# Patient Record
Sex: FEMALE | Race: WHITE | NOT HISPANIC OR LATINO | Employment: OTHER | RURAL
[De-identification: names, ages, dates, MRNs, and addresses within clinical notes are randomized per-mention and may not be internally consistent; named-entity substitution may affect disease eponyms.]

---

## 2018-06-06 ENCOUNTER — HISTORICAL (OUTPATIENT)
Dept: ADMINISTRATIVE | Facility: HOSPITAL | Age: 83
End: 2018-06-06

## 2018-06-14 LAB
LAB AP CLINICAL INFORMATION: NORMAL
LAB AP DIAGNOSIS - HISTORICAL: NORMAL
LAB AP GROSS PATHOLOGY - HISTORICAL: NORMAL
LAB AP SPECIMEN SUBMITTED - HISTORICAL: NORMAL

## 2020-02-13 ENCOUNTER — HISTORICAL (OUTPATIENT)
Dept: ADMINISTRATIVE | Facility: HOSPITAL | Age: 85
End: 2020-02-13

## 2020-07-29 ENCOUNTER — HISTORICAL (OUTPATIENT)
Dept: ADMINISTRATIVE | Facility: HOSPITAL | Age: 85
End: 2020-07-29

## 2021-04-13 ENCOUNTER — HOSPITAL ENCOUNTER (OUTPATIENT)
Dept: RADIOLOGY | Facility: HOSPITAL | Age: 86
Discharge: HOME | End: 2021-04-13
Attending: ORTHOPAEDIC SURGERY
Payer: MEDICARE

## 2021-04-13 DIAGNOSIS — M25.561 ACUTE PAIN OF RIGHT KNEE: ICD-10-CM

## 2021-04-13 PROBLEM — M19.90 ARTHRITIS: Status: ACTIVE | Noted: 2021-04-13

## 2021-04-13 PROCEDURE — 73562 X-RAY EXAM OF KNEE 3: CPT | Mod: TC,RT

## 2021-04-13 PROCEDURE — 73562 X-RAY EXAM OF KNEE 3: CPT | Mod: 26,RT,, | Performed by: RADIOLOGY

## 2021-04-13 PROCEDURE — 73562 XR KNEE AP LAT WITH SUNRISE RIGHT: ICD-10-PCS | Mod: 26,RT,, | Performed by: RADIOLOGY

## 2021-05-18 ENCOUNTER — HOSPITAL ENCOUNTER (OUTPATIENT)
Dept: RADIOLOGY | Facility: HOSPITAL | Age: 86
Discharge: HOME | End: 2021-05-18
Attending: NURSE PRACTITIONER
Payer: MEDICARE

## 2021-05-18 DIAGNOSIS — M25.562 ACUTE PAIN OF LEFT KNEE: ICD-10-CM

## 2021-05-18 PROBLEM — M17.12 PRIMARY OSTEOARTHRITIS OF LEFT KNEE: Status: ACTIVE | Noted: 2021-05-18

## 2021-05-18 PROCEDURE — 73562 X-RAY EXAM OF KNEE 3: CPT | Mod: TC,LT

## 2021-05-18 PROCEDURE — 73562 XR KNEE AP LAT WITH SUNRISE LEFT: ICD-10-PCS | Mod: 26,LT,,

## 2021-05-18 PROCEDURE — 73562 X-RAY EXAM OF KNEE 3: CPT | Mod: 26,LT,,

## 2021-06-08 ENCOUNTER — OFFICE VISIT (OUTPATIENT)
Dept: FAMILY MEDICINE | Facility: CLINIC | Age: 86
End: 2021-06-08
Payer: MEDICARE

## 2021-06-08 ENCOUNTER — APPOINTMENT (OUTPATIENT)
Dept: RADIOLOGY | Facility: CLINIC | Age: 86
End: 2021-06-08
Attending: FAMILY MEDICINE
Payer: MEDICARE

## 2021-06-08 VITALS
WEIGHT: 130.19 LBS | DIASTOLIC BLOOD PRESSURE: 66 MMHG | SYSTOLIC BLOOD PRESSURE: 140 MMHG | HEART RATE: 66 BPM | RESPIRATION RATE: 18 BRPM

## 2021-06-08 DIAGNOSIS — M54.9 DORSALGIA, UNSPECIFIED: ICD-10-CM

## 2021-06-08 DIAGNOSIS — I10 ESSENTIAL HYPERTENSION: ICD-10-CM

## 2021-06-08 DIAGNOSIS — M17.12 PRIMARY OSTEOARTHRITIS OF LEFT KNEE: Primary | ICD-10-CM

## 2021-06-08 PROCEDURE — 72100 X-RAY EXAM L-S SPINE 2/3 VWS: CPT | Mod: 26,,, | Performed by: RADIOLOGY

## 2021-06-08 PROCEDURE — 99213 PR OFFICE/OUTPT VISIT, EST, LEVL III, 20-29 MIN: ICD-10-PCS | Mod: ,,, | Performed by: FAMILY MEDICINE

## 2021-06-08 PROCEDURE — 72100 X-RAY EXAM L-S SPINE 2/3 VWS: CPT | Mod: TC,RHCUB | Performed by: FAMILY MEDICINE

## 2021-06-08 PROCEDURE — 72100 XR LUMBAR SPINE 2 OR 3 VIEWS: ICD-10-PCS | Mod: 26,,, | Performed by: RADIOLOGY

## 2021-06-08 PROCEDURE — 99213 OFFICE O/P EST LOW 20 MIN: CPT | Mod: ,,, | Performed by: FAMILY MEDICINE

## 2021-06-08 RX ORDER — DICLOFENAC SODIUM 10 MG/G
GEL TOPICAL
Status: ON HOLD | COMMUNITY
Start: 2021-04-17 | End: 2021-10-20 | Stop reason: HOSPADM

## 2021-06-08 RX ORDER — HYDROCODONE BITARTRATE AND ACETAMINOPHEN 7.5; 325 MG/1; MG/1
1 TABLET ORAL EVERY 6 HOURS PRN
Qty: 90 TABLET | Refills: 0 | Status: SHIPPED | OUTPATIENT
Start: 2021-06-08 | End: 2021-06-08 | Stop reason: SDUPTHER

## 2021-06-08 RX ORDER — HYDROCODONE BITARTRATE AND ACETAMINOPHEN 7.5; 325 MG/1; MG/1
1 TABLET ORAL EVERY 6 HOURS PRN
Qty: 90 TABLET | Refills: 0 | Status: SHIPPED | OUTPATIENT
Start: 2021-06-08 | End: 2021-09-17

## 2021-07-11 ENCOUNTER — HOSPITAL ENCOUNTER (EMERGENCY)
Facility: HOSPITAL | Age: 86
Discharge: HOME | End: 2021-07-11
Payer: MEDICARE

## 2021-07-11 VITALS
WEIGHT: 125 LBS | SYSTOLIC BLOOD PRESSURE: 128 MMHG | HEART RATE: 94 BPM | BODY MASS INDEX: 19.62 KG/M2 | HEIGHT: 67 IN | DIASTOLIC BLOOD PRESSURE: 86 MMHG | OXYGEN SATURATION: 91 % | TEMPERATURE: 98 F | RESPIRATION RATE: 18 BRPM

## 2021-07-11 DIAGNOSIS — M19.90 ARTHRITIS: ICD-10-CM

## 2021-07-11 DIAGNOSIS — T14.90XA INJURY: ICD-10-CM

## 2021-07-11 DIAGNOSIS — S40.011A CONTUSION OF RIGHT SHOULDER, INITIAL ENCOUNTER: ICD-10-CM
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
## 2021-07-11 DIAGNOSIS — M17.12 PRIMARY OSTEOARTHRITIS OF LEFT KNEE: ICD-10-CM

## 2021-07-11 DIAGNOSIS — I10 HYPERTENSION: ICD-10-CM

## 2021-07-11 PROCEDURE — 99283 EMERGENCY DEPT VISIT LOW MDM: CPT

## 2021-07-11 PROCEDURE — 99283 EMERGENCY DEPT VISIT LOW MDM: CPT | Mod: ,,, | Performed by: NURSE PRACTITIONER

## 2021-07-11 PROCEDURE — 99283 PR EMERGENCY DEPT VISIT,LEVEL III: ICD-10-PCS | Mod: ,,, | Performed by: NURSE PRACTITIONER

## 2021-07-11 RX ORDER — IBUPROFEN 800 MG/1
800 TABLET ORAL EVERY 6 HOURS PRN
Qty: 20 TABLET | Refills: 0 | Status: SHIPPED | OUTPATIENT
Start: 2021-07-11 | End: 2021-09-17

## 2021-07-11 RX ORDER — TIZANIDINE 4 MG/1
4 TABLET ORAL EVERY 8 HOURS PRN
Qty: 10 TABLET | Refills: 0 | Status: SHIPPED | OUTPATIENT
Start: 2021-07-11 | End: 2021-07-21

## 2021-07-19 ENCOUNTER — OFFICE VISIT (OUTPATIENT)
Dept: FAMILY MEDICINE | Facility: CLINIC | Age: 86
End: 2021-07-19
Payer: MEDICARE

## 2021-07-19 VITALS
BODY MASS INDEX: 19.58 KG/M2 | DIASTOLIC BLOOD PRESSURE: 70 MMHG | HEIGHT: 67 IN | HEART RATE: 75 BPM | SYSTOLIC BLOOD PRESSURE: 110 MMHG | RESPIRATION RATE: 14 BRPM | OXYGEN SATURATION: 94 %

## 2021-07-19 DIAGNOSIS — R41.82 ALTERED MENTAL STATUS, UNSPECIFIED ALTERED MENTAL STATUS TYPE: ICD-10-CM

## 2021-07-19 DIAGNOSIS — S09.90XA ATAXIA AFTER HEAD TRAUMA: ICD-10-CM

## 2021-07-19 DIAGNOSIS — M19.90 ARTHRITIS: ICD-10-CM

## 2021-07-19 DIAGNOSIS — R27.0 ATAXIA AFTER HEAD TRAUMA: Primary | ICD-10-CM

## 2021-07-19 DIAGNOSIS — M17.12 PRIMARY OSTEOARTHRITIS OF LEFT KNEE: ICD-10-CM

## 2021-07-19 DIAGNOSIS — S09.90XA ATAXIA AFTER HEAD TRAUMA: Primary | ICD-10-CM

## 2021-07-19 DIAGNOSIS — I10 ESSENTIAL HYPERTENSION: Primary | ICD-10-CM

## 2021-07-19 DIAGNOSIS — R53.83 FATIGUE, UNSPECIFIED TYPE: ICD-10-CM

## 2021-07-19 DIAGNOSIS — R27.0 ATAXIA AFTER HEAD TRAUMA: ICD-10-CM

## 2021-07-19 PROCEDURE — 99214 OFFICE O/P EST MOD 30 MIN: CPT | Mod: ,,, | Performed by: FAMILY MEDICINE

## 2021-07-19 PROCEDURE — 99214 PR OFFICE/OUTPT VISIT, EST, LEVL IV, 30-39 MIN: ICD-10-PCS | Mod: ,,, | Performed by: FAMILY MEDICINE

## 2021-08-02 ENCOUNTER — OFFICE VISIT (OUTPATIENT)
Dept: FAMILY MEDICINE | Facility: CLINIC | Age: 86
End: 2021-08-02
Payer: MEDICARE

## 2021-08-02 VITALS — HEIGHT: 67 IN | HEART RATE: 78 BPM | RESPIRATION RATE: 18 BRPM | WEIGHT: 122.38 LBS | BODY MASS INDEX: 19.21 KG/M2

## 2021-08-02 DIAGNOSIS — M54.9 DORSALGIA, UNSPECIFIED: ICD-10-CM

## 2021-08-02 DIAGNOSIS — F41.9 ANXIETY: Primary | ICD-10-CM

## 2021-08-02 DIAGNOSIS — M19.90 ARTHRITIS: ICD-10-CM

## 2021-08-02 PROCEDURE — 99213 OFFICE O/P EST LOW 20 MIN: CPT | Mod: ,,, | Performed by: FAMILY MEDICINE

## 2021-08-02 PROCEDURE — 99213 PR OFFICE/OUTPT VISIT, EST, LEVL III, 20-29 MIN: ICD-10-PCS | Mod: ,,, | Performed by: FAMILY MEDICINE

## 2021-08-02 RX ORDER — CYCLOBENZAPRINE HCL 10 MG
10 TABLET ORAL 3 TIMES DAILY PRN
Qty: 45 TABLET | Refills: 2 | Status: SHIPPED | OUTPATIENT
Start: 2021-08-02 | End: 2021-08-12

## 2021-08-02 RX ORDER — ALPRAZOLAM 1 MG/1
TABLET ORAL
Qty: 90 TABLET | Refills: 2 | Status: ON HOLD | OUTPATIENT
Start: 2021-08-02 | End: 2021-10-20 | Stop reason: HOSPADM

## 2021-08-16 ENCOUNTER — TELEPHONE (OUTPATIENT)
Dept: FAMILY MEDICINE | Facility: CLINIC | Age: 86
End: 2021-08-16

## 2021-08-16 DIAGNOSIS — I62.00 NONTRAUMATIC SUBDURAL HEMORRHAGE, UNSPECIFIED: ICD-10-CM

## 2021-08-16 DIAGNOSIS — S06.5XAA SUBDURAL HEMATOMA: Primary | ICD-10-CM

## 2021-08-17 ENCOUNTER — TELEPHONE (OUTPATIENT)
Dept: FAMILY MEDICINE | Facility: CLINIC | Age: 86
End: 2021-08-17

## 2021-08-17 DIAGNOSIS — M54.9 DORSALGIA, UNSPECIFIED: Primary | ICD-10-CM

## 2021-08-17 RX ORDER — HYDROCODONE BITARTRATE AND ACETAMINOPHEN 10; 325 MG/1; MG/1
1 TABLET ORAL EVERY 6 HOURS PRN
Qty: 60 TABLET | Refills: 0 | Status: SHIPPED | OUTPATIENT
Start: 2021-08-17 | End: 2021-08-31 | Stop reason: SDUPTHER

## 2021-08-18 ENCOUNTER — APPOINTMENT (OUTPATIENT)
Dept: RADIOLOGY | Facility: CLINIC | Age: 86
End: 2021-08-18
Attending: FAMILY MEDICINE
Payer: MEDICARE

## 2021-08-18 ENCOUNTER — OFFICE VISIT (OUTPATIENT)
Dept: FAMILY MEDICINE | Facility: CLINIC | Age: 86
End: 2021-08-18
Payer: MEDICARE

## 2021-08-18 VITALS
WEIGHT: 122 LBS | HEIGHT: 67 IN | SYSTOLIC BLOOD PRESSURE: 140 MMHG | BODY MASS INDEX: 19.15 KG/M2 | HEART RATE: 72 BPM | RESPIRATION RATE: 16 BRPM | DIASTOLIC BLOOD PRESSURE: 94 MMHG

## 2021-08-18 DIAGNOSIS — M25.552 LEFT HIP PAIN: Primary | ICD-10-CM

## 2021-08-18 DIAGNOSIS — M54.9 DORSALGIA, UNSPECIFIED: ICD-10-CM

## 2021-08-18 PROCEDURE — 72100 X-RAY EXAM L-S SPINE 2/3 VWS: CPT | Mod: TC,RHCUB | Performed by: FAMILY MEDICINE

## 2021-08-18 PROCEDURE — 99214 OFFICE O/P EST MOD 30 MIN: CPT | Mod: ,,, | Performed by: FAMILY MEDICINE

## 2021-08-18 PROCEDURE — 99214 PR OFFICE/OUTPT VISIT, EST, LEVL IV, 30-39 MIN: ICD-10-PCS | Mod: ,,, | Performed by: FAMILY MEDICINE

## 2021-08-18 PROCEDURE — 72100 X-RAY EXAM L-S SPINE 2/3 VWS: CPT | Mod: 26,,, | Performed by: STUDENT IN AN ORGANIZED HEALTH CARE EDUCATION/TRAINING PROGRAM

## 2021-08-18 PROCEDURE — 72100 XR LUMBAR SPINE AP AND LATERAL: ICD-10-PCS | Mod: 26,,, | Performed by: STUDENT IN AN ORGANIZED HEALTH CARE EDUCATION/TRAINING PROGRAM

## 2021-08-24 ENCOUNTER — TELEPHONE (OUTPATIENT)
Dept: FAMILY MEDICINE | Facility: CLINIC | Age: 86
End: 2021-08-24

## 2021-08-24 ENCOUNTER — OFFICE VISIT (OUTPATIENT)
Dept: PAIN MEDICINE | Facility: CLINIC | Age: 86
End: 2021-08-24
Payer: MEDICARE

## 2021-08-24 VITALS
SYSTOLIC BLOOD PRESSURE: 159 MMHG | WEIGHT: 123 LBS | HEIGHT: 67 IN | DIASTOLIC BLOOD PRESSURE: 69 MMHG | HEART RATE: 107 BPM | BODY MASS INDEX: 19.3 KG/M2

## 2021-08-24 DIAGNOSIS — S72.002A CLOSED FRACTURE OF LEFT HIP, INITIAL ENCOUNTER: Chronic | ICD-10-CM

## 2021-08-24 DIAGNOSIS — M54.9 DORSALGIA, UNSPECIFIED: Chronic | ICD-10-CM

## 2021-08-24 DIAGNOSIS — M25.559 HIP PAIN: Chronic | ICD-10-CM

## 2021-08-24 DIAGNOSIS — Z79.899 ENCOUNTER FOR LONG-TERM (CURRENT) USE OF OTHER MEDICATIONS: Primary | Chronic | ICD-10-CM

## 2021-08-24 LAB
CTP QC/QA: YES
POC (AMP) AMPHETAMINE: NEGATIVE
POC (BAR) BARBITURATES: NEGATIVE
POC (BUP) BUPRENORPHINE: NEGATIVE
POC (BZO) BENZODIAZEPINES: ABNORMAL
POC (COC) COCAINE: NEGATIVE
POC (MDMA) METHYLENEDIOXYMETHAMPHETAMINE 3,4: NEGATIVE
POC (MET) METHAMPHETAMINE: NEGATIVE
POC (MOP) OPIATES: ABNORMAL
POC (MTD) METHADONE: NEGATIVE
POC (OXY) OXYCODONE: NEGATIVE
POC (PCP) PHENCYCLIDINE: NEGATIVE
POC (TCA) TRICYCLIC ANTIDEPRESSANTS: NEGATIVE
POC TEMPERATURE (URINE): 94
POC THC: NEGATIVE

## 2021-08-24 PROCEDURE — 99214 OFFICE O/P EST MOD 30 MIN: CPT | Mod: PBBFAC | Performed by: PAIN MEDICINE

## 2021-08-24 PROCEDURE — 80305 DRUG TEST PRSMV DIR OPT OBS: CPT | Mod: PBBFAC | Performed by: PAIN MEDICINE

## 2021-08-24 PROCEDURE — 99204 OFFICE O/P NEW MOD 45 MIN: CPT | Mod: S$PBB,,, | Performed by: PAIN MEDICINE

## 2021-08-24 PROCEDURE — G0481 DRUG SCREEN DEFINITIVE 14, URINE: ICD-10-PCS | Mod: ,,, | Performed by: CLINICAL MEDICAL LABORATORY

## 2021-08-24 PROCEDURE — 99204 PR OFFICE/OUTPT VISIT, NEW, LEVL IV, 45-59 MIN: ICD-10-PCS | Mod: S$PBB,,, | Performed by: PAIN MEDICINE

## 2021-08-24 PROCEDURE — G0481 DRUG TEST DEF 8-14 CLASSES: HCPCS | Mod: ,,, | Performed by: CLINICAL MEDICAL LABORATORY

## 2021-08-24 RX ORDER — OXYCODONE AND ACETAMINOPHEN 5; 325 MG/1; MG/1
1 TABLET ORAL
Qty: 30 TABLET | Refills: 0 | Status: SHIPPED | OUTPATIENT
Start: 2021-08-24 | End: 2021-08-24

## 2021-08-24 RX ORDER — CELECOXIB 200 MG/1
200 CAPSULE ORAL DAILY
Qty: 30 CAPSULE | Refills: 0 | Status: SHIPPED | OUTPATIENT
Start: 2021-08-24 | End: 2021-08-24

## 2021-08-31 ENCOUNTER — TELEPHONE (OUTPATIENT)
Dept: FAMILY MEDICINE | Facility: CLINIC | Age: 86
End: 2021-08-31

## 2021-08-31 DIAGNOSIS — M54.9 DORSALGIA, UNSPECIFIED: ICD-10-CM

## 2021-08-31 RX ORDER — HYDROCODONE BITARTRATE AND ACETAMINOPHEN 10; 325 MG/1; MG/1
1 TABLET ORAL EVERY 6 HOURS PRN
Qty: 60 TABLET | Refills: 0 | Status: ON HOLD | OUTPATIENT
Start: 2021-08-31 | End: 2021-10-20 | Stop reason: HOSPADM

## 2021-09-01 ENCOUNTER — HOSPITAL ENCOUNTER (EMERGENCY)
Facility: HOSPITAL | Age: 86
Discharge: HOME | End: 2021-09-01
Attending: FAMILY MEDICINE
Payer: MEDICARE

## 2021-09-01 VITALS
HEART RATE: 87 BPM | BODY MASS INDEX: 20.24 KG/M2 | SYSTOLIC BLOOD PRESSURE: 139 MMHG | TEMPERATURE: 99 F | DIASTOLIC BLOOD PRESSURE: 61 MMHG | HEIGHT: 62 IN | OXYGEN SATURATION: 95 % | RESPIRATION RATE: 17 BRPM | WEIGHT: 110 LBS

## 2021-09-01 DIAGNOSIS — M19.90 ARTHRITIS: ICD-10-CM
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
## 2021-09-01 DIAGNOSIS — M17.12 PRIMARY OSTEOARTHRITIS OF LEFT KNEE: ICD-10-CM

## 2021-09-01 DIAGNOSIS — S51.011A SKIN TEAR OF RIGHT ELBOW WITHOUT COMPLICATION, INITIAL ENCOUNTER: Primary | ICD-10-CM

## 2021-09-01 DIAGNOSIS — S81.811A SKIN TEAR OF RIGHT LOWER LEG WITHOUT COMPLICATION, INITIAL ENCOUNTER: ICD-10-CM

## 2021-09-01 DIAGNOSIS — I10 HYPERTENSION: ICD-10-CM

## 2021-09-01 PROCEDURE — 12004 RPR S/N/AX/GEN/TRK7.6-12.5CM: CPT | Mod: ,,, | Performed by: FAMILY MEDICINE

## 2021-09-01 PROCEDURE — 96372 THER/PROPH/DIAG INJ SC/IM: CPT

## 2021-09-01 PROCEDURE — 90715 TDAP VACCINE 7 YRS/> IM: CPT | Performed by: FAMILY MEDICINE

## 2021-09-01 PROCEDURE — 99284 EMERGENCY DEPT VISIT MOD MDM: CPT

## 2021-09-01 PROCEDURE — 12002 RPR S/N/AX/GEN/TRNK2.6-7.5CM: CPT

## 2021-09-01 PROCEDURE — 63600175 PHARM REV CODE 636 W HCPCS: Performed by: FAMILY MEDICINE

## 2021-09-01 PROCEDURE — 12004 PR RESUPERF WND BODY 7.6-12.5 CM: ICD-10-PCS | Mod: ,,, | Performed by: FAMILY MEDICINE

## 2021-09-01 PROCEDURE — 25000003 PHARM REV CODE 250: Performed by: FAMILY MEDICINE

## 2021-09-01 PROCEDURE — 99283 PR EMERGENCY DEPT VISIT,LEVEL III: ICD-10-PCS | Mod: 25,,, | Performed by: FAMILY MEDICINE

## 2021-09-01 PROCEDURE — 90471 IMMUNIZATION ADMIN: CPT | Performed by: FAMILY MEDICINE

## 2021-09-01 PROCEDURE — 99283 EMERGENCY DEPT VISIT LOW MDM: CPT | Mod: 25,,, | Performed by: FAMILY MEDICINE

## 2021-09-01 RX ORDER — HYDROMORPHONE HYDROCHLORIDE 2 MG/ML
0.5 INJECTION, SOLUTION INTRAMUSCULAR; INTRAVENOUS; SUBCUTANEOUS
Status: COMPLETED | OUTPATIENT
Start: 2021-09-01 | End: 2021-09-01

## 2021-09-01 RX ORDER — LIDOCAINE HYDROCHLORIDE 10 MG/ML
1 INJECTION INFILTRATION; PERINEURAL
Status: COMPLETED | OUTPATIENT
Start: 2021-09-01 | End: 2021-09-01

## 2021-09-01 RX ADMIN — LIDOCAINE HYDROCHLORIDE 1 ML: 10 INJECTION, SOLUTION INFILTRATION; PERINEURAL at 03:09

## 2021-09-01 RX ADMIN — TETANUS TOXOID, REDUCED DIPHTHERIA TOXOID AND ACELLULAR PERTUSSIS VACCINE, ADSORBED 0.5 ML: 5; 2.5; 8; 8; 2.5 SUSPENSION INTRAMUSCULAR at 03:09

## 2021-09-01 RX ADMIN — HYDROMORPHONE HYDROCHLORIDE 0.5 MG: 2 INJECTION, SOLUTION INTRAMUSCULAR; INTRAVENOUS; SUBCUTANEOUS at 04:09

## 2021-09-08 LAB
CREAT UR-MCNC: 60 MG/DL (ref 28–219)
PH UR STRIP: 6 PH UNITS
SP GR UR STRIP: 1.01

## 2021-09-20 ENCOUNTER — OFFICE VISIT (OUTPATIENT)
Dept: FAMILY MEDICINE | Facility: CLINIC | Age: 86
End: 2021-09-20
Payer: MEDICARE

## 2021-09-20 ENCOUNTER — APPOINTMENT (OUTPATIENT)
Dept: RADIOLOGY | Facility: CLINIC | Age: 86
End: 2021-09-20
Attending: FAMILY MEDICINE
Payer: MEDICARE

## 2021-09-20 ENCOUNTER — HOSPITAL ENCOUNTER (INPATIENT)
Facility: HOSPITAL | Age: 86
LOS: 3 days | Discharge: NURSING, REHAB OR LONG-TERM CARE | DRG: 522 | End: 2021-09-23
Attending: INTERNAL MEDICINE | Admitting: INTERNAL MEDICINE
Payer: MEDICARE

## 2021-09-20 VITALS — DIASTOLIC BLOOD PRESSURE: 84 MMHG | SYSTOLIC BLOOD PRESSURE: 122 MMHG | RESPIRATION RATE: 16 BRPM | HEART RATE: 80 BPM

## 2021-09-20 DIAGNOSIS — M19.90 ARTHRITIS: ICD-10-CM

## 2021-09-20 DIAGNOSIS — S72.002A HIP FX, LEFT, CLOSED, INITIAL ENCOUNTER: ICD-10-CM

## 2021-09-20 DIAGNOSIS — S72.002A CLOSED FRACTURE OF LEFT HIP, INITIAL ENCOUNTER: Primary | ICD-10-CM

## 2021-09-20 DIAGNOSIS — S72.002A CLOSED FRACTURE OF LEFT HIP: ICD-10-CM

## 2021-09-20 DIAGNOSIS — N30.00 ACUTE CYSTITIS WITHOUT HEMATURIA: ICD-10-CM

## 2021-09-20 DIAGNOSIS — N39.0 URINARY TRACT INFECTION WITHOUT HEMATURIA: ICD-10-CM

## 2021-09-20 DIAGNOSIS — M79.605 LEFT LEG PAIN: ICD-10-CM

## 2021-09-20 DIAGNOSIS — R07.9 CHEST PAIN: ICD-10-CM

## 2021-09-20 DIAGNOSIS — N39.0 URINARY TRACT INFECTION WITHOUT HEMATURIA, SITE UNSPECIFIED: ICD-10-CM

## 2021-09-20 DIAGNOSIS — I10 ESSENTIAL HYPERTENSION: ICD-10-CM

## 2021-09-20 DIAGNOSIS — Z01.818 PRE-OP EVALUATION: ICD-10-CM

## 2021-09-20 DIAGNOSIS — I10 HYPERTENSION: ICD-10-CM

## 2021-09-20 DIAGNOSIS — M79.605 LEFT LEG PAIN: Primary | ICD-10-CM

## 2021-09-20 DIAGNOSIS — M25.559 HIP PAIN: ICD-10-CM

## 2021-09-20 DIAGNOSIS — S81.011D LACERATION OF RIGHT KNEE, SUBSEQUENT ENCOUNTER: ICD-10-CM

## 2021-09-20 DIAGNOSIS — M17.12 PRIMARY OSTEOARTHRITIS OF LEFT KNEE: ICD-10-CM

## 2021-09-20 LAB
ALBUMIN SERPL BCP-MCNC: 3.7 G/DL (ref 3.5–5)
ALBUMIN/GLOB SERPL: 1.1 {RATIO}
ALP SERPL-CCNC: 130 U/L (ref 55–142)
ALT SERPL W P-5'-P-CCNC: 16 U/L (ref 13–56)
ANION GAP SERPL CALCULATED.3IONS-SCNC: 15 MMOL/L (ref 7–16)
AST SERPL W P-5'-P-CCNC: 17 U/L (ref 15–37)
BACTERIA #/AREA URNS HPF: ABNORMAL /HPF
BASOPHILS # BLD AUTO: 0.08 K/UL (ref 0–0.2)
BASOPHILS NFR BLD AUTO: 0.7 % (ref 0–1)
BILIRUB SERPL-MCNC: 0.5 MG/DL (ref 0–1.2)
BILIRUB UR QL STRIP: NEGATIVE
BUN SERPL-MCNC: 12 MG/DL (ref 7–18)
BUN/CREAT SERPL: 15 (ref 6–20)
CALCIUM SERPL-MCNC: 9.3 MG/DL (ref 8.5–10.1)
CHLORIDE SERPL-SCNC: 101 MMOL/L (ref 98–107)
CLARITY UR: ABNORMAL
CO2 SERPL-SCNC: 28 MMOL/L (ref 21–32)
COLOR UR: ABNORMAL
CREAT SERPL-MCNC: 0.81 MG/DL (ref 0.55–1.02)
DIFFERENTIAL METHOD BLD: ABNORMAL
EOSINOPHIL # BLD AUTO: 0.05 K/UL (ref 0–0.5)
EOSINOPHIL NFR BLD AUTO: 0.4 % (ref 1–4)
ERYTHROCYTE [DISTWIDTH] IN BLOOD BY AUTOMATED COUNT: 12.6 % (ref 11.5–14.5)
FLUAV AG UPPER RESP QL IA.RAPID: NEGATIVE
FLUBV AG UPPER RESP QL IA.RAPID: NEGATIVE
GLOBULIN SER-MCNC: 3.3 G/DL (ref 2–4)
GLUCOSE SERPL-MCNC: 116 MG/DL (ref 74–106)
GLUCOSE UR STRIP-MCNC: NEGATIVE MG/DL
HCT VFR BLD AUTO: 38.8 % (ref 38–47)
HGB BLD-MCNC: 12.6 G/DL (ref 12–16)
HYALINE CASTS #/AREA URNS LPF: ABNORMAL /LPF
IMM GRANULOCYTES # BLD AUTO: 0.1 K/UL (ref 0–0.04)
IMM GRANULOCYTES NFR BLD: 0.9 % (ref 0–0.4)
KETONES UR STRIP-SCNC: ABNORMAL MG/DL
LEUKOCYTE ESTERASE UR QL STRIP: ABNORMAL
LYMPHOCYTES # BLD AUTO: 1.76 K/UL (ref 1–4.8)
LYMPHOCYTES NFR BLD AUTO: 15.5 % (ref 27–41)
MCH RBC QN AUTO: 28.7 PG (ref 27–31)
MCHC RBC AUTO-ENTMCNC: 32.5 G/DL (ref 32–36)
MCV RBC AUTO: 88.4 FL (ref 80–96)
MONOCYTES # BLD AUTO: 0.78 K/UL (ref 0–0.8)
MONOCYTES NFR BLD AUTO: 6.9 % (ref 2–6)
MPC BLD CALC-MCNC: 9.9 FL (ref 9.4–12.4)
MUCOUS THREADS #/AREA URNS HPF: ABNORMAL /HPF
NEUTROPHILS # BLD AUTO: 8.59 K/UL (ref 1.8–7.7)
NEUTROPHILS NFR BLD AUTO: 75.6 % (ref 53–65)
NITRITE UR QL STRIP: POSITIVE
NRBC # BLD AUTO: 0 X10E3/UL
NRBC, AUTO (.00): 0 %
PH UR STRIP: 5.5 PH UNITS
PLATELET # BLD AUTO: 268 K/UL (ref 150–400)
POTASSIUM SERPL-SCNC: 4.7 MMOL/L (ref 3.5–5.1)
PROT SERPL-MCNC: 7 G/DL (ref 6.4–8.2)
PROT UR QL STRIP: NEGATIVE
RBC # BLD AUTO: 4.39 M/UL (ref 4.2–5.4)
RBC # UR STRIP: ABNORMAL /UL
RBC #/AREA URNS HPF: ABNORMAL /HPF
SARS-COV+SARS-COV-2 AG RESP QL IA.RAPID: NEGATIVE
SODIUM SERPL-SCNC: 139 MMOL/L (ref 136–145)
SP GR UR STRIP: 1.01
SQUAMOUS #/AREA URNS LPF: ABNORMAL /LPF
TRANS CELLS #/AREA URNS LPF: ABNORMAL /LPF
UROBILINOGEN UR STRIP-ACNC: 0.2 MG/DL
WBC # BLD AUTO: 11.36 K/UL (ref 4.5–11)
WBC #/AREA URNS HPF: ABNORMAL /HPF

## 2021-09-20 PROCEDURE — 36415 COLL VENOUS BLD VENIPUNCTURE: CPT | Performed by: NURSE PRACTITIONER

## 2021-09-20 PROCEDURE — 73552 XR FEMUR 2 VIEW LEFT: ICD-10-PCS | Mod: 26,LT,, | Performed by: RADIOLOGY

## 2021-09-20 PROCEDURE — 99214 OFFICE O/P EST MOD 30 MIN: CPT | Mod: ,,, | Performed by: FAMILY MEDICINE

## 2021-09-20 PROCEDURE — 87077 CULTURE AEROBIC IDENTIFY: CPT | Performed by: NURSE PRACTITIONER

## 2021-09-20 PROCEDURE — 81003 URINALYSIS AUTO W/O SCOPE: CPT | Performed by: NURSE PRACTITIONER

## 2021-09-20 PROCEDURE — 63600175 PHARM REV CODE 636 W HCPCS: Performed by: NURSE PRACTITIONER

## 2021-09-20 PROCEDURE — 11000001 HC ACUTE MED/SURG PRIVATE ROOM

## 2021-09-20 PROCEDURE — 96374 THER/PROPH/DIAG INJ IV PUSH: CPT

## 2021-09-20 PROCEDURE — 87428 SARSCOV & INF VIR A&B AG IA: CPT | Performed by: NURSE PRACTITIONER

## 2021-09-20 PROCEDURE — 81001 URINALYSIS AUTO W/SCOPE: CPT | Performed by: NURSE PRACTITIONER

## 2021-09-20 PROCEDURE — 80053 COMPREHEN METABOLIC PANEL: CPT | Performed by: NURSE PRACTITIONER

## 2021-09-20 PROCEDURE — 87186 SC STD MICRODIL/AGAR DIL: CPT | Performed by: NURSE PRACTITIONER

## 2021-09-20 PROCEDURE — 25000003 PHARM REV CODE 250: Performed by: NURSE PRACTITIONER

## 2021-09-20 PROCEDURE — 73552 X-RAY EXAM OF FEMUR 2/>: CPT | Mod: 26,LT,, | Performed by: RADIOLOGY

## 2021-09-20 PROCEDURE — 99284 PR EMERGENCY DEPT VISIT,LEVEL IV: ICD-10-PCS | Mod: ,,, | Performed by: NURSE PRACTITIONER

## 2021-09-20 PROCEDURE — 85025 COMPLETE CBC W/AUTO DIFF WBC: CPT | Performed by: NURSE PRACTITIONER

## 2021-09-20 PROCEDURE — 99223 PR INITIAL HOSPITAL CARE,LEVL III: ICD-10-PCS | Mod: AI,,, | Performed by: INTERNAL MEDICINE

## 2021-09-20 PROCEDURE — 99285 EMERGENCY DEPT VISIT HI MDM: CPT | Mod: 25

## 2021-09-20 PROCEDURE — 99223 1ST HOSP IP/OBS HIGH 75: CPT | Mod: AI,,, | Performed by: INTERNAL MEDICINE

## 2021-09-20 PROCEDURE — 99214 PR OFFICE/OUTPT VISIT, EST, LEVL IV, 30-39 MIN: ICD-10-PCS | Mod: ,,, | Performed by: FAMILY MEDICINE

## 2021-09-20 PROCEDURE — 73552 X-RAY EXAM OF FEMUR 2/>: CPT | Mod: TC,RHCUB,LT | Performed by: FAMILY MEDICINE

## 2021-09-20 PROCEDURE — 99284 EMERGENCY DEPT VISIT MOD MDM: CPT | Mod: ,,, | Performed by: NURSE PRACTITIONER

## 2021-09-20 RX ADMIN — CEFTRIAXONE SODIUM 1 G: 1 INJECTION, POWDER, FOR SOLUTION INTRAMUSCULAR; INTRAVENOUS at 10:09

## 2021-09-21 ENCOUNTER — ANESTHESIA EVENT (OUTPATIENT)
Dept: SURGERY | Facility: HOSPITAL | Age: 86
DRG: 522 | End: 2021-09-21
Payer: MEDICARE

## 2021-09-21 ENCOUNTER — ANESTHESIA (OUTPATIENT)
Dept: SURGERY | Facility: HOSPITAL | Age: 86
DRG: 522 | End: 2021-09-21
Payer: MEDICARE

## 2021-09-21 PROBLEM — N39.0 URINARY TRACT INFECTION WITHOUT HEMATURIA: Status: ACTIVE | Noted: 2021-09-21

## 2021-09-21 LAB
ANION GAP SERPL CALCULATED.3IONS-SCNC: 14 MMOL/L (ref 7–16)
ANION GAP SERPL CALCULATED.3IONS-SCNC: 16 MMOL/L (ref 7–16)
APTT PPP: 34 SECONDS (ref 25.2–37.3)
BASOPHILS # BLD AUTO: 0.08 K/UL (ref 0–0.2)
BASOPHILS # BLD AUTO: 0.11 K/UL (ref 0–0.2)
BASOPHILS NFR BLD AUTO: 0.6 % (ref 0–1)
BASOPHILS NFR BLD AUTO: 0.8 % (ref 0–1)
BUN SERPL-MCNC: 12 MG/DL (ref 7–18)
BUN SERPL-MCNC: 9 MG/DL (ref 7–18)
BUN/CREAT SERPL: 14 (ref 6–20)
BUN/CREAT SERPL: 16 (ref 6–20)
CALCIUM SERPL-MCNC: 8.2 MG/DL (ref 8.5–10.1)
CALCIUM SERPL-MCNC: 8.7 MG/DL (ref 8.5–10.1)
CHLORIDE SERPL-SCNC: 105 MMOL/L (ref 98–107)
CHLORIDE SERPL-SCNC: 105 MMOL/L (ref 98–107)
CO2 SERPL-SCNC: 25 MMOL/L (ref 21–32)
CO2 SERPL-SCNC: 28 MMOL/L (ref 21–32)
CREAT SERPL-MCNC: 0.64 MG/DL (ref 0.55–1.02)
CREAT SERPL-MCNC: 0.77 MG/DL (ref 0.55–1.02)
DIFFERENTIAL METHOD BLD: ABNORMAL
DIFFERENTIAL METHOD BLD: ABNORMAL
EOSINOPHIL # BLD AUTO: 0.27 K/UL (ref 0–0.5)
EOSINOPHIL # BLD AUTO: 0.38 K/UL (ref 0–0.5)
EOSINOPHIL NFR BLD AUTO: 2.2 % (ref 1–4)
EOSINOPHIL NFR BLD AUTO: 2.8 % (ref 1–4)
ERYTHROCYTE [DISTWIDTH] IN BLOOD BY AUTOMATED COUNT: 12.7 % (ref 11.5–14.5)
ERYTHROCYTE [DISTWIDTH] IN BLOOD BY AUTOMATED COUNT: 12.8 % (ref 11.5–14.5)
GLUCOSE SERPL-MCNC: 119 MG/DL (ref 74–106)
GLUCOSE SERPL-MCNC: 91 MG/DL (ref 74–106)
HCT VFR BLD AUTO: 35.5 % (ref 38–47)
HCT VFR BLD AUTO: 37.1 % (ref 38–47)
HGB BLD-MCNC: 11.3 G/DL (ref 12–16)
HGB BLD-MCNC: 12 G/DL (ref 12–16)
IMM GRANULOCYTES # BLD AUTO: 0.07 K/UL (ref 0–0.04)
IMM GRANULOCYTES # BLD AUTO: 0.22 K/UL (ref 0–0.04)
IMM GRANULOCYTES NFR BLD: 0.7 % (ref 0–0.4)
IMM GRANULOCYTES NFR BLD: 1.3 % (ref 0–0.4)
INDIRECT COOMBS: NORMAL
INR BLD: 0.94 (ref 0.9–1.1)
LYMPHOCYTES # BLD AUTO: 2.47 K/UL (ref 1–4.8)
LYMPHOCYTES # BLD AUTO: 3.2 K/UL (ref 1–4.8)
LYMPHOCYTES NFR BLD AUTO: 18.6 % (ref 27–41)
LYMPHOCYTES NFR BLD AUTO: 25.2 % (ref 27–41)
MCH RBC QN AUTO: 28.7 PG (ref 27–31)
MCH RBC QN AUTO: 28.7 PG (ref 27–31)
MCHC RBC AUTO-ENTMCNC: 31.8 G/DL (ref 32–36)
MCHC RBC AUTO-ENTMCNC: 32.3 G/DL (ref 32–36)
MCV RBC AUTO: 88.8 FL (ref 80–96)
MCV RBC AUTO: 90.1 FL (ref 80–96)
MONOCYTES # BLD AUTO: 0.83 K/UL (ref 0–0.8)
MONOCYTES # BLD AUTO: 0.94 K/UL (ref 0–0.8)
MONOCYTES NFR BLD AUTO: 5.5 % (ref 2–6)
MONOCYTES NFR BLD AUTO: 8.5 % (ref 2–6)
MPC BLD CALC-MCNC: 10.2 FL (ref 9.4–12.4)
MPC BLD CALC-MCNC: 10.2 FL (ref 9.4–12.4)
NEUTROPHILS # BLD AUTO: 12.33 K/UL (ref 1.8–7.7)
NEUTROPHILS # BLD AUTO: 6.07 K/UL (ref 1.8–7.7)
NEUTROPHILS NFR BLD AUTO: 62 % (ref 53–65)
NEUTROPHILS NFR BLD AUTO: 71.8 % (ref 53–65)
NRBC # BLD AUTO: 0 X10E3/UL
NRBC # BLD AUTO: 0 X10E3/UL
NRBC, AUTO (.00): 0 %
NRBC, AUTO (.00): 0 %
PLATELET # BLD AUTO: 244 K/UL (ref 150–400)
PLATELET # BLD AUTO: 249 K/UL (ref 150–400)
POTASSIUM SERPL-SCNC: 3.7 MMOL/L (ref 3.5–5.1)
POTASSIUM SERPL-SCNC: 4.6 MMOL/L (ref 3.5–5.1)
PROTHROMBIN TIME: 12.6 SECONDS (ref 11.7–14.7)
RBC # BLD AUTO: 3.94 M/UL (ref 4.2–5.4)
RBC # BLD AUTO: 4.18 M/UL (ref 4.2–5.4)
RH BLD: NORMAL
SODIUM SERPL-SCNC: 142 MMOL/L (ref 136–145)
SODIUM SERPL-SCNC: 142 MMOL/L (ref 136–145)
TROPONIN I SERPL-MCNC: <0.017 NG/ML
WBC # BLD AUTO: 17.18 K/UL (ref 4.5–11)
WBC # BLD AUTO: 9.79 K/UL (ref 4.5–11)

## 2021-09-21 PROCEDURE — 84484 ASSAY OF TROPONIN QUANT: CPT | Performed by: INTERNAL MEDICINE

## 2021-09-21 PROCEDURE — 80048 BASIC METABOLIC PNL TOTAL CA: CPT | Performed by: INTERNAL MEDICINE

## 2021-09-21 PROCEDURE — D9220A PRA ANESTHESIA: Mod: CRNA,,, | Performed by: NURSE ANESTHETIST, CERTIFIED REGISTERED

## 2021-09-21 PROCEDURE — 80048 BASIC METABOLIC PNL TOTAL CA: CPT | Performed by: ORTHOPAEDIC SURGERY

## 2021-09-21 PROCEDURE — 99233 PR SUBSEQUENT HOSPITAL CARE,LEVL III: ICD-10-PCS | Mod: ,,, | Performed by: HOSPITALIST

## 2021-09-21 PROCEDURE — 85025 COMPLETE CBC W/AUTO DIFF WBC: CPT | Performed by: INTERNAL MEDICINE

## 2021-09-21 PROCEDURE — 36415 COLL VENOUS BLD VENIPUNCTURE: CPT | Performed by: INTERNAL MEDICINE

## 2021-09-21 PROCEDURE — 88305 SURGICAL PATHOLOGY: ICD-10-PCS | Mod: 26,,, | Performed by: PATHOLOGY

## 2021-09-21 PROCEDURE — D9220A PRA ANESTHESIA: ICD-10-PCS | Mod: CRNA,,, | Performed by: NURSE ANESTHETIST, CERTIFIED REGISTERED

## 2021-09-21 PROCEDURE — 88305 TISSUE EXAM BY PATHOLOGIST: CPT | Mod: 26,,, | Performed by: PATHOLOGY

## 2021-09-21 PROCEDURE — 25000003 PHARM REV CODE 250: Performed by: NURSE ANESTHETIST, CERTIFIED REGISTERED

## 2021-09-21 PROCEDURE — 86900 BLOOD TYPING SEROLOGIC ABO: CPT | Performed by: ORTHOPAEDIC SURGERY

## 2021-09-21 PROCEDURE — 63600175 PHARM REV CODE 636 W HCPCS: Performed by: NURSE ANESTHETIST, CERTIFIED REGISTERED

## 2021-09-21 PROCEDURE — D9220A PRA ANESTHESIA: ICD-10-PCS | Mod: ANES,ICN,, | Performed by: ANESTHESIOLOGY

## 2021-09-21 PROCEDURE — 37000008 HC ANESTHESIA 1ST 15 MINUTES: Performed by: ORTHOPAEDIC SURGERY

## 2021-09-21 PROCEDURE — 36000711: Performed by: ORTHOPAEDIC SURGERY

## 2021-09-21 PROCEDURE — 93005 ELECTROCARDIOGRAM TRACING: CPT

## 2021-09-21 PROCEDURE — 37000009 HC ANESTHESIA EA ADD 15 MINS: Performed by: ORTHOPAEDIC SURGERY

## 2021-09-21 PROCEDURE — 36000710: Performed by: ORTHOPAEDIC SURGERY

## 2021-09-21 PROCEDURE — 85730 THROMBOPLASTIN TIME PARTIAL: CPT | Performed by: INTERNAL MEDICINE

## 2021-09-21 PROCEDURE — 63600175 PHARM REV CODE 636 W HCPCS: Performed by: ANESTHESIOLOGY

## 2021-09-21 PROCEDURE — 88305 TISSUE EXAM BY PATHOLOGIST: CPT | Mod: SUR | Performed by: ORTHOPAEDIC SURGERY

## 2021-09-21 PROCEDURE — 25000003 PHARM REV CODE 250: Performed by: INTERNAL MEDICINE

## 2021-09-21 PROCEDURE — D9220A PRA ANESTHESIA: Mod: ANES,ICN,, | Performed by: ANESTHESIOLOGY

## 2021-09-21 PROCEDURE — 27201423 OPTIME MED/SURG SUP & DEVICES STERILE SUPPLY: Performed by: ORTHOPAEDIC SURGERY

## 2021-09-21 PROCEDURE — 71000033 HC RECOVERY, INTIAL HOUR: Performed by: ORTHOPAEDIC SURGERY

## 2021-09-21 PROCEDURE — 88311 DECALCIFY TISSUE: CPT | Mod: 26,,, | Performed by: PATHOLOGY

## 2021-09-21 PROCEDURE — C1776 JOINT DEVICE (IMPLANTABLE): HCPCS | Performed by: ORTHOPAEDIC SURGERY

## 2021-09-21 PROCEDURE — C1769 GUIDE WIRE: HCPCS | Performed by: ORTHOPAEDIC SURGERY

## 2021-09-21 PROCEDURE — 99233 SBSQ HOSP IP/OBS HIGH 50: CPT | Mod: ,,, | Performed by: HOSPITALIST

## 2021-09-21 PROCEDURE — 11000001 HC ACUTE MED/SURG PRIVATE ROOM

## 2021-09-21 PROCEDURE — 63600175 PHARM REV CODE 636 W HCPCS: Performed by: INTERNAL MEDICINE

## 2021-09-21 PROCEDURE — 85610 PROTHROMBIN TIME: CPT | Performed by: INTERNAL MEDICINE

## 2021-09-21 PROCEDURE — 88311 SURGICAL PATHOLOGY: ICD-10-PCS | Mod: 26,,, | Performed by: PATHOLOGY

## 2021-09-21 PROCEDURE — 85025 COMPLETE CBC W/AUTO DIFF WBC: CPT | Performed by: ORTHOPAEDIC SURGERY

## 2021-09-21 PROCEDURE — 87040 BLOOD CULTURE FOR BACTERIA: CPT | Performed by: INTERNAL MEDICINE

## 2021-09-21 DEVICE — IMP HEAD FEM 28MM +0MM: Type: IMPLANTABLE DEVICE | Site: HIP | Status: FUNCTIONAL

## 2021-09-21 DEVICE — IMPLANTABLE DEVICE: Type: IMPLANTABLE DEVICE | Site: HIP | Status: FUNCTIONAL

## 2021-09-21 RX ORDER — HYDROMORPHONE HYDROCHLORIDE 2 MG/ML
0.5 INJECTION, SOLUTION INTRAMUSCULAR; INTRAVENOUS; SUBCUTANEOUS EVERY 5 MIN PRN
Status: DISCONTINUED | OUTPATIENT
Start: 2021-09-21 | End: 2021-09-21 | Stop reason: HOSPADM

## 2021-09-21 RX ORDER — PROCHLORPERAZINE EDISYLATE 5 MG/ML
5 INJECTION INTRAMUSCULAR; INTRAVENOUS EVERY 6 HOURS PRN
Status: DISCONTINUED | OUTPATIENT
Start: 2021-09-21 | End: 2021-09-23 | Stop reason: HOSPADM

## 2021-09-21 RX ORDER — HYDROMORPHONE HYDROCHLORIDE 2 MG/ML
1 INJECTION, SOLUTION INTRAMUSCULAR; INTRAVENOUS; SUBCUTANEOUS EVERY 4 HOURS PRN
Status: DISCONTINUED | OUTPATIENT
Start: 2021-09-21 | End: 2021-09-22

## 2021-09-21 RX ORDER — PANTOPRAZOLE SODIUM 40 MG/1
40 TABLET, DELAYED RELEASE ORAL DAILY
Status: DISCONTINUED | OUTPATIENT
Start: 2021-09-22 | End: 2021-09-23 | Stop reason: HOSPADM

## 2021-09-21 RX ORDER — ONDANSETRON 2 MG/ML
INJECTION INTRAMUSCULAR; INTRAVENOUS
Status: DISCONTINUED | OUTPATIENT
Start: 2021-09-21 | End: 2021-09-21

## 2021-09-21 RX ORDER — IPRATROPIUM BROMIDE AND ALBUTEROL SULFATE 2.5; .5 MG/3ML; MG/3ML
3 SOLUTION RESPIRATORY (INHALATION) EVERY 4 HOURS PRN
Status: DISCONTINUED | OUTPATIENT
Start: 2021-09-21 | End: 2021-09-23 | Stop reason: HOSPADM

## 2021-09-21 RX ORDER — ONDANSETRON 2 MG/ML
4 INJECTION INTRAMUSCULAR; INTRAVENOUS EVERY 8 HOURS PRN
Status: DISCONTINUED | OUTPATIENT
Start: 2021-09-21 | End: 2021-09-22 | Stop reason: SDUPTHER

## 2021-09-21 RX ORDER — PROPOFOL 10 MG/ML
INJECTION, EMULSION INTRAVENOUS
Status: DISCONTINUED | OUTPATIENT
Start: 2021-09-21 | End: 2021-09-21

## 2021-09-21 RX ORDER — DIPHENHYDRAMINE HYDROCHLORIDE 50 MG/ML
25 INJECTION INTRAMUSCULAR; INTRAVENOUS EVERY 6 HOURS PRN
Status: DISCONTINUED | OUTPATIENT
Start: 2021-09-21 | End: 2021-09-21 | Stop reason: HOSPADM

## 2021-09-21 RX ORDER — MORPHINE SULFATE 10 MG/ML
4 INJECTION INTRAMUSCULAR; INTRAVENOUS; SUBCUTANEOUS EVERY 5 MIN PRN
Status: DISCONTINUED | OUTPATIENT
Start: 2021-09-21 | End: 2021-09-21 | Stop reason: HOSPADM

## 2021-09-21 RX ORDER — ACETAMINOPHEN 325 MG/1
650 TABLET ORAL EVERY 8 HOURS PRN
Status: DISCONTINUED | OUTPATIENT
Start: 2021-09-21 | End: 2021-09-23 | Stop reason: HOSPADM

## 2021-09-21 RX ORDER — SODIUM CHLORIDE 9 MG/ML
INJECTION, SOLUTION INTRAVENOUS CONTINUOUS
Status: DISCONTINUED | OUTPATIENT
Start: 2021-09-21 | End: 2021-09-23 | Stop reason: HOSPADM

## 2021-09-21 RX ORDER — IPRATROPIUM BROMIDE AND ALBUTEROL SULFATE 2.5; .5 MG/3ML; MG/3ML
3 SOLUTION RESPIRATORY (INHALATION)
Status: DISCONTINUED | OUTPATIENT
Start: 2021-09-21 | End: 2021-09-23 | Stop reason: HOSPADM

## 2021-09-21 RX ORDER — ACETAMINOPHEN 325 MG/1
650 TABLET ORAL EVERY 4 HOURS PRN
Status: DISCONTINUED | OUTPATIENT
Start: 2021-09-21 | End: 2021-09-23 | Stop reason: HOSPADM

## 2021-09-21 RX ORDER — MAG HYDROX/ALUMINUM HYD/SIMETH 200-200-20
30 SUSPENSION, ORAL (FINAL DOSE FORM) ORAL 4 TIMES DAILY PRN
Status: DISCONTINUED | OUTPATIENT
Start: 2021-09-21 | End: 2021-09-23 | Stop reason: HOSPADM

## 2021-09-21 RX ORDER — DILTIAZEM HYDROCHLORIDE 240 MG/1
240 CAPSULE, COATED, EXTENDED RELEASE ORAL DAILY
Status: DISCONTINUED | OUTPATIENT
Start: 2021-09-21 | End: 2021-09-23 | Stop reason: HOSPADM

## 2021-09-21 RX ORDER — FENTANYL CITRATE 50 UG/ML
INJECTION, SOLUTION INTRAMUSCULAR; INTRAVENOUS
Status: DISCONTINUED | OUTPATIENT
Start: 2021-09-21 | End: 2021-09-21

## 2021-09-21 RX ORDER — PHENYLEPHRINE HYDROCHLORIDE 10 MG/ML
INJECTION INTRAVENOUS
Status: DISCONTINUED | OUTPATIENT
Start: 2021-09-21 | End: 2021-09-21

## 2021-09-21 RX ORDER — ONDANSETRON 2 MG/ML
4 INJECTION INTRAMUSCULAR; INTRAVENOUS DAILY PRN
Status: DISCONTINUED | OUTPATIENT
Start: 2021-09-21 | End: 2021-09-21 | Stop reason: HOSPADM

## 2021-09-21 RX ORDER — LISINOPRIL 20 MG/1
20 TABLET ORAL DAILY
Status: DISCONTINUED | OUTPATIENT
Start: 2021-09-21 | End: 2021-09-23 | Stop reason: HOSPADM

## 2021-09-21 RX ORDER — LIDOCAINE HYDROCHLORIDE 20 MG/ML
INJECTION, SOLUTION EPIDURAL; INFILTRATION; INTRACAUDAL; PERINEURAL
Status: DISCONTINUED | OUTPATIENT
Start: 2021-09-21 | End: 2021-09-21

## 2021-09-21 RX ORDER — SODIUM CHLORIDE, SODIUM LACTATE, POTASSIUM CHLORIDE, CALCIUM CHLORIDE 600; 310; 30; 20 MG/100ML; MG/100ML; MG/100ML; MG/100ML
INJECTION, SOLUTION INTRAVENOUS CONTINUOUS PRN
Status: DISCONTINUED | OUTPATIENT
Start: 2021-09-21 | End: 2021-09-21

## 2021-09-21 RX ORDER — CEFAZOLIN SODIUM 1 G/3ML
INJECTION, POWDER, FOR SOLUTION INTRAMUSCULAR; INTRAVENOUS
Status: DISCONTINUED | OUTPATIENT
Start: 2021-09-21 | End: 2021-09-21

## 2021-09-21 RX ORDER — ENOXAPARIN SODIUM 100 MG/ML
40 INJECTION SUBCUTANEOUS EVERY 24 HOURS
Status: DISCONTINUED | OUTPATIENT
Start: 2021-09-21 | End: 2021-09-22 | Stop reason: SDUPTHER

## 2021-09-21 RX ORDER — SODIUM CHLORIDE, SODIUM LACTATE, POTASSIUM CHLORIDE, CALCIUM CHLORIDE 600; 310; 30; 20 MG/100ML; MG/100ML; MG/100ML; MG/100ML
125 INJECTION, SOLUTION INTRAVENOUS CONTINUOUS
Status: DISCONTINUED | OUTPATIENT
Start: 2021-09-21 | End: 2021-09-23 | Stop reason: HOSPADM

## 2021-09-21 RX ORDER — LEVOTHYROXINE SODIUM 125 UG/1
125 TABLET ORAL
Status: DISCONTINUED | OUTPATIENT
Start: 2021-09-21 | End: 2021-09-23 | Stop reason: HOSPADM

## 2021-09-21 RX ORDER — NALOXONE HCL 0.4 MG/ML
0.02 VIAL (ML) INJECTION
Status: DISCONTINUED | OUTPATIENT
Start: 2021-09-21 | End: 2021-09-23 | Stop reason: HOSPADM

## 2021-09-21 RX ORDER — MEPERIDINE HYDROCHLORIDE 25 MG/ML
25 INJECTION INTRAMUSCULAR; INTRAVENOUS; SUBCUTANEOUS EVERY 10 MIN PRN
Status: DISCONTINUED | OUTPATIENT
Start: 2021-09-21 | End: 2021-09-21 | Stop reason: HOSPADM

## 2021-09-21 RX ORDER — TALC
6 POWDER (GRAM) TOPICAL NIGHTLY PRN
Status: DISCONTINUED | OUTPATIENT
Start: 2021-09-21 | End: 2021-09-23 | Stop reason: HOSPADM

## 2021-09-21 RX ADMIN — HYDROMORPHONE HYDROCHLORIDE 1 MG: 2 INJECTION INTRAMUSCULAR; INTRAVENOUS; SUBCUTANEOUS at 10:09

## 2021-09-21 RX ADMIN — SODIUM CHLORIDE, POTASSIUM CHLORIDE, SODIUM LACTATE AND CALCIUM CHLORIDE: 600; 310; 30; 20 INJECTION, SOLUTION INTRAVENOUS at 03:09

## 2021-09-21 RX ADMIN — MORPHINE SULFATE 2 MG: 10 INJECTION INTRAVENOUS at 04:09

## 2021-09-21 RX ADMIN — LEVOTHYROXINE SODIUM 125 MCG: 125 TABLET ORAL at 05:09

## 2021-09-21 RX ADMIN — CEFTRIAXONE SODIUM 2 G: 2 INJECTION, POWDER, FOR SOLUTION INTRAMUSCULAR; INTRAVENOUS at 09:09

## 2021-09-21 RX ADMIN — HYDROMORPHONE HYDROCHLORIDE 1 MG: 2 INJECTION INTRAMUSCULAR; INTRAVENOUS; SUBCUTANEOUS at 05:09

## 2021-09-21 RX ADMIN — HYDROMORPHONE HYDROCHLORIDE 1 MG: 2 INJECTION INTRAMUSCULAR; INTRAVENOUS; SUBCUTANEOUS at 09:09

## 2021-09-21 RX ADMIN — SODIUM CHLORIDE: 9 INJECTION, SOLUTION INTRAVENOUS at 02:09

## 2021-09-21 RX ADMIN — ONDANSETRON 4 MG: 2 INJECTION INTRAMUSCULAR; INTRAVENOUS at 05:09

## 2021-09-21 RX ADMIN — DILTIAZEM HYDROCHLORIDE 240 MG: 240 CAPSULE, COATED, EXTENDED RELEASE ORAL at 09:09

## 2021-09-21 RX ADMIN — SODIUM CHLORIDE, POTASSIUM CHLORIDE, SODIUM LACTATE AND CALCIUM CHLORIDE 125 ML/HR: 600; 310; 30; 20 INJECTION, SOLUTION INTRAVENOUS at 05:09

## 2021-09-21 RX ADMIN — HYDROMORPHONE HYDROCHLORIDE 1 MG: 2 INJECTION INTRAMUSCULAR; INTRAVENOUS; SUBCUTANEOUS at 02:09

## 2021-09-21 RX ADMIN — PROPOFOL 150 MG: 10 INJECTION, EMULSION INTRAVENOUS at 03:09

## 2021-09-21 RX ADMIN — CEFAZOLIN 2 G: 1 INJECTION, POWDER, FOR SOLUTION INTRAMUSCULAR; INTRAVENOUS; PARENTERAL at 03:09

## 2021-09-21 RX ADMIN — PHENYLEPHRINE HYDROCHLORIDE 100 MCG: 10 INJECTION INTRAVENOUS at 03:09

## 2021-09-21 RX ADMIN — LIDOCAINE HYDROCHLORIDE 100 MG: 20 INJECTION, SOLUTION INTRAVENOUS at 03:09

## 2021-09-21 RX ADMIN — ONDANSETRON 4 MG: 2 INJECTION INTRAMUSCULAR; INTRAVENOUS at 04:09

## 2021-09-21 RX ADMIN — FENTANYL CITRATE 100 MCG: 50 INJECTION INTRAMUSCULAR; INTRAVENOUS at 03:09

## 2021-09-22 LAB
ANION GAP SERPL CALCULATED.3IONS-SCNC: 15 MMOL/L (ref 7–16)
BASOPHILS # BLD AUTO: 0.06 K/UL (ref 0–0.2)
BASOPHILS NFR BLD AUTO: 0.5 % (ref 0–1)
BUN SERPL-MCNC: 8 MG/DL (ref 7–18)
BUN/CREAT SERPL: 16 (ref 6–20)
CALCIUM SERPL-MCNC: 8.1 MG/DL (ref 8.5–10.1)
CHLORIDE SERPL-SCNC: 104 MMOL/L (ref 98–107)
CO2 SERPL-SCNC: 24 MMOL/L (ref 21–32)
CREAT SERPL-MCNC: 0.51 MG/DL (ref 0.55–1.02)
DIFFERENTIAL METHOD BLD: ABNORMAL
EOSINOPHIL # BLD AUTO: 0.03 K/UL (ref 0–0.5)
EOSINOPHIL NFR BLD AUTO: 0.2 % (ref 1–4)
ERYTHROCYTE [DISTWIDTH] IN BLOOD BY AUTOMATED COUNT: 12.6 % (ref 11.5–14.5)
GLUCOSE SERPL-MCNC: 97 MG/DL (ref 74–106)
HCT VFR BLD AUTO: 29.1 % (ref 38–47)
HGB BLD-MCNC: 9 G/DL (ref 12–16)
IMM GRANULOCYTES # BLD AUTO: 0.1 K/UL (ref 0–0.04)
IMM GRANULOCYTES NFR BLD: 0.8 % (ref 0–0.4)
LYMPHOCYTES # BLD AUTO: 1.35 K/UL (ref 1–4.8)
LYMPHOCYTES NFR BLD AUTO: 10.9 % (ref 27–41)
MAGNESIUM SERPL-MCNC: 1.6 MG/DL (ref 1.7–2.3)
MCH RBC QN AUTO: 28.3 PG (ref 27–31)
MCHC RBC AUTO-ENTMCNC: 30.9 G/DL (ref 32–36)
MCV RBC AUTO: 91.5 FL (ref 80–96)
MONOCYTES # BLD AUTO: 0.84 K/UL (ref 0–0.8)
MONOCYTES NFR BLD AUTO: 6.8 % (ref 2–6)
MPC BLD CALC-MCNC: 10.9 FL (ref 9.4–12.4)
NEUTROPHILS # BLD AUTO: 9.99 K/UL (ref 1.8–7.7)
NEUTROPHILS NFR BLD AUTO: 80.8 % (ref 53–65)
NRBC # BLD AUTO: 0 X10E3/UL
NRBC, AUTO (.00): 0 %
PLATELET # BLD AUTO: 202 K/UL (ref 150–400)
POTASSIUM SERPL-SCNC: 4.2 MMOL/L (ref 3.5–5.1)
RBC # BLD AUTO: 3.18 M/UL (ref 4.2–5.4)
SODIUM SERPL-SCNC: 139 MMOL/L (ref 136–145)
T4 SERPL-MCNC: 11.1 ΜG/DL (ref 4.8–13.9)
TSH SERPL DL<=0.005 MIU/L-ACNC: 0.58 UIU/ML (ref 0.36–3.74)
WBC # BLD AUTO: 12.37 K/UL (ref 4.5–11)

## 2021-09-22 PROCEDURE — 80048 BASIC METABOLIC PNL TOTAL CA: CPT | Performed by: HOSPITALIST

## 2021-09-22 PROCEDURE — 25000003 PHARM REV CODE 250: Performed by: NURSE PRACTITIONER

## 2021-09-22 PROCEDURE — 25000003 PHARM REV CODE 250: Performed by: HOSPITALIST

## 2021-09-22 PROCEDURE — 36415 COLL VENOUS BLD VENIPUNCTURE: CPT | Performed by: INTERNAL MEDICINE

## 2021-09-22 PROCEDURE — 63600175 PHARM REV CODE 636 W HCPCS: Performed by: ORTHOPAEDIC SURGERY

## 2021-09-22 PROCEDURE — 25000003 PHARM REV CODE 250: Performed by: ORTHOPAEDIC SURGERY

## 2021-09-22 PROCEDURE — 84436 ASSAY OF TOTAL THYROXINE: CPT | Performed by: HOSPITALIST

## 2021-09-22 PROCEDURE — 63600175 PHARM REV CODE 636 W HCPCS: Performed by: INTERNAL MEDICINE

## 2021-09-22 PROCEDURE — 27000221 HC OXYGEN, UP TO 24 HOURS

## 2021-09-22 PROCEDURE — 11000001 HC ACUTE MED/SURG PRIVATE ROOM

## 2021-09-22 PROCEDURE — 97110 THERAPEUTIC EXERCISES: CPT

## 2021-09-22 PROCEDURE — 85025 COMPLETE CBC W/AUTO DIFF WBC: CPT | Performed by: INTERNAL MEDICINE

## 2021-09-22 PROCEDURE — 84443 ASSAY THYROID STIM HORMONE: CPT | Performed by: HOSPITALIST

## 2021-09-22 PROCEDURE — 97162 PT EVAL MOD COMPLEX 30 MIN: CPT

## 2021-09-22 PROCEDURE — 99232 PR SUBSEQUENT HOSPITAL CARE,LEVL II: ICD-10-PCS | Mod: ,,, | Performed by: HOSPITALIST

## 2021-09-22 PROCEDURE — 63600175 PHARM REV CODE 636 W HCPCS: Performed by: NURSE PRACTITIONER

## 2021-09-22 PROCEDURE — 63600175 PHARM REV CODE 636 W HCPCS: Performed by: ANESTHESIOLOGY

## 2021-09-22 PROCEDURE — 97166 OT EVAL MOD COMPLEX 45 MIN: CPT

## 2021-09-22 PROCEDURE — 83735 ASSAY OF MAGNESIUM: CPT | Performed by: HOSPITALIST

## 2021-09-22 PROCEDURE — 25000003 PHARM REV CODE 250: Performed by: INTERNAL MEDICINE

## 2021-09-22 PROCEDURE — 99232 SBSQ HOSP IP/OBS MODERATE 35: CPT | Mod: ,,, | Performed by: HOSPITALIST

## 2021-09-22 RX ORDER — ONDANSETRON 2 MG/ML
4 INJECTION INTRAMUSCULAR; INTRAVENOUS EVERY 8 HOURS PRN
Status: DISCONTINUED | OUTPATIENT
Start: 2021-09-22 | End: 2021-09-23 | Stop reason: HOSPADM

## 2021-09-22 RX ORDER — BISACODYL 10 MG
10 SUPPOSITORY, RECTAL RECTAL DAILY PRN
Status: DISCONTINUED | OUTPATIENT
Start: 2021-09-22 | End: 2021-09-23 | Stop reason: HOSPADM

## 2021-09-22 RX ORDER — CEFAZOLIN SODIUM 2 G/50ML
2 SOLUTION INTRAVENOUS
Status: COMPLETED | OUTPATIENT
Start: 2021-09-22 | End: 2021-09-22

## 2021-09-22 RX ORDER — MAGNESIUM SULFATE HEPTAHYDRATE 40 MG/ML
2 INJECTION, SOLUTION INTRAVENOUS ONCE
Status: COMPLETED | OUTPATIENT
Start: 2021-09-22 | End: 2021-09-22

## 2021-09-22 RX ORDER — SODIUM CHLORIDE, SODIUM LACTATE, POTASSIUM CHLORIDE, CALCIUM CHLORIDE 600; 310; 30; 20 MG/100ML; MG/100ML; MG/100ML; MG/100ML
INJECTION, SOLUTION INTRAVENOUS CONTINUOUS
Status: DISCONTINUED | OUTPATIENT
Start: 2021-09-22 | End: 2021-09-23 | Stop reason: HOSPADM

## 2021-09-22 RX ORDER — HYDROCODONE BITARTRATE AND ACETAMINOPHEN 10; 325 MG/1; MG/1
1 TABLET ORAL EVERY 4 HOURS PRN
Status: DISCONTINUED | OUTPATIENT
Start: 2021-09-22 | End: 2021-09-23 | Stop reason: HOSPADM

## 2021-09-22 RX ADMIN — HYDROMORPHONE HYDROCHLORIDE 1 MG: 2 INJECTION INTRAMUSCULAR; INTRAVENOUS; SUBCUTANEOUS at 08:09

## 2021-09-22 RX ADMIN — HYDROCODONE BITARTRATE AND ACETAMINOPHEN 1 TABLET: 10; 325 TABLET ORAL at 12:09

## 2021-09-22 RX ADMIN — DEXTROSE MONOHYDRATE 2 G: 50 INJECTION, SOLUTION INTRAVENOUS at 08:09

## 2021-09-22 RX ADMIN — HYDROCODONE BITARTRATE AND ACETAMINOPHEN 1 TABLET: 10; 325 TABLET ORAL at 09:09

## 2021-09-22 RX ADMIN — ONDANSETRON 4 MG: 2 INJECTION INTRAMUSCULAR; INTRAVENOUS at 08:09

## 2021-09-22 RX ADMIN — LEVOTHYROXINE SODIUM 125 MCG: 125 TABLET ORAL at 05:09

## 2021-09-22 RX ADMIN — SODIUM CHLORIDE, POTASSIUM CHLORIDE, SODIUM LACTATE AND CALCIUM CHLORIDE: 600; 310; 30; 20 INJECTION, SOLUTION INTRAVENOUS at 09:09

## 2021-09-22 RX ADMIN — PANTOPRAZOLE SODIUM 40 MG: 40 TABLET, DELAYED RELEASE ORAL at 08:09

## 2021-09-22 RX ADMIN — BISACODYL 10 MG: 10 SUPPOSITORY RECTAL at 05:09

## 2021-09-22 RX ADMIN — DEXTROSE MONOHYDRATE 2 G: 50 INJECTION, SOLUTION INTRAVENOUS at 05:09

## 2021-09-22 RX ADMIN — APIXABAN 2.5 MG: 2.5 TABLET, FILM COATED ORAL at 08:09

## 2021-09-22 RX ADMIN — DILTIAZEM HYDROCHLORIDE 240 MG: 240 CAPSULE, COATED, EXTENDED RELEASE ORAL at 08:09

## 2021-09-22 RX ADMIN — APIXABAN 2.5 MG: 2.5 TABLET, FILM COATED ORAL at 09:09

## 2021-09-22 RX ADMIN — VANCOMYCIN HYDROCHLORIDE 1000 MG: 1 INJECTION, POWDER, LYOPHILIZED, FOR SOLUTION INTRAVENOUS at 12:09

## 2021-09-22 RX ADMIN — CEFTRIAXONE SODIUM 2 G: 2 INJECTION, POWDER, FOR SOLUTION INTRAMUSCULAR; INTRAVENOUS at 09:09

## 2021-09-22 RX ADMIN — MAGNESIUM SULFATE IN WATER 2 G: 40 INJECTION, SOLUTION INTRAVENOUS at 02:09

## 2021-09-22 RX ADMIN — HYDROCODONE BITARTRATE AND ACETAMINOPHEN 1 TABLET: 10; 325 TABLET ORAL at 05:09

## 2021-09-22 RX ADMIN — THERA TABS 1 TABLET: TAB at 08:09

## 2021-09-22 RX ADMIN — SODIUM CHLORIDE, POTASSIUM CHLORIDE, SODIUM LACTATE AND CALCIUM CHLORIDE 125 ML/HR: 600; 310; 30; 20 INJECTION, SOLUTION INTRAVENOUS at 06:09

## 2021-09-22 RX ADMIN — ACETAMINOPHEN 650 MG: 325 TABLET ORAL at 03:09

## 2021-09-23 ENCOUNTER — HOSPITAL ENCOUNTER (INPATIENT)
Facility: HOSPITAL | Age: 86
LOS: 27 days | Discharge: HOME HEALTH | DRG: 561 | End: 2021-10-20
Attending: FAMILY MEDICINE | Admitting: FAMILY MEDICINE
Payer: MEDICARE

## 2021-09-23 VITALS
OXYGEN SATURATION: 97 % | HEART RATE: 100 BPM | HEIGHT: 66 IN | RESPIRATION RATE: 18 BRPM | WEIGHT: 120 LBS | DIASTOLIC BLOOD PRESSURE: 63 MMHG | TEMPERATURE: 98 F | SYSTOLIC BLOOD PRESSURE: 125 MMHG | BODY MASS INDEX: 19.29 KG/M2

## 2021-09-23 DIAGNOSIS — R07.9 CHEST PAIN: ICD-10-CM

## 2021-09-23 DIAGNOSIS — R07.89 CHEST TIGHTNESS: ICD-10-CM

## 2021-09-23 DIAGNOSIS — Z98.890 S/P ORIF (OPEN REDUCTION INTERNAL FIXATION) FRACTURE: ICD-10-CM

## 2021-09-23 DIAGNOSIS — F41.9 ANXIETY: ICD-10-CM

## 2021-09-23 DIAGNOSIS — Z87.81 S/P ORIF (OPEN REDUCTION INTERNAL FIXATION) FRACTURE: ICD-10-CM

## 2021-09-23 PROBLEM — N39.0 URINARY TRACT INFECTION WITHOUT HEMATURIA: Status: RESOLVED | Noted: 2021-09-21 | Resolved: 2021-09-23

## 2021-09-23 PROBLEM — S72.002A CLOSED FRACTURE OF LEFT HIP: Status: RESOLVED | Noted: 2021-09-20 | Resolved: 2021-09-23

## 2021-09-23 LAB
ALBUMIN SERPL BCP-MCNC: 2.5 G/DL (ref 3.5–5)
ALBUMIN/GLOB SERPL: 0.9 {RATIO}
ALP SERPL-CCNC: 105 U/L (ref 55–142)
ALT SERPL W P-5'-P-CCNC: 15 U/L (ref 13–56)
ANION GAP SERPL CALCULATED.3IONS-SCNC: 11 MMOL/L (ref 7–16)
ANION GAP SERPL CALCULATED.3IONS-SCNC: 12 MMOL/L (ref 7–16)
AST SERPL W P-5'-P-CCNC: 25 U/L (ref 15–37)
BACTERIA #/AREA URNS HPF: ABNORMAL /HPF
BASOPHILS # BLD AUTO: 0.03 K/UL (ref 0–0.2)
BASOPHILS # BLD AUTO: 0.05 K/UL (ref 0–0.2)
BASOPHILS NFR BLD AUTO: 0.2 % (ref 0–1)
BASOPHILS NFR BLD AUTO: 0.4 % (ref 0–1)
BILIRUB SERPL-MCNC: 0.5 MG/DL (ref 0–1.2)
BILIRUB UR QL STRIP: NEGATIVE
BUN SERPL-MCNC: 11 MG/DL (ref 7–18)
BUN SERPL-MCNC: 6 MG/DL (ref 7–18)
BUN/CREAT SERPL: 12 (ref 6–20)
BUN/CREAT SERPL: 17 (ref 6–20)
CALCIUM SERPL-MCNC: 7.8 MG/DL (ref 8.5–10.1)
CALCIUM SERPL-MCNC: 8.1 MG/DL (ref 8.5–10.1)
CHLORIDE SERPL-SCNC: 101 MMOL/L (ref 98–107)
CHLORIDE SERPL-SCNC: 98 MMOL/L (ref 98–107)
CLARITY UR: CLEAR
CO2 SERPL-SCNC: 28 MMOL/L (ref 21–32)
CO2 SERPL-SCNC: 30 MMOL/L (ref 21–32)
COLOR UR: YELLOW
CREAT SERPL-MCNC: 0.51 MG/DL (ref 0.55–1.02)
CREAT SERPL-MCNC: 0.64 MG/DL (ref 0.55–1.02)
DIFFERENTIAL METHOD BLD: ABNORMAL
DIFFERENTIAL METHOD BLD: ABNORMAL
EOSINOPHIL # BLD AUTO: 0.05 K/UL (ref 0–0.5)
EOSINOPHIL # BLD AUTO: 0.14 K/UL (ref 0–0.5)
EOSINOPHIL NFR BLD AUTO: 0.3 % (ref 1–4)
EOSINOPHIL NFR BLD AUTO: 1 % (ref 1–4)
ERYTHROCYTE [DISTWIDTH] IN BLOOD BY AUTOMATED COUNT: 12.1 % (ref 11.5–14.5)
ERYTHROCYTE [DISTWIDTH] IN BLOOD BY AUTOMATED COUNT: 12.4 % (ref 11.5–14.5)
GLOBULIN SER-MCNC: 2.7 G/DL (ref 2–4)
GLUCOSE SERPL-MCNC: 111 MG/DL (ref 74–106)
GLUCOSE SERPL-MCNC: 117 MG/DL (ref 74–106)
GLUCOSE UR STRIP-MCNC: NEGATIVE MG/DL
HCT VFR BLD AUTO: 28.8 % (ref 38–47)
HCT VFR BLD AUTO: 29.3 % (ref 38–47)
HGB BLD-MCNC: 9.1 G/DL (ref 12–16)
HGB BLD-MCNC: 9.6 G/DL (ref 12–16)
IMM GRANULOCYTES # BLD AUTO: 0.1 K/UL (ref 0–0.04)
IMM GRANULOCYTES NFR BLD: 0.7 % (ref 0–0.4)
KETONES UR STRIP-SCNC: ABNORMAL MG/DL
LEUKOCYTE ESTERASE UR QL STRIP: NEGATIVE
LYMPHOCYTES # BLD AUTO: 1.25 K/UL (ref 1–4.8)
LYMPHOCYTES # BLD AUTO: 1.7 K/UL (ref 1–4.8)
LYMPHOCYTES NFR BLD AUTO: 12.1 % (ref 27–41)
LYMPHOCYTES NFR BLD AUTO: 8.7 % (ref 27–41)
LYMPHOCYTES NFR BLD MANUAL: 10 % (ref 27–41)
MAGNESIUM SERPL-MCNC: 1.9 MG/DL (ref 1.7–2.3)
MCH RBC QN AUTO: 28.9 PG (ref 27–31)
MCH RBC QN AUTO: 29.2 PG (ref 27–31)
MCHC RBC AUTO-ENTMCNC: 31.6 G/DL (ref 32–36)
MCHC RBC AUTO-ENTMCNC: 32.8 G/DL (ref 32–36)
MCV RBC AUTO: 88.3 FL (ref 80–96)
MCV RBC AUTO: 92.3 FL (ref 80–96)
MONOCYTES # BLD AUTO: 0.93 K/UL (ref 0–0.8)
MONOCYTES # BLD AUTO: 0.95 K/UL (ref 0–0.8)
MONOCYTES NFR BLD AUTO: 6.6 % (ref 2–6)
MONOCYTES NFR BLD AUTO: 6.6 % (ref 2–6)
MONOCYTES NFR BLD MANUAL: 4 % (ref 2–6)
MPC BLD CALC-MCNC: 10.4 FL (ref 9.4–12.4)
MPC BLD CALC-MCNC: 10.8 FL (ref 9.4–12.4)
NEUTROPHILS # BLD AUTO: 11.17 K/UL (ref 1.8–7.7)
NEUTROPHILS # BLD AUTO: 12.04 K/UL (ref 1.8–7.7)
NEUTROPHILS NFR BLD AUTO: 79.2 % (ref 53–65)
NEUTROPHILS NFR BLD AUTO: 84.2 % (ref 53–65)
NEUTS BAND NFR BLD MANUAL: 5 % (ref 1–5)
NEUTS SEG NFR BLD MANUAL: 81 % (ref 50–62)
NITRITE UR QL STRIP: NEGATIVE
NRBC # BLD AUTO: 0 X10E3/UL
NRBC BLD MANUAL-RTO: ABNORMAL %
NRBC, AUTO (.00): 0 %
PH UR STRIP: 6 PH UNITS
PLATELET # BLD AUTO: 196 K/UL (ref 150–400)
PLATELET # BLD AUTO: 235 K/UL (ref 150–400)
PLATELET MORPHOLOGY: NORMAL
POTASSIUM SERPL-SCNC: 3.6 MMOL/L (ref 3.5–5.1)
POTASSIUM SERPL-SCNC: 3.9 MMOL/L (ref 3.5–5.1)
PROT SERPL-MCNC: 5.2 G/DL (ref 6.4–8.2)
PROT UR QL STRIP: NEGATIVE
RBC # BLD AUTO: 3.12 M/UL (ref 4.2–5.4)
RBC # BLD AUTO: 3.32 M/UL (ref 4.2–5.4)
RBC # UR STRIP: ABNORMAL /UL
RBC #/AREA URNS HPF: ABNORMAL /HPF
RBC MORPH BLD: NORMAL
SODIUM SERPL-SCNC: 134 MMOL/L (ref 136–145)
SODIUM SERPL-SCNC: 138 MMOL/L (ref 136–145)
SP GR UR STRIP: 1.02
SQUAMOUS #/AREA URNS LPF: ABNORMAL /LPF
UA COMPLETE W REFLEX CULTURE PNL UR: ABNORMAL
UROBILINOGEN UR STRIP-ACNC: 0.2 MG/DL
WBC # BLD AUTO: 14.09 K/UL (ref 4.5–11)
WBC # BLD AUTO: 14.32 K/UL (ref 4.5–11)
WBC #/AREA URNS HPF: ABNORMAL /HPF

## 2021-09-23 PROCEDURE — 85025 COMPLETE CBC W/AUTO DIFF WBC: CPT | Performed by: INTERNAL MEDICINE

## 2021-09-23 PROCEDURE — 97530 THERAPEUTIC ACTIVITIES: CPT

## 2021-09-23 PROCEDURE — 51798 US URINE CAPACITY MEASURE: CPT

## 2021-09-23 PROCEDURE — 36415 COLL VENOUS BLD VENIPUNCTURE: CPT | Performed by: INTERNAL MEDICINE

## 2021-09-23 PROCEDURE — 25000003 PHARM REV CODE 250: Performed by: PHYSICIAN ASSISTANT

## 2021-09-23 PROCEDURE — 25000003 PHARM REV CODE 250: Performed by: ORTHOPAEDIC SURGERY

## 2021-09-23 PROCEDURE — 85025 COMPLETE CBC W/AUTO DIFF WBC: CPT | Performed by: PHYSICIAN ASSISTANT

## 2021-09-23 PROCEDURE — 99239 PR HOSPITAL DISCHARGE DAY,>30 MIN: ICD-10-PCS | Mod: ,,, | Performed by: HOSPITALIST

## 2021-09-23 PROCEDURE — 94761 N-INVAS EAR/PLS OXIMETRY MLT: CPT

## 2021-09-23 PROCEDURE — 63600175 PHARM REV CODE 636 W HCPCS: Performed by: ORTHOPAEDIC SURGERY

## 2021-09-23 PROCEDURE — 99239 HOSP IP/OBS DSCHRG MGMT >30: CPT | Mod: ,,, | Performed by: HOSPITALIST

## 2021-09-23 PROCEDURE — 81001 URINALYSIS AUTO W/SCOPE: CPT | Performed by: PHYSICIAN ASSISTANT

## 2021-09-23 PROCEDURE — 27000221 HC OXYGEN, UP TO 24 HOURS

## 2021-09-23 PROCEDURE — 11000004 HC SNF PRIVATE

## 2021-09-23 PROCEDURE — 25000003 PHARM REV CODE 250: Performed by: INTERNAL MEDICINE

## 2021-09-23 PROCEDURE — 36415 COLL VENOUS BLD VENIPUNCTURE: CPT | Performed by: PHYSICIAN ASSISTANT

## 2021-09-23 PROCEDURE — 84443 ASSAY THYROID STIM HORMONE: CPT | Performed by: PHYSICIAN ASSISTANT

## 2021-09-23 PROCEDURE — 97110 THERAPEUTIC EXERCISES: CPT

## 2021-09-23 PROCEDURE — 81003 URINALYSIS AUTO W/O SCOPE: CPT | Performed by: PHYSICIAN ASSISTANT

## 2021-09-23 PROCEDURE — 99900035 HC TECH TIME PER 15 MIN (STAT)

## 2021-09-23 PROCEDURE — 25000003 PHARM REV CODE 250: Performed by: HOSPITALIST

## 2021-09-23 PROCEDURE — 83735 ASSAY OF MAGNESIUM: CPT | Performed by: PHYSICIAN ASSISTANT

## 2021-09-23 PROCEDURE — 25000003 PHARM REV CODE 250: Performed by: NURSE PRACTITIONER

## 2021-09-23 PROCEDURE — 80053 COMPREHEN METABOLIC PANEL: CPT | Performed by: ORTHOPAEDIC SURGERY

## 2021-09-23 PROCEDURE — 97116 GAIT TRAINING THERAPY: CPT

## 2021-09-23 PROCEDURE — 80048 BASIC METABOLIC PNL TOTAL CA: CPT | Mod: XB | Performed by: PHYSICIAN ASSISTANT

## 2021-09-23 RX ORDER — DICLOFENAC SODIUM 10 MG/G
2 GEL TOPICAL 2 TIMES DAILY
Status: DISCONTINUED | OUTPATIENT
Start: 2021-09-23 | End: 2021-10-20 | Stop reason: HOSPADM

## 2021-09-23 RX ORDER — CALCIUM CARBONATE 200(500)MG
500 TABLET,CHEWABLE ORAL 2 TIMES DAILY PRN
Status: DISCONTINUED | OUTPATIENT
Start: 2021-09-23 | End: 2021-10-20 | Stop reason: HOSPADM

## 2021-09-23 RX ORDER — AMOXICILLIN 250 MG
1 CAPSULE ORAL 2 TIMES DAILY
Status: DISCONTINUED | OUTPATIENT
Start: 2021-09-23 | End: 2021-09-30

## 2021-09-23 RX ORDER — PANTOPRAZOLE SODIUM 40 MG/1
40 TABLET, DELAYED RELEASE ORAL DAILY
Status: DISCONTINUED | OUTPATIENT
Start: 2021-09-24 | End: 2021-10-20 | Stop reason: HOSPADM

## 2021-09-23 RX ORDER — TALC
6 POWDER (GRAM) TOPICAL NIGHTLY PRN
Status: DISCONTINUED | OUTPATIENT
Start: 2021-09-23 | End: 2021-10-20 | Stop reason: HOSPADM

## 2021-09-23 RX ORDER — ALPRAZOLAM 0.5 MG/1
1 TABLET ORAL NIGHTLY PRN
Status: DISCONTINUED | OUTPATIENT
Start: 2021-09-23 | End: 2021-10-05

## 2021-09-23 RX ORDER — LIDOCAINE HYDROCHLORIDE 30 MG/G
1 CREAM TOPICAL DAILY
Status: DISCONTINUED | OUTPATIENT
Start: 2021-09-24 | End: 2021-09-24

## 2021-09-23 RX ORDER — HYDROCHLOROTHIAZIDE 12.5 MG/1
25 TABLET ORAL DAILY
Status: DISCONTINUED | OUTPATIENT
Start: 2021-09-24 | End: 2021-10-20 | Stop reason: HOSPADM

## 2021-09-23 RX ORDER — ACETAMINOPHEN 325 MG/1
650 TABLET ORAL EVERY 6 HOURS PRN
Status: DISCONTINUED | OUTPATIENT
Start: 2021-09-23 | End: 2021-10-20 | Stop reason: HOSPADM

## 2021-09-23 RX ORDER — PANTOPRAZOLE SODIUM 40 MG/1
40 TABLET, DELAYED RELEASE ORAL DAILY
Qty: 30 TABLET | Refills: 11 | Status: ON HOLD
Start: 2021-09-24 | End: 2021-10-20 | Stop reason: SDUPTHER

## 2021-09-23 RX ORDER — HYDROCODONE BITARTRATE AND ACETAMINOPHEN 10; 325 MG/1; MG/1
1 TABLET ORAL EVERY 6 HOURS PRN
Status: DISCONTINUED | OUTPATIENT
Start: 2021-09-23 | End: 2021-09-24

## 2021-09-23 RX ORDER — DILTIAZEM HYDROCHLORIDE 120 MG/1
240 CAPSULE, COATED, EXTENDED RELEASE ORAL DAILY
Status: DISCONTINUED | OUTPATIENT
Start: 2021-09-24 | End: 2021-09-24

## 2021-09-23 RX ADMIN — ALPRAZOLAM 1 MG: 0.5 TABLET ORAL at 09:09

## 2021-09-23 RX ADMIN — THERA TABS 1 TABLET: TAB at 08:09

## 2021-09-23 RX ADMIN — DICLOFENAC 2 G: 10 GEL TOPICAL at 09:09

## 2021-09-23 RX ADMIN — SENNOSIDES AND DOCUSATE SODIUM 1 TABLET: 50; 8.6 TABLET ORAL at 09:09

## 2021-09-23 RX ADMIN — HYDROCODONE BITARTRATE AND ACETAMINOPHEN 1 TABLET: 10; 325 TABLET ORAL at 10:09

## 2021-09-23 RX ADMIN — PANTOPRAZOLE SODIUM 40 MG: 40 TABLET, DELAYED RELEASE ORAL at 08:09

## 2021-09-23 RX ADMIN — ONDANSETRON 4 MG: 2 INJECTION INTRAMUSCULAR; INTRAVENOUS at 02:09

## 2021-09-23 RX ADMIN — APIXABAN 2.5 MG: 2.5 TABLET, FILM COATED ORAL at 09:09

## 2021-09-23 RX ADMIN — DILTIAZEM HYDROCHLORIDE 240 MG: 240 CAPSULE, COATED, EXTENDED RELEASE ORAL at 08:09

## 2021-09-23 RX ADMIN — APIXABAN 2.5 MG: 2.5 TABLET, FILM COATED ORAL at 08:09

## 2021-09-23 RX ADMIN — HYDROCODONE BITARTRATE AND ACETAMINOPHEN 1 TABLET: 10; 325 TABLET ORAL at 02:09

## 2021-09-23 RX ADMIN — LEVOTHYROXINE SODIUM 125 MCG: 125 TABLET ORAL at 06:09

## 2021-09-23 RX ADMIN — HYDROCODONE BITARTRATE AND ACETAMINOPHEN 1 TABLET: 10; 325 TABLET ORAL at 09:09

## 2021-09-24 ENCOUNTER — CLINICAL SUPPORT (OUTPATIENT)
Dept: WOUND CARE | Facility: HOSPITAL | Age: 86
DRG: 561 | End: 2021-09-24
Attending: FAMILY MEDICINE
Payer: MEDICARE

## 2021-09-24 VITALS — RESPIRATION RATE: 18 BRPM

## 2021-09-24 DIAGNOSIS — S72.002A LEFT DISPLACED FEMORAL NECK FRACTURE: ICD-10-CM

## 2021-09-24 DIAGNOSIS — L98.491 SUPERFICIAL ULCER OF SKIN: Primary | ICD-10-CM

## 2021-09-24 LAB — TSH SERPL DL<=0.005 MIU/L-ACNC: 0.58 UIU/ML (ref 0.36–3.74)

## 2021-09-24 PROCEDURE — 11000004 HC SNF PRIVATE

## 2021-09-24 PROCEDURE — 97162 PT EVAL MOD COMPLEX 30 MIN: CPT

## 2021-09-24 PROCEDURE — 94761 N-INVAS EAR/PLS OXIMETRY MLT: CPT

## 2021-09-24 PROCEDURE — 93010 ELECTROCARDIOGRAM REPORT: CPT | Mod: ,,, | Performed by: FAMILY MEDICINE

## 2021-09-24 PROCEDURE — 99213 OFFICE O/P EST LOW 20 MIN: CPT

## 2021-09-24 PROCEDURE — 25000003 PHARM REV CODE 250: Performed by: FAMILY MEDICINE

## 2021-09-24 PROCEDURE — 99900035 HC TECH TIME PER 15 MIN (STAT)

## 2021-09-24 PROCEDURE — 99214 OFFICE O/P EST MOD 30 MIN: CPT

## 2021-09-24 PROCEDURE — 25000003 PHARM REV CODE 250: Performed by: PHYSICIAN ASSISTANT

## 2021-09-24 PROCEDURE — 93005 ELECTROCARDIOGRAM TRACING: CPT

## 2021-09-24 PROCEDURE — 97166 OT EVAL MOD COMPLEX 45 MIN: CPT

## 2021-09-24 PROCEDURE — 93010 EKG 12-LEAD: ICD-10-PCS | Mod: ,,, | Performed by: FAMILY MEDICINE

## 2021-09-24 PROCEDURE — 27000941

## 2021-09-24 RX ORDER — HYDROCODONE BITARTRATE AND ACETAMINOPHEN 5; 325 MG/1; MG/1
1 TABLET ORAL EVERY 6 HOURS PRN
Status: DISCONTINUED | OUTPATIENT
Start: 2021-09-24 | End: 2021-10-06

## 2021-09-24 RX ORDER — NITROFURANTOIN 25; 75 MG/1; MG/1
100 CAPSULE ORAL EVERY 12 HOURS
Status: COMPLETED | OUTPATIENT
Start: 2021-09-24 | End: 2021-09-28

## 2021-09-24 RX ORDER — DILTIAZEM HYDROCHLORIDE 120 MG/1
120 CAPSULE, COATED, EXTENDED RELEASE ORAL DAILY
Status: DISCONTINUED | OUTPATIENT
Start: 2021-09-25 | End: 2021-09-24

## 2021-09-24 RX ORDER — DILTIAZEM HYDROCHLORIDE 120 MG/1
120 CAPSULE, COATED, EXTENDED RELEASE ORAL DAILY
Status: DISCONTINUED | OUTPATIENT
Start: 2021-09-24 | End: 2021-10-20 | Stop reason: HOSPADM

## 2021-09-24 RX ADMIN — HYDROCODONE BITARTRATE AND ACETAMINOPHEN 1 TABLET: 5; 325 TABLET ORAL at 10:09

## 2021-09-24 RX ADMIN — NITROFURANTOIN MONOHYDRATE/MACROCRYSTALS 100 MG: 75; 25 CAPSULE ORAL at 11:09

## 2021-09-24 RX ADMIN — PANTOPRAZOLE SODIUM 40 MG: 40 TABLET, DELAYED RELEASE ORAL at 09:09

## 2021-09-24 RX ADMIN — SENNOSIDES AND DOCUSATE SODIUM 1 TABLET: 50; 8.6 TABLET ORAL at 09:09

## 2021-09-24 RX ADMIN — APIXABAN 2.5 MG: 2.5 TABLET, FILM COATED ORAL at 09:09

## 2021-09-24 RX ADMIN — LEVOTHYROXINE SODIUM 125 MCG: 0.05 TABLET ORAL at 06:09

## 2021-09-24 RX ADMIN — NITROFURANTOIN MONOHYDRATE/MACROCRYSTALS 100 MG: 75; 25 CAPSULE ORAL at 09:09

## 2021-09-24 RX ADMIN — DICLOFENAC 2 G: 10 GEL TOPICAL at 09:09

## 2021-09-24 RX ADMIN — DILTIAZEM HYDROCHLORIDE 120 MG: 120 CAPSULE, COATED, EXTENDED RELEASE ORAL at 10:09

## 2021-09-25 PROCEDURE — 27000941

## 2021-09-25 PROCEDURE — 11000004 HC SNF PRIVATE

## 2021-09-25 PROCEDURE — 94761 N-INVAS EAR/PLS OXIMETRY MLT: CPT

## 2021-09-25 PROCEDURE — 25000003 PHARM REV CODE 250: Performed by: PHYSICIAN ASSISTANT

## 2021-09-25 PROCEDURE — 25000003 PHARM REV CODE 250: Performed by: FAMILY MEDICINE

## 2021-09-25 RX ADMIN — APIXABAN 2.5 MG: 2.5 TABLET, FILM COATED ORAL at 08:09

## 2021-09-25 RX ADMIN — APIXABAN 2.5 MG: 2.5 TABLET, FILM COATED ORAL at 09:09

## 2021-09-25 RX ADMIN — HYDROCHLOROTHIAZIDE 25 MG: 12.5 TABLET ORAL at 08:09

## 2021-09-25 RX ADMIN — LEVOTHYROXINE SODIUM 125 MCG: 0.05 TABLET ORAL at 06:09

## 2021-09-25 RX ADMIN — DICLOFENAC 2 G: 10 GEL TOPICAL at 08:09

## 2021-09-25 RX ADMIN — HYDROCODONE BITARTRATE AND ACETAMINOPHEN 1 TABLET: 5; 325 TABLET ORAL at 08:09

## 2021-09-25 RX ADMIN — NITROFURANTOIN MONOHYDRATE/MACROCRYSTALS 100 MG: 75; 25 CAPSULE ORAL at 09:09

## 2021-09-25 RX ADMIN — NITROFURANTOIN MONOHYDRATE/MACROCRYSTALS 100 MG: 75; 25 CAPSULE ORAL at 08:09

## 2021-09-25 RX ADMIN — DICLOFENAC 2 G: 10 GEL TOPICAL at 09:09

## 2021-09-25 RX ADMIN — SENNOSIDES AND DOCUSATE SODIUM 1 TABLET: 50; 8.6 TABLET ORAL at 08:09

## 2021-09-25 RX ADMIN — SENNOSIDES AND DOCUSATE SODIUM 1 TABLET: 50; 8.6 TABLET ORAL at 09:09

## 2021-09-25 RX ADMIN — DILTIAZEM HYDROCHLORIDE 120 MG: 120 CAPSULE, COATED, EXTENDED RELEASE ORAL at 08:09

## 2021-09-25 RX ADMIN — ALPRAZOLAM 1 MG: 0.5 TABLET ORAL at 09:09

## 2021-09-25 RX ADMIN — HYDROCODONE BITARTRATE AND ACETAMINOPHEN 1 TABLET: 5; 325 TABLET ORAL at 02:09

## 2021-09-25 RX ADMIN — PANTOPRAZOLE SODIUM 40 MG: 40 TABLET, DELAYED RELEASE ORAL at 08:09

## 2021-09-25 RX ADMIN — MELATONIN 6 MG: 3 TAB ORAL at 01:09

## 2021-09-26 LAB
ANION GAP SERPL CALCULATED.3IONS-SCNC: 16 MMOL/L (ref 7–16)
BACTERIA BLD CULT: NORMAL
BACTERIA BLD CULT: NORMAL
BASOPHILS # BLD AUTO: 0.06 K/UL (ref 0–0.2)
BASOPHILS NFR BLD AUTO: 0.5 % (ref 0–1)
BASOPHILS NFR BLD MANUAL: 1 % (ref 0–1)
BUN SERPL-MCNC: 9 MG/DL (ref 7–18)
BUN/CREAT SERPL: 10 (ref 6–20)
CALCIUM SERPL-MCNC: 8.7 MG/DL (ref 8.5–10.1)
CHLORIDE SERPL-SCNC: 96 MMOL/L (ref 98–107)
CO2 SERPL-SCNC: 26 MMOL/L (ref 21–32)
CREAT SERPL-MCNC: 0.88 MG/DL (ref 0.55–1.02)
DIFFERENTIAL METHOD BLD: ABNORMAL
EOSINOPHIL # BLD AUTO: 0.2 K/UL (ref 0–0.5)
EOSINOPHIL NFR BLD AUTO: 1.6 % (ref 1–4)
EOSINOPHIL NFR BLD MANUAL: 2 % (ref 1–4)
ERYTHROCYTE [DISTWIDTH] IN BLOOD BY AUTOMATED COUNT: 12.4 % (ref 11.5–14.5)
GLUCOSE SERPL-MCNC: 113 MG/DL (ref 74–106)
HCT VFR BLD AUTO: 32 % (ref 38–47)
HGB BLD-MCNC: 10.2 G/DL (ref 12–16)
LYMPHOCYTES # BLD AUTO: 2.83 K/UL (ref 1–4.8)
LYMPHOCYTES NFR BLD AUTO: 23.3 % (ref 27–41)
LYMPHOCYTES NFR BLD MANUAL: 26 % (ref 27–41)
MAGNESIUM SERPL-MCNC: 2 MG/DL (ref 1.7–2.3)
MCH RBC QN AUTO: 29.5 PG (ref 27–31)
MCHC RBC AUTO-ENTMCNC: 31.9 G/DL (ref 32–36)
MCV RBC AUTO: 92.5 FL (ref 80–96)
MONOCYTES # BLD AUTO: 1.01 K/UL (ref 0–0.8)
MONOCYTES NFR BLD AUTO: 8.3 % (ref 2–6)
MONOCYTES NFR BLD MANUAL: 4 % (ref 2–6)
MPC BLD CALC-MCNC: 10.1 FL (ref 9.4–12.4)
NEUTROPHILS # BLD AUTO: 8.06 K/UL (ref 1.8–7.7)
NEUTROPHILS NFR BLD AUTO: 66.3 % (ref 53–65)
NEUTS SEG NFR BLD MANUAL: 67 % (ref 50–62)
NRBC BLD MANUAL-RTO: ABNORMAL %
PLATELET # BLD AUTO: 401 K/UL (ref 150–400)
PLATELET MORPHOLOGY: NORMAL
POTASSIUM SERPL-SCNC: 3.5 MMOL/L (ref 3.5–5.1)
RBC # BLD AUTO: 3.46 M/UL (ref 4.2–5.4)
RBC MORPH BLD: NORMAL
SODIUM SERPL-SCNC: 134 MMOL/L (ref 136–145)
TROPONIN I SERPL-MCNC: 0.03 NG/ML
TROPONIN I SERPL-MCNC: 0.03 NG/ML
WBC # BLD AUTO: 12.16 K/UL (ref 4.5–11)

## 2021-09-26 PROCEDURE — 93005 ELECTROCARDIOGRAM TRACING: CPT

## 2021-09-26 PROCEDURE — 25000003 PHARM REV CODE 250: Performed by: NURSE PRACTITIONER

## 2021-09-26 PROCEDURE — 94761 N-INVAS EAR/PLS OXIMETRY MLT: CPT

## 2021-09-26 PROCEDURE — 93010 ELECTROCARDIOGRAM REPORT: CPT | Mod: 76,,, | Performed by: FAMILY MEDICINE

## 2021-09-26 PROCEDURE — 85025 COMPLETE CBC W/AUTO DIFF WBC: CPT | Performed by: NURSE PRACTITIONER

## 2021-09-26 PROCEDURE — 25000003 PHARM REV CODE 250: Performed by: FAMILY MEDICINE

## 2021-09-26 PROCEDURE — 83735 ASSAY OF MAGNESIUM: CPT | Performed by: NURSE PRACTITIONER

## 2021-09-26 PROCEDURE — 27000941

## 2021-09-26 PROCEDURE — 36415 COLL VENOUS BLD VENIPUNCTURE: CPT | Performed by: NURSE PRACTITIONER

## 2021-09-26 PROCEDURE — 84484 ASSAY OF TROPONIN QUANT: CPT | Performed by: NURSE PRACTITIONER

## 2021-09-26 PROCEDURE — 11000004 HC SNF PRIVATE

## 2021-09-26 PROCEDURE — 93010 EKG 12-LEAD: ICD-10-PCS | Mod: 76,,, | Performed by: FAMILY MEDICINE

## 2021-09-26 PROCEDURE — 80048 BASIC METABOLIC PNL TOTAL CA: CPT | Performed by: NURSE PRACTITIONER

## 2021-09-26 PROCEDURE — 25000003 PHARM REV CODE 250: Performed by: PHYSICIAN ASSISTANT

## 2021-09-26 RX ORDER — SODIUM CHLORIDE 9 MG/ML
INJECTION, SOLUTION INTRAVENOUS CONTINUOUS
Status: DISCONTINUED | OUTPATIENT
Start: 2021-09-26 | End: 2021-09-29

## 2021-09-26 RX ADMIN — LEVOTHYROXINE SODIUM 125 MCG: 0.05 TABLET ORAL at 06:09

## 2021-09-26 RX ADMIN — DICLOFENAC 2 G: 10 GEL TOPICAL at 09:09

## 2021-09-26 RX ADMIN — DICLOFENAC 2 G: 10 GEL TOPICAL at 08:09

## 2021-09-26 RX ADMIN — NITROFURANTOIN MONOHYDRATE/MACROCRYSTALS 100 MG: 75; 25 CAPSULE ORAL at 08:09

## 2021-09-26 RX ADMIN — PANTOPRAZOLE SODIUM 40 MG: 40 TABLET, DELAYED RELEASE ORAL at 09:09

## 2021-09-26 RX ADMIN — APIXABAN 2.5 MG: 2.5 TABLET, FILM COATED ORAL at 09:09

## 2021-09-26 RX ADMIN — ALPRAZOLAM 1 MG: 0.5 TABLET ORAL at 08:09

## 2021-09-26 RX ADMIN — SODIUM CHLORIDE: 9 INJECTION, SOLUTION INTRAVENOUS at 08:09

## 2021-09-26 RX ADMIN — NITROFURANTOIN MONOHYDRATE/MACROCRYSTALS 100 MG: 75; 25 CAPSULE ORAL at 09:09

## 2021-09-26 RX ADMIN — SODIUM CHLORIDE: 9 INJECTION, SOLUTION INTRAVENOUS at 10:09

## 2021-09-26 RX ADMIN — DILTIAZEM HYDROCHLORIDE 120 MG: 120 CAPSULE, COATED, EXTENDED RELEASE ORAL at 09:09

## 2021-09-26 RX ADMIN — APIXABAN 2.5 MG: 2.5 TABLET, FILM COATED ORAL at 08:09

## 2021-09-27 LAB
ESTROGEN SERPL-MCNC: NORMAL PG/ML
LAB AP GROSS DESCRIPTION: NORMAL
LAB AP LABORATORY NOTES: NORMAL
T3RU NFR SERPL: NORMAL %

## 2021-09-27 PROCEDURE — 25000003 PHARM REV CODE 250: Performed by: PHYSICIAN ASSISTANT

## 2021-09-27 PROCEDURE — 27000941

## 2021-09-27 PROCEDURE — 97530 THERAPEUTIC ACTIVITIES: CPT | Mod: CQ

## 2021-09-27 PROCEDURE — 97535 SELF CARE MNGMENT TRAINING: CPT

## 2021-09-27 PROCEDURE — 11000004 HC SNF PRIVATE

## 2021-09-27 PROCEDURE — 94761 N-INVAS EAR/PLS OXIMETRY MLT: CPT

## 2021-09-27 PROCEDURE — 97116 GAIT TRAINING THERAPY: CPT | Mod: CQ

## 2021-09-27 PROCEDURE — 25000003 PHARM REV CODE 250: Performed by: FAMILY MEDICINE

## 2021-09-27 PROCEDURE — 25000003 PHARM REV CODE 250: Performed by: NURSE PRACTITIONER

## 2021-09-27 PROCEDURE — 97530 THERAPEUTIC ACTIVITIES: CPT

## 2021-09-27 PROCEDURE — 97110 THERAPEUTIC EXERCISES: CPT | Mod: CQ

## 2021-09-27 PROCEDURE — 97110 THERAPEUTIC EXERCISES: CPT

## 2021-09-27 RX ADMIN — ALPRAZOLAM 1 MG: 0.5 TABLET ORAL at 08:09

## 2021-09-27 RX ADMIN — ACETAMINOPHEN 650 MG: 325 TABLET, FILM COATED ORAL at 08:09

## 2021-09-27 RX ADMIN — APIXABAN 2.5 MG: 2.5 TABLET, FILM COATED ORAL at 08:09

## 2021-09-27 RX ADMIN — SODIUM CHLORIDE: 9 INJECTION, SOLUTION INTRAVENOUS at 05:09

## 2021-09-27 RX ADMIN — NITROFURANTOIN MONOHYDRATE/MACROCRYSTALS 100 MG: 75; 25 CAPSULE ORAL at 08:09

## 2021-09-27 RX ADMIN — HYDROCHLOROTHIAZIDE 25 MG: 12.5 TABLET ORAL at 08:09

## 2021-09-27 RX ADMIN — SODIUM CHLORIDE: 9 INJECTION, SOLUTION INTRAVENOUS at 07:09

## 2021-09-27 RX ADMIN — PANTOPRAZOLE SODIUM 40 MG: 40 TABLET, DELAYED RELEASE ORAL at 08:09

## 2021-09-27 RX ADMIN — ACETAMINOPHEN 650 MG: 325 TABLET, FILM COATED ORAL at 03:09

## 2021-09-27 RX ADMIN — LEVOTHYROXINE SODIUM 125 MCG: 0.05 TABLET ORAL at 05:09

## 2021-09-27 RX ADMIN — DICLOFENAC 2 G: 10 GEL TOPICAL at 08:09

## 2021-09-27 RX ADMIN — DILTIAZEM HYDROCHLORIDE 120 MG: 120 CAPSULE, COATED, EXTENDED RELEASE ORAL at 08:09

## 2021-09-28 PROCEDURE — 25000003 PHARM REV CODE 250: Performed by: PHYSICIAN ASSISTANT

## 2021-09-28 PROCEDURE — 97116 GAIT TRAINING THERAPY: CPT | Mod: CQ

## 2021-09-28 PROCEDURE — 94761 N-INVAS EAR/PLS OXIMETRY MLT: CPT

## 2021-09-28 PROCEDURE — 11000004 HC SNF PRIVATE

## 2021-09-28 PROCEDURE — 25000003 PHARM REV CODE 250: Performed by: FAMILY MEDICINE

## 2021-09-28 PROCEDURE — 97110 THERAPEUTIC EXERCISES: CPT

## 2021-09-28 PROCEDURE — 25000003 PHARM REV CODE 250: Performed by: NURSE PRACTITIONER

## 2021-09-28 PROCEDURE — 97110 THERAPEUTIC EXERCISES: CPT | Mod: CQ

## 2021-09-28 PROCEDURE — 97535 SELF CARE MNGMENT TRAINING: CPT

## 2021-09-28 PROCEDURE — 97530 THERAPEUTIC ACTIVITIES: CPT

## 2021-09-28 RX ADMIN — HYDROCHLOROTHIAZIDE 25 MG: 12.5 TABLET ORAL at 08:09

## 2021-09-28 RX ADMIN — APIXABAN 2.5 MG: 2.5 TABLET, FILM COATED ORAL at 08:09

## 2021-09-28 RX ADMIN — DICLOFENAC 2 G: 10 GEL TOPICAL at 08:09

## 2021-09-28 RX ADMIN — ACETAMINOPHEN 650 MG: 325 TABLET, FILM COATED ORAL at 05:09

## 2021-09-28 RX ADMIN — NITROFURANTOIN MONOHYDRATE/MACROCRYSTALS 100 MG: 75; 25 CAPSULE ORAL at 08:09

## 2021-09-28 RX ADMIN — ALPRAZOLAM 1 MG: 0.5 TABLET ORAL at 08:09

## 2021-09-28 RX ADMIN — PANTOPRAZOLE SODIUM 40 MG: 40 TABLET, DELAYED RELEASE ORAL at 08:09

## 2021-09-28 RX ADMIN — SODIUM CHLORIDE: 9 INJECTION, SOLUTION INTRAVENOUS at 06:09

## 2021-09-28 RX ADMIN — DILTIAZEM HYDROCHLORIDE 120 MG: 120 CAPSULE, COATED, EXTENDED RELEASE ORAL at 08:09

## 2021-09-28 RX ADMIN — LEVOTHYROXINE SODIUM 125 MCG: 0.05 TABLET ORAL at 05:09

## 2021-09-29 PROCEDURE — 97110 THERAPEUTIC EXERCISES: CPT | Mod: CQ

## 2021-09-29 PROCEDURE — 25000003 PHARM REV CODE 250: Performed by: FAMILY MEDICINE

## 2021-09-29 PROCEDURE — 25000003 PHARM REV CODE 250: Performed by: PHYSICIAN ASSISTANT

## 2021-09-29 PROCEDURE — 97535 SELF CARE MNGMENT TRAINING: CPT

## 2021-09-29 PROCEDURE — 11000004 HC SNF PRIVATE

## 2021-09-29 PROCEDURE — 94761 N-INVAS EAR/PLS OXIMETRY MLT: CPT

## 2021-09-29 RX ADMIN — ALPRAZOLAM 1 MG: 0.5 TABLET ORAL at 08:09

## 2021-09-29 RX ADMIN — HYDROCHLOROTHIAZIDE 25 MG: 12.5 TABLET ORAL at 08:09

## 2021-09-29 RX ADMIN — PANTOPRAZOLE SODIUM 40 MG: 40 TABLET, DELAYED RELEASE ORAL at 08:09

## 2021-09-29 RX ADMIN — DICLOFENAC 2 G: 10 GEL TOPICAL at 08:09

## 2021-09-29 RX ADMIN — APIXABAN 2.5 MG: 2.5 TABLET, FILM COATED ORAL at 08:09

## 2021-09-29 RX ADMIN — DILTIAZEM HYDROCHLORIDE 120 MG: 120 CAPSULE, COATED, EXTENDED RELEASE ORAL at 08:09

## 2021-09-29 RX ADMIN — LEVOTHYROXINE SODIUM 125 MCG: 0.05 TABLET ORAL at 06:09

## 2021-09-29 RX ADMIN — SENNOSIDES AND DOCUSATE SODIUM 1 TABLET: 50; 8.6 TABLET ORAL at 08:09

## 2021-09-30 LAB
ANION GAP SERPL CALCULATED.3IONS-SCNC: 11 MMOL/L (ref 7–16)
ANISOCYTOSIS BLD QL SMEAR: ABNORMAL
BASOPHILS # BLD AUTO: 0.07 K/UL (ref 0–0.2)
BASOPHILS NFR BLD AUTO: 0.6 % (ref 0–1)
BASOPHILS NFR BLD MANUAL: 2 % (ref 0–1)
BUN SERPL-MCNC: 9 MG/DL (ref 7–18)
BUN/CREAT SERPL: 13 (ref 6–20)
CALCIUM SERPL-MCNC: 8.3 MG/DL (ref 8.5–10.1)
CHLORIDE SERPL-SCNC: 97 MMOL/L (ref 98–107)
CO2 SERPL-SCNC: 30 MMOL/L (ref 21–32)
CREAT SERPL-MCNC: 0.72 MG/DL (ref 0.55–1.02)
DIFFERENTIAL METHOD BLD: ABNORMAL
EOSINOPHIL # BLD AUTO: 0.36 K/UL (ref 0–0.5)
EOSINOPHIL NFR BLD AUTO: 3.2 % (ref 1–4)
EOSINOPHIL NFR BLD MANUAL: 3 % (ref 1–4)
ERYTHROCYTE [DISTWIDTH] IN BLOOD BY AUTOMATED COUNT: 13.3 % (ref 11.5–14.5)
GLUCOSE SERPL-MCNC: 99 MG/DL (ref 74–106)
HCT VFR BLD AUTO: 30.1 % (ref 38–47)
HGB BLD-MCNC: 9.6 G/DL (ref 12–16)
LYMPHOCYTES # BLD AUTO: 2.16 K/UL (ref 1–4.8)
LYMPHOCYTES NFR BLD AUTO: 19.2 % (ref 27–41)
LYMPHOCYTES NFR BLD MANUAL: 21 % (ref 27–41)
MCH RBC QN AUTO: 29.1 PG (ref 27–31)
MCHC RBC AUTO-ENTMCNC: 31.9 G/DL (ref 32–36)
MCV RBC AUTO: 91.2 FL (ref 80–96)
MONOCYTES # BLD AUTO: 0.91 K/UL (ref 0–0.8)
MONOCYTES NFR BLD AUTO: 8.1 % (ref 2–6)
MONOCYTES NFR BLD MANUAL: 6 % (ref 2–6)
MPC BLD CALC-MCNC: 9.4 FL (ref 9.4–12.4)
NEUTROPHILS # BLD AUTO: 7.75 K/UL (ref 1.8–7.7)
NEUTROPHILS NFR BLD AUTO: 68.9 % (ref 53–65)
NEUTS SEG NFR BLD MANUAL: 68 % (ref 50–62)
NRBC BLD MANUAL-RTO: ABNORMAL %
PLATELET # BLD AUTO: 437 K/UL (ref 150–400)
PLATELET MORPHOLOGY: ABNORMAL
POTASSIUM SERPL-SCNC: 2.9 MMOL/L (ref 3.5–5.1)
RBC # BLD AUTO: 3.3 M/UL (ref 4.2–5.4)
SODIUM SERPL-SCNC: 135 MMOL/L (ref 136–145)
WBC # BLD AUTO: 11.25 K/UL (ref 4.5–11)

## 2021-09-30 PROCEDURE — 11000004 HC SNF PRIVATE

## 2021-09-30 PROCEDURE — 99308 SBSQ NF CARE LOW MDM 20: CPT | Mod: ,,, | Performed by: FAMILY MEDICINE

## 2021-09-30 PROCEDURE — 97110 THERAPEUTIC EXERCISES: CPT

## 2021-09-30 PROCEDURE — 85025 COMPLETE CBC W/AUTO DIFF WBC: CPT | Performed by: NURSE PRACTITIONER

## 2021-09-30 PROCEDURE — 25000003 PHARM REV CODE 250: Performed by: FAMILY MEDICINE

## 2021-09-30 PROCEDURE — 97535 SELF CARE MNGMENT TRAINING: CPT

## 2021-09-30 PROCEDURE — 27000941

## 2021-09-30 PROCEDURE — 80048 BASIC METABOLIC PNL TOTAL CA: CPT | Performed by: NURSE PRACTITIONER

## 2021-09-30 PROCEDURE — 36415 COLL VENOUS BLD VENIPUNCTURE: CPT | Performed by: NURSE PRACTITIONER

## 2021-09-30 PROCEDURE — 97116 GAIT TRAINING THERAPY: CPT | Mod: CQ

## 2021-09-30 PROCEDURE — 94761 N-INVAS EAR/PLS OXIMETRY MLT: CPT

## 2021-09-30 PROCEDURE — 99308 PR NURSING FAC CARE, SUBSEQ, MINOR COMPLIC: ICD-10-PCS | Mod: ,,, | Performed by: FAMILY MEDICINE

## 2021-09-30 PROCEDURE — 97110 THERAPEUTIC EXERCISES: CPT | Mod: CQ

## 2021-09-30 PROCEDURE — 25000003 PHARM REV CODE 250: Performed by: PHYSICIAN ASSISTANT

## 2021-09-30 PROCEDURE — 97530 THERAPEUTIC ACTIVITIES: CPT

## 2021-09-30 RX ADMIN — PANTOPRAZOLE SODIUM 40 MG: 40 TABLET, DELAYED RELEASE ORAL at 08:09

## 2021-09-30 RX ADMIN — ALPRAZOLAM 1 MG: 0.5 TABLET ORAL at 08:09

## 2021-09-30 RX ADMIN — APIXABAN 2.5 MG: 2.5 TABLET, FILM COATED ORAL at 08:09

## 2021-09-30 RX ADMIN — POTASSIUM BICARBONATE 20 MEQ: 391 TABLET, EFFERVESCENT ORAL at 11:09

## 2021-09-30 RX ADMIN — HYDROCHLOROTHIAZIDE 25 MG: 12.5 TABLET ORAL at 08:09

## 2021-09-30 RX ADMIN — DICLOFENAC 2 G: 10 GEL TOPICAL at 09:09

## 2021-09-30 RX ADMIN — DILTIAZEM HYDROCHLORIDE 120 MG: 120 CAPSULE, COATED, EXTENDED RELEASE ORAL at 08:09

## 2021-09-30 RX ADMIN — LEVOTHYROXINE SODIUM 125 MCG: 0.05 TABLET ORAL at 06:09

## 2021-09-30 RX ADMIN — DICLOFENAC 2 G: 10 GEL TOPICAL at 08:09

## 2021-10-01 ENCOUNTER — CLINICAL SUPPORT (OUTPATIENT)
Dept: WOUND CARE | Facility: HOSPITAL | Age: 86
DRG: 561 | End: 2021-10-01
Attending: FAMILY MEDICINE
Payer: MEDICARE

## 2021-10-01 DIAGNOSIS — L98.491 SUPERFICIAL ULCER OF SKIN: Primary | ICD-10-CM

## 2021-10-01 DIAGNOSIS — Z87.81 S/P ORIF (OPEN REDUCTION INTERNAL FIXATION) FRACTURE: ICD-10-CM

## 2021-10-01 DIAGNOSIS — Z98.890 S/P ORIF (OPEN REDUCTION INTERNAL FIXATION) FRACTURE: ICD-10-CM

## 2021-10-01 PROCEDURE — 94761 N-INVAS EAR/PLS OXIMETRY MLT: CPT

## 2021-10-01 PROCEDURE — 97110 THERAPEUTIC EXERCISES: CPT

## 2021-10-01 PROCEDURE — 97530 THERAPEUTIC ACTIVITIES: CPT

## 2021-10-01 PROCEDURE — 27000941

## 2021-10-01 PROCEDURE — 11000004 HC SNF PRIVATE

## 2021-10-01 PROCEDURE — 99212 OFFICE O/P EST SF 10 MIN: CPT

## 2021-10-01 PROCEDURE — 97535 SELF CARE MNGMENT TRAINING: CPT

## 2021-10-01 PROCEDURE — 25000003 PHARM REV CODE 250: Performed by: FAMILY MEDICINE

## 2021-10-01 PROCEDURE — 27000987 HC MATTRESS, MATRIX LOW PROFILE

## 2021-10-01 PROCEDURE — 25000003 PHARM REV CODE 250: Performed by: PHYSICIAN ASSISTANT

## 2021-10-01 RX ADMIN — LEVOTHYROXINE SODIUM 125 MCG: 0.05 TABLET ORAL at 06:10

## 2021-10-01 RX ADMIN — DICLOFENAC 2 G: 10 GEL TOPICAL at 08:10

## 2021-10-01 RX ADMIN — PANTOPRAZOLE SODIUM 40 MG: 40 TABLET, DELAYED RELEASE ORAL at 08:10

## 2021-10-01 RX ADMIN — APIXABAN 2.5 MG: 2.5 TABLET, FILM COATED ORAL at 08:10

## 2021-10-01 RX ADMIN — HYDROCHLOROTHIAZIDE 25 MG: 12.5 TABLET ORAL at 08:10

## 2021-10-01 RX ADMIN — DILTIAZEM HYDROCHLORIDE 120 MG: 120 CAPSULE, COATED, EXTENDED RELEASE ORAL at 08:10

## 2021-10-01 RX ADMIN — ALPRAZOLAM 1 MG: 0.5 TABLET ORAL at 08:10

## 2021-10-02 PROCEDURE — 25000003 PHARM REV CODE 250: Performed by: FAMILY MEDICINE

## 2021-10-02 PROCEDURE — 94761 N-INVAS EAR/PLS OXIMETRY MLT: CPT

## 2021-10-02 PROCEDURE — 25000003 PHARM REV CODE 250: Performed by: PHYSICIAN ASSISTANT

## 2021-10-02 PROCEDURE — 27000941

## 2021-10-02 PROCEDURE — 11000004 HC SNF PRIVATE

## 2021-10-02 RX ADMIN — APIXABAN 2.5 MG: 2.5 TABLET, FILM COATED ORAL at 08:10

## 2021-10-02 RX ADMIN — DICLOFENAC 2 G: 10 GEL TOPICAL at 08:10

## 2021-10-02 RX ADMIN — PANTOPRAZOLE SODIUM 40 MG: 40 TABLET, DELAYED RELEASE ORAL at 08:10

## 2021-10-02 RX ADMIN — ACETAMINOPHEN 650 MG: 325 TABLET, FILM COATED ORAL at 03:10

## 2021-10-02 RX ADMIN — DILTIAZEM HYDROCHLORIDE 120 MG: 120 CAPSULE, COATED, EXTENDED RELEASE ORAL at 08:10

## 2021-10-02 RX ADMIN — LEVOTHYROXINE SODIUM 125 MCG: 0.05 TABLET ORAL at 06:10

## 2021-10-02 RX ADMIN — ALPRAZOLAM 1 MG: 0.5 TABLET ORAL at 08:10

## 2021-10-02 RX ADMIN — HYDROCHLOROTHIAZIDE 25 MG: 12.5 TABLET ORAL at 08:10

## 2021-10-03 PROCEDURE — 25000003 PHARM REV CODE 250: Performed by: PHYSICIAN ASSISTANT

## 2021-10-03 PROCEDURE — 25000003 PHARM REV CODE 250: Performed by: NURSE PRACTITIONER

## 2021-10-03 PROCEDURE — 27000941

## 2021-10-03 PROCEDURE — 11000004 HC SNF PRIVATE

## 2021-10-03 PROCEDURE — 25000003 PHARM REV CODE 250: Performed by: FAMILY MEDICINE

## 2021-10-03 PROCEDURE — 94761 N-INVAS EAR/PLS OXIMETRY MLT: CPT

## 2021-10-03 RX ORDER — AMOXICILLIN 250 MG
1 CAPSULE ORAL DAILY PRN
Status: DISCONTINUED | OUTPATIENT
Start: 2021-10-03 | End: 2021-10-20 | Stop reason: HOSPADM

## 2021-10-03 RX ADMIN — DILTIAZEM HYDROCHLORIDE 120 MG: 120 CAPSULE, COATED, EXTENDED RELEASE ORAL at 08:10

## 2021-10-03 RX ADMIN — APIXABAN 2.5 MG: 2.5 TABLET, FILM COATED ORAL at 08:10

## 2021-10-03 RX ADMIN — DICLOFENAC 2 G: 10 GEL TOPICAL at 08:10

## 2021-10-03 RX ADMIN — LEVOTHYROXINE SODIUM 125 MCG: 0.05 TABLET ORAL at 05:10

## 2021-10-03 RX ADMIN — PANTOPRAZOLE SODIUM 40 MG: 40 TABLET, DELAYED RELEASE ORAL at 08:10

## 2021-10-03 RX ADMIN — ALPRAZOLAM 1 MG: 0.5 TABLET ORAL at 08:10

## 2021-10-03 RX ADMIN — HYDROCHLOROTHIAZIDE 25 MG: 12.5 TABLET ORAL at 08:10

## 2021-10-03 RX ADMIN — SENNOSIDES AND DOCUSATE SODIUM 1 TABLET: 50; 8.6 TABLET ORAL at 09:10

## 2021-10-04 PROCEDURE — 94761 N-INVAS EAR/PLS OXIMETRY MLT: CPT

## 2021-10-04 PROCEDURE — 25000003 PHARM REV CODE 250: Performed by: NURSE PRACTITIONER

## 2021-10-04 PROCEDURE — 25000003 PHARM REV CODE 250: Performed by: FAMILY MEDICINE

## 2021-10-04 PROCEDURE — 97116 GAIT TRAINING THERAPY: CPT | Mod: CQ

## 2021-10-04 PROCEDURE — 11000004 HC SNF PRIVATE

## 2021-10-04 PROCEDURE — 27000941

## 2021-10-04 PROCEDURE — 97110 THERAPEUTIC EXERCISES: CPT | Mod: CQ

## 2021-10-04 PROCEDURE — 97530 THERAPEUTIC ACTIVITIES: CPT | Mod: CQ

## 2021-10-04 PROCEDURE — 25000003 PHARM REV CODE 250: Performed by: PHYSICIAN ASSISTANT

## 2021-10-04 RX ADMIN — DICLOFENAC 2 G: 10 GEL TOPICAL at 08:10

## 2021-10-04 RX ADMIN — DILTIAZEM HYDROCHLORIDE 120 MG: 120 CAPSULE, COATED, EXTENDED RELEASE ORAL at 09:10

## 2021-10-04 RX ADMIN — APIXABAN 2.5 MG: 2.5 TABLET, FILM COATED ORAL at 08:10

## 2021-10-04 RX ADMIN — LEVOTHYROXINE SODIUM 125 MCG: 0.05 TABLET ORAL at 06:10

## 2021-10-04 RX ADMIN — DICLOFENAC 2 G: 10 GEL TOPICAL at 09:10

## 2021-10-04 RX ADMIN — PANTOPRAZOLE SODIUM 40 MG: 40 TABLET, DELAYED RELEASE ORAL at 09:10

## 2021-10-04 RX ADMIN — HYDROCHLOROTHIAZIDE 25 MG: 12.5 TABLET ORAL at 09:10

## 2021-10-04 RX ADMIN — APIXABAN 2.5 MG: 2.5 TABLET, FILM COATED ORAL at 09:10

## 2021-10-04 RX ADMIN — SENNOSIDES AND DOCUSATE SODIUM 1 TABLET: 50; 8.6 TABLET ORAL at 09:10

## 2021-10-05 LAB — TROPONIN I SERPL-MCNC: 0.02 NG/ML

## 2021-10-05 PROCEDURE — 93010 ELECTROCARDIOGRAM REPORT: CPT | Mod: ,,, | Performed by: FAMILY MEDICINE

## 2021-10-05 PROCEDURE — 11000004 HC SNF PRIVATE

## 2021-10-05 PROCEDURE — 93005 ELECTROCARDIOGRAM TRACING: CPT

## 2021-10-05 PROCEDURE — 36415 COLL VENOUS BLD VENIPUNCTURE: CPT | Performed by: FAMILY MEDICINE

## 2021-10-05 PROCEDURE — 97530 THERAPEUTIC ACTIVITIES: CPT

## 2021-10-05 PROCEDURE — 94761 N-INVAS EAR/PLS OXIMETRY MLT: CPT

## 2021-10-05 PROCEDURE — 25000003 PHARM REV CODE 250: Performed by: FAMILY MEDICINE

## 2021-10-05 PROCEDURE — 97110 THERAPEUTIC EXERCISES: CPT

## 2021-10-05 PROCEDURE — 25000003 PHARM REV CODE 250: Performed by: NURSE PRACTITIONER

## 2021-10-05 PROCEDURE — 93010 EKG 12-LEAD: ICD-10-PCS | Mod: ,,, | Performed by: FAMILY MEDICINE

## 2021-10-05 PROCEDURE — 84484 ASSAY OF TROPONIN QUANT: CPT | Performed by: FAMILY MEDICINE

## 2021-10-05 PROCEDURE — 97535 SELF CARE MNGMENT TRAINING: CPT

## 2021-10-05 PROCEDURE — 25000003 PHARM REV CODE 250: Performed by: PHYSICIAN ASSISTANT

## 2021-10-05 PROCEDURE — 27000941

## 2021-10-05 PROCEDURE — 97116 GAIT TRAINING THERAPY: CPT

## 2021-10-05 RX ORDER — ALPRAZOLAM 0.5 MG/1
1 TABLET ORAL 2 TIMES DAILY PRN
Status: DISCONTINUED | OUTPATIENT
Start: 2021-10-05 | End: 2021-10-13

## 2021-10-05 RX ORDER — BISACODYL 10 MG
10 SUPPOSITORY, RECTAL RECTAL DAILY PRN
Status: DISCONTINUED | OUTPATIENT
Start: 2021-10-05 | End: 2021-10-20 | Stop reason: HOSPADM

## 2021-10-05 RX ORDER — CYPROHEPTADINE HYDROCHLORIDE 4 MG/1
4 TABLET ORAL 2 TIMES DAILY
Status: DISCONTINUED | OUTPATIENT
Start: 2021-10-05 | End: 2021-10-20 | Stop reason: HOSPADM

## 2021-10-05 RX ADMIN — CYPROHEPTADINE HYDROCHLORIDE 4 MG: 4 TABLET ORAL at 08:10

## 2021-10-05 RX ADMIN — BISACODYL 10 MG: 10 SUPPOSITORY RECTAL at 05:10

## 2021-10-05 RX ADMIN — LEVOTHYROXINE SODIUM 125 MCG: 0.05 TABLET ORAL at 05:10

## 2021-10-05 RX ADMIN — DICLOFENAC 2 G: 10 GEL TOPICAL at 08:10

## 2021-10-05 RX ADMIN — SENNOSIDES AND DOCUSATE SODIUM 1 TABLET: 50; 8.6 TABLET ORAL at 08:10

## 2021-10-05 RX ADMIN — PANTOPRAZOLE SODIUM 40 MG: 40 TABLET, DELAYED RELEASE ORAL at 08:10

## 2021-10-05 RX ADMIN — APIXABAN 2.5 MG: 2.5 TABLET, FILM COATED ORAL at 08:10

## 2021-10-05 RX ADMIN — ALPRAZOLAM 1 MG: 0.5 TABLET ORAL at 08:10

## 2021-10-05 RX ADMIN — HYDROCHLOROTHIAZIDE 25 MG: 12.5 TABLET ORAL at 08:10

## 2021-10-05 RX ADMIN — DILTIAZEM HYDROCHLORIDE 120 MG: 120 CAPSULE, COATED, EXTENDED RELEASE ORAL at 08:10

## 2021-10-06 PROCEDURE — 97530 THERAPEUTIC ACTIVITIES: CPT

## 2021-10-06 PROCEDURE — 97110 THERAPEUTIC EXERCISES: CPT | Mod: CQ

## 2021-10-06 PROCEDURE — 97110 THERAPEUTIC EXERCISES: CPT

## 2021-10-06 PROCEDURE — 25000003 PHARM REV CODE 250: Performed by: PHYSICIAN ASSISTANT

## 2021-10-06 PROCEDURE — 97116 GAIT TRAINING THERAPY: CPT | Mod: CQ

## 2021-10-06 PROCEDURE — 97535 SELF CARE MNGMENT TRAINING: CPT

## 2021-10-06 PROCEDURE — 25000003 PHARM REV CODE 250: Performed by: NURSE PRACTITIONER

## 2021-10-06 PROCEDURE — 25000003 PHARM REV CODE 250: Performed by: FAMILY MEDICINE

## 2021-10-06 PROCEDURE — 27000941

## 2021-10-06 PROCEDURE — 94761 N-INVAS EAR/PLS OXIMETRY MLT: CPT

## 2021-10-06 PROCEDURE — 11000004 HC SNF PRIVATE

## 2021-10-06 RX ADMIN — HYDROCHLOROTHIAZIDE 25 MG: 12.5 TABLET ORAL at 08:10

## 2021-10-06 RX ADMIN — DICLOFENAC 2 G: 10 GEL TOPICAL at 08:10

## 2021-10-06 RX ADMIN — CYPROHEPTADINE HYDROCHLORIDE 4 MG: 4 TABLET ORAL at 08:10

## 2021-10-06 RX ADMIN — DILTIAZEM HYDROCHLORIDE 120 MG: 120 CAPSULE, COATED, EXTENDED RELEASE ORAL at 08:10

## 2021-10-06 RX ADMIN — SENNOSIDES AND DOCUSATE SODIUM 1 TABLET: 50; 8.6 TABLET ORAL at 08:10

## 2021-10-06 RX ADMIN — LEVOTHYROXINE SODIUM 125 MCG: 0.05 TABLET ORAL at 05:10

## 2021-10-06 RX ADMIN — APIXABAN 2.5 MG: 2.5 TABLET, FILM COATED ORAL at 08:10

## 2021-10-06 RX ADMIN — ACETAMINOPHEN 650 MG: 325 TABLET, FILM COATED ORAL at 08:10

## 2021-10-06 RX ADMIN — ALPRAZOLAM 1 MG: 0.5 TABLET ORAL at 08:10

## 2021-10-06 RX ADMIN — PANTOPRAZOLE SODIUM 40 MG: 40 TABLET, DELAYED RELEASE ORAL at 08:10

## 2021-10-07 LAB
ANION GAP SERPL CALCULATED.3IONS-SCNC: 12 MMOL/L (ref 7–16)
ANISOCYTOSIS BLD QL SMEAR: ABNORMAL
ATYPICAL LYMPHOCYTES: ABNORMAL
BASOPHILS # BLD AUTO: 0.08 K/UL (ref 0–0.2)
BASOPHILS NFR BLD AUTO: 0.8 % (ref 0–1)
BUN SERPL-MCNC: 15 MG/DL (ref 7–18)
BUN/CREAT SERPL: 18 (ref 6–20)
CALCIUM SERPL-MCNC: 8.9 MG/DL (ref 8.5–10.1)
CHLORIDE SERPL-SCNC: 95 MMOL/L (ref 98–107)
CO2 SERPL-SCNC: 29 MMOL/L (ref 21–32)
CREAT SERPL-MCNC: 0.82 MG/DL (ref 0.55–1.02)
DIFFERENTIAL METHOD BLD: ABNORMAL
EOSINOPHIL # BLD AUTO: 0.34 K/UL (ref 0–0.5)
EOSINOPHIL NFR BLD AUTO: 3.5 % (ref 1–4)
EOSINOPHIL NFR BLD MANUAL: 1 % (ref 1–4)
ERYTHROCYTE [DISTWIDTH] IN BLOOD BY AUTOMATED COUNT: 13.9 % (ref 11.5–14.5)
GLUCOSE SERPL-MCNC: 94 MG/DL (ref 74–106)
HCT VFR BLD AUTO: 33.7 % (ref 38–47)
HGB BLD-MCNC: 10.6 G/DL (ref 12–16)
LYMPHOCYTES # BLD AUTO: 2.51 K/UL (ref 1–4.8)
LYMPHOCYTES NFR BLD AUTO: 26.1 % (ref 27–41)
LYMPHOCYTES NFR BLD MANUAL: 24 % (ref 27–41)
MCH RBC QN AUTO: 29 PG (ref 27–31)
MCHC RBC AUTO-ENTMCNC: 31.5 G/DL (ref 32–36)
MCV RBC AUTO: 92.3 FL (ref 80–96)
MONOCYTES # BLD AUTO: 0.83 K/UL (ref 0–0.8)
MONOCYTES NFR BLD AUTO: 8.6 % (ref 2–6)
MONOCYTES NFR BLD MANUAL: 9 % (ref 2–6)
MPC BLD CALC-MCNC: 10 FL (ref 9.4–12.4)
NEUTROPHILS # BLD AUTO: 5.84 K/UL (ref 1.8–7.7)
NEUTROPHILS NFR BLD AUTO: 61 % (ref 53–65)
NEUTS BAND NFR BLD MANUAL: 1 % (ref 1–5)
NEUTS SEG NFR BLD MANUAL: 65 % (ref 50–62)
NRBC BLD MANUAL-RTO: ABNORMAL %
PLATELET # BLD AUTO: 558 K/UL (ref 150–400)
PLATELET MORPHOLOGY: ABNORMAL
POIKILOCYTOSIS BLD QL SMEAR: ABNORMAL
POTASSIUM SERPL-SCNC: 3.6 MMOL/L (ref 3.5–5.1)
RBC # BLD AUTO: 3.65 M/UL (ref 4.2–5.4)
SODIUM SERPL-SCNC: 132 MMOL/L (ref 136–145)
WBC # BLD AUTO: 9.6 K/UL (ref 4.5–11)

## 2021-10-07 PROCEDURE — 97530 THERAPEUTIC ACTIVITIES: CPT

## 2021-10-07 PROCEDURE — 11000004 HC SNF PRIVATE

## 2021-10-07 PROCEDURE — 25000003 PHARM REV CODE 250: Performed by: NURSE PRACTITIONER

## 2021-10-07 PROCEDURE — 97110 THERAPEUTIC EXERCISES: CPT

## 2021-10-07 PROCEDURE — 97116 GAIT TRAINING THERAPY: CPT | Mod: CQ

## 2021-10-07 PROCEDURE — 27000941

## 2021-10-07 PROCEDURE — 25000003 PHARM REV CODE 250: Performed by: FAMILY MEDICINE

## 2021-10-07 PROCEDURE — 80048 BASIC METABOLIC PNL TOTAL CA: CPT | Performed by: NURSE PRACTITIONER

## 2021-10-07 PROCEDURE — 97110 THERAPEUTIC EXERCISES: CPT | Mod: CQ

## 2021-10-07 PROCEDURE — 94761 N-INVAS EAR/PLS OXIMETRY MLT: CPT

## 2021-10-07 PROCEDURE — 36415 COLL VENOUS BLD VENIPUNCTURE: CPT | Performed by: NURSE PRACTITIONER

## 2021-10-07 PROCEDURE — 85025 COMPLETE CBC W/AUTO DIFF WBC: CPT | Performed by: NURSE PRACTITIONER

## 2021-10-07 PROCEDURE — 97530 THERAPEUTIC ACTIVITIES: CPT | Mod: CQ

## 2021-10-07 PROCEDURE — 25000003 PHARM REV CODE 250: Performed by: PHYSICIAN ASSISTANT

## 2021-10-07 RX ADMIN — DICLOFENAC 2 G: 10 GEL TOPICAL at 08:10

## 2021-10-07 RX ADMIN — APIXABAN 2.5 MG: 2.5 TABLET, FILM COATED ORAL at 08:10

## 2021-10-07 RX ADMIN — DILTIAZEM HYDROCHLORIDE 120 MG: 120 CAPSULE, COATED, EXTENDED RELEASE ORAL at 08:10

## 2021-10-07 RX ADMIN — PANTOPRAZOLE SODIUM 40 MG: 40 TABLET, DELAYED RELEASE ORAL at 08:10

## 2021-10-07 RX ADMIN — HYDROCHLOROTHIAZIDE 25 MG: 12.5 TABLET ORAL at 08:10

## 2021-10-07 RX ADMIN — ALPRAZOLAM 1 MG: 0.5 TABLET ORAL at 09:10

## 2021-10-07 RX ADMIN — DICLOFENAC 2 G: 10 GEL TOPICAL at 09:10

## 2021-10-07 RX ADMIN — CYPROHEPTADINE HYDROCHLORIDE 4 MG: 4 TABLET ORAL at 08:10

## 2021-10-07 RX ADMIN — SENNOSIDES AND DOCUSATE SODIUM 1 TABLET: 50; 8.6 TABLET ORAL at 08:10

## 2021-10-07 RX ADMIN — LEVOTHYROXINE SODIUM 125 MCG: 0.05 TABLET ORAL at 06:10

## 2021-10-07 RX ADMIN — CYPROHEPTADINE HYDROCHLORIDE 4 MG: 4 TABLET ORAL at 09:10

## 2021-10-07 RX ADMIN — APIXABAN 2.5 MG: 2.5 TABLET, FILM COATED ORAL at 09:10

## 2021-10-08 ENCOUNTER — CLINICAL SUPPORT (OUTPATIENT)
Dept: WOUND CARE | Facility: HOSPITAL | Age: 86
DRG: 561 | End: 2021-10-08
Attending: FAMILY MEDICINE
Payer: MEDICARE

## 2021-10-08 VITALS — RESPIRATION RATE: 19 BRPM

## 2021-10-08 DIAGNOSIS — L98.491 SUPERFICIAL ULCER OF SKIN: Primary | ICD-10-CM

## 2021-10-08 PROCEDURE — 97110 THERAPEUTIC EXERCISES: CPT | Mod: CQ

## 2021-10-08 PROCEDURE — 97530 THERAPEUTIC ACTIVITIES: CPT

## 2021-10-08 PROCEDURE — 97116 GAIT TRAINING THERAPY: CPT | Mod: CQ

## 2021-10-08 PROCEDURE — 25000003 PHARM REV CODE 250: Performed by: FAMILY MEDICINE

## 2021-10-08 PROCEDURE — 11000004 HC SNF PRIVATE

## 2021-10-08 PROCEDURE — 97110 THERAPEUTIC EXERCISES: CPT

## 2021-10-08 PROCEDURE — 94761 N-INVAS EAR/PLS OXIMETRY MLT: CPT

## 2021-10-08 PROCEDURE — 97535 SELF CARE MNGMENT TRAINING: CPT

## 2021-10-08 PROCEDURE — 25000003 PHARM REV CODE 250: Performed by: PHYSICIAN ASSISTANT

## 2021-10-08 PROCEDURE — 27000941

## 2021-10-08 PROCEDURE — 25000003 PHARM REV CODE 250: Performed by: NURSE PRACTITIONER

## 2021-10-08 PROCEDURE — 99212 OFFICE O/P EST SF 10 MIN: CPT

## 2021-10-08 RX ADMIN — CYPROHEPTADINE HYDROCHLORIDE 4 MG: 4 TABLET ORAL at 08:10

## 2021-10-08 RX ADMIN — APIXABAN 2.5 MG: 2.5 TABLET, FILM COATED ORAL at 08:10

## 2021-10-08 RX ADMIN — PANTOPRAZOLE SODIUM 40 MG: 40 TABLET, DELAYED RELEASE ORAL at 08:10

## 2021-10-08 RX ADMIN — LEVOTHYROXINE SODIUM 125 MCG: 0.05 TABLET ORAL at 06:10

## 2021-10-08 RX ADMIN — DICLOFENAC 2 G: 10 GEL TOPICAL at 08:10

## 2021-10-08 RX ADMIN — SENNOSIDES AND DOCUSATE SODIUM 1 TABLET: 50; 8.6 TABLET ORAL at 08:10

## 2021-10-08 RX ADMIN — ALPRAZOLAM 1 MG: 0.5 TABLET ORAL at 08:10

## 2021-10-08 RX ADMIN — DILTIAZEM HYDROCHLORIDE 120 MG: 120 CAPSULE, COATED, EXTENDED RELEASE ORAL at 08:10

## 2021-10-09 PROCEDURE — 94761 N-INVAS EAR/PLS OXIMETRY MLT: CPT

## 2021-10-09 PROCEDURE — 11000004 HC SNF PRIVATE

## 2021-10-09 PROCEDURE — 25000003 PHARM REV CODE 250: Performed by: FAMILY MEDICINE

## 2021-10-09 PROCEDURE — 25000003 PHARM REV CODE 250: Performed by: NURSE PRACTITIONER

## 2021-10-09 PROCEDURE — 25000003 PHARM REV CODE 250: Performed by: PHYSICIAN ASSISTANT

## 2021-10-09 PROCEDURE — 27000941

## 2021-10-09 RX ADMIN — CYPROHEPTADINE HYDROCHLORIDE 4 MG: 4 TABLET ORAL at 08:10

## 2021-10-09 RX ADMIN — HYDROCHLOROTHIAZIDE 25 MG: 12.5 TABLET ORAL at 08:10

## 2021-10-09 RX ADMIN — DILTIAZEM HYDROCHLORIDE 120 MG: 120 CAPSULE, COATED, EXTENDED RELEASE ORAL at 08:10

## 2021-10-09 RX ADMIN — APIXABAN 2.5 MG: 2.5 TABLET, FILM COATED ORAL at 08:10

## 2021-10-09 RX ADMIN — PANTOPRAZOLE SODIUM 40 MG: 40 TABLET, DELAYED RELEASE ORAL at 08:10

## 2021-10-09 RX ADMIN — DICLOFENAC 2 G: 10 GEL TOPICAL at 08:10

## 2021-10-09 RX ADMIN — ALPRAZOLAM 1 MG: 0.5 TABLET ORAL at 09:10

## 2021-10-09 RX ADMIN — LEVOTHYROXINE SODIUM 125 MCG: 0.05 TABLET ORAL at 06:10

## 2021-10-10 PROCEDURE — 25000003 PHARM REV CODE 250: Performed by: PHYSICIAN ASSISTANT

## 2021-10-10 PROCEDURE — 25000003 PHARM REV CODE 250: Performed by: FAMILY MEDICINE

## 2021-10-10 PROCEDURE — 11000004 HC SNF PRIVATE

## 2021-10-10 PROCEDURE — 27000941

## 2021-10-10 PROCEDURE — 94761 N-INVAS EAR/PLS OXIMETRY MLT: CPT

## 2021-10-10 PROCEDURE — 25000003 PHARM REV CODE 250: Performed by: NURSE PRACTITIONER

## 2021-10-10 RX ADMIN — DICLOFENAC 2 G: 10 GEL TOPICAL at 08:10

## 2021-10-10 RX ADMIN — APIXABAN 2.5 MG: 2.5 TABLET, FILM COATED ORAL at 09:10

## 2021-10-10 RX ADMIN — ALPRAZOLAM 1 MG: 0.5 TABLET ORAL at 08:10

## 2021-10-10 RX ADMIN — LEVOTHYROXINE SODIUM 125 MCG: 0.05 TABLET ORAL at 05:10

## 2021-10-10 RX ADMIN — PANTOPRAZOLE SODIUM 40 MG: 40 TABLET, DELAYED RELEASE ORAL at 09:10

## 2021-10-10 RX ADMIN — HYDROCHLOROTHIAZIDE 25 MG: 12.5 TABLET ORAL at 09:10

## 2021-10-10 RX ADMIN — CYPROHEPTADINE HYDROCHLORIDE 4 MG: 4 TABLET ORAL at 08:10

## 2021-10-10 RX ADMIN — DILTIAZEM HYDROCHLORIDE 120 MG: 120 CAPSULE, COATED, EXTENDED RELEASE ORAL at 09:10

## 2021-10-10 RX ADMIN — SENNOSIDES AND DOCUSATE SODIUM 1 TABLET: 50; 8.6 TABLET ORAL at 09:10

## 2021-10-10 RX ADMIN — DICLOFENAC 2 G: 10 GEL TOPICAL at 09:10

## 2021-10-10 RX ADMIN — CYPROHEPTADINE HYDROCHLORIDE 4 MG: 4 TABLET ORAL at 09:10

## 2021-10-10 RX ADMIN — APIXABAN 2.5 MG: 2.5 TABLET, FILM COATED ORAL at 08:10

## 2021-10-11 PROCEDURE — 11000004 HC SNF PRIVATE

## 2021-10-11 PROCEDURE — 25000003 PHARM REV CODE 250: Performed by: FAMILY MEDICINE

## 2021-10-11 PROCEDURE — 97116 GAIT TRAINING THERAPY: CPT | Mod: CQ

## 2021-10-11 PROCEDURE — 97535 SELF CARE MNGMENT TRAINING: CPT

## 2021-10-11 PROCEDURE — 27000941

## 2021-10-11 PROCEDURE — 25000003 PHARM REV CODE 250: Performed by: PHYSICIAN ASSISTANT

## 2021-10-11 PROCEDURE — 94761 N-INVAS EAR/PLS OXIMETRY MLT: CPT

## 2021-10-11 PROCEDURE — 97110 THERAPEUTIC EXERCISES: CPT

## 2021-10-11 PROCEDURE — 97530 THERAPEUTIC ACTIVITIES: CPT

## 2021-10-11 PROCEDURE — 97110 THERAPEUTIC EXERCISES: CPT | Mod: CQ

## 2021-10-11 PROCEDURE — 25000003 PHARM REV CODE 250: Performed by: NURSE PRACTITIONER

## 2021-10-11 RX ADMIN — CYPROHEPTADINE HYDROCHLORIDE 4 MG: 4 TABLET ORAL at 09:10

## 2021-10-11 RX ADMIN — DICLOFENAC 2 G: 10 GEL TOPICAL at 11:10

## 2021-10-11 RX ADMIN — ALPRAZOLAM 1 MG: 0.5 TABLET ORAL at 09:10

## 2021-10-11 RX ADMIN — PANTOPRAZOLE SODIUM 40 MG: 40 TABLET, DELAYED RELEASE ORAL at 08:10

## 2021-10-11 RX ADMIN — SENNOSIDES AND DOCUSATE SODIUM 1 TABLET: 50; 8.6 TABLET ORAL at 09:10

## 2021-10-11 RX ADMIN — DICLOFENAC 2 G: 10 GEL TOPICAL at 09:10

## 2021-10-11 RX ADMIN — CYPROHEPTADINE HYDROCHLORIDE 4 MG: 4 TABLET ORAL at 08:10

## 2021-10-11 RX ADMIN — APIXABAN 2.5 MG: 2.5 TABLET, FILM COATED ORAL at 08:10

## 2021-10-11 RX ADMIN — LEVOTHYROXINE SODIUM 125 MCG: 0.05 TABLET ORAL at 06:10

## 2021-10-11 RX ADMIN — DILTIAZEM HYDROCHLORIDE 120 MG: 120 CAPSULE, COATED, EXTENDED RELEASE ORAL at 08:10

## 2021-10-11 RX ADMIN — APIXABAN 2.5 MG: 2.5 TABLET, FILM COATED ORAL at 09:10

## 2021-10-11 RX ADMIN — HYDROCHLOROTHIAZIDE 25 MG: 12.5 TABLET ORAL at 08:10

## 2021-10-12 PROCEDURE — 97116 GAIT TRAINING THERAPY: CPT | Mod: CQ

## 2021-10-12 PROCEDURE — 94761 N-INVAS EAR/PLS OXIMETRY MLT: CPT

## 2021-10-12 PROCEDURE — 97530 THERAPEUTIC ACTIVITIES: CPT

## 2021-10-12 PROCEDURE — 25000003 PHARM REV CODE 250: Performed by: FAMILY MEDICINE

## 2021-10-12 PROCEDURE — 25000003 PHARM REV CODE 250: Performed by: PHYSICIAN ASSISTANT

## 2021-10-12 PROCEDURE — 97110 THERAPEUTIC EXERCISES: CPT | Mod: CQ

## 2021-10-12 PROCEDURE — 11000004 HC SNF PRIVATE

## 2021-10-12 PROCEDURE — 97535 SELF CARE MNGMENT TRAINING: CPT

## 2021-10-12 PROCEDURE — 97110 THERAPEUTIC EXERCISES: CPT

## 2021-10-12 PROCEDURE — 27000941

## 2021-10-12 PROCEDURE — 25000003 PHARM REV CODE 250: Performed by: NURSE PRACTITIONER

## 2021-10-12 RX ADMIN — DILTIAZEM HYDROCHLORIDE 120 MG: 120 CAPSULE, COATED, EXTENDED RELEASE ORAL at 08:10

## 2021-10-12 RX ADMIN — APIXABAN 2.5 MG: 2.5 TABLET, FILM COATED ORAL at 09:10

## 2021-10-12 RX ADMIN — CYPROHEPTADINE HYDROCHLORIDE 4 MG: 4 TABLET ORAL at 09:10

## 2021-10-12 RX ADMIN — DICLOFENAC 2 G: 10 GEL TOPICAL at 09:10

## 2021-10-12 RX ADMIN — CYPROHEPTADINE HYDROCHLORIDE 4 MG: 4 TABLET ORAL at 08:10

## 2021-10-12 RX ADMIN — SENNOSIDES AND DOCUSATE SODIUM 1 TABLET: 50; 8.6 TABLET ORAL at 09:10

## 2021-10-12 RX ADMIN — DICLOFENAC 2 G: 10 GEL TOPICAL at 08:10

## 2021-10-12 RX ADMIN — HYDROCHLOROTHIAZIDE 25 MG: 12.5 TABLET ORAL at 08:10

## 2021-10-12 RX ADMIN — ALPRAZOLAM 1 MG: 0.5 TABLET ORAL at 09:10

## 2021-10-12 RX ADMIN — PANTOPRAZOLE SODIUM 40 MG: 40 TABLET, DELAYED RELEASE ORAL at 08:10

## 2021-10-12 RX ADMIN — LEVOTHYROXINE SODIUM 125 MCG: 0.05 TABLET ORAL at 06:10

## 2021-10-12 RX ADMIN — APIXABAN 2.5 MG: 2.5 TABLET, FILM COATED ORAL at 08:10

## 2021-10-13 PROCEDURE — 27000941

## 2021-10-13 PROCEDURE — 97116 GAIT TRAINING THERAPY: CPT

## 2021-10-13 PROCEDURE — 97530 THERAPEUTIC ACTIVITIES: CPT

## 2021-10-13 PROCEDURE — 94761 N-INVAS EAR/PLS OXIMETRY MLT: CPT

## 2021-10-13 PROCEDURE — 25000003 PHARM REV CODE 250: Performed by: PHYSICIAN ASSISTANT

## 2021-10-13 PROCEDURE — 97110 THERAPEUTIC EXERCISES: CPT

## 2021-10-13 PROCEDURE — 25000003 PHARM REV CODE 250: Performed by: FAMILY MEDICINE

## 2021-10-13 PROCEDURE — 11000004 HC SNF PRIVATE

## 2021-10-13 RX ORDER — ALPRAZOLAM 0.5 MG/1
2 TABLET ORAL 2 TIMES DAILY PRN
Status: DISCONTINUED | OUTPATIENT
Start: 2021-10-13 | End: 2021-10-16

## 2021-10-13 RX ADMIN — CYPROHEPTADINE HYDROCHLORIDE 4 MG: 4 TABLET ORAL at 08:10

## 2021-10-13 RX ADMIN — PANTOPRAZOLE SODIUM 40 MG: 40 TABLET, DELAYED RELEASE ORAL at 08:10

## 2021-10-13 RX ADMIN — DICLOFENAC 2 G: 10 GEL TOPICAL at 08:10

## 2021-10-13 RX ADMIN — HYDROCHLOROTHIAZIDE 25 MG: 12.5 TABLET ORAL at 08:10

## 2021-10-13 RX ADMIN — ALPRAZOLAM 2 MG: 0.5 TABLET ORAL at 08:10

## 2021-10-13 RX ADMIN — DILTIAZEM HYDROCHLORIDE 120 MG: 120 CAPSULE, COATED, EXTENDED RELEASE ORAL at 08:10

## 2021-10-13 RX ADMIN — APIXABAN 2.5 MG: 2.5 TABLET, FILM COATED ORAL at 08:10

## 2021-10-13 RX ADMIN — LEVOTHYROXINE SODIUM 125 MCG: 0.05 TABLET ORAL at 05:10

## 2021-10-14 LAB
ANION GAP SERPL CALCULATED.3IONS-SCNC: 10 MMOL/L (ref 7–16)
ANISOCYTOSIS BLD QL SMEAR: ABNORMAL
BASOPHILS # BLD AUTO: 0.05 K/UL (ref 0–0.2)
BASOPHILS NFR BLD AUTO: 0.8 % (ref 0–1)
BASOPHILS NFR BLD MANUAL: 2 % (ref 0–1)
BUN SERPL-MCNC: 12 MG/DL (ref 7–18)
BUN/CREAT SERPL: 17 (ref 6–20)
CALCIUM SERPL-MCNC: 8.5 MG/DL (ref 8.5–10.1)
CHLORIDE SERPL-SCNC: 103 MMOL/L (ref 98–107)
CO2 SERPL-SCNC: 30 MMOL/L (ref 21–32)
CREAT SERPL-MCNC: 0.69 MG/DL (ref 0.55–1.02)
DIFFERENTIAL METHOD BLD: ABNORMAL
EOSINOPHIL # BLD AUTO: 0.32 K/UL (ref 0–0.5)
EOSINOPHIL NFR BLD AUTO: 5.3 % (ref 1–4)
EOSINOPHIL NFR BLD MANUAL: 4 % (ref 1–4)
ERYTHROCYTE [DISTWIDTH] IN BLOOD BY AUTOMATED COUNT: 13.6 % (ref 11.5–14.5)
GLUCOSE SERPL-MCNC: 87 MG/DL (ref 74–106)
HCT VFR BLD AUTO: 31.4 % (ref 38–47)
HGB BLD-MCNC: 9.7 G/DL (ref 12–16)
LYMPHOCYTES # BLD AUTO: 1.6 K/UL (ref 1–4.8)
LYMPHOCYTES NFR BLD AUTO: 26.3 % (ref 27–41)
LYMPHOCYTES NFR BLD MANUAL: 25 % (ref 27–41)
MCH RBC QN AUTO: 29 PG (ref 27–31)
MCHC RBC AUTO-ENTMCNC: 30.9 G/DL (ref 32–36)
MCV RBC AUTO: 94 FL (ref 80–96)
MONOCYTES # BLD AUTO: 0.59 K/UL (ref 0–0.8)
MONOCYTES NFR BLD AUTO: 9.7 % (ref 2–6)
MONOCYTES NFR BLD MANUAL: 7 % (ref 2–6)
MPC BLD CALC-MCNC: 10 FL (ref 9.4–12.4)
NEUTROPHILS # BLD AUTO: 3.53 K/UL (ref 1.8–7.7)
NEUTROPHILS NFR BLD AUTO: 57.9 % (ref 53–65)
NEUTS SEG NFR BLD MANUAL: 62 % (ref 50–62)
NRBC BLD MANUAL-RTO: ABNORMAL %
PLATELET # BLD AUTO: 319 K/UL (ref 150–400)
PLATELET MORPHOLOGY: ABNORMAL
POIKILOCYTOSIS BLD QL SMEAR: ABNORMAL
POTASSIUM SERPL-SCNC: 3.5 MMOL/L (ref 3.5–5.1)
RBC # BLD AUTO: 3.34 M/UL (ref 4.2–5.4)
SODIUM SERPL-SCNC: 139 MMOL/L (ref 136–145)
WBC # BLD AUTO: 6.09 K/UL (ref 4.5–11)

## 2021-10-14 PROCEDURE — 27000941

## 2021-10-14 PROCEDURE — 25000003 PHARM REV CODE 250: Performed by: PHYSICIAN ASSISTANT

## 2021-10-14 PROCEDURE — 11000004 HC SNF PRIVATE

## 2021-10-14 PROCEDURE — 97116 GAIT TRAINING THERAPY: CPT | Mod: CQ

## 2021-10-14 PROCEDURE — 80048 BASIC METABOLIC PNL TOTAL CA: CPT | Performed by: NURSE PRACTITIONER

## 2021-10-14 PROCEDURE — 94761 N-INVAS EAR/PLS OXIMETRY MLT: CPT

## 2021-10-14 PROCEDURE — 97110 THERAPEUTIC EXERCISES: CPT

## 2021-10-14 PROCEDURE — 97535 SELF CARE MNGMENT TRAINING: CPT

## 2021-10-14 PROCEDURE — 97530 THERAPEUTIC ACTIVITIES: CPT

## 2021-10-14 PROCEDURE — 85025 COMPLETE CBC W/AUTO DIFF WBC: CPT | Performed by: NURSE PRACTITIONER

## 2021-10-14 PROCEDURE — 36415 COLL VENOUS BLD VENIPUNCTURE: CPT | Performed by: NURSE PRACTITIONER

## 2021-10-14 PROCEDURE — 97110 THERAPEUTIC EXERCISES: CPT | Mod: CQ

## 2021-10-14 PROCEDURE — 25000003 PHARM REV CODE 250: Performed by: FAMILY MEDICINE

## 2021-10-14 RX ADMIN — CYPROHEPTADINE HYDROCHLORIDE 4 MG: 4 TABLET ORAL at 08:10

## 2021-10-14 RX ADMIN — APIXABAN 2.5 MG: 2.5 TABLET, FILM COATED ORAL at 08:10

## 2021-10-14 RX ADMIN — DICLOFENAC 2 G: 10 GEL TOPICAL at 08:10

## 2021-10-14 RX ADMIN — HYDROCHLOROTHIAZIDE 25 MG: 12.5 TABLET ORAL at 08:10

## 2021-10-14 RX ADMIN — ALPRAZOLAM 2 MG: 0.5 TABLET ORAL at 08:10

## 2021-10-14 RX ADMIN — DILTIAZEM HYDROCHLORIDE 120 MG: 120 CAPSULE, COATED, EXTENDED RELEASE ORAL at 08:10

## 2021-10-14 RX ADMIN — LEVOTHYROXINE SODIUM 125 MCG: 0.05 TABLET ORAL at 05:10

## 2021-10-14 RX ADMIN — PANTOPRAZOLE SODIUM 40 MG: 40 TABLET, DELAYED RELEASE ORAL at 08:10

## 2021-10-15 ENCOUNTER — CLINICAL SUPPORT (OUTPATIENT)
Dept: WOUND CARE | Facility: HOSPITAL | Age: 86
DRG: 561 | End: 2021-10-15
Attending: FAMILY MEDICINE
Payer: MEDICARE

## 2021-10-15 DIAGNOSIS — L98.491 SUPERFICIAL ULCER OF SKIN: Primary | ICD-10-CM

## 2021-10-15 PROCEDURE — 94761 N-INVAS EAR/PLS OXIMETRY MLT: CPT

## 2021-10-15 PROCEDURE — 25000003 PHARM REV CODE 250: Performed by: NURSE PRACTITIONER

## 2021-10-15 PROCEDURE — 97116 GAIT TRAINING THERAPY: CPT | Mod: CQ

## 2021-10-15 PROCEDURE — 25000003 PHARM REV CODE 250: Performed by: PHYSICIAN ASSISTANT

## 2021-10-15 PROCEDURE — 97110 THERAPEUTIC EXERCISES: CPT | Mod: CQ

## 2021-10-15 PROCEDURE — 11000004 HC SNF PRIVATE

## 2021-10-15 PROCEDURE — 25000003 PHARM REV CODE 250: Performed by: FAMILY MEDICINE

## 2021-10-15 PROCEDURE — 27000941

## 2021-10-15 RX ADMIN — DICLOFENAC 2 G: 10 GEL TOPICAL at 08:10

## 2021-10-15 RX ADMIN — SENNOSIDES AND DOCUSATE SODIUM 1 TABLET: 50; 8.6 TABLET ORAL at 08:10

## 2021-10-15 RX ADMIN — DICLOFENAC 2 G: 10 GEL TOPICAL at 09:10

## 2021-10-15 RX ADMIN — LEVOTHYROXINE SODIUM 125 MCG: 0.05 TABLET ORAL at 05:10

## 2021-10-15 RX ADMIN — PANTOPRAZOLE SODIUM 40 MG: 40 TABLET, DELAYED RELEASE ORAL at 08:10

## 2021-10-15 RX ADMIN — APIXABAN 2.5 MG: 2.5 TABLET, FILM COATED ORAL at 09:10

## 2021-10-15 RX ADMIN — APIXABAN 2.5 MG: 2.5 TABLET, FILM COATED ORAL at 08:10

## 2021-10-15 RX ADMIN — CYPROHEPTADINE HYDROCHLORIDE 4 MG: 4 TABLET ORAL at 08:10

## 2021-10-15 RX ADMIN — HYDROCHLOROTHIAZIDE 25 MG: 12.5 TABLET ORAL at 08:10

## 2021-10-15 RX ADMIN — CYPROHEPTADINE HYDROCHLORIDE 4 MG: 4 TABLET ORAL at 09:10

## 2021-10-15 RX ADMIN — DILTIAZEM HYDROCHLORIDE 120 MG: 120 CAPSULE, COATED, EXTENDED RELEASE ORAL at 08:10

## 2021-10-16 PROCEDURE — 11000004 HC SNF PRIVATE

## 2021-10-16 PROCEDURE — 25000003 PHARM REV CODE 250: Performed by: FAMILY MEDICINE

## 2021-10-16 PROCEDURE — 27000941

## 2021-10-16 PROCEDURE — 25000003 PHARM REV CODE 250: Performed by: NURSE PRACTITIONER

## 2021-10-16 PROCEDURE — 94761 N-INVAS EAR/PLS OXIMETRY MLT: CPT

## 2021-10-16 PROCEDURE — 25000003 PHARM REV CODE 250: Performed by: PHYSICIAN ASSISTANT

## 2021-10-16 RX ORDER — ALPRAZOLAM 1 MG/1
2 TABLET ORAL 2 TIMES DAILY PRN
Status: DISCONTINUED | OUTPATIENT
Start: 2021-10-16 | End: 2021-10-20 | Stop reason: HOSPADM

## 2021-10-16 RX ADMIN — ACETAMINOPHEN 650 MG: 325 TABLET, FILM COATED ORAL at 03:10

## 2021-10-16 RX ADMIN — PANTOPRAZOLE SODIUM 40 MG: 40 TABLET, DELAYED RELEASE ORAL at 08:10

## 2021-10-16 RX ADMIN — DICLOFENAC 2 G: 10 GEL TOPICAL at 08:10

## 2021-10-16 RX ADMIN — CYPROHEPTADINE HYDROCHLORIDE 4 MG: 4 TABLET ORAL at 09:10

## 2021-10-16 RX ADMIN — HYDROCHLOROTHIAZIDE 25 MG: 12.5 TABLET ORAL at 08:10

## 2021-10-16 RX ADMIN — APIXABAN 2.5 MG: 2.5 TABLET, FILM COATED ORAL at 08:10

## 2021-10-16 RX ADMIN — APIXABAN 2.5 MG: 2.5 TABLET, FILM COATED ORAL at 09:10

## 2021-10-16 RX ADMIN — DICLOFENAC 2 G: 10 GEL TOPICAL at 09:10

## 2021-10-16 RX ADMIN — LEVOTHYROXINE SODIUM 125 MCG: 0.05 TABLET ORAL at 06:10

## 2021-10-16 RX ADMIN — ALPRAZOLAM 2 MG: 1 TABLET ORAL at 09:10

## 2021-10-16 RX ADMIN — SENNOSIDES AND DOCUSATE SODIUM 1 TABLET: 50; 8.6 TABLET ORAL at 08:10

## 2021-10-16 RX ADMIN — CYPROHEPTADINE HYDROCHLORIDE 4 MG: 4 TABLET ORAL at 08:10

## 2021-10-16 RX ADMIN — DILTIAZEM HYDROCHLORIDE 120 MG: 120 CAPSULE, COATED, EXTENDED RELEASE ORAL at 08:10

## 2021-10-17 PROCEDURE — 25000003 PHARM REV CODE 250: Performed by: FAMILY MEDICINE

## 2021-10-17 PROCEDURE — 25000003 PHARM REV CODE 250: Performed by: NURSE PRACTITIONER

## 2021-10-17 PROCEDURE — 11000004 HC SNF PRIVATE

## 2021-10-17 PROCEDURE — 25000003 PHARM REV CODE 250: Performed by: PHYSICIAN ASSISTANT

## 2021-10-17 PROCEDURE — 27000941

## 2021-10-17 PROCEDURE — 94761 N-INVAS EAR/PLS OXIMETRY MLT: CPT

## 2021-10-17 RX ADMIN — APIXABAN 2.5 MG: 2.5 TABLET, FILM COATED ORAL at 08:10

## 2021-10-17 RX ADMIN — DICLOFENAC 2 G: 10 GEL TOPICAL at 08:10

## 2021-10-17 RX ADMIN — DICLOFENAC 2 G: 10 GEL TOPICAL at 09:10

## 2021-10-17 RX ADMIN — APIXABAN 2.5 MG: 2.5 TABLET, FILM COATED ORAL at 09:10

## 2021-10-17 RX ADMIN — PANTOPRAZOLE SODIUM 40 MG: 40 TABLET, DELAYED RELEASE ORAL at 08:10

## 2021-10-17 RX ADMIN — ALPRAZOLAM 2 MG: 1 TABLET ORAL at 09:10

## 2021-10-17 RX ADMIN — SENNOSIDES AND DOCUSATE SODIUM 1 TABLET: 50; 8.6 TABLET ORAL at 08:10

## 2021-10-17 RX ADMIN — CYPROHEPTADINE HYDROCHLORIDE 4 MG: 4 TABLET ORAL at 09:10

## 2021-10-17 RX ADMIN — LEVOTHYROXINE SODIUM 125 MCG: 0.05 TABLET ORAL at 06:10

## 2021-10-17 RX ADMIN — DILTIAZEM HYDROCHLORIDE 120 MG: 120 CAPSULE, COATED, EXTENDED RELEASE ORAL at 08:10

## 2021-10-17 RX ADMIN — HYDROCHLOROTHIAZIDE 25 MG: 12.5 TABLET ORAL at 08:10

## 2021-10-17 RX ADMIN — CYPROHEPTADINE HYDROCHLORIDE 4 MG: 4 TABLET ORAL at 08:10

## 2021-10-18 PROCEDURE — 97535 SELF CARE MNGMENT TRAINING: CPT

## 2021-10-18 PROCEDURE — 25000003 PHARM REV CODE 250: Performed by: PHYSICIAN ASSISTANT

## 2021-10-18 PROCEDURE — 11000004 HC SNF PRIVATE

## 2021-10-18 PROCEDURE — 27000941

## 2021-10-18 PROCEDURE — 97116 GAIT TRAINING THERAPY: CPT | Mod: CQ

## 2021-10-18 PROCEDURE — 25000003 PHARM REV CODE 250: Performed by: FAMILY MEDICINE

## 2021-10-18 PROCEDURE — 97110 THERAPEUTIC EXERCISES: CPT | Mod: CQ

## 2021-10-18 PROCEDURE — 94761 N-INVAS EAR/PLS OXIMETRY MLT: CPT

## 2021-10-18 RX ADMIN — DILTIAZEM HYDROCHLORIDE 120 MG: 120 CAPSULE, COATED, EXTENDED RELEASE ORAL at 08:10

## 2021-10-18 RX ADMIN — APIXABAN 2.5 MG: 2.5 TABLET, FILM COATED ORAL at 08:10

## 2021-10-18 RX ADMIN — HYDROCHLOROTHIAZIDE 25 MG: 12.5 TABLET ORAL at 08:10

## 2021-10-18 RX ADMIN — ALPRAZOLAM 2 MG: 1 TABLET ORAL at 08:10

## 2021-10-18 RX ADMIN — CYPROHEPTADINE HYDROCHLORIDE 4 MG: 4 TABLET ORAL at 08:10

## 2021-10-18 RX ADMIN — DICLOFENAC 2 G: 10 GEL TOPICAL at 08:10

## 2021-10-18 RX ADMIN — PANTOPRAZOLE SODIUM 40 MG: 40 TABLET, DELAYED RELEASE ORAL at 08:10

## 2021-10-18 RX ADMIN — LEVOTHYROXINE SODIUM 125 MCG: 0.05 TABLET ORAL at 06:10

## 2021-10-19 PROCEDURE — 11000004 HC SNF PRIVATE

## 2021-10-19 PROCEDURE — 97116 GAIT TRAINING THERAPY: CPT | Mod: CQ

## 2021-10-19 PROCEDURE — 25000003 PHARM REV CODE 250: Performed by: NURSE PRACTITIONER

## 2021-10-19 PROCEDURE — 97110 THERAPEUTIC EXERCISES: CPT | Mod: CQ

## 2021-10-19 PROCEDURE — 97110 THERAPEUTIC EXERCISES: CPT

## 2021-10-19 PROCEDURE — 25000003 PHARM REV CODE 250: Performed by: FAMILY MEDICINE

## 2021-10-19 PROCEDURE — 25000003 PHARM REV CODE 250: Performed by: PHYSICIAN ASSISTANT

## 2021-10-19 PROCEDURE — 94761 N-INVAS EAR/PLS OXIMETRY MLT: CPT

## 2021-10-19 PROCEDURE — 27000941

## 2021-10-19 RX ADMIN — CYPROHEPTADINE HYDROCHLORIDE 4 MG: 4 TABLET ORAL at 08:10

## 2021-10-19 RX ADMIN — SENNOSIDES AND DOCUSATE SODIUM 1 TABLET: 50; 8.6 TABLET ORAL at 08:10

## 2021-10-19 RX ADMIN — PANTOPRAZOLE SODIUM 40 MG: 40 TABLET, DELAYED RELEASE ORAL at 08:10

## 2021-10-19 RX ADMIN — ALPRAZOLAM 2 MG: 1 TABLET ORAL at 08:10

## 2021-10-19 RX ADMIN — APIXABAN 2.5 MG: 2.5 TABLET, FILM COATED ORAL at 08:10

## 2021-10-19 RX ADMIN — DILTIAZEM HYDROCHLORIDE 120 MG: 120 CAPSULE, COATED, EXTENDED RELEASE ORAL at 08:10

## 2021-10-19 RX ADMIN — DICLOFENAC 2 G: 10 GEL TOPICAL at 08:10

## 2021-10-19 RX ADMIN — HYDROCHLOROTHIAZIDE 25 MG: 12.5 TABLET ORAL at 08:10

## 2021-10-19 RX ADMIN — ACETAMINOPHEN 650 MG: 325 TABLET, FILM COATED ORAL at 03:10

## 2021-10-19 RX ADMIN — LEVOTHYROXINE SODIUM 125 MCG: 0.05 TABLET ORAL at 06:10

## 2021-10-20 VITALS
WEIGHT: 112.63 LBS | BODY MASS INDEX: 22.11 KG/M2 | HEART RATE: 86 BPM | RESPIRATION RATE: 19 BRPM | DIASTOLIC BLOOD PRESSURE: 67 MMHG | TEMPERATURE: 98 F | OXYGEN SATURATION: 98 % | SYSTOLIC BLOOD PRESSURE: 129 MMHG | HEIGHT: 60 IN

## 2021-10-20 PROCEDURE — 94761 N-INVAS EAR/PLS OXIMETRY MLT: CPT

## 2021-10-20 PROCEDURE — 99316 PR NURSING FAC DISCHRGE DAY,MORE 30 MIN: ICD-10-PCS | Mod: ,,, | Performed by: FAMILY MEDICINE

## 2021-10-20 PROCEDURE — 99316 NF DSCHRG MGMT 30 MIN+: CPT | Mod: ,,, | Performed by: FAMILY MEDICINE

## 2021-10-20 PROCEDURE — 25000003 PHARM REV CODE 250: Performed by: NURSE PRACTITIONER

## 2021-10-20 PROCEDURE — 27000941

## 2021-10-20 PROCEDURE — 25000003 PHARM REV CODE 250: Performed by: FAMILY MEDICINE

## 2021-10-20 PROCEDURE — 25000003 PHARM REV CODE 250: Performed by: PHYSICIAN ASSISTANT

## 2021-10-20 RX ORDER — HYDROCHLOROTHIAZIDE 25 MG/1
25 TABLET ORAL DAILY
Qty: 30 TABLET | Refills: 1 | Status: SHIPPED | OUTPATIENT
Start: 2021-10-20 | End: 2021-11-02 | Stop reason: SDUPTHER

## 2021-10-20 RX ORDER — ALPRAZOLAM 2 MG/1
2 TABLET ORAL NIGHTLY
Qty: 30 TABLET | Refills: 0 | Status: SHIPPED | OUTPATIENT
Start: 2021-10-20 | End: 2021-11-02 | Stop reason: SDUPTHER

## 2021-10-20 RX ORDER — LEVOTHYROXINE SODIUM 125 UG/1
125 TABLET ORAL
Qty: 60 TABLET | Refills: 2 | Status: SHIPPED | OUTPATIENT
Start: 2021-10-20 | End: 2021-11-02 | Stop reason: SDUPTHER

## 2021-10-20 RX ORDER — DILTIAZEM HYDROCHLORIDE 120 MG/1
120 CAPSULE, COATED, EXTENDED RELEASE ORAL DAILY
Qty: 30 CAPSULE | Refills: 11 | Status: SHIPPED | OUTPATIENT
Start: 2021-10-20 | End: 2021-11-02 | Stop reason: SDUPTHER

## 2021-10-20 RX ORDER — PANTOPRAZOLE SODIUM 40 MG/1
40 TABLET, DELAYED RELEASE ORAL DAILY
Qty: 60 TABLET | Refills: 2
Start: 2021-10-20 | End: 2022-10-11 | Stop reason: SDUPTHER

## 2021-10-20 RX ORDER — DICLOFENAC SODIUM 10 MG/G
2 GEL TOPICAL 2 TIMES DAILY
Qty: 30 G | Refills: 2 | Status: SHIPPED | OUTPATIENT
Start: 2021-10-20 | End: 2022-10-11 | Stop reason: SDUPTHER

## 2021-10-20 RX ADMIN — SENNOSIDES AND DOCUSATE SODIUM 1 TABLET: 50; 8.6 TABLET ORAL at 08:10

## 2021-10-20 RX ADMIN — APIXABAN 2.5 MG: 2.5 TABLET, FILM COATED ORAL at 08:10

## 2021-10-20 RX ADMIN — HYDROCHLOROTHIAZIDE 25 MG: 12.5 TABLET ORAL at 08:10

## 2021-10-20 RX ADMIN — DICLOFENAC 2 G: 10 GEL TOPICAL at 08:10

## 2021-10-20 RX ADMIN — ACETAMINOPHEN 650 MG: 325 TABLET, FILM COATED ORAL at 08:10

## 2021-10-20 RX ADMIN — LEVOTHYROXINE SODIUM 125 MCG: 0.05 TABLET ORAL at 05:10

## 2021-10-20 RX ADMIN — DILTIAZEM HYDROCHLORIDE 120 MG: 120 CAPSULE, COATED, EXTENDED RELEASE ORAL at 08:10

## 2021-10-20 RX ADMIN — CYPROHEPTADINE HYDROCHLORIDE 4 MG: 4 TABLET ORAL at 08:10

## 2021-10-20 RX ADMIN — PANTOPRAZOLE SODIUM 40 MG: 40 TABLET, DELAYED RELEASE ORAL at 08:10

## 2021-10-21 ENCOUNTER — TELEPHONE (OUTPATIENT)
Dept: FAMILY MEDICINE | Facility: CLINIC | Age: 86
End: 2021-10-21
Payer: MEDICARE

## 2021-10-25 ENCOUNTER — HOSPITAL ENCOUNTER (OUTPATIENT)
Dept: RADIOLOGY | Facility: HOSPITAL | Age: 86
Discharge: HOME | End: 2021-10-25
Attending: ORTHOPAEDIC SURGERY
Payer: MEDICARE

## 2021-10-25 ENCOUNTER — HOSPITAL ENCOUNTER (OUTPATIENT)
Dept: RADIOLOGY | Facility: HOSPITAL | Age: 86
Discharge: HOME | End: 2021-10-25
Attending: NURSE PRACTITIONER
Payer: MEDICARE

## 2021-10-25 DIAGNOSIS — Z47.89 ORTHOPEDIC AFTERCARE: ICD-10-CM

## 2021-10-25 DIAGNOSIS — M12.811 ROTATOR CUFF ARTHROPATHY OF RIGHT SHOULDER: ICD-10-CM

## 2021-10-25 PROBLEM — Z09 FOLLOW UP: Status: ACTIVE | Noted: 2021-10-25

## 2021-10-25 PROCEDURE — 73502 X-RAY EXAM HIP UNI 2-3 VIEWS: CPT | Mod: TC,LT

## 2021-10-25 PROCEDURE — 73030 XR SHOULDER COMPLETE 2 OR MORE VIEWS RIGHT: ICD-10-PCS | Mod: 26,RT,, | Performed by: RADIOLOGY

## 2021-10-25 PROCEDURE — 73030 X-RAY EXAM OF SHOULDER: CPT | Mod: 26,RT,, | Performed by: RADIOLOGY

## 2021-10-25 PROCEDURE — 73030 X-RAY EXAM OF SHOULDER: CPT | Mod: TC,RT

## 2021-11-02 ENCOUNTER — OFFICE VISIT (OUTPATIENT)
Dept: FAMILY MEDICINE | Facility: CLINIC | Age: 86
End: 2021-11-02
Payer: MEDICARE

## 2021-11-02 VITALS
RESPIRATION RATE: 18 BRPM | HEART RATE: 74 BPM | SYSTOLIC BLOOD PRESSURE: 120 MMHG | HEIGHT: 55 IN | BODY MASS INDEX: 25.6 KG/M2 | TEMPERATURE: 98 F | WEIGHT: 110.63 LBS | DIASTOLIC BLOOD PRESSURE: 62 MMHG

## 2021-11-02 DIAGNOSIS — F41.9 ANXIETY: ICD-10-CM

## 2021-11-02 DIAGNOSIS — M54.9 DORSALGIA, UNSPECIFIED: Primary | ICD-10-CM

## 2021-11-02 DIAGNOSIS — I10 PRIMARY HYPERTENSION: ICD-10-CM

## 2021-11-02 PROCEDURE — 99496 TRANSJ CARE MGMT HIGH F2F 7D: CPT | Mod: ,,, | Performed by: FAMILY MEDICINE

## 2021-11-02 PROCEDURE — 99496 TRANSITIONAL CARE MANAGE SERVICE 7 DAY DISCHARGE: ICD-10-PCS | Mod: ,,, | Performed by: FAMILY MEDICINE

## 2021-11-02 RX ORDER — PREDNISONE 5 MG/1
5 TABLET ORAL DAILY
COMMUNITY
End: 2021-11-02 | Stop reason: SDUPTHER

## 2021-11-02 RX ORDER — HYDROCODONE BITARTRATE AND ACETAMINOPHEN 7.5; 325 MG/1; MG/1
1 TABLET ORAL EVERY 8 HOURS PRN
Qty: 30 TABLET | Refills: 0 | Status: SHIPPED | OUTPATIENT
Start: 2021-11-02 | End: 2021-11-02 | Stop reason: SDUPTHER

## 2021-11-02 RX ORDER — HYDROCODONE BITARTRATE AND ACETAMINOPHEN 7.5; 325 MG/1; MG/1
TABLET ORAL
COMMUNITY
Start: 2021-10-06 | End: 2021-11-02 | Stop reason: SDUPTHER

## 2021-11-02 RX ORDER — HYDROCODONE BITARTRATE AND ACETAMINOPHEN 7.5; 325 MG/1; MG/1
1 TABLET ORAL EVERY 8 HOURS PRN
Qty: 30 TABLET | Refills: 0 | Status: SHIPPED | OUTPATIENT
Start: 2021-11-02 | End: 2022-02-01 | Stop reason: SDUPTHER

## 2021-11-02 RX ORDER — ALPRAZOLAM 2 MG/1
2 TABLET ORAL NIGHTLY
Qty: 30 TABLET | Refills: 2 | Status: SHIPPED | OUTPATIENT
Start: 2021-11-02 | End: 2021-12-02

## 2021-11-02 RX ORDER — DILTIAZEM HYDROCHLORIDE 120 MG/1
120 CAPSULE, COATED, EXTENDED RELEASE ORAL DAILY
Qty: 90 CAPSULE | Refills: 3 | Status: SHIPPED | OUTPATIENT
Start: 2021-11-02 | End: 2022-10-11 | Stop reason: SDUPTHER

## 2021-11-02 RX ORDER — HYDROCHLOROTHIAZIDE 25 MG/1
25 TABLET ORAL DAILY
Qty: 90 TABLET | Refills: 3 | Status: SHIPPED | OUTPATIENT
Start: 2021-11-02 | End: 2022-04-12 | Stop reason: ALTCHOICE

## 2021-11-02 RX ORDER — PREDNISONE 5 MG/1
5 TABLET ORAL DAILY
Qty: 90 TABLET | Refills: 3 | Status: SHIPPED | OUTPATIENT
Start: 2021-11-02 | End: 2022-04-12 | Stop reason: SDUPTHER

## 2021-11-02 RX ORDER — LEVOTHYROXINE SODIUM 125 UG/1
125 TABLET ORAL
Qty: 90 TABLET | Refills: 3 | Status: SHIPPED | OUTPATIENT
Start: 2021-11-02 | End: 2022-02-01 | Stop reason: SDUPTHER

## 2021-11-02 RX ORDER — CYCLOBENZAPRINE HCL 10 MG
TABLET ORAL
COMMUNITY
Start: 2021-10-05 | End: 2022-10-11

## 2021-11-22 DIAGNOSIS — Z96.642 STATUS POST TOTAL HIP REPLACEMENT, LEFT: Primary | ICD-10-CM

## 2021-11-23 ENCOUNTER — HOSPITAL ENCOUNTER (OUTPATIENT)
Dept: RADIOLOGY | Facility: HOSPITAL | Age: 86
Discharge: HOME | End: 2021-11-23
Attending: NURSE PRACTITIONER
Payer: MEDICARE

## 2021-11-23 DIAGNOSIS — Z96.642 STATUS POST TOTAL HIP REPLACEMENT, LEFT: ICD-10-CM
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
## 2021-11-23 PROCEDURE — 73552 X-RAY EXAM OF FEMUR 2/>: CPT | Mod: TC,LT

## 2021-11-23 PROCEDURE — 73552 XR FEMUR 2 VIEW LEFT: ICD-10-PCS | Mod: 26,LT,, | Performed by: STUDENT IN AN ORGANIZED HEALTH CARE EDUCATION/TRAINING PROGRAM

## 2021-11-23 PROCEDURE — 73502 XR HIP WITH PELVIS WHEN PERFORMED, 2 OR 3 VIEWS LEFT: ICD-10-PCS | Mod: 26,LT,, | Performed by: STUDENT IN AN ORGANIZED HEALTH CARE EDUCATION/TRAINING PROGRAM

## 2021-11-23 PROCEDURE — 73502 X-RAY EXAM HIP UNI 2-3 VIEWS: CPT | Mod: 26,LT,, | Performed by: STUDENT IN AN ORGANIZED HEALTH CARE EDUCATION/TRAINING PROGRAM

## 2021-11-23 PROCEDURE — 73552 X-RAY EXAM OF FEMUR 2/>: CPT | Mod: 26,LT,, | Performed by: STUDENT IN AN ORGANIZED HEALTH CARE EDUCATION/TRAINING PROGRAM

## 2021-11-23 PROCEDURE — 73502 X-RAY EXAM HIP UNI 2-3 VIEWS: CPT | Mod: TC,LT

## 2021-12-20 DIAGNOSIS — Z09 FOLLOW UP: Primary | ICD-10-CM

## 2021-12-21 ENCOUNTER — HOSPITAL ENCOUNTER (OUTPATIENT)
Dept: RADIOLOGY | Facility: HOSPITAL | Age: 86
Discharge: HOME | End: 2021-12-21
Attending: NURSE PRACTITIONER
Payer: MEDICARE

## 2021-12-21 DIAGNOSIS — Z09 FOLLOW UP: ICD-10-CM

## 2021-12-21 PROBLEM — M17.0 PRIMARY OSTEOARTHRITIS OF BOTH KNEES: Status: ACTIVE | Noted: 2021-12-21

## 2021-12-21 PROCEDURE — 73502 X-RAY EXAM HIP UNI 2-3 VIEWS: CPT | Mod: 26,LT,, | Performed by: RADIOLOGY

## 2021-12-21 PROCEDURE — 73502 XR HIP WITH PELVIS WHEN PERFORMED, 2 OR 3 VIEWS LEFT: ICD-10-PCS | Mod: 26,LT,, | Performed by: RADIOLOGY

## 2021-12-21 PROCEDURE — 73502 X-RAY EXAM HIP UNI 2-3 VIEWS: CPT | Mod: TC,LT

## 2022-01-24 PROBLEM — Z09 FOLLOW UP: Status: RESOLVED | Noted: 2021-10-25 | Resolved: 2022-01-24

## 2022-02-01 ENCOUNTER — APPOINTMENT (OUTPATIENT)
Dept: RADIOLOGY | Facility: CLINIC | Age: 87
End: 2022-02-01
Attending: FAMILY MEDICINE
Payer: MEDICARE

## 2022-02-01 ENCOUNTER — OFFICE VISIT (OUTPATIENT)
Dept: FAMILY MEDICINE | Facility: CLINIC | Age: 87
End: 2022-02-01
Payer: MEDICARE

## 2022-02-01 VITALS
SYSTOLIC BLOOD PRESSURE: 180 MMHG | HEIGHT: 55 IN | RESPIRATION RATE: 16 BRPM | WEIGHT: 113 LBS | DIASTOLIC BLOOD PRESSURE: 100 MMHG | TEMPERATURE: 97 F | HEART RATE: 88 BPM | BODY MASS INDEX: 26.15 KG/M2

## 2022-02-01 DIAGNOSIS — M25.551 RIGHT HIP PAIN: ICD-10-CM

## 2022-02-01 DIAGNOSIS — M25.551 RIGHT HIP PAIN: Primary | ICD-10-CM

## 2022-02-01 DIAGNOSIS — E03.9 HYPOTHYROIDISM, UNSPECIFIED TYPE: ICD-10-CM

## 2022-02-01 DIAGNOSIS — M54.9 DORSALGIA, UNSPECIFIED: ICD-10-CM

## 2022-02-01 DIAGNOSIS — R55 SYNCOPE, UNSPECIFIED SYNCOPE TYPE: ICD-10-CM

## 2022-02-01 DIAGNOSIS — F41.9 ANXIETY: ICD-10-CM

## 2022-02-01 DIAGNOSIS — M25.521 RIGHT ELBOW PAIN: ICD-10-CM

## 2022-02-01 LAB
ALBUMIN SERPL BCP-MCNC: 3.9 G/DL (ref 3.5–5)
ALBUMIN/GLOB SERPL: 1.1 {RATIO}
ALP SERPL-CCNC: 71 U/L (ref 55–142)
ALT SERPL W P-5'-P-CCNC: 20 U/L (ref 13–56)
ANION GAP SERPL CALCULATED.3IONS-SCNC: 12 MMOL/L (ref 7–16)
AST SERPL W P-5'-P-CCNC: 19 U/L (ref 15–37)
BASOPHILS # BLD AUTO: 0.11 K/UL (ref 0–0.2)
BASOPHILS NFR BLD AUTO: 0.8 % (ref 0–1)
BILIRUB SERPL-MCNC: 0.5 MG/DL (ref 0–1.2)
BUN SERPL-MCNC: 20 MG/DL (ref 7–18)
BUN/CREAT SERPL: 18 (ref 6–20)
CALCIUM SERPL-MCNC: 9.6 MG/DL (ref 8.5–10.1)
CHLORIDE SERPL-SCNC: 96 MMOL/L (ref 98–107)
CO2 SERPL-SCNC: 30 MMOL/L (ref 21–32)
CREAT SERPL-MCNC: 1.09 MG/DL (ref 0.55–1.02)
DIFFERENTIAL METHOD BLD: ABNORMAL
EOSINOPHIL # BLD AUTO: 0.01 K/UL (ref 0–0.5)
EOSINOPHIL NFR BLD AUTO: 0.1 % (ref 1–4)
ERYTHROCYTE [DISTWIDTH] IN BLOOD BY AUTOMATED COUNT: 14.4 % (ref 11.5–14.5)
GLOBULIN SER-MCNC: 3.5 G/DL (ref 2–4)
GLUCOSE SERPL-MCNC: 97 MG/DL (ref 74–106)
HCT VFR BLD AUTO: 39.3 % (ref 38–47)
HGB BLD-MCNC: 12.6 G/DL (ref 12–16)
IMM GRANULOCYTES # BLD AUTO: 0.28 K/UL (ref 0–0.04)
IMM GRANULOCYTES NFR BLD: 2 % (ref 0–0.4)
LYMPHOCYTES # BLD AUTO: 1.42 K/UL (ref 1–4.8)
LYMPHOCYTES NFR BLD AUTO: 10.1 % (ref 27–41)
MCH RBC QN AUTO: 28.4 PG (ref 27–31)
MCHC RBC AUTO-ENTMCNC: 32.1 G/DL (ref 32–36)
MCV RBC AUTO: 88.5 FL (ref 80–96)
MONOCYTES # BLD AUTO: 0.63 K/UL (ref 0–0.8)
MONOCYTES NFR BLD AUTO: 4.5 % (ref 2–6)
MPC BLD CALC-MCNC: 10 FL (ref 9.4–12.4)
NEUTROPHILS # BLD AUTO: 11.66 K/UL (ref 1.8–7.7)
NEUTROPHILS NFR BLD AUTO: 82.5 % (ref 53–65)
NRBC # BLD AUTO: 0 X10E3/UL
NRBC, AUTO (.00): 0 %
PLATELET # BLD AUTO: 360 K/UL (ref 150–400)
POTASSIUM SERPL-SCNC: 4.8 MMOL/L (ref 3.5–5.1)
PROT SERPL-MCNC: 7.4 G/DL (ref 6.4–8.2)
RBC # BLD AUTO: 4.44 M/UL (ref 4.2–5.4)
SODIUM SERPL-SCNC: 133 MMOL/L (ref 136–145)
WBC # BLD AUTO: 14.11 K/UL (ref 4.5–11)

## 2022-02-01 PROCEDURE — 80053 COMPREHENSIVE METABOLIC PANEL: ICD-10-PCS | Mod: ,,, | Performed by: CLINICAL MEDICAL LABORATORY

## 2022-02-01 PROCEDURE — 73502 XR HIP WITH PELVIS WHEN PERFORMED, 2 OR 3  VIEWS RIGHT: ICD-10-PCS | Mod: 26,RT,, | Performed by: RADIOLOGY

## 2022-02-01 PROCEDURE — 85025 CBC WITH DIFFERENTIAL: ICD-10-PCS | Mod: ,,, | Performed by: CLINICAL MEDICAL LABORATORY

## 2022-02-01 PROCEDURE — 80053 COMPREHEN METABOLIC PANEL: CPT | Mod: ,,, | Performed by: CLINICAL MEDICAL LABORATORY

## 2022-02-01 PROCEDURE — 85025 COMPLETE CBC W/AUTO DIFF WBC: CPT | Mod: ,,, | Performed by: CLINICAL MEDICAL LABORATORY

## 2022-02-01 PROCEDURE — 73502 X-RAY EXAM HIP UNI 2-3 VIEWS: CPT | Mod: 26,RT,, | Performed by: RADIOLOGY

## 2022-02-01 PROCEDURE — 73502 X-RAY EXAM HIP UNI 2-3 VIEWS: CPT | Mod: TC,RHCUB,RT | Performed by: FAMILY MEDICINE

## 2022-02-01 PROCEDURE — 99214 OFFICE O/P EST MOD 30 MIN: CPT | Mod: ,,, | Performed by: FAMILY MEDICINE

## 2022-02-01 PROCEDURE — 99214 PR OFFICE/OUTPT VISIT, EST, LEVL IV, 30-39 MIN: ICD-10-PCS | Mod: ,,, | Performed by: FAMILY MEDICINE

## 2022-02-01 RX ORDER — HYDROCODONE BITARTRATE AND ACETAMINOPHEN 7.5; 325 MG/1; MG/1
1 TABLET ORAL EVERY 8 HOURS PRN
Qty: 30 TABLET | Refills: 0 | Status: CANCELLED | OUTPATIENT
Start: 2022-02-01

## 2022-02-01 RX ORDER — MUPIROCIN 20 MG/G
OINTMENT TOPICAL
COMMUNITY
Start: 2021-12-14 | End: 2022-04-12 | Stop reason: SDUPTHER

## 2022-02-01 RX ORDER — LEVOTHYROXINE SODIUM 125 UG/1
125 TABLET ORAL
Qty: 90 TABLET | Refills: 3 | Status: SHIPPED | OUTPATIENT
Start: 2022-02-01 | End: 2022-04-12 | Stop reason: SDUPTHER

## 2022-02-01 RX ORDER — ALPRAZOLAM 1 MG/1
1 TABLET ORAL 2 TIMES DAILY
COMMUNITY
End: 2022-02-01 | Stop reason: SDUPTHER

## 2022-02-01 RX ORDER — HYDROCODONE BITARTRATE AND ACETAMINOPHEN 7.5; 325 MG/1; MG/1
1 TABLET ORAL EVERY 8 HOURS PRN
Qty: 30 TABLET | Refills: 0 | Status: SHIPPED | OUTPATIENT
Start: 2022-02-01 | End: 2022-02-01 | Stop reason: SDUPTHER

## 2022-02-01 RX ORDER — HYDROCODONE BITARTRATE AND ACETAMINOPHEN 7.5; 325 MG/1; MG/1
1 TABLET ORAL EVERY 8 HOURS PRN
Qty: 30 TABLET | Refills: 0 | Status: SHIPPED | OUTPATIENT
Start: 2022-02-01 | End: 2022-04-12 | Stop reason: SDUPTHER

## 2022-02-01 RX ORDER — ALPRAZOLAM 1 MG/1
1 TABLET ORAL 2 TIMES DAILY
Qty: 60 TABLET | Refills: 2 | Status: SHIPPED | OUTPATIENT
Start: 2022-02-01 | End: 2022-04-12 | Stop reason: SDUPTHER

## 2022-02-04 NOTE — PROGRESS NOTES
Brandon Thornton MD        PATIENT NAME: Zandra Veloz  : 1934  DATE: 22  MRN: [id number]      Billing Provider: Brandon Thornton MD  Level of Service: [place] OFFICE/OUTPT VISIT, EST, LEVL IV, 30-39 MIN  Patient PCP Information     Provider PCP Type    Brandon Thornton MD General          Reason for Visit / Chief Complaint: Fall (Discuss frequent falls)       Update PCP  Update Chief Complaint         History of Present Illness / Problem Focused Workflow     Zandra Veloz presents to the clinic with Fall (Discuss frequent falls)     Mrs. Veloz is accompanied by her 2 daughters.  She has fallen recently and now complains of pain in her right hip and her right elbow.      Review of Systems     Review of Systems   Constitutional: Negative for activity change, appetite change, fever and unexpected weight change.   HENT: Negative for congestion, rhinorrhea, sinus pressure, sinus pain, sore throat and trouble swallowing.    Eyes: Negative for photophobia, pain, discharge and visual disturbance.   Respiratory: Negative for cough, chest tightness, wheezing and stridor.    Cardiovascular: Negative for chest pain, palpitations and leg swelling.   Gastrointestinal: Negative for abdominal pain, blood in stool, constipation, diarrhea and nausea.   Endocrine: Negative for polydipsia, polyphagia and polyuria.   Genitourinary: Negative for difficulty urinating, flank pain and hematuria.   Musculoskeletal: Positive for arthralgias and back pain. Negative for neck pain.   Skin: Negative for rash.   Allergic/Immunologic: Negative for food allergies.   Neurological: Negative for dizziness, tremors, seizures, syncope, weakness (global weakness) and headaches.   Psychiatric/Behavioral: Negative for behavioral problems, confusion, decreased concentration, dysphoric mood and hallucinations. The patient is not nervous/anxious.         Medical / Social / Family History     Past Medical History:   Diagnosis Date     Hypertension     Osteoporosis     Thyroid disorder        Past Surgical History:   Procedure Laterality Date    BLADDER SURGERY      HEMIARTHROPLASTY OF HIP Left 9/21/2021    Procedure: HEMIARTHROPLASTY, HIP;  Surgeon: Dequan Singer MD;  Location: [facility];  Service: Orthopedics;  Laterality: Left;    PARTIAL HIP ARTHROPLASTY Right     Dr Singer       Social History  Ms.  reports that she has quit smoking. She has never used smokeless tobacco. She reports that she does not drink alcohol and does not use drugs.    Family History  Ms.'s family history includes Bladder Cancer in her father; Heart attack in her mother; Heart disease in her mother; No Known Problems in her brother, maternal grandfather, maternal grandmother, paternal grandfather, paternal grandmother, and sister.    Medications and Allergies     Medications  No outpatient medications have been marked as taking for the 2/1/22 encounter (Office Visit) with Brandon Thornton MD.       Allergies  Review of patient's allergies indicates:   Allergen Reactions    Bactrim [sulfamethoxazole-trimethoprim]     Codeine     Levaquin [levofloxacin]        Physical Examination     Vitals:    02/01/22 1043   BP: (!) 180/100   Pulse: 88   Resp: 16   Temp: 97.3 °F (36.3 °C)     Physical Exam  Constitutional:       General: She is not in acute distress.     Appearance: Normal appearance.   HENT:      Head: Normocephalic.      Right Ear: Tympanic membrane and ear canal normal.      Left Ear: Tympanic membrane and ear canal normal.      Nose: Nose normal.      Mouth/Throat:      Mouth: Mucous membranes are moist.      Pharynx: No oropharyngeal exudate.   Eyes:      Extraocular Movements: Extraocular movements intact.      Pupils: Pupils are equal, round, and reactive to light.   Cardiovascular:      Rate and Rhythm: Normal rate and regular rhythm.      Heart sounds: No murmur heard.      Pulmonary:      Effort: Pulmonary effort is normal.       Breath sounds: Normal breath sounds. No wheezing.   Abdominal:      General: Abdomen is flat. Bowel sounds are normal.      Palpations: Abdomen is soft.      Hernia: No hernia is present.   Musculoskeletal:         General: Tenderness present. Normal range of motion.      Cervical back: Normal range of motion and neck supple.      Right lower leg: No edema.      Left lower leg: No edema.      Comments: Mild tenderness at the right elbow and right heel.  She does walk with a walker.   Lymphadenopathy:      Cervical: No cervical adenopathy.   Skin:     General: Skin is warm and dry.      Coloration: Skin is not jaundiced.      Findings: No lesion.   Neurological:      General: No focal deficit present.      Mental Status: She is alert and oriented to person, place, and time.      Cranial Nerves: No cranial nerve deficit.      Gait: Gait normal.   Psychiatric:         Mood and Affect: Mood normal.         Behavior: Behavior normal.         Judgment: Judgment normal.          Assessment and Plan (including Health Maintenance)      Problem List  Smart Sets  Document Outside HM   :    Plan:   Right hip pain  -     X-Ray Hip 2 or 3 views Right (with Pelvis when performed); Future; Expected date: 02/01/2022    Dorsalgia, unspecified  -     Discontinue: HYDROcodone-acetaminophen (NORCO) 7.5-325 mg per tablet; Take 1 tablet by mouth every 8 (eight) hours as needed for Pain.  Dispense: 30 tablet; Refill: 0  -     Discontinue: HYDROcodone-acetaminophen (NORCO) 7.5-325 mg per tablet; Take 1 tablet by mouth every 8 (eight) hours as needed for Pain.  Dispense: 30 tablet; Refill: 0  -     HYDROcodone-acetaminophen (NORCO) 7.5-325 mg per tablet; Take 1 tablet by mouth every 8 (eight) hours as needed for Pain.  Dispense: 30 tablet; Refill: 0    Right elbow pain  -     X-Ray Elbow 2 Views Right; Future; Expected date: 02/01/2022    Anxiety  -     ALPRAZolam (XANAX) 1 MG tablet; Take 1 tablet (1 mg total) by mouth 2 (two) times daily.   Dispense: 60 tablet; Refill: 2    Hypothyroidism, unspecified type  -     levothyroxine (SYNTHROID) 125 MCG tablet; Take 1 tablet (125 mcg total) by mouth before breakfast.  Dispense: 90 tablet; Refill: 3    Syncope, unspecified syncope type  -     CBC Auto Differential; Future; Expected date: 08/01/2022  -     Comprehensive Metabolic Panel; Future; Expected date: 08/01/2022           Health Maintenance Due   Topic Date Due    Lipid Panel  Never done    COVID-19 Vaccine (1) Never done    Shingles Vaccine (1 of 2) Never done    Pneumococcal Vaccines (Age 65+) (1 of 1 - PPSV23) Never done    Influenza Vaccine (1) Never done       Problem List Items Addressed This Visit    None     Visit Diagnoses     Right hip pain    -  Primary    Relevant Orders    X-Ray Hip 2 or 3 views Right (with Pelvis when performed) (Completed)    Dorsalgia, unspecified        Relevant Medications    HYDROcodone-acetaminophen (NORCO) 7.5-325 mg per tablet    Right elbow pain        Relevant Orders    X-Ray Elbow 2 Views Right (Completed)    Anxiety        Relevant Medications    ALPRAZolam (XANAX) 1 MG tablet    Hypothyroidism, unspecified type        Relevant Medications    levothyroxine (SYNTHROID) 125 MCG tablet    Syncope, unspecified syncope type        Relevant Orders    CBC Auto Differential (Completed)    Comprehensive Metabolic Panel (Completed)          Health Maintenance Topics with due status: Not Due       Topic Last Completion Date    TETANUS VACCINE 09/01/2021       Future Appointments   Date Time Provider Department Center   4/19/2022 10:00 AM ZURDO [facility]   5/3/2022 10:10 AM Brandon Thornton MD [facility]        There are no Patient Instructions on file for this visit.  Follow up if symptoms worsen or fail to improve.     Signature:  Brandon Thornton MD      Date of encounter: 2/1/22

## 2022-02-15 ENCOUNTER — TELEPHONE (OUTPATIENT)
Dept: FAMILY MEDICINE | Facility: CLINIC | Age: 87
End: 2022-02-15
Payer: MEDICARE

## 2022-02-15 DIAGNOSIS — R41.3 OTHER AMNESIA: ICD-10-CM

## 2022-02-15 NOTE — TELEPHONE ENCOUNTER
"1. Pt's daughters requests MRI of brain for pt's increasing "memory issues."    2.  Pt's daughters also request a referral for grief counseling due to "worsening and severe depression."      "

## 2022-03-11 DIAGNOSIS — Z71.89 COMPLEX CARE COORDINATION: ICD-10-CM

## 2022-03-17 ENCOUNTER — HOSPITAL ENCOUNTER (OUTPATIENT)
Dept: RADIOLOGY | Facility: HOSPITAL | Age: 87
Discharge: HOME | End: 2022-03-17
Attending: NURSE PRACTITIONER
Payer: MEDICARE

## 2022-03-17 DIAGNOSIS — R52 PAIN: ICD-10-CM

## 2022-03-17 PROBLEM — M19.011 PRIMARY OSTEOARTHRITIS OF RIGHT SHOULDER: Status: ACTIVE | Noted: 2022-03-17

## 2022-03-17 PROBLEM — M54.16 LUMBAR RADICULOPATHY: Status: ACTIVE | Noted: 2022-03-17

## 2022-03-17 PROCEDURE — 73552 X-RAY EXAM OF FEMUR 2/>: CPT | Mod: TC,LT

## 2022-03-17 PROCEDURE — 73552 XR FEMUR 2 VIEW LEFT: ICD-10-PCS | Mod: 26,LT,, | Performed by: STUDENT IN AN ORGANIZED HEALTH CARE EDUCATION/TRAINING PROGRAM

## 2022-03-17 PROCEDURE — 73552 X-RAY EXAM OF FEMUR 2/>: CPT | Mod: 26,LT,, | Performed by: STUDENT IN AN ORGANIZED HEALTH CARE EDUCATION/TRAINING PROGRAM

## 2022-04-12 ENCOUNTER — OFFICE VISIT (OUTPATIENT)
Dept: FAMILY MEDICINE | Facility: CLINIC | Age: 87
End: 2022-04-12
Payer: MEDICARE

## 2022-04-12 VITALS
WEIGHT: 116 LBS | RESPIRATION RATE: 18 BRPM | BODY MASS INDEX: 26.85 KG/M2 | SYSTOLIC BLOOD PRESSURE: 130 MMHG | HEART RATE: 71 BPM | DIASTOLIC BLOOD PRESSURE: 70 MMHG | HEIGHT: 55 IN

## 2022-04-12 DIAGNOSIS — F41.9 ANXIETY: ICD-10-CM

## 2022-04-12 DIAGNOSIS — E03.9 HYPOTHYROIDISM, UNSPECIFIED TYPE: ICD-10-CM

## 2022-04-12 DIAGNOSIS — M54.9 DORSALGIA, UNSPECIFIED: ICD-10-CM

## 2022-04-12 DIAGNOSIS — I10 PRIMARY HYPERTENSION: Primary | ICD-10-CM

## 2022-04-12 PROCEDURE — 99214 OFFICE O/P EST MOD 30 MIN: CPT | Mod: ,,, | Performed by: FAMILY MEDICINE

## 2022-04-12 PROCEDURE — 99214 PR OFFICE/OUTPT VISIT, EST, LEVL IV, 30-39 MIN: ICD-10-PCS | Mod: ,,, | Performed by: FAMILY MEDICINE

## 2022-04-12 RX ORDER — FAMOTIDINE 20 MG/1
20 TABLET, FILM COATED ORAL DAILY
COMMUNITY
End: 2022-10-11 | Stop reason: SDUPTHER

## 2022-04-12 RX ORDER — LEVOTHYROXINE SODIUM 125 UG/1
125 TABLET ORAL
Qty: 90 TABLET | Refills: 3 | Status: SHIPPED | OUTPATIENT
Start: 2022-04-12 | End: 2022-10-11 | Stop reason: SDUPTHER

## 2022-04-12 RX ORDER — HYDROCODONE BITARTRATE AND ACETAMINOPHEN 7.5; 325 MG/1; MG/1
1 TABLET ORAL EVERY 8 HOURS PRN
Qty: 60 TABLET | Refills: 0 | Status: SHIPPED | OUTPATIENT
Start: 2022-04-12 | End: 2022-04-12 | Stop reason: SDUPTHER

## 2022-04-12 RX ORDER — LEVOTHYROXINE SODIUM 125 UG/1
125 TABLET ORAL
Qty: 90 TABLET | Refills: 3 | Status: SHIPPED | OUTPATIENT
Start: 2022-04-12 | End: 2022-04-12 | Stop reason: SDUPTHER

## 2022-04-12 RX ORDER — MUPIROCIN 20 MG/G
OINTMENT TOPICAL 3 TIMES DAILY
Qty: 15 G | Refills: 5 | Status: ON HOLD | OUTPATIENT
Start: 2022-04-12 | End: 2022-12-29

## 2022-04-12 RX ORDER — LISINOPRIL 10 MG/1
10 TABLET ORAL DAILY
Qty: 90 TABLET | Refills: 3 | Status: ON HOLD | OUTPATIENT
Start: 2022-04-12 | End: 2022-12-29

## 2022-04-12 RX ORDER — PREDNISONE 5 MG/1
5 TABLET ORAL DAILY
Qty: 90 TABLET | Refills: 3 | Status: SHIPPED | OUTPATIENT
Start: 2022-04-12 | End: 2022-10-11 | Stop reason: SDUPTHER

## 2022-04-12 RX ORDER — HYDROCODONE BITARTRATE AND ACETAMINOPHEN 7.5; 325 MG/1; MG/1
1 TABLET ORAL EVERY 8 HOURS PRN
Qty: 60 TABLET | Refills: 0 | Status: SHIPPED | OUTPATIENT
Start: 2022-04-12 | End: 2022-07-12 | Stop reason: SDUPTHER

## 2022-04-12 RX ORDER — ALPRAZOLAM 1 MG/1
1 TABLET ORAL 2 TIMES DAILY
Qty: 60 TABLET | Refills: 2 | Status: SHIPPED | OUTPATIENT
Start: 2022-04-12 | End: 2022-07-12 | Stop reason: SDUPTHER

## 2022-04-12 NOTE — PROGRESS NOTES
Brandon Thornton MD        PATIENT NAME: Zandra Veloz  : 1934  DATE: 22  MRN: [id number]      Billing Provider: Brandon Thornton MD  Level of Service: ME OFFICE/OUTPT VISIT, EST, LEVL IV, 30-39 MIN  Patient PCP Information     Provider PCP Type    Brandon Thornton MD General          Reason for Visit / Chief Complaint: Back Pain and Anxiety       Update PCP  Update Chief Complaint         History of Present Illness / Problem Focused Workflow     Zandra Veloz presents to the clinic with Back Pain and Anxiety     Routine followup.  No significant interval change.  L sided low back pain is severe.        Review of Systems     Review of Systems   Constitutional: Negative for activity change, appetite change, fever and unexpected weight change.   HENT: Negative for congestion, rhinorrhea, sinus pressure, sinus pain, sore throat and trouble swallowing.    Eyes: Negative for photophobia, pain, discharge and visual disturbance.   Respiratory: Negative for cough, chest tightness, wheezing and stridor.    Cardiovascular: Negative for chest pain, palpitations and leg swelling.   Gastrointestinal: Negative for abdominal pain, blood in stool, constipation, diarrhea and nausea.   Endocrine: Negative for polydipsia, polyphagia and polyuria.   Genitourinary: Negative for difficulty urinating, flank pain and hematuria.   Musculoskeletal: Positive for back pain. Negative for arthralgias and neck pain.   Skin: Negative for rash.   Allergic/Immunologic: Negative for food allergies.   Neurological: Negative for dizziness, tremors, seizures, syncope, weakness (global weakness) and headaches.   Psychiatric/Behavioral: Negative for behavioral problems, confusion, decreased concentration, dysphoric mood and hallucinations. The patient is not nervous/anxious.         Medical / Social / Family History     Past Medical History:   Diagnosis Date    Hypertension     Osteoporosis     Thyroid disorder         Past Surgical History:   Procedure Laterality Date    BLADDER SURGERY      HEMIARTHROPLASTY OF HIP Left 9/21/2021    Procedure: HEMIARTHROPLASTY, HIP;  Surgeon: Dequan Singer MD;  Location: [facility];  Service: Orthopedics;  Laterality: Left;    PARTIAL HIP ARTHROPLASTY Right     Dr Singer       Social History  Ms.  reports that she has quit smoking. She has never used smokeless tobacco. She reports that she does not drink alcohol and does not use drugs.    Family History  Ms.'s family history includes Bladder Cancer in her father; Heart attack in her mother; Heart disease in her mother; No Known Problems in her brother, maternal grandfather, maternal grandmother, paternal grandfather, paternal grandmother, and sister.    Medications and Allergies     Medications  Outpatient Medications Marked as Taking for the 4/12/22 encounter (Office Visit) with Brandon Thornton MD   Medication Sig Dispense Refill    cyclobenzaprine (FLEXERIL) 10 MG tablet       diclofenac sodium (VOLTAREN) 1 % Gel Apply 2 g topically 2 (two) times daily. 30 g 2    diltiaZEM (CARDIZEM CD) 120 MG Cp24 Take 1 capsule (120 mg total) by mouth once daily. 90 capsule 3    famotidine (PEPCID) 20 MG tablet Take 20 mg by mouth once daily.      [DISCONTINUED] ALPRAZolam (XANAX) 1 MG tablet Take 1 tablet (1 mg total) by mouth 2 (two) times daily. 60 tablet 2    [DISCONTINUED] hydroCHLOROthiazide (HYDRODIURIL) 25 MG tablet Take 1 tablet (25 mg total) by mouth once daily. 90 tablet 3    [DISCONTINUED] HYDROcodone-acetaminophen (NORCO) 7.5-325 mg per tablet Take 1 tablet by mouth every 8 (eight) hours as needed for Pain. 30 tablet 0    [DISCONTINUED] levothyroxine (SYNTHROID) 125 MCG tablet Take 1 tablet (125 mcg total) by mouth before breakfast. 90 tablet 3    [DISCONTINUED] predniSONE (DELTASONE) 5 MG tablet Take 1 tablet (5 mg total) by mouth once daily. 90 tablet 3       Allergies  Review of patient's allergies  indicates:   Allergen Reactions    Bactrim [sulfamethoxazole-trimethoprim]     Codeine     Levaquin [levofloxacin]        Physical Examination     Vitals:    04/12/22 1324   BP: 130/70   Pulse: 71   Resp: 18     Physical Exam  Constitutional:       General: She is not in acute distress.     Appearance: Normal appearance.   HENT:      Head: Normocephalic.      Right Ear: Tympanic membrane and ear canal normal.      Left Ear: Tympanic membrane and ear canal normal.      Nose: Nose normal.      Mouth/Throat:      Mouth: Mucous membranes are moist.      Pharynx: No oropharyngeal exudate.   Eyes:      Extraocular Movements: Extraocular movements intact.      Pupils: Pupils are equal, round, and reactive to light.   Cardiovascular:      Rate and Rhythm: Normal rate and regular rhythm.      Heart sounds: No murmur heard.  Pulmonary:      Effort: Pulmonary effort is normal.      Breath sounds: Normal breath sounds. No wheezing.   Abdominal:      General: Abdomen is flat. Bowel sounds are normal.      Palpations: Abdomen is soft.      Hernia: No hernia is present.   Musculoskeletal:         General: Tenderness present. Normal range of motion.      Cervical back: Normal range of motion and neck supple.      Right lower leg: No edema.      Left lower leg: No edema.      Comments: Diffuse tenderness in the lower back without point tenderness.  Does use a walker for ambulation   Lymphadenopathy:      Cervical: No cervical adenopathy.   Skin:     General: Skin is warm and dry.      Coloration: Skin is not jaundiced.      Findings: No lesion.   Neurological:      General: No focal deficit present.      Mental Status: She is alert and oriented to person, place, and time.      Cranial Nerves: No cranial nerve deficit.      Gait: Gait normal.   Psychiatric:         Mood and Affect: Mood normal.         Behavior: Behavior normal.         Judgment: Judgment normal.          Assessment and Plan (including Health Maintenance)       Problem List  Smart Sets  Document Outside HM   :    Plan:   Primary hypertension  -     lisinopriL 10 MG tablet; Take 1 tablet (10 mg total) by mouth once daily.  Dispense: 90 tablet; Refill: 3    Anxiety  -     ALPRAZolam (XANAX) 1 MG tablet; Take 1 tablet (1 mg total) by mouth 2 (two) times daily.  Dispense: 60 tablet; Refill: 2    Dorsalgia, unspecified  -     Discontinue: HYDROcodone-acetaminophen (NORCO) 7.5-325 mg per tablet; Take 1 tablet by mouth every 8 (eight) hours as needed for Pain.  Dispense: 60 tablet; Refill: 0  -     Discontinue: HYDROcodone-acetaminophen (NORCO) 7.5-325 mg per tablet; Take 1 tablet by mouth every 8 (eight) hours as needed for Pain.  Dispense: 60 tablet; Refill: 0  -     HYDROcodone-acetaminophen (NORCO) 7.5-325 mg per tablet; Take 1 tablet by mouth every 8 (eight) hours as needed for Pain.  Dispense: 60 tablet; Refill: 0    Hypothyroidism, unspecified type  -     Discontinue: levothyroxine (SYNTHROID) 125 MCG tablet; Take 1 tablet (125 mcg total) by mouth before breakfast.  Dispense: 90 tablet; Refill: 3  -     levothyroxine (SYNTHROID) 125 MCG tablet; Take 1 tablet (125 mcg total) by mouth before breakfast.  Dispense: 90 tablet; Refill: 3    Other orders  -     predniSONE (DELTASONE) 5 MG tablet; Take 1 tablet (5 mg total) by mouth once daily.  Dispense: 90 tablet; Refill: 3  -     mupirocin (BACTROBAN) 2 % ointment; Apply topically 3 (three) times daily.  Dispense: 15 g; Refill: 5           Health Maintenance Due   Topic Date Due    Lipid Panel  Never done    COVID-19 Vaccine (1) Never done    Shingles Vaccine (1 of 2) Never done    Pneumococcal Vaccines (Age 65+) (1 - PCV) Never done    Influenza Vaccine (1) Never done       Problem List Items Addressed This Visit        Cardiac/Vascular    Hypertension - Primary    Relevant Medications    lisinopriL 10 MG tablet      Other Visit Diagnoses     Anxiety        Relevant Medications    ALPRAZolam (XANAX) 1 MG tablet     Dorsalgia, unspecified        Relevant Medications    HYDROcodone-acetaminophen (NORCO) 7.5-325 mg per tablet    Hypothyroidism, unspecified type        Relevant Medications    levothyroxine (SYNTHROID) 125 MCG tablet          Health Maintenance Topics with due status: Not Due       Topic Last Completion Date    TETANUS VACCINE 09/01/2021       Future Appointments   Date Time Provider Department Center   7/13/2022  1:10 PM Brandon Thornton MD [facility]        There are no Patient Instructions on file for this visit.  Follow up in about 3 months (around 7/12/2022) for routine followup.     Signature:  Brandon Thornton MD      Date of encounter: 4/12/22

## 2022-05-11 ENCOUNTER — OFFICE VISIT (OUTPATIENT)
Dept: FAMILY MEDICINE | Facility: CLINIC | Age: 87
End: 2022-05-11
Payer: MEDICARE

## 2022-05-11 VITALS
HEIGHT: 55 IN | BODY MASS INDEX: 27.31 KG/M2 | RESPIRATION RATE: 16 BRPM | DIASTOLIC BLOOD PRESSURE: 102 MMHG | HEART RATE: 88 BPM | SYSTOLIC BLOOD PRESSURE: 176 MMHG | WEIGHT: 118 LBS

## 2022-05-11 DIAGNOSIS — R60.0 PEDAL EDEMA: ICD-10-CM

## 2022-05-11 DIAGNOSIS — M54.16 LUMBAR RADICULOPATHY: ICD-10-CM

## 2022-05-11 DIAGNOSIS — I10 PRIMARY HYPERTENSION: Primary | ICD-10-CM

## 2022-05-11 PROCEDURE — 99214 PR OFFICE/OUTPT VISIT, EST, LEVL IV, 30-39 MIN: ICD-10-PCS | Mod: ,,, | Performed by: FAMILY MEDICINE

## 2022-05-11 PROCEDURE — 99214 OFFICE O/P EST MOD 30 MIN: CPT | Mod: ,,, | Performed by: FAMILY MEDICINE

## 2022-05-11 RX ORDER — LISINOPRIL AND HYDROCHLOROTHIAZIDE 12.5; 2 MG/1; MG/1
1 TABLET ORAL DAILY
Qty: 90 TABLET | Refills: 3 | Status: ON HOLD | OUTPATIENT
Start: 2022-05-11 | End: 2022-12-29

## 2022-05-11 NOTE — PROGRESS NOTES
Brandon Thornton MD        PATIENT NAME: Zandra Veloz  : 1934  DATE: 22  MRN: [id number]      Billing Provider: Brandon Thornton MD  Level of Service: [place] OFFICE/OUTPT VISIT, EST, LEVL IV, 30-39 MIN  Patient PCP Information     Provider PCP Type    Brandon Thornton MD General          Reason for Visit / Chief Complaint: Leg Pain and Foot Pain (Left foot and leg pain and sweling)       Update PCP  Update Chief Complaint         History of Present Illness / Problem Focused Workflow     Zandra Veloz presents to the clinic with Leg Pain and Foot Pain (Left foot and leg pain and sweling)     Pain in LLE and swelling in L foot.      Leg Pain     Foot Pain  Pertinent negatives include no abdominal pain, arthralgias, chest pain, congestion, coughing, fever, headaches, nausea, neck pain, rash, sore throat or weakness (global weakness).       Review of Systems     Review of Systems   Constitutional: Negative for activity change, appetite change, fever and unexpected weight change.   HENT: Negative for congestion, rhinorrhea, sinus pressure, sinus pain, sore throat and trouble swallowing.    Eyes: Negative for photophobia, pain, discharge and visual disturbance.   Respiratory: Negative for cough, chest tightness, wheezing and stridor.    Cardiovascular: Negative for chest pain, palpitations and leg swelling.   Gastrointestinal: Negative for abdominal pain, blood in stool, constipation, diarrhea and nausea.   Endocrine: Negative for polydipsia, polyphagia and polyuria.   Genitourinary: Negative for difficulty urinating, flank pain and hematuria.   Musculoskeletal: Positive for back pain. Negative for arthralgias and neck pain.   Skin: Negative for rash.   Allergic/Immunologic: Negative for food allergies.   Neurological: Negative for dizziness, tremors, seizures, syncope, weakness (global weakness) and headaches.   Psychiatric/Behavioral: Negative for behavioral problems, confusion, decreased  concentration, dysphoric mood and hallucinations. The patient is not nervous/anxious.         Medical / Social / Family History     Past Medical History:   Diagnosis Date    Hypertension     Osteoporosis     Thyroid disorder        Past Surgical History:   Procedure Laterality Date    BLADDER SURGERY      HEMIARTHROPLASTY OF HIP Left 9/21/2021    Procedure: HEMIARTHROPLASTY, HIP;  Surgeon: Dequan Singer MD;  Location: [facility];  Service: Orthopedics;  Laterality: Left;    PARTIAL HIP ARTHROPLASTY Right     Dr Singer       Social History  Ms.  reports that she has quit smoking. She has never used smokeless tobacco. She reports that she does not drink alcohol and does not use drugs.    Family History  Ms.'s family history includes Bladder Cancer in her father; Heart attack in her mother; Heart disease in her mother; No Known Problems in her brother, maternal grandfather, maternal grandmother, paternal grandfather, paternal grandmother, and sister.    Medications and Allergies     Medications  No outpatient medications have been marked as taking for the 5/11/22 encounter (Office Visit) with Brandon Thornton MD.       Allergies  Review of patient's allergies indicates:   Allergen Reactions    Bactrim [sulfamethoxazole-trimethoprim]     Codeine     Levaquin [levofloxacin]        Physical Examination     Vitals:    05/11/22 1531   BP: (!) 176/102   Pulse: 88   Resp: 16     Physical Exam  Constitutional:       General: She is not in acute distress.     Appearance: Normal appearance.   HENT:      Head: Normocephalic.      Right Ear: Tympanic membrane and ear canal normal.      Left Ear: Tympanic membrane and ear canal normal.      Nose: Nose normal.      Mouth/Throat:      Mouth: Mucous membranes are moist.      Pharynx: No oropharyngeal exudate.   Eyes:      Extraocular Movements: Extraocular movements intact.      Pupils: Pupils are equal, round, and reactive to light.   Cardiovascular:       Rate and Rhythm: Normal rate and regular rhythm.      Heart sounds: No murmur heard.  Pulmonary:      Effort: Pulmonary effort is normal.      Breath sounds: Normal breath sounds. No wheezing.   Abdominal:      General: Abdomen is flat. Bowel sounds are normal.      Palpations: Abdomen is soft.      Hernia: No hernia is present.   Musculoskeletal:         General: Normal range of motion.      Cervical back: Normal range of motion and neck supple.      Right lower leg: No edema.      Left lower leg: Edema present.   Lymphadenopathy:      Cervical: No cervical adenopathy.   Skin:     General: Skin is warm and dry.      Coloration: Skin is not jaundiced.      Findings: No lesion.   Neurological:      General: No focal deficit present.      Mental Status: She is alert and oriented to person, place, and time.      Cranial Nerves: No cranial nerve deficit.      Gait: Gait normal.   Psychiatric:         Mood and Affect: Mood normal.         Behavior: Behavior normal.         Judgment: Judgment normal.          Assessment and Plan (including Health Maintenance)      Problem List  Smart Kymab  Document Outside HM   :    Plan:   Primary hypertension  -     lisinopriL-hydrochlorothiazide (PRINZIDE,ZESTORETIC) 20-12.5 mg per tablet; Take 1 tablet by mouth once daily.  Dispense: 90 tablet; Refill: 3    Lumbar radiculopathy    Pedal edema           Health Maintenance Due   Topic Date Due    Lipid Panel  Never done    Shingles Vaccine (1 of 2) Never done    Pneumococcal Vaccines (Age 65+) (1 - PCV) Never done       Problem List Items Addressed This Visit        Neuro    Lumbar radiculopathy       Cardiac/Vascular    Hypertension - Primary    Relevant Medications    lisinopriL-hydrochlorothiazide (PRINZIDE,ZESTORETIC) 20-12.5 mg per tablet      Other Visit Diagnoses     Pedal edema              Health Maintenance Topics with due status: Not Due       Topic Last Completion Date    TETANUS VACCINE 09/01/2021    Influenza Vaccine  Not Due       Future Appointments   Date Time Provider Department Center   7/13/2022  1:10 PM Brandon Thornton MD [facility]        There are no Patient Instructions on file for this visit.  Follow up in about 3 months (around 8/11/2022) for routine followup.     Signature:  Brandon Thornton MD      Date of encounter: 5/11/22

## 2022-07-12 ENCOUNTER — OFFICE VISIT (OUTPATIENT)
Dept: FAMILY MEDICINE | Facility: CLINIC | Age: 87
End: 2022-07-12
Payer: MEDICARE

## 2022-07-12 VITALS
DIASTOLIC BLOOD PRESSURE: 72 MMHG | WEIGHT: 120 LBS | BODY MASS INDEX: 27.77 KG/M2 | HEART RATE: 84 BPM | SYSTOLIC BLOOD PRESSURE: 130 MMHG | RESPIRATION RATE: 16 BRPM | HEIGHT: 55 IN

## 2022-07-12 DIAGNOSIS — F41.9 ANXIETY: ICD-10-CM

## 2022-07-12 DIAGNOSIS — M54.9 DORSALGIA, UNSPECIFIED: Primary | ICD-10-CM

## 2022-07-12 PROCEDURE — 99213 OFFICE O/P EST LOW 20 MIN: CPT | Mod: ,,, | Performed by: FAMILY MEDICINE

## 2022-07-12 PROCEDURE — 99213 PR OFFICE/OUTPT VISIT, EST, LEVL III, 20-29 MIN: ICD-10-PCS | Mod: ,,, | Performed by: FAMILY MEDICINE

## 2022-07-12 RX ORDER — ALPRAZOLAM 1 MG/1
1 TABLET ORAL 2 TIMES DAILY
Qty: 60 TABLET | Refills: 2 | Status: SHIPPED | OUTPATIENT
Start: 2022-07-12 | End: 2022-10-11 | Stop reason: SDUPTHER

## 2022-07-12 RX ORDER — HYDROCODONE BITARTRATE AND ACETAMINOPHEN 7.5; 325 MG/1; MG/1
1 TABLET ORAL EVERY 8 HOURS PRN
Qty: 60 TABLET | Refills: 0 | Status: SHIPPED | OUTPATIENT
Start: 2022-07-12 | End: 2022-07-12 | Stop reason: SDUPTHER

## 2022-07-12 RX ORDER — HYDROCODONE BITARTRATE AND ACETAMINOPHEN 7.5; 325 MG/1; MG/1
1 TABLET ORAL EVERY 8 HOURS PRN
Qty: 60 TABLET | Refills: 0 | Status: SHIPPED | OUTPATIENT
Start: 2022-07-12 | End: 2022-10-11 | Stop reason: SDUPTHER

## 2022-07-12 NOTE — PROGRESS NOTES
Brandon Thornton MD        PATIENT NAME: Zandra Veloz  : 1934  DATE: 22  MRN: [id number]      Billing Provider: Brandon Thornton MD  Level of Service: NM OFFICE/OUTPT VISIT, EST, LEVL III, 20-29 MIN  Patient PCP Information     Provider PCP Type    Brandon Thornton MD General          Reason for Visit / Chief Complaint: Chronic Pain (3 month check knee swelling and increased back and knee pain)       Update PCP  Update Chief Complaint         History of Present Illness / Problem Focused Workflow     Zandra Veloz presents to the clinic with Chronic Pain (3 month check knee swelling and increased back and knee pain)     Routine followup.  No significant interval change        Review of Systems     Review of Systems   Constitutional: Negative for activity change, appetite change, fever and unexpected weight change.   HENT: Negative for congestion, rhinorrhea, sinus pressure, sinus pain, sore throat and trouble swallowing.    Eyes: Negative for photophobia, pain, discharge and visual disturbance.   Respiratory: Negative for cough, chest tightness, wheezing and stridor.    Cardiovascular: Negative for chest pain, palpitations and leg swelling.   Gastrointestinal: Negative for abdominal pain, blood in stool, constipation, diarrhea and nausea.   Endocrine: Negative for polydipsia, polyphagia and polyuria.   Genitourinary: Negative for difficulty urinating, flank pain and hematuria.   Musculoskeletal: Positive for arthralgias, back pain and gait problem. Negative for neck pain.   Skin: Negative for rash.   Allergic/Immunologic: Negative for food allergies.   Neurological: Negative for dizziness, tremors, seizures, syncope, weakness (global weakness) and headaches.   Psychiatric/Behavioral: Negative for behavioral problems, confusion, decreased concentration, dysphoric mood and hallucinations. The patient is not nervous/anxious.         Medical / Social / Family History     Past Medical  History:   Diagnosis Date    Hypertension     Osteoporosis     Thyroid disorder        Past Surgical History:   Procedure Laterality Date    BLADDER SURGERY      HEMIARTHROPLASTY OF HIP Left 9/21/2021    Procedure: HEMIARTHROPLASTY, HIP;  Surgeon: Dequan Singer MD;  Location: [facility];  Service: Orthopedics;  Laterality: Left;    PARTIAL HIP ARTHROPLASTY Right     Dr Singer       Social History  Ms.  reports that she has quit smoking. She has never used smokeless tobacco. She reports that she does not drink alcohol and does not use drugs.    Family History  Ms.'s family history includes Bladder Cancer in her father; Heart attack in her mother; Heart disease in her mother; No Known Problems in her brother, maternal grandfather, maternal grandmother, paternal grandfather, paternal grandmother, and sister.    Medications and Allergies     Medications  Outpatient Medications Marked as Taking for the 7/12/22 encounter (Office Visit) with Brandon Thornton MD   Medication Sig Dispense Refill    diclofenac sodium (VOLTAREN) 1 % Gel Apply 2 g topically 2 (two) times daily. 30 g 2    diltiaZEM (CARDIZEM CD) 120 MG Cp24 Take 1 capsule (120 mg total) by mouth once daily. 90 capsule 3    famotidine (PEPCID) 20 MG tablet Take 20 mg by mouth once daily.      levothyroxine (SYNTHROID) 125 MCG tablet Take 1 tablet (125 mcg total) by mouth before breakfast. 90 tablet 3    lisinopriL-hydrochlorothiazide (PRINZIDE,ZESTORETIC) 20-12.5 mg per tablet Take 1 tablet by mouth once daily. 90 tablet 3    pantoprazole (PROTONIX) 40 MG tablet Take 1 tablet (40 mg total) by mouth once daily. 60 tablet 2    predniSONE (DELTASONE) 5 MG tablet Take 1 tablet (5 mg total) by mouth once daily. 90 tablet 3    [DISCONTINUED] ALPRAZolam (XANAX) 1 MG tablet Take 1 tablet (1 mg total) by mouth 2 (two) times daily. 60 tablet 2    [DISCONTINUED] HYDROcodone-acetaminophen (NORCO) 7.5-325 mg per tablet Take 1 tablet by mouth  every 8 (eight) hours as needed for Pain. 60 tablet 0       Allergies  Review of patient's allergies indicates:   Allergen Reactions    Bactrim [sulfamethoxazole-trimethoprim]     Codeine     Levaquin [levofloxacin]        Physical Examination     Vitals:    07/12/22 1107   BP: 130/72   Pulse: 84   Resp: 16     Physical Exam  Constitutional:       General: She is not in acute distress.     Appearance: Normal appearance.   HENT:      Head: Normocephalic.      Right Ear: Tympanic membrane and ear canal normal.      Left Ear: Tympanic membrane and ear canal normal.      Nose: Nose normal.      Mouth/Throat:      Mouth: Mucous membranes are moist.      Pharynx: No oropharyngeal exudate.   Eyes:      Extraocular Movements: Extraocular movements intact.      Pupils: Pupils are equal, round, and reactive to light.   Cardiovascular:      Rate and Rhythm: Normal rate and regular rhythm.      Heart sounds: No murmur heard.  Pulmonary:      Effort: Pulmonary effort is normal.      Breath sounds: Normal breath sounds. No wheezing.   Abdominal:      General: Abdomen is flat. Bowel sounds are normal.      Palpations: Abdomen is soft.      Hernia: No hernia is present.   Musculoskeletal:         General: Normal range of motion.      Cervical back: Normal range of motion and neck supple.      Right lower leg: No edema.      Left lower leg: No edema.   Lymphadenopathy:      Cervical: No cervical adenopathy.   Skin:     General: Skin is warm and dry.      Coloration: Skin is not jaundiced.      Findings: No lesion.   Neurological:      General: No focal deficit present.      Mental Status: She is alert and oriented to person, place, and time.      Cranial Nerves: No cranial nerve deficit.      Gait: Gait normal.   Psychiatric:         Mood and Affect: Mood normal.         Behavior: Behavior normal.         Judgment: Judgment normal.          Assessment and Plan (including Health Maintenance)      Problem List  Smart Sets  Document  Outside HM   :    Plan:   Dorsalgia, unspecified  -     Discontinue: HYDROcodone-acetaminophen (NORCO) 7.5-325 mg per tablet; Take 1 tablet by mouth every 8 (eight) hours as needed for Pain.  Dispense: 60 tablet; Refill: 0  -     Discontinue: HYDROcodone-acetaminophen (NORCO) 7.5-325 mg per tablet; Take 1 tablet by mouth every 8 (eight) hours as needed for Pain.  Dispense: 60 tablet; Refill: 0  -     HYDROcodone-acetaminophen (NORCO) 7.5-325 mg per tablet; Take 1 tablet by mouth every 8 (eight) hours as needed for Pain.  Dispense: 60 tablet; Refill: 0    Anxiety  -     ALPRAZolam (XANAX) 1 MG tablet; Take 1 tablet (1 mg total) by mouth 2 (two) times daily.  Dispense: 60 tablet; Refill: 2           Health Maintenance Due   Topic Date Due    Lipid Panel  Never done    Shingles Vaccine (1 of 2) Never done    Pneumococcal Vaccines (Age 65+) (1 - PCV) Never done       Problem List Items Addressed This Visit    None     Visit Diagnoses     Dorsalgia, unspecified    -  Primary    Relevant Medications    HYDROcodone-acetaminophen (NORCO) 7.5-325 mg per tablet    Anxiety        Relevant Medications    ALPRAZolam (XANAX) 1 MG tablet          Health Maintenance Topics with due status: Not Due       Topic Last Completion Date    TETANUS VACCINE 09/01/2021    Influenza Vaccine Not Due       Future Appointments   Date Time Provider Department Center   10/11/2022 10:30 AM Brandon Thornton MD [facility]        There are no Patient Instructions on file for this visit.  Follow up in about 3 months (around 10/12/2022) for routine followup.     Signature:  Brandon Thornton MD      Date of encounter: 7/12/22

## 2022-08-11 PROBLEM — M19.012 PRIMARY OSTEOARTHRITIS OF LEFT SHOULDER: Status: ACTIVE | Noted: 2022-08-11

## 2022-08-11 PROBLEM — M17.11 PRIMARY OSTEOARTHRITIS OF RIGHT KNEE: Status: ACTIVE | Noted: 2022-08-11

## 2022-08-15 ENCOUNTER — APPOINTMENT (OUTPATIENT)
Dept: RADIOLOGY | Facility: CLINIC | Age: 87
End: 2022-08-15
Attending: FAMILY MEDICINE
Payer: MEDICARE

## 2022-08-15 ENCOUNTER — OFFICE VISIT (OUTPATIENT)
Dept: FAMILY MEDICINE | Facility: CLINIC | Age: 87
End: 2022-08-15
Payer: MEDICARE

## 2022-08-15 VITALS
DIASTOLIC BLOOD PRESSURE: 68 MMHG | BODY MASS INDEX: 27.08 KG/M2 | WEIGHT: 117 LBS | OXYGEN SATURATION: 97 % | TEMPERATURE: 98 F | SYSTOLIC BLOOD PRESSURE: 126 MMHG | HEART RATE: 72 BPM | HEIGHT: 55 IN | RESPIRATION RATE: 16 BRPM

## 2022-08-15 DIAGNOSIS — G89.29 CHRONIC LEFT SHOULDER PAIN: ICD-10-CM

## 2022-08-15 DIAGNOSIS — M25.512 CHRONIC LEFT SHOULDER PAIN: Primary | ICD-10-CM

## 2022-08-15 DIAGNOSIS — M25.512 CHRONIC LEFT SHOULDER PAIN: ICD-10-CM

## 2022-08-15 DIAGNOSIS — G89.29 CHRONIC LEFT SHOULDER PAIN: Primary | ICD-10-CM

## 2022-08-15 PROCEDURE — 73030 XR SHOULDER COMPLETE 2 OR MORE VIEWS LEFT: ICD-10-PCS | Mod: 26,LT,, | Performed by: RADIOLOGY

## 2022-08-15 PROCEDURE — 73030 X-RAY EXAM OF SHOULDER: CPT | Mod: 26,LT,, | Performed by: RADIOLOGY

## 2022-08-15 PROCEDURE — 99213 OFFICE O/P EST LOW 20 MIN: CPT | Mod: ,,, | Performed by: FAMILY MEDICINE

## 2022-08-15 PROCEDURE — 73030 X-RAY EXAM OF SHOULDER: CPT | Mod: TC,RHCUB,LT | Performed by: FAMILY MEDICINE

## 2022-08-15 PROCEDURE — 99213 PR OFFICE/OUTPT VISIT, EST, LEVL III, 20-29 MIN: ICD-10-PCS | Mod: ,,, | Performed by: FAMILY MEDICINE

## 2022-08-15 NOTE — PROGRESS NOTES
Brandon Thornton MD        PATIENT NAME: Zandra Veloz  : 1934  DATE: 8/15/22  MRN: [id number]      Billing Provider: Brandon Thornton MD  Level of Service: OK OFFICE/OUTPT VISIT, EST, LEVL III, 20-29 MIN  Patient PCP Information     Provider PCP Type    Brandon Thornton MD General          Reason for Visit / Chief Complaint: Arm Pain (Left arm pain has felt weak and achey all weekend)       Update PCP  Update Chief Complaint         History of Present Illness / Problem Focused Workflow     Zandra Veloz presents to the clinic with Arm Pain (Left arm pain has felt weak and achey all weekend)     LUE pain x five days.  No injury patient had an injection in the left shoulder by orthopedics 5 days ago which did not help in fact she feels made it worse.      Review of Systems     Review of Systems   Constitutional: Positive for fatigue. Negative for activity change, appetite change, fever and unexpected weight change.   HENT: Negative for congestion, rhinorrhea, sinus pressure, sinus pain, sore throat and trouble swallowing.    Eyes: Negative for photophobia, pain, discharge and visual disturbance.   Respiratory: Negative for cough, chest tightness, wheezing and stridor.    Cardiovascular: Negative for chest pain, palpitations and leg swelling.   Gastrointestinal: Negative for abdominal pain, blood in stool, constipation, diarrhea and nausea.   Endocrine: Negative for polydipsia, polyphagia and polyuria.   Genitourinary: Negative for difficulty urinating, flank pain and hematuria.   Musculoskeletal: Positive for arthralgias. Negative for neck pain.   Skin: Negative for rash.   Allergic/Immunologic: Negative for food allergies.   Neurological: Negative for dizziness, tremors, seizures, syncope, weakness (global weakness) and headaches.   Psychiatric/Behavioral: Negative for behavioral problems, confusion, decreased concentration, dysphoric mood and hallucinations. The patient is not  nervous/anxious.         Medical / Social / Family History     Past Medical History:   Diagnosis Date    Hypertension     Osteoporosis     Thyroid disorder        Past Surgical History:   Procedure Laterality Date    BLADDER SURGERY      HEMIARTHROPLASTY OF HIP Left 9/21/2021    Procedure: HEMIARTHROPLASTY, HIP;  Surgeon: Dequan Singer MD;  Location: [facility];  Service: Orthopedics;  Laterality: Left;    PARTIAL HIP ARTHROPLASTY Right     Dr Singer       Social History  Ms.  reports that she has quit smoking. She has never used smokeless tobacco. She reports that she does not drink alcohol and does not use drugs.    Family History  Ms.'s family history includes Bladder Cancer in her father; Heart attack in her mother; Heart disease in her mother; No Known Problems in her brother, maternal grandfather, maternal grandmother, paternal grandfather, paternal grandmother, and sister.    Medications and Allergies     Medications  No outpatient medications have been marked as taking for the 8/15/22 encounter (Office Visit) with Brandon Thornton MD.       Allergies  Review of patient's allergies indicates:   Allergen Reactions    Bactrim [sulfamethoxazole-trimethoprim]     Codeine     Levaquin [levofloxacin]        Physical Examination     Vitals:    08/15/22 1324   BP: 126/68   Pulse: 72   Resp: 16   Temp: 98.1 °F (36.7 °C)     Physical Exam  Constitutional:       General: She is not in acute distress.     Appearance: Normal appearance.   HENT:      Head: Normocephalic.      Right Ear: Tympanic membrane and ear canal normal.      Left Ear: Tympanic membrane and ear canal normal.      Nose: Nose normal.      Mouth/Throat:      Mouth: Mucous membranes are moist.      Pharynx: No oropharyngeal exudate.   Eyes:      Extraocular Movements: Extraocular movements intact.      Pupils: Pupils are equal, round, and reactive to light.   Cardiovascular:      Rate and Rhythm: Normal rate and regular rhythm.       Heart sounds: No murmur heard.  Pulmonary:      Effort: Pulmonary effort is normal.      Breath sounds: Normal breath sounds. No wheezing.   Abdominal:      General: Abdomen is flat. Bowel sounds are normal.      Palpations: Abdomen is soft.      Hernia: No hernia is present.   Musculoskeletal:         General: Tenderness present. Normal range of motion.      Cervical back: Normal range of motion and neck supple.      Right lower leg: No edema.      Left lower leg: No edema.      Comments: Passive range of motion in left shoulder reveals that she lacks 30° of Abduction with a firm endpoint.   Lymphadenopathy:      Cervical: No cervical adenopathy.   Skin:     General: Skin is warm and dry.      Coloration: Skin is not jaundiced.      Findings: No lesion.   Neurological:      General: No focal deficit present.      Mental Status: She is alert and oriented to person, place, and time.      Cranial Nerves: No cranial nerve deficit.      Gait: Gait normal.   Psychiatric:         Mood and Affect: Mood normal.         Behavior: Behavior normal.         Judgment: Judgment normal.          Assessment and Plan (including Health Maintenance)      Problem List  [employer]  Document Outside HM   :    Plan:   Chronic left shoulder pain  -     X-Ray Shoulder 2 or More Views Left; Future; Expected date: 08/15/2022           Health Maintenance Due   Topic Date Due    Lipid Panel  Never done    Shingles Vaccine (1 of 2) Never done    Pneumococcal Vaccines (Age 65+) (1 - PCV) Never done       Problem List Items Addressed This Visit    None     Visit Diagnoses     Chronic left shoulder pain    -  Primary    Relevant Orders    X-Ray Shoulder 2 or More Views Left (Completed)          Health Maintenance Topics with due status: Not Due       Topic Last Completion Date    TETANUS VACCINE 09/01/2021    Influenza Vaccine Not Due       Future Appointments   Date Time Provider Department Center   10/11/2022 10:30 AM Brandon Thornton MD  [facility]        There are no Patient Instructions on file for this visit.  Follow up if symptoms worsen or fail to improve.     Signature:  Brandon Thornton MD      Date of encounter: 8/15/22

## 2022-10-09 DIAGNOSIS — Z71.89 COMPLEX CARE COORDINATION: ICD-10-CM

## 2022-10-11 ENCOUNTER — OFFICE VISIT (OUTPATIENT)
Dept: FAMILY MEDICINE | Facility: CLINIC | Age: 87
End: 2022-10-11
Payer: MEDICARE

## 2022-10-11 VITALS
DIASTOLIC BLOOD PRESSURE: 58 MMHG | OXYGEN SATURATION: 97 % | HEIGHT: 55 IN | WEIGHT: 120.63 LBS | RESPIRATION RATE: 18 BRPM | HEART RATE: 74 BPM | BODY MASS INDEX: 27.92 KG/M2 | SYSTOLIC BLOOD PRESSURE: 122 MMHG

## 2022-10-11 DIAGNOSIS — K21.9 GASTROESOPHAGEAL REFLUX DISEASE, UNSPECIFIED WHETHER ESOPHAGITIS PRESENT: ICD-10-CM

## 2022-10-11 DIAGNOSIS — M54.9 DORSALGIA, UNSPECIFIED: ICD-10-CM

## 2022-10-11 DIAGNOSIS — F41.9 ANXIETY: ICD-10-CM

## 2022-10-11 DIAGNOSIS — E03.9 HYPOTHYROIDISM, UNSPECIFIED TYPE: ICD-10-CM

## 2022-10-11 DIAGNOSIS — M19.90 OSTEOARTHRITIS, UNSPECIFIED OSTEOARTHRITIS TYPE, UNSPECIFIED SITE: Primary | ICD-10-CM

## 2022-10-11 DIAGNOSIS — I10 PRIMARY HYPERTENSION: ICD-10-CM

## 2022-10-11 PROCEDURE — 99213 OFFICE O/P EST LOW 20 MIN: CPT | Mod: ,,, | Performed by: FAMILY MEDICINE

## 2022-10-11 PROCEDURE — 99213 PR OFFICE/OUTPT VISIT, EST, LEVL III, 20-29 MIN: ICD-10-PCS | Mod: ,,, | Performed by: FAMILY MEDICINE

## 2022-10-11 RX ORDER — DICLOFENAC SODIUM 10 MG/G
2 GEL TOPICAL 2 TIMES DAILY
Qty: 30 G | Refills: 2 | Status: ON HOLD | OUTPATIENT
Start: 2022-10-11 | End: 2022-12-29

## 2022-10-11 RX ORDER — DILTIAZEM HYDROCHLORIDE 120 MG/1
120 CAPSULE, COATED, EXTENDED RELEASE ORAL DAILY
Qty: 90 CAPSULE | Refills: 3 | Status: ON HOLD | OUTPATIENT
Start: 2022-10-11 | End: 2023-02-20 | Stop reason: HOSPADM

## 2022-10-11 RX ORDER — PREDNISONE 5 MG/1
5 TABLET ORAL DAILY
Qty: 90 TABLET | Refills: 3 | Status: SHIPPED | OUTPATIENT
Start: 2022-10-11 | End: 2022-12-17

## 2022-10-11 RX ORDER — ALPRAZOLAM 1 MG/1
1 TABLET ORAL 2 TIMES DAILY
Qty: 60 TABLET | Refills: 2 | Status: ON HOLD | OUTPATIENT
Start: 2022-10-11 | End: 2023-02-20 | Stop reason: HOSPADM

## 2022-10-11 RX ORDER — PANTOPRAZOLE SODIUM 40 MG/1
40 TABLET, DELAYED RELEASE ORAL DAILY
Qty: 60 TABLET | Refills: 2 | Status: ON HOLD
Start: 2022-10-11 | End: 2023-01-23 | Stop reason: CLARIF

## 2022-10-11 RX ORDER — HYDROCODONE BITARTRATE AND ACETAMINOPHEN 7.5; 325 MG/1; MG/1
1 TABLET ORAL EVERY 8 HOURS PRN
Qty: 60 TABLET | Refills: 0 | Status: ON HOLD | OUTPATIENT
Start: 2022-10-11 | End: 2022-12-29

## 2022-10-11 RX ORDER — HYDROCODONE BITARTRATE AND ACETAMINOPHEN 7.5; 325 MG/1; MG/1
1 TABLET ORAL EVERY 8 HOURS PRN
Qty: 60 TABLET | Refills: 0 | Status: SHIPPED | OUTPATIENT
Start: 2022-10-11 | End: 2022-10-11 | Stop reason: SDUPTHER

## 2022-10-11 RX ORDER — LEVOTHYROXINE SODIUM 125 UG/1
125 TABLET ORAL
Qty: 90 TABLET | Refills: 3 | Status: ON HOLD | OUTPATIENT
Start: 2022-10-11 | End: 2023-02-20 | Stop reason: HOSPADM

## 2022-10-11 RX ORDER — FAMOTIDINE 20 MG/1
20 TABLET, FILM COATED ORAL DAILY
Qty: 90 TABLET | Refills: 2 | Status: SHIPPED | OUTPATIENT
Start: 2022-10-11 | End: 2023-03-06 | Stop reason: SDUPTHER

## 2022-10-11 NOTE — PROGRESS NOTES
Brandon Thornton MD        PATIENT NAME: Zandra Veloz  : 1934  DATE: 10/11/22  MRN: [id number]      Billing Provider: Brandon Thornton MD  Level of Service: SC OFFICE/OUTPT VISIT, EST, LEVL III, 20-29 MIN  Patient PCP Information       Provider PCP Type    Brandon Thornton MD General            Reason for Visit / Chief Complaint: Medication Refill (Wants to talk about lisinopril-htcz thinks it is too strong/Also wants to talk about options for arthritis)       Update PCP  Update Chief Complaint         History of Present Illness / Problem Focused Workflow     Zandra Veloz presents to the clinic with Medication Refill (Wants to talk about lisinopril-htcz thinks it is too strong/Also wants to talk about options for arthritis)     Followup gen OA and back pain.    Review of Systems     Review of Systems   Constitutional:  Positive for fatigue. Negative for activity change, appetite change, fever and unexpected weight change.   HENT:  Negative for congestion, rhinorrhea, sinus pressure, sinus pain, sore throat and trouble swallowing.    Eyes:  Negative for photophobia, pain, discharge and visual disturbance.   Respiratory:  Negative for cough, chest tightness, wheezing and stridor.    Cardiovascular:  Negative for chest pain, palpitations and leg swelling.   Gastrointestinal:  Negative for abdominal pain, blood in stool, constipation, diarrhea and nausea.   Endocrine: Negative for polydipsia, polyphagia and polyuria.   Genitourinary:  Negative for difficulty urinating, flank pain and hematuria.   Musculoskeletal:  Positive for arthralgias, back pain and gait problem. Negative for neck pain.   Skin:  Negative for rash.   Allergic/Immunologic: Negative for food allergies.   Neurological:  Negative for dizziness, tremors, seizures, syncope, weakness (global weakness) and headaches.   Psychiatric/Behavioral:  Negative for behavioral problems, confusion, decreased concentration, dysphoric mood  and hallucinations. The patient is not nervous/anxious.       Medical / Social / Family History     Past Medical History:   Diagnosis Date    Hypertension     Osteoporosis     Thyroid disorder        Past Surgical History:   Procedure Laterality Date    BLADDER SURGERY      HEMIARTHROPLASTY OF HIP Left 9/21/2021    Procedure: HEMIARTHROPLASTY, HIP;  Surgeon: Dequan Singer MD;  Location: [facility];  Service: Orthopedics;  Laterality: Left;    PARTIAL HIP ARTHROPLASTY Right     Dr Singer       Social History  Ms.  reports that she has quit smoking. She has never used smokeless tobacco. She reports that she does not drink alcohol and does not use drugs.    Family History  Ms.'s family history includes Bladder Cancer in her father; Heart attack in her mother; Heart disease in her mother; No Known Problems in her brother, maternal grandfather, maternal grandmother, paternal grandfather, paternal grandmother, and sister.    Medications and Allergies     Medications  No outpatient medications have been marked as taking for the 10/11/22 encounter (Office Visit) with Brandon Thornton MD.       Allergies  Review of patient's allergies indicates:   Allergen Reactions    Bactrim [sulfamethoxazole-trimethoprim]     Codeine     Levaquin [levofloxacin]        Physical Examination     Vitals:    10/11/22 1122   BP: (!) 122/58   Pulse: 74   Resp: 18     Physical Exam  Constitutional:       General: She is not in acute distress.     Appearance: Normal appearance.   HENT:      Head: Normocephalic.      Right Ear: Tympanic membrane and ear canal normal.      Left Ear: Tympanic membrane and ear canal normal.      Nose: Nose normal.      Mouth/Throat:      Mouth: Mucous membranes are moist.      Pharynx: No oropharyngeal exudate.   Eyes:      Extraocular Movements: Extraocular movements intact.      Pupils: Pupils are equal, round, and reactive to light.   Cardiovascular:      Rate and Rhythm: Normal rate and regular  rhythm.      Heart sounds: No murmur heard.  Pulmonary:      Effort: Pulmonary effort is normal.      Breath sounds: Normal breath sounds. No wheezing.   Abdominal:      General: Abdomen is flat. Bowel sounds are normal.      Palpations: Abdomen is soft.      Hernia: No hernia is present.   Musculoskeletal:         General: Tenderness present.      Cervical back: Normal range of motion and neck supple.      Right lower leg: No edema.      Left lower leg: No edema.      Comments: Abdction extension both shoulders limited 30° actively   Lymphadenopathy:      Cervical: No cervical adenopathy.   Skin:     General: Skin is warm and dry.      Coloration: Skin is not jaundiced.      Findings: No lesion.   Neurological:      General: No focal deficit present.      Mental Status: She is alert and oriented to person, place, and time. Mental status is at baseline.      Cranial Nerves: No cranial nerve deficit.      Gait: Gait normal.   Psychiatric:         Mood and Affect: Mood normal.         Behavior: Behavior normal.         Judgment: Judgment normal.        Assessment and Plan (including Health Maintenance)      Problem List  Smart Sets  Document Outside HM   :    Plan:   Osteoarthritis, unspecified osteoarthritis type, unspecified site  -     predniSONE (DELTASONE) 5 MG tablet; Take 1 tablet (5 mg total) by mouth once daily.  Dispense: 90 tablet; Refill: 3  -     diclofenac sodium (VOLTAREN) 1 % Gel; Apply 2 g topically 2 (two) times daily.  Dispense: 30 g; Refill: 2    Anxiety  -     ALPRAZolam (XANAX) 1 MG tablet; Take 1 tablet (1 mg total) by mouth 2 (two) times daily.  Dispense: 60 tablet; Refill: 2    Primary hypertension  -     diltiaZEM (CARDIZEM CD) 120 MG Cp24; Take 1 capsule (120 mg total) by mouth once daily.  Dispense: 90 capsule; Refill: 3    Dorsalgia, unspecified  -     Discontinue: HYDROcodone-acetaminophen (NORCO) 7.5-325 mg per tablet; Take 1 tablet by mouth every 8 (eight) hours as needed for Pain.   Dispense: 60 tablet; Refill: 0  -     Discontinue: HYDROcodone-acetaminophen (NORCO) 7.5-325 mg per tablet; Take 1 tablet by mouth every 8 (eight) hours as needed for Pain.  Dispense: 60 tablet; Refill: 0  -     HYDROcodone-acetaminophen (NORCO) 7.5-325 mg per tablet; Take 1 tablet by mouth every 8 (eight) hours as needed for Pain.  Dispense: 60 tablet; Refill: 0    Hypothyroidism, unspecified type  -     levothyroxine (SYNTHROID) 125 MCG tablet; Take 1 tablet (125 mcg total) by mouth before breakfast.  Dispense: 90 tablet; Refill: 3    Gastroesophageal reflux disease, unspecified whether esophagitis present  -     pantoprazole (PROTONIX) 40 MG tablet; Take 1 tablet (40 mg total) by mouth once daily.  Dispense: 60 tablet; Refill: 2    Other orders  -     famotidine (PEPCID) 20 MG tablet; Take 1 tablet (20 mg total) by mouth once daily.  Dispense: 90 tablet; Refill: 2         Health Maintenance Due   Topic Date Due    Lipid Panel  Never done    Shingles Vaccine (1 of 2) Never done    Pneumococcal Vaccines (Age 65+) (1 - PCV) Never done    Influenza Vaccine (1) Never done       Problem List Items Addressed This Visit          Cardiac/Vascular    Hypertension    Relevant Medications    diltiaZEM (CARDIZEM CD) 120 MG Cp24     Other Visit Diagnoses       Osteoarthritis, unspecified osteoarthritis type, unspecified site    -  Primary    Relevant Medications    predniSONE (DELTASONE) 5 MG tablet    diclofenac sodium (VOLTAREN) 1 % Gel    Anxiety        Relevant Medications    ALPRAZolam (XANAX) 1 MG tablet    Dorsalgia, unspecified        Relevant Medications    HYDROcodone-acetaminophen (NORCO) 7.5-325 mg per tablet    Hypothyroidism, unspecified type        Relevant Medications    levothyroxine (SYNTHROID) 125 MCG tablet    Gastroesophageal reflux disease, unspecified whether esophagitis present        Relevant Medications    pantoprazole (PROTONIX) 40 MG tablet            Health Maintenance Topics with due status: Not  Due       Topic Last Completion Date    TETANUS VACCINE 09/01/2021       Future Appointments   Date Time Provider Department Center   1/11/2023 10:30 AM Brandon Thornton MD [facility]        There are no Patient Instructions on file for this visit.  Follow up in about 3 months (around 1/11/2023) for routine followup.     Signature:  Brandon Thornton MD      Date of encounter: 10/11/22

## 2022-10-12 ENCOUNTER — HOSPITAL ENCOUNTER (OUTPATIENT)
Dept: RADIOLOGY | Facility: HOSPITAL | Age: 87
Discharge: HOME | End: 2022-10-12
Attending: ORTHOPAEDIC SURGERY
Payer: MEDICARE

## 2022-10-12 DIAGNOSIS — M25.562 PAIN IN BOTH KNEES, UNSPECIFIED CHRONICITY: ICD-10-CM

## 2022-10-12 DIAGNOSIS — M25.561 PAIN IN BOTH KNEES, UNSPECIFIED CHRONICITY: ICD-10-CM

## 2022-10-12 PROBLEM — M19.011 PRIMARY OSTEOARTHRITIS OF BOTH SHOULDERS: Status: ACTIVE | Noted: 2022-08-11

## 2022-10-12 PROCEDURE — 73562 X-RAY EXAM OF KNEE 3: CPT | Mod: TC,50

## 2022-10-13 ENCOUNTER — HOSPITAL ENCOUNTER (EMERGENCY)
Facility: HOSPITAL | Age: 87
Discharge: HOME | End: 2022-10-13
Attending: EMERGENCY MEDICINE
Payer: MEDICARE

## 2022-10-13 VITALS
SYSTOLIC BLOOD PRESSURE: 174 MMHG | RESPIRATION RATE: 15 BRPM | DIASTOLIC BLOOD PRESSURE: 90 MMHG | HEART RATE: 67 BPM | OXYGEN SATURATION: 96 % | BODY MASS INDEX: 27.77 KG/M2 | HEIGHT: 55 IN | TEMPERATURE: 98 F | WEIGHT: 120 LBS

## 2022-10-13 DIAGNOSIS — S22.31XA CLOSED FRACTURE OF ONE RIB OF RIGHT SIDE, INITIAL ENCOUNTER: Primary | ICD-10-CM
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
## 2022-10-13 DIAGNOSIS — R53.1 GENERALIZED WEAKNESS: ICD-10-CM

## 2022-10-13 LAB
ALBUMIN SERPL BCP-MCNC: 4 G/DL (ref 3.5–5)
ALBUMIN/GLOB SERPL: 1.2 {RATIO}
ALP SERPL-CCNC: 45 U/L (ref 55–142)
ALT SERPL W P-5'-P-CCNC: 19 U/L (ref 13–56)
ANION GAP SERPL CALCULATED.3IONS-SCNC: 14 MMOL/L (ref 7–16)
AST SERPL W P-5'-P-CCNC: 19 U/L (ref 15–37)
BACTERIA #/AREA URNS HPF: ABNORMAL /HPF
BASOPHILS # BLD AUTO: 0.03 K/UL (ref 0–0.2)
BASOPHILS NFR BLD AUTO: 0.2 % (ref 0–1)
BILIRUB SERPL-MCNC: 0.5 MG/DL (ref ?–1.2)
BILIRUB UR QL STRIP: NEGATIVE
BUN SERPL-MCNC: 26 MG/DL (ref 7–18)
BUN/CREAT SERPL: 22 (ref 6–20)
CALCIUM SERPL-MCNC: 9.6 MG/DL (ref 8.5–10.1)
CHLORIDE SERPL-SCNC: 95 MMOL/L (ref 98–107)
CLARITY UR: CLEAR
CO2 SERPL-SCNC: 22 MMOL/L (ref 21–32)
COLOR UR: ABNORMAL
CREAT SERPL-MCNC: 1.19 MG/DL (ref 0.55–1.02)
DIFFERENTIAL METHOD BLD: ABNORMAL
EGFR (NO RACE VARIABLE) (RUSH/TITUS): 44 ML/MIN/1.73M²
EOSINOPHIL # BLD AUTO: 0 K/UL (ref 0–0.5)
EOSINOPHIL NFR BLD AUTO: 0 % (ref 1–4)
ERYTHROCYTE [DISTWIDTH] IN BLOOD BY AUTOMATED COUNT: 12.7 % (ref 11.5–14.5)
FLUAV AG UPPER RESP QL IA.RAPID: NEGATIVE
FLUBV AG UPPER RESP QL IA.RAPID: NEGATIVE
GLOBULIN SER-MCNC: 3.4 G/DL (ref 2–4)
GLUCOSE SERPL-MCNC: 129 MG/DL (ref 74–106)
GLUCOSE UR STRIP-MCNC: NORMAL MG/DL
HCT VFR BLD AUTO: 35.2 % (ref 38–47)
HGB BLD-MCNC: 12.1 G/DL (ref 12–16)
IMM GRANULOCYTES # BLD AUTO: 0.22 K/UL (ref 0–0.04)
IMM GRANULOCYTES NFR BLD: 1.5 % (ref 0–0.4)
KETONES UR STRIP-SCNC: NEGATIVE MG/DL
LEUKOCYTE ESTERASE UR QL STRIP: ABNORMAL
LYMPHOCYTES # BLD AUTO: 0.83 K/UL (ref 1–4.8)
LYMPHOCYTES NFR BLD AUTO: 5.5 % (ref 27–41)
LYMPHOCYTES NFR BLD MANUAL: 10 % (ref 27–41)
MCH RBC QN AUTO: 29.7 PG (ref 27–31)
MCHC RBC AUTO-ENTMCNC: 34.4 G/DL (ref 32–36)
MCV RBC AUTO: 86.5 FL (ref 80–96)
MONOCYTES # BLD AUTO: 0.29 K/UL (ref 0–0.8)
MONOCYTES NFR BLD AUTO: 1.9 % (ref 2–6)
MPC BLD CALC-MCNC: 9 FL (ref 9.4–12.4)
MUCOUS THREADS #/AREA URNS HPF: ABNORMAL /HPF
NEUTROPHILS # BLD AUTO: 13.68 K/UL (ref 1.8–7.7)
NEUTROPHILS NFR BLD AUTO: 90.9 % (ref 53–65)
NEUTS SEG NFR BLD MANUAL: 90 % (ref 50–62)
NITRITE UR QL STRIP: NEGATIVE
NRBC # BLD AUTO: 0 X10E3/UL
NRBC, AUTO (.00): 0 %
PH UR STRIP: 6.5 PH UNITS
PLATELET # BLD AUTO: 330 K/UL (ref 150–400)
PLATELET MORPHOLOGY: NORMAL
POTASSIUM SERPL-SCNC: 4.5 MMOL/L (ref 3.5–5.1)
PROT SERPL-MCNC: 7.4 G/DL (ref 6.4–8.2)
PROT UR QL STRIP: NEGATIVE
RBC # BLD AUTO: 4.07 M/UL (ref 4.2–5.4)
RBC # UR STRIP: ABNORMAL /UL
RBC #/AREA URNS HPF: ABNORMAL /HPF
RBC MORPH BLD: NORMAL
SARS-COV+SARS-COV-2 AG RESP QL IA.RAPID: NEGATIVE
SODIUM SERPL-SCNC: 126 MMOL/L (ref 136–145)
SP GR UR STRIP: 1.02
SQUAMOUS #/AREA URNS LPF: ABNORMAL /LPF
TRICHOMONAS #/AREA URNS HPF: ABNORMAL /HPF
UROBILINOGEN UR STRIP-ACNC: NORMAL MG/DL
WBC # BLD AUTO: 15.05 K/UL (ref 4.5–11)
WBC #/AREA URNS HPF: ABNORMAL /HPF
YEAST #/AREA URNS HPF: ABNORMAL /HPF

## 2022-10-13 PROCEDURE — 81001 URINALYSIS AUTO W/SCOPE: CPT | Performed by: EMERGENCY MEDICINE

## 2022-10-13 PROCEDURE — 90471 IMMUNIZATION ADMIN: CPT | Performed by: EMERGENCY MEDICINE

## 2022-10-13 PROCEDURE — 63600175 PHARM REV CODE 636 W HCPCS: Performed by: EMERGENCY MEDICINE

## 2022-10-13 PROCEDURE — 96375 TX/PRO/DX INJ NEW DRUG ADDON: CPT | Mod: 59

## 2022-10-13 PROCEDURE — 80053 COMPREHEN METABOLIC PANEL: CPT | Performed by: EMERGENCY MEDICINE

## 2022-10-13 PROCEDURE — 99285 EMERGENCY DEPT VISIT HI MDM: CPT | Mod: CS

## 2022-10-13 PROCEDURE — 12002 RPR S/N/AX/GEN/TRNK2.6-7.5CM: CPT

## 2022-10-13 PROCEDURE — 90715 TDAP VACCINE 7 YRS/> IM: CPT | Performed by: EMERGENCY MEDICINE

## 2022-10-13 PROCEDURE — 87077 CULTURE AEROBIC IDENTIFY: CPT | Performed by: EMERGENCY MEDICINE

## 2022-10-13 PROCEDURE — 99284 PR EMERGENCY DEPT VISIT,LEVEL IV: ICD-10-PCS | Mod: 25,,, | Performed by: EMERGENCY MEDICINE

## 2022-10-13 PROCEDURE — 99284 EMERGENCY DEPT VISIT MOD MDM: CPT | Mod: 25,,, | Performed by: EMERGENCY MEDICINE

## 2022-10-13 PROCEDURE — 93010 EKG 12-LEAD: ICD-10-PCS | Mod: ,,, | Performed by: HOSPITALIST

## 2022-10-13 PROCEDURE — 87428 SARSCOV & INF VIR A&B AG IA: CPT | Performed by: EMERGENCY MEDICINE

## 2022-10-13 PROCEDURE — 93005 ELECTROCARDIOGRAM TRACING: CPT

## 2022-10-13 PROCEDURE — 85025 COMPLETE CBC W/AUTO DIFF WBC: CPT | Performed by: EMERGENCY MEDICINE

## 2022-10-13 PROCEDURE — 96361 HYDRATE IV INFUSION ADD-ON: CPT

## 2022-10-13 PROCEDURE — 87186 SC STD MICRODIL/AGAR DIL: CPT | Performed by: EMERGENCY MEDICINE

## 2022-10-13 PROCEDURE — 93010 ELECTROCARDIOGRAM REPORT: CPT | Mod: ,,, | Performed by: HOSPITALIST

## 2022-10-13 PROCEDURE — 25000003 PHARM REV CODE 250: Performed by: EMERGENCY MEDICINE

## 2022-10-13 PROCEDURE — 96374 THER/PROPH/DIAG INJ IV PUSH: CPT

## 2022-10-13 PROCEDURE — 12002 PR RESUP NPTERF WND BODY 2.6-7.5 CM: ICD-10-PCS | Mod: ,,, | Performed by: EMERGENCY MEDICINE

## 2022-10-13 PROCEDURE — 36415 COLL VENOUS BLD VENIPUNCTURE: CPT | Performed by: EMERGENCY MEDICINE

## 2022-10-13 PROCEDURE — 12002 RPR S/N/AX/GEN/TRNK2.6-7.5CM: CPT | Mod: ,,, | Performed by: EMERGENCY MEDICINE

## 2022-10-13 RX ORDER — MORPHINE SULFATE 2 MG/ML
2 INJECTION, SOLUTION INTRAMUSCULAR; INTRAVENOUS
Status: COMPLETED | OUTPATIENT
Start: 2022-10-13 | End: 2022-10-13

## 2022-10-13 RX ORDER — ONDANSETRON 2 MG/ML
4 INJECTION INTRAMUSCULAR; INTRAVENOUS
Status: COMPLETED | OUTPATIENT
Start: 2022-10-13 | End: 2022-10-13

## 2022-10-13 RX ORDER — LIDOCAINE HYDROCHLORIDE 10 MG/ML
5 INJECTION, SOLUTION EPIDURAL; INFILTRATION; INTRACAUDAL; PERINEURAL
Status: COMPLETED | OUTPATIENT
Start: 2022-10-13 | End: 2022-10-13

## 2022-10-13 RX ORDER — HYDROCODONE BITARTRATE AND ACETAMINOPHEN 5; 325 MG/1; MG/1
1 TABLET ORAL EVERY 6 HOURS PRN
Qty: 12 TABLET | Refills: 0 | Status: ON HOLD | OUTPATIENT
Start: 2022-10-13 | End: 2022-12-29

## 2022-10-13 RX ORDER — ACETAMINOPHEN 325 MG/1
650 TABLET ORAL
Status: COMPLETED | OUTPATIENT
Start: 2022-10-13 | End: 2022-10-13

## 2022-10-13 RX ADMIN — SODIUM CHLORIDE 500 ML: 9 INJECTION, SOLUTION INTRAVENOUS at 02:10

## 2022-10-13 RX ADMIN — ONDANSETRON 4 MG: 2 INJECTION INTRAMUSCULAR; INTRAVENOUS at 03:10

## 2022-10-13 RX ADMIN — SODIUM CHLORIDE 500 ML: 9 INJECTION, SOLUTION INTRAVENOUS at 01:10

## 2022-10-13 RX ADMIN — ACETAMINOPHEN 650 MG: 325 TABLET ORAL at 03:10

## 2022-10-13 RX ADMIN — LIDOCAINE HYDROCHLORIDE 50 MG: 10 INJECTION, SOLUTION EPIDURAL; INFILTRATION; INTRACAUDAL; PERINEURAL at 04:10

## 2022-10-13 RX ADMIN — TETANUS TOXOID, REDUCED DIPHTHERIA TOXOID AND ACELLULAR PERTUSSIS VACCINE, ADSORBED 0.5 ML: 5; 2.5; 8; 8; 2.5 SUSPENSION INTRAMUSCULAR at 01:10

## 2022-10-13 RX ADMIN — MORPHINE SULFATE 2 MG: 2 INJECTION, SOLUTION INTRAMUSCULAR; INTRAVENOUS at 03:10

## 2022-10-13 NOTE — ED PROVIDER NOTES
Encounter Date: 10/13/2022       History     Chief Complaint   Patient presents with    Fatigue     Patient says she fell today and sustained skin tears of her right knee right elbow.  Patient has mild pain these areas.  Also she has mild pain to the right lateral side of the chest wall.  Pain is worse with movement or deep breathing.  No associated shortness of breath or chest pain.  Patient was feeling a little weak earlier today and lightheaded.  Denies associated head injury or neck pain.  No vomiting or diarrhea.  Good p.o. intake.  No other associated symptoms or modifying factors.  No focal weakness or numbness    Review of patient's allergies indicates:   Allergen Reactions    Bactrim [sulfamethoxazole-trimethoprim]     Codeine     Levaquin [levofloxacin]      Past Medical History:   Diagnosis Date    Hypertension     Osteoporosis     Thyroid disorder      Past Surgical History:   Procedure Laterality Date    BLADDER SURGERY      HEMIARTHROPLASTY OF HIP Left 9/21/2021    Procedure: HEMIARTHROPLASTY, HIP;  Surgeon: Dequan Singer MD;  Location: [facility];  Service: Orthopedics;  Laterality: Left;    PARTIAL HIP ARTHROPLASTY Right     Dr Singer     Family History   Problem Relation Age of Onset    Heart disease Mother     Heart attack Mother     Bladder Cancer Father     No Known Problems Sister     No Known Problems Brother     No Known Problems Maternal Grandmother     No Known Problems Maternal Grandfather     No Known Problems Paternal Grandmother     No Known Problems Paternal Grandfather      Social History     Tobacco Use    Smoking status: Former    Smokeless tobacco: Never   Substance Use Topics    Alcohol use: Never    Drug use: Never     Review of Systems   Constitutional:  Negative for fever.   HENT:  Negative for sore throat.    Respiratory:  Negative for shortness of breath.    Cardiovascular:  Negative for chest pain.   Gastrointestinal:  Negative for nausea.   Genitourinary:   Negative for dysuria.   Musculoskeletal:  Negative for back pain.   Skin:  Negative for rash.   Neurological:  Negative for weakness.   Hematological:  Does not bruise/bleed easily.     Physical Exam     Initial Vitals [10/13/22 1253]   BP Pulse Resp Temp SpO2   (!) 179/71 74 14 98.4 °F (36.9 °C) 96 %      MAP       --         Physical Exam    Nursing note and vitals reviewed.  Constitutional: She appears well-developed and well-nourished.   HENT:   Head: Normocephalic and atraumatic.   Eyes: EOM are normal. Pupils are equal, round, and reactive to light.   Neck: Neck supple. No thyromegaly present.   Normal range of motion.  Cardiovascular:  Normal rate, regular rhythm, normal heart sounds and intact distal pulses.           No murmur heard.  Pulmonary/Chest: Breath sounds normal. No respiratory distress. She has no wheezes. She exhibits tenderness (chest wall tenderness on the right side.).   Abdominal: Abdomen is soft. Bowel sounds are normal. She exhibits no distension. There is no abdominal tenderness.   Musculoskeletal:         General: No tenderness or edema. Normal range of motion.      Cervical back: Normal range of motion and neck supple.      Comments: Skin tear on the right elbow and right knee.     Lymphadenopathy:     She has no cervical adenopathy.   Neurological: She is alert and oriented to person, place, and time. She has normal strength. No cranial nerve deficit or sensory deficit.   Skin: Skin is warm and dry. No rash noted.   Psychiatric: She has a normal mood and affect.       Medical Screening Exam   See Full Note    ED Course   Lac Repair    Date/Time: 10/13/2022 5:46 PM  Performed by: Edi Taylor MD  Authorized by: Edi Taylor MD     Consent:     Consent obtained:  Verbal    Consent given by:  Patient    Risks discussed:  Need for additional repair and poor wound healing    Alternatives discussed:  No treatment  Anesthesia:     Anesthesia method:  Local infiltration     Local anesthetic:  Lidocaine 1% w/o epi  Laceration details:     Length (cm):  5  Exploration:     Hemostasis achieved with:  Direct pressure    Wound exploration: wound explored through full range of motion      Wound extent: no fascia violation noted, no foreign bodies/material noted, no muscle damage noted, no nerve damage noted, no tendon damage noted, no underlying fracture noted and no vascular damage noted      Contaminated: no    Treatment:     Area cleansed with:  Povidone-iodine    Amount of cleaning:  Standard    Irrigation solution:  Sterile saline    Debridement:  None    Undermining:  None    Scar revision: no    Skin repair:     Repair method:  Sutures    Suture size:  4-0    Suture material:  Prolene    Suture technique:  Simple interrupted  Approximation:     Approximation:  Close  Repair type:     Repair type:  Simple  Post-procedure details:     Dressing:  Antibiotic ointment and non-adherent dressing    Procedure completion:  Tolerated well, no immediate complications  Labs Reviewed   COMPREHENSIVE METABOLIC PANEL - Abnormal; Notable for the following components:       Result Value    Sodium 126 (*)     Chloride 95 (*)     Glucose 129 (*)     BUN 26 (*)     Creatinine 1.19 (*)     BUN/Creatinine Ratio 22 (*)     Alk Phos 45 (*)     eGFR 44 (*)     All other components within normal limits   URINALYSIS, REFLEX TO URINE CULTURE - Abnormal; Notable for the following components:    Leukocytes, UA Trace (*)     Blood, UA Small (*)     All other components within normal limits   CBC WITH DIFFERENTIAL - Abnormal; Notable for the following components:    WBC 15.05 (*)     RBC 4.07 (*)     Hematocrit 35.2 (*)     MPV 9.0 (*)     Neutrophils % 90.9 (*)     Lymphocytes % 5.5 (*)     Monocytes % 1.9 (*)     Eosinophils % 0.0 (*)     Immature Granulocytes % 1.5 (*)     Neutrophils, Abs 13.68 (*)     Lymphocytes, Absolute 0.83 (*)     Immature Granulocytes, Absolute 0.22 (*)     All other components within  normal limits   MANUAL DIFFERENTIAL - Abnormal; Notable for the following components:    Segmented Neutrophils, Man % 90 (*)     Lymphocytes, Man % 10 (*)     All other components within normal limits   URINALYSIS, MICROSCOPIC - Abnormal; Notable for the following components:    RBC, UA 3-5 (*)     Bacteria, UA Moderate (*)     All other components within normal limits   SARS-COV2 (COVID) W/ FLU ANTIGEN - Normal    Narrative:     Negative SARS-CoV results should not be used as the sole basis for treatment or patient management decisions; negative results should be considered in the context of a patient's recent exposures, history and the presene of clinical signs and symptoms consistent with COVID-19.  Negative results should be treated as presumptive and confirmed by molecular assay, if necessary for patient management.   CULTURE, URINE   CBC W/ AUTO DIFFERENTIAL    Narrative:     The following orders were created for panel order CBC auto differential.  Procedure                               Abnormality         Status                     ---------                               -----------         ------                     CBC with Differential[[id number]]        Abnormal            Final result               Manual Differential[[id number]]          Abnormal            Edited Result - FINAL        Please view results for these tests on the individual orders.        ECG Results              EKG 12-lead (In process)  Result time 10/13/22 13:24:36      In process by Interface, Lab In [facility] (10/13/22 13:24:36)                   Narrative:    Test Reason : R53.1,    Vent. Rate : 070 BPM     Atrial Rate : 000 BPM     P-R Int : 168 ms          QRS Dur : 084 ms      QT Int : 374 ms       P-R-T Axes : 070 047 063 degrees     QTc Int : 392 ms    Sinus rhythm  Possible sequence error: V2,V3 omitted  Small inferior Q waves: infarct cannot be excluded  Borderline ECG      Referred By: [email or web address]   SELF           Confirmed By:                                    Imaging Results              X-Ray Humerus 2 View Right (In process)                      X-Ray Ribs 2 View Right (Final result)  Result time 10/13/22 13:27:23      Final result by Pranav Lamb II, MD (10/13/22 13:27:23)                   Impression:      Fourth rib fracture.  No other evidence of rib abnormality demonstrated.      Electronically signed by: Pranav Lamb  Date:    10/13/2022  Time:    13:27               Narrative:    EXAMINATION:  XR RIBS 2 VIEW RIGHT    CLINICAL HISTORY:  Unspecified fall, initial encounter    COMPARISON:  None.    FINDINGS:  There is fracture of the right lateral 4th rib with minimal displacement.  No other evidence of fracture seen. No subpleural hematoma or pneumothorax is present.                                       X-Ray Elbow Complete Right (Final result)  Result time 10/13/22 13:22:18      Final result by Pranav Lamb II, MD (10/13/22 13:22:18)                   Impression:      No evidence of acute injury demonstrated      Electronically signed by: Pranav Lamb  Date:    10/13/2022  Time:    13:22               Narrative:    EXAMINATION:  XR ELBOW COMPLETE 3 VIEW RIGHT    CLINICAL HISTORY:  Unspecified fall, initial encounter    COMPARISON:  1 February 2022    FINDINGS:  No evidence of fracture seen.  The alignment of the joints appears normal.  No degenerative change is present.  No soft tissue abnormality is seen.                                       X-Ray Knee 1 or 2 View Right (Final result)  Result time 10/13/22 13:23:16      Final result by Pranav Lamb II, MD (10/13/22 13:23:16)                   Impression:      No evidence of acute injury demonstrated      Electronically signed by: Pranav Lamb  Date:    10/13/2022  Time:    13:23               Narrative:    EXAMINATION:  XR KNEE 1 OR 2 VIEW RIGHT    CLINICAL HISTORY:  Unspecified fall, initial encounter    COMPARISON:  12 October 2022    FINDINGS:  No  evidence of fracture seen.  The alignment of the joints appears normal.  Moderate to severe lateral compartment and mild remaining compartment degenerative change is present.  No soft tissue abnormality is seen.                                       Medications   Tdap (BOOSTRIX) vaccine injection 0.5 mL (0.5 mLs Intramuscular Given 10/13/22 1305)   sodium chloride 0.9% bolus 500 mL (0 mLs Intravenous Stopped 10/13/22 1436)   sodium chloride 0.9% bolus 500 mL (0 mLs Intravenous Stopped 10/13/22 1544)   morphine injection 2 mg (2 mg Intravenous Given 10/13/22 1501)   ondansetron injection 4 mg (4 mg Intravenous Given 10/13/22 1501)   acetaminophen tablet 650 mg (650 mg Oral Given 10/13/22 1545)   LIDOcaine (PF) 10 mg/ml (1%) injection 50 mg (50 mg Infiltration Given 10/13/22 1616)     Medical Decision Making:   ED Management:  Prescribing hydrocodone.  Have suture the knee.  Have use Steri-Strips for the lacerations of the right elbow and the lateral right knee.  The right elbow laceration was superficial skin tear proximally 5 cm in length.  The laceration of the right knee that was suture was 5 cm in length.  There is a 3 cm laceration of the lateral right knee which was Steri stripped.  She showed on x-ray to have a 4th rib fracture.  Lungs are clear breathing normally.  Talk with her 2 daughters and will have home health set up for her.  She wants to go home.  She does not want swing bed.  She says she will come back if her symptoms worsen or new symptoms develop                  Clinical Impression:   Final diagnoses:  [R53.1] Generalized weakness  [W19.XXXA] Fall  [S22.31XA] Closed fracture of one rib of right side, initial encounter (Primary)        ED Disposition Condition    Discharge Stable          ED Prescriptions       Medication Sig Dispense Start Date End Date Auth. Provider    HYDROcodone-acetaminophen (NORCO) 5-325 mg per tablet Take 1 tablet by mouth every 6 (six) hours as needed for Pain. 12 tablet  10/13/2022 -- Edi Taylor MD          Follow-up Information       Follow up With Specialties Details Why Contact Info    Brandon Thornton MD Family Medicine, Emergency Medicine Schedule an appointment as soon as possible for a visit  As needed [address] [facility] [id number]  [phone]      [facility] - Emergency Department Emergency Medicine Schedule an appointment as soon as possible for a visit  If symptoms worsen [address] [place] [id number] [phone]             Edi Taylor MD  10/13/22 1744

## 2022-10-13 NOTE — DISCHARGE INSTRUCTIONS
The x-ray of your upper arm and x-rays done earlier showed no evidence of acute brake.  You do have a break on your 4th rib.    Wound cares discussed.  Use antibiotic ointment daily.  Keep wounds clean.  Follow up in ER 12 days for suture removal of your low right knee.  Avoid bending your knee excessively so that the sutures and wound do not break open

## 2022-10-16 LAB — UA COMPLETE W REFLEX CULTURE PNL UR: ABNORMAL

## 2022-10-17 ENCOUNTER — TELEPHONE (OUTPATIENT)
Dept: EMERGENCY MEDICINE | Facility: HOSPITAL | Age: 87
End: 2022-10-17
Payer: MEDICARE

## 2022-10-17 NOTE — TELEPHONE ENCOUNTER
----- Message from ZURDO Becerra sent at 10/17/2022  9:03 AM CDT -----  Please call to check on the patient. He needs to come to the ED if he is not better

## 2022-11-21 RX ORDER — GABAPENTIN 100 MG/1
100 CAPSULE ORAL 3 TIMES DAILY
COMMUNITY
End: 2022-11-21 | Stop reason: SDUPTHER

## 2022-11-21 RX ORDER — GABAPENTIN 100 MG/1
100 CAPSULE ORAL 3 TIMES DAILY
Qty: 90 CAPSULE | Refills: 11 | Status: SHIPPED | OUTPATIENT
Start: 2022-11-21 | End: 2023-03-06 | Stop reason: SDUPTHER

## 2022-11-21 NOTE — TELEPHONE ENCOUNTER
[facility] health called would like to see if she could get some gabapentin called in for her burning bottom of feet at night called to [facility].

## 2022-11-23 ENCOUNTER — HOSPITAL ENCOUNTER (EMERGENCY)
Facility: HOSPITAL | Age: 87
Discharge: HOME | End: 2022-11-23
Payer: MEDICARE

## 2022-11-23 VITALS
BODY MASS INDEX: 22.45 KG/M2 | DIASTOLIC BLOOD PRESSURE: 75 MMHG | TEMPERATURE: 98 F | HEART RATE: 78 BPM | HEIGHT: 62 IN | WEIGHT: 122 LBS | SYSTOLIC BLOOD PRESSURE: 166 MMHG | RESPIRATION RATE: 18 BRPM | OXYGEN SATURATION: 96 %

## 2022-11-23 DIAGNOSIS — S41.119A SKIN TEAR OF UPPER ARM WITHOUT COMPLICATION, INITIAL ENCOUNTER: ICD-10-CM

## 2022-11-23 DIAGNOSIS — S61.412A SKIN TEAR OF HAND WITHOUT COMPLICATION, LEFT, INITIAL ENCOUNTER: ICD-10-CM
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
## 2022-11-23 DIAGNOSIS — M53.9 MULTILEVEL DEGENERATIVE DISC DISEASE: Primary | ICD-10-CM

## 2022-11-23 PROCEDURE — 63600175 PHARM REV CODE 636 W HCPCS: Performed by: NURSE PRACTITIONER

## 2022-11-23 PROCEDURE — 96372 THER/PROPH/DIAG INJ SC/IM: CPT | Performed by: NURSE PRACTITIONER

## 2022-11-23 PROCEDURE — 99284 EMERGENCY DEPT VISIT MOD MDM: CPT | Mod: ,,, | Performed by: NURSE PRACTITIONER

## 2022-11-23 PROCEDURE — 99285 EMERGENCY DEPT VISIT HI MDM: CPT | Mod: 25

## 2022-11-23 PROCEDURE — 99284 PR EMERGENCY DEPT VISIT,LEVEL IV: ICD-10-PCS | Mod: ,,, | Performed by: NURSE PRACTITIONER

## 2022-11-23 RX ORDER — KETOROLAC TROMETHAMINE 15 MG/ML
15 INJECTION, SOLUTION INTRAMUSCULAR; INTRAVENOUS
Status: COMPLETED | OUTPATIENT
Start: 2022-11-23 | End: 2022-11-23

## 2022-11-23 RX ORDER — ORPHENADRINE CITRATE 30 MG/ML
15 INJECTION INTRAMUSCULAR; INTRAVENOUS
Status: COMPLETED | OUTPATIENT
Start: 2022-11-23 | End: 2022-11-23

## 2022-11-23 RX ADMIN — ORPHENADRINE CITRATE 15 MG: 30 INJECTION INTRAMUSCULAR; INTRAVENOUS at 02:11

## 2022-11-23 RX ADMIN — KETOROLAC TROMETHAMINE 15 MG: 15 INJECTION, SOLUTION INTRAMUSCULAR; INTRAVENOUS at 02:11

## 2022-11-23 NOTE — ED PROVIDER NOTES
Encounter Date: 11/23/2022       History     Chief Complaint   Patient presents with    Fall     88 year old female presents to ED with complaint of pain to left buttock and skin tears status post fall. She states she was getting up from table and the cushion in her chair slipped causing her to fall onto the floor flat of her back. Endorses hitting her head on the floor. Denies LOC. Denies vision changes, nausea/vomiting    The history is provided by the patient and the EMS personnel. No  was used.   Fall  The accident occurred just prior to arrival. The fall occurred while standing (from a chair). She fell from an unknown height. She landed on A hard floor. There was no blood loss. The point of impact was the head and buttocks. Pain location: left buttock. The pain is at a severity of 7/10. She was Not ambulatory at the scene. There was No entrapment after the fall. There was No drug use involved in the accident. There was No alcohol use involved in the accident. Pertinent negatives include no visual change, no numbness, no bowel incontinence, no nausea, no vomiting, no headaches and no loss of consciousness. The symptoms are aggravated by pressure on the injury. She has tried nothing for the symptoms. The treatment provided no relief.   Review of patient's allergies indicates:   Allergen Reactions    Bactrim [sulfamethoxazole-trimethoprim]     Codeine     Levaquin [levofloxacin]      Past Medical History:   Diagnosis Date    Hypertension     Osteoporosis     Thyroid disorder      Past Surgical History:   Procedure Laterality Date    BLADDER SURGERY      HEMIARTHROPLASTY OF HIP Left 9/21/2021    Procedure: HEMIARTHROPLASTY, HIP;  Surgeon: Dequan Singer MD;  Location: [facility];  Service: Orthopedics;  Laterality: Left;    PARTIAL HIP ARTHROPLASTY Right     Dr Singer     Family History   Problem Relation Age of Onset    Heart disease Mother     Heart attack Mother     Bladder  Cancer Father     No Known Problems Sister     No Known Problems Brother     No Known Problems Maternal Grandmother     No Known Problems Maternal Grandfather     No Known Problems Paternal Grandmother     No Known Problems Paternal Grandfather      Social History     Tobacco Use    Smoking status: Former    Smokeless tobacco: Never   Substance Use Topics    Alcohol use: Never    Drug use: Never     Review of Systems   Gastrointestinal:  Negative for bowel incontinence, nausea and vomiting.   Musculoskeletal:  Positive for arthralgias and gait problem.   Skin:  Positive for wound. Negative for color change.   Neurological:  Negative for loss of consciousness, numbness and headaches.   All other systems reviewed and are negative.    Physical Exam     Initial Vitals [11/23/22 1254]   BP Pulse Resp Temp SpO2   (!) 164/76 82 18 98.3 °F (36.8 °C) 96 %      MAP       --         Physical Exam    Nursing note and vitals reviewed.  Constitutional: She appears well-developed and well-nourished.   HENT:   Head: Normocephalic and atraumatic.       Eyes: EOM are normal. Pupils are equal, round, and reactive to light.   Neck: Neck supple.   Normal range of motion.  Cardiovascular:  Normal rate and regular rhythm.           Pulmonary/Chest: She has no wheezes. She has no rhonchi.   Abdominal: Abdomen is soft. She exhibits no distension. There is no abdominal tenderness.   Musculoskeletal:         General: Tenderness present. No edema.      Cervical back: Normal range of motion and neck supple.        Legs:      Neurological: She is alert and oriented to person, place, and time. No cranial nerve deficit or sensory deficit.   Skin: Skin is warm and dry. Capillary refill takes 2 to 3 seconds.        Psychiatric: She has a normal mood and affect. Thought content normal.       Medical Screening Exam   See Full Note    ED Course   Procedures  Labs Reviewed - No data to display       Imaging Results              X-Ray Pelvis Routine AP  (Final result)  Result time 11/23/22 14:16:39      Final result by Dequan Torres MD (11/23/22 14:16:39)                   Impression:      No acute findings      Electronically signed by: Dequan Torres  Date:    11/23/2022  Time:    14:16               Narrative:    EXAMINATION:  XR PELVIS ROUTINE AP    CLINICAL HISTORY:  fall with injury;    TECHNIQUE:  AP view of the pelvis was performed.    COMPARISON:  Pelvic radiograph 02/01/2022    FINDINGS:  No acute fracture.  Postsurgical changes from bilateral hip replacements.  Appearance of the pelvis is similar prior.                                       X-Ray Lumbar Spine Ap And Lateral (Final result)  Result time 11/23/22 14:25:27      Final result by Sajan Mesa DO (11/23/22 14:25:27)                   Impression:      Mild compression fractures of L2, L3, L4 and L5 are redemonstrated and unchanged.    Scoliosis.    Moderate to severe multi-level degenerative change.      Electronically signed by: Sajan Mesa  Date:    11/23/2022  Time:    14:25               Narrative:    EXAMINATION:  XR LUMBAR SPINE AP AND LATERAL    CLINICAL HISTORY:  fall with injury;.    TECHNIQUE:  XR LUMBAR SPINE AP AND LATERAL    COMPARISON:  2021    FINDINGS:  Mild compression fractures of L2, L3, L4 and L5 are redemonstrated and unchanged.    Scoliosis.    Moderate to severe multi-level degenerative change.                                       CT Head Without Contrast (Final result)  Result time 11/23/22 13:58:17      Final result by Pranav Lamb II, MD (11/23/22 13:58:17)                   Impression:      No evidence of acute process or interval change.      Electronically signed by: Pranav Lamb  Date:    11/23/2022  Time:    13:58               Narrative:    EXAMINATION:  CT HEAD WITHOUT CONTRAST    CLINICAL HISTORY:  Head trauma, minor (Age >= 65y);    TECHNIQUE:  Axial CT imaging of the brain is performed without contrast with 3 mm increments.    CT dose reduction  technique used - Dose Rite and tube current modulation.    COMPARISON:  1 September 2021    FINDINGS:  No evidence of hemorrhage,  mass,  mass effect,  midline shift or acute infarct seen.  There is moderate to severe diffuse cerebral atrophy.  There are areas of decreased density seen within the white matter.  Otherwise the brain parenchyma attenuation and differentiation appears within normal limits. The ventricles and cisterns are normal in caliber.  No cranial or skull base abnormality is identified.                                       Medications   ketorolac injection 15 mg (15 mg Intramuscular Given 11/23/22 1453)   orphenadrine injection 15 mg (15 mg Intramuscular Given 11/23/22 1453)                       Clinical Impression:   Final diagnoses:  [M53.9] Multilevel degenerative disc disease (Primary)  [W19.XXXA] Fall, initial encounter  [S61.412A] Skin tear of hand without complication, left, initial encounter  [S41.119A] Skin tear of upper arm without complication, initial encounter      ED Disposition Condition    Discharge Stable          ED Prescriptions    None       Follow-up Information    None          ZURDO Garduno  11/23/22 1453       ZURDO Garduno  11/23/22 2232

## 2022-11-23 NOTE — ED TRIAGE NOTES
Pt slipped and fell from chair. C/o pain to buttocks, left hip and bilateral arms. Has skin tears to left arm and right upper arm. No LOC.

## 2022-12-08 ENCOUNTER — TELEPHONE (OUTPATIENT)
Dept: FAMILY MEDICINE | Facility: CLINIC | Age: 87
End: 2022-12-08
Payer: MEDICARE

## 2022-12-13 ENCOUNTER — HOSPITAL ENCOUNTER (EMERGENCY)
Facility: HOSPITAL | Age: 87
Discharge: HOME | End: 2022-12-13
Payer: MEDICARE

## 2022-12-13 VITALS
DIASTOLIC BLOOD PRESSURE: 88 MMHG | WEIGHT: 130 LBS | RESPIRATION RATE: 19 BRPM | HEIGHT: 62 IN | BODY MASS INDEX: 23.92 KG/M2 | SYSTOLIC BLOOD PRESSURE: 157 MMHG | OXYGEN SATURATION: 98 % | HEART RATE: 92 BPM | TEMPERATURE: 98 F

## 2022-12-13 DIAGNOSIS — R53.1 WEAKNESS: ICD-10-CM

## 2022-12-13 DIAGNOSIS — N39.0 URINARY TRACT INFECTION WITHOUT HEMATURIA, SITE UNSPECIFIED: Primary | ICD-10-CM

## 2022-12-13 LAB
ANION GAP SERPL CALCULATED.3IONS-SCNC: 14 MMOL/L (ref 7–16)
BACTERIA #/AREA URNS HPF: ABNORMAL /HPF
BASOPHILS # BLD AUTO: 0.12 K/UL (ref 0–0.2)
BASOPHILS NFR BLD AUTO: 1 % (ref 0–1)
BILIRUB UR QL STRIP: NEGATIVE
BUN SERPL-MCNC: 11 MG/DL (ref 7–18)
BUN/CREAT SERPL: 13 (ref 6–20)
CALCIUM SERPL-MCNC: 8.9 MG/DL (ref 8.5–10.1)
CHLORIDE SERPL-SCNC: 93 MMOL/L (ref 98–107)
CLARITY UR: CLEAR
CO2 SERPL-SCNC: 22 MMOL/L (ref 21–32)
COLOR UR: ABNORMAL
CREAT SERPL-MCNC: 0.86 MG/DL (ref 0.55–1.02)
DIFFERENTIAL METHOD BLD: ABNORMAL
EGFR (NO RACE VARIABLE) (RUSH/TITUS): 65 ML/MIN/1.73M²
EOSINOPHIL # BLD AUTO: 0.05 K/UL (ref 0–0.5)
EOSINOPHIL NFR BLD AUTO: 0.4 % (ref 1–4)
ERYTHROCYTE [DISTWIDTH] IN BLOOD BY AUTOMATED COUNT: 12.5 % (ref 11.5–14.5)
FLUAV AG UPPER RESP QL IA.RAPID: NEGATIVE
FLUBV AG UPPER RESP QL IA.RAPID: NEGATIVE
GLUCOSE SERPL-MCNC: 92 MG/DL (ref 74–106)
GLUCOSE UR STRIP-MCNC: NORMAL MG/DL
HCT VFR BLD AUTO: 36.2 % (ref 38–47)
HGB BLD-MCNC: 12.1 G/DL (ref 12–16)
IMM GRANULOCYTES # BLD AUTO: 0.35 K/UL (ref 0–0.04)
IMM GRANULOCYTES NFR BLD: 2.9 % (ref 0–0.4)
KETONES UR STRIP-SCNC: NEGATIVE MG/DL
LEUKOCYTE ESTERASE UR QL STRIP: ABNORMAL
LYMPHOCYTES # BLD AUTO: 1.28 K/UL (ref 1–4.8)
LYMPHOCYTES NFR BLD AUTO: 10.5 % (ref 27–41)
MCH RBC QN AUTO: 30.3 PG (ref 27–31)
MCHC RBC AUTO-ENTMCNC: 33.4 G/DL (ref 32–36)
MCV RBC AUTO: 90.7 FL (ref 80–96)
MONOCYTES # BLD AUTO: 0.64 K/UL (ref 0–0.8)
MONOCYTES NFR BLD AUTO: 5.3 % (ref 2–6)
MPC BLD CALC-MCNC: 8.7 FL (ref 9.4–12.4)
MUCOUS THREADS #/AREA URNS HPF: ABNORMAL /HPF
NEUTROPHILS # BLD AUTO: 9.75 K/UL (ref 1.8–7.7)
NEUTROPHILS NFR BLD AUTO: 79.9 % (ref 53–65)
NITRITE UR QL STRIP: POSITIVE
NRBC # BLD AUTO: 0 X10E3/UL
NRBC, AUTO (.00): 0 %
PH UR STRIP: 6.5 PH UNITS
PLATELET # BLD AUTO: 296 K/UL (ref 150–400)
POTASSIUM SERPL-SCNC: 4.3 MMOL/L (ref 3.5–5.1)
PROT UR QL STRIP: NEGATIVE
RBC # BLD AUTO: 3.99 M/UL (ref 4.2–5.4)
RBC # UR STRIP: ABNORMAL /UL
RBC #/AREA URNS HPF: ABNORMAL /HPF
SARS-COV+SARS-COV-2 AG RESP QL IA.RAPID: NEGATIVE
SODIUM SERPL-SCNC: 125 MMOL/L (ref 136–145)
SP GR UR STRIP: 1.01
SQUAMOUS #/AREA URNS LPF: ABNORMAL /LPF
TRICHOMONAS #/AREA URNS HPF: ABNORMAL /HPF
UROBILINOGEN UR STRIP-ACNC: NORMAL MG/DL
WBC # BLD AUTO: 12.19 K/UL (ref 4.5–11)
WBC #/AREA URNS HPF: ABNORMAL /HPF
YEAST #/AREA URNS HPF: ABNORMAL /HPF

## 2022-12-13 PROCEDURE — 81001 URINALYSIS AUTO W/SCOPE: CPT | Performed by: NURSE PRACTITIONER

## 2022-12-13 PROCEDURE — 99284 EMERGENCY DEPT VISIT MOD MDM: CPT | Mod: ,,, | Performed by: NURSE PRACTITIONER

## 2022-12-13 PROCEDURE — 87428 SARSCOV & INF VIR A&B AG IA: CPT | Performed by: NURSE PRACTITIONER

## 2022-12-13 PROCEDURE — 81003 URINALYSIS AUTO W/O SCOPE: CPT | Performed by: NURSE PRACTITIONER

## 2022-12-13 PROCEDURE — 87077 CULTURE AEROBIC IDENTIFY: CPT | Performed by: HOSPITALIST

## 2022-12-13 PROCEDURE — 25000003 PHARM REV CODE 250: Performed by: NURSE PRACTITIONER

## 2022-12-13 PROCEDURE — 63600175 PHARM REV CODE 636 W HCPCS: Performed by: NURSE PRACTITIONER

## 2022-12-13 PROCEDURE — 93010 EKG 12-LEAD: ICD-10-PCS | Mod: ,,, | Performed by: STUDENT IN AN ORGANIZED HEALTH CARE EDUCATION/TRAINING PROGRAM

## 2022-12-13 PROCEDURE — 96365 THER/PROPH/DIAG IV INF INIT: CPT

## 2022-12-13 PROCEDURE — 36415 COLL VENOUS BLD VENIPUNCTURE: CPT | Performed by: NURSE PRACTITIONER

## 2022-12-13 PROCEDURE — 93005 ELECTROCARDIOGRAM TRACING: CPT

## 2022-12-13 PROCEDURE — 93010 ELECTROCARDIOGRAM REPORT: CPT | Mod: ,,, | Performed by: STUDENT IN AN ORGANIZED HEALTH CARE EDUCATION/TRAINING PROGRAM

## 2022-12-13 PROCEDURE — 87186 SC STD MICRODIL/AGAR DIL: CPT | Performed by: HOSPITALIST

## 2022-12-13 PROCEDURE — 80048 BASIC METABOLIC PNL TOTAL CA: CPT | Performed by: NURSE PRACTITIONER

## 2022-12-13 PROCEDURE — 85025 COMPLETE CBC W/AUTO DIFF WBC: CPT | Performed by: NURSE PRACTITIONER

## 2022-12-13 PROCEDURE — 99285 EMERGENCY DEPT VISIT HI MDM: CPT | Mod: 25

## 2022-12-13 PROCEDURE — 99284 PR EMERGENCY DEPT VISIT,LEVEL IV: ICD-10-PCS | Mod: ,,, | Performed by: NURSE PRACTITIONER

## 2022-12-13 RX ORDER — LISINOPRIL 20 MG/1
20 TABLET ORAL DAILY
Qty: 30 TABLET | Refills: 0 | Status: ON HOLD | OUTPATIENT
Start: 2022-12-13 | End: 2022-12-29

## 2022-12-13 RX ORDER — CEFUROXIME AXETIL 500 MG/1
500 TABLET ORAL EVERY 12 HOURS
Qty: 20 TABLET | Refills: 0 | Status: ON HOLD | OUTPATIENT
Start: 2022-12-13 | End: 2022-12-29 | Stop reason: HOSPADM

## 2022-12-13 RX ORDER — ACETAMINOPHEN 325 MG/1
650 TABLET ORAL
Status: COMPLETED | OUTPATIENT
Start: 2022-12-13 | End: 2022-12-13

## 2022-12-13 RX ADMIN — SODIUM CHLORIDE 1000 ML: 9 INJECTION, SOLUTION INTRAVENOUS at 01:12

## 2022-12-13 RX ADMIN — ACETAMINOPHEN 650 MG: 325 TABLET ORAL at 01:12

## 2022-12-13 RX ADMIN — CEFTRIAXONE SODIUM 1 G: 1 INJECTION, POWDER, FOR SOLUTION INTRAMUSCULAR; INTRAVENOUS at 01:12

## 2022-12-13 NOTE — ED NOTES
Called to room per daughter, patient and daughter state that her blood pressure cuff always causes her to bruise significantly.  Blood pressure cuff removed and patients arm is bruised from her elbow to her armpit.  Per Yolanda RENNER, okay to leave blood pressures off unless patient has decline during stay.

## 2022-12-13 NOTE — ED TRIAGE NOTES
Presents to ED via EMS for c/o generalized weakness that has been ongoing for a few days, reports dysuria.

## 2022-12-13 NOTE — ED PROVIDER NOTES
Encounter Date: 12/13/2022       History     Chief Complaint   Patient presents with    Weakness     88 year old female presents to the emergency department to be evaluated for generalized weakness that began several days ago. She had a cbc and bmp by her home health nurse, and her lisinopril/hctz was discontinued 4 days ago because her sodium was 127. Her weakness has gotten worse over the last several days. Her sitter has also had a runny nose and cough for the last several days. She denies any runny nose, cough, fever, chills, nausea, vomiting, diarrhea, chest pain, abdominal pain. Denies any recent fall or head injury, but she has had a headache.    The history is provided by the patient.   Illness   The current episode started several days ago. Pertinent negatives include no fever, no abdominal pain, no constipation, no diarrhea, no nausea, no vomiting, no muscle aches, no neck pain, no neck stiffness, no cough, no shortness of breath, no URI, no wheezing and no rash.   Review of patient's allergies indicates:   Allergen Reactions    Bactrim [sulfamethoxazole-trimethoprim]     Codeine     Levaquin [levofloxacin]      Past Medical History:   Diagnosis Date    Hypertension     Osteoporosis     Thyroid disorder      Past Surgical History:   Procedure Laterality Date    BLADDER SURGERY      HEMIARTHROPLASTY OF HIP Left 9/21/2021    Procedure: HEMIARTHROPLASTY, HIP;  Surgeon: Dequan Singer MD;  Location: [facility];  Service: Orthopedics;  Laterality: Left;    PARTIAL HIP ARTHROPLASTY Right     Dr Singer     Family History   Problem Relation Age of Onset    Heart disease Mother     Heart attack Mother     Bladder Cancer Father     No Known Problems Sister     No Known Problems Brother     No Known Problems Maternal Grandmother     No Known Problems Maternal Grandfather     No Known Problems Paternal Grandmother     No Known Problems Paternal Grandfather      Social History     Tobacco Use    Smoking  status: Former    Smokeless tobacco: Never   Substance Use Topics    Alcohol use: Never    Drug use: Never     Review of Systems   Constitutional:  Negative for fever.   Respiratory:  Negative for cough, shortness of breath and wheezing.    Gastrointestinal:  Negative for abdominal pain, constipation, diarrhea, nausea and vomiting.   Musculoskeletal:  Negative for neck pain.   Skin:  Negative for rash.   All other systems reviewed and are negative.    Physical Exam     Initial Vitals [12/13/22 1055]   BP Pulse Resp Temp SpO2   (!) 146/69 83 (!) 22 98.4 °F (36.9 °C) 98 %      MAP       --         Physical Exam    Vitals reviewed.  Constitutional: She appears well-developed and well-nourished.   HENT:   Head: Normocephalic and atraumatic.   Eyes: EOM are normal. Pupils are equal, round, and reactive to light.   Neck: Neck supple.   Cardiovascular:  Normal rate and regular rhythm.           Pulmonary/Chest: Breath sounds normal.   Abdominal: Abdomen is soft. Bowel sounds are normal. She exhibits no distension and no mass. There is no abdominal tenderness. There is no rebound and no guarding.   Musculoskeletal:         General: Normal range of motion.      Cervical back: Neck supple.     Neurological: She is alert and oriented to person, place, and time. She has normal strength. No cranial nerve deficit or sensory deficit. GCS score is 15. GCS eye subscore is 4. GCS verbal subscore is 5. GCS motor subscore is 6.   Skin: Skin is warm and dry. Capillary refill takes less than 2 seconds.   Psychiatric: She has a normal mood and affect.       Medical Screening Exam   See Full Note    ED Course   Procedures  Labs Reviewed   BASIC METABOLIC PANEL - Abnormal; Notable for the following components:       Result Value    Sodium 125 (*)     Chloride 93 (*)     All other components within normal limits   URINALYSIS - Abnormal; Notable for the following components:    Leukocytes, UA Large (*)     Nitrites, UA Positive (*)     Blood,  UA Moderate (*)     All other components within normal limits   CBC WITH DIFFERENTIAL - Abnormal; Notable for the following components:    WBC 12.19 (*)     RBC 3.99 (*)     Hematocrit 36.2 (*)     MPV 8.7 (*)     Neutrophils % 79.9 (*)     Lymphocytes % 10.5 (*)     Eosinophils % 0.4 (*)     Immature Granulocytes % 2.9 (*)     Neutrophils, Abs 9.75 (*)     Immature Granulocytes, Absolute 0.35 (*)     All other components within normal limits   URINALYSIS, MICROSCOPIC - Abnormal; Notable for the following components:    WBC, UA 5-10 (*)     Bacteria, UA Few (*)     Yeast, UA Few (*)     Squamous Epithelial Cells, UA Few (*)     Mucus, UA Rare (*)     All other components within normal limits   SARS-COV2 (COVID) W/ FLU ANTIGEN - Normal    Narrative:     Negative SARS-CoV results should not be used as the sole basis for treatment or patient management decisions; negative results should be considered in the context of a patient's recent exposures, history and the presene of clinical signs and symptoms consistent with COVID-19.  Negative results should be treated as presumptive and confirmed by molecular assay, if necessary for patient management.   CBC W/ AUTO DIFFERENTIAL    Narrative:     The following orders were created for panel order CBC auto differential.  Procedure                               Abnormality         Status                     ---------                               -----------         ------                     CBC with Differential[[id number]]        Abnormal            Final result                 Please view results for these tests on the individual orders.          Imaging Results              CT Head Without Contrast (Final result)  Result time 12/13/22 14:07:37      Final result by Dequan Torres MD (12/13/22 14:07:37)                   Impression:      Atrophy and chronic microvascular ischemia.  No CT evidence of acute intracranial abnormality.      Electronically signed by: Dequan  Ray  Date:    12/13/2022  Time:    14:07               Narrative:    EXAMINATION:  CT HEAD WITHOUT CONTRAST    CLINICAL HISTORY:  Headache, new or worsening (Age >= 50y);    TECHNIQUE:  Low dose axial images were obtained through the head.  Coronal and sagittal reformations were also performed. Contrast was not administered.    COMPARISON:  11/23/2022    FINDINGS:  Global parenchymal volume loss and chronic microvascular ischemic changes are again seen similar to prior.  There is some artifact of the posterior parietal region from external metallic objects overlying the patient.  No acute large vessel infarct is evident by this exam.  No midline shift.  No herniation.  No acute hemorrhage.  Calvarium intact.  Vascular calcifications.                                       Medications   sodium chloride 0.9% bolus 1,000 mL 1,000 mL (1,000 mLs Intravenous New Bag 12/13/22 1308)   acetaminophen tablet 650 mg (650 mg Oral Given 12/13/22 1312)   cefTRIAXone (ROCEPHIN) 1 g in dextrose 5 % in water (D5W) 5 % 50 mL IVPB (MB+) (0 g Intravenous Stopped 12/13/22 1341)                       Clinical Impression:   Final diagnoses:  [R53.1] Weakness  [N39.0] Urinary tract infection without hematuria, site unspecified (Primary)        ED Disposition Condition    Discharge Stable          ED Prescriptions       Medication Sig Dispense Start Date End Date Auth. Provider    lisinopriL (PRINIVIL,ZESTRIL) 20 MG tablet Take 1 tablet (20 mg total) by mouth once daily. 30 tablet 12/13/2022 1/12/2023 ZURDO Becerra    cefUROXime (CEFTIN) 500 MG tablet Take 1 tablet (500 mg total) by mouth every 12 (twelve) hours. for 10 days 20 tablet 12/13/2022 12/23/2022 ZURDO Becerra          Follow-up Information    None          ZURDO Becerra  12/13/22 5548

## 2022-12-13 NOTE — DISCHARGE INSTRUCTIONS
Do not take any more hydrochlorothiazide. Take antibiotics as directed. Take lisinopril as directed. Follow up with your primary care provider in 2 days. Return to the emergency department for any increase in symptoms or for any other new or worrisome symptoms.

## 2022-12-15 ENCOUNTER — TELEPHONE (OUTPATIENT)
Dept: EMERGENCY MEDICINE | Facility: HOSPITAL | Age: 87
End: 2022-12-15
Payer: MEDICARE

## 2022-12-16 ENCOUNTER — TELEPHONE (OUTPATIENT)
Dept: FAMILY MEDICINE | Facility: CLINIC | Age: 87
End: 2022-12-16
Payer: MEDICARE

## 2022-12-16 DIAGNOSIS — N30.00 ACUTE CYSTITIS WITHOUT HEMATURIA: Primary | ICD-10-CM

## 2022-12-16 RX ORDER — NITROFURANTOIN 25; 75 MG/1; MG/1
100 CAPSULE ORAL 2 TIMES DAILY
Qty: 10 CAPSULE | Refills: 0 | Status: ON HOLD | OUTPATIENT
Start: 2022-12-16 | End: 2022-12-29

## 2022-12-16 NOTE — TELEPHONE ENCOUNTER
[facility] Wednesday with UTI given IV antibiotics and sent home with ceftin which is causing diarrhea what can we change her to? [facility].

## 2022-12-17 ENCOUNTER — HOSPITAL ENCOUNTER (INPATIENT)
Facility: HOSPITAL | Age: 87
LOS: 12 days | Discharge: NURSING, REHAB OR LONG-TERM CARE | DRG: 467 | End: 2022-12-29
Attending: EMERGENCY MEDICINE | Admitting: INTERNAL MEDICINE
Payer: MEDICARE

## 2022-12-17 DIAGNOSIS — Z96.649 PERI-PROSTHETIC FRACTURE AROUND PROSTHETIC HIP: ICD-10-CM

## 2022-12-17 DIAGNOSIS — S41.111A SKIN TEAR OF UPPER ARM WITHOUT COMPLICATION, RIGHT, INITIAL ENCOUNTER: ICD-10-CM

## 2022-12-17 DIAGNOSIS — S51.812A SKIN TEAR OF LEFT FOREARM WITHOUT COMPLICATION, INITIAL ENCOUNTER: ICD-10-CM

## 2022-12-17 DIAGNOSIS — M97.8XXA PERI-PROSTHETIC FRACTURE AROUND PROSTHETIC HIP: ICD-10-CM

## 2022-12-17 DIAGNOSIS — R01.1 HEART MURMUR: ICD-10-CM
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
## 2022-12-17 DIAGNOSIS — R07.9 CHEST PAIN: ICD-10-CM

## 2022-12-17 DIAGNOSIS — Z96.649 PERIPROSTHETIC FRACTURE OF HIP, INITIAL ENCOUNTER: Primary | ICD-10-CM

## 2022-12-17 DIAGNOSIS — S72.002A CLOSED FRACTURE OF LEFT HIP, INITIAL ENCOUNTER: ICD-10-CM

## 2022-12-17 DIAGNOSIS — M97.8XXA PERIPROSTHETIC FRACTURE OF HIP, INITIAL ENCOUNTER: Primary | ICD-10-CM

## 2022-12-17 PROBLEM — N30.00 ACUTE CYSTITIS WITHOUT HEMATURIA: Status: ACTIVE | Noted: 2022-12-17

## 2022-12-17 LAB
ALBUMIN SERPL BCP-MCNC: 3.7 G/DL (ref 3.5–5)
ALBUMIN/GLOB SERPL: 1.4 {RATIO}
ALP SERPL-CCNC: 48 U/L (ref 55–142)
ALT SERPL W P-5'-P-CCNC: 17 U/L (ref 13–56)
ANION GAP SERPL CALCULATED.3IONS-SCNC: 14 MMOL/L (ref 7–16)
APTT PPP: 24.2 SECONDS (ref 25.2–37.3)
AST SERPL W P-5'-P-CCNC: 19 U/L (ref 15–37)
BASOPHILS # BLD AUTO: 0.11 K/UL (ref 0–0.2)
BASOPHILS NFR BLD AUTO: 0.9 % (ref 0–1)
BILIRUB SERPL-MCNC: 0.4 MG/DL (ref ?–1.2)
BUN SERPL-MCNC: 9 MG/DL (ref 7–18)
BUN/CREAT SERPL: 9 (ref 6–20)
CALCIUM SERPL-MCNC: 9.5 MG/DL (ref 8.5–10.1)
CHLORIDE SERPL-SCNC: 99 MMOL/L (ref 98–107)
CO2 SERPL-SCNC: 27 MMOL/L (ref 21–32)
CREAT SERPL-MCNC: 0.95 MG/DL (ref 0.55–1.02)
DIFFERENTIAL METHOD BLD: ABNORMAL
EGFR (NO RACE VARIABLE) (RUSH/TITUS): 58 ML/MIN/1.73M²
EOSINOPHIL # BLD AUTO: 0.1 K/UL (ref 0–0.5)
EOSINOPHIL NFR BLD AUTO: 0.9 % (ref 1–4)
ERYTHROCYTE [DISTWIDTH] IN BLOOD BY AUTOMATED COUNT: 12.6 % (ref 11.5–14.5)
GLOBULIN SER-MCNC: 2.7 G/DL (ref 2–4)
GLUCOSE SERPL-MCNC: 92 MG/DL (ref 74–106)
HCT VFR BLD AUTO: 34.8 % (ref 38–47)
HGB BLD-MCNC: 11.4 G/DL (ref 12–16)
IMM GRANULOCYTES # BLD AUTO: 0.25 K/UL (ref 0–0.04)
IMM GRANULOCYTES NFR BLD: 2.1 % (ref 0–0.4)
INDIRECT COOMBS: NORMAL
INR BLD: 1.07
LYMPHOCYTES # BLD AUTO: 2.59 K/UL (ref 1–4.8)
LYMPHOCYTES NFR BLD AUTO: 22.3 % (ref 27–41)
MCH RBC QN AUTO: 30.6 PG (ref 27–31)
MCHC RBC AUTO-ENTMCNC: 32.8 G/DL (ref 32–36)
MCV RBC AUTO: 93.5 FL (ref 80–96)
MONOCYTES # BLD AUTO: 0.83 K/UL (ref 0–0.8)
MONOCYTES NFR BLD AUTO: 7.1 % (ref 2–6)
MPC BLD CALC-MCNC: 8.8 FL (ref 9.4–12.4)
NEUTROPHILS # BLD AUTO: 7.76 K/UL (ref 1.8–7.7)
NEUTROPHILS NFR BLD AUTO: 66.7 % (ref 53–65)
NRBC # BLD AUTO: 0 X10E3/UL
NRBC, AUTO (.00): 0 %
PLATELET # BLD AUTO: 306 K/UL (ref 150–400)
POTASSIUM SERPL-SCNC: 4.2 MMOL/L (ref 3.5–5.1)
PROT SERPL-MCNC: 6.4 G/DL (ref 6.4–8.2)
PROTHROMBIN TIME: 13.5 SECONDS (ref 11.7–14.7)
RBC # BLD AUTO: 3.72 M/UL (ref 4.2–5.4)
RH BLD: NORMAL
SODIUM SERPL-SCNC: 136 MMOL/L (ref 136–145)
TROPONIN I SERPL HS-MCNC: 21.6 PG/ML
WBC # BLD AUTO: 11.64 K/UL (ref 4.5–11)

## 2022-12-17 PROCEDURE — 25000003 PHARM REV CODE 250: Performed by: INTERNAL MEDICINE

## 2022-12-17 PROCEDURE — 84484 ASSAY OF TROPONIN QUANT: CPT | Performed by: INTERNAL MEDICINE

## 2022-12-17 PROCEDURE — 93010 EKG 12-LEAD: ICD-10-PCS | Mod: ,,, | Performed by: INTERNAL MEDICINE

## 2022-12-17 PROCEDURE — 11000001 HC ACUTE MED/SURG PRIVATE ROOM

## 2022-12-17 PROCEDURE — 36415 COLL VENOUS BLD VENIPUNCTURE: CPT | Performed by: EMERGENCY MEDICINE

## 2022-12-17 PROCEDURE — 85610 PROTHROMBIN TIME: CPT | Performed by: EMERGENCY MEDICINE

## 2022-12-17 PROCEDURE — 96374 THER/PROPH/DIAG INJ IV PUSH: CPT

## 2022-12-17 PROCEDURE — 93005 ELECTROCARDIOGRAM TRACING: CPT

## 2022-12-17 PROCEDURE — 99285 EMERGENCY DEPT VISIT HI MDM: CPT | Mod: 25

## 2022-12-17 PROCEDURE — 85730 THROMBOPLASTIN TIME PARTIAL: CPT | Performed by: EMERGENCY MEDICINE

## 2022-12-17 PROCEDURE — 63600175 PHARM REV CODE 636 W HCPCS: Performed by: EMERGENCY MEDICINE

## 2022-12-17 PROCEDURE — 93010 ELECTROCARDIOGRAM REPORT: CPT | Mod: ,,, | Performed by: INTERNAL MEDICINE

## 2022-12-17 PROCEDURE — 86900 BLOOD TYPING SEROLOGIC ABO: CPT | Performed by: EMERGENCY MEDICINE

## 2022-12-17 PROCEDURE — 96376 TX/PRO/DX INJ SAME DRUG ADON: CPT

## 2022-12-17 PROCEDURE — 63600175 PHARM REV CODE 636 W HCPCS: Performed by: INTERNAL MEDICINE

## 2022-12-17 PROCEDURE — 99285 PR EMERGENCY DEPT VISIT,LEVEL V: ICD-10-PCS | Mod: ,,, | Performed by: EMERGENCY MEDICINE

## 2022-12-17 PROCEDURE — 99285 EMERGENCY DEPT VISIT HI MDM: CPT | Mod: ,,, | Performed by: EMERGENCY MEDICINE

## 2022-12-17 PROCEDURE — 96375 TX/PRO/DX INJ NEW DRUG ADDON: CPT

## 2022-12-17 PROCEDURE — 99222 PR INITIAL HOSPITAL CARE,LEVL II: ICD-10-PCS | Mod: AI,,, | Performed by: INTERNAL MEDICINE

## 2022-12-17 PROCEDURE — 85025 COMPLETE CBC W/AUTO DIFF WBC: CPT | Performed by: EMERGENCY MEDICINE

## 2022-12-17 PROCEDURE — 99222 1ST HOSP IP/OBS MODERATE 55: CPT | Mod: AI,,, | Performed by: INTERNAL MEDICINE

## 2022-12-17 PROCEDURE — 80053 COMPREHEN METABOLIC PANEL: CPT | Performed by: EMERGENCY MEDICINE

## 2022-12-17 RX ORDER — TALC
6 POWDER (GRAM) TOPICAL NIGHTLY PRN
Status: DISCONTINUED | OUTPATIENT
Start: 2022-12-17 | End: 2022-12-29 | Stop reason: HOSPADM

## 2022-12-17 RX ORDER — HYDROMORPHONE HYDROCHLORIDE 2 MG/ML
1 INJECTION, SOLUTION INTRAMUSCULAR; INTRAVENOUS; SUBCUTANEOUS EVERY 6 HOURS PRN
Status: DISCONTINUED | OUTPATIENT
Start: 2022-12-17 | End: 2022-12-19

## 2022-12-17 RX ORDER — SODIUM CHLORIDE 0.9 % (FLUSH) 0.9 %
10 SYRINGE (ML) INJECTION
Status: DISCONTINUED | OUTPATIENT
Start: 2022-12-17 | End: 2022-12-29 | Stop reason: HOSPADM

## 2022-12-17 RX ORDER — CEFUROXIME AXETIL 250 MG/1
500 TABLET ORAL EVERY 12 HOURS
Status: DISCONTINUED | OUTPATIENT
Start: 2022-12-17 | End: 2022-12-19

## 2022-12-17 RX ORDER — LEVOTHYROXINE SODIUM 125 UG/1
125 TABLET ORAL
Status: DISCONTINUED | OUTPATIENT
Start: 2022-12-18 | End: 2022-12-29 | Stop reason: HOSPADM

## 2022-12-17 RX ORDER — FENTANYL CITRATE 50 UG/ML
25 INJECTION, SOLUTION INTRAMUSCULAR; INTRAVENOUS
Status: COMPLETED | OUTPATIENT
Start: 2022-12-17 | End: 2022-12-17

## 2022-12-17 RX ORDER — ACETAMINOPHEN 325 MG/1
650 TABLET ORAL EVERY 4 HOURS PRN
Status: DISCONTINUED | OUTPATIENT
Start: 2022-12-17 | End: 2022-12-18

## 2022-12-17 RX ORDER — SODIUM,POTASSIUM PHOSPHATES 280-250MG
2 POWDER IN PACKET (EA) ORAL
Status: DISCONTINUED | OUTPATIENT
Start: 2022-12-17 | End: 2022-12-29 | Stop reason: HOSPADM

## 2022-12-17 RX ORDER — LANOLIN ALCOHOL/MO/W.PET/CERES
800 CREAM (GRAM) TOPICAL
Status: DISCONTINUED | OUTPATIENT
Start: 2022-12-17 | End: 2022-12-29 | Stop reason: HOSPADM

## 2022-12-17 RX ORDER — MORPHINE SULFATE 2 MG/ML
2 INJECTION, SOLUTION INTRAMUSCULAR; INTRAVENOUS
Status: COMPLETED | OUTPATIENT
Start: 2022-12-17 | End: 2022-12-17

## 2022-12-17 RX ORDER — ACETAMINOPHEN 500 MG
1000 TABLET ORAL EVERY 8 HOURS PRN
Status: DISCONTINUED | OUTPATIENT
Start: 2022-12-17 | End: 2022-12-18

## 2022-12-17 RX ORDER — GABAPENTIN 100 MG/1
100 CAPSULE ORAL 3 TIMES DAILY
Status: DISCONTINUED | OUTPATIENT
Start: 2022-12-17 | End: 2022-12-18

## 2022-12-17 RX ORDER — HYDROMORPHONE HYDROCHLORIDE 2 MG/ML
0.5 INJECTION, SOLUTION INTRAMUSCULAR; INTRAVENOUS; SUBCUTANEOUS EVERY 6 HOURS PRN
Status: DISCONTINUED | OUTPATIENT
Start: 2022-12-17 | End: 2022-12-17

## 2022-12-17 RX ORDER — POLYETHYLENE GLYCOL 3350 17 G/17G
17 POWDER, FOR SOLUTION ORAL DAILY
Status: DISCONTINUED | OUTPATIENT
Start: 2022-12-17 | End: 2022-12-26

## 2022-12-17 RX ORDER — INSULIN ASPART 100 [IU]/ML
0-5 INJECTION, SOLUTION INTRAVENOUS; SUBCUTANEOUS
Status: DISCONTINUED | OUTPATIENT
Start: 2022-12-17 | End: 2022-12-29 | Stop reason: HOSPADM

## 2022-12-17 RX ORDER — PANTOPRAZOLE SODIUM 40 MG/1
40 TABLET, DELAYED RELEASE ORAL DAILY
Status: DISCONTINUED | OUTPATIENT
Start: 2022-12-17 | End: 2022-12-29 | Stop reason: HOSPADM

## 2022-12-17 RX ORDER — HYDROMORPHONE HYDROCHLORIDE 2 MG/ML
0.5 INJECTION, SOLUTION INTRAMUSCULAR; INTRAVENOUS; SUBCUTANEOUS
Status: COMPLETED | OUTPATIENT
Start: 2022-12-17 | End: 2022-12-17

## 2022-12-17 RX ORDER — ENOXAPARIN SODIUM 100 MG/ML
40 INJECTION SUBCUTANEOUS EVERY 24 HOURS
Status: DISCONTINUED | OUTPATIENT
Start: 2022-12-17 | End: 2022-12-26 | Stop reason: SDUPTHER

## 2022-12-17 RX ORDER — ALPRAZOLAM 0.25 MG/1
1 TABLET ORAL 2 TIMES DAILY
Status: DISCONTINUED | OUTPATIENT
Start: 2022-12-17 | End: 2022-12-18

## 2022-12-17 RX ORDER — GLUCAGON 1 MG
1 KIT INJECTION
Status: DISCONTINUED | OUTPATIENT
Start: 2022-12-17 | End: 2022-12-29 | Stop reason: HOSPADM

## 2022-12-17 RX ORDER — NALOXONE HCL 0.4 MG/ML
0.02 VIAL (ML) INJECTION
Status: DISCONTINUED | OUTPATIENT
Start: 2022-12-17 | End: 2022-12-29 | Stop reason: HOSPADM

## 2022-12-17 RX ORDER — HYDROCODONE BITARTRATE AND ACETAMINOPHEN 7.5; 325 MG/1; MG/1
1 TABLET ORAL EVERY 6 HOURS PRN
Status: DISCONTINUED | OUTPATIENT
Start: 2022-12-17 | End: 2022-12-18

## 2022-12-17 RX ADMIN — HYDROMORPHONE HYDROCHLORIDE 1 MG: 2 INJECTION, SOLUTION INTRAMUSCULAR; INTRAVENOUS; SUBCUTANEOUS at 08:12

## 2022-12-17 RX ADMIN — MORPHINE SULFATE 2 MG: 2 INJECTION, SOLUTION INTRAMUSCULAR; INTRAVENOUS at 10:12

## 2022-12-17 RX ADMIN — CEFUROXIME AXETIL 500 MG: 250 TABLET, FILM COATED ORAL at 08:12

## 2022-12-17 RX ADMIN — FENTANYL CITRATE 25 MCG: 50 INJECTION, SOLUTION INTRAMUSCULAR; INTRAVENOUS at 07:12

## 2022-12-17 RX ADMIN — GABAPENTIN 100 MG: 100 CAPSULE ORAL at 09:12

## 2022-12-17 RX ADMIN — MORPHINE SULFATE 2 MG: 2 INJECTION, SOLUTION INTRAMUSCULAR; INTRAVENOUS at 09:12

## 2022-12-17 RX ADMIN — PANTOPRAZOLE SODIUM 40 MG: 40 TABLET, DELAYED RELEASE ORAL at 04:12

## 2022-12-17 RX ADMIN — GABAPENTIN 100 MG: 100 CAPSULE ORAL at 04:12

## 2022-12-17 RX ADMIN — HYDROMORPHONE HYDROCHLORIDE 0.5 MG: 2 INJECTION INTRAMUSCULAR; INTRAVENOUS; SUBCUTANEOUS at 01:12

## 2022-12-17 RX ADMIN — ENOXAPARIN SODIUM 40 MG: 40 INJECTION SUBCUTANEOUS at 04:12

## 2022-12-17 RX ADMIN — HYDROCODONE BITARTRATE AND ACETAMINOPHEN 1 TABLET: 7.5; 325 TABLET ORAL at 04:12

## 2022-12-17 RX ADMIN — ALPRAZOLAM 1 MG: 0.25 TABLET ORAL at 08:12

## 2022-12-17 RX ADMIN — HYDROMORPHONE HYDROCHLORIDE 0.5 MG: 2 INJECTION INTRAMUSCULAR; INTRAVENOUS; SUBCUTANEOUS at 11:12

## 2022-12-17 NOTE — ED PROVIDER NOTES
Encounter Date: 12/17/2022       History     Chief Complaint   Patient presents with    Fall    Hip Pain     Patient arrives by EMS complaining of left hip pain after a fall approximately an hour prior to arrival.  Shear left leg is shortened and rotated.  She also complains of skin tears to the left upper extremity.  However she denies hitting her head or headache or neck pain.  No back pain or chest pain.  Patient has had multiple falls over the past couple months.  She was given Zofran 4 mg and fentanyl 50 mg EN route.  Pain is moderate.  Worse with movement.    Review of patient's allergies indicates:   Allergen Reactions    Bactrim [sulfamethoxazole-trimethoprim]     Codeine     Levaquin [levofloxacin]      Past Medical History:   Diagnosis Date    Hypertension     Osteoporosis     Thyroid disorder      Past Surgical History:   Procedure Laterality Date    BLADDER SURGERY      HEMIARTHROPLASTY OF HIP Left 9/21/2021    Procedure: HEMIARTHROPLASTY, HIP;  Surgeon: Dequan Singer MD;  Location: [facility];  Service: Orthopedics;  Laterality: Left;    PARTIAL HIP ARTHROPLASTY Right     Dr Singer     Family History   Problem Relation Age of Onset    Heart disease Mother     Heart attack Mother     Bladder Cancer Father     No Known Problems Sister     No Known Problems Brother     No Known Problems Maternal Grandmother     No Known Problems Maternal Grandfather     No Known Problems Paternal Grandmother     No Known Problems Paternal Grandfather      Social History     Tobacco Use    Smoking status: Former    Smokeless tobacco: Never   Substance Use Topics    Alcohol use: Never    Drug use: Never     Review of Systems   Constitutional:  Negative for fever.   HENT:  Negative for sore throat.    Respiratory:  Negative for shortness of breath.    Cardiovascular:  Negative for chest pain.   Gastrointestinal:  Negative for nausea.   Genitourinary:  Negative for dysuria.   Musculoskeletal:  Negative for  back pain.   Skin:  Negative for rash.   Neurological:  Negative for weakness.   Hematological:  Does not bruise/bleed easily.     Physical Exam     Initial Vitals   BP Pulse Resp Temp SpO2   12/17/22 0740 12/17/22 0740 12/17/22 0735 12/17/22 0820 12/17/22 0755   (!) 165/81 73 20 98.3 °F (36.8 °C) 95 %      MAP       --                Physical Exam    Nursing note and vitals reviewed.  Constitutional: She appears well-developed and well-nourished.   HENT:   Head: Normocephalic and atraumatic.   Eyes: EOM are normal. Pupils are equal, round, and reactive to light.   Neck: Neck supple. No thyromegaly present.   Normal range of motion.  Cardiovascular:  Normal rate, regular rhythm, normal heart sounds and intact distal pulses.           No murmur heard.  Pulmonary/Chest: Breath sounds normal. No respiratory distress. She has no wheezes.   Abdominal: Abdomen is soft. Bowel sounds are normal. She exhibits no distension. There is no abdominal tenderness.   Musculoskeletal:         General: No tenderness or edema. Normal range of motion.      Cervical back: Normal range of motion and neck supple.      Comments: Left hip is tender left leg shortened rotated externally.  Neurovascularly intact     Lymphadenopathy:     She has no cervical adenopathy.   Neurological: She is alert and oriented to person, place, and time. She has normal strength. No cranial nerve deficit or sensory deficit.   Skin: Skin is warm and dry. No rash noted.   Proximally 20 cm skin tears on the left upper arm and left forearm that should be suitable to have Steri-Strips and not suitable for sutures due to the shallow nature.   Psychiatric: She has a normal mood and affect.       Medical Screening Exam   See Full Note    ED Course   Procedures  Labs Reviewed   COMPREHENSIVE METABOLIC PANEL - Abnormal; Notable for the following components:       Result Value    Alk Phos 48 (*)     eGFR 58 (*)     All other components within normal limits   APTT -  Abnormal; Notable for the following components:    PTT 24.2 (*)     All other components within normal limits   CBC WITH DIFFERENTIAL - Abnormal; Notable for the following components:    WBC 11.64 (*)     RBC 3.72 (*)     Hemoglobin 11.4 (*)     Hematocrit 34.8 (*)     MPV 8.8 (*)     Neutrophils % 66.7 (*)     Lymphocytes % 22.3 (*)     Monocytes % 7.1 (*)     Eosinophils % 0.9 (*)     Immature Granulocytes % 2.1 (*)     Neutrophils, Abs 7.76 (*)     Monocytes, Absolute 0.83 (*)     Immature Granulocytes, Absolute 0.25 (*)     All other components within normal limits   PROTIME-INR - Normal   CBC W/ AUTO DIFFERENTIAL    Narrative:     The following orders were created for panel order CBC auto differential.  Procedure                               Abnormality         Status                     ---------                               -----------         ------                     CBC with Differential[[id number]]        Abnormal            Final result                 Please view results for these tests on the individual orders.   TYPE & SCREEN          Imaging Results              CT Head Without Contrast (Final result)  Result time 12/17/22 10:29:58      Final result by Pranav Lamb II, MD (12/17/22 10:29:58)                   Impression:      No evidence of acute process or interval change.      Electronically signed by: Pranav Lamb  Date:    12/17/2022  Time:    10:29               Narrative:    EXAMINATION:  CT HEAD WITHOUT CONTRAST    CLINICAL HISTORY:  Head trauma, moderate-severe;    TECHNIQUE:  Axial CT imaging of the brain is performed without contrast with 3 mm increments.    CT dose reduction technique used - Dose Rite and tube current modulation.    COMPARISON:  13 December 2022    FINDINGS:  No evidence of hemorrhage,  mass,  mass effect,  midline shift or acute infarct seen.  There is moderate diffuse cerebral atrophy.  There are areas of decreased density seen within the white matter.   Otherwise the brain parenchyma attenuation and differentiation appears within normal limits. The ventricles and cisterns are normal in caliber.  No cranial or skull base abnormality is identified.                                       CT Cervical Spine Without Contrast (Final result)  Result time 12/17/22 10:33:57      Final result by Pranav Lamb II, MD (12/17/22 10:33:57)                   Impression:      No evidence of acute injury demonstrated      Electronically signed by: Pranav Lamb  Date:    12/17/2022  Time:    10:33               Narrative:    EXAMINATION:  CT CERVICAL SPINE WITHOUT CONTRAST    CLINICAL HISTORY:  Neck trauma (Age >= 65y);    TECHNIQUE:  Axial CT imaging of the cervical spine is performed without contrast.  Computer reformatting is viewed in the sagittal and coronal planes.    CT dose reduction technique used - Dose Rite and tube current modulation.    COMPARISON:  None available    FINDINGS:  No fracture is seen.  Alignment of the cervical spine is within normal limits.  Vertebral body heights are normal.  Multilevel degenerative changes present.  No other abnormality is demonstrated.                                       X-Ray Femur AP/LAT Left (Final result)  Result time 12/17/22 09:26:17      Final result by Edi Mccullough MD (12/17/22 09:26:17)                   Impression:      As above.    Place of service: [facility]      Electronically signed by: Edi Mccullough  Date:    12/17/2022  Time:    09:26               Narrative:    EXAMINATION:  XR femur left two views    CLINICAL HISTORY:  Left femur pain, fracture    COMPARISON:  Prior radiograph 03/17/2022    FINDINGS:  There is an acute mildly comminuted proximal left femur fracture with no evidence of hip dislocation. Left hip arthroplasty hardware is noted in place at the level of the fracture. No suspicious osseous or soft tissue lesions are identified. Osteopenia is noted.                                        X-Ray Chest 1 View (Final result)  Result time 12/17/22 08:51:42      Final result by Edi Mccullough MD (12/17/22 08:51:42)                   Impression:      No acute cardiopulmonary process.    Place of service: [facility]      Electronically signed by: Edi Mccullough  Date:    12/17/2022  Time:    08:51               Narrative:    EXAMINATION:  XR CHEST 1 VIEW    CLINICAL HISTORY:  Unspecified fall, initial encounter    COMPARISON:  Prior radiograph 10/13/2022    FINDINGS:  The cardiomediastinal silhouette is within normal limits. The lungs are predominantly clear with at least mild underlying interstitial scarring changes suggested.  There is no pneumothorax or pleural effusion.    There is no acute osseous or soft tissue abnormality.                                       X-Ray Hip 2 or 3 views Left (with Pelvis when performed) (Final result)  Result time 12/17/22 09:11:05      Final result by Edi Mccullough MD (12/17/22 09:11:05)                   Impression:      As above.    Place of service: [facility]      Electronically signed by: Edi Mccullough  Date:    12/17/2022  Time:    09:11               Narrative:    EXAMINATION:  XR hip, pelvis left with three views    CLINICAL HISTORY:  Fall, pain    COMPARISON:  02/01/2022 radiograph    FINDINGS:  Bilateral hip arthroplasty hardware is in place.  There is an acute mildly comminuted fracture of the left hip joint associated with the left hip hardware.  The hardware however appears intact with no evidence of dislocation at the hip joint.  Osseous pelvis is grossly intact as visualized.  Somewhat prominent osteopenia is noted.  Old pelvic fractures are suggested bilaterally.  No suspicious osseous lesions.  No focal soft tissue abnormality otherwise identified.                                       Medications   HYDROmorphone (PF) injection 0.5 mg (has no administration in time range)   fentaNYL injection 25 mcg  (25 mcg Intravenous Given 12/17/22 0735)   morphine injection 2 mg (2 mg Intravenous Given 12/17/22 0901)   morphine injection 2 mg (2 mg Intravenous Given 12/17/22 1038)                 ED Course as of 12/17/22 1135   Sat Dec 17, 2022   0902 Also skin tear right upper arm a proximally 4 cm.  Assisted RNs and reapproximating the skin tears on both her arms [PK]   1028 CT head image reviewed no gross abnormality per ED read waiting for radiologist read. [PK]      ED Course User Index  [PK] Edi Taylor MD          Clinical Impression:   Final diagnoses:  [W19.XXXA] Fall  [M97.8XXA, Z96.649] Periprosthetic fracture of hip, initial encounter (Primary)  [S72.002A] Closed fracture of left hip, initial encounter  [S51.812A] Skin tear of left forearm without complication, initial encounter  [S41.111A] Skin tear of upper arm without complication, right, initial encounter        ED Disposition Condition    Admit Stable                Edi Taylor MD  12/17/22 1133

## 2022-12-17 NOTE — SUBJECTIVE & OBJECTIVE
Past Medical History:   Diagnosis Date    Hypertension     Osteoporosis     Thyroid disorder        Past Surgical History:   Procedure Laterality Date    BLADDER SURGERY      HEMIARTHROPLASTY OF HIP Left 9/21/2021    Procedure: HEMIARTHROPLASTY, HIP;  Surgeon: Dequan Singer MD;  Location: [facility];  Service: Orthopedics;  Laterality: Left;    PARTIAL HIP ARTHROPLASTY Right     Dr Singer       Review of patient's allergies indicates:   Allergen Reactions    Bactrim [sulfamethoxazole-trimethoprim]     Codeine     Levaquin [levofloxacin]        No current facility-administered medications on file prior to encounter.     Current Outpatient Medications on File Prior to Encounter   Medication Sig    ALPRAZolam (XANAX) 1 MG tablet Take 1 tablet (1 mg total) by mouth 2 (two) times daily.    cefUROXime (CEFTIN) 500 MG tablet Take 1 tablet (500 mg total) by mouth every 12 (twelve) hours. for 10 days    diclofenac sodium (VOLTAREN) 1 % Gel Apply 2 g topically 2 (two) times daily.    diltiaZEM (CARDIZEM CD) 120 MG Cp24 Take 1 capsule (120 mg total) by mouth once daily.    famotidine (PEPCID) 20 MG tablet Take 1 tablet (20 mg total) by mouth once daily.    gabapentin (NEURONTIN) 100 MG capsule Take 1 capsule (100 mg total) by mouth 3 (three) times daily.    HYDROcodone-acetaminophen (NORCO) 5-325 mg per tablet Take 1 tablet by mouth every 6 (six) hours as needed for Pain.    HYDROcodone-acetaminophen (NORCO) 7.5-325 mg per tablet Take 1 tablet by mouth every 8 (eight) hours as needed for Pain.    levothyroxine (SYNTHROID) 125 MCG tablet Take 1 tablet (125 mcg total) by mouth before breakfast.    lisinopriL (PRINIVIL,ZESTRIL) 20 MG tablet Take 1 tablet (20 mg total) by mouth once daily.    lisinopriL 10 MG tablet Take 1 tablet (10 mg total) by mouth once daily. (Patient not taking: Reported on 7/12/2022)    lisinopriL-hydrochlorothiazide (PRINZIDE,ZESTORETIC) 20-12.5 mg per tablet Take 1 tablet by mouth once  daily.    mupirocin (BACTROBAN) 2 % ointment Apply topically 3 (three) times daily. (Patient not taking: Reported on 7/12/2022)    nitrofurantoin, macrocrystal-monohydrate, (MACROBID) 100 MG capsule Take 1 capsule (100 mg total) by mouth 2 (two) times daily.    pantoprazole (PROTONIX) 40 MG tablet Take 1 tablet (40 mg total) by mouth once daily.    [DISCONTINUED] predniSONE (DELTASONE) 5 MG tablet Take 1 tablet (5 mg total) by mouth once daily.     Family History       Problem Relation (Age of Onset)    Bladder Cancer Father    Heart attack Mother    Heart disease Mother    No Known Problems Sister, Brother, Maternal Grandmother, Maternal Grandfather, Paternal Grandmother, Paternal Grandfather          Tobacco Use    Smoking status: Former    Smokeless tobacco: Never   Substance and Sexual Activity    Alcohol use: Never    Drug use: Never    Sexual activity: Not Currently     Review of Systems   Constitutional:  Negative for activity change, appetite change, fatigue and fever.   HENT:  Negative for congestion, rhinorrhea, sinus pressure, sneezing and sore throat.    Eyes:  Negative for itching.   Respiratory:  Negative for cough, chest tightness and shortness of breath.    Cardiovascular:  Negative for chest pain, palpitations and leg swelling.   Gastrointestinal:  Negative for abdominal pain, constipation, diarrhea, nausea and vomiting.   Endocrine: Negative for cold intolerance, heat intolerance, polydipsia, polyphagia and polyuria.   Genitourinary:  Negative for difficulty urinating, dysuria, frequency and hematuria.   Musculoskeletal:  Positive for arthralgias. Negative for back pain, joint swelling, myalgias, neck pain and neck stiffness.        Left hip pain   Skin:  Negative for wound.   Allergic/Immunologic: Negative for environmental allergies.   Neurological:  Positive for weakness. Negative for dizziness, syncope and headaches.   Hematological:  Does not bruise/bleed easily.   Psychiatric/Behavioral:   Negative for agitation, behavioral problems, confusion, decreased concentration and sleep disturbance.    All other systems reviewed and are negative.  Objective:     Vital Signs (Most Recent):  Temp: 98.3 °F (36.8 °C) (12/17/22 0820)  Pulse: 91 (12/17/22 1240)  Resp: 15 (12/17/22 1240)  BP: (!) 113/59 (12/17/22 1240)  SpO2: 97 % (12/17/22 1240)   Vital Signs (24h Range):  Temp:  [98.3 °F (36.8 °C)] 98.3 °F (36.8 °C)  Pulse:  [73-91] 91  Resp:  [13-20] 15  SpO2:  [95 %-98 %] 97 %  BP: (113-165)/(59-81) 113/59        Body mass index is 23.78 kg/m².    Physical Exam  Vitals and nursing note reviewed.   Constitutional:       Appearance: Normal appearance.   HENT:      Head: Normocephalic and atraumatic.      Right Ear: Tympanic membrane, ear canal and external ear normal.      Left Ear: Tympanic membrane, ear canal and external ear normal.      Nose: Nose normal.      Mouth/Throat:      Mouth: Mucous membranes are moist.      Pharynx: Oropharynx is clear.   Eyes:      Extraocular Movements: Extraocular movements intact.      Conjunctiva/sclera: Conjunctivae normal.      Pupils: Pupils are equal, round, and reactive to light.   Cardiovascular:      Rate and Rhythm: Normal rate and regular rhythm.      Pulses: Normal pulses.      Heart sounds: Murmur heard.   Pulmonary:      Effort: Pulmonary effort is normal.      Breath sounds: Normal breath sounds.   Abdominal:      General: Abdomen is flat. Bowel sounds are normal.      Palpations: Abdomen is soft.   Musculoskeletal:         General: Tenderness present. Normal range of motion.      Cervical back: Normal range of motion and neck supple.      Comments: Tenderness in the left hip area   Skin:     General: Skin is warm and dry.   Neurological:      General: No focal deficit present.      Mental Status: She is alert. Mental status is at baseline. She is disoriented.      Motor: Weakness present.   Psychiatric:         Mood and Affect: Mood normal.         Behavior:  Behavior normal.         Thought Content: Thought content normal.         Judgment: Judgment normal.       CRANIAL NERVES     CN III, IV, VI   Pupils are equal, round, and reactive to light.     Significant Labs: All pertinent labs within the past 24 hours have been reviewed.  BMP:   Recent Labs   Lab 12/17/22  0922   GLU 92      K 4.2   CL 99   CO2 27   BUN 9   CREATININE 0.95   CALCIUM 9.5     CBC:   Recent Labs   Lab 12/17/22  0804   WBC 11.64*   HGB 11.4*   HCT 34.8*        CMP:   Recent Labs   Lab 12/17/22  0922      K 4.2   CL 99   CO2 27   GLU 92   BUN 9   CREATININE 0.95   CALCIUM 9.5   PROT 6.4   ALBUMIN 3.7   BILITOT 0.4   ALKPHOS 48*   AST 19   ALT 17   ANIONGAP 14     Troponin: No results for input(s): TROPONINI, TROPONINIHS in the last 48 hours.    Significant Imaging: I have reviewed all pertinent imaging results/findings within the past 24 hours.

## 2022-12-17 NOTE — ED TRIAGE NOTES
Pt presents to the ED via EMS after a fall this morning. Pt states she was going to the bathroom and fell backwards. She now complains of left hip pain.

## 2022-12-17 NOTE — Clinical Note
Diagnosis: Fall [565969]   Admitting Provider:: LEANN DICK [[id number]]   Future Attending Provider: LEANN DICK [[id number]]   Reason for IP Medical Treatment  (Clinical interventions that can only be accomplished in the IP setting? ) :: treatment   Estimated Length of Stay:: 2 midnights   I certify that Inpatient services for greater than or equal to 2 midnights are medically necessary:: Yes   Plans for Post-Acute care--if anticipated (pick the single best option):: A. No post acute care anticipated at this time

## 2022-12-17 NOTE — NURSING TRANSFER
Nursing Transfer Note      12/17/2022     Reason patient is being transferred: Hospital Admit for L broke hip    Transfer To: [id number]    Transfer via stretcher    Transfer with cardiac monitoring    Transported by ED Staff     Medicines sent:     Any special needs or follow-up needed:     Chart send with patient: Yes    Notified: daughter    Patient reassessed at:  (date, time)    Upon arrival to floor: cardiac monitor applied, patient oriented to room, call bell in reach, and bed in lowest position

## 2022-12-17 NOTE — ASSESSMENT & PLAN NOTE
Patient complains of chest tightness, she rates it a 5/10.  Chest tightness has been intermittent for several days.    Trend out her troponins   Admit to telemetry   Hold off on surgery   Consult Cardiology  Follow-up EKG

## 2022-12-17 NOTE — ASSESSMENT & PLAN NOTE
Patient found to have a left femur fracture after a fall today.    Consult Orthopedics.  Will hold off on surgery for now because the patient is having chest tightness.  Will consult Cardiology

## 2022-12-17 NOTE — H&P
[facility] - Emergency Department  Hospital Medicine  History & Physical    Patient Name: Zandra Veloz  MRN: [id number]  Patient Class: IP- Inpatient  Admission Date: 12/17/2022  Attending Physician: Tal Albert MD   Primary Care Provider: Brandon Thornton MD         Patient information was obtained from patient and ER records.     Subjective:     Principal Problem:Delmy-prosthetic fracture around prosthetic hip    Chief Complaint:   Chief Complaint   Patient presents with    Fall    Hip Pain        HPI: 88-year-old lady seen emergency room department.  Patient presents after having a fall when she was trying to go to the restroom at her home.  Patient sustained a left femur fracture.  Patient complains of moderate to severe pain in the left hip area.  Patient also complains of chest tightness, she rates it a 5/10 in the chest tightness has been intubate.  Patient also was seen in emergency room department earlier this week per her daughter.  Patient is found to have dehydration and a urinary tract infection.  Current she denies any shortness of breath.  She does not give a history of coronary artery disease.      Past Medical History:   Diagnosis Date    Hypertension     Osteoporosis     Thyroid disorder        Past Surgical History:   Procedure Laterality Date    BLADDER SURGERY      HEMIARTHROPLASTY OF HIP Left 9/21/2021    Procedure: HEMIARTHROPLASTY, HIP;  Surgeon: Dequan Singer MD;  Location: [facility];  Service: Orthopedics;  Laterality: Left;    PARTIAL HIP ARTHROPLASTY Right     Dr Singer       Review of patient's allergies indicates:   Allergen Reactions    Bactrim [sulfamethoxazole-trimethoprim]     Codeine     Levaquin [levofloxacin]        No current facility-administered medications on file prior to encounter.     Current Outpatient Medications on File Prior to Encounter   Medication Sig    ALPRAZolam (XANAX) 1 MG tablet Take 1 tablet (1 mg total)  by mouth 2 (two) times daily.    cefUROXime (CEFTIN) 500 MG tablet Take 1 tablet (500 mg total) by mouth every 12 (twelve) hours. for 10 days    diclofenac sodium (VOLTAREN) 1 % Gel Apply 2 g topically 2 (two) times daily.    diltiaZEM (CARDIZEM CD) 120 MG Cp24 Take 1 capsule (120 mg total) by mouth once daily.    famotidine (PEPCID) 20 MG tablet Take 1 tablet (20 mg total) by mouth once daily.    gabapentin (NEURONTIN) 100 MG capsule Take 1 capsule (100 mg total) by mouth 3 (three) times daily.    HYDROcodone-acetaminophen (NORCO) 5-325 mg per tablet Take 1 tablet by mouth every 6 (six) hours as needed for Pain.    HYDROcodone-acetaminophen (NORCO) 7.5-325 mg per tablet Take 1 tablet by mouth every 8 (eight) hours as needed for Pain.    levothyroxine (SYNTHROID) 125 MCG tablet Take 1 tablet (125 mcg total) by mouth before breakfast.    lisinopriL (PRINIVIL,ZESTRIL) 20 MG tablet Take 1 tablet (20 mg total) by mouth once daily.    lisinopriL 10 MG tablet Take 1 tablet (10 mg total) by mouth once daily. (Patient not taking: Reported on 7/12/2022)    lisinopriL-hydrochlorothiazide (PRINZIDE,ZESTORETIC) 20-12.5 mg per tablet Take 1 tablet by mouth once daily.    mupirocin (BACTROBAN) 2 % ointment Apply topically 3 (three) times daily. (Patient not taking: Reported on 7/12/2022)    nitrofurantoin, macrocrystal-monohydrate, (MACROBID) 100 MG capsule Take 1 capsule (100 mg total) by mouth 2 (two) times daily.    pantoprazole (PROTONIX) 40 MG tablet Take 1 tablet (40 mg total) by mouth once daily.    [DISCONTINUED] predniSONE (DELTASONE) 5 MG tablet Take 1 tablet (5 mg total) by mouth once daily.     Family History       Problem Relation (Age of Onset)    Bladder Cancer Father    Heart attack Mother    Heart disease Mother    No Known Problems Sister, Brother, Maternal Grandmother, Maternal Grandfather, Paternal Grandmother, Paternal Grandfather          Tobacco Use    Smoking status: Former    Smokeless  tobacco: Never   Substance and Sexual Activity    Alcohol use: Never    Drug use: Never    Sexual activity: Not Currently     Review of Systems   Constitutional:  Negative for activity change, appetite change, fatigue and fever.   HENT:  Negative for congestion, rhinorrhea, sinus pressure, sneezing and sore throat.    Eyes:  Negative for itching.   Respiratory:  Negative for cough, chest tightness and shortness of breath.    Cardiovascular:  Negative for chest pain, palpitations and leg swelling.   Gastrointestinal:  Negative for abdominal pain, constipation, diarrhea, nausea and vomiting.   Endocrine: Negative for cold intolerance, heat intolerance, polydipsia, polyphagia and polyuria.   Genitourinary:  Negative for difficulty urinating, dysuria, frequency and hematuria.   Musculoskeletal:  Positive for arthralgias. Negative for back pain, joint swelling, myalgias, neck pain and neck stiffness.        Left hip pain   Skin:  Negative for wound.   Allergic/Immunologic: Negative for environmental allergies.   Neurological:  Positive for weakness. Negative for dizziness, syncope and headaches.   Hematological:  Does not bruise/bleed easily.   Psychiatric/Behavioral:  Negative for agitation, behavioral problems, confusion, decreased concentration and sleep disturbance.    All other systems reviewed and are negative.  Objective:     Vital Signs (Most Recent):  Temp: 98.3 °F (36.8 °C) (12/17/22 0820)  Pulse: 91 (12/17/22 1240)  Resp: 15 (12/17/22 1240)  BP: (!) 113/59 (12/17/22 1240)  SpO2: 97 % (12/17/22 1240)   Vital Signs (24h Range):  Temp:  [98.3 °F (36.8 °C)] 98.3 °F (36.8 °C)  Pulse:  [73-91] 91  Resp:  [13-20] 15  SpO2:  [95 %-98 %] 97 %  BP: (113-165)/(59-81) 113/59        Body mass index is 23.78 kg/m².    Physical Exam  Vitals and nursing note reviewed.   Constitutional:       Appearance: Normal appearance.   HENT:      Head: Normocephalic and atraumatic.      Right Ear: Tympanic membrane, ear canal and  external ear normal.      Left Ear: Tympanic membrane, ear canal and external ear normal.      Nose: Nose normal.      Mouth/Throat:      Mouth: Mucous membranes are moist.      Pharynx: Oropharynx is clear.   Eyes:      Extraocular Movements: Extraocular movements intact.      Conjunctiva/sclera: Conjunctivae normal.      Pupils: Pupils are equal, round, and reactive to light.   Cardiovascular:      Rate and Rhythm: Normal rate and regular rhythm.      Pulses: Normal pulses.      Heart sounds: Murmur heard.   Pulmonary:      Effort: Pulmonary effort is normal.      Breath sounds: Normal breath sounds.   Abdominal:      General: Abdomen is flat. Bowel sounds are normal.      Palpations: Abdomen is soft.   Musculoskeletal:         General: Tenderness present. Normal range of motion.      Cervical back: Normal range of motion and neck supple.      Comments: Tenderness in the left hip area   Skin:     General: Skin is warm and dry.   Neurological:      General: No focal deficit present.      Mental Status: She is alert. Mental status is at baseline. She is disoriented.      Motor: Weakness present.   Psychiatric:         Mood and Affect: Mood normal.         Behavior: Behavior normal.         Thought Content: Thought content normal.         Judgment: Judgment normal.       CRANIAL NERVES     CN III, IV, VI   Pupils are equal, round, and reactive to light.     Significant Labs: All pertinent labs within the past 24 hours have been reviewed.  BMP:   Recent Labs   Lab 12/17/22  0922   GLU 92      K 4.2   CL 99   CO2 27   BUN 9   CREATININE 0.95   CALCIUM 9.5     CBC:   Recent Labs   Lab 12/17/22  0804   WBC 11.64*   HGB 11.4*   HCT 34.8*        CMP:   Recent Labs   Lab 12/17/22  0922      K 4.2   CL 99   CO2 27   GLU 92   BUN 9   CREATININE 0.95   CALCIUM 9.5   PROT 6.4   ALBUMIN 3.7   BILITOT 0.4   ALKPHOS 48*   AST 19   ALT 17   ANIONGAP 14     Troponin: No results for input(s): TROPONINI, TROPONINIHS  in the last 48 hours.    Significant Imaging: I have reviewed all pertinent imaging results/findings within the past 24 hours.    Assessment/Plan:     * Delmy-prosthetic fracture around prosthetic hip  Patient found to have a left femur fracture after a fall today.    Consult Orthopedics.  Will hold off on surgery for now because the patient is having chest tightness.  Will consult Cardiology      Chest pain  Patient complains of chest tightness, she rates it a 5/10.  Chest tightness has been intermittent for several days.    Trend out her troponins   Admit to telemetry   Hold off on surgery   Consult Cardiology  Follow-up EKG      Acute cystitis without hematuria    Patient diagnosed with UTI proximally 5 days ago.  Will continue Ceftin    Hypertension  Blood pressure currently stable, will follow      Arthritis    Tylenol p.r.n..      VTE Risk Mitigation (From admission, onward)         Ordered     enoxaparin injection 40 mg  Daily         12/17/22 1253     IP VTE HIGH RISK PATIENT  Once         12/17/22 1253     Place sequential compression device  Until discontinued         12/17/22 1253                   Tal Albert MD  Department of Hospital Medicine   [facility] - Emergency Department

## 2022-12-17 NOTE — NURSING
Rcvd report from charge nurse, pt lying in bed, resp even and unlabored, skin w/d, nadn. Bed low, call light in reach, side rails ^ x 2. Tele monitor placed.

## 2022-12-17 NOTE — HPI
88-year-old lady seen emergency room department.  Patient presents after having a fall when she was trying to go to the restroom at her home.  Patient sustained a left femur fracture.  Patient complains of moderate to severe pain in the left hip area.  Patient also complains of chest tightness, she rates it a 5/10 in the chest tightness has been intubate.  Patient also was seen in emergency room department earlier this week per her daughter.  Patient is found to have dehydration and a urinary tract infection.  Current she denies any shortness of breath.  She does not give a history of coronary artery disease.

## 2022-12-18 PROBLEM — S72.8X2A OTHER FRACTURE OF LEFT FEMUR, INITIAL ENCOUNTER FOR CLOSED FRACTURE: Status: ACTIVE | Noted: 2022-12-18

## 2022-12-18 PROBLEM — R00.2 PALPITATIONS: Status: ACTIVE | Noted: 2022-12-18

## 2022-12-18 LAB
GLUCOSE SERPL-MCNC: 112 MG/DL (ref 70–105)
GLUCOSE SERPL-MCNC: 126 MG/DL (ref 70–105)
GLUCOSE SERPL-MCNC: 159 MG/DL (ref 70–105)
GLUCOSE SERPL-MCNC: 91 MG/DL (ref 70–105)
TROPONIN I SERPL HS-MCNC: 33.2 PG/ML

## 2022-12-18 PROCEDURE — 25000003 PHARM REV CODE 250: Performed by: INTERNAL MEDICINE

## 2022-12-18 PROCEDURE — 93010 ELECTROCARDIOGRAM REPORT: CPT | Mod: ,,, | Performed by: INTERNAL MEDICINE

## 2022-12-18 PROCEDURE — 11000001 HC ACUTE MED/SURG PRIVATE ROOM

## 2022-12-18 PROCEDURE — 36415 COLL VENOUS BLD VENIPUNCTURE: CPT | Performed by: INTERNAL MEDICINE

## 2022-12-18 PROCEDURE — 84484 ASSAY OF TROPONIN QUANT: CPT | Performed by: INTERNAL MEDICINE

## 2022-12-18 PROCEDURE — 99232 SBSQ HOSP IP/OBS MODERATE 35: CPT | Mod: ,,, | Performed by: INTERNAL MEDICINE

## 2022-12-18 PROCEDURE — 93005 ELECTROCARDIOGRAM TRACING: CPT

## 2022-12-18 PROCEDURE — 25000003 PHARM REV CODE 250: Performed by: NURSE PRACTITIONER

## 2022-12-18 PROCEDURE — 93010 EKG 12-LEAD: ICD-10-PCS | Mod: ,,, | Performed by: INTERNAL MEDICINE

## 2022-12-18 PROCEDURE — 99232 PR SUBSEQUENT HOSPITAL CARE,LEVL II: ICD-10-PCS | Mod: ,,, | Performed by: INTERNAL MEDICINE

## 2022-12-18 PROCEDURE — 82962 GLUCOSE BLOOD TEST: CPT

## 2022-12-18 PROCEDURE — 63600175 PHARM REV CODE 636 W HCPCS: Performed by: INTERNAL MEDICINE

## 2022-12-18 PROCEDURE — 51798 US URINE CAPACITY MEASURE: CPT

## 2022-12-18 RX ORDER — TRAMADOL HYDROCHLORIDE 50 MG/1
50 TABLET ORAL EVERY 6 HOURS PRN
Status: DISCONTINUED | OUTPATIENT
Start: 2022-12-18 | End: 2022-12-19

## 2022-12-18 RX ORDER — LISINOPRIL 10 MG/1
10 TABLET ORAL DAILY
Status: DISCONTINUED | OUTPATIENT
Start: 2022-12-18 | End: 2022-12-18

## 2022-12-18 RX ORDER — SODIUM CHLORIDE 9 MG/ML
INJECTION, SOLUTION INTRAVENOUS CONTINUOUS
Status: DISCONTINUED | OUTPATIENT
Start: 2022-12-18 | End: 2022-12-20

## 2022-12-18 RX ORDER — LISINOPRIL 5 MG/1
5 TABLET ORAL DAILY
Status: DISCONTINUED | OUTPATIENT
Start: 2022-12-18 | End: 2022-12-18

## 2022-12-18 RX ADMIN — HYDROCODONE BITARTRATE AND ACETAMINOPHEN 1 TABLET: 7.5; 325 TABLET ORAL at 03:12

## 2022-12-18 RX ADMIN — PANTOPRAZOLE SODIUM 40 MG: 40 TABLET, DELAYED RELEASE ORAL at 08:12

## 2022-12-18 RX ADMIN — HYDROMORPHONE HYDROCHLORIDE 1 MG: 2 INJECTION, SOLUTION INTRAMUSCULAR; INTRAVENOUS; SUBCUTANEOUS at 08:12

## 2022-12-18 RX ADMIN — ENOXAPARIN SODIUM 40 MG: 40 INJECTION SUBCUTANEOUS at 06:12

## 2022-12-18 RX ADMIN — SODIUM CHLORIDE: 9 INJECTION, SOLUTION INTRAVENOUS at 06:12

## 2022-12-18 RX ADMIN — SODIUM CHLORIDE 500 ML: 9 INJECTION, SOLUTION INTRAVENOUS at 10:12

## 2022-12-18 RX ADMIN — HYDROMORPHONE HYDROCHLORIDE 1 MG: 2 INJECTION, SOLUTION INTRAMUSCULAR; INTRAVENOUS; SUBCUTANEOUS at 07:12

## 2022-12-18 RX ADMIN — GABAPENTIN 100 MG: 100 CAPSULE ORAL at 08:12

## 2022-12-18 RX ADMIN — ALPRAZOLAM 1 MG: 0.25 TABLET ORAL at 08:12

## 2022-12-18 RX ADMIN — TRAMADOL HYDROCHLORIDE 50 MG: 50 TABLET ORAL at 12:12

## 2022-12-18 RX ADMIN — SODIUM CHLORIDE: 9 INJECTION, SOLUTION INTRAVENOUS at 01:12

## 2022-12-18 RX ADMIN — POLYETHYLENE GLYCOL 3350 17 G: 17 POWDER, FOR SOLUTION ORAL at 08:12

## 2022-12-18 RX ADMIN — TRAMADOL HYDROCHLORIDE 50 MG: 50 TABLET ORAL at 06:12

## 2022-12-18 RX ADMIN — LEVOTHYROXINE SODIUM 125 MCG: 0.12 TABLET ORAL at 05:12

## 2022-12-18 NOTE — PLAN OF CARE
Problem: Adult Inpatient Plan of Care  Goal: Plan of Care Review  Outcome: Ongoing, Not Progressing     Problem: Adult Inpatient Plan of Care  Goal: Patient-Specific Goal (Individualized)  Outcome: Ongoing, Not Progressing     Problem: Adult Inpatient Plan of Care  Goal: Absence of Hospital-Acquired Illness or Injury  Outcome: Ongoing, Not Progressing     Problem: Adult Inpatient Plan of Care  Goal: Optimal Comfort and Wellbeing  Outcome: Ongoing, Not Progressing     Problem: Adult Inpatient Plan of Care  Goal: Readiness for Transition of Care  Outcome: Ongoing, Not Progressing

## 2022-12-18 NOTE — NURSING
Reviewed allergies with patient as Norco was ordered, but codeine was listed as an allergy. Both pt and mother state that [place] is listed as a home med and she doesn't have an allergy to the med.

## 2022-12-18 NOTE — PROGRESS NOTES
[facility]  Orthopaedic Progress Note  Progress Note    Subjective:     Interval History: no acute events overnight    Post-Op Info:  * No surgery found *         Medications:  Continuous Infusions:  Scheduled Meds:   ALPRAZolam  1 mg Oral BID    cefUROXime  500 mg Oral Q12H    enoxaparin  40 mg Subcutaneous Daily    gabapentin  100 mg Oral TID    levothyroxine  125 mcg Oral Before breakfast    pantoprazole  40 mg Oral Daily    polyethylene glycol  17 g Oral Daily     PRN Meds:acetaminophen, acetaminophen, dextrose 10%, dextrose 10%, glucagon (human recombinant), HYDROcodone-acetaminophen, HYDROmorphone, insulin aspart U-100, magnesium oxide, magnesium oxide, melatonin, naloxone, potassium bicarbonate, potassium bicarbonate, potassium bicarbonate, potassium, sodium phosphates, potassium, sodium phosphates, potassium, sodium phosphates, sodium chloride 0.9%     Objective:     Vital Signs (Most Recent):  Temp: 98.6 °F (37 °C) (12/18/22 0630)  Pulse: 89 (12/18/22 0630)  Resp: 16 (12/18/22 0715)  BP: (!) 120/44 (12/18/22 0630)  SpO2: (!) 93 % (12/18/22 0630)   Vital Signs (24h Range):  Temp:  [97.6 °F (36.4 °C)-98.6 °F (37 °C)] 98.6 °F (37 °C)  Pulse:  [] 89  Resp:  [13-20] 16  SpO2:  [92 %-98 %] 93 %  BP: ()/(44-87) 120/44     No intake or output data in the 24 hours ending 12/18/22 0852    Physical Exam  Resting in bed comfortably.  Left lower extremity is shortened and externally rotated.  Neurovascularly intact.  No breaks in skin  Significant Labs:  Recent Lab Results         12/18/22  0525   12/18/22  0011   12/17/22  1455   12/17/22  0922        Albumin/Globulin Ratio       1.4       Albumin       3.7       Alkaline Phosphatase       48       ALT       17       ANION GAP       14       AST       19       BILIRUBIN TOTAL       0.4       BUN       9       BUN/CREAT RATIO       9       Calcium       9.5       Chloride       99       CO2       27       Creatinine        0.95       eGFR       58       Globulin, Total       2.7       Glucose       92       POC Glucose 91   112           Potassium       4.2       PROTEIN TOTAL       6.4       Sodium       136       Troponin I High Sensitivity     21.6                 Significant Diagnostics:  CT Cervical Spine Without Contrast  Narrative: EXAMINATION:  CT CERVICAL SPINE WITHOUT CONTRAST    CLINICAL HISTORY:  Neck trauma (Age >= 65y);    TECHNIQUE:  Axial CT imaging of the cervical spine is performed without contrast.  Computer reformatting is viewed in the sagittal and coronal planes.    CT dose reduction technique used - Dose Rite and tube current modulation.    COMPARISON:  None available    FINDINGS:  No fracture is seen.  Alignment of the cervical spine is within normal limits.  Vertebral body heights are normal.  Multilevel degenerative changes present.  No other abnormality is demonstrated.  Impression: No evidence of acute injury demonstrated    Electronically signed by: Pranav Lamb  Date:    12/17/2022  Time:    10:33  CT Head Without Contrast  Narrative: EXAMINATION:  CT HEAD WITHOUT CONTRAST    CLINICAL HISTORY:  Head trauma, moderate-severe;    TECHNIQUE:  Axial CT imaging of the brain is performed without contrast with 3 mm increments.    CT dose reduction technique used - Dose Rite and tube current modulation.    COMPARISON:  13 December 2022    FINDINGS:  No evidence of hemorrhage,  mass,  mass effect,  midline shift or acute infarct seen.  There is moderate diffuse cerebral atrophy.  There are areas of decreased density seen within the white matter.  Otherwise the brain parenchyma attenuation and differentiation appears within normal limits. The ventricles and cisterns are normal in caliber.  No cranial or skull base abnormality is identified.  Impression: No evidence of acute process or interval change.    Electronically signed by: Pranav Lamb  Date:    12/17/2022  Time:    10:29  X-Ray Femur AP/LAT  Left  Narrative: EXAMINATION:  XR femur left two views    CLINICAL HISTORY:  Left femur pain, fracture    COMPARISON:  Prior radiograph 03/17/2022    FINDINGS:  There is an acute mildly comminuted proximal left femur fracture with no evidence of hip dislocation. Left hip arthroplasty hardware is noted in place at the level of the fracture. No suspicious osseous or soft tissue lesions are identified. Osteopenia is noted.  Impression: As above.    Place of service: [facility]    Electronically signed by: Edi Mccullough  Date:    12/17/2022  Time:    09:26  X-Ray Hip 2 or 3 views Left (with Pelvis when performed)  Narrative: EXAMINATION:  XR hip, pelvis left with three views    CLINICAL HISTORY:  Fall, pain    COMPARISON:  02/01/2022 radiograph    FINDINGS:  Bilateral hip arthroplasty hardware is in place.  There is an acute mildly comminuted fracture of the left hip joint associated with the left hip hardware.  The hardware however appears intact with no evidence of dislocation at the hip joint.  Osseous pelvis is grossly intact as visualized.  Somewhat prominent osteopenia is noted.  Old pelvic fractures are suggested bilaterally.  No suspicious osseous lesions.  No focal soft tissue abnormality otherwise identified.  Impression: As above.    Place of service: [facility]    Electronically signed by: Edi Mccullough  Date:    12/17/2022  Time:    09:11  X-Ray Chest 1 View  Narrative: EXAMINATION:  XR CHEST 1 VIEW    CLINICAL HISTORY:  Unspecified fall, initial encounter    COMPARISON:  Prior radiograph 10/13/2022    FINDINGS:  The cardiomediastinal silhouette is within normal limits. The lungs are predominantly clear with at least mild underlying interstitial scarring changes suggested.  There is no pneumothorax or pleural effusion.    There is no acute osseous or soft tissue abnormality.  Impression: No acute cardiopulmonary process.    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 Gonzalez    Plan for surgery likely on Tuesday but possibly as early as Monday.  Lovenox in the interim for DVT prophylaxis.  Nonweightbearing at this time.      Bryan Bates MD  Orthopaedic Surgery  [facility] - [facility]

## 2022-12-18 NOTE — NURSING
Report received from previous shift, pt lying in bed, resp even and unlabored, skin w/d, nadn. Bed low, call light in reach, side rails ^ x 2. Pt c/o pain.

## 2022-12-18 NOTE — CONSULTS
[facility] - [address] [place] [address]  Orthopedics  Consult Note    Patient Name: Zandra Veloz  MRN: [id number]  Admission Date: 12/17/2022  Hospital Length of Stay: 0 days  Attending Provider: Tal Albert MD  Primary Care Provider: Brandon Thornton MD    Patient information was obtained from patient, relative(s), and ER records.     Consults  Subjective:     Principal Problem:Delmy-prosthetic fracture around prosthetic hip    Chief Complaint:   Chief Complaint   Patient presents with    Fall    Hip Pain        HPI:  88-year-old female presents to the emergency department following a ground level fall today.  Noted immediate pain in the left hip.  Past surgical history significant for left hip hemiarthroplasty 1 year prior.  Has done reasonably well but has had a history of falls here lately.  Has been seen in the emergency department at least 2 times in the last month following ground level falls.  Complains of left hip pain and inability to ambulate secondary to pain.  Denies any other associated injury.    Past Medical History:   Diagnosis Date    Hypertension     Osteoporosis     Thyroid disorder        Past Surgical History:   Procedure Laterality Date    BLADDER SURGERY      HEMIARTHROPLASTY OF HIP Left 9/21/2021    Procedure: HEMIARTHROPLASTY, HIP;  Surgeon: Dequan Singer MD;  Location: [facility];  Service: Orthopedics;  Laterality: Left;    PARTIAL HIP ARTHROPLASTY Right     Dr Singer       Review of patient's allergies indicates:   Allergen Reactions    Bactrim [sulfamethoxazole-trimethoprim]     Codeine     Levaquin [levofloxacin]        Current Facility-Administered Medications   Medication    acetaminophen tablet 1,000 mg    acetaminophen tablet 650 mg    ALPRAZolam tablet 1 mg    cefUROXime tablet 500 mg    dextrose 10% bolus 125 mL 125 mL    dextrose 10% bolus 250 mL 250 mL    enoxaparin injection 40 mg    gabapentin capsule 100 mg    glucagon (human  "recombinant) injection 1 mg    HYDROcodone-acetaminophen 7.5-325 mg per tablet 1 tablet    HYDROmorphone (PF) injection 1 mg    insulin aspart U-100 injection 0-5 Units    [START ON 12/18/2022] levothyroxine tablet 125 mcg    magnesium oxide tablet 800 mg    magnesium oxide tablet 800 mg    melatonin tablet 6 mg    naloxone 0.4 mg/mL injection 0.02 mg    pantoprazole EC tablet 40 mg    polyethylene glycol packet 17 g    potassium bicarbonate disintegrating tablet 35 mEq    potassium bicarbonate disintegrating tablet 50 mEq    potassium bicarbonate disintegrating tablet 60 mEq    potassium, sodium phosphates 280-160-250 mg packet 2 packet    potassium, sodium phosphates 280-160-250 mg packet 2 packet    potassium, sodium phosphates 280-160-250 mg packet 2 packet    sodium chloride 0.9% flush 10 mL     Family History       Problem Relation (Age of Onset)    Bladder Cancer Father    Heart attack Mother    Heart disease Mother    No Known Problems Sister, Brother, Maternal Grandmother, Maternal Grandfather, Paternal Grandmother, Paternal Grandfather          Tobacco Use    Smoking status: Former    Smokeless tobacco: Never   Substance and Sexual Activity    Alcohol use: Never    Drug use: Never    Sexual activity: Not Currently     ROS  Objective:     Vital Signs (Most Recent):  Temp: 97.6 °F (36.4 °C) (12/17/22 1542)  Pulse: 81 (12/17/22 1542)  Resp: 18 (12/17/22 1649)  BP: 115/64 (12/17/22 1542)  SpO2: 97 % (12/17/22 1542) Vital Signs (24h Range):  Temp:  [97.6 °F (36.4 °C)-98.3 °F (36.8 °C)] 97.6 °F (36.4 °C)  Pulse:  [73-91] 81  Resp:  [13-20] 18  SpO2:  [94 %-98 %] 97 %  BP: (103-165)/(55-81) 115/64     Weight: 59 kg (130 lb)  Height: 5' 2" (157.5 cm)  Body mass index is 23.78 kg/m².    No intake or output data in the 24 hours ending 12/17/22 1823    General    Nursing note and vitals reviewed.  Constitutional: She is oriented to person, place, and time. She appears well-developed and well-nourished.   HENT: "   Head: Normocephalic and atraumatic.   Nose: Nose normal.   Eyes: EOM are normal. Pupils are equal, round, and reactive to light.   Neck: Neck supple.   Cardiovascular:  Normal rate and intact distal pulses.            Pulmonary/Chest: Effort normal. No respiratory distress. She exhibits no tenderness.   Abdominal: Soft. She exhibits no distension. There is no abdominal tenderness.   Neurological: She is alert and oriented to person, place, and time. She has normal reflexes.   Psychiatric: She has a normal mood and affect. Her behavior is normal. Judgment and thought content normal.         Left Hip Exam     Inspection   Swelling: present  Deformity of hip.  Quadriceps Atrophy:  positive  Erythema: present  Bruising: present    Tenderness   The patient tender to palpation of the trochanteric bursa.    Crepitus   The patient has crepitus of the trochanteric bursa.    Range of Motion   Abduction:  abnormal   Adduction:  abnormal   Extension:  abnormal   Flexion:  abnormal   External rotation:  abnormal   Internal rotation: abnormal     Tests   Pain w/ forced internal rotation (KITTY): present  Pain w/ forced external rotation (FADIR): present  Circumduction test: positive  Log Roll: positive          Muscle Strength   Left Lower Extremity   Hip Flexion: 2/5     Significant Labs:   Recent Lab Results         12/17/22  1455   12/17/22  0922   12/17/22  0807   12/17/22  0804        Albumin/Globulin Ratio   1.4           Albumin   3.7           Alkaline Phosphatase   48           ALT   17           ANION GAP   14           aPTT       24.2       AST   19           Baso #       0.11       Basophil %       0.9       BILIRUBIN TOTAL   0.4           BUN   9           BUN/CREAT RATIO   9           Calcium   9.5           Chloride   99           CO2   27           Creatinine   0.95           Differential Type       Auto       eGFR   58           Eos #       0.10       Eosinophil %       0.9       Globulin, Total   2.7            Glucose   92           Group & Rh     B POS         HEMATOCRIT       34.8       HEMOGLOBIN       11.4       Immature Grans (Abs)       0.25       Immature Granulocytes       2.1       INDIRECT EZEQUIEL     NEG         INR       1.07       Lymph #       2.59       Lymph %       22.3       MCH       30.6       MCHC       32.8       MCV       93.5       Mono #       0.83       Mono %       7.1       MPV       8.8       Neutrophils, Abs       7.76       Neutrophils Relative       66.7       nRBC       0.0       NUCLEATED RBC ABSOLUTE       0.00       Platelets       306       Potassium   4.2           PROTEIN TOTAL   6.4           Protime       13.5       RBC       3.72       RDW       12.6       Sodium   136           Troponin I High Sensitivity 21.6             WBC       11.64             All pertinent labs within the past 24 hours have been reviewed.    Significant Imaging:   X-Ray Chest 1 View    Result Date: 12/17/2022  EXAMINATION: XR CHEST 1 VIEW CLINICAL HISTORY: Unspecified fall, initial encounter COMPARISON: Prior radiograph 10/13/2022 FINDINGS: The cardiomediastinal silhouette is within normal limits. The lungs are predominantly clear with at least mild underlying interstitial scarring changes suggested.  There is no pneumothorax or pleural effusion. There is no acute osseous or soft tissue abnormality.     No acute cardiopulmonary process. Place of service: [facility] Electronically signed by: Edi Mccullough Date:    12/17/2022 Time:    08:51    X-Ray Lumbar Spine Ap And Lateral    Result Date: 11/23/2022  EXAMINATION: XR LUMBAR SPINE AP AND LATERAL CLINICAL HISTORY: fall with injury;. TECHNIQUE: XR LUMBAR SPINE AP AND LATERAL COMPARISON: 2021 FINDINGS: Mild compression fractures of L2, L3, L4 and L5 are redemonstrated and unchanged. Scoliosis. Moderate to severe multi-level degenerative change.     Mild compression fractures of L2, L3, L4 and L5 are redemonstrated and unchanged. Scoliosis.  Moderate to severe multi-level degenerative change. Electronically signed by: Sajan Mesa Date:    11/23/2022 Time:    14:25    X-Ray Hip 2 or 3 views Left (with Pelvis when performed)    Result Date: 12/17/2022  EXAMINATION: XR hip, pelvis left with three views CLINICAL HISTORY: Fall, pain COMPARISON: 02/01/2022 radiograph FINDINGS: Bilateral hip arthroplasty hardware is in place.  There is an acute mildly comminuted fracture of the left hip joint associated with the left hip hardware.  The hardware however appears intact with no evidence of dislocation at the hip joint.  Osseous pelvis is grossly intact as visualized.  Somewhat prominent osteopenia is noted.  Old pelvic fractures are suggested bilaterally.  No suspicious osseous lesions.  No focal soft tissue abnormality otherwise identified.     As above. Place of service: [facility] Electronically signed by: Edi Mccullough Date:    12/17/2022 Time:    09:11    X-Ray Femur AP/LAT Left    Result Date: 12/17/2022  EXAMINATION: XR femur left two views CLINICAL HISTORY: Left femur pain, fracture COMPARISON: Prior radiograph 03/17/2022 FINDINGS: There is an acute mildly comminuted proximal left femur fracture with no evidence of hip dislocation. Left hip arthroplasty hardware is noted in place at the level of the fracture. No suspicious osseous or soft tissue lesions are identified. Osteopenia is noted.     As above. Place of service: [facility] Electronically signed by: Edi Mccullough Date:    12/17/2022 Time:    09:26    CT Head Without Contrast    Result Date: 12/17/2022  EXAMINATION: CT HEAD WITHOUT CONTRAST CLINICAL HISTORY: Head trauma, moderate-severe; TECHNIQUE: Axial CT imaging of the brain is performed without contrast with 3 mm increments. CT dose reduction technique used - Dose Rite and tube current modulation. COMPARISON: 13 December 2022 FINDINGS: No evidence of hemorrhage,  mass,  mass effect,  midline shift or acute  infarct seen.  There is moderate diffuse cerebral atrophy.  There are areas of decreased density seen within the white matter.  Otherwise the brain parenchyma attenuation and differentiation appears within normal limits. The ventricles and cisterns are normal in caliber.  No cranial or skull base abnormality is identified.     No evidence of acute process or interval change. Electronically signed by: Pranav Lamb Date:    12/17/2022 Time:    10:29    CT Head Without Contrast    Result Date: 12/13/2022  EXAMINATION: CT HEAD WITHOUT CONTRAST CLINICAL HISTORY: Headache, new or worsening (Age >= 50y); TECHNIQUE: Low dose axial images were obtained through the head.  Coronal and sagittal reformations were also performed. Contrast was not administered. COMPARISON: 11/23/2022 FINDINGS: Global parenchymal volume loss and chronic microvascular ischemic changes are again seen similar to prior.  There is some artifact of the posterior parietal region from external metallic objects overlying the patient.  No acute large vessel infarct is evident by this exam.  No midline shift.  No herniation.  No acute hemorrhage.  Calvarium intact.  Vascular calcifications.     Atrophy and chronic microvascular ischemia.  No CT evidence of acute intracranial abnormality. Electronically signed by: Dequan Torres Date:    12/13/2022 Time:    14:07    CT Head Without Contrast    Result Date: 11/23/2022  EXAMINATION: CT HEAD WITHOUT CONTRAST CLINICAL HISTORY: Head trauma, minor (Age >= 65y); TECHNIQUE: Axial CT imaging of the brain is performed without contrast with 3 mm increments. CT dose reduction technique used - Dose Rite and tube current modulation. COMPARISON: 1 September 2021 FINDINGS: No evidence of hemorrhage,  mass,  mass effect,  midline shift or acute infarct seen.  There is moderate to severe diffuse cerebral atrophy.  There are areas of decreased density seen within the white matter.  Otherwise the brain parenchyma attenuation and  differentiation appears within normal limits. The ventricles and cisterns are normal in caliber.  No cranial or skull base abnormality is identified.     No evidence of acute process or interval change. Electronically signed by: Pranav Lamb Date:    11/23/2022 Time:    13:58    CT Cervical Spine Without Contrast    Result Date: 12/17/2022  EXAMINATION: CT CERVICAL SPINE WITHOUT CONTRAST CLINICAL HISTORY: Neck trauma (Age >= 65y); TECHNIQUE: Axial CT imaging of the cervical spine is performed without contrast.  Computer reformatting is viewed in the sagittal and coronal planes. CT dose reduction technique used - Dose Rite and tube current modulation. COMPARISON: None available FINDINGS: No fracture is seen.  Alignment of the cervical spine is within normal limits.  Vertebral body heights are normal.  Multilevel degenerative changes present.  No other abnormality is demonstrated.     No evidence of acute injury demonstrated Electronically signed by: Pranav Lamb Date:    12/17/2022 Time:    10:33    X-Ray Pelvis Routine AP    Result Date: 11/23/2022  EXAMINATION: XR PELVIS ROUTINE AP CLINICAL HISTORY: fall with injury; TECHNIQUE: AP view of the pelvis was performed. COMPARISON: Pelvic radiograph 02/01/2022 FINDINGS: No acute fracture.  Postsurgical changes from bilateral hip replacements.  Appearance of the pelvis is similar prior.     No acute findings Electronically signed by: Dequan Torres Date:    11/23/2022 Time:    14:16     Assessment/Plan:     Active Diagnoses:    Diagnosis Date Noted POA    PRINCIPAL PROBLEM:  Delmy-prosthetic fracture around prosthetic hip [M97.8XXA, Z96.649] 12/17/2022 Not Applicable    Acute cystitis without hematuria [N30.00] 12/17/2022 Yes    Chest pain [R07.9] 12/17/2022 Yes    Primary osteoarthritis of right knee [M17.11] 08/11/2022 Yes    Primary osteoarthritis of right shoulder [M19.011] 03/17/2022 Yes    Lumbar radiculopathy [M54.16] 03/17/2022 Yes    Hypertension [I10]  Yes     Arthritis [M19.90] 04/13/2021 Yes      Problems Resolved During this Admission:       Thank you for your consult.   I discussed this case with the operating surgeon and this will require open reduction internal fixation with possible exchange of the prior hardware.  Will plan for surgery the Monday or Tuesday.  We will continue her current pain regimen let her eat today and tomorrow likely proceed with surgery Monday or Tuesday    Bryan Bates MD  Orthopedics  [facility] - [address] [place] [address]

## 2022-12-18 NOTE — ASSESSMENT & PLAN NOTE
Chest pain most consistent with musculoskeletal etiology.  Will order echo to evaluate for subsegmental wall motion abnormalities suggestive of previous MI and serial troponin x 2.

## 2022-12-18 NOTE — HPI
CC: left hip pain  HPI: Pt reports she was bending forward in her closet, could not stop herself from continuing forward, fell on her face with immediate pain in left hip and leg.  Notes she was unable to stand since that event. She also notes has had diffuse chest pain, mostly left sided, worse with deep inspiration and with coughing.  She was not experiencing chest tightness or discomfort prior to the fall.  She has had two months of increasing weakness and fatique, her daughter at bedside reports she is much less active, spending more time in her chair.  No specific complaints, however notes increased fatigue and tires more easily.  She is avoiding activity which could provoke increased fatigue.     PT has vague history of irregular heart beat for which she was started on diltiazem several years ago, no symptoms at present

## 2022-12-18 NOTE — SUBJECTIVE & OBJECTIVE
Past Medical History:   Diagnosis Date    Hypertension     Osteoporosis     Thyroid disorder        Past Surgical History:   Procedure Laterality Date    BLADDER SURGERY      HEMIARTHROPLASTY OF HIP Left 9/21/2021    Procedure: HEMIARTHROPLASTY, HIP;  Surgeon: Dequan Singer MD;  Location: [facility];  Service: Orthopedics;  Laterality: Left;    PARTIAL HIP ARTHROPLASTY Right     Dr Singer       Review of patient's allergies indicates:   Allergen Reactions    Bactrim [sulfamethoxazole-trimethoprim]     Codeine     Levaquin [levofloxacin]        No current facility-administered medications on file prior to encounter.     Current Outpatient Medications on File Prior to Encounter   Medication Sig    ALPRAZolam (XANAX) 1 MG tablet Take 1 tablet (1 mg total) by mouth 2 (two) times daily.    cefUROXime (CEFTIN) 500 MG tablet Take 1 tablet (500 mg total) by mouth every 12 (twelve) hours. for 10 days    diclofenac sodium (VOLTAREN) 1 % Gel Apply 2 g topically 2 (two) times daily.    diltiaZEM (CARDIZEM CD) 120 MG Cp24 Take 1 capsule (120 mg total) by mouth once daily.    famotidine (PEPCID) 20 MG tablet Take 1 tablet (20 mg total) by mouth once daily.    gabapentin (NEURONTIN) 100 MG capsule Take 1 capsule (100 mg total) by mouth 3 (three) times daily.    HYDROcodone-acetaminophen (NORCO) 5-325 mg per tablet Take 1 tablet by mouth every 6 (six) hours as needed for Pain.    HYDROcodone-acetaminophen (NORCO) 7.5-325 mg per tablet Take 1 tablet by mouth every 8 (eight) hours as needed for Pain.    levothyroxine (SYNTHROID) 125 MCG tablet Take 1 tablet (125 mcg total) by mouth before breakfast.    lisinopriL (PRINIVIL,ZESTRIL) 20 MG tablet Take 1 tablet (20 mg total) by mouth once daily.    lisinopriL 10 MG tablet Take 1 tablet (10 mg total) by mouth once daily. (Patient not taking: Reported on 7/12/2022)    lisinopriL-hydrochlorothiazide (PRINZIDE,ZESTORETIC) 20-12.5 mg per tablet Take 1 tablet by mouth once  daily.    mupirocin (BACTROBAN) 2 % ointment Apply topically 3 (three) times daily. (Patient not taking: Reported on 7/12/2022)    nitrofurantoin, macrocrystal-monohydrate, (MACROBID) 100 MG capsule Take 1 capsule (100 mg total) by mouth 2 (two) times daily.    pantoprazole (PROTONIX) 40 MG tablet Take 1 tablet (40 mg total) by mouth once daily.     Family History       Problem Relation (Age of Onset)    Bladder Cancer Father    Heart attack Mother    Heart disease Mother    No Known Problems Sister, Brother, Maternal Grandmother, Maternal Grandfather, Paternal Grandmother, Paternal Grandfather          Tobacco Use    Smoking status: Former    Smokeless tobacco: Never   Substance and Sexual Activity    Alcohol use: Never    Drug use: Never    Sexual activity: Not Currently     Review of Systems   Constitutional: Positive for malaise/fatigue. Negative for diaphoresis, night sweats and weight gain.   HENT:  Negative for congestion, ear pain, hearing loss, nosebleeds and sore throat.    Eyes:  Negative for blurred vision, double vision, pain, photophobia and visual disturbance.   Cardiovascular:  Positive for dyspnea on exertion and palpitations. Negative for chest pain, claudication, irregular heartbeat, leg swelling, near-syncope, orthopnea and syncope.   Respiratory:  Positive for shortness of breath. Negative for cough, sleep disturbances due to breathing, snoring and wheezing.    Endocrine: Negative for cold intolerance, heat intolerance, polydipsia, polyphagia and polyuria.   Hematologic/Lymphatic: Negative for bleeding problem. Does not bruise/bleed easily.   Skin:  Negative for dry skin, flushing, itching, rash and skin cancer.   Musculoskeletal:  Negative for arthritis, back pain, falls, joint pain, muscle cramps, muscle weakness and myalgias.   Gastrointestinal:  Negative for abdominal pain, change in bowel habit, constipation, diarrhea, dysphagia, heartburn, nausea and vomiting.   Genitourinary:  Negative  for bladder incontinence, dysuria, flank pain, frequency and nocturia.   Neurological:  Positive for light-headedness and loss of balance. Negative for dizziness, focal weakness, headaches, numbness, paresthesias and seizures.   Psychiatric/Behavioral:  Negative for depression, memory loss and substance abuse. The patient is not nervous/anxious.    Allergic/Immunologic: Negative for environmental allergies.   Objective:     Vital Signs (Most Recent):  Temp: 97.8 °F (36.6 °C) (12/18/22 1726)  Pulse: 106 (12/18/22 1726)  Resp: 19 (12/18/22 1726)  BP: 131/60 (12/18/22 1726)  SpO2: (!) 87 % (12/18/22 1726)   Vital Signs (24h Range):  Temp:  [97.4 °F (36.3 °C)-98.6 °F (37 °C)] 97.8 °F (36.6 °C)  Pulse:  [] 106  Resp:  [16-19] 19  SpO2:  [87 %-96 %] 87 %  BP: ()/(44-87) 131/60     Weight: 59 kg (130 lb)  Body mass index is 23.78 kg/m².    SpO2: (!) 87 %       No intake or output data in the 24 hours ending 12/18/22 1753    Lines/Drains/Airways       Peripheral Intravenous Line  Duration                  Peripheral IV - Single Lumen 12/17/22 2000 22 G Posterior;Right;Medial Hand <1 day                    Physical Exam  Vitals and nursing note reviewed.   Constitutional:       Appearance: Normal appearance. She is normal weight. She is ill-appearing.   HENT:      Head: Normocephalic and atraumatic.      Right Ear: External ear normal.      Left Ear: External ear normal.   Eyes:      General: No scleral icterus.        Right eye: No discharge.         Left eye: No discharge.      Extraocular Movements: Extraocular movements intact.      Conjunctiva/sclera: Conjunctivae normal.      Pupils: Pupils are equal, round, and reactive to light.   Neck:      Vascular: Carotid bruit present.   Cardiovascular:      Rate and Rhythm: Normal rate and regular rhythm.      Pulses: Normal pulses.      Heart sounds: Murmur heard.     No friction rub. No gallop.   Pulmonary:      Effort: Pulmonary effort is normal.      Breath  sounds: Normal breath sounds. No wheezing, rhonchi or rales.   Chest:      Chest wall: No tenderness.   Abdominal:      General: Abdomen is flat. Bowel sounds are normal. There is no distension.      Palpations: Abdomen is soft.      Tenderness: There is no abdominal tenderness. There is no guarding or rebound.   Musculoskeletal:         General: Deformity present. No swelling or tenderness. Normal range of motion.      Cervical back: Normal range of motion and neck supple.   Skin:     General: Skin is warm and dry.      Findings: Bruising present. No erythema or rash.   Neurological:      General: No focal deficit present.      Mental Status: She is alert and oriented to person, place, and time.      Cranial Nerves: No cranial nerve deficit.      Motor: No weakness.      Gait: Gait abnormal.   Psychiatric:         Mood and Affect: Mood normal.         Behavior: Behavior normal.         Thought Content: Thought content normal.         Judgment: Judgment normal.       Significant Labs: BMP:   Recent Labs   Lab 12/17/22  0922   GLU 92      K 4.2   CL 99   CO2 27   BUN 9   CREATININE 0.95   CALCIUM 9.5   , CBC   Recent Labs   Lab 12/17/22  0804   WBC 11.64*   HGB 11.4*   HCT 34.8*      , and Troponin No results for input(s): TROPONINI in the last 48 hours.    Significant Imaging:

## 2022-12-18 NOTE — PROGRESS NOTES
[facility] - [facility] Hospital Medicine  Progress Note    Patient Name: Zandra Veloz  MRN: [id number]  Patient Class: IP- Inpatient   Admission Date: 12/17/2022  Length of Stay: 1 days  Attending Physician: Tal Albert MD  Primary Care Provider: Brandon Thornton MD        Subjective:     Principal Problem:Delmy-prosthetic fracture around prosthetic hip        HPI:  88-year-old lady seen emergency room department.  Patient presents after having a fall when she was trying to go to the restroom at her home.  Patient sustained a left femur fracture.  Patient complains of moderate to severe pain in the left hip area.  Patient also complains of chest tightness, she rates it a 5/10 in the chest tightness has been intubate.  Patient also was seen in emergency room department earlier this week per her daughter.  Patient is found to have dehydration and a urinary tract infection.  Current she denies any shortness of breath.  She does not give a history of coronary artery disease.      Overview/Hospital Course:  No notes on file    Interval History:  Patient is still complain of hip pain.  Patient blood pressure has been borderline low.  Otherwise she is in no acute distress today.  She has had intermittent chest pain therefore we have consulted Cardiology.    Review of Systems   Constitutional:  Negative for appetite change and fever.   Respiratory:  Positive for chest tightness. Negative for shortness of breath.    Gastrointestinal:  Negative for abdominal pain.   Psychiatric/Behavioral:  Negative for agitation and confusion.    Objective:     Vital Signs (Most Recent):  Temp: 97.4 °F (36.3 °C) (12/18/22 1223)  Pulse: 104 (12/18/22 1223)  Resp: 19 (12/18/22 1223)  BP: 110/62 (12/18/22 1223)  SpO2: (!) 92 % (12/18/22 1223)   Vital Signs (24h Range):  Temp:  [97.4 °F (36.3 °C)-98.6 °F (37 °C)] 97.4 °F (36.3 °C)  Pulse:  [] 104  Resp:  [15-19] 19  SpO2:  [92 %-97 %] 92 %  BP:  ()/(44-87) 110/62     Weight: 59 kg (130 lb)  Body mass index is 23.78 kg/m².  No intake or output data in the 24 hours ending 12/18/22 1318   Physical Exam  Vitals and nursing note reviewed.   Constitutional:       Appearance: Normal appearance.   Cardiovascular:      Rate and Rhythm: Normal rate and regular rhythm.      Pulses: Normal pulses.      Heart sounds: Normal heart sounds.   Pulmonary:      Effort: Pulmonary effort is normal.      Breath sounds: Normal breath sounds.   Abdominal:      General: Abdomen is flat. Bowel sounds are normal.      Palpations: Abdomen is soft.   Musculoskeletal:         General: Normal range of motion.   Skin:     General: Skin is warm and dry.   Neurological:      General: No focal deficit present.      Mental Status: She is alert and oriented to person, place, and time. Mental status is at baseline.       Significant Labs: All pertinent labs within the past 24 hours have been reviewed.  CBC:   Recent Labs   Lab 12/17/22  0804   WBC 11.64*   HGB 11.4*   HCT 34.8*        CMP:   Recent Labs   Lab 12/17/22  0922      K 4.2   CL 99   CO2 27   GLU 92   BUN 9   CREATININE 0.95   CALCIUM 9.5   PROT 6.4   ALBUMIN 3.7   BILITOT 0.4   ALKPHOS 48*   AST 19   ALT 17   ANIONGAP 14     Cardiac Markers: No results for input(s): CKMB, MYOGLOBIN, BNP, TROPISTAT in the last 48 hours.    Significant Imaging: I have reviewed all pertinent imaging results/findings within the past 24 hours.      Assessment/Plan:      * Delmy-prosthetic fracture around prosthetic hip  Patient found to have a left femur fracture after a fall today.    Consult Orthopedics.  Will hold off on surgery for now because the patient is having chest tightness.  Will consult Cardiology      Chest pain  Patient complains of chest tightness, she rates it a 5/10.  Chest tightness has been intermittent for several days.    Trend out her troponins   Admit to telemetry   Hold off on surgery   Consult  Cardiology  Follow-up EKG      Acute cystitis without hematuria    Patient diagnosed with UTI proximally 5 days ago.  Will continue Ceftin    Hypertension  Blood pressure currently stable, will follow      Arthritis    Tylenol p.r.n..      VTE Risk Mitigation (From admission, onward)         Ordered     enoxaparin injection 40 mg  Daily         12/17/22 1253     IP VTE HIGH RISK PATIENT  Once         12/17/22 1253     Place sequential compression device  Until discontinued         12/17/22 1253                Discharge Planning   YANIV:      Code Status: Full Code   Is the patient medically ready for discharge?:     Reason for patient still in hospital (select all that apply): Treatment                     Tal Albert MD  Department of Hospital Medicine   [facility] - [address] [place] [address]

## 2022-12-18 NOTE — PT/OT/SLP PROGRESS
Occupational Therapy      Patient Name:  Zandra Veloz   MRN:  [id number]    Patient not seen today secondary to Therapist assessment (Pt is awaiting surgerery for her  hip fracture). Will follow-up 12/19/2022.    12/18/2022

## 2022-12-18 NOTE — HOSPITAL COURSE
PT reports pain in left leg adequately controlled, is resting comfortably, denies chest pain, pressure  or shortness of breath.

## 2022-12-18 NOTE — SUBJECTIVE & OBJECTIVE
Interval History:  Patient is still complain of hip pain.  Patient blood pressure has been borderline low.  Otherwise she is in no acute distress today.  She has had intermittent chest pain therefore we have consulted Cardiology.    Review of Systems   Constitutional:  Negative for appetite change and fever.   Respiratory:  Positive for chest tightness. Negative for shortness of breath.    Gastrointestinal:  Negative for abdominal pain.   Psychiatric/Behavioral:  Negative for agitation and confusion.    Objective:     Vital Signs (Most Recent):  Temp: 97.4 °F (36.3 °C) (12/18/22 1223)  Pulse: 104 (12/18/22 1223)  Resp: 19 (12/18/22 1223)  BP: 110/62 (12/18/22 1223)  SpO2: (!) 92 % (12/18/22 1223)   Vital Signs (24h Range):  Temp:  [97.4 °F (36.3 °C)-98.6 °F (37 °C)] 97.4 °F (36.3 °C)  Pulse:  [] 104  Resp:  [15-19] 19  SpO2:  [92 %-97 %] 92 %  BP: ()/(44-87) 110/62     Weight: 59 kg (130 lb)  Body mass index is 23.78 kg/m².  No intake or output data in the 24 hours ending 12/18/22 1318   Physical Exam  Vitals and nursing note reviewed.   Constitutional:       Appearance: Normal appearance.   Cardiovascular:      Rate and Rhythm: Normal rate and regular rhythm.      Pulses: Normal pulses.      Heart sounds: Normal heart sounds.   Pulmonary:      Effort: Pulmonary effort is normal.      Breath sounds: Normal breath sounds.   Abdominal:      General: Abdomen is flat. Bowel sounds are normal.      Palpations: Abdomen is soft.   Musculoskeletal:         General: Normal range of motion.   Skin:     General: Skin is warm and dry.   Neurological:      General: No focal deficit present.      Mental Status: She is alert and oriented to person, place, and time. Mental status is at baseline.       Significant Labs: All pertinent labs within the past 24 hours have been reviewed.  CBC:   Recent Labs   Lab 12/17/22  0804   WBC 11.64*   HGB 11.4*   HCT 34.8*        CMP:   Recent Labs   Lab 12/17/22  0922       K 4.2   CL 99   CO2 27   GLU 92   BUN 9   CREATININE 0.95   CALCIUM 9.5   PROT 6.4   ALBUMIN 3.7   BILITOT 0.4   ALKPHOS 48*   AST 19   ALT 17   ANIONGAP 14     Cardiac Markers: No results for input(s): CKMB, MYOGLOBIN, BNP, TROPISTAT in the last 48 hours.    Significant Imaging: I have reviewed all pertinent imaging results/findings within the past 24 hours.

## 2022-12-19 LAB
AORTIC ROOT ANNULUS: 2.7 CM
AORTIC VALVE CUSP SEPERATION: 2.13 CM
AV INDEX (PROSTH): 0.62
AV MEAN GRADIENT: 18 MMHG
AV PEAK GRADIENT: 36 MMHG
AV VALVE AREA: 1.94 CM2
AV VELOCITY RATIO: 0.67
BSA FOR ECHO PROCEDURE: 1.61 M2
CV ECHO LV RWT: 0.35 CM
DOP CALC AO PEAK VEL: 3 M/S
DOP CALC AO VTI: 47 CM
DOP CALC LVOT AREA: 3.1 CM2
DOP CALC LVOT DIAMETER: 2 CM
DOP CALC LVOT PEAK VEL: 2 M/S
DOP CALC LVOT STROKE VOLUME: 91.06 CM3
DOP CALC MV VTI: 81.1 CM
DOP CALCLVOT PEAK VEL VTI: 29 CM
E WAVE DECELERATION TIME: 242 MSEC
ECHO EF ESTIMATED: 65 %
ECHO LV POSTERIOR WALL: 0.74 CM (ref 0.6–1.1)
EJECTION FRACTION: 75 %
FRACTIONAL SHORTENING: 42 % (ref 28–44)
GLUCOSE SERPL-MCNC: 104 MG/DL (ref 70–105)
GLUCOSE SERPL-MCNC: 96 MG/DL (ref 70–105)
GLUCOSE SERPL-MCNC: 99 MG/DL (ref 70–105)
INTERVENTRICULAR SEPTUM: 1.01 CM (ref 0.6–1.1)
IVC OSTIUM: 1.3 CM
LEFT ATRIUM SIZE: 2 CM
LEFT INTERNAL DIMENSION IN SYSTOLE: 2.41 CM (ref 2.1–4)
LEFT VENTRICLE DIASTOLIC VOLUME INDEX: 47.13 ML/M2
LEFT VENTRICLE DIASTOLIC VOLUME: 77.3 ML
LEFT VENTRICLE MASS INDEX: 69 G/M2
LEFT VENTRICLE SYSTOLIC VOLUME INDEX: 12.4 ML/M2
LEFT VENTRICLE SYSTOLIC VOLUME: 20.4 ML
LEFT VENTRICULAR INTERNAL DIMENSION IN DIASTOLE: 4.17 CM (ref 3.5–6)
LEFT VENTRICULAR MASS: 112.89 G
LVOT MG: 8 MMHG
MV MEAN GRADIENT: 67 MMHG
MV PEAK E VEL: 3.5 M/S
MV PEAK GRADIENT: 121 MMHG
MV STENOSIS PRESSURE HALF TIME: 38 MS
MV VALVE AREA BY CONTINUITY EQUATION: 1.12 CM2
MV VALVE AREA P 1/2 METHOD: 5.79 CM2
OSMOLALITY SERPL: 271 MOSM/KG (ref 280–301)
PISA TR MAX VEL: 2.7 M/S
RA MAJOR: 3.3 CM
RA PRESSURE: 3 MMHG
RIGHT VENTRICULAR END-DIASTOLIC DIMENSION: 3.1 CM
TR MAX PG: 29 MMHG
TRICUSPID ANNULAR PLANE SYSTOLIC EXCURSION: 1.7 CM
TV REST PULMONARY ARTERY PRESSURE: 32 MMHG

## 2022-12-19 PROCEDURE — 99233 PR SUBSEQUENT HOSPITAL CARE,LEVL III: ICD-10-PCS | Mod: ,,, | Performed by: HOSPITALIST

## 2022-12-19 PROCEDURE — 82962 GLUCOSE BLOOD TEST: CPT

## 2022-12-19 PROCEDURE — 11000001 HC ACUTE MED/SURG PRIVATE ROOM

## 2022-12-19 PROCEDURE — 63600175 PHARM REV CODE 636 W HCPCS: Performed by: HOSPITALIST

## 2022-12-19 PROCEDURE — 87086 URINE CULTURE/COLONY COUNT: CPT | Performed by: HOSPITALIST

## 2022-12-19 PROCEDURE — 36415 COLL VENOUS BLD VENIPUNCTURE: CPT | Performed by: NURSE PRACTITIONER

## 2022-12-19 PROCEDURE — 63600175 PHARM REV CODE 636 W HCPCS: Performed by: NURSE PRACTITIONER

## 2022-12-19 PROCEDURE — 25000003 PHARM REV CODE 250: Performed by: HOSPITALIST

## 2022-12-19 PROCEDURE — 36415 COLL VENOUS BLD VENIPUNCTURE: CPT | Performed by: ORTHOPAEDIC SURGERY

## 2022-12-19 PROCEDURE — 99233 SBSQ HOSP IP/OBS HIGH 50: CPT | Mod: ,,, | Performed by: HOSPITALIST

## 2022-12-19 PROCEDURE — 25000003 PHARM REV CODE 250: Performed by: INTERNAL MEDICINE

## 2022-12-19 PROCEDURE — 83930 ASSAY OF BLOOD OSMOLALITY: CPT | Performed by: NURSE PRACTITIONER

## 2022-12-19 PROCEDURE — 25000003 PHARM REV CODE 250: Performed by: NURSE PRACTITIONER

## 2022-12-19 PROCEDURE — 63600175 PHARM REV CODE 636 W HCPCS: Performed by: INTERNAL MEDICINE

## 2022-12-19 RX ORDER — HYDROCODONE BITARTRATE AND ACETAMINOPHEN 7.5; 325 MG/1; MG/1
1 TABLET ORAL EVERY 4 HOURS PRN
Status: DISCONTINUED | OUTPATIENT
Start: 2022-12-19 | End: 2022-12-28

## 2022-12-19 RX ORDER — MORPHINE SULFATE 4 MG/ML
4 INJECTION, SOLUTION INTRAMUSCULAR; INTRAVENOUS EVERY 6 HOURS PRN
Status: DISCONTINUED | OUTPATIENT
Start: 2022-12-19 | End: 2022-12-28

## 2022-12-19 RX ORDER — ONDANSETRON 2 MG/ML
4 INJECTION INTRAMUSCULAR; INTRAVENOUS EVERY 6 HOURS PRN
Status: DISCONTINUED | OUTPATIENT
Start: 2022-12-19 | End: 2022-12-28

## 2022-12-19 RX ORDER — METOPROLOL TARTRATE 25 MG/1
25 TABLET, FILM COATED ORAL 2 TIMES DAILY
Status: DISCONTINUED | OUTPATIENT
Start: 2022-12-19 | End: 2022-12-20

## 2022-12-19 RX ORDER — DIPHENHYDRAMINE HYDROCHLORIDE 50 MG/ML
25 INJECTION INTRAMUSCULAR; INTRAVENOUS ONCE
Status: COMPLETED | OUTPATIENT
Start: 2022-12-19 | End: 2022-12-19

## 2022-12-19 RX ORDER — PROMETHAZINE HYDROCHLORIDE 25 MG/ML
25 INJECTION, SOLUTION INTRAMUSCULAR; INTRAVENOUS ONCE
Status: COMPLETED | OUTPATIENT
Start: 2022-12-19 | End: 2022-12-19

## 2022-12-19 RX ADMIN — CEFUROXIME AXETIL 500 MG: 250 TABLET, FILM COATED ORAL at 08:12

## 2022-12-19 RX ADMIN — CEFEPIME 1 G: 1 INJECTION, POWDER, FOR SOLUTION INTRAMUSCULAR; INTRAVENOUS at 05:12

## 2022-12-19 RX ADMIN — TRAMADOL HYDROCHLORIDE 50 MG: 50 TABLET ORAL at 04:12

## 2022-12-19 RX ADMIN — PANTOPRAZOLE SODIUM 40 MG: 40 TABLET, DELAYED RELEASE ORAL at 08:12

## 2022-12-19 RX ADMIN — PROMETHAZINE HYDROCHLORIDE 25 MG: 25 INJECTION INTRAMUSCULAR; INTRAVENOUS at 10:12

## 2022-12-19 RX ADMIN — HYDROMORPHONE HYDROCHLORIDE 1 MG: 2 INJECTION, SOLUTION INTRAMUSCULAR; INTRAVENOUS; SUBCUTANEOUS at 11:12

## 2022-12-19 RX ADMIN — SODIUM CHLORIDE: 9 INJECTION, SOLUTION INTRAVENOUS at 07:12

## 2022-12-19 RX ADMIN — HYDROCODONE BITARTRATE AND ACETAMINOPHEN 1 TABLET: 7.5; 325 TABLET ORAL at 08:12

## 2022-12-19 RX ADMIN — DIPHENHYDRAMINE HYDROCHLORIDE 25 MG: 50 INJECTION INTRAMUSCULAR; INTRAVENOUS at 10:12

## 2022-12-19 RX ADMIN — HYDROMORPHONE HYDROCHLORIDE 1 MG: 2 INJECTION, SOLUTION INTRAMUSCULAR; INTRAVENOUS; SUBCUTANEOUS at 04:12

## 2022-12-19 RX ADMIN — SODIUM CHLORIDE: 9 INJECTION, SOLUTION INTRAVENOUS at 08:12

## 2022-12-19 RX ADMIN — MORPHINE SULFATE 4 MG: 4 INJECTION, SOLUTION INTRAMUSCULAR; INTRAVENOUS at 05:12

## 2022-12-19 RX ADMIN — ONDANSETRON 4 MG: 2 INJECTION INTRAMUSCULAR; INTRAVENOUS at 04:12

## 2022-12-19 RX ADMIN — LEVOTHYROXINE SODIUM 125 MCG: 0.12 TABLET ORAL at 05:12

## 2022-12-19 RX ADMIN — ONDANSETRON 4 MG: 2 INJECTION INTRAMUSCULAR; INTRAVENOUS at 08:12

## 2022-12-19 RX ADMIN — TRAMADOL HYDROCHLORIDE 50 MG: 50 TABLET ORAL at 10:12

## 2022-12-19 RX ADMIN — METOPROLOL TARTRATE 25 MG: 25 TABLET, FILM COATED ORAL at 05:12

## 2022-12-19 NOTE — PLAN OF CARE
[facility]  Initial Discharge Assessment       Primary Care Provider: Brandon Thornton MD    Admission Diagnosis: Fall [W19.XXXA]  Closed fracture of left hip, initial encounter [S72.002A]  Skin tear of upper arm without complication, right, initial encounter [S41.111A]  Periprosthetic fracture of hip, initial encounter [M97.8XXA, Z96.649]  Skin tear of left forearm without complication, initial encounter [S51.812A]    Admission Date: 12/17/2022  Expected Discharge Date:     Discharge Barriers Identified: None    Payor: MEDICARE / Plan: MEDICARE PART A & B / Product Type: Government /     Extended Emergency Contact Information  Primary Emergency Contact: JOSSY PRITCHETT  Mobile Phone: [phone]  Relation: Daughter  Preferred language: English   needed? No  Secondary Emergency Contact: RUDI PERRIN  Mobile Phone: [phone]  Relation: Daughter  Preferred language: English    Discharge Plan A: Home  Discharge Plan B: Home      [facility] - [place], [place] - [address]  [address] [facility] [address]  Phone: [phone] Fax: [phone]    [id number] [address] [facility]  [address]  [facility] [address]  Phone: [phone] Fax: [phone]    [facility], [place] - [address]  [address] [facility] [address]  Phone: [phone] Fax: [phone]      Initial Assessment (most recent)       Adult Discharge Assessment - 12/19/22 1356          Discharge Assessment    Assessment Type Discharge Planning Assessment     Source of Information patient     Communicated YANIV with patient/caregiver Date not available/Unable to determine     People in Home alone     Do you expect to return to your current living situation? Yes     Do you have help at home or someone to help you manage your care at home? Yes     Who are your caregiver(s) and  their phone number(s)? Eunice Gill (daughter) [phone]     Prior to hospitilization cognitive status: Alert/Oriented     Current cognitive status: Alert/Oriented     Walking or Climbing Stairs ambulation difficulty, assistance 1 person;ambulation difficulty, requires equipment     Dressing/Bathing bathing difficulty, assistance 1 person     Home Accessibility wheelchair accessible     Home Layout Able to live on 1st floor     Equipment Currently Used at Home walker, rolling;bedside commode     Readmission within 30 days? No     Patient currently being followed by outpatient case management? No     Do you currently have service(s) that help you manage your care at home? No     Do you take prescription medications? Yes     Do you have prescription coverage? Yes     Do you have any problems affording any of your prescribed medications? No     Is the patient taking medications as prescribed? yes     How do you get to doctors appointments? family or friend will provide     Are you on dialysis? No     Do you take coumadin? No     Discharge Plan A Home     Discharge Plan B Home     DME Needed Upon Discharge  none     Discharge Plan discussed with: Patient     Discharge Barriers Identified None        Physical Activity    On average, how many days per week do you engage in moderate to strenuous exercise (like a brisk walk)? 0 days     On average, how many minutes do you engage in exercise at this level? 0 min        Financial Resource Strain    How hard is it for you to pay for the very basics like food, housing, medical care, and heating? Not hard at all        Housing Stability    In the last 12 months, was there a time when you were not able to pay the mortgage or rent on time? No     In the last 12 months, how many places have you lived? 1     In the last 12 months, was there a time when you did not have a steady place to sleep or slept in a shelter (including now)? No        Transportation Needs    In the past 12  months, has lack of transportation kept you from medical appointments or from getting medications? No     In the past 12 months, has lack of transportation kept you from meetings, work, or from getting things needed for daily living? No        Food Insecurity    Within the past 12 months, you worried that your food would run out before you got the money to buy more. Never true     Within the past 12 months, the food you bought just didn't last and you didn't have money to get more. Never true        Stress    Do you feel stress - tense, restless, nervous, or anxious, or unable to sleep at night because your mind is troubled all the time - these days? Not at all        Social Connections    In a typical week, how many times do you talk on the phone with family, friends, or neighbors? More than three times a week     How often do you get together with friends or relatives? More than three times a week     How often do you attend Religious or Adventist services? Never     Do you belong to any clubs or organizations such as Religious groups, unions, fraternal or athletic groups, or school groups? No     How often do you attend meetings of the clubs or organizations you belong to? Never     Are you , , , , never , or living with a partner?         Alcohol Use    Q1: How often do you have a drink containing alcohol? Never     Q2: How many drinks containing alcohol do you have on a typical day when you are drinking? Patient does not drink     Q3: How often do you have six or more drinks on one occasion? Never                   Patient lives home alone, but she has 24/7 care (private pay). She has a walker and bsc for home use. She is not current with home health, but would like to use [facility] if needed, choice obtained. IMM reviewed and signature obtained. SDOH complete. SS following for discharge needs as arise.

## 2022-12-19 NOTE — H&P (VIEW-ONLY)
Tiffanie Veloz is an 88-year-old female known to me having undergone bipolar endoprosthetic replacement of her left hip for femoral neck fracture 09/21/2021.  She did well in the postoperative period until 2 days ago.  At that time she was bending forward in her closet and could not stop her forward motion.  She fell to the ground injuring her left hip.  She was seen at [facility] emergency room where x-rays revealed a comminuted displaced three-part periprosthetic fracture left proximal femur.  She was admitted to the hospitalist service and has been undergoing preoperative medical evaluation and clearance.  She has history of Pseudomonas infection in October of this year.  Her UA on admission showed 5-10 white blood cells and only few bacteria.  She denied chest pain, syncope or shortness of breaths has antecedent factors in her falling.  She has extremely thin skin and has sustained some degloving type skin injuries to the upper extremities that are being treated by wound care.  Additionally she was seen by Cardiology.  All have advised that she is at increased risk for any surgery due to her age and frail condition.  It was felt that she was slightly dehydrated and she is receiving IV fluids.  Family understands that without surgery she would be in constant pain and unable to weightbear or rehab in the future.  I have recommended modular femoral hip stem replacement and repair of the periprosthetic femur fracture.  The potential benefits and risks of surgery again were outlined to include but not limited to bleeding, infection, damage to blood vessels nerves, need for further surgery, other risks and complications including even death the patient and family wished proceed.

## 2022-12-19 NOTE — ASSESSMENT & PLAN NOTE
- Chest pain most consistent with musculoskeletal etiology.  Will order echo to evaluate for subsegmental wall motion abnormalities suggestive of previous MI and serial troponin x 2.     12/19/22:  - pt denies chest pain on exam today  - echo shows hyperdynamic EF of 75%, LVDD and moderate to severe MR  - agree with continuous IV fluids for hydration  - Dr. Seth has seen and evaluated this pt  - recommends to adequately hydrate pt, control BP and HR perioperatively  - pt is at moderately increased cardiac risk for hip surgery

## 2022-12-19 NOTE — PROGRESS NOTES
[facility] - [address] [place] [address]  Orthopaedic Progress Note  Progress Note    Subjective:     Interval History:  No acute events.  On evaluation today patient was undergoing an echo.    Post-Op Info:  * No surgery found *         Medications:  Continuous Infusions:   sodium chloride 0.9% 75 mL/hr at 12/19/22 0751     Scheduled Meds:   cefUROXime  500 mg Oral Q12H    enoxaparin  40 mg Subcutaneous Daily    levothyroxine  125 mcg Oral Before breakfast    pantoprazole  40 mg Oral Daily    polyethylene glycol  17 g Oral Daily     PRN Meds:dextrose 10%, dextrose 10%, glucagon (human recombinant), HYDROmorphone, insulin aspart U-100, magnesium oxide, magnesium oxide, melatonin, naloxone, potassium bicarbonate, potassium bicarbonate, potassium bicarbonate, potassium, sodium phosphates, potassium, sodium phosphates, potassium, sodium phosphates, sodium chloride 0.9%, traMADoL     Objective:     Vital Signs (Most Recent):  Temp: 97.9 °F (36.6 °C) (12/19/22 0718)  Pulse: 99 (12/19/22 0718)  Resp: 18 (12/19/22 0718)  BP: (!) 145/64 (12/19/22 0718)  SpO2: (!) 94 % (12/19/22 0718)   Vital Signs (24h Range):  Temp:  [97.4 °F (36.3 °C)-100.2 °F (37.9 °C)] 97.9 °F (36.6 °C)  Pulse:  [] 99  Resp:  [16-20] 18  SpO2:  [87 %-98 %] 94 %  BP: (110-150)/(46-64) 145/64       Intake/Output Summary (Last 24 hours) at 12/19/2022 0905  Last data filed at 12/19/2022 0607  Gross per 24 hour   Intake --   Output 700 ml   Net -700 ml       Physical Exam thorough physical exam not performed today as patient was undergoing echo during my visit    Significant Labs:  Recent Lab Results         12/19/22  0434   12/18/22  1828   12/18/22  1652   12/18/22  1209        POC Glucose 99     126   159       Troponin I High Sensitivity   33.2                   Significant Diagnostics:  CT Cervical Spine Without Contrast  Narrative: EXAMINATION:  CT CERVICAL SPINE WITHOUT CONTRAST    CLINICAL HISTORY:  Neck trauma (Age >=  65y);    TECHNIQUE:  Axial CT imaging of the cervical spine is performed without contrast.  Computer reformatting is viewed in the sagittal and coronal planes.    CT dose reduction technique used - Dose Rite and tube current modulation.    COMPARISON:  None available    FINDINGS:  No fracture is seen.  Alignment of the cervical spine is within normal limits.  Vertebral body heights are normal.  Multilevel degenerative changes present.  No other abnormality is demonstrated.  Impression: No evidence of acute injury demonstrated    Electronically signed by: Pranav Labm  Date:    12/17/2022  Time:    10:33  CT Head Without Contrast  Narrative: EXAMINATION:  CT HEAD WITHOUT CONTRAST    CLINICAL HISTORY:  Head trauma, moderate-severe;    TECHNIQUE:  Axial CT imaging of the brain is performed without contrast with 3 mm increments.    CT dose reduction technique used - Dose Rite and tube current modulation.    COMPARISON:  13 December 2022    FINDINGS:  No evidence of hemorrhage,  mass,  mass effect,  midline shift or acute infarct seen.  There is moderate diffuse cerebral atrophy.  There are areas of decreased density seen within the white matter.  Otherwise the brain parenchyma attenuation and differentiation appears within normal limits. The ventricles and cisterns are normal in caliber.  No cranial or skull base abnormality is identified.  Impression: No evidence of acute process or interval change.    Electronically signed by: Pranav Lamb  Date:    12/17/2022  Time:    10:29  X-Ray Femur AP/LAT Left  Narrative: EXAMINATION:  XR femur left two views    CLINICAL HISTORY:  Left femur pain, fracture    COMPARISON:  Prior radiograph 03/17/2022    FINDINGS:  There is an acute mildly comminuted proximal left femur fracture with no evidence of hip dislocation. Left hip arthroplasty hardware is noted in place at the level of the fracture. No suspicious osseous or soft tissue lesions are identified. Osteopenia is  noted.  Impression: As above.    Place of service: [facility]    Electronically signed by: Edi Mccullough  Date:    12/17/2022  Time:    09:26  X-Ray Hip 2 or 3 views Left (with Pelvis when performed)  Narrative: EXAMINATION:  XR hip, pelvis left with three views    CLINICAL HISTORY:  Fall, pain    COMPARISON:  02/01/2022 radiograph    FINDINGS:  Bilateral hip arthroplasty hardware is in place.  There is an acute mildly comminuted fracture of the left hip joint associated with the left hip hardware.  The hardware however appears intact with no evidence of dislocation at the hip joint.  Osseous pelvis is grossly intact as visualized.  Somewhat prominent osteopenia is noted.  Old pelvic fractures are suggested bilaterally.  No suspicious osseous lesions.  No focal soft tissue abnormality otherwise identified.  Impression: As above.    Place of service: [facility]    Electronically signed by: Edi Mccullough  Date:    12/17/2022  Time:    09:11  X-Ray Chest 1 View  Narrative: EXAMINATION:  XR CHEST 1 VIEW    CLINICAL HISTORY:  Unspecified fall, initial encounter    COMPARISON:  Prior radiograph 10/13/2022    FINDINGS:  The cardiomediastinal silhouette is within normal limits. The lungs are predominantly clear with at least mild underlying interstitial scarring changes suggested.  There is no pneumothorax or pleural effusion.    There is no acute osseous or soft tissue abnormality.  Impression: No acute cardiopulmonary process.    Place of service: [facility]    Electronically signed by: Edi Mccullough  Date:    12/17/2022  Time:    08:51           Assessment/Plan:     Active Diagnoses:    Diagnosis Date Noted POA    PRINCIPAL PROBLEM:  Delmy-prosthetic fracture around prosthetic hip [M97.8XXA, Z96.649] 12/17/2022 Not Applicable    Other fracture of left femur, initial encounter for closed fracture [S72.8X2A] 12/18/2022 Unknown    Palpitations [R00.2] 12/18/2022 Unknown     Acute cystitis without hematuria [N30.00] 12/17/2022 Yes    Chest pain [R07.9] 12/17/2022 Yes    Primary osteoarthritis of right knee [M17.11] 08/11/2022 Yes    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  Likely undergo surgical intervention with Dr. Carty tomorrow      Byran Bates MD  Orthopaedic Surgery  [facility] - [facility] Telemetry

## 2022-12-19 NOTE — CONSULTS
[facility] - [address] [place] [address] Telemetry  Cardiology  Consult Note    Patient Name: Zandra Veloz  MRN: [id number]  Admission Date: 12/17/2022  Hospital Length of Stay: 1 days  Code Status: Full Code   Attending Provider: Tal Albert MD   Consulting Provider: Tonio Hanley DO  Primary Care Physician: Brandon Thornton MD  Principal Problem:Delmy-prosthetic fracture around prosthetic hip    Patient information was obtained from patient, relative(s) and ER records.     Consults  Subjective:     Chief Complaint:  Left hip, chest pain     HPI:   CC: left hip pain  HPI: Pt reports she was bending forward in her closet, could not stop herself from continuing forward, fell on her face with immediate pain in left hip and leg.  Notes she was unable to stand since that event. She also notes has had diffuse chest pain, mostly left sided, worse with deep inspiration and with coughing.  She was not experiencing chest tightness or discomfort prior to the fall.  She has had two months of increasing weakness and fatique, her daughter at bedside reports she is much less active, spending more time in her chair.  No specific complaints, however notes increased fatigue and tires more easily.  She is avoiding activity which could provoke increased fatigue.     PT has vague history of irregular heart beat for which she was started on diltiazem several years ago, no symptoms at present      Past Medical History:   Diagnosis Date    Hypertension     Osteoporosis     Thyroid disorder        Past Surgical History:   Procedure Laterality Date    BLADDER SURGERY      HEMIARTHROPLASTY OF HIP Left 9/21/2021    Procedure: HEMIARTHROPLASTY, HIP;  Surgeon: Dequan Singer MD;  Location: [facility];  Service: Orthopedics;  Laterality: Left;    PARTIAL HIP ARTHROPLASTY Right     Dr Singer       Review of patient's allergies indicates:   Allergen Reactions    Bactrim [sulfamethoxazole-trimethoprim]      Codeine     Levaquin [levofloxacin]        No current facility-administered medications on file prior to encounter.     Current Outpatient Medications on File Prior to Encounter   Medication Sig    ALPRAZolam (XANAX) 1 MG tablet Take 1 tablet (1 mg total) by mouth 2 (two) times daily.    cefUROXime (CEFTIN) 500 MG tablet Take 1 tablet (500 mg total) by mouth every 12 (twelve) hours. for 10 days    diclofenac sodium (VOLTAREN) 1 % Gel Apply 2 g topically 2 (two) times daily.    diltiaZEM (CARDIZEM CD) 120 MG Cp24 Take 1 capsule (120 mg total) by mouth once daily.    famotidine (PEPCID) 20 MG tablet Take 1 tablet (20 mg total) by mouth once daily.    gabapentin (NEURONTIN) 100 MG capsule Take 1 capsule (100 mg total) by mouth 3 (three) times daily.    HYDROcodone-acetaminophen (NORCO) 5-325 mg per tablet Take 1 tablet by mouth every 6 (six) hours as needed for Pain.    HYDROcodone-acetaminophen (NORCO) 7.5-325 mg per tablet Take 1 tablet by mouth every 8 (eight) hours as needed for Pain.    levothyroxine (SYNTHROID) 125 MCG tablet Take 1 tablet (125 mcg total) by mouth before breakfast.    lisinopriL (PRINIVIL,ZESTRIL) 20 MG tablet Take 1 tablet (20 mg total) by mouth once daily.    lisinopriL 10 MG tablet Take 1 tablet (10 mg total) by mouth once daily. (Patient not taking: Reported on 7/12/2022)    lisinopriL-hydrochlorothiazide (PRINZIDE,ZESTORETIC) 20-12.5 mg per tablet Take 1 tablet by mouth once daily.    mupirocin (BACTROBAN) 2 % ointment Apply topically 3 (three) times daily. (Patient not taking: Reported on 7/12/2022)    nitrofurantoin, macrocrystal-monohydrate, (MACROBID) 100 MG capsule Take 1 capsule (100 mg total) by mouth 2 (two) times daily.    pantoprazole (PROTONIX) 40 MG tablet Take 1 tablet (40 mg total) by mouth once daily.     Family History       Problem Relation (Age of Onset)    Bladder Cancer Father    Heart attack Mother    Heart disease Mother    No Known Problems Sister,  Brother, Maternal Grandmother, Maternal Grandfather, Paternal Grandmother, Paternal Grandfather          Tobacco Use    Smoking status: Former    Smokeless tobacco: Never   Substance and Sexual Activity    Alcohol use: Never    Drug use: Never    Sexual activity: Not Currently     Review of Systems   Constitutional: Positive for malaise/fatigue. Negative for diaphoresis, night sweats and weight gain.   HENT:  Negative for congestion, ear pain, hearing loss, nosebleeds and sore throat.    Eyes:  Negative for blurred vision, double vision, pain, photophobia and visual disturbance.   Cardiovascular:  Positive for dyspnea on exertion and palpitations. Negative for chest pain, claudication, irregular heartbeat, leg swelling, near-syncope, orthopnea and syncope.   Respiratory:  Positive for shortness of breath. Negative for cough, sleep disturbances due to breathing, snoring and wheezing.    Endocrine: Negative for cold intolerance, heat intolerance, polydipsia, polyphagia and polyuria.   Hematologic/Lymphatic: Negative for bleeding problem. Does not bruise/bleed easily.   Skin:  Negative for dry skin, flushing, itching, rash and skin cancer.   Musculoskeletal:  Negative for arthritis, back pain, falls, joint pain, muscle cramps, muscle weakness and myalgias.   Gastrointestinal:  Negative for abdominal pain, change in bowel habit, constipation, diarrhea, dysphagia, heartburn, nausea and vomiting.   Genitourinary:  Negative for bladder incontinence, dysuria, flank pain, frequency and nocturia.   Neurological:  Positive for light-headedness and loss of balance. Negative for dizziness, focal weakness, headaches, numbness, paresthesias and seizures.   Psychiatric/Behavioral:  Negative for depression, memory loss and substance abuse. The patient is not nervous/anxious.    Allergic/Immunologic: Negative for environmental allergies.   Objective:     Vital Signs (Most Recent):  Temp: 97.8 °F (36.6 °C) (12/18/22 1726)  Pulse:  106 (12/18/22 1726)  Resp: 19 (12/18/22 1726)  BP: 131/60 (12/18/22 1726)  SpO2: (!) 87 % (12/18/22 1726)   Vital Signs (24h Range):  Temp:  [97.4 °F (36.3 °C)-98.6 °F (37 °C)] 97.8 °F (36.6 °C)  Pulse:  [] 106  Resp:  [16-19] 19  SpO2:  [87 %-96 %] 87 %  BP: ()/(44-87) 131/60     Weight: 59 kg (130 lb)  Body mass index is 23.78 kg/m².    SpO2: (!) 87 %       No intake or output data in the 24 hours ending 12/18/22 1753    Lines/Drains/Airways       Peripheral Intravenous Line  Duration                  Peripheral IV - Single Lumen 12/17/22 2000 22 G Posterior;Right;Medial Hand <1 day                    Physical Exam  Vitals and nursing note reviewed.   Constitutional:       Appearance: Normal appearance. She is normal weight. She is ill-appearing.   HENT:      Head: Normocephalic and atraumatic.      Right Ear: External ear normal.      Left Ear: External ear normal.   Eyes:      General: No scleral icterus.        Right eye: No discharge.         Left eye: No discharge.      Extraocular Movements: Extraocular movements intact.      Conjunctiva/sclera: Conjunctivae normal.      Pupils: Pupils are equal, round, and reactive to light.   Neck:      Vascular: Carotid bruit present.   Cardiovascular:      Rate and Rhythm: Normal rate and regular rhythm.      Pulses: Normal pulses.      Heart sounds: Murmur heard.     No friction rub. No gallop.   Pulmonary:      Effort: Pulmonary effort is normal.      Breath sounds: Normal breath sounds. No wheezing, rhonchi or rales.   Chest:      Chest wall: No tenderness.   Abdominal:      General: Abdomen is flat. Bowel sounds are normal. There is no distension.      Palpations: Abdomen is soft.      Tenderness: There is no abdominal tenderness. There is no guarding or rebound.   Musculoskeletal:         General: Deformity present. No swelling or tenderness. Normal range of motion.      Cervical back: Normal range of motion and neck supple.   Skin:     General: Skin is  warm and dry.      Findings: Bruising present. No erythema or rash.   Neurological:      General: No focal deficit present.      Mental Status: She is alert and oriented to person, place, and time.      Cranial Nerves: No cranial nerve deficit.      Motor: No weakness.      Gait: Gait abnormal.   Psychiatric:         Mood and Affect: Mood normal.         Behavior: Behavior normal.         Thought Content: Thought content normal.         Judgment: Judgment normal.       Significant Labs: BMP:   Recent Labs   Lab 12/17/22  0922   GLU 92      K 4.2   CL 99   CO2 27   BUN 9   CREATININE 0.95   CALCIUM 9.5   , CBC   Recent Labs   Lab 12/17/22  0804   WBC 11.64*   HGB 11.4*   HCT 34.8*      , and Troponin No results for input(s): TROPONINI in the last 48 hours.    Significant Imaging:     Assessment and Plan:     Chest pain  Chest pain most consistent with musculoskeletal etiology.  Will order echo to evaluate for subsegmental wall motion abnormalities suggestive of previous MI and serial troponin x 2.     Hypertension  Controlled on current meds.         VTE Risk Mitigation (From admission, onward)         Ordered     enoxaparin injection 40 mg  Daily         12/17/22 1253     IP VTE HIGH RISK PATIENT  Once         12/17/22 1253     Place sequential compression device  Until discontinued         12/17/22 1253                Thank you for your consult. I will follow-up with patient. Please contact us if you have any additional questions.    Tonio Hanley, DO  Cardiology   [facility] - [address] [place] [address]

## 2022-12-19 NOTE — PT/OT/SLP PROGRESS
Physical Therapy      Patient Name:  Zandra Veloz   MRN:  [id number]    Patient not seen today secondary to Other (Comment) (Pt awaiting surgical fixation of hip. Scheduled to have surgery with Dr. Singer tomorrow). Will follow-up 12/20/22.

## 2022-12-19 NOTE — ASSESSMENT & PLAN NOTE
- hypertensive and mildly tachycardic  - start metoprolol tartrate 25mg po bid now  - monitor BP, add/adjust meds as needed to control BP and HR perioperatively

## 2022-12-19 NOTE — SUBJECTIVE & OBJECTIVE
Interval History: Lying in bed awake, alert. Chest pain has resolved. No distress. Complains of nausea. Family at bedside.     Review of Systems   Constitutional: Negative for diaphoresis.   Cardiovascular:  Negative for irregular heartbeat, leg swelling, near-syncope, orthopnea, palpitations and syncope.        Musculoskeletal chest pain   Respiratory:  Negative for shortness of breath.    Gastrointestinal:  Positive for nausea. Negative for vomiting.   Objective:     Vital Signs (Most Recent):  Temp: 98 °F (36.7 °C) (12/19/22 1025)  Pulse: 100 (12/19/22 1025)  Resp: 20 (12/19/22 1106)  BP: (!) 183/78 (12/19/22 1025)  SpO2: 98 % (12/19/22 1025)   Vital Signs (24h Range):  Temp:  [97.8 °F (36.6 °C)-100.2 °F (37.9 °C)] 98 °F (36.7 °C)  Pulse:  [] 100  Resp:  [16-20] 20  SpO2:  [87 %-98 %] 98 %  BP: (112-183)/(46-78) 183/78     Weight: 63.3 kg (139 lb 8.8 oz)  Body mass index is 25.52 kg/m².     SpO2: 98 %         Intake/Output Summary (Last 24 hours) at 12/19/2022 1455  Last data filed at 12/19/2022 0607  Gross per 24 hour   Intake --   Output 700 ml   Net -700 ml       Lines/Drains/Airways       Peripheral Intravenous Line  Duration                  Peripheral IV - Single Lumen 12/17/22 2000 22 G Posterior;Right;Medial Hand 1 day                    Physical Exam  Vitals reviewed.   Constitutional:       General: She is not in acute distress.     Appearance: Normal appearance.   Eyes:      Pupils: Pupils are equal, round, and reactive to light.   Cardiovascular:      Rate and Rhythm: Normal rate and regular rhythm.      Pulses: Normal pulses.      Heart sounds: Normal heart sounds.   Pulmonary:      Effort: Pulmonary effort is normal.      Breath sounds: Normal breath sounds.   Skin:     General: Skin is warm and dry.   Neurological:      Mental Status: She is alert and oriented to person, place, and time.   Psychiatric:         Mood and Affect: Mood normal.         Behavior: Behavior normal.       Significant  Labs: All pertinent lab results from the last 24 hours have been reviewed. and   Recent Lab Results  (Last 5 results in the past 24 hours)        12/19/22  1047   12/19/22  0857   12/19/22  0434   12/18/22  1828   12/18/22  1652        Ao root annulus   2.70             Ao peak bharath   3.0             Ao VTI   47.00             AV valve area   1.94             AORTIC VALVE CUSP SEPERATION   2.13             AV mean gradient   18             AV index (prosthetic)   0.62             AV peak gradient   36             AV Velocity Ratio   0.67             BSA   1.61             Left Ventricle Relative Wall Thickness   0.35             Echo EF Estimated   65             EF   75             E wave deceleration time   242.00             FS   42             IVC ostium   1.3             IVSd   1.01             LA size   2.00             LVOT area   3.1             LV EDV BP   77.30             LV Diastolic Volume Index   47.13             LVIDd   4.17             LVIDs   2.41             LV mass   112.89             LV Mass Index   69             Left Ventricular Outflow Tract Mean Gradient   8.00             LVOT diameter   2.00             LVOT peak bharath   2.0             LVOT stroke volume   91.06             LVOT peak VTI   29.00             LV ESV BP   20.40             LV Systolic Volume Index   12.4             MV valve area p 1/2 method   5.79             MV valve area by continuity eq   1.12             MV mean gradient   67             MV peak gradient   121             MV Peak E Bharath   3.50             MV stenosis pressure 1/2 time   38.00             MV VTI   81.1             POC Glucose 104     99     126       Posterior Wall   0.74             Right Atrial Pressure (from IVC)   3             RA Major Axis   3.30             RVDD   3.10             TAPSE   1.70             Triscuspid Valve Regurgitation Peak Gradient   29             TR Max Bharath   2.70             Troponin I High Sensitivity       33.2         TV rest  pulmonary artery pressure   32                                    Significant Imaging: Echocardiogram: Transthoracic echo (TTE) complete (Cupid Only):   Results for orders placed or performed during the hospital encounter of 12/17/22   Echo   Result Value Ref Range    BSA 1.61 m2    Right Atrial Pressure (from IVC) 3 mmHg    EF 75 %    Left Ventricular Outflow Tract Mean Gradient 8.00 mmHg    AORTIC VALVE CUSP SEPERATION 2.13 cm    LVIDd 4.17 3.5 - 6.0 cm    IVS 1.01 0.6 - 1.1 cm    Posterior Wall 0.74 0.6 - 1.1 cm    Ao root annulus 2.70 cm    LVIDs 2.41 2.1 - 4.0 cm    FS 42 28 - 44 %    IVC ostium 1.3 cm    LV mass 112.89 g    LA size 2.00 cm    RVDD 3.10 cm    TAPSE 1.70 cm    Left Ventricle Relative Wall Thickness 0.35 cm    AV mean gradient 18 mmHg    AV valve area 1.94 cm2    AV Velocity Ratio 0.67     AV index (prosthetic) 0.62     MV mean gradient 67 mmHg    MV valve area p 1/2 method 5.79 cm2    MV valve area by continuity eq 1.12 cm2    E wave deceleration time 242.00 msec    LVOT diameter 2.00 cm    LVOT area 3.1 cm2    LVOT peak bharath 2.0 m/s    LVOT peak VTI 29.00 cm    Ao peak bharath 3.0 m/s    Ao VTI 47.00 cm    LVOT stroke volume 91.06 cm3    AV peak gradient 36 mmHg    MV peak gradient 121 mmHg    TV rest pulmonary artery pressure 32 mmHg    MV Peak E Bharath 3.50 m/s    TR Max Bharath 2.70 m/s    MV VTI 81.1 cm    MV stenosis pressure 1/2 time 38.00 ms    LV Systolic Volume 20.40 mL    LV Systolic Volume Index 12.4 mL/m2    LV Diastolic Volume 77.30 mL    LV Diastolic Volume Index 47.13 mL/m2    LV Mass Index 69 g/m2    Echo EF Estimated 65 %    RA Major Axis 3.30 cm    Triscuspid Valve Regurgitation Peak Gradient 29 mmHg    Narrative    · The left ventricle is normal in size with hyperdynamic systolic   function.  · The estimated ejection fraction is 75%.  · Left ventricular diastolic dysfunction.  · Normal right ventricular size.  · There is mild aortic valve stenosis.  · Aortic valve area is 1.94 cm2; peak  velocity is 3.0 m/s; mean gradient   is 18 mmHg.  · Moderate-to-severe mitral regurgitation.  · Mild tricuspid regurgitation.  · Mild pulmonic regurgitation.  · Normal central venous pressure (3 mmHg).  · The estimated PA systolic pressure is 32 mmHg.  · Trivial pericardial effusion.

## 2022-12-20 PROBLEM — R01.1 HEART MURMUR: Status: ACTIVE | Noted: 2022-12-20

## 2022-12-20 LAB
ANION GAP SERPL CALCULATED.3IONS-SCNC: 15 MMOL/L (ref 7–16)
BASOPHILS # BLD AUTO: 0.08 K/UL (ref 0–0.2)
BASOPHILS NFR BLD AUTO: 0.7 % (ref 0–1)
BUN SERPL-MCNC: 9 MG/DL (ref 7–18)
BUN/CREAT SERPL: 12 (ref 6–20)
CALCIUM SERPL-MCNC: 8.4 MG/DL (ref 8.5–10.1)
CHLORIDE SERPL-SCNC: 99 MMOL/L (ref 98–107)
CO2 SERPL-SCNC: 23 MMOL/L (ref 21–32)
CREAT SERPL-MCNC: 0.75 MG/DL (ref 0.55–1.02)
DIFFERENTIAL METHOD BLD: ABNORMAL
EGFR (NO RACE VARIABLE) (RUSH/TITUS): 77 ML/MIN/1.73M²
EOSINOPHIL # BLD AUTO: 0.07 K/UL (ref 0–0.5)
EOSINOPHIL NFR BLD AUTO: 0.6 % (ref 1–4)
ERYTHROCYTE [DISTWIDTH] IN BLOOD BY AUTOMATED COUNT: 12.6 % (ref 11.5–14.5)
GLUCOSE SERPL-MCNC: 109 MG/DL (ref 70–105)
GLUCOSE SERPL-MCNC: 113 MG/DL (ref 70–105)
GLUCOSE SERPL-MCNC: 175 MG/DL (ref 70–105)
GLUCOSE SERPL-MCNC: 89 MG/DL (ref 70–105)
GLUCOSE SERPL-MCNC: 92 MG/DL (ref 74–106)
GLUCOSE SERPL-MCNC: 95 MG/DL (ref 70–105)
HCT VFR BLD AUTO: 28.5 % (ref 38–47)
HGB BLD-MCNC: 9.5 G/DL (ref 12–16)
IMM GRANULOCYTES # BLD AUTO: 0.18 K/UL (ref 0–0.04)
IMM GRANULOCYTES NFR BLD: 1.5 % (ref 0–0.4)
LYMPHOCYTES # BLD AUTO: 1.22 K/UL (ref 1–4.8)
LYMPHOCYTES NFR BLD AUTO: 10.2 % (ref 27–41)
MCH RBC QN AUTO: 30.3 PG (ref 27–31)
MCHC RBC AUTO-ENTMCNC: 33.3 G/DL (ref 32–36)
MCV RBC AUTO: 90.8 FL (ref 80–96)
MONOCYTES # BLD AUTO: 0.73 K/UL (ref 0–0.8)
MONOCYTES NFR BLD AUTO: 6.1 % (ref 2–6)
MPC BLD CALC-MCNC: 9.1 FL (ref 9.4–12.4)
NEUTROPHILS # BLD AUTO: 9.7 K/UL (ref 1.8–7.7)
NEUTROPHILS NFR BLD AUTO: 80.9 % (ref 53–65)
NRBC # BLD AUTO: 0 X10E3/UL
NRBC, AUTO (.00): 0 %
PLATELET # BLD AUTO: 239 K/UL (ref 150–400)
POTASSIUM SERPL-SCNC: 4.5 MMOL/L (ref 3.5–5.1)
RBC # BLD AUTO: 3.14 M/UL (ref 4.2–5.4)
SODIUM SERPL-SCNC: 132 MMOL/L (ref 136–145)
WBC # BLD AUTO: 11.98 K/UL (ref 4.5–11)

## 2022-12-20 PROCEDURE — 85025 COMPLETE CBC W/AUTO DIFF WBC: CPT | Performed by: NURSE PRACTITIONER

## 2022-12-20 PROCEDURE — 99233 SBSQ HOSP IP/OBS HIGH 50: CPT | Mod: ,,, | Performed by: HOSPITALIST

## 2022-12-20 PROCEDURE — 93010 ELECTROCARDIOGRAM REPORT: CPT | Mod: ,,, | Performed by: HOSPITALIST

## 2022-12-20 PROCEDURE — 25000003 PHARM REV CODE 250: Performed by: NURSE PRACTITIONER

## 2022-12-20 PROCEDURE — 25000003 PHARM REV CODE 250: Performed by: HOSPITALIST

## 2022-12-20 PROCEDURE — 25000003 PHARM REV CODE 250: Performed by: INTERNAL MEDICINE

## 2022-12-20 PROCEDURE — 82962 GLUCOSE BLOOD TEST: CPT

## 2022-12-20 PROCEDURE — 99233 PR SUBSEQUENT HOSPITAL CARE,LEVL III: ICD-10-PCS | Mod: ,,, | Performed by: HOSPITALIST

## 2022-12-20 PROCEDURE — 11000001 HC ACUTE MED/SURG PRIVATE ROOM

## 2022-12-20 PROCEDURE — 80048 BASIC METABOLIC PNL TOTAL CA: CPT | Performed by: NURSE PRACTITIONER

## 2022-12-20 PROCEDURE — 93005 ELECTROCARDIOGRAM TRACING: CPT

## 2022-12-20 PROCEDURE — 63600175 PHARM REV CODE 636 W HCPCS: Performed by: HOSPITALIST

## 2022-12-20 PROCEDURE — 93010 EKG 12-LEAD: ICD-10-PCS | Mod: ,,, | Performed by: HOSPITALIST

## 2022-12-20 PROCEDURE — 36415 COLL VENOUS BLD VENIPUNCTURE: CPT | Performed by: NURSE PRACTITIONER

## 2022-12-20 PROCEDURE — 63600175 PHARM REV CODE 636 W HCPCS: Performed by: INTERNAL MEDICINE

## 2022-12-20 PROCEDURE — 63600175 PHARM REV CODE 636 W HCPCS: Performed by: NURSE PRACTITIONER

## 2022-12-20 RX ORDER — LISINOPRIL 20 MG/1
20 TABLET ORAL DAILY
Status: DISCONTINUED | OUTPATIENT
Start: 2022-12-21 | End: 2022-12-29 | Stop reason: HOSPADM

## 2022-12-20 RX ORDER — DILTIAZEM HYDROCHLORIDE 5 MG/ML
0.25 INJECTION INTRAVENOUS ONCE
Status: COMPLETED | OUTPATIENT
Start: 2022-12-20 | End: 2022-12-20

## 2022-12-20 RX ORDER — LORAZEPAM 0.5 MG/1
0.5 TABLET ORAL NIGHTLY PRN
Status: DISCONTINUED | OUTPATIENT
Start: 2022-12-20 | End: 2022-12-21

## 2022-12-20 RX ORDER — METOPROLOL TARTRATE 50 MG/1
50 TABLET ORAL 2 TIMES DAILY
Status: DISCONTINUED | OUTPATIENT
Start: 2022-12-20 | End: 2022-12-29 | Stop reason: HOSPADM

## 2022-12-20 RX ORDER — LISINOPRIL 10 MG/1
10 TABLET ORAL DAILY
Status: DISCONTINUED | OUTPATIENT
Start: 2022-12-20 | End: 2022-12-20

## 2022-12-20 RX ORDER — DILTIAZEM HYDROCHLORIDE 120 MG/1
120 CAPSULE, COATED, EXTENDED RELEASE ORAL DAILY
Status: DISCONTINUED | OUTPATIENT
Start: 2022-12-20 | End: 2022-12-29 | Stop reason: HOSPADM

## 2022-12-20 RX ORDER — SODIUM CHLORIDE 9 MG/ML
250 INJECTION, SOLUTION INTRAVENOUS ONCE
Status: COMPLETED | OUTPATIENT
Start: 2022-12-20 | End: 2022-12-20

## 2022-12-20 RX ADMIN — LISINOPRIL 10 MG: 10 TABLET ORAL at 12:12

## 2022-12-20 RX ADMIN — DILTIAZEM HYDROCHLORIDE 16 MG: 5 INJECTION INTRAVENOUS at 04:12

## 2022-12-20 RX ADMIN — CEFEPIME 1 G: 1 INJECTION, POWDER, FOR SOLUTION INTRAMUSCULAR; INTRAVENOUS at 04:12

## 2022-12-20 RX ADMIN — MORPHINE SULFATE 4 MG: 4 INJECTION, SOLUTION INTRAMUSCULAR; INTRAVENOUS at 12:12

## 2022-12-20 RX ADMIN — ONDANSETRON 4 MG: 2 INJECTION INTRAMUSCULAR; INTRAVENOUS at 08:12

## 2022-12-20 RX ADMIN — METOPROLOL TARTRATE 50 MG: 50 TABLET, FILM COATED ORAL at 09:12

## 2022-12-20 RX ADMIN — ONDANSETRON 4 MG: 2 INJECTION INTRAMUSCULAR; INTRAVENOUS at 12:12

## 2022-12-20 RX ADMIN — SODIUM CHLORIDE: 9 INJECTION, SOLUTION INTRAVENOUS at 03:12

## 2022-12-20 RX ADMIN — MORPHINE SULFATE 4 MG: 4 INJECTION, SOLUTION INTRAMUSCULAR; INTRAVENOUS at 04:12

## 2022-12-20 RX ADMIN — SODIUM CHLORIDE 250 ML: 9 INJECTION, SOLUTION INTRAVENOUS at 09:12

## 2022-12-20 RX ADMIN — ONDANSETRON 4 MG: 2 INJECTION INTRAMUSCULAR; INTRAVENOUS at 04:12

## 2022-12-20 RX ADMIN — DILTIAZEM HYDROCHLORIDE 120 MG: 120 CAPSULE, COATED, EXTENDED RELEASE ORAL at 03:12

## 2022-12-20 RX ADMIN — ENOXAPARIN SODIUM 40 MG: 40 INJECTION SUBCUTANEOUS at 04:12

## 2022-12-20 RX ADMIN — MORPHINE SULFATE 4 MG: 4 INJECTION, SOLUTION INTRAMUSCULAR; INTRAVENOUS at 08:12

## 2022-12-20 NOTE — SUBJECTIVE & OBJECTIVE
Interval History:     Review of Systems   Constitutional:  Negative for appetite change, fatigue and fever.   HENT:  Negative for congestion, hearing loss and trouble swallowing.    Respiratory:  Negative for chest tightness, shortness of breath and wheezing.    Cardiovascular:  Negative for chest pain and palpitations.   Gastrointestinal:  Negative for abdominal pain, constipation and nausea.   Genitourinary:  Negative for difficulty urinating and dysuria.   Musculoskeletal:  Positive for gait problem. Negative for back pain and neck stiffness.        Left hip pain after fall   Skin:  Negative for pallor and rash.   Neurological:  Negative for dizziness, speech difficulty and headaches.   Psychiatric/Behavioral:  Negative for confusion and suicidal ideas.    Objective:     Vital Signs (Most Recent):  Temp: 98.3 °F (36.8 °C) (12/19/22 2000)  Pulse: 96 (12/19/22 2000)  Resp: 18 (12/19/22 2024)  BP: (!) 159/70 (12/19/22 2000)  SpO2: 95 % (12/19/22 2000)   Vital Signs (24h Range):  Temp:  [97.9 °F (36.6 °C)-98.3 °F (36.8 °C)] 98.3 °F (36.8 °C)  Pulse:  [] 96  Resp:  [16-20] 18  SpO2:  [91 %-98 %] 95 %  BP: (145-183)/(51-79) 159/70     Weight: 63.3 kg (139 lb 8.8 oz)  Body mass index is 25.52 kg/m².    Intake/Output Summary (Last 24 hours) at 12/20/2022 0006  Last data filed at 12/19/2022 2233  Gross per 24 hour   Intake 1 ml   Output 1100 ml   Net -1099 ml      Physical Exam  Vitals reviewed.   Constitutional:       General: She is not in acute distress.  Eyes:      Pupils: Pupils are equal, round, and reactive to light.   Cardiovascular:      Rate and Rhythm: Normal rate and regular rhythm.      Pulses: Normal pulses.   Pulmonary:      Effort: Pulmonary effort is normal. No respiratory distress.      Breath sounds: Normal breath sounds. No wheezing.   Abdominal:      General: Bowel sounds are normal. There is no distension.      Tenderness: There is no abdominal tenderness.   Musculoskeletal:      Comments: Left  hip pain and misalignment after fall   Skin:     General: Skin is warm.   Neurological:      General: No focal deficit present.      Mental Status: She is alert, oriented to person, place, and time and easily aroused. Mental status is at baseline.   Psychiatric:         Mood and Affect: Mood normal.         Behavior: Behavior normal.       Significant Labs: All pertinent labs within the past 24 hours have been reviewed.  BMP: No results for input(s): GLU, NA, K, CL, CO2, BUN, CREATININE, CALCIUM, MG in the last 48 hours.  CBC: No results for input(s): WBC, HGB, HCT, PLT in the last 48 hours.  CMP: No results for input(s): NA, K, CL, CO2, GLU, BUN, CREATININE, CALCIUM, PROT, ALBUMIN, BILITOT, ALKPHOS, AST, ALT, ANIONGAP, EGFRNONAA in the last 48 hours.    Invalid input(s): ESTGFAFRICA    Significant Imaging: I have reviewed all pertinent imaging results/findings within the past 24 hours.

## 2022-12-20 NOTE — PT/OT/SLP PROGRESS
Occupational Therapy      Patient Name:  Zandra Veloz   MRN:  [id number]    Patient not seen today secondary to Therapist assessment (Pt is awaiting surgical repair of her fractured (L) femur. Pt is scheduled to have surgery with Dr. Singer on Tuesday, 12/20/2022.). Will follow-up 12/20/2022.    12/19/2022

## 2022-12-20 NOTE — NURSING
0355- Monitor room called stating pt's HR was in 190s. Pt asymptomatic; denies chest pain, SOB, or palpitations. EKG was done at 0358- showed SVT with a rate of 183. Called hospitalist- residents came to bedside. Cardizem was given IVP at 0430. HR came down to 90s. 2nd EKG done at 0449 showed sinus rhythm with a rate of 99.     0505- Called pt's daughter, Paola Braswell, and updated her on patient.

## 2022-12-20 NOTE — PROGRESS NOTES
12/20/22 0837   Wound Care Follow Up   Wound Care Follow-up? Yes   Wound Care- Next Visit Date 12/22/22   Follow Up Plan Assess wounds and Eval POC

## 2022-12-20 NOTE — PT/OT/SLP PROGRESS
[address] [facility] Physical Therapy  Phone: [phone]   Fax: [phone]    Physical Therapy Daily Treatment Note  Date:  2021    Patient Name:  Ambrocio Sims    :  1944  MRN: [id number]  Skilled OT eval orders received and reviewed however the pt remains in surgery at this time.    passively stretch hip external rotators while sitting and supine                                    Neuromuscular Re-ed (03629)                                                 Manual Intervention (54036)       13863)   [] EVAL - MOD (72375)  [] EVAL - HIGH (04158)  [] RE-EVAL (14680)  [x] OU(15791) x  1     [] Ionto  [] NMR (75953) x       [] Vaso  [x] Manual (63210) x  2     [] Ultrasound  [] TA x        [] Mech Traction (04198)  [] Aquatic Therapy x     [] ES (un) (52677):   [] Home Management Training x  [] ES(attended) (00796)   [] Dry Needling 1-2 muscles (55414):  [] Dry Needling 3+ muscles (840018)  [] [facility], 

## 2022-12-20 NOTE — PROGRESS NOTES
[facility] - [address] [place] [address] Medicine  Progress Note    Patient Name: Zandra Veloz  MRN: [id number]  Patient Class: IP- Inpatient   Admission Date: 12/17/2022  Length of Stay: 3 days  Attending Physician: Caleb Carter MD  Primary Care Provider: Brandon Thornton MD        Subjective:     Principal Problem:Delmy-prosthetic fracture around prosthetic hip        HPI:  88-year-old lady seen emergency room department.  Patient presents after having a fall when she was trying to go to the restroom at her home.  Patient sustained a left femur fracture.  Patient complains of moderate to severe pain in the left hip area.  Patient also complains of chest tightness, she rates it a 5/10 in the chest tightness has been intubate.  Patient also was seen in emergency room department earlier this week per her daughter.  Patient is found to have dehydration and a urinary tract infection.  Current she denies any shortness of breath.  She does not give a history of coronary artery disease.      Overview/Hospital Course:  12/19 - records reviewed. Fall without LOC and has left hip fx. No other complaints currently. Seen by cardiology with some CP felt musculoskeletal.  Echo mild AS and mod MR with nl EF. Age increase cardiac risk for surgery but discussed with daughter surgery is urgent and see no benefit in delay medically. Feel low to moderate risk for surgery. Question about UTI. No symptoms. Had UTI in 10.22 not surprising. Lab to do culture on urine in lab. Cover with ATN for prior culture with ATB started. Discussed with Dr Singer and cardiology.      Interval History:     Review of Systems   Constitutional:  Negative for appetite change, fatigue and fever.   HENT:  Negative for congestion, hearing loss and trouble swallowing.    Respiratory:  Negative for chest tightness, shortness of breath and wheezing.    Cardiovascular:  Negative for chest pain and palpitations.   Gastrointestinal:   Negative for abdominal pain, constipation and nausea.   Genitourinary:  Negative for difficulty urinating and dysuria.   Musculoskeletal:  Positive for gait problem. Negative for back pain and neck stiffness.        Left hip pain after fall   Skin:  Negative for pallor and rash.   Neurological:  Negative for dizziness, speech difficulty and headaches.   Psychiatric/Behavioral:  Negative for confusion and suicidal ideas.    Objective:     Vital Signs (Most Recent):  Temp: 98.3 °F (36.8 °C) (12/19/22 2000)  Pulse: 96 (12/19/22 2000)  Resp: 18 (12/19/22 2024)  BP: (!) 159/70 (12/19/22 2000)  SpO2: 95 % (12/19/22 2000)   Vital Signs (24h Range):  Temp:  [97.9 °F (36.6 °C)-98.3 °F (36.8 °C)] 98.3 °F (36.8 °C)  Pulse:  [] 96  Resp:  [16-20] 18  SpO2:  [91 %-98 %] 95 %  BP: (145-183)/(51-79) 159/70     Weight: 63.3 kg (139 lb 8.8 oz)  Body mass index is 25.52 kg/m².    Intake/Output Summary (Last 24 hours) at 12/20/2022 0006  Last data filed at 12/19/2022 2233  Gross per 24 hour   Intake 1 ml   Output 1100 ml   Net -1099 ml      Physical Exam  Vitals reviewed.   Constitutional:       General: She is not in acute distress.  Eyes:      Pupils: Pupils are equal, round, and reactive to light.   Cardiovascular:      Rate and Rhythm: Normal rate and regular rhythm.      Pulses: Normal pulses.   Pulmonary:      Effort: Pulmonary effort is normal. No respiratory distress.      Breath sounds: Normal breath sounds. No wheezing.   Abdominal:      General: Bowel sounds are normal. There is no distension.      Tenderness: There is no abdominal tenderness.   Musculoskeletal:      Comments: Left hip pain and misalignment after fall   Skin:     General: Skin is warm.   Neurological:      General: No focal deficit present.      Mental Status: She is alert, oriented to person, place, and time and easily aroused. Mental status is at baseline.   Psychiatric:         Mood and Affect: Mood normal.         Behavior: Behavior normal.        Significant Labs: All pertinent labs within the past 24 hours have been reviewed.  BMP: No results for input(s): GLU, NA, K, CL, CO2, BUN, CREATININE, CALCIUM, MG in the last 48 hours.  CBC: No results for input(s): WBC, HGB, HCT, PLT in the last 48 hours.  CMP: No results for input(s): NA, K, CL, CO2, GLU, BUN, CREATININE, CALCIUM, PROT, ALBUMIN, BILITOT, ALKPHOS, AST, ALT, ANIONGAP, EGFRNONAA in the last 48 hours.    Invalid input(s): ESTGFAFR[place]    Significant Imaging: I have reviewed all pertinent imaging results/findings within the past 24 hours.      Assessment/Plan:      * Delmy-prosthetic fracture around prosthetic hip  Patient found to have a left femur fracture after a fall today.    Consult Orthopedics.  Will hold off on surgery for now because the patient is having chest tightness.  Will consult Cardiology      Palpitations        Chest pain  Patient complains of chest tightness, she rates it a 5/10.  Chest tightness has been intermittent for several days.    Trend out her troponins   Admit to telemetry   Hold off on surgery   Consult Cardiology  Follow-up EKG      Acute cystitis without hematuria    This not clear but likely chronic UTI. ATB cover culture collected 10/2022. Urine this admitsent for culture    Primary osteoarthritis of right knee        Primary osteoarthritis of right shoulder        Hypertension  Blood pressure currently stable, will follow      Arthritis    Tylenol p.r.n..      VTE Risk Mitigation (From admission, onward)         Ordered     enoxaparin injection 40 mg  Daily         12/17/22 1253     IP VTE HIGH RISK PATIENT  Once         12/17/22 1253     Place sequential compression device  Until discontinued         12/17/22 1253                Discharge Planning   YANIV:      Code Status: Full Code   Is the patient medically ready for discharge?:     Reason for patient still in hospital (select all that apply): Laboratory test, Treatment, Imaging and Consult  recommendations  Discharge Plan A: Home                  Caleb Carter MD  Department of Hospital Medicine   [facility] - [address] [place] [address]

## 2022-12-20 NOTE — PT/OT/SLP PROGRESS
Physical Therapy      Patient Name:  Zandra Veloz   MRN:  [id number]    Patient not seen today secondary to Other (Comment) (Pt awaiting hip ORIF). Will follow-up 12/21/22.

## 2022-12-20 NOTE — ASSESSMENT & PLAN NOTE
This not clear but likely chronic UTI. ATB cover culture collected 10/2022. Urine this admitsent for culture

## 2022-12-20 NOTE — HOSPITAL COURSE
12/19 - records reviewed. Fall without LOC and has left hip fx. No other complaints currently. Seen by cardiology with some CP felt musculoskeletal.  Echo mild AS and mod MR with nl EF. Age increase cardiac risk for surgery but discussed with daughter surgery is urgent and see no benefit in delay medically. Feel low to moderate risk for surgery. Question about UTI. No symptoms. Had UTI in 10.22 not surprising. Lab to do culture on urine in lab. Cover with ATN for prior culture with ATB started. Discussed with Dr Singer and cardiology.  12/20 Tachycardia and elevated BP. Cardiology adjusted meds. Plan hold off surgery until 12/22 to adjust meds and treat UTI. Family in room. Pt not sleeping well.   12/21 Didn't sleep well prior night. Apparently been on xanax for a time. Also recently started on Neurontin. Family with question. No recent BM. Some increase stool on KUB but not severe. Surgery for am. BP some up but better. HR good.   12/22 Seen prior to surgery and apparently add better night. Sore mouth / throat better with mycostatin. For surgery. Family in room.  12/23 patient with pain but otherwise seemed to be doing relatively well.  Daughter in room.  Apparently been taking prednisone for some time and this was restarted per family request.  Look into swing bed placement  12/24 patient had better night rested and everyone's in better spirits today.  Tolerating some diet.   Therapy worked with patient.  Look into swing bed placement next week  12/25 remained in good spirits.  Less pain.  Had good bowel movement and ordered Dulcolax not given.  Because of dressing to leg, Burk was not removed to keep it from getting soiled.  Wound care to check 12/27.  Discuss this with patient and daughter.  On antibiotics for UTI  12/26-will dc to swingbed once accepted. Needs continued wound care.  12/27-DC when bed available  12/28- issues with wound care yesterday.  Dr. Singer had to come remove dressings himself due  to adherence to subcutaneous tissue.  Family refusing transfer to [facility] due to lack of specially trained wound care team availability.  Planning to transfer to specialty but resistant to that as well and requesting to speak to administration.    12/29- agreeable to transfer to specialty.  This will be the best place for her to receive wound care.  DC after family tours    12/29-needs continued wound care and therapy[facility]

## 2022-12-20 NOTE — CONSULTS
Pt visit per family request who are known to me from Dr. Thornton's office.    Daughter requests wound care nurse visit during encounter in [facility]. She reports pt had extensive skin tears of L arm and additional tears, less severe, of R arm.  Daughter displays photos on her phone.    Received pt alert and oriented in bed with two daughters and another visitor at bedside.  Daughters report that steri-strips and Xeroform gauze were applied to pts arms and covered with roll gauze in the ED. There is dried blood noted on posterior of L arm bandaging.  Inst pt's family that it is best to leave bandaging intact for now to allow for continued approximation of tissue and hemostasis.  They report a previous trauma to L hand that HH had treated a week prior and requested bandage to be removed.  Done per request.  Note continued partial thickness tissue loss to dorsal thumb.  Recovered with Xeroform cut to fit and wrapped with roll gauze and secured with Coban.    Will continue to follow patient.  Thank you for consult.

## 2022-12-20 NOTE — PROGRESS NOTES
[facility] Tele[facility]  Cardiology  Progress Note    Patient Name: Zandra Veloz  MRN: [id number]  Admission Date: 12/17/2022  Hospital Length of Stay: 2 days  Code Status: Full Code   Attending Physician: Caleb Carter MD   Primary Care Physician: Brandon Thornton MD  Expected Discharge Date:   Principal Problem:Delmy-prosthetic fracture around prosthetic hip    Subjective:     Hospital Course:   PT reports pain in left leg adequately controlled, is resting comfortably, denies chest pain, pressure  or shortness of breath.       Interval History: Lying in bed awake, alert. Chest pain has resolved. No distress. Complains of nausea. Family at bedside.     Review of Systems   Constitutional: Negative for diaphoresis.   Cardiovascular:  Negative for irregular heartbeat, leg swelling, near-syncope, orthopnea, palpitations and syncope.        Musculoskeletal chest pain   Respiratory:  Negative for shortness of breath.    Gastrointestinal:  Positive for nausea. Negative for vomiting.   Objective:     Vital Signs (Most Recent):  Temp: 98 °F (36.7 °C) (12/19/22 1025)  Pulse: 100 (12/19/22 1025)  Resp: 20 (12/19/22 1106)  BP: (!) 183/78 (12/19/22 1025)  SpO2: 98 % (12/19/22 1025)   Vital Signs (24h Range):  Temp:  [97.8 °F (36.6 °C)-100.2 °F (37.9 °C)] 98 °F (36.7 °C)  Pulse:  [] 100  Resp:  [16-20] 20  SpO2:  [87 %-98 %] 98 %  BP: (112-183)/(46-78) 183/78     Weight: 63.3 kg (139 lb 8.8 oz)  Body mass index is 25.52 kg/m².     SpO2: 98 %         Intake/Output Summary (Last 24 hours) at 12/19/2022 1455  Last data filed at 12/19/2022 0607  Gross per 24 hour   Intake --   Output 700 ml   Net -700 ml       Lines/Drains/Airways       Peripheral Intravenous Line  Duration                  Peripheral IV - Single Lumen 12/17/22 2000 22 G Posterior;Right;Medial Hand 1 day                    Physical Exam  Vitals reviewed.   Constitutional:       General: She is not in acute distress.      Appearance: Normal appearance.   Eyes:      Pupils: Pupils are equal, round, and reactive to light.   Cardiovascular:      Rate and Rhythm: Normal rate and regular rhythm.      Pulses: Normal pulses.      Heart sounds: Normal heart sounds.   Pulmonary:      Effort: Pulmonary effort is normal.      Breath sounds: Normal breath sounds.   Skin:     General: Skin is warm and dry.   Neurological:      Mental Status: She is alert and oriented to person, place, and time.   Psychiatric:         Mood and Affect: Mood normal.         Behavior: Behavior normal.       Significant Labs: All pertinent lab results from the last 24 hours have been reviewed. and   Recent Lab Results  (Last 5 results in the past 24 hours)        12/19/22  1047   12/19/22  0857   12/19/22  0434   12/18/22  1828   12/18/22  1652        Ao root annulus   2.70             Ao peak trice   3.0             Ao VTI   47.00             AV valve area   1.94             AORTIC VALVE CUSP SEPERATION   2.13             AV mean gradient   18             AV index (prosthetic)   0.62             AV peak gradient   36             AV Velocity Ratio   0.67             BSA   1.61             Left Ventricle Relative Wall Thickness   0.35             Echo EF Estimated   65             EF   75             E wave deceleration time   242.00             FS   42             IVC ostium   1.3             IVSd   1.01             LA size   2.00             LVOT area   3.1             LV EDV BP   77.30             LV Diastolic Volume Index   47.13             LVIDd   4.17             LVIDs   2.41             LV mass   112.89             LV Mass Index   69             Left Ventricular Outflow Tract Mean Gradient   8.00             LVOT diameter   2.00             LVOT peak trice   2.0             LVOT stroke volume   91.06             LVOT peak VTI   29.00             LV ESV BP   20.40             LV Systolic Volume Index   12.4             MV valve area p 1/2 method   5.79              MV valve area by continuity eq   1.12             MV mean gradient   67             MV peak gradient   121             MV Peak E Bharath   3.50             MV stenosis pressure 1/2 time   38.00             MV VTI   81.1             POC Glucose 104     99     126       Posterior Wall   0.74             Right Atrial Pressure (from IVC)   3             RA Major Axis   3.30             RVDD   3.10             TAPSE   1.70             Triscuspid Valve Regurgitation Peak Gradient   29             TR Max Bharath   2.70             Troponin I High Sensitivity       33.2         TV rest pulmonary artery pressure   32                                    Significant Imaging: Echocardiogram: Transthoracic echo (TTE) complete (Cupid Only):   Results for orders placed or performed during the hospital encounter of 12/17/22   Echo   Result Value Ref Range    BSA 1.61 m2    Right Atrial Pressure (from IVC) 3 mmHg    EF 75 %    Left Ventricular Outflow Tract Mean Gradient 8.00 mmHg    AORTIC VALVE CUSP SEPERATION 2.13 cm    LVIDd 4.17 3.5 - 6.0 cm    IVS 1.01 0.6 - 1.1 cm    Posterior Wall 0.74 0.6 - 1.1 cm    Ao root annulus 2.70 cm    LVIDs 2.41 2.1 - 4.0 cm    FS 42 28 - 44 %    IVC ostium 1.3 cm    LV mass 112.89 g    LA size 2.00 cm    RVDD 3.10 cm    TAPSE 1.70 cm    Left Ventricle Relative Wall Thickness 0.35 cm    AV mean gradient 18 mmHg    AV valve area 1.94 cm2    AV Velocity Ratio 0.67     AV index (prosthetic) 0.62     MV mean gradient 67 mmHg    MV valve area p 1/2 method 5.79 cm2    MV valve area by continuity eq 1.12 cm2    E wave deceleration time 242.00 msec    LVOT diameter 2.00 cm    LVOT area 3.1 cm2    LVOT peak bharath 2.0 m/s    LVOT peak VTI 29.00 cm    Ao peak bharath 3.0 m/s    Ao VTI 47.00 cm    LVOT stroke volume 91.06 cm3    AV peak gradient 36 mmHg    MV peak gradient 121 mmHg    TV rest pulmonary artery pressure 32 mmHg    MV Peak E Bharath 3.50 m/s    TR Max Bharath 2.70 m/s    MV VTI 81.1 cm    MV stenosis pressure 1/2 time  38.00 ms    LV Systolic Volume 20.40 mL    LV Systolic Volume Index 12.4 mL/m2    LV Diastolic Volume 77.30 mL    LV Diastolic Volume Index 47.13 mL/m2    LV Mass Index 69 g/m2    Echo EF Estimated 65 %    RA Major Axis 3.30 cm    Triscuspid Valve Regurgitation Peak Gradient 29 mmHg    Narrative    · The left ventricle is normal in size with hyperdynamic systolic   function.  · The estimated ejection fraction is 75%.  · Left ventricular diastolic dysfunction.  · Normal right ventricular size.  · There is mild aortic valve stenosis.  · Aortic valve area is 1.94 cm2; peak velocity is 3.0 m/s; mean gradient   is 18 mmHg.  · Moderate-to-severe mitral regurgitation.  · Mild tricuspid regurgitation.  · Mild pulmonic regurgitation.  · Normal central venous pressure (3 mmHg).  · The estimated PA systolic pressure is 32 mmHg.  · Trivial pericardial effusion.        Assessment and Plan:     Brief HPI: CC: left hip pain  HPI: Pt reports she was bending forward in her closet, could not stop herself from continuing forward, fell on her face with immediate pain in left hip and leg.  Notes she was unable to stand since that event. She also notes has had diffuse chest pain, mostly left sided, worse with deep inspiration and with coughing.  She was not experiencing chest tightness or discomfort prior to the fall.  She has had two months of increasing weakness and fatique, her daughter at bedside reports she is much less active, spending more time in her chair.  No specific complaints, however notes increased fatigue and tires more easily.  She is avoiding activity which could provoke increased fatigue.     PT has vague history of irregular heart beat for which she was started on diltiazem several years ago, no symptoms at present        Chest pain  - Chest pain most consistent with musculoskeletal etiology.  Will order echo to evaluate for subsegmental wall motion abnormalities suggestive of previous MI and serial troponin x 2.      12/19/22:  - pt denies chest pain on exam today  - echo shows hyperdynamic EF of 75%, LVDD and moderate to severe MR  - agree with continuous IV fluids for hydration  - Dr. Seth has seen and evaluated this pt  - recommends to adequately hydrate pt, control BP and HR perioperatively  - pt is at moderately increased cardiac risk for hip surgery      Hypertension  - hypertensive and mildly tachycardic  - start metoprolol tartrate 25mg po bid now  - monitor BP, add/adjust meds as needed to control BP and HR perioperatively        VTE Risk Mitigation (From admission, onward)         Ordered     enoxaparin injection 40 mg  Daily         12/17/22 1253     IP VTE HIGH RISK PATIENT  Once         12/17/22 1253     Place sequential compression device  Until discontinued         12/17/22 1253                ZURDO Hughes  Cardiology  [facility] - [facility]

## 2022-12-21 LAB
ALBUMIN SERPL BCP-MCNC: 2.6 G/DL (ref 3.5–5)
ALBUMIN/GLOB SERPL: 1 {RATIO}
ALP SERPL-CCNC: 43 U/L (ref 55–142)
ALT SERPL W P-5'-P-CCNC: 14 U/L (ref 13–56)
ANION GAP SERPL CALCULATED.3IONS-SCNC: 16 MMOL/L (ref 7–16)
AST SERPL W P-5'-P-CCNC: 27 U/L (ref 15–37)
BASOPHILS # BLD AUTO: 0.11 K/UL (ref 0–0.2)
BASOPHILS NFR BLD AUTO: 0.9 % (ref 0–1)
BILIRUB SERPL-MCNC: 0.9 MG/DL (ref ?–1.2)
BUN SERPL-MCNC: 9 MG/DL (ref 7–18)
BUN/CREAT SERPL: 11 (ref 6–20)
CALCIUM SERPL-MCNC: 8.4 MG/DL (ref 8.5–10.1)
CHLORIDE SERPL-SCNC: 95 MMOL/L (ref 98–107)
CO2 SERPL-SCNC: 23 MMOL/L (ref 21–32)
CREAT SERPL-MCNC: 0.85 MG/DL (ref 0.55–1.02)
DIFFERENTIAL METHOD BLD: ABNORMAL
EGFR (NO RACE VARIABLE) (RUSH/TITUS): 66 ML/MIN/1.73M²
EOSINOPHIL # BLD AUTO: 0.13 K/UL (ref 0–0.5)
EOSINOPHIL NFR BLD AUTO: 1.1 % (ref 1–4)
ERYTHROCYTE [DISTWIDTH] IN BLOOD BY AUTOMATED COUNT: 12.3 % (ref 11.5–14.5)
GLOBULIN SER-MCNC: 2.7 G/DL (ref 2–4)
GLUCOSE SERPL-MCNC: 100 MG/DL (ref 70–105)
GLUCOSE SERPL-MCNC: 60 MG/DL (ref 74–106)
GLUCOSE SERPL-MCNC: 78 MG/DL (ref 70–105)
GLUCOSE SERPL-MCNC: 95 MG/DL (ref 70–105)
HCT VFR BLD AUTO: 27.5 % (ref 38–47)
HGB BLD-MCNC: 9.1 G/DL (ref 12–16)
IMM GRANULOCYTES # BLD AUTO: 0.22 K/UL (ref 0–0.04)
IMM GRANULOCYTES NFR BLD: 1.8 % (ref 0–0.4)
LYMPHOCYTES # BLD AUTO: 1.25 K/UL (ref 1–4.8)
LYMPHOCYTES NFR BLD AUTO: 10.3 % (ref 27–41)
MCH RBC QN AUTO: 30.3 PG (ref 27–31)
MCHC RBC AUTO-ENTMCNC: 33.1 G/DL (ref 32–36)
MCV RBC AUTO: 91.7 FL (ref 80–96)
MONOCYTES # BLD AUTO: 0.83 K/UL (ref 0–0.8)
MONOCYTES NFR BLD AUTO: 6.8 % (ref 2–6)
MPC BLD CALC-MCNC: 9.7 FL (ref 9.4–12.4)
NEUTROPHILS # BLD AUTO: 9.61 K/UL (ref 1.8–7.7)
NEUTROPHILS NFR BLD AUTO: 79.1 % (ref 53–65)
NRBC # BLD AUTO: 0 X10E3/UL
NRBC, AUTO (.00): 0 %
PLATELET # BLD AUTO: 278 K/UL (ref 150–400)
POTASSIUM SERPL-SCNC: 4.2 MMOL/L (ref 3.5–5.1)
PROT SERPL-MCNC: 5.3 G/DL (ref 6.4–8.2)
RBC # BLD AUTO: 3 M/UL (ref 4.2–5.4)
SODIUM SERPL-SCNC: 130 MMOL/L (ref 136–145)
WBC # BLD AUTO: 12.15 K/UL (ref 4.5–11)

## 2022-12-21 PROCEDURE — 25000003 PHARM REV CODE 250: Performed by: NURSE PRACTITIONER

## 2022-12-21 PROCEDURE — 63600175 PHARM REV CODE 636 W HCPCS: Performed by: INTERNAL MEDICINE

## 2022-12-21 PROCEDURE — 63600175 PHARM REV CODE 636 W HCPCS: Performed by: HOSPITALIST

## 2022-12-21 PROCEDURE — 85025 COMPLETE CBC W/AUTO DIFF WBC: CPT | Performed by: NURSE PRACTITIONER

## 2022-12-21 PROCEDURE — 25000003 PHARM REV CODE 250: Performed by: STUDENT IN AN ORGANIZED HEALTH CARE EDUCATION/TRAINING PROGRAM

## 2022-12-21 PROCEDURE — 99233 PR SUBSEQUENT HOSPITAL CARE,LEVL III: ICD-10-PCS | Mod: ,,, | Performed by: HOSPITALIST

## 2022-12-21 PROCEDURE — 25000003 PHARM REV CODE 250: Performed by: HOSPITALIST

## 2022-12-21 PROCEDURE — 11000001 HC ACUTE MED/SURG PRIVATE ROOM

## 2022-12-21 PROCEDURE — 25000003 PHARM REV CODE 250: Performed by: INTERNAL MEDICINE

## 2022-12-21 PROCEDURE — 36415 COLL VENOUS BLD VENIPUNCTURE: CPT | Performed by: NURSE PRACTITIONER

## 2022-12-21 PROCEDURE — 63600175 PHARM REV CODE 636 W HCPCS: Performed by: NURSE PRACTITIONER

## 2022-12-21 PROCEDURE — 82962 GLUCOSE BLOOD TEST: CPT

## 2022-12-21 PROCEDURE — 99233 SBSQ HOSP IP/OBS HIGH 50: CPT | Mod: ,,, | Performed by: HOSPITALIST

## 2022-12-21 PROCEDURE — 80053 COMPREHEN METABOLIC PANEL: CPT | Performed by: NURSE PRACTITIONER

## 2022-12-21 RX ORDER — ALPRAZOLAM 0.25 MG/1
1 TABLET ORAL NIGHTLY PRN
Status: DISCONTINUED | OUTPATIENT
Start: 2022-12-21 | End: 2022-12-23

## 2022-12-21 RX ORDER — DOCUSATE SODIUM 100 MG/1
100 CAPSULE, LIQUID FILLED ORAL 2 TIMES DAILY
Status: DISCONTINUED | OUTPATIENT
Start: 2022-12-21 | End: 2022-12-29 | Stop reason: HOSPADM

## 2022-12-21 RX ORDER — HYDRALAZINE HYDROCHLORIDE 20 MG/ML
10 INJECTION INTRAMUSCULAR; INTRAVENOUS EVERY 6 HOURS PRN
Status: DISCONTINUED | OUTPATIENT
Start: 2022-12-21 | End: 2022-12-29 | Stop reason: HOSPADM

## 2022-12-21 RX ORDER — MUPIROCIN 20 MG/G
OINTMENT TOPICAL 2 TIMES DAILY
Status: COMPLETED | OUTPATIENT
Start: 2022-12-21 | End: 2022-12-25

## 2022-12-21 RX ORDER — NYSTATIN 100000 [USP'U]/ML
500000 SUSPENSION ORAL 4 TIMES DAILY
Status: DISCONTINUED | OUTPATIENT
Start: 2022-12-21 | End: 2022-12-29 | Stop reason: HOSPADM

## 2022-12-21 RX ORDER — GABAPENTIN 100 MG/1
100 CAPSULE ORAL 3 TIMES DAILY
Status: DISCONTINUED | OUTPATIENT
Start: 2022-12-22 | End: 2022-12-29 | Stop reason: HOSPADM

## 2022-12-21 RX ORDER — HYDRALAZINE HYDROCHLORIDE 25 MG/1
25 TABLET, FILM COATED ORAL EVERY 8 HOURS
Status: DISCONTINUED | OUTPATIENT
Start: 2022-12-21 | End: 2022-12-29 | Stop reason: HOSPADM

## 2022-12-21 RX ORDER — GABAPENTIN 300 MG/1
300 CAPSULE ORAL 3 TIMES DAILY
Status: DISCONTINUED | OUTPATIENT
Start: 2022-12-21 | End: 2022-12-21

## 2022-12-21 RX ADMIN — GABAPENTIN 300 MG: 300 CAPSULE ORAL at 05:12

## 2022-12-21 RX ADMIN — MORPHINE SULFATE 4 MG: 4 INJECTION, SOLUTION INTRAMUSCULAR; INTRAVENOUS at 09:12

## 2022-12-21 RX ADMIN — CEFEPIME 1 G: 1 INJECTION, POWDER, FOR SOLUTION INTRAMUSCULAR; INTRAVENOUS at 05:12

## 2022-12-21 RX ADMIN — ONDANSETRON 4 MG: 2 INJECTION INTRAMUSCULAR; INTRAVENOUS at 09:12

## 2022-12-21 RX ADMIN — METOPROLOL TARTRATE 50 MG: 50 TABLET, FILM COATED ORAL at 08:12

## 2022-12-21 RX ADMIN — MORPHINE SULFATE 4 MG: 4 INJECTION, SOLUTION INTRAMUSCULAR; INTRAVENOUS at 01:12

## 2022-12-21 RX ADMIN — MUPIROCIN: 20 OINTMENT TOPICAL at 09:12

## 2022-12-21 RX ADMIN — PANTOPRAZOLE SODIUM 40 MG: 40 TABLET, DELAYED RELEASE ORAL at 09:12

## 2022-12-21 RX ADMIN — HYDRALAZINE HYDROCHLORIDE 10 MG: 20 INJECTION INTRAMUSCULAR; INTRAVENOUS at 04:12

## 2022-12-21 RX ADMIN — GABAPENTIN 300 MG: 300 CAPSULE ORAL at 08:12

## 2022-12-21 RX ADMIN — HYDRALAZINE HYDROCHLORIDE 25 MG: 25 TABLET ORAL at 08:12

## 2022-12-21 RX ADMIN — ALPRAZOLAM 1 MG: 0.25 TABLET ORAL at 08:12

## 2022-12-21 RX ADMIN — LEVOTHYROXINE SODIUM 125 MCG: 0.12 TABLET ORAL at 09:12

## 2022-12-21 RX ADMIN — METOPROLOL TARTRATE 50 MG: 50 TABLET, FILM COATED ORAL at 09:12

## 2022-12-21 RX ADMIN — LISINOPRIL 20 MG: 20 TABLET ORAL at 09:12

## 2022-12-21 RX ADMIN — NYSTATIN 500000 UNITS: 500000 SUSPENSION ORAL at 05:12

## 2022-12-21 RX ADMIN — HYDROCODONE BITARTRATE AND ACETAMINOPHEN 1 TABLET: 7.5; 325 TABLET ORAL at 05:12

## 2022-12-21 RX ADMIN — NYSTATIN 500000 UNITS: 500000 SUSPENSION ORAL at 08:12

## 2022-12-21 RX ADMIN — DOCUSATE SODIUM 100 MG: 100 CAPSULE, LIQUID FILLED ORAL at 11:12

## 2022-12-21 RX ADMIN — ONDANSETRON 4 MG: 2 INJECTION INTRAMUSCULAR; INTRAVENOUS at 02:12

## 2022-12-21 RX ADMIN — ENOXAPARIN SODIUM 40 MG: 40 INJECTION SUBCUTANEOUS at 05:12

## 2022-12-21 RX ADMIN — POLYETHYLENE GLYCOL 3350 17 G: 17 POWDER, FOR SOLUTION ORAL at 09:12

## 2022-12-21 RX ADMIN — CEFEPIME 1 G: 1 INJECTION, POWDER, FOR SOLUTION INTRAMUSCULAR; INTRAVENOUS at 04:12

## 2022-12-21 RX ADMIN — DILTIAZEM HYDROCHLORIDE 120 MG: 120 CAPSULE, COATED, EXTENDED RELEASE ORAL at 09:12

## 2022-12-21 NOTE — PT/OT/SLP PROGRESS
Physical Therapy      Patient Name:  Zandra Veloz   MRN:  [id number]    Patient not seen today secondary to Other (Comment) (Pt awaiting ORIF of hip. Scheduled for 12/22/22). Will follow-up 12/22/22.

## 2022-12-21 NOTE — CARE UPDATE
Assessed pt for c/o difficulty swallowing, states throat is not sore but it just felt like it was a little harder to swallow.  This seems to have resolved since taking the Nystatin swish and swallow.  Would like to also try a bottle of Chloraseptic spray to see if this will also help and in preparation of sore throat after surgery. Pt has no c/o sob, no s/s of respiratory compromise or allergic reaction.  Family present at bedside and also states that this episode was a few hours ago and seems to have resolved since receiving the Nystatin.  Offered pt ice chips, pt has poor oral intake due to nasuea.  Is receiving PPI routinely.  Also advised that pain medications can sometimes cause dry mouth which ice chips may help.

## 2022-12-21 NOTE — SUBJECTIVE & OBJECTIVE
Interval History:     Review of Systems   Constitutional:  Negative for appetite change, fatigue and fever.   HENT:  Negative for congestion, hearing loss and trouble swallowing.    Respiratory:  Negative for chest tightness, shortness of breath and wheezing.    Cardiovascular:  Negative for chest pain and palpitations.   Gastrointestinal:  Negative for abdominal pain, constipation and nausea.   Genitourinary:  Negative for difficulty urinating and dysuria.   Musculoskeletal:  Positive for gait problem. Negative for back pain and neck stiffness.        Left hip pain after fall   Skin:  Negative for pallor and rash.   Neurological:  Negative for dizziness, speech difficulty and headaches.   Psychiatric/Behavioral:  Negative for confusion and suicidal ideas.    Objective:     Vital Signs (Most Recent):  Temp: 98.4 °F (36.9 °C) (12/20/22 1901)  Pulse: 89 (12/20/22 1901)  Resp: 18 (12/20/22 1901)  BP: (!) 219/83 (12/20/22 1901)  SpO2: (!) 94 % (12/20/22 1901)   Vital Signs (24h Range):  Temp:  [98.2 °F (36.8 °C)-99.3 °F (37.4 °C)] 98.4 °F (36.9 °C)  Pulse:  [] 89  Resp:  [18-20] 18  SpO2:  [94 %-98 %] 94 %  BP: (123-219)/(60-83) 219/83     Weight: 63.3 kg (139 lb 8.8 oz)  Body mass index is 25.52 kg/m².    Intake/Output Summary (Last 24 hours) at 12/20/2022 2350  Last data filed at 12/20/2022 1215  Gross per 24 hour   Intake --   Output 1600 ml   Net -1600 ml        Physical Exam  Vitals reviewed.   Constitutional:       General: She is not in acute distress.  Eyes:      Pupils: Pupils are equal, round, and reactive to light.   Cardiovascular:      Rate and Rhythm: Normal rate and regular rhythm.      Pulses: Normal pulses.   Pulmonary:      Effort: Pulmonary effort is normal. No respiratory distress.      Breath sounds: Normal breath sounds. No wheezing.   Abdominal:      General: Bowel sounds are normal. There is no distension.      Tenderness: There is no abdominal tenderness.   Musculoskeletal:      Comments:  Left hip pain and misalignment after fall   Skin:     General: Skin is warm.   Neurological:      General: No focal deficit present.      Mental Status: She is alert, oriented to person, place, and time and easily aroused. Mental status is at baseline.   Psychiatric:         Mood and Affect: Mood normal.         Behavior: Behavior normal.       Significant Labs: All pertinent labs within the past 24 hours have been reviewed.  BMP:   Recent Labs   Lab 12/20/22  0815   GLU 92   *   K 4.5   CL 99   CO2 23   BUN 9   CREATININE 0.75   CALCIUM 8.4*     CBC:   Recent Labs   Lab 12/20/22  0815   WBC 11.98*   HGB 9.5*   HCT 28.5*        CMP:   Recent Labs   Lab 12/20/22  0815   *   K 4.5   CL 99   CO2 23   GLU 92   BUN 9   CREATININE 0.75   CALCIUM 8.4*   ANIONGAP 15       Significant Imaging: I have reviewed all pertinent imaging results/findings within the past 24 hours.

## 2022-12-21 NOTE — PROGRESS NOTES
[facility] - [address] [place] [address] Medicine  Progress Note    Patient Name: Zandra Veloz  MRN: [id number]  Patient Class: IP- Inpatient   Admission Date: 12/17/2022  Length of Stay: 3 days  Attending Physician: Caleb Carter MD  Primary Care Provider: Brandon Thornton MD        Subjective:     Principal Problem:Delmy-prosthetic fracture around prosthetic hip        HPI:  88-year-old lady seen emergency room department.  Patient presents after having a fall when she was trying to go to the restroom at her home.  Patient sustained a left femur fracture.  Patient complains of moderate to severe pain in the left hip area.  Patient also complains of chest tightness, she rates it a 5/10 in the chest tightness has been intubate.  Patient also was seen in emergency room department earlier this week per her daughter.  Patient is found to have dehydration and a urinary tract infection.  Current she denies any shortness of breath.  She does not give a history of coronary artery disease.      Overview/Hospital Course:  12/19 - records reviewed. Fall without LOC and has left hip fx. No other complaints currently. Seen by cardiology with some CP felt musculoskeletal.  Echo mild AS and mod MR with nl EF. Age increase cardiac risk for surgery but discussed with daughter surgery is urgent and see no benefit in delay medically. Feel low to moderate risk for surgery. Question about UTI. No symptoms. Had UTI in 10.22 not surprising. Lab to do culture on urine in lab. Cover with ATN for prior culture with ATB started. Discussed with Dr Singer and cardiology.  12/20 Tachycardia and elevated BP. Cardiology adjusted meds. Plan hold off surgery until 12/22 to adjust meds and treat UTI. Family in room. Pt not sleeping well.       Interval History:     Review of Systems   Constitutional:  Negative for appetite change, fatigue and fever.   HENT:  Negative for congestion, hearing loss and trouble swallowing.     Respiratory:  Negative for chest tightness, shortness of breath and wheezing.    Cardiovascular:  Negative for chest pain and palpitations.   Gastrointestinal:  Negative for abdominal pain, constipation and nausea.   Genitourinary:  Negative for difficulty urinating and dysuria.   Musculoskeletal:  Positive for gait problem. Negative for back pain and neck stiffness.        Left hip pain after fall   Skin:  Negative for pallor and rash.   Neurological:  Negative for dizziness, speech difficulty and headaches.   Psychiatric/Behavioral:  Negative for confusion and suicidal ideas.    Objective:     Vital Signs (Most Recent):  Temp: 98.4 °F (36.9 °C) (12/20/22 1901)  Pulse: 89 (12/20/22 1901)  Resp: 18 (12/20/22 1901)  BP: (!) 219/83 (12/20/22 1901)  SpO2: (!) 94 % (12/20/22 1901)   Vital Signs (24h Range):  Temp:  [98.2 °F (36.8 °C)-99.3 °F (37.4 °C)] 98.4 °F (36.9 °C)  Pulse:  [] 89  Resp:  [18-20] 18  SpO2:  [94 %-98 %] 94 %  BP: (123-219)/(60-83) 219/83     Weight: 63.3 kg (139 lb 8.8 oz)  Body mass index is 25.52 kg/m².    Intake/Output Summary (Last 24 hours) at 12/20/2022 2350  Last data filed at 12/20/2022 1215  Gross per 24 hour   Intake --   Output 1600 ml   Net -1600 ml        Physical Exam  Vitals reviewed.   Constitutional:       General: She is not in acute distress.  Eyes:      Pupils: Pupils are equal, round, and reactive to light.   Cardiovascular:      Rate and Rhythm: Normal rate and regular rhythm.      Pulses: Normal pulses.   Pulmonary:      Effort: Pulmonary effort is normal. No respiratory distress.      Breath sounds: Normal breath sounds. No wheezing.   Abdominal:      General: Bowel sounds are normal. There is no distension.      Tenderness: There is no abdominal tenderness.   Musculoskeletal:      Comments: Left hip pain and misalignment after fall   Skin:     General: Skin is warm.   Neurological:      General: No focal deficit present.      Mental Status: She is alert, oriented to  person, place, and time and easily aroused. Mental status is at baseline.   Psychiatric:         Mood and Affect: Mood normal.         Behavior: Behavior normal.       Significant Labs: All pertinent labs within the past 24 hours have been reviewed.  BMP:   Recent Labs   Lab 12/20/22  0815   GLU 92   *   K 4.5   CL 99   CO2 23   BUN 9   CREATININE 0.75   CALCIUM 8.4*     CBC:   Recent Labs   Lab 12/20/22  0815   WBC 11.98*   HGB 9.5*   HCT 28.5*        CMP:   Recent Labs   Lab 12/20/22  0815   *   K 4.5   CL 99   CO2 23   GLU 92   BUN 9   CREATININE 0.75   CALCIUM 8.4*   ANIONGAP 15       Significant Imaging: I have reviewed all pertinent imaging results/findings within the past 24 hours.      Assessment/Plan:      * Delmy-prosthetic fracture around prosthetic hip  Patient found to have a left femur fracture after a fall today.    Consult Orthopedics.  Will hold off on surgery for now because the patient is having chest tightness.  Will consult Cardiology      Heart murmur    Echo Summary this admit     The left ventricle is normal in size with hyperdynamic systolic function.   The estimated ejection fraction is 75%.   Left ventricular diastolic dysfunction.   Normal right ventricular size.   There is mild aortic valve stenosis.   Aortic valve area is 1.94 cm2; peak velocity is 3.0 m/s; mean gradient is 18 mmHg.   Moderate-to-severe mitral regurgitation.   Mild tricuspid regurgitation.   Mild pulmonic regurgitation.   Normal central venous pressure (3 mmHg).   The estimated PA systolic pressure is 32 mmHg.   Trivial pericardial effusion.      Palpitations        Chest pain  Patient complains of chest tightness, she rates it a 5/10.  Chest tightness has been intermittent for several days.    Trend out her troponins   Admit to telemetry   Hold off on surgery   Consult Cardiology  Follow-up EKG      Acute cystitis without hematuria    This not clear but likely chronic UTI. ATB cover  culture collected 10/2022. Urine this admitsent for culture    Primary osteoarthritis of right knee        Primary osteoarthritis of right shoulder        Closed fracture of left hip    Surgery 12/22    Hypertension  Blood pressure currently stable, will follow    12/20 BP until and meds adjusted    Arthritis    Tylenol p.r.n..      VTE Risk Mitigation (From admission, onward)         Ordered     enoxaparin injection 40 mg  Daily         12/17/22 1253     IP VTE HIGH RISK PATIENT  Once         12/17/22 1253     Place sequential compression device  Until discontinued         12/17/22 1253                Discharge Planning   YANIV:      Code Status: Full Code   Is the patient medically ready for discharge?:     Reason for patient still in hospital (select all that apply): Laboratory test, Treatment, Imaging and Consult recommendations  Discharge Plan A: Home                  Caleb Carter MD  Department of Hospital Medicine   [facility]

## 2022-12-21 NOTE — ASSESSMENT & PLAN NOTE
Echo Summary this admit     The left ventricle is normal in size with hyperdynamic systolic function.   The estimated ejection fraction is 75%.   Left ventricular diastolic dysfunction.   Normal right ventricular size.   There is mild aortic valve stenosis.   Aortic valve area is 1.94 cm2; peak velocity is 3.0 m/s; mean gradient is 18 mmHg.   Moderate-to-severe mitral regurgitation.   Mild tricuspid regurgitation.   Mild pulmonic regurgitation.   Normal central venous pressure (3 mmHg).   The estimated PA systolic pressure is 32 mmHg.   Trivial pericardial effusion.

## 2022-12-21 NOTE — NURSING
Notify Dr. Ornelas of pt BP of 207/84 with HR of 63 for 0400 rounds and that no prn bp meds have been ordered. Rec'd order to give hydralazinf 10mg IV every 6 hrs prn. Plan to implement.

## 2022-12-21 NOTE — PT/OT/SLP PROGRESS
Occupational Therapy      Patient Name:  Zandra Veloz   MRN:  [id number]    Patient not seen today secondary to Therapist assessment (Pt awaiting surgery for repair of left hip fracture). Will follow-up 12/22/22.    12/21/2022

## 2022-12-21 NOTE — NURSING
Called to room by patient, pt states she has a sore throat, pt states she has difficulty swallowing. Assessed throat, no signs of swelling, notified dr yang.

## 2022-12-22 ENCOUNTER — ANESTHESIA (OUTPATIENT)
Dept: SURGERY | Facility: HOSPITAL | Age: 87
DRG: 467 | End: 2022-12-22
Payer: MEDICARE

## 2022-12-22 ENCOUNTER — ANESTHESIA EVENT (OUTPATIENT)
Dept: SURGERY | Facility: HOSPITAL | Age: 87
DRG: 467 | End: 2022-12-22
Payer: MEDICARE

## 2022-12-22 LAB
ABO + RH BLD: NORMAL
ABO + RH BLD: NORMAL
ANION GAP SERPL CALCULATED.3IONS-SCNC: 15 MMOL/L (ref 7–16)
ANION GAP SERPL CALCULATED.3IONS-SCNC: 17 MMOL/L (ref 7–16)
BASOPHILS # BLD AUTO: 0.08 K/UL (ref 0–0.2)
BASOPHILS # BLD AUTO: 0.09 K/UL (ref 0–0.2)
BASOPHILS NFR BLD AUTO: 0.4 % (ref 0–1)
BASOPHILS NFR BLD AUTO: 0.9 % (ref 0–1)
BLD PROD TYP BPU: NORMAL
BLD PROD TYP BPU: NORMAL
BLOOD UNIT EXPIRATION DATE: NORMAL
BLOOD UNIT EXPIRATION DATE: NORMAL
BLOOD UNIT TYPE CODE: 7300
BLOOD UNIT TYPE CODE: 7300
BUN SERPL-MCNC: 11 MG/DL (ref 7–18)
BUN SERPL-MCNC: 11 MG/DL (ref 7–18)
BUN/CREAT SERPL: 14 (ref 6–20)
BUN/CREAT SERPL: 16 (ref 6–20)
CALCIUM SERPL-MCNC: 8.1 MG/DL (ref 8.5–10.1)
CALCIUM SERPL-MCNC: 8.3 MG/DL (ref 8.5–10.1)
CHLORIDE SERPL-SCNC: 96 MMOL/L (ref 98–107)
CHLORIDE SERPL-SCNC: 99 MMOL/L (ref 98–107)
CO2 SERPL-SCNC: 21 MMOL/L (ref 21–32)
CO2 SERPL-SCNC: 24 MMOL/L (ref 21–32)
CREAT SERPL-MCNC: 0.7 MG/DL (ref 0.55–1.02)
CREAT SERPL-MCNC: 0.79 MG/DL (ref 0.55–1.02)
CROSSMATCH INTERPRETATION: NORMAL
CROSSMATCH INTERPRETATION: NORMAL
DIFFERENTIAL METHOD BLD: ABNORMAL
DIFFERENTIAL METHOD BLD: ABNORMAL
DISPENSE STATUS: NORMAL
DISPENSE STATUS: NORMAL
EGFR (NO RACE VARIABLE) (RUSH/TITUS): 72 ML/MIN/1.73M²
EGFR (NO RACE VARIABLE) (RUSH/TITUS): 83 ML/MIN/1.73M²
EOSINOPHIL # BLD AUTO: 0.01 K/UL (ref 0–0.5)
EOSINOPHIL # BLD AUTO: 0.22 K/UL (ref 0–0.5)
EOSINOPHIL NFR BLD AUTO: 0 % (ref 1–4)
EOSINOPHIL NFR BLD AUTO: 2.5 % (ref 1–4)
ERYTHROCYTE [DISTWIDTH] IN BLOOD BY AUTOMATED COUNT: 12.5 % (ref 11.5–14.5)
ERYTHROCYTE [DISTWIDTH] IN BLOOD BY AUTOMATED COUNT: 13 % (ref 11.5–14.5)
GLUCOSE SERPL-MCNC: 119 MG/DL (ref 74–106)
GLUCOSE SERPL-MCNC: 122 MG/DL (ref 70–105)
GLUCOSE SERPL-MCNC: 71 MG/DL (ref 74–106)
GLUCOSE SERPL-MCNC: 88 MG/DL (ref 70–105)
GLUCOSE SERPL-MCNC: 89 MG/DL (ref 70–105)
GLUCOSE SERPL-MCNC: 95 MG/DL (ref 70–105)
HCT VFR BLD AUTO: 26.9 % (ref 38–47)
HCT VFR BLD AUTO: 38.8 % (ref 38–47)
HGB BLD-MCNC: 13 G/DL (ref 12–16)
HGB BLD-MCNC: 9.1 G/DL (ref 12–16)
IMM GRANULOCYTES # BLD AUTO: 0.22 K/UL (ref 0–0.04)
IMM GRANULOCYTES # BLD AUTO: 0.49 K/UL (ref 0–0.04)
IMM GRANULOCYTES NFR BLD: 2.3 % (ref 0–0.4)
IMM GRANULOCYTES NFR BLD: 2.5 % (ref 0–0.4)
INDIRECT COOMBS: NORMAL
LYMPHOCYTES # BLD AUTO: 1.48 K/UL (ref 1–4.8)
LYMPHOCYTES # BLD AUTO: 1.49 K/UL (ref 1–4.8)
LYMPHOCYTES NFR BLD AUTO: 16.7 % (ref 27–41)
LYMPHOCYTES NFR BLD AUTO: 6.8 % (ref 27–41)
MCH RBC QN AUTO: 30 PG (ref 27–31)
MCH RBC QN AUTO: 30.1 PG (ref 27–31)
MCHC RBC AUTO-ENTMCNC: 33.5 G/DL (ref 32–36)
MCHC RBC AUTO-ENTMCNC: 33.8 G/DL (ref 32–36)
MCV RBC AUTO: 89.1 FL (ref 80–96)
MCV RBC AUTO: 89.6 FL (ref 80–96)
MONOCYTES # BLD AUTO: 0.4 K/UL (ref 0–0.8)
MONOCYTES # BLD AUTO: 0.75 K/UL (ref 0–0.8)
MONOCYTES NFR BLD AUTO: 1.8 % (ref 2–6)
MONOCYTES NFR BLD AUTO: 8.4 % (ref 2–6)
MPC BLD CALC-MCNC: 9 FL (ref 9.4–12.4)
MPC BLD CALC-MCNC: 9.5 FL (ref 9.4–12.4)
NEUTROPHILS # BLD AUTO: 19.2 K/UL (ref 1.8–7.7)
NEUTROPHILS # BLD AUTO: 6.15 K/UL (ref 1.8–7.7)
NEUTROPHILS NFR BLD AUTO: 69 % (ref 53–65)
NEUTROPHILS NFR BLD AUTO: 88.7 % (ref 53–65)
NRBC # BLD AUTO: 0 X10E3/UL
NRBC # BLD AUTO: 0 X10E3/UL
NRBC, AUTO (.00): 0 %
NRBC, AUTO (.00): 0 %
PLATELET # BLD AUTO: 268 K/UL (ref 150–400)
PLATELET # BLD AUTO: 305 K/UL (ref 150–400)
POTASSIUM SERPL-SCNC: 3.9 MMOL/L (ref 3.5–5.1)
POTASSIUM SERPL-SCNC: 4.7 MMOL/L (ref 3.5–5.1)
RBC # BLD AUTO: 3.02 M/UL (ref 4.2–5.4)
RBC # BLD AUTO: 4.33 M/UL (ref 4.2–5.4)
RH BLD: NORMAL
SODIUM SERPL-SCNC: 131 MMOL/L (ref 136–145)
SODIUM SERPL-SCNC: 132 MMOL/L (ref 136–145)
UA COMPLETE W REFLEX CULTURE PNL UR: ABNORMAL
UNIT NUMBER: NORMAL
UNIT NUMBER: NORMAL
WBC # BLD AUTO: 21.67 K/UL (ref 4.5–11)
WBC # BLD AUTO: 8.91 K/UL (ref 4.5–11)

## 2022-12-22 PROCEDURE — 27201423 OPTIME MED/SURG SUP & DEVICES STERILE SUPPLY: Performed by: ORTHOPAEDIC SURGERY

## 2022-12-22 PROCEDURE — 80048 BASIC METABOLIC PNL TOTAL CA: CPT | Performed by: ORTHOPAEDIC SURGERY

## 2022-12-22 PROCEDURE — 71000033 HC RECOVERY, INTIAL HOUR: Performed by: ORTHOPAEDIC SURGERY

## 2022-12-22 PROCEDURE — D9220A PRA ANESTHESIA: ICD-10-PCS | Mod: CRNA,,, | Performed by: NURSE ANESTHETIST, CERTIFIED REGISTERED

## 2022-12-22 PROCEDURE — 85025 COMPLETE CBC W/AUTO DIFF WBC: CPT | Performed by: HOSPITALIST

## 2022-12-22 PROCEDURE — 36000710: Performed by: ORTHOPAEDIC SURGERY

## 2022-12-22 PROCEDURE — 63600175 PHARM REV CODE 636 W HCPCS: Performed by: NURSE ANESTHETIST, CERTIFIED REGISTERED

## 2022-12-22 PROCEDURE — 36415 COLL VENOUS BLD VENIPUNCTURE: CPT | Performed by: ORTHOPAEDIC SURGERY

## 2022-12-22 PROCEDURE — 85025 COMPLETE CBC W/AUTO DIFF WBC: CPT | Performed by: ORTHOPAEDIC SURGERY

## 2022-12-22 PROCEDURE — 63600175 PHARM REV CODE 636 W HCPCS: Performed by: ANESTHESIOLOGY

## 2022-12-22 PROCEDURE — 99232 SBSQ HOSP IP/OBS MODERATE 35: CPT | Mod: ,,, | Performed by: HOSPITALIST

## 2022-12-22 PROCEDURE — 36430 TRANSFUSION BLD/BLD COMPNT: CPT

## 2022-12-22 PROCEDURE — C1776 JOINT DEVICE (IMPLANTABLE): HCPCS | Performed by: ORTHOPAEDIC SURGERY

## 2022-12-22 PROCEDURE — C1713 ANCHOR/SCREW BN/BN,TIS/BN: HCPCS | Performed by: ORTHOPAEDIC SURGERY

## 2022-12-22 PROCEDURE — 99232 PR SUBSEQUENT HOSPITAL CARE,LEVL II: ICD-10-PCS | Mod: ,,, | Performed by: HOSPITALIST

## 2022-12-22 PROCEDURE — 25000003 PHARM REV CODE 250: Performed by: INTERNAL MEDICINE

## 2022-12-22 PROCEDURE — 11000001 HC ACUTE MED/SURG PRIVATE ROOM

## 2022-12-22 PROCEDURE — D9220A PRA ANESTHESIA: Mod: ANES,,, | Performed by: ANESTHESIOLOGY

## 2022-12-22 PROCEDURE — 25000003 PHARM REV CODE 250: Performed by: NURSE ANESTHETIST, CERTIFIED REGISTERED

## 2022-12-22 PROCEDURE — D9220A PRA ANESTHESIA: ICD-10-PCS | Mod: ANES,,, | Performed by: ANESTHESIOLOGY

## 2022-12-22 PROCEDURE — 25000003 PHARM REV CODE 250: Performed by: NURSE PRACTITIONER

## 2022-12-22 PROCEDURE — 36000711: Performed by: ORTHOPAEDIC SURGERY

## 2022-12-22 PROCEDURE — 36415 COLL VENOUS BLD VENIPUNCTURE: CPT | Performed by: HOSPITALIST

## 2022-12-22 PROCEDURE — 80048 BASIC METABOLIC PNL TOTAL CA: CPT | Performed by: HOSPITALIST

## 2022-12-22 PROCEDURE — P9016 RBC LEUKOCYTES REDUCED: HCPCS | Performed by: ANESTHESIOLOGY

## 2022-12-22 PROCEDURE — D9220A PRA ANESTHESIA: Mod: CRNA,,, | Performed by: NURSE ANESTHETIST, CERTIFIED REGISTERED

## 2022-12-22 PROCEDURE — 82962 GLUCOSE BLOOD TEST: CPT

## 2022-12-22 PROCEDURE — 71000039 HC RECOVERY, EACH ADD'L HOUR: Performed by: ORTHOPAEDIC SURGERY

## 2022-12-22 PROCEDURE — 86900 BLOOD TYPING SEROLOGIC ABO: CPT | Performed by: ANESTHESIOLOGY

## 2022-12-22 PROCEDURE — 37000009 HC ANESTHESIA EA ADD 15 MINS: Performed by: ORTHOPAEDIC SURGERY

## 2022-12-22 PROCEDURE — 37000008 HC ANESTHESIA 1ST 15 MINUTES: Performed by: ORTHOPAEDIC SURGERY

## 2022-12-22 PROCEDURE — 86923 COMPATIBILITY TEST ELECTRIC: CPT | Performed by: ANESTHESIOLOGY

## 2022-12-22 PROCEDURE — 63600175 PHARM REV CODE 636 W HCPCS: Performed by: HOSPITALIST

## 2022-12-22 PROCEDURE — 63600175 PHARM REV CODE 636 W HCPCS: Performed by: NURSE PRACTITIONER

## 2022-12-22 PROCEDURE — 25000003 PHARM REV CODE 250: Performed by: HOSPITALIST

## 2022-12-22 PROCEDURE — 25000003 PHARM REV CODE 250: Performed by: STUDENT IN AN ORGANIZED HEALTH CARE EDUCATION/TRAINING PROGRAM

## 2022-12-22 DEVICE — HEAD FEMORAL V40 +0X28MM COCR: Type: IMPLANTABLE DEVICE | Site: HIP | Status: FUNCTIONAL

## 2022-12-22 DEVICE — IMPLANTABLE DEVICE: Type: IMPLANTABLE DEVICE | Site: HIP | Status: FUNCTIONAL

## 2022-12-22 DEVICE — SET CABLE BEADED 2MM: Type: IMPLANTABLE DEVICE | Site: HIP | Status: FUNCTIONAL

## 2022-12-22 RX ORDER — ETOMIDATE 2 MG/ML
INJECTION INTRAVENOUS
Status: DISCONTINUED | OUTPATIENT
Start: 2022-12-22 | End: 2022-12-22

## 2022-12-22 RX ORDER — MEPERIDINE HYDROCHLORIDE 25 MG/ML
25 INJECTION INTRAMUSCULAR; INTRAVENOUS; SUBCUTANEOUS EVERY 10 MIN PRN
Status: ACTIVE | OUTPATIENT
Start: 2022-12-22 | End: 2022-12-22

## 2022-12-22 RX ORDER — HYDROCODONE BITARTRATE AND ACETAMINOPHEN 500; 5 MG/1; MG/1
TABLET ORAL
Status: DISCONTINUED | OUTPATIENT
Start: 2022-12-22 | End: 2022-12-29 | Stop reason: HOSPADM

## 2022-12-22 RX ORDER — ROCURONIUM BROMIDE 10 MG/ML
INJECTION, SOLUTION INTRAVENOUS
Status: DISCONTINUED | OUTPATIENT
Start: 2022-12-22 | End: 2022-12-22

## 2022-12-22 RX ORDER — LIDOCAINE HYDROCHLORIDE 40 MG/ML
SOLUTION TOPICAL
Status: DISCONTINUED | OUTPATIENT
Start: 2022-12-22 | End: 2022-12-22

## 2022-12-22 RX ORDER — LIDOCAINE HYDROCHLORIDE 20 MG/ML
INJECTION, SOLUTION EPIDURAL; INFILTRATION; INTRACAUDAL; PERINEURAL
Status: DISCONTINUED | OUTPATIENT
Start: 2022-12-22 | End: 2022-12-22

## 2022-12-22 RX ORDER — MORPHINE SULFATE 10 MG/ML
4 INJECTION INTRAMUSCULAR; INTRAVENOUS; SUBCUTANEOUS EVERY 5 MIN PRN
Status: DISCONTINUED | OUTPATIENT
Start: 2022-12-22 | End: 2022-12-28

## 2022-12-22 RX ORDER — FENTANYL CITRATE 50 UG/ML
INJECTION, SOLUTION INTRAMUSCULAR; INTRAVENOUS
Status: DISCONTINUED | OUTPATIENT
Start: 2022-12-22 | End: 2022-12-22

## 2022-12-22 RX ORDER — PROPOFOL 10 MG/ML
VIAL (ML) INTRAVENOUS
Status: DISCONTINUED | OUTPATIENT
Start: 2022-12-22 | End: 2022-12-22

## 2022-12-22 RX ORDER — DEXAMETHASONE SODIUM PHOSPHATE 4 MG/ML
INJECTION, SOLUTION INTRA-ARTICULAR; INTRALESIONAL; INTRAMUSCULAR; INTRAVENOUS; SOFT TISSUE
Status: DISCONTINUED | OUTPATIENT
Start: 2022-12-22 | End: 2022-12-22

## 2022-12-22 RX ORDER — ONDANSETRON 2 MG/ML
4 INJECTION INTRAMUSCULAR; INTRAVENOUS DAILY PRN
Status: DISCONTINUED | OUTPATIENT
Start: 2022-12-22 | End: 2022-12-28

## 2022-12-22 RX ORDER — PHENYLEPHRINE HYDROCHLORIDE 10 MG/ML
INJECTION INTRAVENOUS
Status: DISCONTINUED | OUTPATIENT
Start: 2022-12-22 | End: 2022-12-22

## 2022-12-22 RX ORDER — CEFAZOLIN SODIUM 1 G/3ML
INJECTION, POWDER, FOR SOLUTION INTRAMUSCULAR; INTRAVENOUS
Status: DISCONTINUED | OUTPATIENT
Start: 2022-12-22 | End: 2022-12-22

## 2022-12-22 RX ORDER — DIPHENHYDRAMINE HYDROCHLORIDE 50 MG/ML
25 INJECTION INTRAMUSCULAR; INTRAVENOUS EVERY 6 HOURS PRN
Status: DISCONTINUED | OUTPATIENT
Start: 2022-12-22 | End: 2022-12-29 | Stop reason: HOSPADM

## 2022-12-22 RX ORDER — HYDROMORPHONE HYDROCHLORIDE 2 MG/ML
0.5 INJECTION, SOLUTION INTRAMUSCULAR; INTRAVENOUS; SUBCUTANEOUS EVERY 5 MIN PRN
Status: DISCONTINUED | OUTPATIENT
Start: 2022-12-22 | End: 2022-12-28

## 2022-12-22 RX ADMIN — CEFEPIME 1 G: 1 INJECTION, POWDER, FOR SOLUTION INTRAMUSCULAR; INTRAVENOUS at 06:12

## 2022-12-22 RX ADMIN — PHENYLEPHRINE HYDROCHLORIDE 100 MCG: 10 INJECTION INTRAVENOUS at 03:12

## 2022-12-22 RX ADMIN — ROCURONIUM BROMIDE 40 MG: 10 INJECTION, SOLUTION INTRAVENOUS at 12:12

## 2022-12-22 RX ADMIN — ONDANSETRON 4 MG: 2 INJECTION INTRAMUSCULAR; INTRAVENOUS at 12:12

## 2022-12-22 RX ADMIN — HYDROCODONE BITARTRATE AND ACETAMINOPHEN 1 TABLET: 7.5; 325 TABLET ORAL at 06:12

## 2022-12-22 RX ADMIN — FENTANYL CITRATE 50 MCG: 50 INJECTION INTRAMUSCULAR; INTRAVENOUS at 12:12

## 2022-12-22 RX ADMIN — DOCUSATE SODIUM 100 MG: 100 CAPSULE, LIQUID FILLED ORAL at 08:12

## 2022-12-22 RX ADMIN — MORPHINE SULFATE 4 MG: 10 INJECTION, SOLUTION INTRAMUSCULAR; INTRAVENOUS at 04:12

## 2022-12-22 RX ADMIN — LIDOCAINE HYDROCHLORIDE 120 MG: 40 SOLUTION TOPICAL at 12:12

## 2022-12-22 RX ADMIN — PROPOFOL 20 MG: 10 INJECTION, EMULSION INTRAVENOUS at 12:12

## 2022-12-22 RX ADMIN — GABAPENTIN 100 MG: 100 CAPSULE ORAL at 10:12

## 2022-12-22 RX ADMIN — CEFEPIME 1 G: 1 INJECTION, POWDER, FOR SOLUTION INTRAMUSCULAR; INTRAVENOUS at 04:12

## 2022-12-22 RX ADMIN — MUPIROCIN: 20 OINTMENT TOPICAL at 10:12

## 2022-12-22 RX ADMIN — FENTANYL CITRATE 25 MCG: 50 INJECTION INTRAMUSCULAR; INTRAVENOUS at 03:12

## 2022-12-22 RX ADMIN — ALPRAZOLAM 1 MG: 0.25 TABLET ORAL at 10:12

## 2022-12-22 RX ADMIN — DEXAMETHASONE SODIUM PHOSPHATE 8 MG: 4 INJECTION, SOLUTION INTRA-ARTICULAR; INTRALESIONAL; INTRAMUSCULAR; INTRAVENOUS; SOFT TISSUE at 12:12

## 2022-12-22 RX ADMIN — CEFAZOLIN 2 G: 1 INJECTION, POWDER, FOR SOLUTION INTRAMUSCULAR; INTRAVENOUS; PARENTERAL at 12:12

## 2022-12-22 RX ADMIN — PHENYLEPHRINE HYDROCHLORIDE 200 MCG: 10 INJECTION INTRAVENOUS at 01:12

## 2022-12-22 RX ADMIN — LIDOCAINE HYDROCHLORIDE 80 MG: 20 INJECTION, SOLUTION INTRAVENOUS at 12:12

## 2022-12-22 RX ADMIN — HYDROMORPHONE HYDROCHLORIDE 0.5 MG: 2 INJECTION, SOLUTION INTRAMUSCULAR; INTRAVENOUS; SUBCUTANEOUS at 08:12

## 2022-12-22 RX ADMIN — DILTIAZEM HYDROCHLORIDE 120 MG: 120 CAPSULE, COATED, EXTENDED RELEASE ORAL at 10:12

## 2022-12-22 RX ADMIN — NYSTATIN 500000 UNITS: 500000 SUSPENSION ORAL at 08:12

## 2022-12-22 RX ADMIN — ETOMIDATE 12 MG: 2 INJECTION, SOLUTION INTRAVENOUS at 12:12

## 2022-12-22 RX ADMIN — PANTOPRAZOLE SODIUM 40 MG: 40 TABLET, DELAYED RELEASE ORAL at 10:12

## 2022-12-22 RX ADMIN — MUPIROCIN: 20 OINTMENT TOPICAL at 09:12

## 2022-12-22 RX ADMIN — GABAPENTIN 100 MG: 100 CAPSULE ORAL at 08:12

## 2022-12-22 RX ADMIN — PHENYLEPHRINE HYDROCHLORIDE 200 MCG: 10 INJECTION INTRAVENOUS at 12:12

## 2022-12-22 RX ADMIN — MORPHINE SULFATE 2 MG: 10 INJECTION, SOLUTION INTRAMUSCULAR; INTRAVENOUS at 04:12

## 2022-12-22 RX ADMIN — METOPROLOL TARTRATE 50 MG: 50 TABLET, FILM COATED ORAL at 10:12

## 2022-12-22 NOTE — PROGRESS NOTES
[facility] - [address] [place] [address] Medicine  Progress Note    Patient Name: Zandra Veloz  MRN: [id number]  Patient Class: IP- Inpatient   Admission Date: 12/17/2022  Length of Stay: 4 days  Attending Physician: Caleb Carter MD  Primary Care Provider: Brandon Thornton MD        Subjective:     Principal Problem:Delmy-prosthetic fracture around prosthetic hip        HPI:  88-year-old lady seen emergency room department.  Patient presents after having a fall when she was trying to go to the restroom at her home.  Patient sustained a left femur fracture.  Patient complains of moderate to severe pain in the left hip area.  Patient also complains of chest tightness, she rates it a 5/10 in the chest tightness has been intubate.  Patient also was seen in emergency room department earlier this week per her daughter.  Patient is found to have dehydration and a urinary tract infection.  Current she denies any shortness of breath.  She does not give a history of coronary artery disease.      Overview/Hospital Course:  12/19 - records reviewed. Fall without LOC and has left hip fx. No other complaints currently. Seen by cardiology with some CP felt musculoskeletal.  Echo mild AS and mod MR with nl EF. Age increase cardiac risk for surgery but discussed with daughter surgery is urgent and see no benefit in delay medically. Feel low to moderate risk for surgery. Question about UTI. No symptoms. Had UTI in 10.22 not surprising. Lab to do culture on urine in lab. Cover with ATN for prior culture with ATB started. Discussed with Dr Singer and cardiology.  12/20 Tachycardia and elevated BP. Cardiology adjusted meds. Plan hold off surgery until 12/22 to adjust meds and treat UTI. Family in room. Pt not sleeping well.   12/21 Didn't sleep well prior night. Apparently been on xanax for a time. Also recently started on Neurontin. Family with question. No recent BM. Some increase stool on KUB but not  severe. Surgery for am. BP some up but better. HR good.       Interval History:     Review of Systems   Constitutional:  Negative for appetite change, fatigue and fever.   HENT:  Negative for congestion, hearing loss and trouble swallowing.    Respiratory:  Negative for chest tightness, shortness of breath and wheezing.    Cardiovascular:  Negative for chest pain and palpitations.   Gastrointestinal:  Negative for abdominal pain, constipation and nausea.   Genitourinary:  Negative for difficulty urinating and dysuria.   Musculoskeletal:  Positive for gait problem. Negative for back pain and neck stiffness.        Left hip pain after fall   Skin:  Negative for pallor and rash.   Neurological:  Negative for dizziness, speech difficulty and headaches.   Psychiatric/Behavioral:  Negative for confusion and suicidal ideas.    Objective:     Vital Signs (Most Recent):  Temp: 98.6 °F (37 °C) (12/21/22 1930)  Pulse: 69 (12/21/22 1930)  Resp: 18 (12/21/22 1930)  BP: (!) 149/65 (12/21/22 1930)  SpO2: 96 % (12/21/22 1930)   Vital Signs (24h Range):  Temp:  [98.2 °F (36.8 °C)-98.8 °F (37.1 °C)] 98.6 °F (37 °C)  Pulse:  [57-91] 69  Resp:  [18-20] 18  SpO2:  [94 %-97 %] 96 %  BP: (130-207)/(56-86) 149/65     Weight: 59.5 kg (131 lb 2.8 oz)  Body mass index is 23.99 kg/m².    Intake/Output Summary (Last 24 hours) at 12/21/2022 2230  Last data filed at 12/21/2022 0454  Gross per 24 hour   Intake 240 ml   Output 500 ml   Net -260 ml        Physical Exam  Vitals reviewed.   Constitutional:       General: She is not in acute distress.  Eyes:      Pupils: Pupils are equal, round, and reactive to light.   Cardiovascular:      Rate and Rhythm: Normal rate and regular rhythm.      Pulses: Normal pulses.   Pulmonary:      Effort: Pulmonary effort is normal. No respiratory distress.      Breath sounds: Normal breath sounds. No wheezing.   Abdominal:      General: Bowel sounds are normal. There is no distension.      Tenderness: There is no  abdominal tenderness.   Musculoskeletal:      Comments: Left hip pain and misalignment after fall   Skin:     General: Skin is warm.   Neurological:      General: No focal deficit present.      Mental Status: She is alert, oriented to person, place, and time and easily aroused. Mental status is at baseline.   Psychiatric:         Mood and Affect: Mood normal.         Behavior: Behavior normal.       Significant Labs: All pertinent labs within the past 24 hours have been reviewed.  BMP:   Recent Labs   Lab 12/21/22 0256   GLU 60*   *   K 4.2   CL 95*   CO2 23   BUN 9   CREATININE 0.85   CALCIUM 8.4*       CBC:   Recent Labs   Lab 12/20/22 0815 12/21/22 0256   WBC 11.98* 12.15*   HGB 9.5* 9.1*   HCT 28.5* 27.5*    278       CMP:   Recent Labs   Lab 12/20/22 0815 12/21/22 0256   * 130*   K 4.5 4.2   CL 99 95*   CO2 23 23   GLU 92 60*   BUN 9 9   CREATININE 0.75 0.85   CALCIUM 8.4* 8.4*   PROT  --  5.3*   ALBUMIN  --  2.6*   BILITOT  --  0.9   ALKPHOS  --  43*   AST  --  27   ALT  --  14   ANIONGAP 15 16         Significant Imaging: I have reviewed all pertinent imaging results/findings within the past 24 hours.      Assessment/Plan:      * Dlemy-prosthetic fracture around prosthetic hip  Patient found to have a left femur fracture after a fall today.    Consult Orthopedics.  Will hold off on surgery for now because the patient is having chest tightness.  Will consult Cardiology    12/21 surgery for am    Heart murmur    Echo Summary this admit     The left ventricle is normal in size with hyperdynamic systolic function.   The estimated ejection fraction is 75%.   Left ventricular diastolic dysfunction.   Normal right ventricular size.   There is mild aortic valve stenosis.   Aortic valve area is 1.94 cm2; peak velocity is 3.0 m/s; mean gradient is 18 mmHg.   Moderate-to-severe mitral regurgitation.   Mild tricuspid regurgitation.   Mild pulmonic regurgitation.   Normal central venous  pressure (3 mmHg).   The estimated PA systolic pressure is 32 mmHg.   Trivial pericardial effusion.      Palpitations        Chest pain  Patient complains of chest tightness, she rates it a 5/10.  Chest tightness has been intermittent for several days.    Trend out her troponins   Admit to telemetry   Hold off on surgery   Consult Cardiology  Follow-up EKG      Acute cystitis without hematuria    This not clear but likely chronic UTI. ATB cover culture collected 10/2022. Urine this admitsent for culture    Primary osteoarthritis of right knee        Primary osteoarthritis of right shoulder        Closed fracture of left hip    Surgery 12/22    Hypertension  Blood pressure currently stable, will follow    12/20 BP until and meds adjusted    Arthritis    Tylenol p.r.n..      VTE Risk Mitigation (From admission, onward)         Ordered     enoxaparin injection 40 mg  Daily         12/17/22 1253     IP VTE HIGH RISK PATIENT  Once         12/17/22 1253     Place sequential compression device  Until discontinued         12/17/22 1253                Discharge Planning   YANIV:      Code Status: Full Code   Is the patient medically ready for discharge?:     Reason for patient still in hospital (select all that apply): Laboratory test, Treatment, Imaging and Consult recommendations  Discharge Plan A: Home                  Caleb Carter MD  Department of Hospital Medicine   [facility] - [facility] Tele[facility]

## 2022-12-22 NOTE — PLAN OF CARE
Problem: Adult Inpatient Plan of Care  Goal: Plan of Care Review  Outcome: Ongoing, Progressing  Goal: Optimal Comfort and Wellbeing  Outcome: Ongoing, Progressing     Problem: Impaired Wound Healing  Goal: Optimal Wound Healing  Outcome: Ongoing, Progressing     Problem: Fall Injury Risk  Goal: Absence of Fall and Fall-Related Injury  Outcome: Ongoing, Progressing     Problem: Skin Injury Risk Increased  Goal: Skin Health and Integrity  Outcome: Ongoing, Progressing

## 2022-12-22 NOTE — SUBJECTIVE & OBJECTIVE
Interval History:     Review of Systems   Constitutional:  Negative for appetite change, fatigue and fever.   HENT:  Negative for congestion, hearing loss and trouble swallowing.    Respiratory:  Negative for chest tightness, shortness of breath and wheezing.    Cardiovascular:  Negative for chest pain and palpitations.   Gastrointestinal:  Negative for abdominal pain, constipation and nausea.   Genitourinary:  Negative for difficulty urinating and dysuria.   Musculoskeletal:  Positive for gait problem. Negative for back pain and neck stiffness.        Left hip pain after fall   Skin:  Negative for pallor and rash.   Neurological:  Negative for dizziness, speech difficulty and headaches.   Psychiatric/Behavioral:  Negative for confusion and suicidal ideas.    Objective:     Vital Signs (Most Recent):  Temp: 98.6 °F (37 °C) (12/21/22 1930)  Pulse: 69 (12/21/22 1930)  Resp: 18 (12/21/22 1930)  BP: (!) 149/65 (12/21/22 1930)  SpO2: 96 % (12/21/22 1930)   Vital Signs (24h Range):  Temp:  [98.2 °F (36.8 °C)-98.8 °F (37.1 °C)] 98.6 °F (37 °C)  Pulse:  [57-91] 69  Resp:  [18-20] 18  SpO2:  [94 %-97 %] 96 %  BP: (130-207)/(56-86) 149/65     Weight: 59.5 kg (131 lb 2.8 oz)  Body mass index is 23.99 kg/m².    Intake/Output Summary (Last 24 hours) at 12/21/2022 2230  Last data filed at 12/21/2022 0454  Gross per 24 hour   Intake 240 ml   Output 500 ml   Net -260 ml        Physical Exam  Vitals reviewed.   Constitutional:       General: She is not in acute distress.  Eyes:      Pupils: Pupils are equal, round, and reactive to light.   Cardiovascular:      Rate and Rhythm: Normal rate and regular rhythm.      Pulses: Normal pulses.   Pulmonary:      Effort: Pulmonary effort is normal. No respiratory distress.      Breath sounds: Normal breath sounds. No wheezing.   Abdominal:      General: Bowel sounds are normal. There is no distension.      Tenderness: There is no abdominal tenderness.   Musculoskeletal:      Comments: Left  hip pain and misalignment after fall   Skin:     General: Skin is warm.   Neurological:      General: No focal deficit present.      Mental Status: She is alert, oriented to person, place, and time and easily aroused. Mental status is at baseline.   Psychiatric:         Mood and Affect: Mood normal.         Behavior: Behavior normal.       Significant Labs: All pertinent labs within the past 24 hours have been reviewed.  BMP:   Recent Labs   Lab 12/21/22 0256   GLU 60*   *   K 4.2   CL 95*   CO2 23   BUN 9   CREATININE 0.85   CALCIUM 8.4*       CBC:   Recent Labs   Lab 12/20/22  0815 12/21/22 0256   WBC 11.98* 12.15*   HGB 9.5* 9.1*   HCT 28.5* 27.5*    278       CMP:   Recent Labs   Lab 12/20/22 0815 12/21/22 0256   * 130*   K 4.5 4.2   CL 99 95*   CO2 23 23   GLU 92 60*   BUN 9 9   CREATININE 0.75 0.85   CALCIUM 8.4* 8.4*   PROT  --  5.3*   ALBUMIN  --  2.6*   BILITOT  --  0.9   ALKPHOS  --  43*   AST  --  27   ALT  --  14   ANIONGAP 15 16         Significant Imaging: I have reviewed all pertinent imaging results/findings within the past 24 hours.

## 2022-12-22 NOTE — INTERVAL H&P NOTE
The patient has been examined and the H&P has been reviewed:    I concur with the findings and no changes have occurred since H&P was written.    Surgery risks, benefits and alternative options discussed and understood by patient/family.          Active Hospital Problems    Diagnosis  POA    *Delmy-prosthetic fracture around prosthetic hip [M97.8XXA, Z96.649]  Not Applicable    Heart murmur [R01.1]  Yes    Other fracture of left femur, initial encounter for closed fracture [S72.8X2A]  Yes     PT undergoing evaluation for surgical intervention, cardiovascular risk stratification pending echo, serial troponin.       Palpitations [R00.2]  Yes     Vague history, continue to monitor on tele      Acute cystitis without hematuria [N30.00]  Yes     Patient diagnosed with a UTI approximately 5 days ago.  Will continue Ceftin      Chest pain [R07.9]  Yes    Primary osteoarthritis of right knee [M17.11]  Yes    Primary osteoarthritis of right shoulder [M19.011]  Yes    Lumbar radiculopathy [M54.16]  Yes    Closed fracture of left hip [S72.002A]  Yes    Hypertension [I10]  Yes    Arthritis [M19.90]  Yes      Resolved Hospital Problems   No resolved problems to display.

## 2022-12-22 NOTE — ANESTHESIA PREPROCEDURE EVALUATION
12/22/2022  Zandra Veloz is a 88 y.o., female.      Pre-op Assessment    I have reviewed the Patient Summary Reports.    I have reviewed the NPO Status.   I have reviewed the Medications.     Review of Systems  Social:  Non-Smoker, No Alcohol Use    Hematology/Oncology:  Hematology Normal   Oncology Normal    -- Anemia:  Hematology Comments: Will have 2 units of pRBC's on standby and likely infuse during case.    EENT/Dental:EENT/Dental Normal   Cardiovascular:  Cardiovascular Normal Exercise tolerance: poor  ECG has been reviewed. Heart murmur  Exercise tolerance low secondary to high Fall risk    Pulmonary:  Pulmonary Normal    Renal/:  Renal/ Normal     Hepatic/GI:  Hepatic/GI Normal    Musculoskeletal:  Musculoskeletal Normal    Neurological:  Neurology Normal  Denies CVA.  Pain Etiology/Diagnosis, Lumbar Radiculopathy    Endocrine:  Endocrine Normal    Dermatological:  Skin Normal    Psych:  Psychiatric Normal           Chemistry        Component Value Date/Time     (L) 12/22/2022 0313    K 3.9 12/22/2022 0313    CL 96 (L) 12/22/2022 0313    CO2 24 12/22/2022 0313    BUN 11 12/22/2022 0313    CREATININE 0.70 12/22/2022 0313    GLU 71 (L) 12/22/2022 0313        Component Value Date/Time    CALCIUM 8.3 (L) 12/22/2022 0313    ALKPHOS 43 (L) 12/21/2022 0256    AST 27 12/21/2022 0256    ALT 14 12/21/2022 0256    BILITOT 0.9 12/21/2022 0256    EGFRNONAA 50 (L) 02/01/2022 1223        Lab Results   Component Value Date    WBC 8.91 12/22/2022    RBC 3.02 (L) 12/22/2022    HGB 9.1 (L) 12/22/2022    MCV 89.1 12/22/2022    MCH 30.1 12/22/2022    MCHC 33.8 12/22/2022    RDW 12.5 12/22/2022     12/22/2022    MPV 9.5 12/22/2022    LYMPH 16.7 (L) 12/22/2022    LYMPH 1.49 12/22/2022    MONO 8.4 (H) 12/22/2022    EOS 0.22 12/22/2022    BASO 0.08 12/22/2022     Results for orders placed or  performed during the hospital encounter of 12/17/22   EKG 12-lead    Collection Time: 12/20/22  4:49 AM    Narrative    Test Reason : R07.9,    Vent. Rate : 099 BPM     Atrial Rate : 099 BPM     P-R Int : 174 ms          QRS Dur : 082 ms      QT Int : 342 ms       P-R-T Axes : 068 064 067 degrees     QTc Int : 438 ms    Normal sinus rhythm  Normal ECG  When compared with ECG of 20-DEC-2022 03:58,  Vent. rate has decreased BY  84 BPM  ST no longer depressed in Inferior leads  ST no longer depressed in Anterior-lateral leads    Referred By: [email or web address]   SELF           Confirmed By:      EchoSummary     The left ventricle is normal in size with hyperdynamic systolic function.   The estimated ejection fraction is 75%.   Left ventricular diastolic dysfunction.   Normal right ventricular size.   There is mild aortic valve stenosis.   Aortic valve area is 1.94 cm2; peak velocity is 3.0 m/s; mean gradient is 18 mmHg.   Moderate-to-severe mitral regurgitation.   Mild tricuspid regurgitation.   Mild pulmonic regurgitation.   Normal central venous pressure (3 mmHg).   The estimated PA systolic pressure is 32 mmHg.   Trivial pericardial effusion.         Physical Exam  General: Well nourished, Cooperative, Alert and Oriented    Airway:  Mallampati: II / II  Mouth Opening: Normal  TM Distance: Normal  Neck ROM: Normal ROM    Dental:  Intact    Chest/Lungs:  Clear to auscultation    Heart:  Rate: Normal  Rhythm: Regular Rhythm  Sounds: Normal        Anesthesia Plan  Type of Anesthesia, risks & benefits discussed:    Anesthesia Type: Gen ETT  Intra-op Monitoring Plan: Standard ASA Monitors  Post Op Pain Control Plan: multimodal analgesia  Induction:  IV  Airway Plan: Direct  Informed Consent: Informed consent signed with the Patient and all parties understand the risks and agree with anesthesia plan.  All questions answered.   ASA Score: 4  Day of Surgery Review of History & Physical: H&P Update referred to the  surgeon/provider.    Ready For Surgery From Anesthesia Perspective.     .

## 2022-12-22 NOTE — PT/OT/SLP PROGRESS
Occupational Therapy      Patient Name:  Zandra Veloz   MRN:  [id number]    Patient not seen today secondary to Off the floor for procedure/surgery (pt gone to surgery for repair of hip fracture). Will follow-up 12/23/22.    12/22/2022

## 2022-12-22 NOTE — PLAN OF CARE
1607  pt to pacu pt resp reg and non labored pt dsg d/I to l hip pt with hip abd pillow intact pt chao cath intact and draining well  pt with dsg d/I to l lower leg from slough skin on lower leg from skin tears in or    1630 pt c/o pain in l leg gave morphine 4mg ivp will titrate for pain control lab and x rays done    1645 pt vs stable pt resting well pt sao2 96% on 2l nbp pt cont c/o pain titrating morphine for pain control   1700   pt resting at times pt sao2 96% on 2l nbp pt dsg d/I to l hip hemovac intact an charged   1715  pt vs stable pt sao2 96% on ra pt awake  and responsive  orders to release to room per md    1730 pt released to g batsheva rn b/p 117/63 pulse 69 resp 16 sao2 94% on 2l nbp  temp 97.7 oral

## 2022-12-22 NOTE — BRIEF OP NOTE
[facility] - [facility]  Brief Operative Note    SUMMARY     Surgery Date: 12/22/2022     Surgeon(s) and Role:     * Dequan Singer MD - Primary    Assisting Surgeon: None    Pre-op Diagnosis:  Fall [W19.XXXA]  Periprosthetic fracture of hip, initial encounter [M97.8XXA, Z96.649]    Post-op Diagnosis:  Post-Op Diagnosis Codes:     * Fall [W19.XXXA]     * Periprosthetic fracture of hip, initial encounter [M97.8XXA, Z96.649]    Procedure(s) (LRB):  REVISION, TOTAL ARTHROPLASTY, HIP, VICTORIANO PROSTHETIC FX (Left)    Anesthesia:  General    Operative Findings:  Highly comminuted displaced periprosthetic fracture-left hip    Estimated Blood Loss: 1100 mL    Estimated Blood Loss has been documented.         Specimens:   Specimen (24h ago, onward)      None            [id number]

## 2022-12-22 NOTE — PROGRESS NOTES
Patient is alert and oriented today.  The planned revision of her left hip periprosthetic fracture was delayed due to atrial fibrillation and labile blood pressure.  She was reportedly now cleared proceed tomorrow a.m. with repair of the left hip fracture.  Hemoglobin is 9.2 today.

## 2022-12-22 NOTE — OP NOTE
[facility] - [facility] Medical Telemetry  Surgery Department  Operative Note    SUMMARY     Date of Procedure: 12/22/2022     Procedure: Procedure(s) (LRB):  REVISION, TOTAL ARTHROPLASTY, HIP, VICTORIANO PROSTHETIC FX (Left)     Surgeon(s) and Role:     * Dequan Singer MD - Primary    Assisting Surgeon: None    Pre-Operative Diagnosis: Fall [W19.XXXA]  Periprosthetic fracture of hip, initial encounter [M97.8XXA, Z96.649]    Post-Operative Diagnosis: Post-Op Diagnosis Codes:     * Fall [W19.XXXA]     * Periprosthetic fracture of hip, initial encounter [M97.8XXA, Z96.649]    Anesthesia:  General    Technical Procedures Used:  Patient taken the operating room placed supine position.  After adequate level of general anesthesia been achieved (see anesthesia note) was transferred to the right lateral decubitus position and held with a pelvic support.  Left hip was prepped with Betadine draped sterile fashion.  The operation begun making curvilinear skin incision over the lateral aspect of the proximal thigh and buttocks region aligned with the previously healed surgical incision.  Incision was carried carefully through subcutaneous layers.  Skin flaps were developed.  The fascia cody and gluteal fascia were incised in line with skin incision.  Hip was internally rotated dissection was carried down along the posterior aspect of the hip.  Sciatic nerve was protected and the hip joint capsule was opened in T-fashion and tagged with suture.  Bipolar femoral head and neck region were visualized.  The femoral prosthesis was found to be subsided within the proximal femur and there was highly comminuted periprosthetic fractures including greater trochanter, lesser trochanter and femoral diaphysis.  The femoral bipolar head was removed followed by removal of the femoral prosthesis.  Vastus lateralis was split longitudinally and comminuted fracture was identified.  Fracture was exposed.  Preliminary reduction and  stabilization was accomplished with multiple Dall-Miles cables.  Proximal femur was then delivered into the wound.  Proximal femoral canal was opened sequentially with hand reamers.  To a final diameter of 60 mm.  A 16 mm diameter by 155 mm in length restoration modular hip stem was implanted in the femoral canal.  Medial aspect of the greater trochanter was then reamed sequentially over a pete attached to the proximal portion of the hip stem.  Proximal femoral bodies and bipolar head/neck length combinations were trialed.  The trial components removed.  A 21 mm +10 porous-coated modular hip stem body was then implanted and engaged with the stem.  The body was placed  anteversion and then tightened with the connecting bolt.  A 44 mm outside diameter/28 mm inside diameter bipolar femoral head +0 neutral neck length was engaged with the Thrasher taper of the femoral component.  Hip was then reduced and brought through good stable range of motion.  Leg lengths appeared equal.  Additional 2.0 mm diameter Dall-Miles titanium cables were added to secure the more proximal fracture fragments including the greater trochanter.  Good stability was ultimately achieved.  Excess cables were removed.  Wounds irrigated antibiotic solution.  Two Hemovac drains placed in the depths of the wound and brought through separate stab wounds in the skin.  The fascia cody and gluteal fascia were reapproximated with interrupted 1. Vicryl suture.  Subcutaneous tissue was approximated with interrupted 2-0 Vicryl suture.  Skin margins approximated with stainless steel staples.  Wounds were dressed sterilely.  Removing the Ace wrap and impervious dressings from the leg revealed a skin tear comprising half of the mid tibial region.  Patient has extremely thin skin and suffered degloving type injuries to her arms as result of her fall as well.  Skin tear involving the leg was tacked down with interrupted 5 0 nylon suture and dressed sterilely.   Patient's family was made aware of the skin tear.  Patient tolerated procedure well was awakened and taken recovery room in good condition.  Estimated blood loss 1100 cc.          Complications: No    Estimated Blood Loss (EBL): 1100 mL           Implants:   Implant Name Type Inv. Item Serial No.  Lot No. LRB No. Used Action   SET CABLE BEADED 2MM - [id number]  SET CABLE BEADED 2MM  [employer]  Left 1 Implanted   SET CABLE BEADED 2MM - [id number]  SET CABLE BEADED 2MM  [employer]  Left 1 Implanted   SET CABLE BEADED 2MM - [id number]  SET CABLE BEADED 2MM  [employer]  Left 1 Implanted   SET CABLE BEADED 2MM - [id number]  SET CABLE BEADED 2MM  [employer]  Left 1 Implanted   STEM CONIC DST STR [id number] - [id number]  STEM CONIC DST STR [id number]  BL[employer] HETAL. [id number] Left 1 Implanted   HEAD FEMORAL V40 +0X28MM COCR - [id number]  HEAD FEMORAL V40 +0X28MM COCR  BL[employer] HETAL.  Left 1 Implanted   blas UHR univeral head bipolar component      Left 1 Implanted   PROSTHESIS HIP STD +[id number] - [id number]  PROSTHESIS HIP STD +[id number]  [employer] HETAL.  Left 1 Implanted   SET CABLE BEADED 2MM - [id number]  SET CABLE BEADED 2MM  [employer]  Left 1 Implanted   SET CABLE BEADED 2MM - [id number]  SET CABLE BEADED 2MM  [employer] [id number] Left 1 Implanted       Specimens:   Specimen (24h ago, onward)      None                    Condition: Good    Disposition: PACU - hemodynamically stable.    Attestation: I was present and scrubbed for the entire procedure.

## 2022-12-22 NOTE — ANESTHESIA PROCEDURE NOTES
Intubation    Date/Time: 12/22/2022 12:42 PM  Performed by: Cornelius Marmolejo CRNA  Authorized by: Nitin Mercado MD     Intubation:     Induction:  Intravenous    Intubated:  Postinduction    Mask Ventilation:  Easy mask    Attempts:  1    Attempted By:  CRNA    Method of Intubation:  Direct    Blade:  Sandi 4    Laryngeal View Grade: Grade I - full view of cords      Difficult Airway Encountered?: No      Complications:  None    Airway Device:  Oral endotracheal tube    Airway Device Size:  7.5    Style/Cuff Inflation:  Cuffed    Inflation Amount (mL):  8    Tube secured:  21    Secured at:  The lips    Placement Verified By:  Capnometry    Complicating Factors:  None    Findings Post-Intubation:  BS equal bilateral and atraumatic/condition of teeth unchanged

## 2022-12-22 NOTE — TRANSFER OF CARE
"Anesthesia Transfer of Care Note    Patient: Zandra Veloz    Procedure(s) Performed: Procedure(s) (LRB):  REVISION, TOTAL ARTHROPLASTY, HIP, VICTORIANO PROSTHETIC FX (Left)    Patient location: PACU    Anesthesia Type: general    Transport from OR: Transported from OR on room air with adequate spontaneous ventilation    Post pain: adequate analgesia    Post assessment: no apparent anesthetic complications and tolerated procedure well    Post vital signs: stable    Level of consciousness: responds to stimulation    Nausea/Vomiting: no nausea/vomiting    Complications: none    Transfer of care protocol was followedComments: Report Given to PACU rn VSS      Last vitals:   Visit Vitals  BP (!) 193/165 (Patient Position: Lying)   Pulse 76   Temp 37.3 °C (99.1 °F)   Resp (!) 26   Ht 5' 2" (1.575 m)   Wt 59.5 kg (131 lb 2.8 oz)   SpO2 99%   Breastfeeding No   BMI 23.99 kg/m²     "

## 2022-12-22 NOTE — ASSESSMENT & PLAN NOTE
Patient found to have a left femur fracture after a fall today.    Consult Orthopedics.  Will hold off on surgery for now because the patient is having chest tightness.  Will consult Cardiology    12/21 surgery for am

## 2022-12-22 NOTE — PT/OT/SLP PROGRESS
Physical Therapy      Patient Name:  Zandra Veloz   MRN:  [id number]    Patient not seen today secondary to Other (Comment) (Pt in surgery today for ORIF of hip.). Will follow-up 12/23/22.

## 2022-12-22 NOTE — ASSESSMENT & PLAN NOTE
This not clear but likely chronic UTI. ATB cover culture collected 10/2022. Urine this admitsent for culture    12/21 G neg in urine ID / sent pending

## 2022-12-23 LAB
GLUCOSE SERPL-MCNC: 141 MG/DL (ref 70–105)
GLUCOSE SERPL-MCNC: 147 MG/DL (ref 70–105)
GLUCOSE SERPL-MCNC: 151 MG/DL (ref 70–105)
GLUCOSE SERPL-MCNC: 152 MG/DL (ref 70–105)

## 2022-12-23 PROCEDURE — 25000003 PHARM REV CODE 250: Performed by: ORTHOPAEDIC SURGERY

## 2022-12-23 PROCEDURE — 63600175 PHARM REV CODE 636 W HCPCS: Performed by: NURSE PRACTITIONER

## 2022-12-23 PROCEDURE — 97530 THERAPEUTIC ACTIVITIES: CPT

## 2022-12-23 PROCEDURE — 99232 PR SUBSEQUENT HOSPITAL CARE,LEVL II: ICD-10-PCS | Mod: ,,, | Performed by: HOSPITALIST

## 2022-12-23 PROCEDURE — 63600175 PHARM REV CODE 636 W HCPCS: Performed by: HOSPITALIST

## 2022-12-23 PROCEDURE — 25000003 PHARM REV CODE 250: Performed by: INTERNAL MEDICINE

## 2022-12-23 PROCEDURE — 97110 THERAPEUTIC EXERCISES: CPT

## 2022-12-23 PROCEDURE — 63600175 PHARM REV CODE 636 W HCPCS: Performed by: INTERNAL MEDICINE

## 2022-12-23 PROCEDURE — 82962 GLUCOSE BLOOD TEST: CPT

## 2022-12-23 PROCEDURE — 97163 PT EVAL HIGH COMPLEX 45 MIN: CPT

## 2022-12-23 PROCEDURE — 25000003 PHARM REV CODE 250: Performed by: HOSPITALIST

## 2022-12-23 PROCEDURE — 27000221 HC OXYGEN, UP TO 24 HOURS

## 2022-12-23 PROCEDURE — 25000003 PHARM REV CODE 250: Performed by: NURSE PRACTITIONER

## 2022-12-23 PROCEDURE — 94761 N-INVAS EAR/PLS OXIMETRY MLT: CPT

## 2022-12-23 PROCEDURE — 99232 SBSQ HOSP IP/OBS MODERATE 35: CPT | Mod: ,,, | Performed by: HOSPITALIST

## 2022-12-23 PROCEDURE — 11000001 HC ACUTE MED/SURG PRIVATE ROOM

## 2022-12-23 PROCEDURE — 97166 OT EVAL MOD COMPLEX 45 MIN: CPT

## 2022-12-23 RX ORDER — KETOROLAC TROMETHAMINE 15 MG/ML
15 INJECTION, SOLUTION INTRAMUSCULAR; INTRAVENOUS ONCE
Status: COMPLETED | OUTPATIENT
Start: 2022-12-23 | End: 2022-12-23

## 2022-12-23 RX ORDER — ALPRAZOLAM 0.5 MG/1
1 TABLET ORAL 2 TIMES DAILY
Status: DISCONTINUED | OUTPATIENT
Start: 2022-12-23 | End: 2022-12-29 | Stop reason: HOSPADM

## 2022-12-23 RX ORDER — HYDROCODONE BITARTRATE AND ACETAMINOPHEN 10; 325 MG/1; MG/1
2 TABLET ORAL EVERY 6 HOURS PRN
Status: DISCONTINUED | OUTPATIENT
Start: 2022-12-23 | End: 2022-12-29 | Stop reason: HOSPADM

## 2022-12-23 RX ORDER — PREDNISONE 5 MG/1
5 TABLET ORAL DAILY
Status: DISCONTINUED | OUTPATIENT
Start: 2022-12-23 | End: 2022-12-29 | Stop reason: HOSPADM

## 2022-12-23 RX ORDER — HYDROCODONE BITARTRATE AND ACETAMINOPHEN 10; 325 MG/1; MG/1
1 TABLET ORAL EVERY 6 HOURS PRN
Status: DISCONTINUED | OUTPATIENT
Start: 2022-12-23 | End: 2022-12-28

## 2022-12-23 RX ORDER — KETOROLAC TROMETHAMINE 15 MG/ML
INJECTION, SOLUTION INTRAMUSCULAR; INTRAVENOUS
Status: DISPENSED
Start: 2022-12-23 | End: 2022-12-23

## 2022-12-23 RX ORDER — SODIUM CHLORIDE 9 MG/ML
INJECTION, SOLUTION INTRAVENOUS CONTINUOUS
Status: DISPENSED | OUTPATIENT
Start: 2022-12-23 | End: 2022-12-24

## 2022-12-23 RX ADMIN — MUPIROCIN: 20 OINTMENT TOPICAL at 08:12

## 2022-12-23 RX ADMIN — SODIUM CHLORIDE: 9 INJECTION, SOLUTION INTRAVENOUS at 11:12

## 2022-12-23 RX ADMIN — DOCUSATE SODIUM 100 MG: 100 CAPSULE, LIQUID FILLED ORAL at 08:12

## 2022-12-23 RX ADMIN — ALPRAZOLAM 1 MG: 0.5 TABLET ORAL at 11:12

## 2022-12-23 RX ADMIN — NYSTATIN 500000 UNITS: 500000 SUSPENSION ORAL at 12:12

## 2022-12-23 RX ADMIN — ALPRAZOLAM 1 MG: 0.5 TABLET ORAL at 08:12

## 2022-12-23 RX ADMIN — GABAPENTIN 100 MG: 100 CAPSULE ORAL at 02:12

## 2022-12-23 RX ADMIN — MUPIROCIN: 20 OINTMENT TOPICAL at 09:12

## 2022-12-23 RX ADMIN — PREDNISONE 5 MG: 5 TABLET ORAL at 04:12

## 2022-12-23 RX ADMIN — ENOXAPARIN SODIUM 40 MG: 40 INJECTION SUBCUTANEOUS at 04:12

## 2022-12-23 RX ADMIN — HYDRALAZINE HYDROCHLORIDE 25 MG: 25 TABLET ORAL at 09:12

## 2022-12-23 RX ADMIN — SODIUM CHLORIDE: 9 INJECTION, SOLUTION INTRAVENOUS at 02:12

## 2022-12-23 RX ADMIN — HYDROCODONE BITARTRATE AND ACETAMINOPHEN 1 TABLET: 7.5; 325 TABLET ORAL at 08:12

## 2022-12-23 RX ADMIN — MORPHINE SULFATE 4 MG: 4 INJECTION, SOLUTION INTRAMUSCULAR; INTRAVENOUS at 05:12

## 2022-12-23 RX ADMIN — GABAPENTIN 100 MG: 100 CAPSULE ORAL at 08:12

## 2022-12-23 RX ADMIN — NYSTATIN 500000 UNITS: 500000 SUSPENSION ORAL at 04:12

## 2022-12-23 RX ADMIN — HYDROCODONE BITARTRATE AND ACETAMINOPHEN 1 TABLET: 7.5; 325 TABLET ORAL at 12:12

## 2022-12-23 RX ADMIN — LEVOTHYROXINE SODIUM 125 MCG: 0.12 TABLET ORAL at 06:12

## 2022-12-23 RX ADMIN — DILTIAZEM HYDROCHLORIDE 120 MG: 120 CAPSULE, COATED, EXTENDED RELEASE ORAL at 08:12

## 2022-12-23 RX ADMIN — NYSTATIN 500000 UNITS: 500000 SUSPENSION ORAL at 08:12

## 2022-12-23 RX ADMIN — HYDROCODONE BITARTRATE AND ACETAMINOPHEN 2 TABLET: 10; 325 TABLET ORAL at 04:12

## 2022-12-23 RX ADMIN — KETOROLAC TROMETHAMINE 15 MG: 15 INJECTION, SOLUTION INTRAMUSCULAR; INTRAVENOUS at 08:12

## 2022-12-23 RX ADMIN — PANTOPRAZOLE SODIUM 40 MG: 40 TABLET, DELAYED RELEASE ORAL at 08:12

## 2022-12-23 RX ADMIN — CEFEPIME 1 G: 1 INJECTION, POWDER, FOR SOLUTION INTRAMUSCULAR; INTRAVENOUS at 05:12

## 2022-12-23 RX ADMIN — HYDROCODONE BITARTRATE AND ACETAMINOPHEN 2 TABLET: 10; 325 TABLET ORAL at 08:12

## 2022-12-23 RX ADMIN — POLYETHYLENE GLYCOL 3350 17 G: 17 POWDER, FOR SOLUTION ORAL at 08:12

## 2022-12-23 RX ADMIN — METOPROLOL TARTRATE 50 MG: 50 TABLET, FILM COATED ORAL at 08:12

## 2022-12-23 RX ADMIN — CEFEPIME 1 G: 1 INJECTION, POWDER, FOR SOLUTION INTRAMUSCULAR; INTRAVENOUS at 04:12

## 2022-12-23 NOTE — PT/OT/SLP PROGRESS
Physical Therapy Treatment    Patient Name:  Zandra Veloz   MRN:  [id number]    Recommendations:     Discharge Recommendations: nursing facility, skilled, LTACH (long-term acute care hospital)  Discharge Equipment Recommendations: hospital bed, lift device  Barriers to discharge: Inaccessible home and Decreased caregiver support    Assessment:     Zandra Veloz is a 88 y.o. female admitted with a medical diagnosis of Victoriano-prosthetic fracture around prosthetic hip.  She presents with the following impairments/functional limitations: weakness, impaired self care skills, impaired functional mobility, impaired balance, decreased lower extremity function, decreased upper extremity function, pain, decreased ROM, impaired skin, orthopedic precautions Pt demonstrated better tolerance to sitting. Less anxiety this afternoon. ROM was improved and she was able to actively participate. Will continue to increase mobility as able.    Rehab Prognosis: Poor; patient would benefit from acute skilled PT services to address these deficits and reach maximum level of function.    Recent Surgery: Procedure(s) (LRB):  REVISION, TOTAL ARTHROPLASTY, HIP, VICTORIANO PROSTHETIC FX (Left) 1 Day Post-Op    Plan:     During this hospitalization, patient to be seen BID (5x/wk, daily 2x/wk) to address the identified rehab impairments via gait training, therapeutic activities, therapeutic exercises, wheelchair management/training, neuromuscular re-education and progress toward the following goals:    Plan of Care Expires:  01/23/23    Subjective     Chief Complaint: left hip fracture  Patient/Family Comments/goals: Pt more agreeable to PT  Pain/Comfort:  Pain Rating 1: 8/10  Location - Side 1: Left  Location 1: leg  Pain Addressed 1: Pre-medicate for activity  Pain Rating Post-Intervention 1: 8/10      Objective:     Communicated with DEBORA Post RN prior to session.  Patient found supine with peripheral IV, blood pressure cuff, chao  catheter, hemovac upon PT entry to room.     General Precautions: Standard, fall  Orthopedic Precautions: LLE toe touch weight bearing, LLE posterior precautions  Braces: N/A  Respiratory Status: Room air     Functional Mobility:  Bed Mobility:     Scooting: maximal assistance and of 2 persons  Supine to Sit: maximal assistance and of 2 persons  Sit to Supine: maximal assistance and of 2 persons  Balance: fair      AM-PAC 6 CLICK MOBILITY  Turning over in bed (including adjusting bedclothes, sheets and blankets)?: 2  Sitting down on and standing up from a chair with arms (e.g., wheelchair, bedside commode, etc.): 1  Moving from lying on back to sitting on the side of the bed?: 2  Moving to and from a bed to a chair (including a wheelchair)?: 1  Need to walk in hospital room?: 1  Climbing 3-5 steps with a railing?: 1  Basic Mobility Total Score: 8       Treatment & Education:  Pt performed bilateral LE: ankle pumps, short arc quads, heel slides, hip abduction/adduction, and Long arc quads x 20 each  Edge of bed sitting x 12 minutes with contact guard assistance to minimal assistance for trunk support, pt leans to right side to offload hip, mild dizziness reported    Patient left HOB elevated with all lines intact and call button in reach..    GOALS:   Multidisciplinary Problems       Physical Therapy Goals          Problem: Physical Therapy    Goal Priority Disciplines Outcome Goal Variances Interventions   Physical Therapy Goal     PT, PT/OT Ongoing, Progressing     Description: Short term goals:  1. Supine to sit with Moderate Assistance  2. Rolling to Left and Right with Moderate Assistance.  3. Bed to chair transfer with Maximum Assistance using stand pivot transfer  4. Sitting at edge of bed x15 minutes with Minimal Assistance    Long term goals:  1. Supine to sit with MInimal Assistance  2. Sit to stand transfer with Minimal Assistance  3. Bed to chair transfer with Moderate Assistance using Stand pivot  transfer  4. Sitting at edge of bed x20 minutes with Modified [place]                         Time Tracking:     PT Received On: 12/23/22  PT Start Time: 1420     PT Stop Time: 1449  PT Total Time (min): 29 min     Billable Minutes: Therapeutic Activity 15 and Therapeutic Exercise 14    Treatment Type: Evaluation  PT/PTA: PT     PTA Visit Number: 0     12/23/2022

## 2022-12-23 NOTE — CARE UPDATE
Patient is awake alert without any new complaints today.  Her left lower extremity dressing is intact.  She moves her toes dorsiflexion plantar flexion.  She has capillary refill 2 seconds.  Palpable dorsalis pedis pulse.  Continue with therapy.  DC drain on 12/24.

## 2022-12-23 NOTE — PROGRESS NOTES
Postop day 1 following revision of total hip replacement arthroplasty left hip for periprosthetic hip fracture.  She is having some discomfort.  Staff informs me that when they give her IV narcotic pain medication that it drops her blood pressure significantly.  She has been taking p.o. Norco.  They state during HS she did get some relief of symptoms.  However this morning she states she continues to have significant discomfort.  She is unable to rate it on a 1-10 scale.  Drain had scant output during HS.  T-max during last 24 hours is 99.1° F.    PE:  She is awake.  She is alert.  No acute distress is noted.  Surgical site dressing is dry.  Thigh is soft.  Drain is noted with scant amount of drainage.  She is able to dorsiflex and pedal flex her left foot.    Impression:  Postop day 1 revision total hip arthroplasty for a periprosthetic hip fracture-left     Plan:  1.)  Toe-touch weight-bearing only left lower extremity with a rolling walker  2.)  We will give 1 time dose of Toradol 15 mg IM  3.)  Recommend sterile dressing change postop day 3 applying Aquacel dressing.  New 4.)  May remove skin staples in 2 weeks wound healing per meeting and apply Steri-Strips New line 5.)recommend Eliquis 2.5 mg p.o. daily times 35 days.  6.)  May use home Norco 1 p.o. Q 4 hours p.r.n. left hip pain  7.)  Follow-up Orthopedics in 2 weeks with x-rays of left hip

## 2022-12-23 NOTE — PLAN OF CARE
Problem: Occupational Therapy  Goal: Occupational Therapy Goal  Description: STG:  Pt will perform grooming with setup  Pt will bathe anterior upper body with Liset with setup from bed  Pt will perform UE dressing with Liset  Pt will perform LE dressing with MaxA with AD   Pt will sit EOB x 10 min with CGA   Pt will transfer bed/chair/bsc with MaxA with stand pivot t/f with RW  Pt will tolerate 15 minutes of tx without fatigue      LT.Restore to max I with self care and mobility.      Outcome: Ongoing, Progressing

## 2022-12-23 NOTE — SUBJECTIVE & OBJECTIVE
Interval History:     Review of Systems   Constitutional:  Negative for appetite change, fatigue and fever.   HENT:  Negative for congestion, hearing loss and trouble swallowing.    Respiratory:  Negative for chest tightness, shortness of breath and wheezing.    Cardiovascular:  Negative for chest pain and palpitations.   Gastrointestinal:  Negative for abdominal pain, constipation and nausea.   Genitourinary:  Negative for difficulty urinating and dysuria.   Musculoskeletal:  Positive for gait problem. Negative for back pain and neck stiffness.        Left hip pain after fall   Skin:  Negative for pallor and rash.   Neurological:  Negative for dizziness, speech difficulty and headaches.   Psychiatric/Behavioral:  Negative for confusion and suicidal ideas.    Objective:     Vital Signs (Most Recent):  Temp: 98 °F (36.7 °C) (12/22/22 1827)  Pulse: 82 (12/22/22 1827)  Resp: 16 (12/22/22 2022)  BP: (!) 125/41 (12/22/22 1827)  SpO2: 98 % (12/22/22 1827)   Vital Signs (24h Range):  Temp:  [97.6 °F (36.4 °C)-99.1 °F (37.3 °C)] 98 °F (36.7 °C)  Pulse:  [67-82] 82  Resp:  [16-26] 16  SpO2:  [94 %-100 %] 98 %  BP: ()/() 125/41     Weight: 59.5 kg (131 lb 2.8 oz)  Body mass index is 23.99 kg/m².    Intake/Output Summary (Last 24 hours) at 12/22/2022 2208  Last data filed at 12/22/2022 1450  Gross per 24 hour   Intake 650 ml   Output 1650 ml   Net -1000 ml        Physical Exam  Vitals reviewed.   Constitutional:       General: She is not in acute distress.  Eyes:      Pupils: Pupils are equal, round, and reactive to light.   Cardiovascular:      Rate and Rhythm: Normal rate and regular rhythm.      Pulses: Normal pulses.   Pulmonary:      Effort: Pulmonary effort is normal. No respiratory distress.      Breath sounds: Normal breath sounds. No wheezing.   Abdominal:      General: Bowel sounds are normal. There is no distension.      Tenderness: There is no abdominal tenderness.   Musculoskeletal:      Comments: Left  hip pain and misalignment after fall   Skin:     General: Skin is warm.   Neurological:      General: No focal deficit present.      Mental Status: She is alert, oriented to person, place, and time and easily aroused. Mental status is at baseline.   Psychiatric:         Mood and Affect: Mood normal.         Behavior: Behavior normal.       Significant Labs: All pertinent labs within the past 24 hours have been reviewed.  BMP:   Recent Labs   Lab 12/22/22  1630   *   *   K 4.7   CL 99   CO2 21   BUN 11   CREATININE 0.79   CALCIUM 8.1*       CBC:   Recent Labs   Lab 12/21/22  0256 12/22/22  0313 12/22/22  1630   WBC 12.15* 8.91 21.67*   HGB 9.1* 9.1* 13.0   HCT 27.5* 26.9* 38.8    305 268       CMP:   Recent Labs   Lab 12/21/22  0256 12/22/22  0313 12/22/22  1630   * 131* 132*   K 4.2 3.9 4.7   CL 95* 96* 99   CO2 23 24 21   GLU 60* 71* 119*   BUN 9 11 11   CREATININE 0.85 0.70 0.79   CALCIUM 8.4* 8.3* 8.1*   PROT 5.3*  --   --    ALBUMIN 2.6*  --   --    BILITOT 0.9  --   --    ALKPHOS 43*  --   --    AST 27  --   --    ALT 14  --   --    ANIONGAP 16 15 17*         Significant Imaging: I have reviewed all pertinent imaging results/findings within the past 24 hours.

## 2022-12-23 NOTE — PROGRESS NOTES
[facility] - [address] [place] [address] Medicine  Progress Note    Patient Name: Zandra Veloz  MRN: [id number]  Patient Class: IP- Inpatient   Admission Date: 12/17/2022  Length of Stay: 5 days  Attending Physician: Caleb Carter MD  Primary Care Provider: Brandon Thornton MD        Subjective:     Principal Problem:Delmy-prosthetic fracture around prosthetic hip        HPI:  88-year-old lady seen emergency room department.  Patient presents after having a fall when she was trying to go to the restroom at her home.  Patient sustained a left femur fracture.  Patient complains of moderate to severe pain in the left hip area.  Patient also complains of chest tightness, she rates it a 5/10 in the chest tightness has been intubate.  Patient also was seen in emergency room department earlier this week per her daughter.  Patient is found to have dehydration and a urinary tract infection.  Current she denies any shortness of breath.  She does not give a history of coronary artery disease.      Overview/Hospital Course:  12/19 - records reviewed. Fall without LOC and has left hip fx. No other complaints currently. Seen by cardiology with some CP felt musculoskeletal.  Echo mild AS and mod MR with nl EF. Age increase cardiac risk for surgery but discussed with daughter surgery is urgent and see no benefit in delay medically. Feel low to moderate risk for surgery. Question about UTI. No symptoms. Had UTI in 10.22 not surprising. Lab to do culture on urine in lab. Cover with ATN for prior culture with ATB started. Discussed with Dr Singer and cardiology.  12/20 Tachycardia and elevated BP. Cardiology adjusted meds. Plan hold off surgery until 12/22 to adjust meds and treat UTI. Family in room. Pt not sleeping well.   12/21 Didn't sleep well prior night. Apparently been on xanax for a time. Also recently started on Neurontin. Family with question. No recent BM. Some increase stool on KUB but not  severe. Surgery for am. BP some up but better. HR good.   12/22 Seen prior to surgery and apparently add better night. Sore mouth / throat better with mycostatin. For surgery. Family in room.      Interval History:     Review of Systems   Constitutional:  Negative for appetite change, fatigue and fever.   HENT:  Negative for congestion, hearing loss and trouble swallowing.    Respiratory:  Negative for chest tightness, shortness of breath and wheezing.    Cardiovascular:  Negative for chest pain and palpitations.   Gastrointestinal:  Negative for abdominal pain, constipation and nausea.   Genitourinary:  Negative for difficulty urinating and dysuria.   Musculoskeletal:  Positive for gait problem. Negative for back pain and neck stiffness.        Left hip pain after fall   Skin:  Negative for pallor and rash.   Neurological:  Negative for dizziness, speech difficulty and headaches.   Psychiatric/Behavioral:  Negative for confusion and suicidal ideas.    Objective:     Vital Signs (Most Recent):  Temp: 98 °F (36.7 °C) (12/22/22 1827)  Pulse: 82 (12/22/22 1827)  Resp: 16 (12/22/22 2022)  BP: (!) 125/41 (12/22/22 1827)  SpO2: 98 % (12/22/22 1827)   Vital Signs (24h Range):  Temp:  [97.6 °F (36.4 °C)-99.1 °F (37.3 °C)] 98 °F (36.7 °C)  Pulse:  [67-82] 82  Resp:  [16-26] 16  SpO2:  [94 %-100 %] 98 %  BP: ()/() 125/41     Weight: 59.5 kg (131 lb 2.8 oz)  Body mass index is 23.99 kg/m².    Intake/Output Summary (Last 24 hours) at 12/22/2022 2208  Last data filed at 12/22/2022 1450  Gross per 24 hour   Intake 650 ml   Output 1650 ml   Net -1000 ml        Physical Exam  Vitals reviewed.   Constitutional:       General: She is not in acute distress.  Eyes:      Pupils: Pupils are equal, round, and reactive to light.   Cardiovascular:      Rate and Rhythm: Normal rate and regular rhythm.      Pulses: Normal pulses.   Pulmonary:      Effort: Pulmonary effort is normal. No respiratory distress.      Breath sounds:  Normal breath sounds. No wheezing.   Abdominal:      General: Bowel sounds are normal. There is no distension.      Tenderness: There is no abdominal tenderness.   Musculoskeletal:      Comments: Left hip pain and misalignment after fall   Skin:     General: Skin is warm.   Neurological:      General: No focal deficit present.      Mental Status: She is alert, oriented to person, place, and time and easily aroused. Mental status is at baseline.   Psychiatric:         Mood and Affect: Mood normal.         Behavior: Behavior normal.       Significant Labs: All pertinent labs within the past 24 hours have been reviewed.  BMP:   Recent Labs   Lab 12/22/22  1630   *   *   K 4.7   CL 99   CO2 21   BUN 11   CREATININE 0.79   CALCIUM 8.1*       CBC:   Recent Labs   Lab 12/21/22  0256 12/22/22  0313 12/22/22  1630   WBC 12.15* 8.91 21.67*   HGB 9.1* 9.1* 13.0   HCT 27.5* 26.9* 38.8    305 268       CMP:   Recent Labs   Lab 12/21/22  0256 12/22/22  0313 12/22/22  1630   * 131* 132*   K 4.2 3.9 4.7   CL 95* 96* 99   CO2 23 24 21   GLU 60* 71* 119*   BUN 9 11 11   CREATININE 0.85 0.70 0.79   CALCIUM 8.4* 8.3* 8.1*   PROT 5.3*  --   --    ALBUMIN 2.6*  --   --    BILITOT 0.9  --   --    ALKPHOS 43*  --   --    AST 27  --   --    ALT 14  --   --    ANIONGAP 16 15 17*         Significant Imaging: I have reviewed all pertinent imaging results/findings within the past 24 hours.      Assessment/Plan:      * Delmy-prosthetic fracture around prosthetic hip  Patient found to have a left femur fracture after a fall today.    Consult Orthopedics.  Will hold off on surgery for now because the patient is having chest tightness.  Will consult Cardiology    12/21 surgery for am    Heart murmur    Echo Summary this admit     The left ventricle is normal in size with hyperdynamic systolic function.   The estimated ejection fraction is 75%.   Left ventricular diastolic dysfunction.   Normal right ventricular  size.   There is mild aortic valve stenosis.   Aortic valve area is 1.94 cm2; peak velocity is 3.0 m/s; mean gradient is 18 mmHg.   Moderate-to-severe mitral regurgitation.   Mild tricuspid regurgitation.   Mild pulmonic regurgitation.   Normal central venous pressure (3 mmHg).   The estimated PA systolic pressure is 32 mmHg.   Trivial pericardial effusion.      Palpitations        Chest pain  Patient complains of chest tightness, she rates it a 5/10.  Chest tightness has been intermittent for several days.    Trend out her troponins   Admit to telemetry   Hold off on surgery   Consult Cardiology  Follow-up EKG      Acute cystitis without hematuria    This not clear but likely chronic UTI. ATB cover culture collected 10/2022. Urine this admitsent for culture    12/21 G neg in urine ID / sent pending    Primary osteoarthritis of right knee        Primary osteoarthritis of right shoulder        Closed fracture of left hip    Surgery 12/22    Hypertension  Blood pressure currently stable, will follow    12/20 BP until and meds adjusted    Arthritis    Tylenol p.r.n..      VTE Risk Mitigation (From admission, onward)         Ordered     enoxaparin injection 40 mg  Daily         12/17/22 1253     IP VTE HIGH RISK PATIENT  Once         12/17/22 1253     Place sequential compression device  Until discontinued         12/17/22 1253                Discharge Planning   YANIV:      Code Status: Full Code   Is the patient medically ready for discharge?:     Reason for patient still in hospital (select all that apply): Laboratory test, Treatment and Imaging  Discharge Plan A: Home                  Caleb Carter MD  Department of Hospital Medicine   [facility] - [facility] Tele[facility]

## 2022-12-23 NOTE — PT/OT/SLP EVAL
Physical Therapy Evaluation      Patient Name: Zandra Veloz   MRN: [id number]  Recent Surgery: Procedure(s) (LRB):  REVISION, TOTAL ARTHROPLASTY, HIP, VICTORIANO PROSTHETIC FX (Left) 1 Day Post-Op    Recommendations:     Discharge Recommendations: nursing facility, skilled, LTACH (long-term acute care hospital)   Discharge Equipment Recommendations: hospital bed, lift device   Barriers to discharge: Increased level of assist and Inaccessible home    Assessment:     Zandra Veloz is a 88 y.o. female admitted with a medical diagnosis of Victoriano-prosthetic fracture around prosthetic hip. She presents with the following impairments/functional limitations: weakness, impaired self care skills, impaired functional mobility, impaired balance, decreased lower extremity function, decreased upper extremity function, pain, decreased ROM, impaired skin, orthopedic precautions. Pt has significant pain post op and is very anxious about beginning PT due to pain. Pt is very frail and has severe wounds on UE and LE from skin tears. She required maximum assistance with all mobility and was able to sit up at edge of bed with minimal assistance. Pt will require significant assistance at discharge. Due to age, frailty, and history of falls, she would benefit from use of wheelchair for mobility. Pt will need swingbed    Rehab Prognosis: Poor; patient would benefit from acute skilled PT services to address these deficits and reach maximum level of function.  Recent Surgery: Procedure(s) (LRB):  REVISION, TOTAL ARTHROPLASTY, HIP, VICTORIANO PROSTHETIC FX (Left) 1 Day Post-Op    Plan:     During this hospitalization, patient to be seen BID (5x/wk, daily 2x/wk) to address the identified rehab impairments via gait training, therapeutic activities, therapeutic exercises, wheelchair management/training, neuromuscular re-education and progress toward the following goals:    Plan of Care Expires: 01/23/23    Subjective     Chief Complaint:  left hip fracture  Patient/Family Comments/Goals: Pt is very anxious about mobility  Pain/Comfort:  Pain Rating 1: 810  Location - Side 1: Left  Location 1: leg  Pain Addressed 1: Pre-medicate for activity  Pain Rating Post-Intervention 1: 8/10    Patients cultural, spiritual, Rastafarian conflicts given the current situation: no    Social History:  Living Environment: Patient lives with their family in a single story home, number of outside stairs: 2 .  Prior Level of Function: Prior to admission, patient required max assistance with ADLs.  Equipment Used at Home: rolling walker, bedside commode  DME owned (not currently used): rolling walker and bedside commode  Assistance Upon Discharge: family, sitters, and facility staff    Objective:     Communicated with DEBORA Post RN prior to session. Patient found supine with peripheral IV, hip abduction pillow upon PT entry to room.    General Precautions: Standard, fall   Orthopedic Precautions:LLE toe touch weight bearing, LLE posterior precautions   Braces: N/A   Respiratory Status: Nasal cannula, flow 2 L/min    Exams:  RLE ROM: WFL except self limiting  RLE Strength:  2/5  LLE ROM:  very limited due to pain  LLE Strength:  2/5  Cognitive Exam: Patient is oriented to Person, Place, Time, Situation  Skin Integrity/Edema:      -       Skin integrity: Tear of LUE, BLE    Functional Mobility:  Bed Mobility:   Scooting: maximal assistance of 2 persons  Supine to Sit: maximal assistance of 2 persons for LE management, trunk management, and maintenance of precautions  Sit to Supine: maximal assistance of 2 persons for LE management, trunk management, and maintenance of precautions  Transfers:  Unable to stand  Gait:   Distance: 0   Level of Assistance:  uanble  AD used:  unable  Gait Deviations: n/a   Comments: unable to attempt ambulation  Balance:   Sitting: FAIR+: Maintains balance through MINIMAL excursions of active trunk motion  Standin: N/A      Therapeutic Activities  and Exercises:  Patient educated on role of acute care PT and PT POC, safety while in hospital including calling nurse for mobility, and call light usage.  Educated about weightbearing precautions and provided cuing for adherence as appropriate during session.      AM-PAC 6 CLICK MOBILITY  Total Score:8     Patient left HOB elevated with all lines intact and call button in reach.    GOALS:   Multidisciplinary Problems       Physical Therapy Goals          Problem: Physical Therapy    Goal Priority Disciplines Outcome Goal Variances Interventions   Physical Therapy Goal     PT, PT/OT Ongoing, Progressing     Description: Short term goals:  1. Supine to sit with Moderate Assistance  2. Rolling to Left and Right with Moderate Assistance.  3. Bed to chair transfer with Maximum Assistance using stand pivot transfer  4. Sitting at edge of bed x15 minutes with Minimal Assistance    Long term goals:  1. Supine to sit with MInimal Assistance  2. Sit to stand transfer with Minimal Assistance  3. Bed to chair transfer with Moderate Assistance using Stand pivot transfer  4. Sitting at edge of bed x20 minutes with Modified [place]                         History:     Past Medical History:   Diagnosis Date    Hypertension     Osteoporosis     Thyroid disorder        Past Surgical History:   Procedure Laterality Date    BLADDER SURGERY      HEMIARTHROPLASTY OF HIP Left 9/21/2021    Procedure: HEMIARTHROPLASTY, HIP;  Surgeon: Dequan Singer MD;  Location: [facility];  Service: Orthopedics;  Laterality: Left;    PARTIAL HIP ARTHROPLASTY Right     Dr Singer       Time Tracking:     PT Received On: 12/23/22  PT Start Time: 0920  PT Stop Time: 1005  PT Total Time (min): 45 min     Billable Minutes: Evaluation high complexity    12/23/2022

## 2022-12-23 NOTE — PT/OT/SLP EVAL
Occupational Therapy   Evaluation    Name: Zandra Veloz  MRN: [id number]  Admitting Diagnosis: Victoriano-prosthetic fracture around prosthetic hip  Recent Surgery: Procedure(s) (LRB):  REVISION, TOTAL ARTHROPLASTY, HIP, VICTORIANO PROSTHETIC FX (Left) 1 Day Post-Op    Recommendations:     Discharge Recommendations: nursing facility, skilled  Discharge Equipment Recommendations:  hospital bed  Barriers to discharge:  None    Assessment:     Zandra Veloz is a 88 y.o. female with a medical diagnosis of Victoriano-prosthetic fracture around prosthetic hip.  She presents with s/p revision left total hip arthoplasty on 12/22/22. Performance deficits affecting function: weakness, impaired endurance, impaired self care skills, impaired functional mobility, gait instability, impaired balance, pain, orthopedic precautions, impaired skin.      Rehab Prognosis: Good; patient would benefit from acute skilled OT services to address these deficits and reach maximum level of function.       Plan:     Patient to be seen 5 x/week to address the above listed problems via self-care/home management, therapeutic activities, therapeutic exercises  Plan of Care Expires: 01/20/23  Plan of Care Reviewed with: patient    Subjective     Chief Complaint: s/p left THR revision  Patient/Family Comments/goals: pt agreeable to participate in OT eval    Occupational Profile:  Living Environment: pt lives with 24 hour sitters   Previous level of function: pt performed functional mobility to chair and bathroom with RW  and received assist with ADL tasks  Roles and Routines: assist with self care  Equipment Used at Home: walker, rolling, bedside commode, wheelchair  Assistance upon Discharge: swing bed    Pain/Comfort:  Pain Rating 1: 8/10  Location - Side 1: Left  Location 1: leg  Pain Addressed 1: Pre-medicate for activity    Patients cultural, spiritual, Restorationism conflicts given the current situation: no    Objective:     Communicated with:  CLYDE Burton prior to session.  Patient found supine with blood pressure cuff, chao catheter, hemovac, hip abduction pillow, oxygen, peripheral IV, SCD, pulse ox (continuous) upon OT entry to room.    General Precautions: Standard, fall  Orthopedic Precautions: LLE posterior precautions, LLE toe touch weight bearing  Braces: N/A  Respiratory Status: Nasal cannula, flow 2 L/min    Occupational Performance:    Bed Mobility:    Patient completed Supine to Sit with maximal assistance and 2 persons  Patient completed Sit to Supine with maximal assistance and 2 persons    Functional Mobility/Transfers:  unable    Activities of Daily Living:  Lower Body Dressing: maximal assistance to aj socks    Cognitive/Visual Perceptual:  Cognitive/Psychosocial Skills:     -       Oriented to: Person, Place, Time, and Situation   -       Follows Commands/attention:Follows multistep  commands  -       Mood/Affect/Coping skills/emotional control: Cooperative    Physical Exam:  Balance:    -       sitting balance Liset to maintain static sitting  Skin integrity: Wound BUE and LLE and Thin  Upper Extremity Range of Motion:     -       Right Upper Extremity: shoulder limited; elbow, wrist, and hand WFL  -       Left Upper Extremity: shoulder limited; elbow, wrist, and hand WFL  Upper Extremity Strength:    -       Right Upper Extremity: 3+/5  -       Left Upper Extremity: 3+/5  Gross motor coordination:   decreased d/t pain and weakness    AMPAC 6 Click ADL:  AMPAC Total Score:      Treatment & Education:  Pt educated on OT role/POC.   Importance of OOB activity with staff assistance.  Importance of sitting up in the chair throughout the day as tolerated, especially for meals   Safety during functional t/f and mobility with use of RW  Importance of assisting with self-care activities   All questions/concerns answered within OT scope of practice      Patient left supine with all lines intact, call button in reach, and daughter  present    GOALS:   Multidisciplinary Problems       Occupational Therapy Goals          Problem: Occupational Therapy    Goal Priority Disciplines Outcome Interventions   Occupational Therapy Goal     OT, PT/OT Ongoing, Progressing    Description: STG:  Pt will perform grooming with setup  Pt will bathe anterior upper body with Liset with setup from bed  Pt will perform UE dressing with Liset  Pt will perform LE dressing with MaxA with AD   Pt will sit EOB x 10 min with CGA   Pt will transfer bed/chair/bsc with MaxA with stand pivot t/f with RW  Pt will tolerate 15 minutes of tx without fatigue      LT.Restore to max I with self care and mobility.                           History:     Past Medical History:   Diagnosis Date    Hypertension     Osteoporosis     Thyroid disorder          Past Surgical History:   Procedure Laterality Date    BLADDER SURGERY      HEMIARTHROPLASTY OF HIP Left 2021    Procedure: HEMIARTHROPLASTY, HIP;  Surgeon: Dequan Singer MD;  Location: [facility];  Service: Orthopedics;  Laterality: Left;    PARTIAL HIP ARTHROPLASTY Right     Dr Singer       Time Tracking:     OT Date of Treatment: 22  OT Start Time: 925  OT Stop Time: 1008  OT Total Time (min): 43 min    Billable Minutes:Evaluation OT mod complexity eval    2022

## 2022-12-23 NOTE — PLAN OF CARE
Problem: Physical Therapy  Goal: Physical Therapy Goal  Description: Short term goals:  1. Supine to sit with Moderate Assistance  2. Rolling to Left and Right with Moderate Assistance.  3. Bed to chair transfer with Maximum Assistance using stand pivot transfer  4. Sitting at edge of bed x15 minutes with Minimal Assistance    Long term goals:  1. Supine to sit with MInimal Assistance  2. Sit to stand transfer with Minimal Assistance  3. Bed to chair transfer with Moderate Assistance using Stand pivot transfer  4. Sitting at edge of bed x20 minutes with Modified [place]    Outcome: Ongoing, Progressing

## 2022-12-23 NOTE — PLAN OF CARE
SS was consulted on pt for swb. MARK spoke with daughter and choice obtained for Della. Referral left on Екатерина's voicemail and will follow up with Екатерина on Tuesday.

## 2022-12-23 NOTE — PLAN OF CARE
Problem: Adult Inpatient Plan of Care  Goal: Plan of Care Review  12/23/2022 0751 by Josephine Post RN  Outcome: Ongoing, Progressing  12/23/2022 0746 by Josephine Post RN  Outcome: Ongoing, Progressing  Goal: Patient-Specific Goal (Individualized)  12/23/2022 0751 by Josephine Post RN  Outcome: Ongoing, Progressing  12/23/2022 0746 by Josephine Post RN  Outcome: Ongoing, Progressing  Goal: Absence of Hospital-Acquired Illness or Injury  12/23/2022 0751 by Josephine Post RN  Outcome: Ongoing, Progressing  12/23/2022 0746 by Josephine Post RN  Outcome: Ongoing, Progressing  Goal: Optimal Comfort and Wellbeing  12/23/2022 0751 by Josephine Post RN  Outcome: Ongoing, Progressing  12/23/2022 0746 by Josephine Post RN  Outcome: Ongoing, Progressing  Goal: Readiness for Transition of Care  12/23/2022 0751 by Josephine Post RN  Outcome: Ongoing, Progressing  12/23/2022 0746 by Josephine Post RN  Outcome: Ongoing, Progressing     Problem: Impaired Wound Healing  Goal: Optimal Wound Healing  12/23/2022 0751 by Josephine Post RN  Outcome: Ongoing, Progressing  12/23/2022 0746 by Josephine Pots RN  Outcome: Ongoing, Progressing     Problem: Fall Injury Risk  Goal: Absence of Fall and Fall-Related Injury  12/23/2022 0751 by Josephine Post RN  Outcome: Ongoing, Progressing  12/23/2022 0746 by Josephine Post RN  Outcome: Ongoing, Progressing     Problem: Skin Injury Risk Increased  Goal: Skin Health and Integrity  12/23/2022 0751 by Josephine Post RN  Outcome: Ongoing, Progressing  12/23/2022 0746 by Josephine Post RN  Outcome: Ongoing, Progressing     Problem: Infection  Goal: Absence of Infection Signs and Symptoms  12/23/2022 0751 by Josephine Post RN  Outcome: Ongoing, Progressing  12/23/2022 0746 by Josephine Post RN  Outcome: Ongoing, Progressing

## 2022-12-24 LAB
ANION GAP SERPL CALCULATED.3IONS-SCNC: 11 MMOL/L (ref 7–16)
BASOPHILS # BLD AUTO: 0.04 K/UL (ref 0–0.2)
BASOPHILS NFR BLD AUTO: 0.2 % (ref 0–1)
BUN SERPL-MCNC: 22 MG/DL (ref 7–18)
BUN/CREAT SERPL: 23 (ref 6–20)
CALCIUM SERPL-MCNC: 7.9 MG/DL (ref 8.5–10.1)
CHLORIDE SERPL-SCNC: 101 MMOL/L (ref 98–107)
CO2 SERPL-SCNC: 24 MMOL/L (ref 21–32)
CREAT SERPL-MCNC: 0.97 MG/DL (ref 0.55–1.02)
DIFFERENTIAL METHOD BLD: ABNORMAL
EGFR (NO RACE VARIABLE) (RUSH/TITUS): 56 ML/MIN/1.73M²
EOSINOPHIL # BLD AUTO: 0 K/UL (ref 0–0.5)
EOSINOPHIL NFR BLD AUTO: 0 % (ref 1–4)
ERYTHROCYTE [DISTWIDTH] IN BLOOD BY AUTOMATED COUNT: 13.6 % (ref 11.5–14.5)
GLUCOSE SERPL-MCNC: 108 MG/DL (ref 74–106)
GLUCOSE SERPL-MCNC: 125 MG/DL (ref 70–105)
GLUCOSE SERPL-MCNC: 135 MG/DL (ref 70–105)
GLUCOSE SERPL-MCNC: 137 MG/DL (ref 70–105)
HCT VFR BLD AUTO: 29.9 % (ref 38–47)
HGB BLD-MCNC: 10 G/DL (ref 12–16)
IMM GRANULOCYTES # BLD AUTO: 0.26 K/UL (ref 0–0.04)
IMM GRANULOCYTES NFR BLD: 1.4 % (ref 0–0.4)
LYMPHOCYTES # BLD AUTO: 0.83 K/UL (ref 1–4.8)
LYMPHOCYTES NFR BLD AUTO: 4.4 % (ref 27–41)
MCH RBC QN AUTO: 30.6 PG (ref 27–31)
MCHC RBC AUTO-ENTMCNC: 33.4 G/DL (ref 32–36)
MCV RBC AUTO: 91.4 FL (ref 80–96)
MONOCYTES # BLD AUTO: 0.87 K/UL (ref 0–0.8)
MONOCYTES NFR BLD AUTO: 4.6 % (ref 2–6)
MPC BLD CALC-MCNC: 9.3 FL (ref 9.4–12.4)
NEUTROPHILS # BLD AUTO: 16.73 K/UL (ref 1.8–7.7)
NEUTROPHILS NFR BLD AUTO: 89.4 % (ref 53–65)
NRBC # BLD AUTO: 0 X10E3/UL
NRBC, AUTO (.00): 0 %
PLATELET # BLD AUTO: 321 K/UL (ref 150–400)
POTASSIUM SERPL-SCNC: 4.7 MMOL/L (ref 3.5–5.1)
RBC # BLD AUTO: 3.27 M/UL (ref 4.2–5.4)
SODIUM SERPL-SCNC: 131 MMOL/L (ref 136–145)
WBC # BLD AUTO: 18.73 K/UL (ref 4.5–11)

## 2022-12-24 PROCEDURE — 25000003 PHARM REV CODE 250: Performed by: ORTHOPAEDIC SURGERY

## 2022-12-24 PROCEDURE — 25000003 PHARM REV CODE 250: Performed by: HOSPITALIST

## 2022-12-24 PROCEDURE — 82962 GLUCOSE BLOOD TEST: CPT

## 2022-12-24 PROCEDURE — 63600175 PHARM REV CODE 636 W HCPCS: Performed by: INTERNAL MEDICINE

## 2022-12-24 PROCEDURE — 97530 THERAPEUTIC ACTIVITIES: CPT

## 2022-12-24 PROCEDURE — 25000003 PHARM REV CODE 250: Performed by: INTERNAL MEDICINE

## 2022-12-24 PROCEDURE — 99232 PR SUBSEQUENT HOSPITAL CARE,LEVL II: ICD-10-PCS | Mod: ,,, | Performed by: HOSPITALIST

## 2022-12-24 PROCEDURE — 97110 THERAPEUTIC EXERCISES: CPT

## 2022-12-24 PROCEDURE — 27000221 HC OXYGEN, UP TO 24 HOURS

## 2022-12-24 PROCEDURE — 99232 SBSQ HOSP IP/OBS MODERATE 35: CPT | Mod: ,,, | Performed by: HOSPITALIST

## 2022-12-24 PROCEDURE — 80048 BASIC METABOLIC PNL TOTAL CA: CPT | Performed by: HOSPITALIST

## 2022-12-24 PROCEDURE — 36415 COLL VENOUS BLD VENIPUNCTURE: CPT | Performed by: HOSPITALIST

## 2022-12-24 PROCEDURE — 94761 N-INVAS EAR/PLS OXIMETRY MLT: CPT

## 2022-12-24 PROCEDURE — 85025 COMPLETE CBC W/AUTO DIFF WBC: CPT | Performed by: HOSPITALIST

## 2022-12-24 PROCEDURE — 11000001 HC ACUTE MED/SURG PRIVATE ROOM

## 2022-12-24 PROCEDURE — 25000003 PHARM REV CODE 250: Performed by: NURSE PRACTITIONER

## 2022-12-24 PROCEDURE — 63600175 PHARM REV CODE 636 W HCPCS: Performed by: HOSPITALIST

## 2022-12-24 RX ADMIN — MUPIROCIN: 20 OINTMENT TOPICAL at 09:12

## 2022-12-24 RX ADMIN — CEFEPIME 1 G: 1 INJECTION, POWDER, FOR SOLUTION INTRAMUSCULAR; INTRAVENOUS at 04:12

## 2022-12-24 RX ADMIN — GABAPENTIN 100 MG: 100 CAPSULE ORAL at 04:12

## 2022-12-24 RX ADMIN — GABAPENTIN 100 MG: 100 CAPSULE ORAL at 09:12

## 2022-12-24 RX ADMIN — GABAPENTIN 100 MG: 100 CAPSULE ORAL at 08:12

## 2022-12-24 RX ADMIN — HYDROCODONE BITARTRATE AND ACETAMINOPHEN 2 TABLET: 10; 325 TABLET ORAL at 08:12

## 2022-12-24 RX ADMIN — HYDROCODONE BITARTRATE AND ACETAMINOPHEN 1 TABLET: 7.5; 325 TABLET ORAL at 05:12

## 2022-12-24 RX ADMIN — PREDNISONE 5 MG: 5 TABLET ORAL at 08:12

## 2022-12-24 RX ADMIN — DOCUSATE SODIUM 100 MG: 100 CAPSULE, LIQUID FILLED ORAL at 08:12

## 2022-12-24 RX ADMIN — DILTIAZEM HYDROCHLORIDE 120 MG: 120 CAPSULE, COATED, EXTENDED RELEASE ORAL at 08:12

## 2022-12-24 RX ADMIN — NYSTATIN 500000 UNITS: 500000 SUSPENSION ORAL at 08:12

## 2022-12-24 RX ADMIN — NYSTATIN 500000 UNITS: 500000 SUSPENSION ORAL at 04:12

## 2022-12-24 RX ADMIN — PANTOPRAZOLE SODIUM 40 MG: 40 TABLET, DELAYED RELEASE ORAL at 08:12

## 2022-12-24 RX ADMIN — HYDRALAZINE HYDROCHLORIDE 25 MG: 25 TABLET ORAL at 05:12

## 2022-12-24 RX ADMIN — POLYETHYLENE GLYCOL 3350 17 G: 17 POWDER, FOR SOLUTION ORAL at 08:12

## 2022-12-24 RX ADMIN — LEVOTHYROXINE SODIUM 125 MCG: 0.12 TABLET ORAL at 05:12

## 2022-12-24 RX ADMIN — DOCUSATE SODIUM 100 MG: 100 CAPSULE, LIQUID FILLED ORAL at 09:12

## 2022-12-24 RX ADMIN — ALPRAZOLAM 1 MG: 0.5 TABLET ORAL at 08:12

## 2022-12-24 RX ADMIN — HYDROCODONE BITARTRATE AND ACETAMINOPHEN 2 TABLET: 10; 325 TABLET ORAL at 04:12

## 2022-12-24 RX ADMIN — CEFEPIME 1 G: 1 INJECTION, POWDER, FOR SOLUTION INTRAMUSCULAR; INTRAVENOUS at 05:12

## 2022-12-24 RX ADMIN — NYSTATIN 500000 UNITS: 500000 SUSPENSION ORAL at 09:12

## 2022-12-24 RX ADMIN — ENOXAPARIN SODIUM 40 MG: 40 INJECTION SUBCUTANEOUS at 04:12

## 2022-12-24 RX ADMIN — HYDROCODONE BITARTRATE AND ACETAMINOPHEN 2 TABLET: 10; 325 TABLET ORAL at 10:12

## 2022-12-24 RX ADMIN — ALPRAZOLAM 1 MG: 0.5 TABLET ORAL at 09:12

## 2022-12-24 NOTE — PT/OT/SLP PROGRESS
Occupational Therapy   Treatment    Name: Zandra Veloz  MRN: [id number]  Admitting Diagnosis:  Delmy-prosthetic fracture around prosthetic hip  2 Days Post-Op    Recommendations:     Discharge Recommendations: nursing facility, skilled  Discharge Equipment Recommendations:   (to be determined)  Barriers to discharge:  None    Assessment:     Zandra Veloz is a 88 y.o. female with a medical diagnosis of Delmy-prosthetic fracture around prosthetic hip.  She presents with complaint of (L) pain. Performance deficits affecting function are weakness, impaired endurance, impaired self care skills, impaired functional mobility, impaired balance, decreased upper extremity function, decreased lower extremity function, pain, orthopedic precautions.     Rehab Prognosis:  Good; patient would benefit from acute skilled OT services to address these deficits and reach maximum level of function.       Plan:     Patient to be seen 5 x/week to address the above listed problems via self-care/home management, therapeutic activities, therapeutic exercises  Plan of Care Expires: 01/20/23  Plan of Care Reviewed with: patient, caregiver, daughter    Subjective     Pain/Comfort:  Pain Rating 1: 7/10  Location - Side 1: Left  Location 1: leg  Pain Addressed 1: Reposition, Distraction  Pain Rating Post-Intervention 1: 6/10    Objective:     Communicated with: CLYDE Post prior to session.  Patient found HOB elevated with hemovac, chao catheter, peripheral IV upon OT entry to room.    General Precautions: Standard, fall    Orthopedic Precautions:LLE posterior precautions, LLE toe touch weight bearing  Braces: N/A  Respiratory Status: Room air     Occupational Performance:     Bed Mobility:    Patient completed Supine to Sit with maximal assistance and x 2 persons  Patient completed Sit to Supine with maximal assistance and x 2 persons     Functional Mobility/Transfers:  Sit/Stand NT  Functional Mobility: NT    Activities  of Daily Living:  Pt sat EOB x 10 min with CGA/Min a. Pt able to assist with correcting balance with verbal/tactile cues.      [facility] 6 Click ADL:      Treatment & Education:  Pt performed UE exercises. AAROM x 2 sets of 10 reps (B) shoulder flexion/extension and adduction/abduction. 1 lb hand wt x 2 sets of 10 reps (B) elbow and wrist flexion/extension. Pt performed active ROM x 2 sets of 10 reps (L) hand  due to edema. Extremity position in elevation at the end of tx.  Pt participated well with tx. Plan is to continue tx addressing goals.    Patient left HOB elevated with all lines intact, call button in reach, and daughter and sitter present    GOALS:   Multidisciplinary Problems       Occupational Therapy Goals          Problem: Occupational Therapy    Goal Priority Disciplines Outcome Interventions   Occupational Therapy Goal     OT, PT/OT Ongoing, Progressing    Description: STG:  Pt will perform grooming with setup  Pt will bathe anterior upper body with Liset with setup from bed  Pt will perform UE dressing with Liset  Pt will perform LE dressing with MaxA with AD   Pt will sit EOB x 10 min with CGA   Pt will transfer bed/chair/bsc with MaxA with stand pivot t/f with RW  Pt will tolerate 15 minutes of tx without fatigue      LT.Restore to max I with self care and mobility.                           Time Tracking:     OT Date of Treatment: 22  OT Start Time: 1355  OT Stop Time: 1426  OT Total Time (min): 31 min    Billable Minutes:Therapeutic Activity 15  Therapeutic Exercise 10    OT/BRIGHT: OT          2022

## 2022-12-24 NOTE — PT/OT/SLP PROGRESS
Physical Therapy Treatment    Patient Name:  Zandra Veloz   MRN:  [id number]    Recommendations:     Discharge Recommendations: nursing facility, skilled  Discharge Equipment Recommendations: hospital bed, lift device  Barriers to discharge:  ongoing medical care    Assessment:     Zandra Veloz is a 88 y.o. female admitted with a medical diagnosis of Victoriano-prosthetic fracture around prosthetic hip.  She presents with the following impairments/functional limitations: weakness, impaired endurance, impaired functional mobility, gait instability, impaired balance, decreased lower extremity function, pain, decreased ROM, impaired skin, edema, orthopedic precautions. Pt requires significant assistance for all mobility but able to maintain EOB sitting with Min A at most and CGA at best for 10 mins. Pt rq freq verbal and tactile cues to attain/maintain midline position and balance to reduce posterior trunk lean. Pt otherwise req Max x 2 to total assist with bed mob. Pt does appear to be making improvements in pain management and level of participation.     Rehab Prognosis: Fair; patient would benefit from acute skilled PT services to address these deficits and reach maximum level of function.    Recent Surgery: Procedure(s) (LRB):  REVISION, TOTAL ARTHROPLASTY, HIP, VICTORIANO PROSTHETIC FX (Left) 2 Days Post-Op    Plan:     During this hospitalization, patient to be seen BID (5x/wk; daily 2x/wk) to address the identified rehab impairments via gait training, therapeutic activities, therapeutic exercises, wheelchair management/training, neuromuscular re-education and progress toward the following goals:    Plan of Care Expires:  01/23/23    Subjective     Chief Complaint: victoriano-prosthetic left hip fracture  Patient/Family Comments/goals: agreeable to PT  Pain/Comfort:  Pain Rating 1: 7/10  Location - Side 1: Left  Location 1: leg  Pain Addressed 1: Reposition, Distraction  Pain Rating Post-Intervention 1:  6/10  Pain Addressed 2: Pre-medicate for activity      Objective:     Communicated with Josephine Post RN prior to session.  Patient found HOB elevated with peripheral IV, hemovac, chao catheter, blood pressure cuff upon PT entry to room.     General Precautions: Standard, fall  Orthopedic Precautions: LLE posterior precautions, LLE toe touch weight bearing  Braces: N/A  Respiratory Status: Room air     Functional Mobility:  Bed Mobility:     Scooting: dependence and of 2 persons  Supine to Sit: maximal assistance and of 2 persons  Sit to Supine: maximal assistance and of 2 persons  Balance: EOB x 10 mins with Min to CGA       AM-PAC 6 CLICK MOBILITY  Turning over in bed (including adjusting bedclothes, sheets and blankets)?: 2  Sitting down on and standing up from a chair with arms (e.g., wheelchair, bedside commode, etc.): 1  Moving from lying on back to sitting on the side of the bed?: 2  Moving to and from a bed to a chair (including a wheelchair)?: 1  Need to walk in hospital room?: 1  Climbing 3-5 steps with a railing?: 1  Basic Mobility Total Score: 8       Treatment & Education:  Bilateral lower extremity exercise 2 x 10 reps: ankle pumps, Quad sets, heel slides, hip abduction/adduction, and Long arc quads with active assist ROM and verbal cues for sequencing and safety     Patient left HOB elevated with all lines intact, call button in reach, and OTR, daughter and sitter present.    GOALS:   Multidisciplinary Problems       Physical Therapy Goals          Problem: Physical Therapy    Goal Priority Disciplines Outcome Goal Variances Interventions   Physical Therapy Goal     PT, PT/OT Ongoing, Progressing     Description: Short term goals:  1. Supine to sit with Moderate Assistance  2. Rolling to Left and Right with Moderate Assistance.  3. Bed to chair transfer with Maximum Assistance using stand pivot transfer  4. Sitting at edge of bed x15 minutes with Minimal Assistance    Long term goals:  1. Supine to sit  with MInimal Assistance  2. Sit to stand transfer with Minimal Assistance  3. Bed to chair transfer with Moderate Assistance using Stand pivot transfer  4. Sitting at edge of bed x20 minutes with Modified [place]                         Time Tracking:     PT Received On: 12/24/22  PT Start Time: 1336     PT Stop Time: 1418  PT Total Time (min): 42 min     Billable Minutes: Therapeutic Activity 25 and Therapeutic Exercise 15    Treatment Type: Treatment  PT/PTA: PT     PTA Visit Number: 0     12/24/2022

## 2022-12-24 NOTE — PROGRESS NOTES
[facility] - [address] [place] [address] Medicine  Progress Note    Patient Name: Zandra Veloz  MRN: [id number]  Patient Class: IP- Inpatient   Admission Date: 12/17/2022  Length of Stay: 7 days  Attending Physician: Caleb Carter MD  Primary Care Provider: Brandon Thornton MD        Subjective:     Principal Problem:Delmy-prosthetic fracture around prosthetic hip        HPI:  88-year-old lady seen emergency room department.  Patient presents after having a fall when she was trying to go to the restroom at her home.  Patient sustained a left femur fracture.  Patient complains of moderate to severe pain in the left hip area.  Patient also complains of chest tightness, she rates it a 5/10 in the chest tightness has been intubate.  Patient also was seen in emergency room department earlier this week per her daughter.  Patient is found to have dehydration and a urinary tract infection.  Current she denies any shortness of breath.  She does not give a history of coronary artery disease.      Overview/Hospital Course:  12/19 - records reviewed. Fall without LOC and has left hip fx. No other complaints currently. Seen by cardiology with some CP felt musculoskeletal.  Echo mild AS and mod MR with nl EF. Age increase cardiac risk for surgery but discussed with daughter surgery is urgent and see no benefit in delay medically. Feel low to moderate risk for surgery. Question about UTI. No symptoms. Had UTI in 10.22 not surprising. Lab to do culture on urine in lab. Cover with ATN for prior culture with ATB started. Discussed with Dr Singer and cardiology.  12/20 Tachycardia and elevated BP. Cardiology adjusted meds. Plan hold off surgery until 12/22 to adjust meds and treat UTI. Family in room. Pt not sleeping well.   12/21 Didn't sleep well prior night. Apparently been on xanax for a time. Also recently started on Neurontin. Family with question. No recent BM. Some increase stool on KUB but not  severe. Surgery for am. BP some up but better. HR good.   12/22 Seen prior to surgery and apparently add better night. Sore mouth / throat better with mycostatin. For surgery. Family in room.  12/23 patient with pain but otherwise seemed to be doing relatively well.  Daughter in room.  Apparently been taking prednisone for some time and this was restarted per family request.  Look into swing bed placement  12/24 patient had better night rested and everyone's in better spirits today.  Tolerating some diet.   Therapy worked with patient.  Look into swing bed placement next week      Interval History:     Review of Systems   Constitutional:  Negative for appetite change, fatigue and fever.   HENT:  Negative for congestion, hearing loss and trouble swallowing.    Respiratory:  Negative for chest tightness, shortness of breath and wheezing.    Cardiovascular:  Negative for chest pain and palpitations.   Gastrointestinal:  Negative for abdominal pain, constipation and nausea.   Genitourinary:  Negative for difficulty urinating and dysuria.   Musculoskeletal:  Positive for gait problem. Negative for back pain and neck stiffness.        Left hip pain after fall   Skin:  Negative for pallor and rash.   Neurological:  Negative for dizziness, speech difficulty and headaches.   Psychiatric/Behavioral:  Negative for confusion and suicidal ideas.    Objective:     Vital Signs (Most Recent):  Temp: 97.8 °F (36.6 °C) (12/24/22 1115)  Pulse: 62 (12/24/22 1115)  Resp: 18 (12/24/22 1631)  BP: (!) 129/44 (12/24/22 1115)  SpO2: 96 % (12/24/22 1115)   Vital Signs (24h Range):  Temp:  [97.3 °F (36.3 °C)-98 °F (36.7 °C)] 97.8 °F (36.6 °C)  Pulse:  [60-77] 62  Resp:  [16-18] 18  SpO2:  [93 %-96 %] 96 %  BP: (103-148)/(38-59) 129/44     Weight: 59.5 kg (131 lb 2.8 oz)  Body mass index is 23.99 kg/m².    Intake/Output Summary (Last 24 hours) at 12/24/2022 1651  Last data filed at 12/24/2022 0518  Gross per 24 hour   Intake --   Output 650 ml    Net -650 ml        Physical Exam  Vitals reviewed.   Constitutional:       General: She is not in acute distress.  Eyes:      Pupils: Pupils are equal, round, and reactive to light.   Cardiovascular:      Rate and Rhythm: Normal rate and regular rhythm.      Pulses: Normal pulses.   Pulmonary:      Effort: Pulmonary effort is normal. No respiratory distress.      Breath sounds: Normal breath sounds. No wheezing.   Abdominal:      General: Bowel sounds are normal. There is no distension.      Tenderness: There is no abdominal tenderness.   Musculoskeletal:      Comments: Left hip pain and misalignment after fall   Skin:     General: Skin is warm.   Neurological:      General: No focal deficit present.      Mental Status: She is alert, oriented to person, place, and time and easily aroused. Mental status is at baseline.   Psychiatric:         Mood and Affect: Mood normal.         Behavior: Behavior normal.       Significant Labs: All pertinent labs within the past 24 hours have been reviewed.  BMP:   Recent Labs   Lab 12/24/22  0531   *   *   K 4.7      CO2 24   BUN 22*   CREATININE 0.97   CALCIUM 7.9*       CBC:   Recent Labs   Lab 12/24/22  0531   WBC 18.73*   HGB 10.0*   HCT 29.9*          CMP:   Recent Labs   Lab 12/24/22  0531   *   K 4.7      CO2 24   *   BUN 22*   CREATININE 0.97   CALCIUM 7.9*   ANIONGAP 11         Significant Imaging: I have reviewed all pertinent imaging results/findings within the past 24 hours.      Assessment/Plan:      * Delmy-prosthetic fracture around prosthetic hip  Patient found to have a left femur fracture after a fall today.    Consult Orthopedics.  Will hold off on surgery for now because the patient is having chest tightness.  Will consult Cardiology    12/21 surgery for am    Heart murmur    Echo Summary this admit     The left ventricle is normal in size with hyperdynamic systolic function.   The estimated ejection fraction is  75%.   Left ventricular diastolic dysfunction.   Normal right ventricular size.   There is mild aortic valve stenosis.   Aortic valve area is 1.94 cm2; peak velocity is 3.0 m/s; mean gradient is 18 mmHg.   Moderate-to-severe mitral regurgitation.   Mild tricuspid regurgitation.   Mild pulmonic regurgitation.   Normal central venous pressure (3 mmHg).   The estimated PA systolic pressure is 32 mmHg.   Trivial pericardial effusion.      Palpitations        Chest pain  Patient complains of chest tightness, she rates it a 5/10.  Chest tightness has been intermittent for several days.    Trend out her troponins   Admit to telemetry   Hold off on surgery   Consult Cardiology  Follow-up EKG      Acute cystitis without hematuria    This not clear but likely chronic UTI. ATB cover culture collected 10/2022. Urine this admitsent for culture    12/21 G neg in urine ID / sent pending    Primary osteoarthritis of right knee        Primary osteoarthritis of right shoulder        Lumbar radiculopathy        Closed fracture of left hip    Surgery 12/22    Hypertension  Blood pressure currently stable, will follow    12/20 BP until and meds adjusted    Arthritis    Tylenol p.r.n..      VTE Risk Mitigation (From admission, onward)         Ordered     enoxaparin injection 40 mg  Daily         12/17/22 1253     IP VTE HIGH RISK PATIENT  Once         12/17/22 1253     Place sequential compression device  Until discontinued         12/17/22 1253                Discharge Planning   YANIV:      Code Status: Full Code   Is the patient medically ready for discharge?:     Reason for patient still in hospital (select all that apply): Laboratory test, Treatment, Imaging, Consult recommendations and PT / OT recommendations  Discharge Plan A: Home                  Caleb Carter MD  Department of Hospital Medicine   [facility] - [facility] Telemetry

## 2022-12-24 NOTE — PROGRESS NOTES
"[facility] - [address] [place] [address]  Adult Nutrition  First Assessment Note         Reason for Assessment  Reason For Assessment: length of stay  Nutrition Risk Screen: no indicators present    RD assessment d/t LOS.    Recommend continue current diet order as medically appropriate and tolerated; monitor need to add Cardiac modification to current diet order. Encourage good PO intakes. If PO intake declines, monitor need for oral nutrition supplement to promote adequate oral intake.    Per H&P,   "88-year-old lady seen emergency room department.  Patient presents after having a fall when she was trying to go to the restroom at her home.  Patient sustained a left femur fracture.  Patient complains of moderate to severe pain in the left hip area.  Patient also complains of chest tightness, she rates it a 5/10 in the chest tightness has been intubate.  Patient also was seen in emergency room department earlier this week per her daughter.  Patient is found to have dehydration and a urinary tract infection.  Current she denies any shortness of breath.  She does not give a history of coronary artery disease." Per MD note, "look into swing bed placement." Per orthropedic surgery note, pt had revision of total hip placement arthroplasty left hip for periprosthetic hip fracture 12/22.     Patient currently receiving Regular Diet since 12/23 0910 with no PO % intake documented. Monitor need for Cardiac diet modification as pt with HTN. Encourage good PO intakes. Monitor need for oral nutrition supplement if PO intake declines.    CBW 59.5kg considered in IBW range, BMI considered WNL per geriatric guidelines.     Last BM 12/21 per flowsheets - pt receiving docusate sodium and polyethylene glycol. Will continue to monitor PO intake, meds, labs, updates in patient condition. RD following.    Malnutrition  Is Patient Malnourished: No    Nutrition Diagnosis  Decreased nutrient needs of Na, cholesterol, saturated fats "   related to Chronic illness as evidenced by pt with HTN    Nutrition Diagnosis Status: Chronic/ continues    Nutrition Risk  Level of Risk/Frequency of Follow-up: low - moderate   Chewing or Swallowing Difficulty?: No Chewing or swallowing difficulty    Estimated/Assessed Needs    Temp: 97.8 °F (36.6 °C)Oral  Weight Used For Calorie Calculations: 59.5 kg (131 lb 2.8 oz)   Energy Need Method: Kcal/kg (25-30 kcal/kg) Energy Calorie Requirements (kcal): 6971-2250 kcal  Weight Used For Protein Calculations: 59.5 kg (131 lb 2.8 oz)  Protein Requirements: 60-72 g pro (1.0-1.2 g pro/kg)       RDA Method (mL): 1488     Nutrition Prescription / Recommendations  Recommendation/Intervention: Recommend continue current diet order as medically appropriate and tolerated; monitor need to add Cardiac modification to current diet order. Encourage good PO intakes. If PO intake declines, monitor need for oral nutrition supplement to promote adequate oral intake.  Goals: PO intake >75%, weight maintenance  Nutrition Goal Status: new  Current Diet Order: Regular Diet  Nutrition Order Comments: Monitor need for Cardiac Diet  Recommended Diet: Regular Monitor need for Cardiac Diet  Recommended Oral Supplement: No Oral Supplements  Is Nutrition Support Recommended: No  Is Education Recommended: No    Monitor and Evaluation  % current Intake: Unknown/ No P.O. intake documented  % intake to meet estimated needs: 75 - 100 %  Food and Nutrient Intake: energy intake  Food and Nutrient Adminstration: diet order  Knowledge/Beliefs/Attitudes: food and nutrition knowledge/skill, beliefs and attitudes  Physical Activity and Function: nutrition-related ADLs and IADLs, factors affecting access to physical activity  Anthropometric Measurements: weight change, weight, body mass index  Biochemical Data, Medical Tests and Procedures: electrolyte and renal panel, gastrointestinal profile, glucose/endocrine profile, inflammatory profile, lipid  "profile  Nutrition-Focused Physical Findings: overall appearance, extremities, muscles and bones, head and eyes, skin     Current Medical Diagnosis and Past Medical History  Diagnosis: other (see comments) (scott-prosthetic fracture around prosthetic hip)  Past Medical History:   Diagnosis Date    Hypertension     Osteoporosis     Thyroid disorder      Nutrition/Diet History  Spiritual, Cultural Beliefs, Evangelical Practices, Values that Affect Care: no  Food Allergies: NKFA    Lab/Procedures/Meds  Recent Labs   Lab 12/24/22  0531   *   K 4.7   BUN 22*   CREATININE 0.97   *   CALCIUM 7.9*        Last A1c: No results found for: HGBA1C  Lab Results   Component Value Date    RBC 3.27 (L) 12/24/2022    HGB 10.0 (L) 12/24/2022    HCT 29.9 (L) 12/24/2022    MCV 91.4 12/24/2022    MCH 30.6 12/24/2022    MCHC 33.4 12/24/2022     Pertinent Labs Reviewed: reviewed  Na 131 (L) - replete to WNL as needed, BUN 22 (H), BUN/CREAT RATIO 23 (H), eGFR 56 (L), Ca 7.9 (L) - pt with no PMH of CKD, will continue to monitor,  (H) - pt with no PMH of DM, pt receiving prednisone, alk phos 43 (L), total protein 5.3 (L), albumin 2.6 (L), osmolality 271 (L) - unsure etiology, will continue to monitor labs  Pertinent Medications Reviewed: reviewed  Alprazolam, maxipime, diltiazem, docusate sodium, enoxaparin, gabapentin, hydralazine, levothyroxine, lisinopril, lopressor, mupirocin, nystatin, pantoprazole, polyethylene glycol, prednisone, hydrocodone-acetaminophen PRN    Anthropometrics  Temp: 97.8 °F (36.6 °C)  Height Method: Stated  Height: 5' 2" (157.5 cm)  Height (inches): 62 in  Weight Method: Standard Scale  Weight: 59.5 kg (131 lb 2.8 oz)  Weight (lb): 131.18 lb  Ideal Body Weight (IBW), Female: 110 lb  % Ideal Body Weight, Female (lb): 118.18 %  BMI (Calculated): 24     Nutrition by Nursing  Diet/Nutrition Received: regular  Last Bowel Movement: 12/21/22    Nutrition Follow-Up  RD Follow-up?: Yes  "

## 2022-12-24 NOTE — SUBJECTIVE & OBJECTIVE
Interval History:     Review of Systems   Constitutional:  Negative for appetite change, fatigue and fever.   HENT:  Negative for congestion, hearing loss and trouble swallowing.    Respiratory:  Negative for chest tightness, shortness of breath and wheezing.    Cardiovascular:  Negative for chest pain and palpitations.   Gastrointestinal:  Negative for abdominal pain, constipation and nausea.   Genitourinary:  Negative for difficulty urinating and dysuria.   Musculoskeletal:  Positive for gait problem. Negative for back pain and neck stiffness.        Left hip pain after fall   Skin:  Negative for pallor and rash.   Neurological:  Negative for dizziness, speech difficulty and headaches.   Psychiatric/Behavioral:  Negative for confusion and suicidal ideas.    Objective:     Vital Signs (Most Recent):  Temp: 98 °F (36.7 °C) (12/23/22 2000)  Pulse: 77 (12/23/22 2000)  Resp: 16 (12/23/22 2052)  BP: (!) 146/59 (12/23/22 2000)  SpO2: (!) 93 % (12/23/22 2000)   Vital Signs (24h Range):  Temp:  [97.4 °F (36.3 °C)-99.6 °F (37.6 °C)] 98 °F (36.7 °C)  Pulse:  [59-77] 77  Resp:  [16-19] 16  SpO2:  [93 %-98 %] 93 %  BP: ()/(43-59) 146/59     Weight: 59.5 kg (131 lb 2.8 oz)  Body mass index is 23.99 kg/m².    Intake/Output Summary (Last 24 hours) at 12/23/2022 2252  Last data filed at 12/23/2022 1051  Gross per 24 hour   Intake --   Output 335 ml   Net -335 ml        Physical Exam  Vitals reviewed.   Constitutional:       General: She is not in acute distress.  Eyes:      Pupils: Pupils are equal, round, and reactive to light.   Cardiovascular:      Rate and Rhythm: Normal rate and regular rhythm.      Pulses: Normal pulses.   Pulmonary:      Effort: Pulmonary effort is normal. No respiratory distress.      Breath sounds: Normal breath sounds. No wheezing.   Abdominal:      General: Bowel sounds are normal. There is no distension.      Tenderness: There is no abdominal tenderness.   Musculoskeletal:      Comments: Left hip  pain and misalignment after fall   Skin:     General: Skin is warm.   Neurological:      General: No focal deficit present.      Mental Status: She is alert, oriented to person, place, and time and easily aroused. Mental status is at baseline.   Psychiatric:         Mood and Affect: Mood normal.         Behavior: Behavior normal.       Significant Labs: All pertinent labs within the past 24 hours have been reviewed.  BMP:   Recent Labs   Lab 12/22/22  1630   *   *   K 4.7   CL 99   CO2 21   BUN 11   CREATININE 0.79   CALCIUM 8.1*       CBC:   Recent Labs   Lab 12/22/22  0313 12/22/22  1630   WBC 8.91 21.67*   HGB 9.1* 13.0   HCT 26.9* 38.8    268       CMP:   Recent Labs   Lab 12/22/22  0313 12/22/22  1630   * 132*   K 3.9 4.7   CL 96* 99   CO2 24 21   GLU 71* 119*   BUN 11 11   CREATININE 0.70 0.79   CALCIUM 8.3* 8.1*   ANIONGAP 15 17*         Significant Imaging: I have reviewed all pertinent imaging results/findings within the past 24 hours.

## 2022-12-24 NOTE — PROGRESS NOTES
Ms. Veloz is more comfortable today after the pain medicine changes.  She was able to sit on the side of the bed yesterday.      Dressings are clean, dry and intact to left lower extremity.  The left lower extremity is neurovascularly unchanged.    Hemoglobin 10.0    Postop 2.    Continue therapy.  Discontinue Hemovac drain.  Discharge planning  Lovenox for DVT prophylaxis

## 2022-12-24 NOTE — SUBJECTIVE & OBJECTIVE
Interval History:     Review of Systems   Constitutional:  Negative for appetite change, fatigue and fever.   HENT:  Negative for congestion, hearing loss and trouble swallowing.    Respiratory:  Negative for chest tightness, shortness of breath and wheezing.    Cardiovascular:  Negative for chest pain and palpitations.   Gastrointestinal:  Negative for abdominal pain, constipation and nausea.   Genitourinary:  Negative for difficulty urinating and dysuria.   Musculoskeletal:  Positive for gait problem. Negative for back pain and neck stiffness.        Left hip pain after fall   Skin:  Negative for pallor and rash.   Neurological:  Negative for dizziness, speech difficulty and headaches.   Psychiatric/Behavioral:  Negative for confusion and suicidal ideas.    Objective:     Vital Signs (Most Recent):  Temp: 97.8 °F (36.6 °C) (12/24/22 1115)  Pulse: 62 (12/24/22 1115)  Resp: 18 (12/24/22 1631)  BP: (!) 129/44 (12/24/22 1115)  SpO2: 96 % (12/24/22 1115)   Vital Signs (24h Range):  Temp:  [97.3 °F (36.3 °C)-98 °F (36.7 °C)] 97.8 °F (36.6 °C)  Pulse:  [60-77] 62  Resp:  [16-18] 18  SpO2:  [93 %-96 %] 96 %  BP: (103-148)/(38-59) 129/44     Weight: 59.5 kg (131 lb 2.8 oz)  Body mass index is 23.99 kg/m².    Intake/Output Summary (Last 24 hours) at 12/24/2022 1651  Last data filed at 12/24/2022 0518  Gross per 24 hour   Intake --   Output 650 ml   Net -650 ml        Physical Exam  Vitals reviewed.   Constitutional:       General: She is not in acute distress.  Eyes:      Pupils: Pupils are equal, round, and reactive to light.   Cardiovascular:      Rate and Rhythm: Normal rate and regular rhythm.      Pulses: Normal pulses.   Pulmonary:      Effort: Pulmonary effort is normal. No respiratory distress.      Breath sounds: Normal breath sounds. No wheezing.   Abdominal:      General: Bowel sounds are normal. There is no distension.      Tenderness: There is no abdominal tenderness.   Musculoskeletal:      Comments: Left hip  pain and misalignment after fall   Skin:     General: Skin is warm.   Neurological:      General: No focal deficit present.      Mental Status: She is alert, oriented to person, place, and time and easily aroused. Mental status is at baseline.   Psychiatric:         Mood and Affect: Mood normal.         Behavior: Behavior normal.       Significant Labs: All pertinent labs within the past 24 hours have been reviewed.  BMP:   Recent Labs   Lab 12/24/22  0531   *   *   K 4.7      CO2 24   BUN 22*   CREATININE 0.97   CALCIUM 7.9*       CBC:   Recent Labs   Lab 12/24/22  0531   WBC 18.73*   HGB 10.0*   HCT 29.9*          CMP:   Recent Labs   Lab 12/24/22  0531   *   K 4.7      CO2 24   *   BUN 22*   CREATININE 0.97   CALCIUM 7.9*   ANIONGAP 11         Significant Imaging: I have reviewed all pertinent imaging results/findings within the past 24 hours.

## 2022-12-24 NOTE — PROGRESS NOTES
[facility] - [address] [place] [address] Medicine  Progress Note    Patient Name: Zandra Veloz  MRN: [id number]  Patient Class: IP- Inpatient   Admission Date: 12/17/2022  Length of Stay: 6 days  Attending Physician: Caleb Carter MD  Primary Care Provider: Brandon Thornton MD        Subjective:     Principal Problem:Delmy-prosthetic fracture around prosthetic hip        HPI:  88-year-old lady seen emergency room department.  Patient presents after having a fall when she was trying to go to the restroom at her home.  Patient sustained a left femur fracture.  Patient complains of moderate to severe pain in the left hip area.  Patient also complains of chest tightness, she rates it a 5/10 in the chest tightness has been intubate.  Patient also was seen in emergency room department earlier this week per her daughter.  Patient is found to have dehydration and a urinary tract infection.  Current she denies any shortness of breath.  She does not give a history of coronary artery disease.      Overview/Hospital Course:  12/19 - records reviewed. Fall without LOC and has left hip fx. No other complaints currently. Seen by cardiology with some CP felt musculoskeletal.  Echo mild AS and mod MR with nl EF. Age increase cardiac risk for surgery but discussed with daughter surgery is urgent and see no benefit in delay medically. Feel low to moderate risk for surgery. Question about UTI. No symptoms. Had UTI in 10.22 not surprising. Lab to do culture on urine in lab. Cover with ATN for prior culture with ATB started. Discussed with Dr Singer and cardiology.  12/20 Tachycardia and elevated BP. Cardiology adjusted meds. Plan hold off surgery until 12/22 to adjust meds and treat UTI. Family in room. Pt not sleeping well.   12/21 Didn't sleep well prior night. Apparently been on xanax for a time. Also recently started on Neurontin. Family with question. No recent BM. Some increase stool on KUB but not  severe. Surgery for am. BP some up but better. HR good.   12/22 Seen prior to surgery and apparently add better night. Sore mouth / throat better with mycostatin. For surgery. Family in room.  12/23 patient with pain but otherwise seemed to be doing relatively well.  Daughter in room.  Apparently been taking prednisone for some time and this was restarted per family request.  Look into swing bed placement      Interval History:     Review of Systems   Constitutional:  Negative for appetite change, fatigue and fever.   HENT:  Negative for congestion, hearing loss and trouble swallowing.    Respiratory:  Negative for chest tightness, shortness of breath and wheezing.    Cardiovascular:  Negative for chest pain and palpitations.   Gastrointestinal:  Negative for abdominal pain, constipation and nausea.   Genitourinary:  Negative for difficulty urinating and dysuria.   Musculoskeletal:  Positive for gait problem. Negative for back pain and neck stiffness.        Left hip pain after fall   Skin:  Negative for pallor and rash.   Neurological:  Negative for dizziness, speech difficulty and headaches.   Psychiatric/Behavioral:  Negative for confusion and suicidal ideas.    Objective:     Vital Signs (Most Recent):  Temp: 98 °F (36.7 °C) (12/23/22 2000)  Pulse: 77 (12/23/22 2000)  Resp: 16 (12/23/22 2052)  BP: (!) 146/59 (12/23/22 2000)  SpO2: (!) 93 % (12/23/22 2000)   Vital Signs (24h Range):  Temp:  [97.4 °F (36.3 °C)-99.6 °F (37.6 °C)] 98 °F (36.7 °C)  Pulse:  [59-77] 77  Resp:  [16-19] 16  SpO2:  [93 %-98 %] 93 %  BP: ()/(43-59) 146/59     Weight: 59.5 kg (131 lb 2.8 oz)  Body mass index is 23.99 kg/m².    Intake/Output Summary (Last 24 hours) at 12/23/2022 2252  Last data filed at 12/23/2022 1051  Gross per 24 hour   Intake --   Output 335 ml   Net -335 ml        Physical Exam  Vitals reviewed.   Constitutional:       General: She is not in acute distress.  Eyes:      Pupils: Pupils are equal, round, and reactive  to light.   Cardiovascular:      Rate and Rhythm: Normal rate and regular rhythm.      Pulses: Normal pulses.   Pulmonary:      Effort: Pulmonary effort is normal. No respiratory distress.      Breath sounds: Normal breath sounds. No wheezing.   Abdominal:      General: Bowel sounds are normal. There is no distension.      Tenderness: There is no abdominal tenderness.   Musculoskeletal:      Comments: Left hip pain and misalignment after fall   Skin:     General: Skin is warm.   Neurological:      General: No focal deficit present.      Mental Status: She is alert, oriented to person, place, and time and easily aroused. Mental status is at baseline.   Psychiatric:         Mood and Affect: Mood normal.         Behavior: Behavior normal.       Significant Labs: All pertinent labs within the past 24 hours have been reviewed.  BMP:   Recent Labs   Lab 12/22/22  1630   *   *   K 4.7   CL 99   CO2 21   BUN 11   CREATININE 0.79   CALCIUM 8.1*       CBC:   Recent Labs   Lab 12/22/22  0313 12/22/22  1630   WBC 8.91 21.67*   HGB 9.1* 13.0   HCT 26.9* 38.8    268       CMP:   Recent Labs   Lab 12/22/22  0313 12/22/22  1630   * 132*   K 3.9 4.7   CL 96* 99   CO2 24 21   GLU 71* 119*   BUN 11 11   CREATININE 0.70 0.79   CALCIUM 8.3* 8.1*   ANIONGAP 15 17*         Significant Imaging: I have reviewed all pertinent imaging results/findings within the past 24 hours.      Assessment/Plan:      * Delmy-prosthetic fracture around prosthetic hip  Patient found to have a left femur fracture after a fall today.    Consult Orthopedics.  Will hold off on surgery for now because the patient is having chest tightness.  Will consult Cardiology    12/21 surgery for am    Heart murmur    Echo Summary this admit     The left ventricle is normal in size with hyperdynamic systolic function.   The estimated ejection fraction is 75%.   Left ventricular diastolic dysfunction.   Normal right ventricular size.   There is  mild aortic valve stenosis.   Aortic valve area is 1.94 cm2; peak velocity is 3.0 m/s; mean gradient is 18 mmHg.   Moderate-to-severe mitral regurgitation.   Mild tricuspid regurgitation.   Mild pulmonic regurgitation.   Normal central venous pressure (3 mmHg).   The estimated PA systolic pressure is 32 mmHg.   Trivial pericardial effusion.      Palpitations        Chest pain  Patient complains of chest tightness, she rates it a 5/10.  Chest tightness has been intermittent for several days.    Trend out her troponins   Admit to telemetry   Hold off on surgery   Consult Cardiology  Follow-up EKG      Acute cystitis without hematuria    This not clear but likely chronic UTI. ATB cover culture collected 10/2022. Urine this admitsent for culture    12/21 G neg in urine ID / sent pending    Primary osteoarthritis of right knee        Primary osteoarthritis of right shoulder        Lumbar radiculopathy        Closed fracture of left hip    Surgery 12/22    Hypertension  Blood pressure currently stable, will follow    12/20 BP until and meds adjusted    Arthritis    Tylenol p.r.n..      VTE Risk Mitigation (From admission, onward)         Ordered     enoxaparin injection 40 mg  Daily         12/17/22 1253     IP VTE HIGH RISK PATIENT  Once         12/17/22 1253     Place sequential compression device  Until discontinued         12/17/22 1253                Discharge Planning   YANIV:      Code Status: Full Code   Is the patient medically ready for discharge?:     Reason for patient still in hospital (select all that apply): Laboratory test, Treatment, Imaging, Consult recommendations, PT / OT recommendations and Pending disposition  Discharge Plan A: Home                  Caleb Carter MD  Department of Hospital Medicine   [facility] - [facility] Telemetry

## 2022-12-24 NOTE — PLAN OF CARE
SW consulted again for SWB placement.  Per  notes, a voicemail was left  yesterday for Екатерина at [facility] informing of SWB referral.  SS will follow up with Екатерина on Tuesday.

## 2022-12-25 LAB
ANION GAP SERPL CALCULATED.3IONS-SCNC: 12 MMOL/L (ref 7–16)
BASOPHILS # BLD AUTO: 0.03 K/UL (ref 0–0.2)
BASOPHILS NFR BLD AUTO: 0.2 % (ref 0–1)
BUN SERPL-MCNC: 22 MG/DL (ref 7–18)
BUN/CREAT SERPL: 25 (ref 6–20)
CALCIUM SERPL-MCNC: 8 MG/DL (ref 8.5–10.1)
CHLORIDE SERPL-SCNC: 103 MMOL/L (ref 98–107)
CO2 SERPL-SCNC: 22 MMOL/L (ref 21–32)
CREAT SERPL-MCNC: 0.88 MG/DL (ref 0.55–1.02)
DIFFERENTIAL METHOD BLD: ABNORMAL
EGFR (NO RACE VARIABLE) (RUSH/TITUS): 63 ML/MIN/1.73M²
EOSINOPHIL # BLD AUTO: 0.04 K/UL (ref 0–0.5)
EOSINOPHIL NFR BLD AUTO: 0.3 % (ref 1–4)
ERYTHROCYTE [DISTWIDTH] IN BLOOD BY AUTOMATED COUNT: 14.1 % (ref 11.5–14.5)
GLUCOSE SERPL-MCNC: 121 MG/DL (ref 70–105)
GLUCOSE SERPL-MCNC: 92 MG/DL (ref 74–106)
GLUCOSE SERPL-MCNC: 96 MG/DL (ref 70–105)
HCT VFR BLD AUTO: 26.7 % (ref 38–47)
HGB BLD-MCNC: 8.6 G/DL (ref 12–16)
IMM GRANULOCYTES # BLD AUTO: 0.35 K/UL (ref 0–0.04)
IMM GRANULOCYTES NFR BLD: 2.5 % (ref 0–0.4)
LYMPHOCYTES # BLD AUTO: 1.04 K/UL (ref 1–4.8)
LYMPHOCYTES NFR BLD AUTO: 7.3 % (ref 27–41)
MCH RBC QN AUTO: 30.7 PG (ref 27–31)
MCHC RBC AUTO-ENTMCNC: 32.2 G/DL (ref 32–36)
MCV RBC AUTO: 95.4 FL (ref 80–96)
MONOCYTES # BLD AUTO: 0.84 K/UL (ref 0–0.8)
MONOCYTES NFR BLD AUTO: 5.9 % (ref 2–6)
MPC BLD CALC-MCNC: 9.6 FL (ref 9.4–12.4)
NEUTROPHILS # BLD AUTO: 11.9 K/UL (ref 1.8–7.7)
NEUTROPHILS NFR BLD AUTO: 83.8 % (ref 53–65)
NRBC # BLD AUTO: 0 X10E3/UL
NRBC, AUTO (.00): 0 %
PLATELET # BLD AUTO: 318 K/UL (ref 150–400)
POTASSIUM SERPL-SCNC: 4.3 MMOL/L (ref 3.5–5.1)
RBC # BLD AUTO: 2.8 M/UL (ref 4.2–5.4)
SODIUM SERPL-SCNC: 133 MMOL/L (ref 136–145)
WBC # BLD AUTO: 14.2 K/UL (ref 4.5–11)

## 2022-12-25 PROCEDURE — 80048 BASIC METABOLIC PNL TOTAL CA: CPT | Performed by: HOSPITALIST

## 2022-12-25 PROCEDURE — 94761 N-INVAS EAR/PLS OXIMETRY MLT: CPT

## 2022-12-25 PROCEDURE — 99232 SBSQ HOSP IP/OBS MODERATE 35: CPT | Mod: ,,, | Performed by: HOSPITALIST

## 2022-12-25 PROCEDURE — 82962 GLUCOSE BLOOD TEST: CPT

## 2022-12-25 PROCEDURE — 63600175 PHARM REV CODE 636 W HCPCS: Performed by: INTERNAL MEDICINE

## 2022-12-25 PROCEDURE — 25000003 PHARM REV CODE 250: Performed by: INTERNAL MEDICINE

## 2022-12-25 PROCEDURE — 97530 THERAPEUTIC ACTIVITIES: CPT

## 2022-12-25 PROCEDURE — 25000003 PHARM REV CODE 250: Performed by: ORTHOPAEDIC SURGERY

## 2022-12-25 PROCEDURE — 97110 THERAPEUTIC EXERCISES: CPT

## 2022-12-25 PROCEDURE — 36415 COLL VENOUS BLD VENIPUNCTURE: CPT | Performed by: HOSPITALIST

## 2022-12-25 PROCEDURE — 85025 COMPLETE CBC W/AUTO DIFF WBC: CPT | Performed by: HOSPITALIST

## 2022-12-25 PROCEDURE — 11000001 HC ACUTE MED/SURG PRIVATE ROOM

## 2022-12-25 PROCEDURE — 25000003 PHARM REV CODE 250: Performed by: NURSE PRACTITIONER

## 2022-12-25 PROCEDURE — 25000003 PHARM REV CODE 250: Performed by: HOSPITALIST

## 2022-12-25 PROCEDURE — 99232 PR SUBSEQUENT HOSPITAL CARE,LEVL II: ICD-10-PCS | Mod: ,,, | Performed by: HOSPITALIST

## 2022-12-25 PROCEDURE — 63600175 PHARM REV CODE 636 W HCPCS: Performed by: HOSPITALIST

## 2022-12-25 RX ORDER — BISACODYL 5 MG
10 TABLET, DELAYED RELEASE (ENTERIC COATED) ORAL ONCE
Status: DISCONTINUED | OUTPATIENT
Start: 2022-12-25 | End: 2022-12-29 | Stop reason: HOSPADM

## 2022-12-25 RX ADMIN — CEFEPIME 1 G: 1 INJECTION, POWDER, FOR SOLUTION INTRAMUSCULAR; INTRAVENOUS at 05:12

## 2022-12-25 RX ADMIN — ENOXAPARIN SODIUM 40 MG: 40 INJECTION SUBCUTANEOUS at 05:12

## 2022-12-25 RX ADMIN — HYDROCODONE BITARTRATE AND ACETAMINOPHEN 2 TABLET: 10; 325 TABLET ORAL at 05:12

## 2022-12-25 RX ADMIN — LEVOTHYROXINE SODIUM 125 MCG: 0.12 TABLET ORAL at 05:12

## 2022-12-25 RX ADMIN — GABAPENTIN 100 MG: 100 CAPSULE ORAL at 09:12

## 2022-12-25 RX ADMIN — MUPIROCIN: 20 OINTMENT TOPICAL at 09:12

## 2022-12-25 RX ADMIN — NYSTATIN 500000 UNITS: 500000 SUSPENSION ORAL at 09:12

## 2022-12-25 RX ADMIN — PREDNISONE 5 MG: 5 TABLET ORAL at 09:12

## 2022-12-25 RX ADMIN — NYSTATIN 500000 UNITS: 500000 SUSPENSION ORAL at 12:12

## 2022-12-25 RX ADMIN — DOCUSATE SODIUM 100 MG: 100 CAPSULE, LIQUID FILLED ORAL at 09:12

## 2022-12-25 RX ADMIN — ALPRAZOLAM 1 MG: 0.5 TABLET ORAL at 09:12

## 2022-12-25 RX ADMIN — GABAPENTIN 100 MG: 100 CAPSULE ORAL at 05:12

## 2022-12-25 RX ADMIN — HYDROCODONE BITARTRATE AND ACETAMINOPHEN 2 TABLET: 10; 325 TABLET ORAL at 12:12

## 2022-12-25 RX ADMIN — NYSTATIN 500000 UNITS: 500000 SUSPENSION ORAL at 05:12

## 2022-12-25 RX ADMIN — PANTOPRAZOLE SODIUM 40 MG: 40 TABLET, DELAYED RELEASE ORAL at 09:12

## 2022-12-25 RX ADMIN — DILTIAZEM HYDROCHLORIDE 120 MG: 120 CAPSULE, COATED, EXTENDED RELEASE ORAL at 09:12

## 2022-12-25 RX ADMIN — POLYETHYLENE GLYCOL 3350 17 G: 17 POWDER, FOR SOLUTION ORAL at 09:12

## 2022-12-25 NOTE — PROGRESS NOTES
Ms. Veloz is more comfortable but still complaining of pain.  She was able to sit on the side of the bed yesterday several times but not stand..      A leg wound is draining serous fluid posteriorly.  The left lower extremity is neurovascularly unchanged.     Hemoglobin 8.6     Postop 3.     Continue therapy.  Discharge planning  Lovenox for DVT prophylaxis  Continue dressings.

## 2022-12-25 NOTE — PT/OT/SLP PROGRESS
"Physical Therapy Treatment    Patient Name:  Zandra Veloz   MRN:  [id number]    Recommendations:     Discharge Recommendations: nursing facility, skilled  Discharge Equipment Recommendations: hospital bed, lift device  Barriers to discharge:  complicated hospitalization    Assessment:     Zandra Veloz is a 88 y.o. female admitted with a medical diagnosis of Victoriano-prosthetic fracture around prosthetic hip.  She presents with the following impairments/functional limitations: weakness, impaired endurance, impaired functional mobility, gait instability, impaired balance, decreased lower extremity function, pain, decreased ROM, impaired skin, edema, orthopedic precautions.    Rehab Prognosis: Good and Fair; patient would benefit from acute skilled PT services to address these deficits and reach maximum level of function.    Recent Surgery: Procedure(s) (LRB):  REVISION, TOTAL ARTHROPLASTY, HIP, VICTORIANO PROSTHETIC FX (Left) 3 Days Post-Op    Plan:     During this hospitalization, patient to be seen BID (5x/wk; daily 2x/wk) to address the identified rehab impairments via gait training, therapeutic activities, therapeutic exercises, wheelchair management/training, neuromuscular re-education and progress toward the following goals:    Plan of Care Expires:  01/23/23    Subjective     Chief Complaint: periprosthetic hip fracture  Patient/Family Comments/goals: declines EOB d/t severity of pain, agreeable to PT  Pain/Comfort: "i'm hurting a pretty good bit"         Objective:     Communicated with Josephine Post RN prior to session.  Patient found HOB elevated with peripheral IV, hemovac, chao catheter, blood pressure cuff upon PT entry to room.     General Precautions: Standard, fall  Orthopedic Precautions: LLE posterior precautions, LLE toe touch weight bearing  Braces: N/A  Respiratory Status: Room air     Functional Mobility:  Completed positioning in bed to improve midline position and to reduce external " rotation of LLE. Pt req max A at best, demonstrating ability to self correct with assistance.       AM-PAC 6 CLICK MOBILITY          Treatment & Education:  Bilateral lower extremity exercise 2 x 10 reps: ankle pumps, Quad sets, short arc quads, heel slides, and hip abduction/adduction with active assist ROM and verbal cues for sequencing and safety     Patient left with bed in chair position with all lines intact, call button in reach, RN notified, and sitter and daughter present..    GOALS:   Multidisciplinary Problems       Physical Therapy Goals          Problem: Physical Therapy    Goal Priority Disciplines Outcome Goal Variances Interventions   Physical Therapy Goal     PT, PT/OT Ongoing, Progressing     Description: Short term goals:  1. Supine to sit with Moderate Assistance  2. Rolling to Left and Right with Moderate Assistance.  3. Bed to chair transfer with Maximum Assistance using stand pivot transfer  4. Sitting at edge of bed x15 minutes with Minimal Assistance    Long term goals:  1. Supine to sit with MInimal Assistance  2. Sit to stand transfer with Minimal Assistance  3. Bed to chair transfer with Moderate Assistance using Stand pivot transfer  4. Sitting at edge of bed x20 minutes with Modified [place]                         Time Tracking:     PT Received On: 12/25/22  PT Start Time: 1257     PT Stop Time: 1331  PT Total Time (min): 34 min     Billable Minutes: Therapeutic Activity 15 and Therapeutic Exercise 15    Treatment Type: Treatment  PT/PTA: PT     PTA Visit Number: 0     12/25/2022

## 2022-12-25 NOTE — NURSING
Pt has leg wound equivilant to skin graft from skin tearing in surgery. Dr Castellanos called after surgery and stated to have woundcare take the dressing off next week because the tissue needed time to adhere to the skin. Burk needs to remain in place until dressings can be changed to prevent soiling.

## 2022-12-26 LAB
ANION GAP SERPL CALCULATED.3IONS-SCNC: 13 MMOL/L (ref 7–16)
BASOPHILS # BLD AUTO: 0.04 K/UL (ref 0–0.2)
BASOPHILS NFR BLD AUTO: 0.3 % (ref 0–1)
BUN SERPL-MCNC: 17 MG/DL (ref 7–18)
BUN/CREAT SERPL: 25 (ref 6–20)
CALCIUM SERPL-MCNC: 7.7 MG/DL (ref 8.5–10.1)
CHLORIDE SERPL-SCNC: 107 MMOL/L (ref 98–107)
CO2 SERPL-SCNC: 22 MMOL/L (ref 21–32)
CREAT SERPL-MCNC: 0.67 MG/DL (ref 0.55–1.02)
DIFFERENTIAL METHOD BLD: ABNORMAL
EGFR (NO RACE VARIABLE) (RUSH/TITUS): 84 ML/MIN/1.73M²
EOSINOPHIL # BLD AUTO: 0.13 K/UL (ref 0–0.5)
EOSINOPHIL NFR BLD AUTO: 1 % (ref 1–4)
ERYTHROCYTE [DISTWIDTH] IN BLOOD BY AUTOMATED COUNT: 14.2 % (ref 11.5–14.5)
GLUCOSE SERPL-MCNC: 103 MG/DL (ref 70–105)
GLUCOSE SERPL-MCNC: 107 MG/DL (ref 70–105)
GLUCOSE SERPL-MCNC: 169 MG/DL (ref 70–105)
GLUCOSE SERPL-MCNC: 208 MG/DL (ref 70–105)
GLUCOSE SERPL-MCNC: 67 MG/DL (ref 74–106)
HCT VFR BLD AUTO: 29.3 % (ref 38–47)
HGB BLD-MCNC: 9.2 G/DL (ref 12–16)
IMM GRANULOCYTES # BLD AUTO: 0.56 K/UL (ref 0–0.04)
IMM GRANULOCYTES NFR BLD: 4.5 % (ref 0–0.4)
LYMPHOCYTES # BLD AUTO: 1.33 K/UL (ref 1–4.8)
LYMPHOCYTES NFR BLD AUTO: 10.7 % (ref 27–41)
MCH RBC QN AUTO: 30.5 PG (ref 27–31)
MCHC RBC AUTO-ENTMCNC: 31.4 G/DL (ref 32–36)
MCV RBC AUTO: 97 FL (ref 80–96)
MONOCYTES # BLD AUTO: 0.78 K/UL (ref 0–0.8)
MONOCYTES NFR BLD AUTO: 6.3 % (ref 2–6)
MPC BLD CALC-MCNC: 9.5 FL (ref 9.4–12.4)
NEUTROPHILS # BLD AUTO: 9.6 K/UL (ref 1.8–7.7)
NEUTROPHILS NFR BLD AUTO: 77.2 % (ref 53–65)
NRBC # BLD AUTO: 0 X10E3/UL
NRBC, AUTO (.00): 0 %
PLATELET # BLD AUTO: 362 K/UL (ref 150–400)
POTASSIUM SERPL-SCNC: 4.7 MMOL/L (ref 3.5–5.1)
RBC # BLD AUTO: 3.02 M/UL (ref 4.2–5.4)
SODIUM SERPL-SCNC: 137 MMOL/L (ref 136–145)
WBC # BLD AUTO: 12.44 K/UL (ref 4.5–11)

## 2022-12-26 PROCEDURE — 97530 THERAPEUTIC ACTIVITIES: CPT

## 2022-12-26 PROCEDURE — 80048 BASIC METABOLIC PNL TOTAL CA: CPT | Performed by: HOSPITALIST

## 2022-12-26 PROCEDURE — 25000003 PHARM REV CODE 250: Performed by: HOSPITALIST

## 2022-12-26 PROCEDURE — 85025 COMPLETE CBC W/AUTO DIFF WBC: CPT | Performed by: HOSPITALIST

## 2022-12-26 PROCEDURE — 25000003 PHARM REV CODE 250: Performed by: ORTHOPAEDIC SURGERY

## 2022-12-26 PROCEDURE — 25000003 PHARM REV CODE 250: Performed by: NURSE PRACTITIONER

## 2022-12-26 PROCEDURE — 82962 GLUCOSE BLOOD TEST: CPT

## 2022-12-26 PROCEDURE — 99233 SBSQ HOSP IP/OBS HIGH 50: CPT | Mod: ,,, | Performed by: STUDENT IN AN ORGANIZED HEALTH CARE EDUCATION/TRAINING PROGRAM

## 2022-12-26 PROCEDURE — 99233 PR SUBSEQUENT HOSPITAL CARE,LEVL III: ICD-10-PCS | Mod: ,,, | Performed by: STUDENT IN AN ORGANIZED HEALTH CARE EDUCATION/TRAINING PROGRAM

## 2022-12-26 PROCEDURE — 36415 COLL VENOUS BLD VENIPUNCTURE: CPT | Performed by: HOSPITALIST

## 2022-12-26 PROCEDURE — 63600175 PHARM REV CODE 636 W HCPCS: Performed by: ORTHOPAEDIC SURGERY

## 2022-12-26 PROCEDURE — 11000001 HC ACUTE MED/SURG PRIVATE ROOM

## 2022-12-26 PROCEDURE — 63600175 PHARM REV CODE 636 W HCPCS: Performed by: HOSPITALIST

## 2022-12-26 PROCEDURE — 97110 THERAPEUTIC EXERCISES: CPT

## 2022-12-26 PROCEDURE — 25000003 PHARM REV CODE 250: Performed by: INTERNAL MEDICINE

## 2022-12-26 PROCEDURE — 94761 N-INVAS EAR/PLS OXIMETRY MLT: CPT

## 2022-12-26 RX ORDER — SODIUM CHLORIDE, SODIUM LACTATE, POTASSIUM CHLORIDE, CALCIUM CHLORIDE 600; 310; 30; 20 MG/100ML; MG/100ML; MG/100ML; MG/100ML
INJECTION, SOLUTION INTRAVENOUS CONTINUOUS
Status: DISCONTINUED | OUTPATIENT
Start: 2022-12-26 | End: 2022-12-29 | Stop reason: HOSPADM

## 2022-12-26 RX ORDER — HYDROCODONE BITARTRATE AND ACETAMINOPHEN 10; 325 MG/1; MG/1
1 TABLET ORAL EVERY 4 HOURS PRN
Status: DISCONTINUED | OUTPATIENT
Start: 2022-12-26 | End: 2022-12-26 | Stop reason: SDUPTHER

## 2022-12-26 RX ORDER — ONDANSETRON 2 MG/ML
4 INJECTION INTRAMUSCULAR; INTRAVENOUS EVERY 8 HOURS PRN
Status: DISCONTINUED | OUTPATIENT
Start: 2022-12-26 | End: 2022-12-29 | Stop reason: HOSPADM

## 2022-12-26 RX ORDER — BISACODYL 10 MG
10 SUPPOSITORY, RECTAL RECTAL DAILY PRN
Status: DISCONTINUED | OUTPATIENT
Start: 2022-12-26 | End: 2022-12-29 | Stop reason: HOSPADM

## 2022-12-26 RX ORDER — MORPHINE SULFATE 4 MG/ML
4 INJECTION, SOLUTION INTRAMUSCULAR; INTRAVENOUS EVERY 4 HOURS PRN
Status: DISCONTINUED | OUTPATIENT
Start: 2022-12-26 | End: 2022-12-26 | Stop reason: SDUPTHER

## 2022-12-26 RX ORDER — SENNOSIDES 8.6 MG/1
8.6 TABLET ORAL DAILY
Status: DISCONTINUED | OUTPATIENT
Start: 2022-12-26 | End: 2022-12-29 | Stop reason: HOSPADM

## 2022-12-26 RX ORDER — POLYETHYLENE GLYCOL 3350 17 G/17G
34 POWDER, FOR SOLUTION ORAL 2 TIMES DAILY
Status: DISCONTINUED | OUTPATIENT
Start: 2022-12-26 | End: 2022-12-29 | Stop reason: HOSPADM

## 2022-12-26 RX ORDER — CEFAZOLIN SODIUM 2 G/50ML
2 SOLUTION INTRAVENOUS
Status: COMPLETED | OUTPATIENT
Start: 2022-12-26 | End: 2022-12-26

## 2022-12-26 RX ADMIN — PREDNISONE 5 MG: 5 TABLET ORAL at 09:12

## 2022-12-26 RX ADMIN — LISINOPRIL 20 MG: 20 TABLET ORAL at 09:12

## 2022-12-26 RX ADMIN — DEXTROSE MONOHYDRATE 2 G: 50 INJECTION, SOLUTION INTRAVENOUS at 04:12

## 2022-12-26 RX ADMIN — ALPRAZOLAM 1 MG: 0.5 TABLET ORAL at 09:12

## 2022-12-26 RX ADMIN — METOPROLOL TARTRATE 50 MG: 50 TABLET, FILM COATED ORAL at 09:12

## 2022-12-26 RX ADMIN — GABAPENTIN 100 MG: 100 CAPSULE ORAL at 09:12

## 2022-12-26 RX ADMIN — NYSTATIN 500000 UNITS: 500000 SUSPENSION ORAL at 05:12

## 2022-12-26 RX ADMIN — CEFEPIME 1 G: 1 INJECTION, POWDER, FOR SOLUTION INTRAMUSCULAR; INTRAVENOUS at 05:12

## 2022-12-26 RX ADMIN — SODIUM CHLORIDE, POTASSIUM CHLORIDE, SODIUM LACTATE AND CALCIUM CHLORIDE: 600; 310; 30; 20 INJECTION, SOLUTION INTRAVENOUS at 07:12

## 2022-12-26 RX ADMIN — HYDRALAZINE HYDROCHLORIDE 25 MG: 25 TABLET ORAL at 06:12

## 2022-12-26 RX ADMIN — HYDROCODONE BITARTRATE AND ACETAMINOPHEN 2 TABLET: 10; 325 TABLET ORAL at 02:12

## 2022-12-26 RX ADMIN — APIXABAN 2.5 MG: 2.5 TABLET, FILM COATED ORAL at 08:12

## 2022-12-26 RX ADMIN — HYDRALAZINE HYDROCHLORIDE 25 MG: 25 TABLET ORAL at 01:12

## 2022-12-26 RX ADMIN — APIXABAN 2.5 MG: 2.5 TABLET, FILM COATED ORAL at 09:12

## 2022-12-26 RX ADMIN — DEXTROSE MONOHYDRATE 2 G: 50 INJECTION, SOLUTION INTRAVENOUS at 09:12

## 2022-12-26 RX ADMIN — HYDROCODONE BITARTRATE AND ACETAMINOPHEN 2 TABLET: 10; 325 TABLET ORAL at 07:12

## 2022-12-26 RX ADMIN — NYSTATIN 500000 UNITS: 500000 SUSPENSION ORAL at 09:12

## 2022-12-26 RX ADMIN — NYSTATIN 500000 UNITS: 500000 SUSPENSION ORAL at 01:12

## 2022-12-26 RX ADMIN — LEVOTHYROXINE SODIUM 125 MCG: 0.12 TABLET ORAL at 06:12

## 2022-12-26 RX ADMIN — HYDROCODONE BITARTRATE AND ACETAMINOPHEN 2 TABLET: 10; 325 TABLET ORAL at 08:12

## 2022-12-26 RX ADMIN — GABAPENTIN 100 MG: 100 CAPSULE ORAL at 02:12

## 2022-12-26 RX ADMIN — DOCUSATE SODIUM 100 MG: 100 CAPSULE, LIQUID FILLED ORAL at 09:12

## 2022-12-26 RX ADMIN — CEFEPIME 1 G: 1 INJECTION, POWDER, FOR SOLUTION INTRAMUSCULAR; INTRAVENOUS at 06:12

## 2022-12-26 RX ADMIN — GABAPENTIN 100 MG: 100 CAPSULE ORAL at 08:12

## 2022-12-26 RX ADMIN — DILTIAZEM HYDROCHLORIDE 120 MG: 120 CAPSULE, COATED, EXTENDED RELEASE ORAL at 09:12

## 2022-12-26 RX ADMIN — PANTOPRAZOLE SODIUM 40 MG: 40 TABLET, DELAYED RELEASE ORAL at 09:12

## 2022-12-26 RX ADMIN — VANCOMYCIN HYDROCHLORIDE 1000 MG: 500 INJECTION, POWDER, LYOPHILIZED, FOR SOLUTION INTRAVENOUS at 10:12

## 2022-12-26 RX ADMIN — NYSTATIN 500000 UNITS: 500000 SUSPENSION ORAL at 08:12

## 2022-12-26 RX ADMIN — POLYETHYLENE GLYCOL 3350 17 G: 17 POWDER, FOR SOLUTION ORAL at 09:12

## 2022-12-26 NOTE — ASSESSMENT & PLAN NOTE
This not clear but likely chronic UTI. ATB cover culture collected 10/2022. Urine this admitsent for culture    12/25 - Pseudomonas as cause of UTI.  Leave on current antibiotics.  Burk being left because of wounds to legs

## 2022-12-26 NOTE — PLAN OF CARE
SW consulted for discharge planning,  Per  and SW notes, a voicemail message was left for Екатерина at [facility] informing of referral.  SS is to follow up with Екатерина tomorrow.

## 2022-12-26 NOTE — PT/OT/SLP PROGRESS
Physical Therapy Treatment    Patient Name:  Zandra Veloz   MRN:  [id number]    Recommendations:     Discharge Recommendations: nursing facility, skilled  Discharge Equipment Recommendations: hospital bed, lift device  Barriers to discharge: Inaccessible home and Decreased caregiver support    Assessment:     Zandra Veloz is a 88 y.o. female admitted with a medical diagnosis of Victoriano-prosthetic fracture around prosthetic hip.  She presents with the following impairments/functional limitations: weakness, impaired endurance, impaired functional mobility, gait instability, impaired balance, decreased lower extremity function, pain, decreased ROM, impaired skin, edema, orthopedic precautions Bed exercises performed secondary to increased pain after being assisted with hygiene following BM. Pt is improving in active ROM with minimal pain. Plan to begin transfers to chair tomorrow.    Rehab Prognosis: Fair; patient would benefit from acute skilled PT services to address these deficits and reach maximum level of function.    Recent Surgery: Procedure(s) (LRB):  REVISION, TOTAL ARTHROPLASTY, HIP, VICTORIANO PROSTHETIC FX (Left) 4 Days Post-Op    Plan:     During this hospitalization, patient to be seen BID (5x/wk; daily 2x/wk) to address the identified rehab impairments via gait training, therapeutic activities, therapeutic exercises, wheelchair management/training, neuromuscular re-education and progress toward the following goals:    Plan of Care Expires:  01/23/23    Subjective     Chief Complaint: left hip fx  Patient/Family Comments/goals: Pt complaining of more pain  Pain/Comfort:  Pain Rating 1: 4/10  Location - Side 1: Left  Location 1: leg  Pain Addressed 1: Pre-medicate for activity  Pain Rating Post-Intervention 1: 4/10      Objective:     Communicated with ANA Bryan RN prior to session.  Patient found HOB elevated with peripheral IV, chao catheter, blood pressure cuff upon PT entry to room.      General Precautions: Standard, fall  Orthopedic Precautions: LLE posterior precautions, LLE toe touch weight bearing  Braces: N/A  Respiratory Status: Room air     Functional Mobility:  Not performed      AM-PAC 6 CLICK MOBILITY  Turning over in bed (including adjusting bedclothes, sheets and blankets)?: 2  Sitting down on and standing up from a chair with arms (e.g., wheelchair, bedside commode, etc.): 1  Moving from lying on back to sitting on the side of the bed?: 2  Moving to and from a bed to a chair (including a wheelchair)?: 1  Need to walk in hospital room?: 1  Climbing 3-5 steps with a railing?: 1  Basic Mobility Total Score: 8       Treatment & Education:  Pt performed bilateral LE: ankle pumps, short arc quads, heel slides, and hip abduction/adduction x 30 each      Patient left HOB elevated with all lines intact and call button in reach..    GOALS:   Multidisciplinary Problems       Physical Therapy Goals          Problem: Physical Therapy    Goal Priority Disciplines Outcome Goal Variances Interventions   Physical Therapy Goal     PT, PT/OT Ongoing, Progressing     Description: Short term goals:  1. Supine to sit with Moderate Assistance  2. Rolling to Left and Right with Moderate Assistance.  3. Bed to chair transfer with Maximum Assistance using stand pivot transfer  4. Sitting at edge of bed x15 minutes with Minimal Assistance    Long term goals:  1. Supine to sit with MInimal Assistance  2. Sit to stand transfer with Minimal Assistance  3. Bed to chair transfer with Moderate Assistance using Stand pivot transfer  4. Sitting at edge of bed x20 minutes with Modified [place]                         Time Tracking:     PT Received On: 12/26/22  PT Start Time: 1424     PT Stop Time: 1446  PT Total Time (min): 22 min     Billable Minutes: Therapeutic Exercise 20    Treatment Type: Treatment  PT/PTA: PT     PTA Visit Number: 0     12/26/2022

## 2022-12-26 NOTE — PT/OT/SLP PROGRESS
Physical Therapy Treatment    Patient Name:  Zandra Veloz   MRN:  [id number]    Recommendations:     Discharge Recommendations: nursing facility, skilled  Discharge Equipment Recommendations: hospital bed, lift device  Barriers to discharge: Inaccessible home and Decreased caregiver support    Assessment:     Zandra Veloz is a 88 y.o. female admitted with a medical diagnosis of Victoriano-prosthetic fracture around prosthetic hip.  She presents with the following impairments/functional limitations: weakness, impaired endurance, impaired functional mobility, gait instability, impaired balance, decreased lower extremity function, pain, decreased ROM, impaired skin, edema, orthopedic precautions Pt is improving in activity tolerance and required less assistance to reach sitting at edge of bed. Pt sat with minimal assistance and verbal/tactile cues for upright positioning. She had less pain with mobility. Have not been able to transfer to chair due to high anxiety secondary to multiple severe skin tears. Hopefully can begin transfers soon..    Rehab Prognosis: Fair; patient would benefit from acute skilled PT services to address these deficits and reach maximum level of function.    Recent Surgery: Procedure(s) (LRB):  REVISION, TOTAL ARTHROPLASTY, HIP, VICTORIANO PROSTHETIC FX (Left) 4 Days Post-Op    Plan:     During this hospitalization, patient to be seen BID (5x/wk; daily 2x/wk) to address the identified rehab impairments via gait training, therapeutic activities, therapeutic exercises, wheelchair management/training, neuromuscular re-education and progress toward the following goals:    Plan of Care Expires:  01/23/23    Subjective     Chief Complaint: L Hip fx  Patient/Family Comments/goals: Pt agreeable to PT  Pain/Comfort:  Pain Rating 1: 4/10  Location - Side 1: Left  Location 1: leg  Pain Addressed 1: Pre-medicate for activity  Pain Rating Post-Intervention 1: 4/10      Objective:     Communicated  with ANA Bryan RN prior to session.  Patient found with bed in chair position with peripheral IV, chao catheter, blood pressure cuff upon PT entry to room.     General Precautions: Standard, fall  Orthopedic Precautions: LLE posterior precautions, LLE toe touch weight bearing  Braces: N/A  Respiratory Status: Room air     Functional Mobility:  Bed Mobility:     Scooting: maximal assistance  Supine to Sit: maximal assistance and of 2 persons  Sit to Supine: maximal assistance and of 2 persons  Balance: minimal assistance to sit edge of bed       AM-PAC 6 CLICK MOBILITY  Turning over in bed (including adjusting bedclothes, sheets and blankets)?: 2  Sitting down on and standing up from a chair with arms (e.g., wheelchair, bedside commode, etc.): 1  Moving from lying on back to sitting on the side of the bed?: 2  Moving to and from a bed to a chair (including a wheelchair)?: 1  Need to walk in hospital room?: 1  Climbing 3-5 steps with a railing?: 1  Basic Mobility Total Score: 8       Treatment & Education:  Edge of bed sitting x 15 minutes with contact guard assistance to minimal assistance for trunk support  Sitting posture training  Pt performed bilateral LE: ankle pumps and short arc quads x 30 each      Patient left HOB elevated with all lines intact and call button in reach..    GOALS:   Multidisciplinary Problems       Physical Therapy Goals          Problem: Physical Therapy    Goal Priority Disciplines Outcome Goal Variances Interventions   Physical Therapy Goal     PT, PT/OT Ongoing, Progressing     Description: Short term goals:  1. Supine to sit with Moderate Assistance  2. Rolling to Left and Right with Moderate Assistance.  3. Bed to chair transfer with Maximum Assistance using stand pivot transfer  4. Sitting at edge of bed x15 minutes with Minimal Assistance    Long term goals:  1. Supine to sit with MInimal Assistance  2. Sit to stand transfer with Minimal Assistance  3. Bed to chair transfer with  Moderate Assistance using Stand pivot transfer  4. Sitting at edge of bed x20 minutes with Modified [place]                         Time Tracking:     PT Received On: 12/26/22  PT Start Time: 1110     PT Stop Time: 1141  PT Total Time (min): 31 min     Billable Minutes: Therapeutic Activity 20 and Therapeutic Exercise 10    Treatment Type: Treatment  PT/PTA: PT     PTA Visit Number: 0     12/26/2022

## 2022-12-26 NOTE — PROGRESS NOTES
[facility] - [address] [place] [address] Medicine  Progress Note    Patient Name: Zandra Veloz  MRN: [id number]  Patient Class: IP- Inpatient   Admission Date: 12/17/2022  Length of Stay: 8 days  Attending Physician: Caleb Carter MD  Primary Care Provider: Brandon Thornton MD        Subjective:     Principal Problem:Delmy-prosthetic fracture around prosthetic hip        HPI:  88-year-old lady seen emergency room department.  Patient presents after having a fall when she was trying to go to the restroom at her home.  Patient sustained a left femur fracture.  Patient complains of moderate to severe pain in the left hip area.  Patient also complains of chest tightness, she rates it a 5/10 in the chest tightness has been intubate.  Patient also was seen in emergency room department earlier this week per her daughter.  Patient is found to have dehydration and a urinary tract infection.  Current she denies any shortness of breath.  She does not give a history of coronary artery disease.      Overview/Hospital Course:  12/19 - records reviewed. Fall without LOC and has left hip fx. No other complaints currently. Seen by cardiology with some CP felt musculoskeletal.  Echo mild AS and mod MR with nl EF. Age increase cardiac risk for surgery but discussed with daughter surgery is urgent and see no benefit in delay medically. Feel low to moderate risk for surgery. Question about UTI. No symptoms. Had UTI in 10.22 not surprising. Lab to do culture on urine in lab. Cover with ATN for prior culture with ATB started. Discussed with Dr Singer and cardiology.  12/20 Tachycardia and elevated BP. Cardiology adjusted meds. Plan hold off surgery until 12/22 to adjust meds and treat UTI. Family in room. Pt not sleeping well.   12/21 Didn't sleep well prior night. Apparently been on xanax for a time. Also recently started on Neurontin. Family with question. No recent BM. Some increase stool on KUB but not  severe. Surgery for am. BP some up but better. HR good.   12/22 Seen prior to surgery and apparently add better night. Sore mouth / throat better with mycostatin. For surgery. Family in room.  12/23 patient with pain but otherwise seemed to be doing relatively well.  Daughter in room.  Apparently been taking prednisone for some time and this was restarted per family request.  Look into swing bed placement  12/24 patient had better night rested and everyone's in better spirits today.  Tolerating some diet.   Therapy worked with patient.  Look into swing bed placement next week  12/25 remained in good spirits.  Less pain.  Had good bowel movement and ordered Dulcolax not given.  Because of dressing to leg, Burk was not removed to keep it from getting soiled.  Wound care to check 12/27.  Discuss this with patient and daughter.  On antibiotics for UTI      Interval History:     Review of Systems   Constitutional:  Negative for appetite change, fatigue and fever.   HENT:  Negative for congestion, hearing loss and trouble swallowing.    Respiratory:  Negative for chest tightness, shortness of breath and wheezing.    Cardiovascular:  Negative for chest pain and palpitations.   Gastrointestinal:  Negative for abdominal pain, constipation and nausea.   Genitourinary:  Negative for difficulty urinating and dysuria.   Musculoskeletal:  Positive for gait problem. Negative for back pain and neck stiffness.        Left hip pain after fall   Skin:  Negative for pallor and rash.   Neurological:  Negative for dizziness, speech difficulty and headaches.   Psychiatric/Behavioral:  Negative for confusion and suicidal ideas.    Objective:     Vital Signs (Most Recent):  Temp: 97.9 °F (36.6 °C) (12/25/22 1600)  Pulse: 62 (12/25/22 1600)  Resp: 18 (12/25/22 1737)  BP: (!) 131/47 (12/25/22 1600)  SpO2: 97 % (12/25/22 1600)   Vital Signs (24h Range):  Temp:  [97.4 °F (36.3 °C)-98 °F (36.7 °C)] 97.9 °F (36.6 °C)  Pulse:  [57-73] 62  Resp:   [16-18] 18  SpO2:  [95 %-97 %] 97 %  BP: (109-135)/(46-56) 131/47     Weight: 59.2 kg (130 lb 8.2 oz)  Body mass index is 23.87 kg/m².    Intake/Output Summary (Last 24 hours) at 12/25/2022 1824  Last data filed at 12/25/2022 0400  Gross per 24 hour   Intake --   Output 500 ml   Net -500 ml        Physical Exam  Vitals reviewed.   Constitutional:       General: She is not in acute distress.  Eyes:      Pupils: Pupils are equal, round, and reactive to light.   Cardiovascular:      Rate and Rhythm: Normal rate and regular rhythm.      Pulses: Normal pulses.   Pulmonary:      Effort: Pulmonary effort is normal. No respiratory distress.      Breath sounds: Normal breath sounds. No wheezing.   Abdominal:      General: Bowel sounds are normal. There is no distension.      Tenderness: There is no abdominal tenderness.   Musculoskeletal:      Comments: Left hip pain and misalignment after fall   Skin:     General: Skin is warm.   Neurological:      General: No focal deficit present.      Mental Status: She is alert, oriented to person, place, and time and easily aroused. Mental status is at baseline.   Psychiatric:         Mood and Affect: Mood normal.         Behavior: Behavior normal.       Significant Labs: All pertinent labs within the past 24 hours have been reviewed.  BMP:   Recent Labs   Lab 12/25/22  0300   GLU 92   *   K 4.3      CO2 22   BUN 22*   CREATININE 0.88   CALCIUM 8.0*       CBC:   Recent Labs   Lab 12/24/22  0531 12/25/22  0300   WBC 18.73* 14.20*   HGB 10.0* 8.6*   HCT 29.9* 26.7*    318       CMP:   Recent Labs   Lab 12/24/22  0531 12/25/22  0300   * 133*   K 4.7 4.3    103   CO2 24 22   * 92   BUN 22* 22*   CREATININE 0.97 0.88   CALCIUM 7.9* 8.0*   ANIONGAP 11 12         Significant Imaging: I have reviewed all pertinent imaging results/findings within the past 24 hours.      Assessment/Plan:      * Delmy-prosthetic fracture around prosthetic hip  Patient found to  have a left femur fracture after a fall today.    Consult Orthopedics.  Will hold off on surgery for now because the patient is having chest tightness.  Will consult Cardiology    12/21 surgery for am    Heart murmur    Echo Summary this admit     The left ventricle is normal in size with hyperdynamic systolic function.   The estimated ejection fraction is 75%.   Left ventricular diastolic dysfunction.   Normal right ventricular size.   There is mild aortic valve stenosis.   Aortic valve area is 1.94 cm2; peak velocity is 3.0 m/s; mean gradient is 18 mmHg.   Moderate-to-severe mitral regurgitation.   Mild tricuspid regurgitation.   Mild pulmonic regurgitation.   Normal central venous pressure (3 mmHg).   The estimated PA systolic pressure is 32 mmHg.   Trivial pericardial effusion.      Palpitations        Chest pain  Patient complains of chest tightness, she rates it a 5/10.  Chest tightness has been intermittent for several days.    Trend out her troponins   Admit to telemetry   Hold off on surgery   Consult Cardiology  Follow-up EKG      Acute cystitis without hematuria    This not clear but likely chronic UTI. ATB cover culture collected 10/2022. Urine this admitsent for culture    12/25 - Pseudomonas as cause of UTI.  Leave on current antibiotics.  Burk being left because of wounds to legs    Primary osteoarthritis of right knee        Primary osteoarthritis of right shoulder        Lumbar radiculopathy        Closed fracture of left hip    Surgery 12/22    Hypertension  Blood pressure currently stable, will follow    12/20 BP until and meds adjusted    Arthritis    Tylenol p.r.n..      VTE Risk Mitigation (From admission, onward)         Ordered     enoxaparin injection 40 mg  Daily         12/17/22 1253     IP VTE HIGH RISK PATIENT  Once         12/17/22 1253     Place sequential compression device  Until discontinued         12/17/22 1253                Discharge Planning   YANIV:      Code  Status: Full Code   Is the patient medically ready for discharge?:     Reason for patient still in hospital (select all that apply): Laboratory test, Treatment, Imaging, PT / OT recommendations and Pending disposition  Discharge Plan A: Home         Monitor with plan to swing bed next week          Caleb Carter MD  Department of Hospital Medicine   [facility] - [facility] Telemetry

## 2022-12-26 NOTE — PT/OT/SLP PROGRESS
Occupational Therapy   Treatment    Name: Zandra Veloz  MRN: [id number]  Admitting Diagnosis:  Delmy-prosthetic fracture around prosthetic hip  4 Days Post-Op    Recommendations:     Discharge Recommendations: nursing facility, skilled  Discharge Equipment Recommendations:   (to be determined)  Barriers to discharge:  None    Assessment:     Zandra Veloz is a 88 y.o. female with a medical diagnosis of Delmy-prosthetic fracture around prosthetic hip.  She presents with weakness, decreased functional mobility, and decline in ADLs. Performance deficits affecting function are weakness, impaired endurance, impaired self care skills, impaired functional mobility, gait instability, impaired balance, pain, impaired skin, orthopedic precautions.     Rehab Prognosis:  Good; patient would benefit from acute skilled OT services to address these deficits and reach maximum level of function.       Plan:     Patient to be seen 5 x/week to address the above listed problems via self-care/home management, therapeutic activities, therapeutic exercises  Plan of Care Expires: 01/20/23  Plan of Care Reviewed with: patient    Subjective     Pain/Comfort:  Pain Rating 1: 4/10  Location - Side 1: Left  Location 1: leg    Objective:     Communicated with: CLYDE Church prior to session.  Patient found supine with peripheral IV, telemetry upon OT entry to room.    General Precautions: Standard, fall    Orthopedic Precautions:LLE posterior precautions, LLE toe touch weight bearing  Braces: N/A  Respiratory Status: Room air     Occupational Performance:     Bed Mobility:    Patient completed Scooting/Bridging with maximal assistance  Patient completed Supine to Sit with maximal assistance and 2 persons  Patient completed Sit to Supine with maximal assistance and 2 persons     Functional Mobility/Transfers:  Not performed    Activities of Daily Living:  Upper Body Dressing: maximal assistance to doff and aj gown      [facility] 6  Click ADL:      Treatment & Education:  Pt performed EOB sitting x 15 minutes with CGA to Liset to maintain sitting balance. Pt performed x 15 reps each AROM shoulder flexion and elbow flexion/extension in order to improve BUE strength and endurance to perform ADL tasks and ADL t/f with less assist.     Patient left HOB elevated with all lines intact, call button in reach, Ranjit RN notified, and daughter present    GOALS:   Multidisciplinary Problems       Occupational Therapy Goals          Problem: Occupational Therapy    Goal Priority Disciplines Outcome Interventions   Occupational Therapy Goal     OT, PT/OT Ongoing, Progressing    Description: STG:  Pt will perform grooming with setup  Pt will bathe anterior upper body with Liset with setup from bed  Pt will perform UE dressing with Liset  Pt will perform LE dressing with MaxA with AD   Pt will sit EOB x 10 min with CGA   Pt will transfer bed/chair/bsc with MaxA with stand pivot t/f with RW  Pt will tolerate 15 minutes of tx without fatigue      LT.Restore to max I with self care and mobility.                           Time Tracking:     OT Date of Treatment: 22  OT Start Time: 1118  OT Stop Time: 1141  OT Total Time (min): 23 min    Billable Minutes:Therapeutic Activity 13 minutes    OT/BRIGHT: OT          2022

## 2022-12-26 NOTE — ASSESSMENT & PLAN NOTE
Echo Summary this admit     The left ventricle is normal in size with hyperdynamic systolic function.   The estimated ejection fraction is 75%.   Left ventricular diastolic dysfunction.   Normal right ventricular size.   There is mild aortic valve stenosis.   Aortic valve area is 1.94 cm2; peak velocity is 3.0 m/s; mean gradient is 18 mmHg.   Moderate-to-severe mitral regurgitation.   Mild tricuspid regurgitation.   Mild pulmonic regurgitation.   Normal central venous pressure (3 mmHg).   The estimated PA systolic pressure is 32 mmHg.   Trivial pericardial effusion.    PCP follow up

## 2022-12-26 NOTE — SUBJECTIVE & OBJECTIVE
Interval History: naeo    Review of Systems   Constitutional:  Negative for chills, fatigue, fever and unexpected weight change.   HENT:  Negative for congestion, mouth sores and sore throat.    Eyes:  Negative for photophobia and visual disturbance.   Respiratory:  Negative for cough, chest tightness, shortness of breath and wheezing.    Cardiovascular:  Negative for chest pain, palpitations and leg swelling.   Gastrointestinal:  Negative for abdominal pain, diarrhea, nausea and vomiting.   Endocrine: Negative for cold intolerance and heat intolerance.   Genitourinary:  Negative for difficulty urinating, dysuria, frequency and urgency.   Musculoskeletal:  Positive for arthralgias. Negative for back pain and myalgias.   Skin:  Negative for pallor and rash.   Neurological:  Negative for tremors, seizures, syncope, weakness, numbness and headaches.   Hematological:  Does not bruise/bleed easily.   Psychiatric/Behavioral:  Negative for agitation, confusion, hallucinations and suicidal ideas.    Objective:     Vital Signs (Most Recent):  Temp: 97.6 °F (36.4 °C) (12/26/22 1200)  Pulse: 60 (12/26/22 1200)  Resp: 18 (12/26/22 1435)  BP: (!) 149/66 (12/26/22 1200)  SpO2: 97 % (12/26/22 1200)   Vital Signs (24h Range):  Temp:  [97.5 °F (36.4 °C)-98 °F (36.7 °C)] 97.6 °F (36.4 °C)  Pulse:  [60-84] 60  Resp:  [16-18] 18  SpO2:  [95 %-97 %] 97 %  BP: (112-161)/(47-84) 149/66     Weight: 59.2 kg (130 lb 8.2 oz)  Body mass index is 23.87 kg/m².    Intake/Output Summary (Last 24 hours) at 12/26/2022 1507  Last data filed at 12/26/2022 1216  Gross per 24 hour   Intake --   Output 900 ml   Net -900 ml      Physical Exam  Vitals reviewed.   Constitutional:       Appearance: Normal appearance.   HENT:      Head: Normocephalic and atraumatic.   Eyes:      Extraocular Movements: Extraocular movements intact.   Cardiovascular:      Rate and Rhythm: Normal rate and regular rhythm.      Pulses: Normal pulses.   Pulmonary:      Effort:  Pulmonary effort is normal.      Breath sounds: Normal breath sounds.   Abdominal:      General: Abdomen is flat.      Palpations: Abdomen is soft.   Musculoskeletal:         General: Tenderness and signs of injury present.      Comments: Dressed left lower extremity, painful to touch   Skin:     General: Skin is warm and dry.      Capillary Refill: Capillary refill takes less than 2 seconds.   Neurological:      General: No focal deficit present.      Mental Status: She is alert and oriented to person, place, and time.   Psychiatric:         Mood and Affect: Mood normal.         Behavior: Behavior normal.       Significant Labs: All pertinent labs within the past 24 hours have been reviewed.    Significant Imaging: I have reviewed all pertinent imaging results/findings within the past 24 hours.

## 2022-12-26 NOTE — SUBJECTIVE & OBJECTIVE
Interval History:     Review of Systems   Constitutional:  Negative for appetite change, fatigue and fever.   HENT:  Negative for congestion, hearing loss and trouble swallowing.    Respiratory:  Negative for chest tightness, shortness of breath and wheezing.    Cardiovascular:  Negative for chest pain and palpitations.   Gastrointestinal:  Negative for abdominal pain, constipation and nausea.   Genitourinary:  Negative for difficulty urinating and dysuria.   Musculoskeletal:  Positive for gait problem. Negative for back pain and neck stiffness.        Left hip pain after fall   Skin:  Negative for pallor and rash.   Neurological:  Negative for dizziness, speech difficulty and headaches.   Psychiatric/Behavioral:  Negative for confusion and suicidal ideas.    Objective:     Vital Signs (Most Recent):  Temp: 97.9 °F (36.6 °C) (12/25/22 1600)  Pulse: 62 (12/25/22 1600)  Resp: 18 (12/25/22 1737)  BP: (!) 131/47 (12/25/22 1600)  SpO2: 97 % (12/25/22 1600)   Vital Signs (24h Range):  Temp:  [97.4 °F (36.3 °C)-98 °F (36.7 °C)] 97.9 °F (36.6 °C)  Pulse:  [57-73] 62  Resp:  [16-18] 18  SpO2:  [95 %-97 %] 97 %  BP: (109-135)/(46-56) 131/47     Weight: 59.2 kg (130 lb 8.2 oz)  Body mass index is 23.87 kg/m².    Intake/Output Summary (Last 24 hours) at 12/25/2022 1824  Last data filed at 12/25/2022 0400  Gross per 24 hour   Intake --   Output 500 ml   Net -500 ml        Physical Exam  Vitals reviewed.   Constitutional:       General: She is not in acute distress.  Eyes:      Pupils: Pupils are equal, round, and reactive to light.   Cardiovascular:      Rate and Rhythm: Normal rate and regular rhythm.      Pulses: Normal pulses.   Pulmonary:      Effort: Pulmonary effort is normal. No respiratory distress.      Breath sounds: Normal breath sounds. No wheezing.   Abdominal:      General: Bowel sounds are normal. There is no distension.      Tenderness: There is no abdominal tenderness.   Musculoskeletal:      Comments: Left hip  pain and misalignment after fall   Skin:     General: Skin is warm.   Neurological:      General: No focal deficit present.      Mental Status: She is alert, oriented to person, place, and time and easily aroused. Mental status is at baseline.   Psychiatric:         Mood and Affect: Mood normal.         Behavior: Behavior normal.       Significant Labs: All pertinent labs within the past 24 hours have been reviewed.  BMP:   Recent Labs   Lab 12/25/22  0300   GLU 92   *   K 4.3      CO2 22   BUN 22*   CREATININE 0.88   CALCIUM 8.0*       CBC:   Recent Labs   Lab 12/24/22  0531 12/25/22  0300   WBC 18.73* 14.20*   HGB 10.0* 8.6*   HCT 29.9* 26.7*    318       CMP:   Recent Labs   Lab 12/24/22  0531 12/25/22  0300   * 133*   K 4.7 4.3    103   CO2 24 22   * 92   BUN 22* 22*   CREATININE 0.97 0.88   CALCIUM 7.9* 8.0*   ANIONGAP 11 12         Significant Imaging: I have reviewed all pertinent imaging results/findings within the past 24 hours.

## 2022-12-26 NOTE — PROGRESS NOTES
Ms. Veloz is still just sitting on the side of the bed.      Dressings intact   Nv unchanged     Hemoglobin 9.2     Postop 4     Continue therapy.  Discharge planning  Lovenox for DVT prophylaxis  Wound care tomorrow

## 2022-12-26 NOTE — PROGRESS NOTES
[facility] - [address] [place] [address] Medicine  Progress Note    Patient Name: Zandra Veloz  MRN: [id number]  Patient Class: IP- Inpatient   Admission Date: 12/17/2022  Length of Stay: 9 days  Attending Physician: Shai Guerra DO  Primary Care Provider: Brandon Thornton MD        Subjective:     Principal Problem:Delmy-prosthetic fracture around prosthetic hip        HPI:  88-year-old lady seen emergency room department.  Patient presents after having a fall when she was trying to go to the restroom at her home.  Patient sustained a left femur fracture.  Patient complains of moderate to severe pain in the left hip area.  Patient also complains of chest tightness, she rates it a 5/10 in the chest tightness has been intubate.  Patient also was seen in emergency room department earlier this week per her daughter.  Patient is found to have dehydration and a urinary tract infection.  Current she denies any shortness of breath.  She does not give a history of coronary artery disease.      Overview/Hospital Course:  12/19 - records reviewed. Fall without LOC and has left hip fx. No other complaints currently. Seen by cardiology with some CP felt musculoskeletal.  Echo mild AS and mod MR with nl EF. Age increase cardiac risk for surgery but discussed with daughter surgery is urgent and see no benefit in delay medically. Feel low to moderate risk for surgery. Question about UTI. No symptoms. Had UTI in 10.22 not surprising. Lab to do culture on urine in lab. Cover with ATN for prior culture with ATB started. Discussed with Dr Singer and cardiology.  12/20 Tachycardia and elevated BP. Cardiology adjusted meds. Plan hold off surgery until 12/22 to adjust meds and treat UTI. Family in room. Pt not sleeping well.   12/21 Didn't sleep well prior night. Apparently been on xanax for a time. Also recently started on Neurontin. Family with question. No recent BM. Some increase stool on KUB but not  severe. Surgery for am. BP some up but better. HR good.   12/22 Seen prior to surgery and apparently add better night. Sore mouth / throat better with mycostatin. For surgery. Family in room.  12/23 patient with pain but otherwise seemed to be doing relatively well.  Daughter in room.  Apparently been taking prednisone for some time and this was restarted per family request.  Look into swing bed placement  12/24 patient had better night rested and everyone's in better spirits today.  Tolerating some diet.   Therapy worked with patient.  Look into swing bed placement next week  12/25 remained in good spirits.  Less pain.  Had good bowel movement and ordered Dulcolax not given.  Because of dressing to leg, Burk was not removed to keep it from getting soiled.  Wound care to check 12/27.  Discuss this with patient and daughter.  On antibiotics for UTI  12/26-will dc to swingbed once accepted. Needs continued wound care.      Interval History: naeo    Review of Systems   Constitutional:  Negative for chills, fatigue, fever and unexpected weight change.   HENT:  Negative for congestion, mouth sores and sore throat.    Eyes:  Negative for photophobia and visual disturbance.   Respiratory:  Negative for cough, chest tightness, shortness of breath and wheezing.    Cardiovascular:  Negative for chest pain, palpitations and leg swelling.   Gastrointestinal:  Negative for abdominal pain, diarrhea, nausea and vomiting.   Endocrine: Negative for cold intolerance and heat intolerance.   Genitourinary:  Negative for difficulty urinating, dysuria, frequency and urgency.   Musculoskeletal:  Positive for arthralgias. Negative for back pain and myalgias.   Skin:  Negative for pallor and rash.   Neurological:  Negative for tremors, seizures, syncope, weakness, numbness and headaches.   Hematological:  Does not bruise/bleed easily.   Psychiatric/Behavioral:  Negative for agitation, confusion, hallucinations and suicidal ideas.     Objective:     Vital Signs (Most Recent):  Temp: 97.6 °F (36.4 °C) (12/26/22 1200)  Pulse: 60 (12/26/22 1200)  Resp: 18 (12/26/22 1435)  BP: (!) 149/66 (12/26/22 1200)  SpO2: 97 % (12/26/22 1200)   Vital Signs (24h Range):  Temp:  [97.5 °F (36.4 °C)-98 °F (36.7 °C)] 97.6 °F (36.4 °C)  Pulse:  [60-84] 60  Resp:  [16-18] 18  SpO2:  [95 %-97 %] 97 %  BP: (112-161)/(47-84) 149/66     Weight: 59.2 kg (130 lb 8.2 oz)  Body mass index is 23.87 kg/m².    Intake/Output Summary (Last 24 hours) at 12/26/2022 1507  Last data filed at 12/26/2022 1216  Gross per 24 hour   Intake --   Output 900 ml   Net -900 ml      Physical Exam  Vitals reviewed.   Constitutional:       Appearance: Normal appearance.   HENT:      Head: Normocephalic and atraumatic.   Eyes:      Extraocular Movements: Extraocular movements intact.   Cardiovascular:      Rate and Rhythm: Normal rate and regular rhythm.      Pulses: Normal pulses.   Pulmonary:      Effort: Pulmonary effort is normal.      Breath sounds: Normal breath sounds.   Abdominal:      General: Abdomen is flat.      Palpations: Abdomen is soft.   Musculoskeletal:         General: Tenderness and signs of injury present.      Comments: Dressed left lower extremity, painful to touch   Skin:     General: Skin is warm and dry.      Capillary Refill: Capillary refill takes less than 2 seconds.   Neurological:      General: No focal deficit present.      Mental Status: She is alert and oriented to person, place, and time.   Psychiatric:         Mood and Affect: Mood normal.         Behavior: Behavior normal.       Significant Labs: All pertinent labs within the past 24 hours have been reviewed.    Significant Imaging: I have reviewed all pertinent imaging results/findings within the past 24 hours.      Assessment/Plan:      * Delmy-prosthetic fracture around prosthetic hip  S/p fixation  Stable for dc to swingbed    Heart murmur    Echo Summary this admit     The left ventricle is normal in size  with hyperdynamic systolic function.   The estimated ejection fraction is 75%.   Left ventricular diastolic dysfunction.   Normal right ventricular size.   There is mild aortic valve stenosis.   Aortic valve area is 1.94 cm2; peak velocity is 3.0 m/s; mean gradient is 18 mmHg.   Moderate-to-severe mitral regurgitation.   Mild tricuspid regurgitation.   Mild pulmonic regurgitation.   Normal central venous pressure (3 mmHg).   The estimated PA systolic pressure is 32 mmHg.   Trivial pericardial effusion.    PCP follow up    Palpitations  resolved      Other fracture of left femur, initial encounter for closed fracture  S/p OR      Chest pain  resolved      Acute cystitis without hematuria  Pseudomonas  Continue cefepime for 7 days   Exchange chao    Primary osteoarthritis of right knee  OP follow up      Primary osteoarthritis of right shoulder  OP follow up      Lumbar radiculopathy  OP follow up  Pain control      Closed fracture of left hip    Surgery 12/22    Hypertension  Adjust medications as needed    Arthritis    Tylenol p.r.n..      VTE Risk Mitigation (From admission, onward)         Ordered     apixaban tablet 2.5 mg  2 times daily         12/26/22 0717     IP VTE HIGH RISK PATIENT  Once         12/26/22 0717     Place KARINA hose  Until discontinued         12/26/22 0717     Place sequential compression device  Until discontinued         12/26/22 0717     Place sequential compression device  Until discontinued         12/17/22 1253                Discharge Planning   YANIV:      Code Status: Full Code   Is the patient medically ready for discharge?:     Reason for patient still in hospital (select all that apply): Treatment  Discharge Plan A: Home                  Shai Guerar DO  Department of Hospital Medicine   [facility] - [facility] Telemetry

## 2022-12-27 LAB
ALBUMIN SERPL BCP-MCNC: 2 G/DL (ref 3.5–5)
ALBUMIN/GLOB SERPL: 0.8 {RATIO}
ALP SERPL-CCNC: 48 U/L (ref 55–142)
ALT SERPL W P-5'-P-CCNC: 12 U/L (ref 13–56)
ANION GAP SERPL CALCULATED.3IONS-SCNC: 11 MMOL/L (ref 7–16)
AST SERPL W P-5'-P-CCNC: 18 U/L (ref 15–37)
BASOPHILS # BLD AUTO: 0.1 K/UL (ref 0–0.2)
BASOPHILS NFR BLD AUTO: 0.8 % (ref 0–1)
BILIRUB SERPL-MCNC: 0.4 MG/DL (ref ?–1.2)
BUN SERPL-MCNC: 17 MG/DL (ref 7–18)
BUN/CREAT SERPL: 25 (ref 6–20)
CALCIUM SERPL-MCNC: 8 MG/DL (ref 8.5–10.1)
CHLORIDE SERPL-SCNC: 109 MMOL/L (ref 98–107)
CO2 SERPL-SCNC: 26 MMOL/L (ref 21–32)
CREAT SERPL-MCNC: 0.68 MG/DL (ref 0.55–1.02)
DIFFERENTIAL METHOD BLD: ABNORMAL
EGFR (NO RACE VARIABLE) (RUSH/TITUS): 84 ML/MIN/1.73M²
EOSINOPHIL # BLD AUTO: 0.24 K/UL (ref 0–0.5)
EOSINOPHIL NFR BLD AUTO: 1.9 % (ref 1–4)
EOSINOPHIL NFR BLD MANUAL: 1 % (ref 1–4)
ERYTHROCYTE [DISTWIDTH] IN BLOOD BY AUTOMATED COUNT: 14 % (ref 11.5–14.5)
GLOBULIN SER-MCNC: 2.5 G/DL (ref 2–4)
GLUCOSE SERPL-MCNC: 101 MG/DL (ref 70–105)
GLUCOSE SERPL-MCNC: 108 MG/DL (ref 70–105)
GLUCOSE SERPL-MCNC: 82 MG/DL (ref 70–105)
GLUCOSE SERPL-MCNC: 83 MG/DL (ref 74–106)
GLUCOSE SERPL-MCNC: 97 MG/DL (ref 70–105)
HCT VFR BLD AUTO: 30.4 % (ref 38–47)
HGB BLD-MCNC: 9.5 G/DL (ref 12–16)
HYPOCHROMIA BLD QL SMEAR: ABNORMAL
IMM GRANULOCYTES # BLD AUTO: 1.11 K/UL (ref 0–0.04)
IMM GRANULOCYTES NFR BLD: 8.6 % (ref 0–0.4)
LYMPHOCYTES # BLD AUTO: 2.06 K/UL (ref 1–4.8)
LYMPHOCYTES NFR BLD AUTO: 16 % (ref 27–41)
LYMPHOCYTES NFR BLD MANUAL: 11 % (ref 27–41)
MCH RBC QN AUTO: 30.2 PG (ref 27–31)
MCHC RBC AUTO-ENTMCNC: 31.3 G/DL (ref 32–36)
MCV RBC AUTO: 96.5 FL (ref 80–96)
METAMYELOCYTES NFR BLD MANUAL: 2 %
MONOCYTES # BLD AUTO: 0.82 K/UL (ref 0–0.8)
MONOCYTES NFR BLD AUTO: 6.4 % (ref 2–6)
MONOCYTES NFR BLD MANUAL: 4 % (ref 2–6)
MPC BLD CALC-MCNC: 9 FL (ref 9.4–12.4)
MYELOCYTES NFR BLD MANUAL: 6 %
NEUTROPHILS # BLD AUTO: 8.57 K/UL (ref 1.8–7.7)
NEUTROPHILS NFR BLD AUTO: 66.3 % (ref 53–65)
NEUTS BAND NFR BLD MANUAL: 1 % (ref 1–5)
NEUTS SEG NFR BLD MANUAL: 75 % (ref 50–62)
NRBC # BLD AUTO: 0 X10E3/UL
NRBC, AUTO (.00): 0 %
OVALOCYTES BLD QL SMEAR: ABNORMAL
PLATELET # BLD AUTO: 433 K/UL (ref 150–400)
PLATELET MORPHOLOGY: ABNORMAL
POLYCHROMASIA BLD QL SMEAR: ABNORMAL
POTASSIUM SERPL-SCNC: 4.7 MMOL/L (ref 3.5–5.1)
PROT SERPL-MCNC: 4.5 G/DL (ref 6.4–8.2)
RBC # BLD AUTO: 3.15 M/UL (ref 4.2–5.4)
SODIUM SERPL-SCNC: 141 MMOL/L (ref 136–145)
WBC # BLD AUTO: 12.9 K/UL (ref 4.5–11)

## 2022-12-27 PROCEDURE — 36415 COLL VENOUS BLD VENIPUNCTURE: CPT | Performed by: ORTHOPAEDIC SURGERY

## 2022-12-27 PROCEDURE — 99221 PR INITIAL HOSPITAL CARE,LEVL I: ICD-10-PCS | Mod: ,,, | Performed by: SURGERY

## 2022-12-27 PROCEDURE — 97530 THERAPEUTIC ACTIVITIES: CPT

## 2022-12-27 PROCEDURE — 11000001 HC ACUTE MED/SURG PRIVATE ROOM

## 2022-12-27 PROCEDURE — 85025 COMPLETE CBC W/AUTO DIFF WBC: CPT | Performed by: HOSPITALIST

## 2022-12-27 PROCEDURE — 97110 THERAPEUTIC EXERCISES: CPT

## 2022-12-27 PROCEDURE — 99232 SBSQ HOSP IP/OBS MODERATE 35: CPT | Mod: ,,, | Performed by: STUDENT IN AN ORGANIZED HEALTH CARE EDUCATION/TRAINING PROGRAM

## 2022-12-27 PROCEDURE — 63600175 PHARM REV CODE 636 W HCPCS: Performed by: HOSPITALIST

## 2022-12-27 PROCEDURE — 25000003 PHARM REV CODE 250: Performed by: ORTHOPAEDIC SURGERY

## 2022-12-27 PROCEDURE — 99221 1ST HOSP IP/OBS SF/LOW 40: CPT | Mod: ,,, | Performed by: SURGERY

## 2022-12-27 PROCEDURE — 25000003 PHARM REV CODE 250: Performed by: HOSPITALIST

## 2022-12-27 PROCEDURE — 80053 COMPREHEN METABOLIC PANEL: CPT | Performed by: ORTHOPAEDIC SURGERY

## 2022-12-27 PROCEDURE — 94761 N-INVAS EAR/PLS OXIMETRY MLT: CPT

## 2022-12-27 PROCEDURE — 63600175 PHARM REV CODE 636 W HCPCS: Performed by: STUDENT IN AN ORGANIZED HEALTH CARE EDUCATION/TRAINING PROGRAM

## 2022-12-27 PROCEDURE — 25000003 PHARM REV CODE 250: Performed by: INTERNAL MEDICINE

## 2022-12-27 PROCEDURE — 25000003 PHARM REV CODE 250: Performed by: NURSE PRACTITIONER

## 2022-12-27 PROCEDURE — 82962 GLUCOSE BLOOD TEST: CPT

## 2022-12-27 PROCEDURE — 99232 PR SUBSEQUENT HOSPITAL CARE,LEVL II: ICD-10-PCS | Mod: ,,, | Performed by: STUDENT IN AN ORGANIZED HEALTH CARE EDUCATION/TRAINING PROGRAM

## 2022-12-27 RX ORDER — KETOROLAC TROMETHAMINE 30 MG/ML
15 INJECTION, SOLUTION INTRAMUSCULAR; INTRAVENOUS ONCE
Status: DISCONTINUED | OUTPATIENT
Start: 2022-12-27 | End: 2022-12-27

## 2022-12-27 RX ORDER — LIDOCAINE HYDROCHLORIDE 20 MG/ML
JELLY TOPICAL
Status: DISCONTINUED | OUTPATIENT
Start: 2022-12-27 | End: 2022-12-29 | Stop reason: HOSPADM

## 2022-12-27 RX ORDER — KETOROLAC TROMETHAMINE 30 MG/ML
15 INJECTION, SOLUTION INTRAMUSCULAR; INTRAVENOUS ONCE
Status: COMPLETED | OUTPATIENT
Start: 2022-12-27 | End: 2022-12-27

## 2022-12-27 RX ADMIN — HYDROCODONE BITARTRATE AND ACETAMINOPHEN 2 TABLET: 10; 325 TABLET ORAL at 05:12

## 2022-12-27 RX ADMIN — GABAPENTIN 100 MG: 100 CAPSULE ORAL at 08:12

## 2022-12-27 RX ADMIN — METOPROLOL TARTRATE 50 MG: 50 TABLET, FILM COATED ORAL at 08:12

## 2022-12-27 RX ADMIN — PREDNISONE 5 MG: 5 TABLET ORAL at 08:12

## 2022-12-27 RX ADMIN — APIXABAN 2.5 MG: 2.5 TABLET, FILM COATED ORAL at 08:12

## 2022-12-27 RX ADMIN — HYDROCODONE BITARTRATE AND ACETAMINOPHEN 2 TABLET: 10; 325 TABLET ORAL at 10:12

## 2022-12-27 RX ADMIN — DOCUSATE SODIUM 100 MG: 100 CAPSULE, LIQUID FILLED ORAL at 08:12

## 2022-12-27 RX ADMIN — HYDROCODONE BITARTRATE AND ACETAMINOPHEN 2 TABLET: 10; 325 TABLET ORAL at 04:12

## 2022-12-27 RX ADMIN — LISINOPRIL 20 MG: 20 TABLET ORAL at 08:12

## 2022-12-27 RX ADMIN — NYSTATIN 500000 UNITS: 500000 SUSPENSION ORAL at 12:12

## 2022-12-27 RX ADMIN — CEFEPIME 1 G: 1 INJECTION, POWDER, FOR SOLUTION INTRAMUSCULAR; INTRAVENOUS at 05:12

## 2022-12-27 RX ADMIN — GABAPENTIN 100 MG: 100 CAPSULE ORAL at 02:12

## 2022-12-27 RX ADMIN — LEVOTHYROXINE SODIUM 125 MCG: 0.12 TABLET ORAL at 05:12

## 2022-12-27 RX ADMIN — DILTIAZEM HYDROCHLORIDE 120 MG: 120 CAPSULE, COATED, EXTENDED RELEASE ORAL at 08:12

## 2022-12-27 RX ADMIN — HYDRALAZINE HYDROCHLORIDE 25 MG: 25 TABLET ORAL at 10:12

## 2022-12-27 RX ADMIN — HYDROCODONE BITARTRATE AND ACETAMINOPHEN 2 TABLET: 10; 325 TABLET ORAL at 11:12

## 2022-12-27 RX ADMIN — ALPRAZOLAM 1 MG: 0.5 TABLET ORAL at 08:12

## 2022-12-27 RX ADMIN — NYSTATIN 500000 UNITS: 500000 SUSPENSION ORAL at 04:12

## 2022-12-27 RX ADMIN — KETOROLAC TROMETHAMINE 15 MG: 30 INJECTION, SOLUTION INTRAMUSCULAR; INTRAVENOUS at 04:12

## 2022-12-27 RX ADMIN — NYSTATIN 500000 UNITS: 500000 SUSPENSION ORAL at 08:12

## 2022-12-27 RX ADMIN — PANTOPRAZOLE SODIUM 40 MG: 40 TABLET, DELAYED RELEASE ORAL at 08:12

## 2022-12-27 NOTE — PT/OT/SLP PROGRESS
Physical Therapy Treatment    Patient Name:  Zandra Veloz   MRN:  [id number]    Recommendations:     Discharge Recommendations: nursing facility, skilled  Discharge Equipment Recommendations: hospital bed, lift device  Barriers to discharge: Inaccessible home and Decreased caregiver support    Assessment:     Zandra Veloz is a 88 y.o. female admitted with a medical diagnosis of Victoriano-prosthetic fracture around prosthetic hip.  She presents with the following impairments/functional limitations: weakness, impaired endurance, impaired functional mobility, gait instability, impaired balance, decreased lower extremity function, pain, decreased ROM, impaired skin, edema, orthopedic precautions Pt was able to transfer to chair with maximum assistance. Pt is still very anxious about mobility and requires assistance to sit at edge of bed. Pt will need swingbed at d/c.    Rehab Prognosis: fair; patient would benefit from acute skilled PT services to address these deficits and reach maximum level of function.    Recent Surgery: Procedure(s) (LRB):  REVISION, TOTAL ARTHROPLASTY, HIP, VICTORIANO PROSTHETIC FX (Left) 5 Days Post-Op    Plan:     During this hospitalization, patient to be seen BID (5x/wk; daily 2x/wk) to address the identified rehab impairments via gait training, therapeutic activities, therapeutic exercises, wheelchair management/training, neuromuscular re-education and progress toward the following goals:    Plan of Care Expires:  01/23/23    Subjective     Chief Complaint: left hip pain  Patient/Family Comments/goals: Pt hoping to d/c to swingbed soon  Pain/Comfort:  Pain Rating 1: 7/10  Location - Side 1: Left  Location 1: leg  Pain Addressed 1: Pre-medicate for activity  Pain Rating Post-Intervention 1: 7/10      Objective:     Communicated with ANA Rojo Rn prior to session.  Patient found HOB elevated with peripheral IV, telemetry, chao catheter upon PT entry to room.     General  Precautions: Standard, fall  Orthopedic Precautions: LLE posterior precautions, LLE toe touch weight bearing  Braces: N/A  Respiratory Status: Room air     Functional Mobility:  Bed Mobility:     Scooting: maximal assistance  Supine to Sit: maximal assistance and of 2 persons  Transfers:     Sit to Stand:  maximal assistance with gait belt  Bed to Chair: maximal assistance with  gait belt  using  Stand Pivot  Balance: poor      AM-PAC 6 CLICK MOBILITY  Turning over in bed (including adjusting bedclothes, sheets and blankets)?: 2  Sitting down on and standing up from a chair with arms (e.g., wheelchair, bedside commode, etc.): 2  Moving from lying on back to sitting on the side of the bed?: 2  Moving to and from a bed to a chair (including a wheelchair)?: 2  Need to walk in hospital room?: 1  Climbing 3-5 steps with a railing?: 1  Basic Mobility Total Score: 10       Treatment & Education:  Pt performed bilateral LE: ankle pumps, short arc quads, and hip abduction/adduction x 30 each  Maximum assistance required with bed mobility, SPT performed with maximum assistance toward right side      Patient left up in chair with all lines intact and call button in reach..    GOALS:   Multidisciplinary Problems       Physical Therapy Goals          Problem: Physical Therapy    Goal Priority Disciplines Outcome Goal Variances Interventions   Physical Therapy Goal     PT, PT/OT Ongoing, Progressing     Description: Short term goals:  1. Supine to sit with Moderate Assistance  2. Rolling to Left and Right with Moderate Assistance.  3. Bed to chair transfer with Maximum Assistance using stand pivot transfer  4. Sitting at edge of bed x15 minutes with Minimal Assistance    Long term goals:  1. Supine to sit with MInimal Assistance  2. Sit to stand transfer with Minimal Assistance  3. Bed to chair transfer with Moderate Assistance using Stand pivot transfer  4. Sitting at edge of bed x20 minutes with Modified [place]                          Time Tracking:     PT Received On: 12/27/22  PT Start Time: 0930     PT Stop Time: 1004  PT Total Time (min): 34 min     Billable Minutes: Therapeutic Activity 15 and Therapeutic Exercise 15    Treatment Type: Treatment  PT/PTA: PT     PTA Visit Number: 0     12/27/2022

## 2022-12-27 NOTE — NURSING
"Patient's daughter came to the desk and said that her thought was that "this hospital can get a wound care specialist to come here to see her"      I will pass along to doctor's.  "

## 2022-12-27 NOTE — PLAN OF CARE
Problem: Adult Inpatient Plan of Care  Goal: Plan of Care Review  Outcome: Ongoing, Progressing  Goal: Patient-Specific Goal (Individualized)  Outcome: Ongoing, Progressing  Goal: Absence of Hospital-Acquired Illness or Injury  Outcome: Ongoing, Progressing  Goal: Optimal Comfort and Wellbeing  Outcome: Ongoing, Progressing  Goal: Readiness for Transition of Care  Outcome: Ongoing, Progressing     Problem: Impaired Wound Healing  Goal: Optimal Wound Healing  Outcome: Ongoing, Progressing     Problem: Fall Injury Risk  Goal: Absence of Fall and Fall-Related Injury  Outcome: Ongoing, Progressing     Problem: Infection  Goal: Absence of Infection Signs and Symptoms  Outcome: Ongoing, Progressing       POC reviewed and continues

## 2022-12-27 NOTE — NURSING
Dr. Singer called regarding the request from Hanna, wound care nurse, for some Lidocaine gel to help with pain while bandages are removed.    Order placed.     Dr. Singer stated that he was in case but when he finished he would be up to see the patient.    [id number] Dr. Guerra called to come see patient's legs while wound care is in with patient.  He will be here soon.

## 2022-12-27 NOTE — NURSING
Spoke with Dr. Guerra regarding my conversation with Dr. Hurley.  Dr. Thakkar to Dr. Guerra the same thing that he wanted to wait to see what Dr. Singer says.      Dr. Guerra did say that he wants the wound card nurse to do dressing changes daily

## 2022-12-27 NOTE — SUBJECTIVE & OBJECTIVE
Interval History: naeo    Review of Systems   Constitutional:  Negative for chills, fatigue, fever and unexpected weight change.   HENT:  Negative for congestion, mouth sores and sore throat.    Eyes:  Negative for photophobia and visual disturbance.   Respiratory:  Negative for cough, chest tightness, shortness of breath and wheezing.    Cardiovascular:  Negative for chest pain, palpitations and leg swelling.   Gastrointestinal:  Negative for abdominal pain, diarrhea, nausea and vomiting.   Endocrine: Negative for cold intolerance and heat intolerance.   Genitourinary:  Negative for difficulty urinating, dysuria, frequency and urgency.   Musculoskeletal:  Positive for arthralgias. Negative for back pain and myalgias.   Skin:  Negative for pallor and rash.   Neurological:  Negative for tremors, seizures, syncope, weakness, numbness and headaches.   Hematological:  Does not bruise/bleed easily.   Psychiatric/Behavioral:  Negative for agitation, confusion, hallucinations and suicidal ideas.    Objective:     Vital Signs (Most Recent):  Temp: 97.8 °F (36.6 °C) (12/27/22 0700)  Pulse: 63 (12/27/22 0700)  Resp: 20 (12/27/22 1114)  BP: (!) 145/61 (12/27/22 0700)  SpO2: 96 % (12/27/22 0830)   Vital Signs (24h Range):  Temp:  [97.4 °F (36.3 °C)-98.6 °F (37 °C)] 97.8 °F (36.6 °C)  Pulse:  [60-63] 63  Resp:  [16-20] 20  SpO2:  [95 %-97 %] 96 %  BP: (122-159)/(41-66) 145/61     Weight: 63.6 kg (140 lb 3.4 oz)  Body mass index is 25.65 kg/m².    Intake/Output Summary (Last 24 hours) at 12/27/2022 1151  Last data filed at 12/27/2022 0555  Gross per 24 hour   Intake --   Output 1300 ml   Net -1300 ml        Physical Exam  Vitals reviewed.   Constitutional:       Appearance: Normal appearance.   HENT:      Head: Normocephalic and atraumatic.   Eyes:      Extraocular Movements: Extraocular movements intact.   Cardiovascular:      Rate and Rhythm: Normal rate and regular rhythm.      Pulses: Normal pulses.   Pulmonary:      Effort:  Pulmonary effort is normal.      Breath sounds: Normal breath sounds.   Abdominal:      General: Abdomen is flat.      Palpations: Abdomen is soft.   Musculoskeletal:         General: Tenderness and signs of injury present.      Comments: Dressed left lower extremity, painful to touch   Skin:     General: Skin is warm and dry.      Capillary Refill: Capillary refill takes less than 2 seconds.   Neurological:      General: No focal deficit present.      Mental Status: She is alert and oriented to person, place, and time.   Psychiatric:         Mood and Affect: Mood normal.         Behavior: Behavior normal.       Significant Labs: All pertinent labs within the past 24 hours have been reviewed.    Significant Imaging: I have reviewed all pertinent imaging results/findings within the past 24 hours.

## 2022-12-27 NOTE — PLAN OF CARE
Patient is pending admit to swb at [facility]. Left Gricelda a message and called. Gricelda is out today and no one is covering for her for admits. Dr Guerra notified. Will follow up tomorrow.

## 2022-12-27 NOTE — PROGRESS NOTES
[facility] - [address] [place] [address] Medicine  Progress Note    Patient Name: Zandra Veloz  MRN: [id number]  Patient Class: IP- Inpatient   Admission Date: 12/17/2022  Length of Stay: 10 days  Attending Physician: Shai Guerra DO  Primary Care Provider: Brandon Thornton MD        Subjective:     Principal Problem:Delmy-prosthetic fracture around prosthetic hip        HPI:  88-year-old lady seen emergency room department.  Patient presents after having a fall when she was trying to go to the restroom at her home.  Patient sustained a left femur fracture.  Patient complains of moderate to severe pain in the left hip area.  Patient also complains of chest tightness, she rates it a 5/10 in the chest tightness has been intubate.  Patient also was seen in emergency room department earlier this week per her daughter.  Patient is found to have dehydration and a urinary tract infection.  Current she denies any shortness of breath.  She does not give a history of coronary artery disease.      Overview/Hospital Course:  12/19 - records reviewed. Fall without LOC and has left hip fx. No other complaints currently. Seen by cardiology with some CP felt musculoskeletal.  Echo mild AS and mod MR with nl EF. Age increase cardiac risk for surgery but discussed with daughter surgery is urgent and see no benefit in delay medically. Feel low to moderate risk for surgery. Question about UTI. No symptoms. Had UTI in 10.22 not surprising. Lab to do culture on urine in lab. Cover with ATN for prior culture with ATB started. Discussed with Dr Singer and cardiology.  12/20 Tachycardia and elevated BP. Cardiology adjusted meds. Plan hold off surgery until 12/22 to adjust meds and treat UTI. Family in room. Pt not sleeping well.   12/21 Didn't sleep well prior night. Apparently been on xanax for a time. Also recently started on Neurontin. Family with question. No recent BM. Some increase stool on KUB but not  severe. Surgery for am. BP some up but better. HR good.   12/22 Seen prior to surgery and apparently add better night. Sore mouth / throat better with mycostatin. For surgery. Family in room.  12/23 patient with pain but otherwise seemed to be doing relatively well.  Daughter in room.  Apparently been taking prednisone for some time and this was restarted per family request.  Look into swing bed placement  12/24 patient had better night rested and everyone's in better spirits today.  Tolerating some diet.   Therapy worked with patient.  Look into swing bed placement next week  12/25 remained in good spirits.  Less pain.  Had good bowel movement and ordered Dulcolax not given.  Because of dressing to leg, Burk was not removed to keep it from getting soiled.  Wound care to check 12/27.  Discuss this with patient and daughter.  On antibiotics for UTI  12/26-will dc to swingbed once accepted. Needs continued wound care.  12/27-DC when bed available      Interval History: naeo    Review of Systems   Constitutional:  Negative for chills, fatigue, fever and unexpected weight change.   HENT:  Negative for congestion, mouth sores and sore throat.    Eyes:  Negative for photophobia and visual disturbance.   Respiratory:  Negative for cough, chest tightness, shortness of breath and wheezing.    Cardiovascular:  Negative for chest pain, palpitations and leg swelling.   Gastrointestinal:  Negative for abdominal pain, diarrhea, nausea and vomiting.   Endocrine: Negative for cold intolerance and heat intolerance.   Genitourinary:  Negative for difficulty urinating, dysuria, frequency and urgency.   Musculoskeletal:  Positive for arthralgias. Negative for back pain and myalgias.   Skin:  Negative for pallor and rash.   Neurological:  Negative for tremors, seizures, syncope, weakness, numbness and headaches.   Hematological:  Does not bruise/bleed easily.   Psychiatric/Behavioral:  Negative for agitation, confusion, hallucinations  and suicidal ideas.    Objective:     Vital Signs (Most Recent):  Temp: 97.8 °F (36.6 °C) (12/27/22 0700)  Pulse: 63 (12/27/22 0700)  Resp: 20 (12/27/22 1114)  BP: (!) 145/61 (12/27/22 0700)  SpO2: 96 % (12/27/22 0830)   Vital Signs (24h Range):  Temp:  [97.4 °F (36.3 °C)-98.6 °F (37 °C)] 97.8 °F (36.6 °C)  Pulse:  [60-63] 63  Resp:  [16-20] 20  SpO2:  [95 %-97 %] 96 %  BP: (122-159)/(41-66) 145/61     Weight: 63.6 kg (140 lb 3.4 oz)  Body mass index is 25.65 kg/m².    Intake/Output Summary (Last 24 hours) at 12/27/2022 1151  Last data filed at 12/27/2022 0555  Gross per 24 hour   Intake --   Output 1300 ml   Net -1300 ml        Physical Exam  Vitals reviewed.   Constitutional:       Appearance: Normal appearance.   HENT:      Head: Normocephalic and atraumatic.   Eyes:      Extraocular Movements: Extraocular movements intact.   Cardiovascular:      Rate and Rhythm: Normal rate and regular rhythm.      Pulses: Normal pulses.   Pulmonary:      Effort: Pulmonary effort is normal.      Breath sounds: Normal breath sounds.   Abdominal:      General: Abdomen is flat.      Palpations: Abdomen is soft.   Musculoskeletal:         General: Tenderness and signs of injury present.      Comments: Dressed left lower extremity, painful to touch   Skin:     General: Skin is warm and dry.      Capillary Refill: Capillary refill takes less than 2 seconds.   Neurological:      General: No focal deficit present.      Mental Status: She is alert and oriented to person, place, and time.   Psychiatric:         Mood and Affect: Mood normal.         Behavior: Behavior normal.       Significant Labs: All pertinent labs within the past 24 hours have been reviewed.    Significant Imaging: I have reviewed all pertinent imaging results/findings within the past 24 hours.      Assessment/Plan:      * Delmy-prosthetic fracture around prosthetic hip  S/p fixation  Stable for dc to swingbed    Heart murmur    Echo Summary this admit     The left  ventricle is normal in size with hyperdynamic systolic function.   The estimated ejection fraction is 75%.   Left ventricular diastolic dysfunction.   Normal right ventricular size.   There is mild aortic valve stenosis.   Aortic valve area is 1.94 cm2; peak velocity is 3.0 m/s; mean gradient is 18 mmHg.   Moderate-to-severe mitral regurgitation.   Mild tricuspid regurgitation.   Mild pulmonic regurgitation.   Normal central venous pressure (3 mmHg).   The estimated PA systolic pressure is 32 mmHg.   Trivial pericardial effusion.    PCP follow up    Palpitations  resolved      Other fracture of left femur, initial encounter for closed fracture  S/p OR      Chest pain  resolved      Acute cystitis without hematuria  Pseudomonas  Continue cefepime for 7 days   Exchange chao    Primary osteoarthritis of right knee  OP follow up      Primary osteoarthritis of right shoulder  OP follow up      Lumbar radiculopathy  OP follow up  Pain control      Closed fracture of left hip    Surgery 12/22    Hypertension  Adjust medications as needed    Arthritis    Tylenol p.r.n..      VTE Risk Mitigation (From admission, onward)         Ordered     apixaban tablet 2.5 mg  2 times daily         12/26/22 0717     IP VTE HIGH RISK PATIENT  Once         12/26/22 0717     Place KARINA hose  Until discontinued         12/26/22 0717     Place sequential compression device  Until discontinued         12/26/22 0717     Place sequential compression device  Until discontinued         12/17/22 1253                Discharge Planning   YANIV:      Code Status: Full Code   Is the patient medically ready for discharge?:     Reason for patient still in hospital (select all that apply): Treatment  Discharge Plan A: Home                  Shai Guerra DO  Department of Hospital Medicine   [facility]

## 2022-12-27 NOTE — PROGRESS NOTES
[facility]  Wound Care    Patient Name:  Zandra Veloz   MRN:  [id number]  Date: 12/27/2022  Diagnosis: Delmy-prosthetic fracture around prosthetic hip    History:     Past Medical History:   Diagnosis Date    Hypertension     Osteoporosis     Thyroid disorder        Social History     Socioeconomic History    Marital status:    Occupational History    Occupation: retired   Tobacco Use    Smoking status: Former    Smokeless tobacco: Never   Substance and Sexual Activity    Alcohol use: Never    Drug use: Never    Sexual activity: Not Currently     Social Determinants of Health     Financial Resource Strain: Low Risk     Difficulty of Paying Living Expenses: Not hard at all   Food Insecurity: No Food Insecurity    Worried About Running Out of Food in the Last Year: Never true    Ran Out of Food in the Last Year: Never true   Transportation Needs: No Transportation Needs    Lack of Transportation (Medical): No    Lack of Transportation (Non-Medical): No   Physical Activity: Inactive    Days of Exercise per Week: 0 days    Minutes of Exercise per Session: 0 min   Stress: No Stress Concern Present    Feeling of Stress : Not at all   Social Connections: Socially Isolated    Frequency of Communication with Friends and Family: More than three times a week    Frequency of Social Gatherings with Friends and Family: More than three times a week    Attends [employer]ism Services: Never    Active Member of Clubs or Organizations: No    Attends Club or Organization Meetings: Never    Marital Status:    Housing Stability: Low Risk     Unable to Pay for Housing in the Last Year: No    Number of Places Lived in the Last Year: 1    Unstable Housing in the Last Year: No       Precautions:     Allergies as of 12/17/2022 - Reviewed 12/17/2022   Allergen Reaction Noted    Bactrim [sulfamethoxazole-trimethoprim]  04/13/2021    Codeine  04/13/2021    Levaquin [levofloxacin]   04/13/2021       [facility] Assessment Details/Treatment        12/27/22 1516        Altered Skin Integrity Right anterior;medial Thigh #3 Skin Tear   No Date First Assessed or Time First Assessed found.   Altered Skin Integrity Present on Admission: suspected hospital acquired  Side: Right  Orientation: anterior;medial  Location: Thigh  Wound Number: #3  Is this injury device related?: Yes  Primary...   Dressing Appearance Intact;Clean   Appearance Dressing in place, unable to visualize        Altered Skin Integrity 12/22/22 1531 Left anterior;medial Thigh #2   Date First Assessed/Time First Assessed: 12/22/22 1531   Altered Skin Integrity Present on Admission: suspected hospital acquired  Side: Left  Orientation: anterior;medial  Location: Thigh  Wound Number: #2  Is this injury device related?: Yes   Dressing Appearance Intact;Moist drainage;Dried drainage   Drainage Amount Moderate   Drainage Characteristics/Odor Serosanguineous   Appearance Dressing in place, unable to visualize  (Adhered; pt unable to tolerate removal.)        Altered Skin Integrity 12/22/22 1531 Left anterior;lower Leg #1 Traumatic   Date First Assessed/Time First Assessed: 12/22/22 1531   Side: Left  Orientation: anterior;lower  Location: Leg  Wound Number: #1  Primary Wound Type: Traumatic   Wound Image     Dressing Appearance Intact;Dried drainage;Moist drainage   Drainage Amount Moderate   Drainage Characteristics/Odor Serosanguineous   Appearance Red;Yellow;Maroon;Purple;Black;Moist;Not granulating   Tissue loss description Full thickness   Red (%), Wound Tissue Color 45 %   Yellow (%), Wound Tissue Color 40 %   Periwound Area Ecchymotic;Dry   Wound Edges Defined;Irregular;Jagged;Open   Care Cleansed with:;Antimicrobial agent;Wound cleanser;Sterile normal saline   Dressing Applied;Hydrogel;Non-adherent;Other (comment)  (Xerofoam gauze)        Altered Skin Integrity 12/22/22 1531 Left anterior Foot #4 Traumatic   Date First Assessed/Time First  Assessed: 12/22/22 1531   Altered Skin Integrity Present on Admission: suspected hospital acquired  Side: Left  Orientation: anterior  Location: Foot  Wound Number: #4  Is this injury device related?: Yes  Primary Wound ...   Wound Image    Dressing Appearance Intact   Drainage Amount Moderate   Drainage Characteristics/Odor Serosanguineous   Appearance Red;Yellow;Black   Tissue loss description Full thickness   Black (%), Wound Tissue Color 45 %   Red (%), Wound Tissue Color 50 %   Yellow (%), Wound Tissue Color 5 %   Periwound Area Ecchymotic   Wound Edges Defined;Irregular;Open   Wound Length (cm) 3 cm   Wound Width (cm) 3 cm   Wound Surface Area (cm^2) 9 cm^2   Care Cleansed with:;Antimicrobial agent;Wound cleanser   Dressing Hydrogel;Other (comment)  (xerofoam gauze)        Incision/Site 12/22/22 1531 Left Greater trochanter lateral   Date First Assessed/Time First Assessed: 12/22/22 1531   Side: Left  Location: Greater trochanter  Orientation: lateral   Dressing Appearance Intact   Appearance Dressing in place, unable to visualize       WOC Team consulted for Altered skin integrity; traumatic shearing/skin tear    Narrative: Pt received alert and oriented; Lisa, daughter, at bedside.  Dressing removal attempted  on L medical LE using 0.9% Saline and Vashe soaks.  Much of the dressing removable. However, Some tissue adhered to rolled gauze and cannot be removed without significant trauma to the underlying tissue.  This is a full thickness wound.  Pt had been medicated 1 hour prior with 20 mg Norco.  Cries out in pain.  Daughter crying. Suggest surgical removal with topical or general anesthesia. MALA Guy, Dr. Guerra and Dr. Granado notified. Dressing removal terminated per pt and family request. Dr. Guerra assessed and agrees that surgical intervention needed.     Active Wounds: BLE thigh traumatic Type 2 and 3 skin tears; LLE full thickness Type 3 skin tear    Goals per TIME Model: Promote  moist wound healing, Apply antimicrobial, Initiate wound approximation, Reduce pain, and Reduce bioburden     Barriers to Wound Healing: multiple co-morbidities decreased granulation tissue large surface area advanced age fragile skin    Recommendations made to primary team for Surgical removal of adhered dressing and devitalized tissue. Consider Epifix artifical skin substitute; eval for hyperbaric treatments. For now, covered open wounds with Anacept Hydrogel and Xeroform non-adherent gauze.    Orders placed.     Thank you for the consult.     We will continue to follow. See additional note under Notes TAB for tentative f/u plan/dates.    12/27/2022

## 2022-12-27 NOTE — NURSING
Dr. Thakkar notified of consult.  He plans to wait until he hears from Dr. Singer.  Waiting on Dr. THAYER to see patient.    Daughter came to the desk and asked if patient would have surgery today.  I explained that I did not know yet and she said that she just didn't know if she could physically stay here all night for her mother to have surgery.      I will let her know of the plans as soon as I see orders.

## 2022-12-27 NOTE — PT/OT/SLP PROGRESS
Occupational Therapy   Treatment    Name: Zandra Veloz  MRN: [id number]  Admitting Diagnosis:  Delmy-prosthetic fracture around prosthetic hip  5 Days Post-Op    Recommendations:     Discharge Recommendations: nursing facility, skilled  Discharge Equipment Recommendations:   (to be determined)  Barriers to discharge:  None    Assessment:     Zandra Veloz is a 88 y.o. female with a medical diagnosis of Delmy-prosthetic fracture around prosthetic hip.  She presents with weakness, decreased functional mobility, and decline in ADLs. Performance deficits affecting function are weakness, impaired endurance, impaired self care skills, impaired functional mobility, gait instability, impaired balance.     Rehab Prognosis:  Good; patient would benefit from acute skilled OT services to address these deficits and reach maximum level of function.       Plan:     Patient to be seen 5 x/week to address the above listed problems via self-care/home management, therapeutic activities, therapeutic exercises  Plan of Care Expires: 01/20/23  Plan of Care Reviewed with: patient    Subjective     Pain/Comfort:  Pain Rating 1: 5/10  Location - Side 1: Left  Location 1: leg    Objective:     Communicated with: CLYDE Vargas prior to session.  Patient found up in chair with peripheral IV upon OT entry to room.    General Precautions: Standard, fall    Orthopedic Precautions:LLE posterior precautions, LLE toe touch weight bearing  Braces: N/A  Respiratory Status: Room air     Occupational Performance:     Bed Mobility:    Patient completed Sit to Supine with maximal assistance and 2 persons     Functional Mobility/Transfers:  Patient completed Bed <> Chair Transfer using Stand Pivot technique with maximal assistance with manual assist with gait belt  Functional Mobility: not performed    Activities of Daily Living:  Lower Body Dressing: pt educated on LB dressing with AD; donning/doffing socks demonstrated to pt with sock aide;  not performed by pt d/t poor skin integrity        [facility] 6 Click ADL:      Treatment & Education:  Pt performed 2 sets x 12 reps each bicep curls 1#db, shoulder flexion AROM, and tricep press with manual resistance in order to improve BUE strength and endurance to perform ADL and ADL t/f with less assist.     Patient left supine with all lines intact, call button in reach, Sam, RN notified, and daughter present    GOALS:   Multidisciplinary Problems       Occupational Therapy Goals          Problem: Occupational Therapy    Goal Priority Disciplines Outcome Interventions   Occupational Therapy Goal     OT, PT/OT Ongoing, Progressing    Description: STG:  Pt will perform grooming with setup  Pt will bathe anterior upper body with Liset with setup from bed  Pt will perform UE dressing with Liset  Pt will perform LE dressing with MaxA with AD   Pt will sit EOB x 10 min with CGA   Pt will transfer bed/chair/bsc with MaxA with stand pivot t/f with RW  Pt will tolerate 15 minutes of tx without fatigue      LT.Restore to max I with self care and mobility.                           Time Tracking:     OT Date of Treatment: 22  OT Start Time: 1036  OT Stop Time: 1113  OT Total Time (min): 37 min    Billable Minutes:Therapeutic Activity 10 minutes  Therapeutic Exercise 15 minutes    OT/BRIGHT: OT          2022

## 2022-12-27 NOTE — PT/OT/SLP PROGRESS
Physical Therapy Treatment    Patient Name:  Zandra Veloz   MRN:  [id number]    Recommendations:     Discharge Recommendations: nursing facility, skilled  Discharge Equipment Recommendations: hospital bed, lift device  Barriers to discharge: Inaccessible home and Decreased caregiver support    Assessment:     Zandra Veloz is a 88 y.o. female admitted with a medical diagnosis of Victoriano-prosthetic fracture around prosthetic hip.  She presents with the following impairments/functional limitations: weakness, impaired endurance, impaired functional mobility, gait instability, impaired balance, decreased lower extremity function, pain, decreased ROM, impaired skin, edema, orthopedic precautions Pt tolerated sitting in chair for approximately 1 hour. Maximum assistance required to transfer back to bed. Still complaining of increased pain to left leg. Will benefit from swingbed.    Rehab Prognosis: Fair; patient would benefit from acute skilled PT services to address these deficits and reach maximum level of function.    Recent Surgery: Procedure(s) (LRB):  REVISION, TOTAL ARTHROPLASTY, HIP, VICTORIANO PROSTHETIC FX (Left) 5 Days Post-Op    Plan:     During this hospitalization, patient to be seen BID (5x/wk; daily 2x/wk) to address the identified rehab impairments via gait training, therapeutic activities, therapeutic exercises, wheelchair management/training, neuromuscular re-education and progress toward the following goals:    Plan of Care Expires:  01/23/23    Subjective     Chief Complaint: left hip pain  Patient/Family Comments/goals: Pt ready to get back into bed  Pain/Comfort:  Pain Rating 1: 7/10  Location - Side 1: Left  Location 1: leg  Pain Addressed 1: Pre-medicate for activity  Pain Rating Post-Intervention 1: 7/10      Objective:     Communicated with ANA Rojo RN prior to session.  Patient found up in chair with peripheral IV, telemetry, chao catheter upon PT entry to room.     General  Precautions: Standard, fall  Orthopedic Precautions: LLE posterior precautions, LLE toe touch weight bearing  Braces: N/A  Respiratory Status: Room air     Functional Mobility:  Bed Mobility:     Scooting: moderate assistance  Sit to Supine: maximal assistance and of 2 persons  Transfers:     Chair to mat: maximal assistance with  gait belt  using  Stand Pivot  Balance: poor      AM-PAC 6 CLICK MOBILITY  Turning over in bed (including adjusting bedclothes, sheets and blankets)?: 2  Sitting down on and standing up from a chair with arms (e.g., wheelchair, bedside commode, etc.): 2  Moving from lying on back to sitting on the side of the bed?: 2  Moving to and from a bed to a chair (including a wheelchair)?: 2  Need to walk in hospital room?: 1  Climbing 3-5 steps with a railing?: 1  Basic Mobility Total Score: 10       Treatment & Education:  Pt assisted with transfer from chair to bed with maximum assistance, transfer to right side, maximum assistance to position in bed    Patient left HOB elevated with all lines intact and call button in reach..    GOALS:   Multidisciplinary Problems       Physical Therapy Goals          Problem: Physical Therapy    Goal Priority Disciplines Outcome Goal Variances Interventions   Physical Therapy Goal     PT, PT/OT Ongoing, Progressing     Description: Short term goals:  1. Supine to sit with Moderate Assistance  2. Rolling to Left and Right with Moderate Assistance.  3. Bed to chair transfer with Maximum Assistance using stand pivot transfer  4. Sitting at edge of bed x15 minutes with Minimal Assistance    Long term goals:  1. Supine to sit with MInimal Assistance  2. Sit to stand transfer with Minimal Assistance  3. Bed to chair transfer with Moderate Assistance using Stand pivot transfer  4. Sitting at edge of bed x20 minutes with Modified [place]                         Time Tracking:     PT Received On: 12/27/22  PT Start Time: 1102     PT Stop Time: 1113  PT Total Time  (min): 11 min     Billable Minutes: Therapeutic Activity 11    Treatment Type: Treatment  PT/PTA: PT     PTA Visit Number: 0     12/27/2022

## 2022-12-27 NOTE — PROGRESS NOTES
12/27/22 1540   Wound Care Follow Up   Wound Care Follow-up? Yes   Wound Care- Next Visit Date 12/28/22   Follow Up Plan Assess wounds and Eval POC

## 2022-12-28 PROBLEM — S81.802A UNSPECIFIED OPEN WOUND, LEFT LOWER LEG, INITIAL ENCOUNTER: Status: ACTIVE | Noted: 2022-12-28

## 2022-12-28 PROBLEM — S81.812A SKIN TEAR OF LOWER LEG WITHOUT COMPLICATION, LEFT, INITIAL ENCOUNTER: Status: ACTIVE | Noted: 2022-12-28

## 2022-12-28 PROBLEM — S41.111A SKIN TEAR OF UPPER ARM WITHOUT COMPLICATION, RIGHT, INITIAL ENCOUNTER: Status: ACTIVE | Noted: 2022-12-28

## 2022-12-28 LAB
ALBUMIN SERPL BCP-MCNC: 2 G/DL (ref 3.5–5)
ALBUMIN/GLOB SERPL: 0.7 {RATIO}
ALP SERPL-CCNC: 51 U/L (ref 55–142)
ALT SERPL W P-5'-P-CCNC: 11 U/L (ref 13–56)
ANION GAP SERPL CALCULATED.3IONS-SCNC: 13 MMOL/L (ref 7–16)
AST SERPL W P-5'-P-CCNC: 21 U/L (ref 15–37)
BASOPHILS # BLD AUTO: 0.06 K/UL (ref 0–0.2)
BASOPHILS NFR BLD AUTO: 0.4 % (ref 0–1)
BILIRUB SERPL-MCNC: 0.5 MG/DL (ref ?–1.2)
BUN SERPL-MCNC: 19 MG/DL (ref 7–18)
BUN/CREAT SERPL: 26 (ref 6–20)
CALCIUM SERPL-MCNC: 8.2 MG/DL (ref 8.5–10.1)
CHLORIDE SERPL-SCNC: 103 MMOL/L (ref 98–107)
CO2 SERPL-SCNC: 26 MMOL/L (ref 21–32)
CREAT SERPL-MCNC: 0.72 MG/DL (ref 0.55–1.02)
CRENATED CELLS: ABNORMAL
DIFFERENTIAL METHOD BLD: ABNORMAL
EGFR (NO RACE VARIABLE) (RUSH/TITUS): 81 ML/MIN/1.73M²
EOSINOPHIL # BLD AUTO: 0.39 K/UL (ref 0–0.5)
EOSINOPHIL NFR BLD AUTO: 2.7 % (ref 1–4)
EOSINOPHIL NFR BLD MANUAL: 3 % (ref 1–4)
ERYTHROCYTE [DISTWIDTH] IN BLOOD BY AUTOMATED COUNT: 14.1 % (ref 11.5–14.5)
GLOBULIN SER-MCNC: 2.8 G/DL (ref 2–4)
GLUCOSE SERPL-MCNC: 77 MG/DL (ref 74–106)
HCT VFR BLD AUTO: 31.7 % (ref 38–47)
HGB BLD-MCNC: 9.6 G/DL (ref 12–16)
IMM GRANULOCYTES # BLD AUTO: 1.55 K/UL (ref 0–0.04)
IMM GRANULOCYTES NFR BLD: 10.7 % (ref 0–0.4)
LYMPHOCYTES # BLD AUTO: 2.38 K/UL (ref 1–4.8)
LYMPHOCYTES NFR BLD AUTO: 16.5 % (ref 27–41)
LYMPHOCYTES NFR BLD MANUAL: 14 % (ref 27–41)
MCH RBC QN AUTO: 30.3 PG (ref 27–31)
MCHC RBC AUTO-ENTMCNC: 30.3 G/DL (ref 32–36)
MCV RBC AUTO: 100 FL (ref 80–96)
MONOCYTES # BLD AUTO: 0.85 K/UL (ref 0–0.8)
MONOCYTES NFR BLD AUTO: 5.9 % (ref 2–6)
MONOCYTES NFR BLD MANUAL: 4 % (ref 2–6)
MPC BLD CALC-MCNC: 9.1 FL (ref 9.4–12.4)
NEUTROPHILS # BLD AUTO: 9.2 K/UL (ref 1.8–7.7)
NEUTROPHILS NFR BLD AUTO: 63.8 % (ref 53–65)
NEUTS BAND NFR BLD MANUAL: 12 % (ref 1–5)
NEUTS SEG NFR BLD MANUAL: 67 % (ref 50–62)
NRBC # BLD AUTO: 0.02 X10E3/UL
NRBC, AUTO (.00): 0.1 %
PLATELET # BLD AUTO: 470 K/UL (ref 150–400)
PLATELET MORPHOLOGY: ABNORMAL
POLYCHROMASIA BLD QL SMEAR: ABNORMAL
POTASSIUM SERPL-SCNC: 5.1 MMOL/L (ref 3.5–5.1)
PROT SERPL-MCNC: 4.8 G/DL (ref 6.4–8.2)
RBC # BLD AUTO: 3.17 M/UL (ref 4.2–5.4)
SODIUM SERPL-SCNC: 137 MMOL/L (ref 136–145)
WBC # BLD AUTO: 14.43 K/UL (ref 4.5–11)

## 2022-12-28 PROCEDURE — 99232 PR SUBSEQUENT HOSPITAL CARE,LEVL II: ICD-10-PCS | Mod: ,,, | Performed by: STUDENT IN AN ORGANIZED HEALTH CARE EDUCATION/TRAINING PROGRAM

## 2022-12-28 PROCEDURE — 25000003 PHARM REV CODE 250: Performed by: INTERNAL MEDICINE

## 2022-12-28 PROCEDURE — 25000003 PHARM REV CODE 250: Performed by: HOSPITALIST

## 2022-12-28 PROCEDURE — 63600175 PHARM REV CODE 636 W HCPCS: Performed by: HOSPITALIST

## 2022-12-28 PROCEDURE — 99232 SBSQ HOSP IP/OBS MODERATE 35: CPT | Mod: ,,, | Performed by: STUDENT IN AN ORGANIZED HEALTH CARE EDUCATION/TRAINING PROGRAM

## 2022-12-28 PROCEDURE — 97110 THERAPEUTIC EXERCISES: CPT

## 2022-12-28 PROCEDURE — 25000003 PHARM REV CODE 250: Performed by: ORTHOPAEDIC SURGERY

## 2022-12-28 PROCEDURE — 97530 THERAPEUTIC ACTIVITIES: CPT

## 2022-12-28 PROCEDURE — 80053 COMPREHEN METABOLIC PANEL: CPT | Performed by: STUDENT IN AN ORGANIZED HEALTH CARE EDUCATION/TRAINING PROGRAM

## 2022-12-28 PROCEDURE — 36415 COLL VENOUS BLD VENIPUNCTURE: CPT | Performed by: STUDENT IN AN ORGANIZED HEALTH CARE EDUCATION/TRAINING PROGRAM

## 2022-12-28 PROCEDURE — 11000001 HC ACUTE MED/SURG PRIVATE ROOM

## 2022-12-28 PROCEDURE — 85025 COMPLETE CBC W/AUTO DIFF WBC: CPT | Performed by: STUDENT IN AN ORGANIZED HEALTH CARE EDUCATION/TRAINING PROGRAM

## 2022-12-28 PROCEDURE — 25000003 PHARM REV CODE 250: Performed by: NURSE PRACTITIONER

## 2022-12-28 RX ADMIN — LEVOTHYROXINE SODIUM 125 MCG: 0.12 TABLET ORAL at 05:12

## 2022-12-28 RX ADMIN — NYSTATIN 500000 UNITS: 500000 SUSPENSION ORAL at 02:12

## 2022-12-28 RX ADMIN — HYDROCODONE BITARTRATE AND ACETAMINOPHEN 2 TABLET: 10; 325 TABLET ORAL at 03:12

## 2022-12-28 RX ADMIN — PANTOPRAZOLE SODIUM 40 MG: 40 TABLET, DELAYED RELEASE ORAL at 08:12

## 2022-12-28 RX ADMIN — APIXABAN 2.5 MG: 2.5 TABLET, FILM COATED ORAL at 08:12

## 2022-12-28 RX ADMIN — CEFEPIME 1 G: 1 INJECTION, POWDER, FOR SOLUTION INTRAMUSCULAR; INTRAVENOUS at 05:12

## 2022-12-28 RX ADMIN — ALPRAZOLAM 1 MG: 0.5 TABLET ORAL at 09:12

## 2022-12-28 RX ADMIN — DILTIAZEM HYDROCHLORIDE 120 MG: 120 CAPSULE, COATED, EXTENDED RELEASE ORAL at 08:12

## 2022-12-28 RX ADMIN — METOPROLOL TARTRATE 50 MG: 50 TABLET, FILM COATED ORAL at 08:12

## 2022-12-28 RX ADMIN — PREDNISONE 5 MG: 5 TABLET ORAL at 08:12

## 2022-12-28 RX ADMIN — NYSTATIN 500000 UNITS: 500000 SUSPENSION ORAL at 09:12

## 2022-12-28 RX ADMIN — HYDROCODONE BITARTRATE AND ACETAMINOPHEN 2 TABLET: 10; 325 TABLET ORAL at 09:12

## 2022-12-28 RX ADMIN — GABAPENTIN 100 MG: 100 CAPSULE ORAL at 08:12

## 2022-12-28 RX ADMIN — NYSTATIN 500000 UNITS: 500000 SUSPENSION ORAL at 05:12

## 2022-12-28 RX ADMIN — NYSTATIN 500000 UNITS: 500000 SUSPENSION ORAL at 08:12

## 2022-12-28 RX ADMIN — ALPRAZOLAM 1 MG: 0.5 TABLET ORAL at 08:12

## 2022-12-28 RX ADMIN — HYDROCODONE BITARTRATE AND ACETAMINOPHEN 1 TABLET: 7.5; 325 TABLET ORAL at 11:12

## 2022-12-28 RX ADMIN — APIXABAN 2.5 MG: 2.5 TABLET, FILM COATED ORAL at 09:12

## 2022-12-28 RX ADMIN — HYDROCODONE BITARTRATE AND ACETAMINOPHEN 1 TABLET: 7.5; 325 TABLET ORAL at 08:12

## 2022-12-28 RX ADMIN — GABAPENTIN 100 MG: 100 CAPSULE ORAL at 02:12

## 2022-12-28 RX ADMIN — GABAPENTIN 100 MG: 100 CAPSULE ORAL at 09:12

## 2022-12-28 NOTE — SUBJECTIVE & OBJECTIVE
Interval History: naeo    Review of Systems   Constitutional:  Negative for chills, fatigue, fever and unexpected weight change.   HENT:  Negative for congestion, mouth sores and sore throat.    Eyes:  Negative for photophobia and visual disturbance.   Respiratory:  Negative for cough, chest tightness, shortness of breath and wheezing.    Cardiovascular:  Negative for chest pain, palpitations and leg swelling.   Gastrointestinal:  Negative for abdominal pain, diarrhea, nausea and vomiting.   Endocrine: Negative for cold intolerance and heat intolerance.   Genitourinary:  Negative for difficulty urinating, dysuria, frequency and urgency.   Musculoskeletal:  Positive for arthralgias. Negative for back pain and myalgias.   Skin:  Negative for pallor and rash.   Neurological:  Negative for tremors, seizures, syncope, weakness, numbness and headaches.   Hematological:  Does not bruise/bleed easily.   Psychiatric/Behavioral:  Negative for agitation, confusion, hallucinations and suicidal ideas.    Objective:     Vital Signs (Most Recent):  Temp: 98.5 °F (36.9 °C) (12/28/22 1605)  Pulse: (!) 55 (12/28/22 1605)  Resp: 18 (12/28/22 1605)  BP: (!) 118/41 (12/28/22 1605)  SpO2: 95 % (12/28/22 1605)   Vital Signs (24h Range):  Temp:  [96.9 °F (36.1 °C)-98.5 °F (36.9 °C)] 98.5 °F (36.9 °C)  Pulse:  [51-61] 55  Resp:  [18-20] 18  SpO2:  [95 %-97 %] 95 %  BP: (113-135)/(41-74) 118/41     Weight: 63.6 kg (140 lb 3.4 oz)  Body mass index is 25.65 kg/m².    Intake/Output Summary (Last 24 hours) at 12/28/2022 1726  Last data filed at 12/28/2022 1650  Gross per 24 hour   Intake --   Output 900 ml   Net -900 ml        Physical Exam  Vitals reviewed.   Constitutional:       Appearance: Normal appearance.   HENT:      Head: Normocephalic and atraumatic.   Eyes:      Extraocular Movements: Extraocular movements intact.   Cardiovascular:      Rate and Rhythm: Normal rate and regular rhythm.      Pulses: Normal pulses.   Pulmonary:       Effort: Pulmonary effort is normal.      Breath sounds: Normal breath sounds.   Abdominal:      General: Abdomen is flat.      Palpations: Abdomen is soft.   Musculoskeletal:         General: Tenderness and signs of injury present.      Comments: Dressed left lower extremity, painful to touch   Skin:     General: Skin is warm and dry.      Capillary Refill: Capillary refill takes less than 2 seconds.   Neurological:      General: No focal deficit present.      Mental Status: She is alert and oriented to person, place, and time.   Psychiatric:         Mood and Affect: Mood normal.         Behavior: Behavior normal.       Significant Labs: All pertinent labs within the past 24 hours have been reviewed.    Significant Imaging: I have reviewed all pertinent imaging results/findings within the past 24 hours.

## 2022-12-28 NOTE — PT/OT/SLP PROGRESS
Physical Therapy Treatment    Patient Name:  Zandra Veloz   MRN:  [id number]    Recommendations:     Discharge Recommendations: nursing facility, skilled  Discharge Equipment Recommendations: hospital bed, lift device  Barriers to discharge: Inaccessible home and Decreased caregiver support    Assessment:     Zandra Veloz is a 88 y.o. female admitted with a medical diagnosis of Victoriano-prosthetic fracture around prosthetic hip.  She presents with the following impairments/functional limitations: weakness, impaired endurance, impaired functional mobility, gait instability, impaired balance, decreased lower extremity function, pain, decreased ROM, impaired skin, edema, orthopedic precautions Pt tolerated sitting in chair for approximately 2 hour. Maximum assistance required to transfer back to bed. Still complaining of increased pain to left leg. Will benefit from swingbed.    Rehab Prognosis: Fair; patient would benefit from acute skilled PT services to address these deficits and reach maximum level of function.    Recent Surgery: Procedure(s) (LRB):  REVISION, TOTAL ARTHROPLASTY, HIP, VICTORIANO PROSTHETIC FX (Left) 6 Days Post-Op    Plan:     During this hospitalization, patient to be seen BID (5x/wk; daily 2x/wk) to address the identified rehab impairments via gait training, therapeutic activities, therapeutic exercises, wheelchair management/training, neuromuscular re-education and progress toward the following goals:    Plan of Care Expires:  01/23/23    Subjective     Chief Complaint: left hip pain  Patient/Family Comments/goals: Pt ready to get back into bed  Pain/Comfort:  Pain Rating 1: 3/10  Location - Side 1: Left  Location 1: leg  Pain Addressed 1: Pre-medicate for activity  Pain Rating Post-Intervention 1: 5/10      Objective:     Communicated with SILVER Pagan RN prior to session.  Patient found up in chair with peripheral IV, telemetry, chao catheter upon PT entry to room.     General  Precautions: Standard, fall  Orthopedic Precautions: LLE posterior precautions, LLE toe touch weight bearing  Braces: N/A  Respiratory Status: Room air     Functional Mobility:  Bed Mobility:     Scooting: moderate assistance  Sit to Supine: maximal assistance and of 2 persons  Transfers:     Chair to mat: maximal assistance with  gait belt  using  Stand Pivot  Balance: poor      AM-PAC 6 CLICK MOBILITY  Turning over in bed (including adjusting bedclothes, sheets and blankets)?: 2  Sitting down on and standing up from a chair with arms (e.g., wheelchair, bedside commode, etc.): 2  Moving from lying on back to sitting on the side of the bed?: 2  Moving to and from a bed to a chair (including a wheelchair)?: 2  Need to walk in hospital room?: 1  Climbing 3-5 steps with a railing?: 1  Basic Mobility Total Score: 10       Treatment & Education:  Pt assisted with transfer from chair to bed with maximum assistance, transfer to right side, maximum assistance to position in bed  BLE ankle pumps, SAQ, knee flexion, hip abduction x30    Patient left HOB elevated with all lines intact and call button in reach..    GOALS:   Multidisciplinary Problems       Physical Therapy Goals          Problem: Physical Therapy    Goal Priority Disciplines Outcome Goal Variances Interventions   Physical Therapy Goal     PT, PT/OT Ongoing, Progressing     Description: Short term goals:  1. Supine to sit with Moderate Assistance  2. Rolling to Left and Right with Moderate Assistance.  3. Bed to chair transfer with Maximum Assistance using stand pivot transfer  4. Sitting at edge of bed x15 minutes with Minimal Assistance    Long term goals:  1. Supine to sit with MInimal Assistance  2. Sit to stand transfer with Minimal Assistance  3. Bed to chair transfer with Moderate Assistance using Stand pivot transfer  4. Sitting at edge of bed x20 minutes with Modified [place]                         Time Tracking:     PT Received On: 12/28/22  PT  Start Time: 1337     PT Stop Time: 1404  PT Total Time (min): 27 min     Billable Minutes: Therapeutic Activity 10, Therapeutic exercise 15    Treatment Type: Treatment  PT/PTA: PT     PTA Visit Number: 0     12/28/2022

## 2022-12-28 NOTE — PROGRESS NOTES
[facility] - [address] [place] [address] Medicine  Progress Note    Patient Name: Zandra Veloz  MRN: [id number]  Patient Class: IP- Inpatient   Admission Date: 12/17/2022  Length of Stay: 11 days  Attending Physician: Shai Guerra DO  Primary Care Provider: Brandon Thornton MD        Subjective:     Principal Problem:Delmy-prosthetic fracture around prosthetic hip        HPI:  88-year-old lady seen emergency room department.  Patient presents after having a fall when she was trying to go to the restroom at her home.  Patient sustained a left femur fracture.  Patient complains of moderate to severe pain in the left hip area.  Patient also complains of chest tightness, she rates it a 5/10 in the chest tightness has been intubate.  Patient also was seen in emergency room department earlier this week per her daughter.  Patient is found to have dehydration and a urinary tract infection.  Current she denies any shortness of breath.  She does not give a history of coronary artery disease.      Overview/Hospital Course:  12/19 - records reviewed. Fall without LOC and has left hip fx. No other complaints currently. Seen by cardiology with some CP felt musculoskeletal.  Echo mild AS and mod MR with nl EF. Age increase cardiac risk for surgery but discussed with daughter surgery is urgent and see no benefit in delay medically. Feel low to moderate risk for surgery. Question about UTI. No symptoms. Had UTI in 10.22 not surprising. Lab to do culture on urine in lab. Cover with ATN for prior culture with ATB started. Discussed with Dr Singer and cardiology.  12/20 Tachycardia and elevated BP. Cardiology adjusted meds. Plan hold off surgery until 12/22 to adjust meds and treat UTI. Family in room. Pt not sleeping well.   12/21 Didn't sleep well prior night. Apparently been on xanax for a time. Also recently started on Neurontin. Family with question. No recent BM. Some increase stool on KUB but not  severe. Surgery for am. BP some up but better. HR good.   12/22 Seen prior to surgery and apparently add better night. Sore mouth / throat better with mycostatin. For surgery. Family in room.  12/23 patient with pain but otherwise seemed to be doing relatively well.  Daughter in room.  Apparently been taking prednisone for some time and this was restarted per family request.  Look into swing bed placement  12/24 patient had better night rested and everyone's in better spirits today.  Tolerating some diet.   Therapy worked with patient.  Look into swing bed placement next week  12/25 remained in good spirits.  Less pain.  Had good bowel movement and ordered Dulcolax not given.  Because of dressing to leg, Burk was not removed to keep it from getting soiled.  Wound care to check 12/27.  Discuss this with patient and daughter.  On antibiotics for UTI  12/26-will dc to swingbed once accepted. Needs continued wound care.  12/27-DC when bed available  12/28- issues with wound care yesterday.  Dr. Singer had to come remove dressings himself due to adherence to subcutaneous tissue.  Family refusing transfer to [facility] due to lack of specially trained wound care team availability.  Planning to transfer to specialty but resistant to that as well and requesting to speak to administration.        Interval History: naeo    Review of Systems   Constitutional:  Negative for chills, fatigue, fever and unexpected weight change.   HENT:  Negative for congestion, mouth sores and sore throat.    Eyes:  Negative for photophobia and visual disturbance.   Respiratory:  Negative for cough, chest tightness, shortness of breath and wheezing.    Cardiovascular:  Negative for chest pain, palpitations and leg swelling.   Gastrointestinal:  Negative for abdominal pain, diarrhea, nausea and vomiting.   Endocrine: Negative for cold intolerance and heat intolerance.   Genitourinary:  Negative for difficulty urinating, dysuria, frequency and  urgency.   Musculoskeletal:  Positive for arthralgias. Negative for back pain and myalgias.   Skin:  Negative for pallor and rash.   Neurological:  Negative for tremors, seizures, syncope, weakness, numbness and headaches.   Hematological:  Does not bruise/bleed easily.   Psychiatric/Behavioral:  Negative for agitation, confusion, hallucinations and suicidal ideas.    Objective:     Vital Signs (Most Recent):  Temp: 98.5 °F (36.9 °C) (12/28/22 1605)  Pulse: (!) 55 (12/28/22 1605)  Resp: 18 (12/28/22 1605)  BP: (!) 118/41 (12/28/22 1605)  SpO2: 95 % (12/28/22 1605)   Vital Signs (24h Range):  Temp:  [96.9 °F (36.1 °C)-98.5 °F (36.9 °C)] 98.5 °F (36.9 °C)  Pulse:  [51-61] 55  Resp:  [18-20] 18  SpO2:  [95 %-97 %] 95 %  BP: (113-135)/(41-74) 118/41     Weight: 63.6 kg (140 lb 3.4 oz)  Body mass index is 25.65 kg/m².    Intake/Output Summary (Last 24 hours) at 12/28/2022 1726  Last data filed at 12/28/2022 1650  Gross per 24 hour   Intake --   Output 900 ml   Net -900 ml        Physical Exam  Vitals reviewed.   Constitutional:       Appearance: Normal appearance.   HENT:      Head: Normocephalic and atraumatic.   Eyes:      Extraocular Movements: Extraocular movements intact.   Cardiovascular:      Rate and Rhythm: Normal rate and regular rhythm.      Pulses: Normal pulses.   Pulmonary:      Effort: Pulmonary effort is normal.      Breath sounds: Normal breath sounds.   Abdominal:      General: Abdomen is flat.      Palpations: Abdomen is soft.   Musculoskeletal:         General: Tenderness and signs of injury present.      Comments: Dressed left lower extremity, painful to touch   Skin:     General: Skin is warm and dry.      Capillary Refill: Capillary refill takes less than 2 seconds.   Neurological:      General: No focal deficit present.      Mental Status: She is alert and oriented to person, place, and time.   Psychiatric:         Mood and Affect: Mood normal.         Behavior: Behavior normal.       Significant  Labs: All pertinent labs within the past 24 hours have been reviewed.    Significant Imaging: I have reviewed all pertinent imaging results/findings within the past 24 hours.      Assessment/Plan:      * Delmy-prosthetic fracture around prosthetic hip  S/p fixation  Stable for dc to swingbed    Unspecified open wound, left lower leg, initial encounter  2/2 protective device placed in surgery. Injury into subcutaneous tissue layer.  Consulted general surgery yesterday for issues with dressing adherence.  She needs continued wound care.  Will attempt tomorrow to get her to specialty however family is resistant at the moment.      Skin tear of upper arm without complication, right, initial encounter    Wound care    Heart murmur    Echo Summary this admit     The left ventricle is normal in size with hyperdynamic systolic function.   The estimated ejection fraction is 75%.   Left ventricular diastolic dysfunction.   Normal right ventricular size.   There is mild aortic valve stenosis.   Aortic valve area is 1.94 cm2; peak velocity is 3.0 m/s; mean gradient is 18 mmHg.   Moderate-to-severe mitral regurgitation.   Mild tricuspid regurgitation.   Mild pulmonic regurgitation.   Normal central venous pressure (3 mmHg).   The estimated PA systolic pressure is 32 mmHg.   Trivial pericardial effusion.    PCP follow up    Palpitations  resolved      Other fracture of left femur, initial encounter for closed fracture  S/p OR      Chest pain  resolved      Acute cystitis without hematuria  Pseudomonas  Continue cefepime for 7 days   Exchange chao    Primary osteoarthritis of right knee  OP follow up      Primary osteoarthritis of right shoulder  OP follow up      Lumbar radiculopathy  OP follow up  Pain control      Closed fracture of left hip    Surgery 12/22    Hypertension  Adjust medications as needed    Arthritis    Tylenol p.r.n..      VTE Risk Mitigation (From admission, onward)         Ordered     apixaban  tablet 2.5 mg  2 times daily         12/26/22 0717     IP VTE HIGH RISK PATIENT  Once         12/26/22 0717     Place KARINA hose  Until discontinued         12/26/22 0717     Place sequential compression device  Until discontinued         12/26/22 0717     Place sequential compression device  Until discontinued         12/17/22 1253                Discharge Planning   YANIV:      Code Status: Full Code   Is the patient medically ready for discharge?:     Reason for patient still in hospital (select all that apply): Treatment  Discharge Plan A: Home                  Shai Guerra DO  Department of Hospital Medicine   [facility] - [facility] Telemetry

## 2022-12-28 NOTE — ANESTHESIA POSTPROCEDURE EVALUATION
Anesthesia Post Evaluation    Patient: Zandra Veloz    Procedure(s) Performed: Procedure(s) (LRB):  REVISION, TOTAL ARTHROPLASTY, HIP, VICTORIANO PROSTHETIC FX (Left)    Final Anesthesia Type: general      Patient location during evaluation: PACU  Post-procedure vital signs: reviewed and stable  Pain management: adequate  Airway patency: patent    PONV status at discharge: No PONV  Anesthetic complications: no      Cardiovascular status: hemodynamically stable  Respiratory status: unassisted  Hydration status: euvolemic  Follow-up not needed.          Vitals Value Taken Time   /74 12/28/22 1040   Temp 36.6 °C (97.9 °F) 12/28/22 1040   Pulse 51 12/28/22 1040   Resp 18 12/28/22 1135   SpO2 95 % 12/28/22 1040         Event Time   Out of Recovery 12/22/2022 17:15:00         Pain/Blanche Score: Pain Rating Prior to Med Admin: 5 (12/28/2022 11:35 AM)  Pain Rating Post Med Admin: 2 (12/28/2022  9:36 AM)

## 2022-12-28 NOTE — HPI
88-year-old female patient who underwent left hip replacement during this hospitalization.  She has been noted to have very fragile skin, and has had multiple skin tears of the lower extremities as well as recent skin tears from the left upper extremity, which occurred after she suffered a fall (the same fall in which she broke her left hip).  General surgery is consulted to assist management with the dressings and wound care management.

## 2022-12-28 NOTE — PROGRESS NOTES
Postop day 5. Status post revision/repair comminuted displaced periprosthetic fracture of the left hip.  Stable postoperative course thus far.  Major difficulty at this time is extremely fragile skin with multiple low trauma tearing of both upper and lower extremities.  Wound care in countered some adherence of the surgical op site dressings.  These were teased away by myself using forceps and peroxide tripping without any additional trauma or loss of adherence.  Dr. Thakkar (general surgery) was in attendance and will be addressing skin issues in conjunction with wound care.  Her left hip incision is clean and intact with no signs of erythema or infection.

## 2022-12-28 NOTE — PT/OT/SLP PROGRESS
Occupational Therapy   Treatment    Name: Zandra Veloz  MRN: [id number]  Admitting Diagnosis:  Delmy-prosthetic fracture around prosthetic hip  6 Days Post-Op    Recommendations:     Discharge Recommendations: nursing facility, skilled  Discharge Equipment Recommendations:   (to be determined)  Barriers to discharge:  None    Assessment:     Zandra Veloz is a 88 y.o. female with a medical diagnosis of Delmy-prosthetic fracture around prosthetic hip.  She presents with weakness, decreased functional mobility, and decline in ADLs. Performance deficits affecting function are weakness, impaired endurance, impaired self care skills, impaired functional mobility, gait instability, impaired balance, pain.     Rehab Prognosis:  Good; patient would benefit from acute skilled OT services to address these deficits and reach maximum level of function.       Plan:     Patient to be seen 5 x/week to address the above listed problems via self-care/home management, therapeutic activities, therapeutic exercises  Plan of Care Expires: 01/20/23  Plan of Care Reviewed with: patient    Subjective     Pain/Comfort:  Pain Rating 1: 4/10  Location - Side 1: Left  Location 1: leg    Objective:     Communicated with: CLYDE Gonzalez prior to session.  Patient found up in chair with peripheral IV, telemetry upon OT entry to room.    General Precautions: Standard, fall    Orthopedic Precautions:LLE posterior precautions, LLE toe touch weight bearing  Braces: N/A  Respiratory Status: Room air     Occupational Performance:     Bed Mobility:    Not performed     Functional Mobility/Transfers:  Not performed    Activities of Daily Living:  Not performed      [facility] [address] [facility] 6 Click ADL:      Treatment & Education:  Pt performed 2 sets x 12 reps each bicep curls 1#db, chest press 1#db, IR/ER 1#db, and tricep press red tband in order to improve BUE strength and endurance to perform ADL and ADL t/f with less assist.     Patient left up in chair  with all lines intact, call button in reach, and sitter present    GOALS:   Multidisciplinary Problems       Occupational Therapy Goals          Problem: Occupational Therapy    Goal Priority Disciplines Outcome Interventions   Occupational Therapy Goal     OT, PT/OT Ongoing, Progressing    Description: STG:  Pt will perform grooming with setup  Pt will bathe anterior upper body with Liset with setup from bed  Pt will perform UE dressing with Liset  Pt will perform LE dressing with MaxA with AD   Pt will sit EOB x 10 min with CGA   Pt will transfer bed/chair/bsc with MaxA with stand pivot t/f with RW  Pt will tolerate 15 minutes of tx without fatigue      LT.Restore to max I with self care and mobility.                           Time Tracking:     OT Date of Treatment: 22  OT Start Time: 1313  OT Stop Time: 1331  OT Total Time (min): 18 min    Billable Minutes:Therapeutic Exercise 18 minutes    OT/BRIGHT: OT          2022

## 2022-12-28 NOTE — PT/OT/SLP PROGRESS
Physical Therapy Treatment    Patient Name:  Zandra Veloz   MRN:  [id number]    Recommendations:     Discharge Recommendations: nursing facility, skilled  Discharge Equipment Recommendations: hospital bed, lift device  Barriers to discharge: Inaccessible home and Decreased caregiver support    Assessment:     Zandra Veloz is a 88 y.o. female admitted with a medical diagnosis of Victoriano-prosthetic fracture around prosthetic hip.  She presents with the following impairments/functional limitations: weakness, impaired endurance, impaired functional mobility, gait instability, impaired balance, decreased lower extremity function, pain, decreased ROM, impaired skin, edema, orthopedic precautions Pt still very limited in mobility due to weightbearing status and fragile skin. Slowly improving in AROM of LLE. Up to chair with maximum assistance via stand pivot transfer..    Rehab Prognosis: Fair; patient would benefit from acute skilled PT services to address these deficits and reach maximum level of function.    Recent Surgery: Procedure(s) (LRB):  REVISION, TOTAL ARTHROPLASTY, HIP, VICTORIANO PROSTHETIC FX (Left) 6 Days Post-Op    Plan:     During this hospitalization, patient to be seen BID (5x/wk; daily 2x/wk) to address the identified rehab impairments via gait training, therapeutic activities, therapeutic exercises, wheelchair management/training, neuromuscular re-education and progress toward the following goals:    Plan of Care Expires:  01/23/23    Subjective     Chief Complaint: left hip fx  Patient/Family Comments/goals: Pt had dressing changed yesterday which she said was very painful  Pain/Comfort:  Pain Rating 1: 3/10  Location - Side 1: Left  Location 1: leg  Pain Addressed 1: Pre-medicate for activity  Pain Rating Post-Intervention 1: 5/10      Objective:     Communicated with SILVER Pagan RN prior to session.  Patient found supine with peripheral IV, telemetry, chao catheter upon PT entry to  room.     General Precautions: Standard, fall  Orthopedic Precautions: LLE posterior precautions, LLE toe touch weight bearing  Braces: N/A  Respiratory Status: Room air     Functional Mobility:  Bed Mobility:     Scooting: maximal assistance  Supine to Sit: maximal assistance and of 2 persons  Transfers:     Bed to Chair: maximal assistance with  gait belt  using  Stand Pivot  Balance: poor, posterior lean during sitting      AM-PAC 6 CLICK MOBILITY  Turning over in bed (including adjusting bedclothes, sheets and blankets)?: 2  Sitting down on and standing up from a chair with arms (e.g., wheelchair, bedside commode, etc.): 2  Moving from lying on back to sitting on the side of the bed?: 2  Moving to and from a bed to a chair (including a wheelchair)?: 2  Need to walk in hospital room?: 1  Climbing 3-5 steps with a railing?: 1  Basic Mobility Total Score: 10       Treatment & Education:  Pt performed left LE: ankle pumps, short arc quads, heel slides, and hip abduction/adduction x 30 each  Edge of bed sitting with minimal assistance to moderate assistance for trunk positioning  Maximum assistance SPT to chair    Patient left up in chair with all lines intact and call button in reach..    GOALS:   Multidisciplinary Problems       Physical Therapy Goals          Problem: Physical Therapy    Goal Priority Disciplines Outcome Goal Variances Interventions   Physical Therapy Goal     PT, PT/OT Ongoing, Progressing     Description: Short term goals:  1. Supine to sit with Moderate Assistance  2. Rolling to Left and Right with Moderate Assistance.  3. Bed to chair transfer with Maximum Assistance using stand pivot transfer  4. Sitting at edge of bed x15 minutes with Minimal Assistance    Long term goals:  1. Supine to sit with MInimal Assistance  2. Sit to stand transfer with Minimal Assistance  3. Bed to chair transfer with Moderate Assistance using Stand pivot transfer  4. Sitting at edge of bed x20 minutes with Modified  [place]                         Time Tracking:     PT Received On: 12/28/22  PT Start Time: 1057     PT Stop Time: 1126  PT Total Time (min): 29 min     Billable Minutes: Therapeutic Activity 15 and Therapeutic Exercise 14    Treatment Type: Treatment  PT/PTA: PT     PTA Visit Number: 0     12/28/2022

## 2022-12-28 NOTE — CONSULTS
[facility] - [facility] Telemetry  General Surgery  Consult Note    Patient Name: Zandra Veloz  MRN: [id number]  Code Status: Full Code  Admission Date: 12/17/2022  Hospital Length of Stay: 11 days  Attending Physician: Shai Guerra DO  Primary Care Provider: Brandon Thornton MD    Patient information was obtained from relative(s) and primary team.     Inpatient consult to General Surgery  Consult performed by: Wil Thakkar MD  Consult ordered by: Shai Guerra DO  Reason for consult: wounds  Assessment/Recommendations: Xeroform to lacerations, changing every 2-3 days        Subjective:     Principal Problem: Delmy-prosthetic fracture around prosthetic hip    History of Present Illness: 88-year-old female patient who underwent left hip replacement during this hospitalization.  She has been noted to have very fragile skin, and has had multiple skin tears of the lower extremities as well as recent skin tears from the left upper extremity, which occurred after she suffered a fall (the same fall in which she broke her left hip).  General surgery is consulted to assist management with the dressings and wound care management.      No current facility-administered medications on file prior to encounter.     Current Outpatient Medications on File Prior to Encounter   Medication Sig    ALPRAZolam (XANAX) 1 MG tablet Take 1 tablet (1 mg total) by mouth 2 (two) times daily.    diclofenac sodium (VOLTAREN) 1 % Gel Apply 2 g topically 2 (two) times daily.    diltiaZEM (CARDIZEM CD) 120 MG Cp24 Take 1 capsule (120 mg total) by mouth once daily.    famotidine (PEPCID) 20 MG tablet Take 1 tablet (20 mg total) by mouth once daily.    gabapentin (NEURONTIN) 100 MG capsule Take 1 capsule (100 mg total) by mouth 3 (three) times daily.    HYDROcodone-acetaminophen (NORCO) 5-325 mg per tablet Take 1 tablet by mouth every 6 (six) hours as needed for Pain.    HYDROcodone-acetaminophen (NORCO) 7.5-325 mg per  tablet Take 1 tablet by mouth every 8 (eight) hours as needed for Pain.    levothyroxine (SYNTHROID) 125 MCG tablet Take 1 tablet (125 mcg total) by mouth before breakfast.    lisinopriL (PRINIVIL,ZESTRIL) 20 MG tablet Take 1 tablet (20 mg total) by mouth once daily.    lisinopriL 10 MG tablet Take 1 tablet (10 mg total) by mouth once daily. (Patient not taking: Reported on 7/12/2022)    lisinopriL-hydrochlorothiazide (PRINZIDE,ZESTORETIC) 20-12.5 mg per tablet Take 1 tablet by mouth once daily.    mupirocin (BACTROBAN) 2 % ointment Apply topically 3 (three) times daily. (Patient not taking: Reported on 7/12/2022)    nitrofurantoin, macrocrystal-monohydrate, (MACROBID) 100 MG capsule Take 1 capsule (100 mg total) by mouth 2 (two) times daily.    pantoprazole (PROTONIX) 40 MG tablet Take 1 tablet (40 mg total) by mouth once daily.       Review of patient's allergies indicates:   Allergen Reactions    Bactrim [sulfamethoxazole-trimethoprim]     Codeine     Levaquin [levofloxacin]        Past Medical History:   Diagnosis Date    Hypertension     Osteoporosis     Thyroid disorder      Past Surgical History:   Procedure Laterality Date    BLADDER SURGERY      HEMIARTHROPLASTY OF HIP Left 9/21/2021    Procedure: HEMIARTHROPLASTY, HIP;  Surgeon: Dequan Singer MD;  [facility];  Service: Orthopedics;  Laterality: Left;    PARTIAL HIP ARTHROPLASTY Right     Dr Singer    REVISION TOTAL HIP ARTHROPLASTY Left 12/22/2022    Procedure: REVISION, TOTAL ARTHROPLASTY, HIP, VICTORIANO PROSTHETIC FX;  Surgeon: Dequan Singer MD;  [facility];  Service: Orthopedics;  Laterality: Left;     Family History       Problem Relation (Age of Onset)    Bladder Cancer Father    Heart attack Mother    Heart disease Mother    No Known Problems Sister, Brother, Maternal Grandmother, Maternal Grandfather, Paternal Grandmother, Paternal Grandfather          Tobacco Use    Smoking status: Former    Smokeless  tobacco: Never   Substance and Sexual Activity    Alcohol use: Never    Drug use: Never    Sexual activity: Not Currently     Review of Systems   All other systems reviewed and are negative.  Objective:     Vital Signs (Most Recent):  Temp: 97.9 °F (36.6 °C) (12/28/22 1040)  Pulse: (!) 51 (12/28/22 1040)  Resp: 18 (12/28/22 1135)  BP: 113/74 (12/28/22 1040)  SpO2: 95 % (12/28/22 1040)   Vital Signs (24h Range):  Temp:  [96.9 °F (36.1 °C)-97.9 °F (36.6 °C)] 97.9 °F (36.6 °C)  Pulse:  [51-61] 51  Resp:  [18-20] 18  SpO2:  [95 %-97 %] 95 %  BP: (113-135)/(43-74) 113/74     Weight: 63.6 kg (140 lb 3.4 oz)  Body mass index is 25.65 kg/m².    Physical Exam  Cardiovascular:      Rate and Rhythm: Normal rate.   Pulmonary:      Effort: Pulmonary effort is normal. No respiratory distress.   Abdominal:      Palpations: Abdomen is soft.   Skin:     Comments: Skin tear of the left lower leg medially as well as the left thigh medially.  Well-healed incision of the left lateral healed  Blister noted posterior to the right knee and small ulceration of the medial right thigh.  There is a healing laceration of the left upper arm from the triceps area across the elbow to the forearm.   Neurological:      General: No focal deficit present.      Mental Status: She is alert.       Significant Labs:  CBC:   Recent Labs   Lab 12/28/22  0324   WBC 14.43*   RBC 3.17*   HGB 9.6*   HCT 31.7*   *   .0*   MCH 30.3   MCHC 30.3*     BMP:   Recent Labs   Lab 12/28/22  0324   GLU 77      K 5.1      CO2 26   BUN 19*   CREATININE 0.72   CALCIUM 8.2*       Significant Diagnostics:  I have reviewed all pertinent imaging results/findings within the past 24 hours.  None      Assessment/Plan:     Skin tear of lower leg without complication, left, initial encounter  Place nonadherent (Xeroform) gauze to this skin tear, as well as the others, to prevent infection, and promote skin healing.    Leave dressings for 2-3 days prior to  changing.      VTE Risk Mitigation (From admission, onward)           Ordered     apixaban tablet 2.5 mg  2 times daily         12/26/22 0717     IP VTE HIGH RISK PATIENT  Once         12/26/22 0717     Place KARINA hose  Until discontinued         12/26/22 0717     Place sequential compression device  Until discontinued         12/26/22 0717     Place sequential compression device  Until discontinued         12/17/22 4420                    Thank you for your consult. I will follow-up with patient. Please contact us if you have any additional questions.    Wil Thakkar MD  General Surgery  [facility] - [address] [place] [address]

## 2022-12-28 NOTE — NURSING
Taylor TANG made rounds, daughter not in room but sitter told taylor to call her and that she was expecting her phone call.

## 2022-12-28 NOTE — NURSING
Patient lying in bed awake. Resp even and unlabored. No complaints voiced. Sitter present at bedside. Safety measures ongoing.

## 2022-12-28 NOTE — ASSESSMENT & PLAN NOTE
Place nonadherent (Xeroform) gauze to this skin tear, as well as the others, to prevent infection, and promote skin healing.    Leave dressings for 2-3 days prior to changing.

## 2022-12-28 NOTE — SUBJECTIVE & OBJECTIVE
No current facility-administered medications on file prior to encounter.     Current Outpatient Medications on File Prior to Encounter   Medication Sig    ALPRAZolam (XANAX) 1 MG tablet Take 1 tablet (1 mg total) by mouth 2 (two) times daily.    diclofenac sodium (VOLTAREN) 1 % Gel Apply 2 g topically 2 (two) times daily.    diltiaZEM (CARDIZEM CD) 120 MG Cp24 Take 1 capsule (120 mg total) by mouth once daily.    famotidine (PEPCID) 20 MG tablet Take 1 tablet (20 mg total) by mouth once daily.    gabapentin (NEURONTIN) 100 MG capsule Take 1 capsule (100 mg total) by mouth 3 (three) times daily.    HYDROcodone-acetaminophen (NORCO) 5-325 mg per tablet Take 1 tablet by mouth every 6 (six) hours as needed for Pain.    HYDROcodone-acetaminophen (NORCO) 7.5-325 mg per tablet Take 1 tablet by mouth every 8 (eight) hours as needed for Pain.    levothyroxine (SYNTHROID) 125 MCG tablet Take 1 tablet (125 mcg total) by mouth before breakfast.    lisinopriL (PRINIVIL,ZESTRIL) 20 MG tablet Take 1 tablet (20 mg total) by mouth once daily.    lisinopriL 10 MG tablet Take 1 tablet (10 mg total) by mouth once daily. (Patient not taking: Reported on 7/12/2022)    lisinopriL-hydrochlorothiazide (PRINZIDE,ZESTORETIC) 20-12.5 mg per tablet Take 1 tablet by mouth once daily.    mupirocin (BACTROBAN) 2 % ointment Apply topically 3 (three) times daily. (Patient not taking: Reported on 7/12/2022)    nitrofurantoin, macrocrystal-monohydrate, (MACROBID) 100 MG capsule Take 1 capsule (100 mg total) by mouth 2 (two) times daily.    pantoprazole (PROTONIX) 40 MG tablet Take 1 tablet (40 mg total) by mouth once daily.       Review of patient's allergies indicates:   Allergen Reactions    Bactrim [sulfamethoxazole-trimethoprim]     Codeine     Levaquin [levofloxacin]        Past Medical History:   Diagnosis Date    Hypertension     Osteoporosis     Thyroid disorder      Past Surgical History:   Procedure Laterality Date    BLADDER SURGERY       HEMIARTHROPLASTY OF HIP Left 9/21/2021    Procedure: HEMIARTHROPLASTY, HIP;  Surgeon: Dequan Singer MD;  Location: [facility];  Service: Orthopedics;  Laterality: Left;    PARTIAL HIP ARTHROPLASTY Right     Dr Singer    REVISION TOTAL HIP ARTHROPLASTY Left 12/22/2022    Procedure: REVISION, TOTAL ARTHROPLASTY, HIP, VICTORIANO PROSTHETIC FX;  Surgeon: Dequan Singer MD;  Location: [facility];  Service: Orthopedics;  Laterality: Left;     Family History       Problem Relation (Age of Onset)    Bladder Cancer Father    Heart attack Mother    Heart disease Mother    No Known Problems Sister, Brother, Maternal Grandmother, Maternal Grandfather, Paternal Grandmother, Paternal Grandfather          Tobacco Use    Smoking status: Former    Smokeless tobacco: Never   Substance and Sexual Activity    Alcohol use: Never    Drug use: Never    Sexual activity: Not Currently     Review of Systems   All other systems reviewed and are negative.  Objective:     Vital Signs (Most Recent):  Temp: 97.9 °F (36.6 °C) (12/28/22 1040)  Pulse: (!) 51 (12/28/22 1040)  Resp: 18 (12/28/22 1135)  BP: 113/74 (12/28/22 1040)  SpO2: 95 % (12/28/22 1040)   Vital Signs (24h Range):  Temp:  [96.9 °F (36.1 °C)-97.9 °F (36.6 °C)] 97.9 °F (36.6 °C)  Pulse:  [51-61] 51  Resp:  [18-20] 18  SpO2:  [95 %-97 %] 95 %  BP: (113-135)/(43-74) 113/74     Weight: 63.6 kg (140 lb 3.4 oz)  Body mass index is 25.65 kg/m².    Physical Exam  Cardiovascular:      Rate and Rhythm: Normal rate.   Pulmonary:      Effort: Pulmonary effort is normal. No respiratory distress.   Abdominal:      Palpations: Abdomen is soft.   Skin:     Comments: Skin tear of the left lower leg medially as well as the left thigh medially.  Well-healed incision of the left lateral healed  Blister noted posterior to the right knee and small ulceration of the medial right thigh.  There is a healing laceration of the left upper arm from the triceps area across the elbow to the  forearm.   Neurological:      General: No focal deficit present.      Mental Status: She is alert.       Significant Labs:  CBC:   Recent Labs   Lab 12/28/22 0324   WBC 14.43*   RBC 3.17*   HGB 9.6*   HCT 31.7*   *   .0*   MCH 30.3   MCHC 30.3*     BMP:   Recent Labs   Lab 12/28/22 0324   GLU 77      K 5.1      CO2 26   BUN 19*   CREATININE 0.72   CALCIUM 8.2*       Significant Diagnostics:  I have reviewed all pertinent imaging results/findings within the past 24 hours.  None

## 2022-12-28 NOTE — PLAN OF CARE
Spoke with Dr Guerra about plan of care. Daughter Paola does not want patient to go to [facility] at this time as patient will need wound care nurses to change dressings on legs as she was told by Dr Singer and Dr Thakkar. Daughter would like to consider options before deciding where to go. Spoke with her about [facility] and she states that may be an option. She will be back at hospital in the morning. Will plan to follow up with daughter then.

## 2022-12-28 NOTE — ASSESSMENT & PLAN NOTE
2/2 protective device placed in surgery. Injury into subcutaneous tissue layer.  Consulted general surgery yesterday for issues with dressing adherence.  She needs continued wound care.  Will attempt tomorrow to get her to specialty however family is resistant at the moment.

## 2022-12-28 NOTE — PLAN OF CARE
Spoke with Gricelda at [facility]. She has been accepted for swb. Dr Guerra aware and states will plan for dc tomorrow. Packet in cubby at nurses station.

## 2022-12-29 ENCOUNTER — HOSPITAL ENCOUNTER (INPATIENT)
Facility: HOSPITAL | Age: 87
LOS: 53 days | Discharge: NURSING, REHAB OR LONG-TERM CARE | DRG: 690 | End: 2023-02-20
Attending: STUDENT IN AN ORGANIZED HEALTH CARE EDUCATION/TRAINING PROGRAM | Admitting: STUDENT IN AN ORGANIZED HEALTH CARE EDUCATION/TRAINING PROGRAM
Payer: MEDICARE

## 2022-12-29 VITALS
HEART RATE: 60 BPM | OXYGEN SATURATION: 96 % | HEIGHT: 62 IN | TEMPERATURE: 98 F | RESPIRATION RATE: 18 BRPM | BODY MASS INDEX: 25.8 KG/M2 | SYSTOLIC BLOOD PRESSURE: 125 MMHG | WEIGHT: 140.19 LBS | DIASTOLIC BLOOD PRESSURE: 48 MMHG

## 2022-12-29 DIAGNOSIS — K21.9 GASTROESOPHAGEAL REFLUX DISEASE, UNSPECIFIED WHETHER ESOPHAGITIS PRESENT: ICD-10-CM

## 2022-12-29 DIAGNOSIS — R00.0 TACHYCARDIA: ICD-10-CM

## 2022-12-29 DIAGNOSIS — R07.9 CHEST PAIN: ICD-10-CM

## 2022-12-29 DIAGNOSIS — T81.31XD DISRUPTION OF EXTERNAL SURGICAL WOUND, SUBSEQUENT ENCOUNTER: ICD-10-CM

## 2022-12-29 DIAGNOSIS — N30.00 ACUTE CYSTITIS WITHOUT HEMATURIA: ICD-10-CM

## 2022-12-29 DIAGNOSIS — N39.0 UTI (URINARY TRACT INFECTION): ICD-10-CM

## 2022-12-29 DIAGNOSIS — Z98.890 S/P ORIF (OPEN REDUCTION INTERNAL FIXATION) FRACTURE: ICD-10-CM

## 2022-12-29 DIAGNOSIS — Z87.81 S/P ORIF (OPEN REDUCTION INTERNAL FIXATION) FRACTURE: ICD-10-CM

## 2022-12-29 DIAGNOSIS — S81.802D OPEN WOUND OF LEFT LOWER LEG, SUBSEQUENT ENCOUNTER: ICD-10-CM

## 2022-12-29 DIAGNOSIS — T14.8XXA MULTIPLE SKIN TEARS: ICD-10-CM

## 2022-12-29 DIAGNOSIS — T14.90XA WOUNDS AND INJURIES: Primary | ICD-10-CM

## 2022-12-29 DIAGNOSIS — N39.0 URINARY TRACT INFECTION: ICD-10-CM

## 2022-12-29 PROBLEM — R01.1 HEART MURMUR: Status: RESOLVED | Noted: 2022-12-20 | Resolved: 2022-12-29

## 2022-12-29 PROBLEM — S72.8X2A OTHER FRACTURE OF LEFT FEMUR, INITIAL ENCOUNTER FOR CLOSED FRACTURE: Status: RESOLVED | Noted: 2022-12-18 | Resolved: 2022-12-29

## 2022-12-29 PROBLEM — R00.2 PALPITATIONS: Status: RESOLVED | Noted: 2022-12-18 | Resolved: 2022-12-29

## 2022-12-29 LAB
ALBUMIN SERPL BCP-MCNC: 1.7 G/DL (ref 3.5–5)
ALBUMIN/GLOB SERPL: 0.5 {RATIO}
ALP SERPL-CCNC: 65 U/L (ref 55–142)
ALT SERPL W P-5'-P-CCNC: 8 U/L (ref 13–56)
ANION GAP SERPL CALCULATED.3IONS-SCNC: 11 MMOL/L (ref 7–16)
AST SERPL W P-5'-P-CCNC: 26 U/L (ref 15–37)
ATYPICAL LYMPHOCYTES: ABNORMAL
BASOPHILS # BLD AUTO: 0.13 K/UL (ref 0–0.2)
BASOPHILS NFR BLD AUTO: 0.9 % (ref 0–1)
BILIRUB SERPL-MCNC: 0.3 MG/DL (ref ?–1.2)
BUN SERPL-MCNC: 17 MG/DL (ref 7–18)
BUN/CREAT SERPL: 25 (ref 6–20)
CALCIUM SERPL-MCNC: 8 MG/DL (ref 8.5–10.1)
CHLORIDE SERPL-SCNC: 104 MMOL/L (ref 98–107)
CO2 SERPL-SCNC: 26 MMOL/L (ref 21–32)
CREAT SERPL-MCNC: 0.68 MG/DL (ref 0.55–1.02)
DIFFERENTIAL METHOD BLD: ABNORMAL
EGFR (NO RACE VARIABLE) (RUSH/TITUS): 84 ML/MIN/1.73M²
EOSINOPHIL # BLD AUTO: 0.33 K/UL (ref 0–0.5)
EOSINOPHIL NFR BLD AUTO: 2.2 % (ref 1–4)
EOSINOPHIL NFR BLD MANUAL: 2 % (ref 1–4)
ERYTHROCYTE [DISTWIDTH] IN BLOOD BY AUTOMATED COUNT: 14 % (ref 11.5–14.5)
GLOBULIN SER-MCNC: 3.3 G/DL (ref 2–4)
GLUCOSE SERPL-MCNC: 85 MG/DL (ref 74–106)
HCT VFR BLD AUTO: 27.6 % (ref 38–47)
HGB BLD-MCNC: 9 G/DL (ref 12–16)
IMM GRANULOCYTES # BLD AUTO: 1.75 K/UL (ref 0–0.04)
IMM GRANULOCYTES NFR BLD: 11.9 % (ref 0–0.4)
LYMPHOCYTES # BLD AUTO: 2.36 K/UL (ref 1–4.8)
LYMPHOCYTES NFR BLD AUTO: 16 % (ref 27–41)
LYMPHOCYTES NFR BLD MANUAL: 26 % (ref 27–41)
MCH RBC QN AUTO: 30.6 PG (ref 27–31)
MCHC RBC AUTO-ENTMCNC: 32.6 G/DL (ref 32–36)
MCV RBC AUTO: 93.9 FL (ref 80–96)
MONOCYTES # BLD AUTO: 1.01 K/UL (ref 0–0.8)
MONOCYTES NFR BLD AUTO: 6.9 % (ref 2–6)
MONOCYTES NFR BLD MANUAL: 5 % (ref 2–6)
MPC BLD CALC-MCNC: 9.4 FL (ref 9.4–12.4)
NEUTROPHILS # BLD AUTO: 9.14 K/UL (ref 1.8–7.7)
NEUTROPHILS NFR BLD AUTO: 62.1 % (ref 53–65)
NEUTS BAND NFR BLD MANUAL: 1 % (ref 1–5)
NEUTS SEG NFR BLD MANUAL: 66 % (ref 50–62)
NRBC # BLD AUTO: 0 X10E3/UL
NRBC, AUTO (.00): 0 %
OVALOCYTES BLD QL SMEAR: ABNORMAL
PLATELET # BLD AUTO: 456 K/UL (ref 150–400)
PLATELET MORPHOLOGY: ABNORMAL
POTASSIUM SERPL-SCNC: 4.8 MMOL/L (ref 3.5–5.1)
PROT SERPL-MCNC: 5 G/DL (ref 6.4–8.2)
RBC # BLD AUTO: 2.94 M/UL (ref 4.2–5.4)
SODIUM SERPL-SCNC: 136 MMOL/L (ref 136–145)
WBC # BLD AUTO: 14.72 K/UL (ref 4.5–11)

## 2022-12-29 PROCEDURE — 85025 COMPLETE CBC W/AUTO DIFF WBC: CPT | Performed by: STUDENT IN AN ORGANIZED HEALTH CARE EDUCATION/TRAINING PROGRAM

## 2022-12-29 PROCEDURE — 97110 THERAPEUTIC EXERCISES: CPT

## 2022-12-29 PROCEDURE — 25000003 PHARM REV CODE 250: Performed by: ORTHOPAEDIC SURGERY

## 2022-12-29 PROCEDURE — 25000003 PHARM REV CODE 250: Performed by: INTERNAL MEDICINE

## 2022-12-29 PROCEDURE — 99239 PR HOSPITAL DISCHARGE DAY,>30 MIN: ICD-10-PCS | Mod: ,,, | Performed by: STUDENT IN AN ORGANIZED HEALTH CARE EDUCATION/TRAINING PROGRAM

## 2022-12-29 PROCEDURE — 63600175 PHARM REV CODE 636 W HCPCS: Performed by: STUDENT IN AN ORGANIZED HEALTH CARE EDUCATION/TRAINING PROGRAM

## 2022-12-29 PROCEDURE — 97530 THERAPEUTIC ACTIVITIES: CPT

## 2022-12-29 PROCEDURE — 11000001 HC ACUTE MED/SURG PRIVATE ROOM

## 2022-12-29 PROCEDURE — 25000003 PHARM REV CODE 250: Performed by: STUDENT IN AN ORGANIZED HEALTH CARE EDUCATION/TRAINING PROGRAM

## 2022-12-29 PROCEDURE — 99239 HOSP IP/OBS DSCHRG MGMT >30: CPT | Mod: ,,, | Performed by: STUDENT IN AN ORGANIZED HEALTH CARE EDUCATION/TRAINING PROGRAM

## 2022-12-29 PROCEDURE — 36415 COLL VENOUS BLD VENIPUNCTURE: CPT | Performed by: STUDENT IN AN ORGANIZED HEALTH CARE EDUCATION/TRAINING PROGRAM

## 2022-12-29 PROCEDURE — 63600175 PHARM REV CODE 636 W HCPCS: Performed by: HOSPITALIST

## 2022-12-29 PROCEDURE — 25000003 PHARM REV CODE 250: Performed by: NURSE PRACTITIONER

## 2022-12-29 PROCEDURE — 80053 COMPREHEN METABOLIC PANEL: CPT | Performed by: STUDENT IN AN ORGANIZED HEALTH CARE EDUCATION/TRAINING PROGRAM

## 2022-12-29 PROCEDURE — 25000003 PHARM REV CODE 250: Performed by: HOSPITALIST

## 2022-12-29 RX ORDER — ONDANSETRON 2 MG/ML
4 INJECTION INTRAMUSCULAR; INTRAVENOUS EVERY 8 HOURS PRN
Status: ON HOLD
Start: 2022-12-29 | End: 2023-01-19 | Stop reason: CLARIF

## 2022-12-29 RX ORDER — BISACODYL 5 MG
10 TABLET, DELAYED RELEASE (ENTERIC COATED) ORAL ONCE
Refills: 0 | Status: ON HOLD
Start: 2022-12-29 | End: 2023-01-23 | Stop reason: CLARIF

## 2022-12-29 RX ORDER — LANOLIN ALCOHOL/MO/W.PET/CERES
800 CREAM (GRAM) TOPICAL
Refills: 0 | Status: ON HOLD
Start: 2022-12-29 | End: 2023-01-23 | Stop reason: CLARIF

## 2022-12-29 RX ORDER — SODIUM,POTASSIUM PHOSPHATES 280-250MG
2 POWDER IN PACKET (EA) ORAL
Refills: 0
Start: 2022-12-29 | End: 2022-12-29 | Stop reason: HOSPADM

## 2022-12-29 RX ORDER — SODIUM CHLORIDE 0.9 % (FLUSH) 0.9 %
10 SYRINGE (ML) INJECTION EVERY 12 HOURS PRN
Status: DISCONTINUED | OUTPATIENT
Start: 2022-12-29 | End: 2023-02-20 | Stop reason: HOSPADM

## 2022-12-29 RX ORDER — METOPROLOL TARTRATE 50 MG/1
50 TABLET ORAL 2 TIMES DAILY
Status: DISCONTINUED | OUTPATIENT
Start: 2022-12-29 | End: 2023-02-05

## 2022-12-29 RX ORDER — TALC
6 POWDER (GRAM) TOPICAL NIGHTLY PRN
Status: DISCONTINUED | OUTPATIENT
Start: 2022-12-29 | End: 2023-02-20 | Stop reason: HOSPADM

## 2022-12-29 RX ORDER — HYDROCODONE BITARTRATE AND ACETAMINOPHEN 10; 325 MG/1; MG/1
2 TABLET ORAL EVERY 6 HOURS PRN
Refills: 0 | Status: ON HOLD
Start: 2022-12-29 | End: 2023-01-23 | Stop reason: CLARIF

## 2022-12-29 RX ORDER — LEVOTHYROXINE SODIUM 125 UG/1
125 TABLET ORAL
Status: DISCONTINUED | OUTPATIENT
Start: 2022-12-30 | End: 2023-02-20 | Stop reason: HOSPADM

## 2022-12-29 RX ORDER — NALOXONE HCL 0.4 MG/ML
0.02 VIAL (ML) INJECTION
Status: DISCONTINUED | OUTPATIENT
Start: 2022-12-29 | End: 2023-02-20 | Stop reason: HOSPADM

## 2022-12-29 RX ORDER — POLYETHYLENE GLYCOL 3350 17 G/17G
34 POWDER, FOR SOLUTION ORAL 2 TIMES DAILY
Refills: 0 | Status: ON HOLD
Start: 2022-12-29 | End: 2023-01-23 | Stop reason: CLARIF

## 2022-12-29 RX ORDER — HYDRALAZINE HYDROCHLORIDE 25 MG/1
25 TABLET, FILM COATED ORAL EVERY 8 HOURS
Qty: 90 TABLET | Refills: 11 | Status: ON HOLD | OUTPATIENT
Start: 2022-12-29 | End: 2023-01-23 | Stop reason: CLARIF

## 2022-12-29 RX ORDER — PREDNISONE 5 MG/1
5 TABLET ORAL DAILY
Status: ON HOLD
Start: 2022-12-30 | End: 2023-03-02 | Stop reason: SDUPTHER

## 2022-12-29 RX ORDER — HYDRALAZINE HYDROCHLORIDE 25 MG/1
25 TABLET, FILM COATED ORAL EVERY 8 HOURS
Status: DISCONTINUED | OUTPATIENT
Start: 2022-12-29 | End: 2023-02-02

## 2022-12-29 RX ORDER — SODIUM,POTASSIUM PHOSPHATES 280-250MG
2 POWDER IN PACKET (EA) ORAL
Status: DISCONTINUED | OUTPATIENT
Start: 2022-12-29 | End: 2023-02-20 | Stop reason: HOSPADM

## 2022-12-29 RX ORDER — LISINOPRIL 20 MG/1
20 TABLET ORAL DAILY
Qty: 90 TABLET | Refills: 3 | Status: ON HOLD | OUTPATIENT
Start: 2022-12-30 | End: 2023-02-20 | Stop reason: HOSPADM

## 2022-12-29 RX ORDER — DOCUSATE SODIUM 100 MG/1
100 CAPSULE, LIQUID FILLED ORAL 2 TIMES DAILY
Status: DISCONTINUED | OUTPATIENT
Start: 2022-12-29 | End: 2023-02-20 | Stop reason: HOSPADM

## 2022-12-29 RX ORDER — IBUPROFEN 200 MG
24 TABLET ORAL
Status: DISCONTINUED | OUTPATIENT
Start: 2022-12-29 | End: 2023-02-20 | Stop reason: HOSPADM

## 2022-12-29 RX ORDER — DILTIAZEM HYDROCHLORIDE 120 MG/1
120 CAPSULE, COATED, EXTENDED RELEASE ORAL DAILY
Status: DISCONTINUED | OUTPATIENT
Start: 2022-12-30 | End: 2023-02-02

## 2022-12-29 RX ORDER — METOPROLOL TARTRATE 50 MG/1
50 TABLET ORAL 2 TIMES DAILY
Qty: 60 TABLET | Refills: 11 | Status: ON HOLD | OUTPATIENT
Start: 2022-12-29 | End: 2023-01-24 | Stop reason: CLARIF

## 2022-12-29 RX ORDER — NYSTATIN 100000 [USP'U]/ML
500000 SUSPENSION ORAL 4 TIMES DAILY
Status: DISCONTINUED | OUTPATIENT
Start: 2022-12-29 | End: 2023-01-14

## 2022-12-29 RX ORDER — TALC
6 POWDER (GRAM) TOPICAL NIGHTLY PRN
Refills: 0 | Status: ON HOLD
Start: 2022-12-29 | End: 2023-01-23 | Stop reason: CLARIF

## 2022-12-29 RX ORDER — FAMOTIDINE 20 MG/1
20 TABLET, FILM COATED ORAL DAILY
Status: DISCONTINUED | OUTPATIENT
Start: 2022-12-30 | End: 2023-02-02

## 2022-12-29 RX ORDER — SENNOSIDES 8.6 MG/1
1 TABLET ORAL DAILY
Status: ON HOLD
Start: 2022-12-30 | End: 2023-01-23 | Stop reason: CLARIF

## 2022-12-29 RX ORDER — LANOLIN ALCOHOL/MO/W.PET/CERES
800 CREAM (GRAM) TOPICAL
Status: DISCONTINUED | OUTPATIENT
Start: 2022-12-29 | End: 2023-02-20 | Stop reason: HOSPADM

## 2022-12-29 RX ORDER — PREDNISONE 5 MG/1
5 TABLET ORAL DAILY
Status: DISCONTINUED | OUTPATIENT
Start: 2022-12-31 | End: 2023-02-20 | Stop reason: HOSPADM

## 2022-12-29 RX ORDER — HYDROCODONE BITARTRATE AND ACETAMINOPHEN 500; 5 MG/1; MG/1
50 TABLET ORAL
Start: 2022-12-29 | End: 2022-12-29

## 2022-12-29 RX ORDER — NYSTATIN 100000 [USP'U]/ML
500000 SUSPENSION ORAL 4 TIMES DAILY
Qty: 200 ML | Refills: 0 | Status: ON HOLD | OUTPATIENT
Start: 2022-12-29 | End: 2023-01-23 | Stop reason: CLARIF

## 2022-12-29 RX ORDER — KETOROLAC TROMETHAMINE 15 MG/ML
15 INJECTION, SOLUTION INTRAMUSCULAR; INTRAVENOUS ONCE AS NEEDED
Status: COMPLETED | OUTPATIENT
Start: 2022-12-29 | End: 2022-12-29

## 2022-12-29 RX ORDER — BISACODYL 10 MG
10 SUPPOSITORY, RECTAL RECTAL DAILY PRN
Refills: 0
Start: 2022-12-29 | End: 2022-12-29

## 2022-12-29 RX ORDER — ALPRAZOLAM 0.25 MG/1
1 TABLET ORAL 2 TIMES DAILY
Status: DISCONTINUED | OUTPATIENT
Start: 2022-12-29 | End: 2023-01-08

## 2022-12-29 RX ORDER — POLYETHYLENE GLYCOL 3350 17 G/17G
34 POWDER, FOR SOLUTION ORAL 2 TIMES DAILY
Status: DISCONTINUED | OUTPATIENT
Start: 2022-12-29 | End: 2023-01-08

## 2022-12-29 RX ORDER — ONDANSETRON 2 MG/ML
4 INJECTION INTRAMUSCULAR; INTRAVENOUS EVERY 8 HOURS PRN
Status: DISCONTINUED | OUTPATIENT
Start: 2022-12-29 | End: 2023-01-11

## 2022-12-29 RX ORDER — HYDROCODONE BITARTRATE AND ACETAMINOPHEN 10; 325 MG/1; MG/1
2 TABLET ORAL EVERY 6 HOURS PRN
Status: DISCONTINUED | OUTPATIENT
Start: 2022-12-29 | End: 2023-02-05

## 2022-12-29 RX ORDER — MUPIROCIN 20 MG/G
OINTMENT TOPICAL 2 TIMES DAILY
Status: DISPENSED | OUTPATIENT
Start: 2022-12-29 | End: 2023-01-03

## 2022-12-29 RX ORDER — DOCUSATE SODIUM 100 MG/1
100 CAPSULE, LIQUID FILLED ORAL 2 TIMES DAILY
Refills: 0 | Status: ON HOLD
Start: 2022-12-29 | End: 2023-01-23 | Stop reason: CLARIF

## 2022-12-29 RX ORDER — GLUCAGON 1 MG
1 KIT INJECTION
Status: DISCONTINUED | OUTPATIENT
Start: 2022-12-29 | End: 2023-02-20 | Stop reason: HOSPADM

## 2022-12-29 RX ORDER — DIPHENHYDRAMINE HYDROCHLORIDE 50 MG/ML
25 INJECTION INTRAMUSCULAR; INTRAVENOUS EVERY 6 HOURS PRN
Start: 2022-12-29 | End: 2022-12-29

## 2022-12-29 RX ORDER — LISINOPRIL 20 MG/1
20 TABLET ORAL DAILY
Status: DISCONTINUED | OUTPATIENT
Start: 2022-12-31 | End: 2023-02-02

## 2022-12-29 RX ORDER — SENNOSIDES 8.6 MG/1
1 TABLET ORAL DAILY
Status: DISCONTINUED | OUTPATIENT
Start: 2022-12-31 | End: 2023-02-04

## 2022-12-29 RX ORDER — IBUPROFEN 200 MG
16 TABLET ORAL
Status: DISCONTINUED | OUTPATIENT
Start: 2022-12-29 | End: 2023-02-20 | Stop reason: HOSPADM

## 2022-12-29 RX ORDER — BISACODYL 5 MG
10 TABLET, DELAYED RELEASE (ENTERIC COATED) ORAL ONCE
Status: DISCONTINUED | OUTPATIENT
Start: 2022-12-29 | End: 2023-01-08

## 2022-12-29 RX ORDER — PANTOPRAZOLE SODIUM 40 MG/1
40 TABLET, DELAYED RELEASE ORAL DAILY
Status: DISCONTINUED | OUTPATIENT
Start: 2022-12-30 | End: 2023-02-20 | Stop reason: HOSPADM

## 2022-12-29 RX ADMIN — NYSTATIN 500000 UNITS: 500000 SUSPENSION ORAL at 12:12

## 2022-12-29 RX ADMIN — PANTOPRAZOLE SODIUM 40 MG: 40 TABLET, DELAYED RELEASE ORAL at 08:12

## 2022-12-29 RX ADMIN — HYDROCODONE BITARTRATE AND ACETAMINOPHEN 2 TABLET: 10; 325 TABLET ORAL at 03:12

## 2022-12-29 RX ADMIN — NYSTATIN 500000 UNITS: 100000 SUSPENSION ORAL at 09:12

## 2022-12-29 RX ADMIN — KETOROLAC TROMETHAMINE 15 MG: 15 INJECTION, SOLUTION INTRAMUSCULAR; INTRAVENOUS at 12:12

## 2022-12-29 RX ADMIN — NYSTATIN 500000 UNITS: 500000 SUSPENSION ORAL at 08:12

## 2022-12-29 RX ADMIN — NYSTATIN 500000 UNITS: 100000 SUSPENSION ORAL at 05:12

## 2022-12-29 RX ADMIN — PREDNISONE 5 MG: 5 TABLET ORAL at 08:12

## 2022-12-29 RX ADMIN — METOPROLOL TARTRATE 50 MG: 50 TABLET, FILM COATED ORAL at 09:12

## 2022-12-29 RX ADMIN — GABAPENTIN 100 MG: 100 CAPSULE ORAL at 03:12

## 2022-12-29 RX ADMIN — APIXABAN 2.5 MG: 2.5 TABLET, FILM COATED ORAL at 08:12

## 2022-12-29 RX ADMIN — CEFEPIME 1 G: 1 INJECTION, POWDER, FOR SOLUTION INTRAMUSCULAR; INTRAVENOUS at 06:12

## 2022-12-29 RX ADMIN — LEVOTHYROXINE SODIUM 125 MCG: 0.12 TABLET ORAL at 06:12

## 2022-12-29 RX ADMIN — CEFEPIME 1 G: 1 INJECTION, POWDER, FOR SOLUTION INTRAMUSCULAR; INTRAVENOUS at 09:12

## 2022-12-29 RX ADMIN — DILTIAZEM HYDROCHLORIDE 120 MG: 120 CAPSULE, COATED, EXTENDED RELEASE ORAL at 08:12

## 2022-12-29 RX ADMIN — DOCUSATE SODIUM 100 MG: 100 CAPSULE, LIQUID FILLED ORAL at 09:12

## 2022-12-29 RX ADMIN — METOPROLOL TARTRATE 50 MG: 50 TABLET, FILM COATED ORAL at 08:12

## 2022-12-29 RX ADMIN — HYDROCODONE BITARTRATE AND ACETAMINOPHEN 2 TABLET: 10; 325 TABLET ORAL at 09:12

## 2022-12-29 RX ADMIN — ALPRAZOLAM 1 MG: 0.5 TABLET ORAL at 08:12

## 2022-12-29 RX ADMIN — DOCUSATE SODIUM 100 MG: 100 CAPSULE, LIQUID FILLED ORAL at 08:12

## 2022-12-29 RX ADMIN — GABAPENTIN 100 MG: 100 CAPSULE ORAL at 08:12

## 2022-12-29 RX ADMIN — HYDROCODONE BITARTRATE AND ACETAMINOPHEN 2 TABLET: 10; 325 TABLET ORAL at 08:12

## 2022-12-29 RX ADMIN — APIXABAN 2.5 MG: 2.5 TABLET, FILM COATED ORAL at 09:12

## 2022-12-29 RX ADMIN — ALPRAZOLAM 1 MG: 0.25 TABLET ORAL at 09:12

## 2022-12-29 RX ADMIN — LISINOPRIL 20 MG: 20 TABLET ORAL at 08:12

## 2022-12-29 NOTE — NURSING
"Patient sitting up in bed.  Resp even and unlabored. No distress noted.  Patient seems happy and comfortable.    Night sitter at bedside said that she and the patient both "slept like babies".    No compliants of pain at this time.    "

## 2022-12-29 NOTE — PLAN OF CARE
[facility] - [facility] [place] [facility] Telemetry  Discharge Final Note    Primary Care Provider: Brandon Thornton MD    Expected Discharge Date: 12/29/2022    Final Discharge Note (most recent)       Final Note - 12/29/22 1459          Final Note    Assessment Type Final Discharge Note     Anticipated Discharge Disposition Long Term Acute Care        Post-Acute Status    Post-Acute Authorization Placement     Post-Acute Placement Status Set-up Complete/Auth obtained     Patient choice form signed by patient/caregiver List with quality metrics by geographic area provided;List from CMS Compare;List from System Post-Acute Care     Discharge Delays None known at this time                     Important Message from Medicare  Important Message from Medicare regarding Discharge Appeal Rights: Explained to patient/caregiver, Signed/date by patient/caregiver, Given to patient/caregiver     Date IMM was signed: 12/29/22  Time IMM was signed: 1500    Patients daughters have toured Specialty. Plan to dc to [facility] today. Verbal IM.

## 2022-12-29 NOTE — DISCHARGE SUMMARY
[facility] Medicine  Discharge Summary      Patient Name: Zandra Veloz  MRN: [id number]  SUSAN: [id number]  Patient Class: IP- Inpatient  Admission Date: 12/17/2022  Hospital Length of Stay: 12 days  Discharge Date and Time:  12/29/2022 2:58 PM  Attending Physician: Shai Guerra DO   Discharging Provider: Shai Guerra DO  Primary Care Provider: Brandon Thornton MD    Primary Care Team: Networked reference to record PCT     HPI:   88-year-old lady seen emergency room department.  Patient presents after having a fall when she was trying to go to the restroom at her home.  Patient sustained a left femur fracture.  Patient complains of moderate to severe pain in the left hip area.  Patient also complains of chest tightness, she rates it a 5/10 in the chest tightness has been intubate.  Patient also was seen in emergency room department earlier this week per her daughter.  Patient is found to have dehydration and a urinary tract infection.  Current she denies any shortness of breath.  She does not give a history of coronary artery disease.      Procedure(s) (LRB):  REVISION, TOTAL ARTHROPLASTY, HIP, VICTORIANO PROSTHETIC FX (Left)      Hospital Course:   12/19 - records reviewed. Fall without LOC and has left hip fx. No other complaints currently. Seen by cardiology with some CP felt musculoskeletal.  Echo mild AS and mod MR with nl EF. Age increase cardiac risk for surgery but discussed with daughter surgery is urgent and see no benefit in delay medically. Feel low to moderate risk for surgery. Question about UTI. No symptoms. Had UTI in 10.22 not surprising. Lab to do culture on urine in lab. Cover with ATN for prior culture with ATB started. Discussed with Dr Singer and cardiology.  12/20 Tachycardia and elevated BP. Cardiology adjusted meds. Plan hold off surgery until 12/22 to adjust meds and treat UTI. Family in room. Pt not sleeping well.   12/21 Didn't  sleep well prior night. Apparently been on xanax for a time. Also recently started on Neurontin. Family with question. No recent BM. Some increase stool on KUB but not severe. Surgery for am. BP some up but better. HR good.   12/22 Seen prior to surgery and apparently add better night. Sore mouth / throat better with mycostatin. For surgery. Family in room.  12/23 patient with pain but otherwise seemed to be doing relatively well.  Daughter in room.  Apparently been taking prednisone for some time and this was restarted per family request.  Look into swing bed placement  12/24 patient had better night rested and everyone's in better spirits today.  Tolerating some diet.   Therapy worked with patient.  Look into swing bed placement next week  12/25 remained in good spirits.  Less pain.  Had good bowel movement and ordered Dulcolax not given.  Because of dressing to leg, Burk was not removed to keep it from getting soiled.  Wound care to check 12/27.  Discuss this with patient and daughter.  On antibiotics for UTI  12/26-will dc to swingbed once accepted. Needs continued wound care.  12/27-DC when bed available  12/28- issues with wound care yesterday.  Dr. Singer had to come remove dressings himself due to adherence to subcutaneous tissue.  Family refusing transfer to [facility] due to lack of specially trained wound care team availability.  Planning to transfer to specialty but resistant to that as well and requesting to speak to administration.    12/29- agreeable to transfer to specialty.  This will be the best place for her to receive wound care.  DC after family tours    12/29-needs continued wound care and therapy. [facility]       Goals of Care Treatment Preferences:  Code Status: Full Code      Consults:   Consults (From admission, onward)        Status Ordering Provider     Inpatient consult to General Surgery  Once        Provider:  Wil Thakkar MD    Completed JOANIE PERKINS     Inpatient  consult to Hospitalist  Once        Provider:  (Not yet assigned)    Acknowledged TROY COSBY     IP consult case management/social work  Once        Provider:  (Not yet assigned)    Completed TROY COSBY     Inpatient consult to Social Work  Once        Provider:  (Not yet assigned)    Completed HASEEB BROCK     Inpatient consult to Social Work  Once        Provider:  (Not yet assigned)    Completed NIKKI CUELLAR     Inpatient consult to Cardiology  Once        Provider:  Tonio Hanley DO    Completed LEANN DICK     Inpatient consult to Orthopedic Surgery  Once        Provider:  Bryan Bates MD    Acknowledged ALEX BAXTER          * Delmy-prosthetic fracture around prosthetic hip  S/p fixation  Stable for dc to swingbed    Unspecified open wound, left lower leg, initial encounter  2/2 protective device placed in surgery. Injury into subcutaneous tissue layer.  Consulted general surgery yesterday for issues with dressing adherence.  She needs continued wound care.    Hopefully to Specialty today for wound care      Skin tear of upper arm without complication, right, initial encounter    Wound care    Acute cystitis without hematuria  Pseudomonas  Continue cefepime for 7 days   Exchange chao    Primary osteoarthritis of right knee  OP follow up      Primary osteoarthritis of right shoulder  OP follow up      Lumbar radiculopathy  OP follow up  Pain control      Closed fracture of left hip    Surgery 12/22    Hypertension  Adjust medications as needed    Arthritis    Tylenol p.r.n..      Final Active Diagnoses:    Diagnosis Date Noted POA    PRINCIPAL PROBLEM:  Delmy-prosthetic fracture around prosthetic hip [M97.8XXA, Z96.649] 12/17/2022 Not Applicable    Skin tear of upper arm without complication, right, initial encounter [S41.111A] 12/28/2022 Yes    Unspecified open wound, left lower leg, initial encounter [S81.802A] 12/28/2022 Yes    Acute cystitis without hematuria  [N30.00] 12/17/2022 Yes    Primary osteoarthritis of right knee [M17.11] 08/11/2022 Yes    Primary osteoarthritis of right shoulder [M19.011] 03/17/2022 Yes    Lumbar radiculopathy [M54.16] 03/17/2022 Yes    Closed fracture of left hip [S72.002A] 09/20/2021 Yes    Hypertension [I10]  Yes    Arthritis [M19.90] 04/13/2021 Yes      Problems Resolved During this Admission:    Diagnosis Date Noted Date Resolved POA    Heart murmur [R01.1] 12/20/2022 12/29/2022 Yes    Other fracture of left femur, initial encounter for closed fracture [S72.8X2A] 12/18/2022 12/29/2022 Yes    Palpitations [R00.2] 12/18/2022 12/29/2022 Yes    Chest pain [R07.9] 12/17/2022 12/29/2022 Yes       Discharged Condition: good    Disposition: Long Term Acute Care    Follow Up:    Patient Instructions:   No discharge procedures on file.    Significant Diagnostic Studies: Labs: All labs within the past 24 hours have been reviewed    Pending Diagnostic Studies:     None         Medications:  Reconciled Home Medications:      Medication List      START taking these medications    apixaban 2.5 mg Tab  Commonly known as: ELIQUIS  Take 1 tablet (2.5 mg total) by mouth 2 (two) times daily.     * bisacodyL 10 mg Supp  Commonly known as: DULCOLAX  Place 1 suppository (10 mg total) rectally daily as needed (Until bowel movement if patient has no bowel movement for 2 days).     * bisacodyL 5 mg EC tablet  Commonly known as: DULCOLAX  Take 2 tablets (10 mg total) by mouth once. for 1 dose     dextrose 5 % in water (D5W) 5 % PgBk 50 mL with ceFEPIme 1 gram SolR 1 g  Inject 1 g into the vein every 12 (twelve) hours.     diphenhydrAMINE 50 mg/mL injection  Commonly known as: BENADRYL  Inject 0.5 mLs (25 mg total) into the vein every 6 (six) hours as needed for Itching.     docusate sodium 100 MG capsule  Commonly known as: COLACE  Take 1 capsule (100 mg total) by mouth 2 (two) times daily.     hydrALAZINE 25 MG tablet  Commonly known as: APRESOLINE  Take  1 tablet (25 mg total) by mouth every 8 (eight) hours.     magnesium oxide 400 mg (241.3 mg magnesium) tablet  Commonly known as: MAG-OX  Take 2 tablets (800 mg total) by mouth as needed (if magnesium level is 1.5-1.8 mg/dL give 800 mg every 4 hours x 2 doses).     melatonin 3 mg tablet  Commonly known as: MELATIN  Take 2 tablets (6 mg total) by mouth nightly as needed for Insomnia.     metoprolol tartrate 50 MG tablet  Commonly known as: LOPRESSOR  Take 1 tablet (50 mg total) by mouth 2 (two) times daily.     nystatin 100,000 unit/mL suspension  Commonly known as: MYCOSTATIN  Take 5 mLs (500,000 Units total) by mouth 4 (four) times daily. for 10 days     ondansetron 4 mg/2 mL Soln  Inject 4 mg into the vein every 8 (eight) hours as needed.     polyethylene glycol 17 gram Pwpk  Commonly known as: GLYCOLAX  Take 34 g by mouth 2 (two) times daily.     * potassium bicarbonate disintegrating tablet  Commonly known as: K-LYTE  Take 2 tablets (50 mEq total) by mouth as needed (if potassium level is 3.5-3.8 mmol/L give 50 mEq x 1 dose -given as 25 meq x2tabs or 10 meq x5 tabs).     * potassium bicarbonate disintegrating tablet  Commonly known as: K-LYTE  Take 1.5 tablets (37.5 mEq total) by mouth as needed (if potassium level is 3.1-3.4 mmol/L give 35 mEq every 2 hours x 2 doses - given as 25 meq tab + 10 meq tab every 2h X 2 doses).     * potassium bicarbonate disintegrating tablet  Commonly known as: K-LYTE  Take 3 tablets (60 mEq total) by mouth as needed (if potassium level is 3 mmol/L or less give 60 mEq every 2 hours x 2 doses - given as 25 meq x 2 tabs + 10meq tab or 10 meq x 6 tabs every 2h x 2).     * potassium, sodium phosphates 280-160-250 mg Pwpk  Commonly known as: PHOS-NAK  Take 2 packets by mouth as needed (if phosphorous is 2.3-2.7 mg/dL give 2 packets every 4 hours x 2 doses).     * potassium, sodium phosphates 280-160-250 mg Pwpk  Commonly known as: PHOS-NAK  Take 2 packets by mouth as needed (if  phosphorous level is 1.6-2.2 mg/dL give 2 packets every 4 hours x 3 doses).     * potassium, sodium phosphates 280-160-250 mg Pwpk  Commonly known as: PHOS-NAK  Take 2 packets by mouth as needed (if phosphorous level is 1.5 mg/dL or less give 2 packets every 4 hours x 4 doses).     predniSONE 5 MG tablet  Commonly known as: DELTASONE  Take 1 tablet (5 mg total) by mouth once daily.  Start taking on: December 30, 2022     senna 8.6 mg tablet  Commonly known as: SENOKOT  Take 1 tablet by mouth once daily.  Start taking on: December 30, 2022     sodium chloride 0.9% SolP 250 mL  Inject 50 mLs into the vein every 24 hours as needed.         * This list has 8 medication(s) that are the same as other medications prescribed for you. Read the directions carefully, and ask your doctor or other care provider to review them with you.            CHANGE how you take these medications    * HYDROcodone-acetaminophen 7.5-325 mg per tablet  Commonly known as: NORCO  Take 1 tablet by mouth every 8 (eight) hours as needed for Pain.  What changed: Another medication with the same name was added. Make sure you understand how and when to take each.     * HYDROcodone-acetaminophen 5-325 mg per tablet  Commonly known as: NORCO  Take 1 tablet by mouth every 6 (six) hours as needed for Pain.  What changed: Another medication with the same name was added. Make sure you understand how and when to take each.     * HYDROcodone-acetaminophen  mg per tablet  Commonly known as: NORCO  Take 2 tablets by mouth every 6 (six) hours as needed for Pain.  What changed: You were already taking a medication with the same name, and this prescription was added. Make sure you understand how and when to take each.     * lisinopriL 20 MG tablet  Commonly known as: PRINIVIL,ZESTRIL  Take 1 tablet (20 mg total) by mouth once daily.  What changed: Another medication with the same name was added. Make sure you understand how and when to take each.     *  lisinopriL 20 MG tablet  Commonly known as: PRINIVIL,ZESTRIL  Take 1 tablet (20 mg total) by mouth once daily.  Start taking on: December 30, 2022  What changed: You were already taking a medication with the same name, and this prescription was added. Make sure you understand how and when to take each.         * This list has 5 medication(s) that are the same as other medications prescribed for you. Read the directions carefully, and ask your doctor or other care provider to review them with you.            CONTINUE taking these medications    ALPRAZolam 1 MG tablet  Commonly known as: XANAX  Take 1 tablet (1 mg total) by mouth 2 (two) times daily.     diclofenac sodium 1 % Gel  Commonly known as: VOLTAREN  Apply 2 g topically 2 (two) times daily.     diltiaZEM 120 MG Cp24  Commonly known as: CARDIZEM CD  Take 1 capsule (120 mg total) by mouth once daily.     famotidine 20 MG tablet  Commonly known as: PEPCID  Take 1 tablet (20 mg total) by mouth once daily.     gabapentin 100 MG capsule  Commonly known as: NEURONTIN  Take 1 capsule (100 mg total) by mouth 3 (three) times daily.     levothyroxine 125 MCG tablet  Commonly known as: SYNTHROID  Take 1 tablet (125 mcg total) by mouth before breakfast.     lisinopriL-hydrochlorothiazide 20-12.5 mg per tablet  Commonly known as: PRINZIDE,ZESTORETIC  Take 1 tablet by mouth once daily.     nitrofurantoin (macrocrystal-monohydrate) 100 MG capsule  Commonly known as: MACROBID  Take 1 capsule (100 mg total) by mouth 2 (two) times daily.     pantoprazole 40 MG tablet  Commonly known as: PROTONIX  Take 1 tablet (40 mg total) by mouth once daily.        STOP taking these medications    cefUROXime 500 MG tablet  Commonly known as: CEFTIN        ASK your doctor about these medications    * lisinopriL 10 MG tablet  Take 1 tablet (10 mg total) by mouth once daily.     mupirocin 2 % ointment  Commonly known as: BACTROBAN  Apply topically 3 (three) times daily.         * This list has  1 medication(s) that are the same as other medications prescribed for you. Read the directions carefully, and ask your doctor or other care provider to review them with you.                Indwelling Lines/Drains at time of discharge:   Lines/Drains/Airways     Drain  Duration                Urethral Catheter   -- days         Closed/Suction Drain 12/22/22 1607 Left;Proximal Thigh 6 days                Time spent on the discharge of patient: >30 minutes         Shai Guerra DO  Department of Hospital Medicine  [facility] - [address] [place] [address]

## 2022-12-29 NOTE — NURSING
"In to reassess pain after Toradol injection.  Asked patient how was her pain.  She stated, "I was about to ask for something"  States "it's a '8' on a 0-10 scale.  When asked if she had gotten any relief for the Toradol shot she stated' "no".    Explained that it was not quite time for her Norco and she voiced understanding.    "

## 2022-12-29 NOTE — PLAN OF CARE
Patients daughters have [facility]. They plan to tour at 1400 today. Dr Guerra aware. [facility]ty will have a bed today.

## 2022-12-29 NOTE — PROGRESS NOTES
"[facility] - [facility]  Adult Nutrition  Follow Up Note       Reason for Assessment  Reason For Assessment: length of stay/ follow up note  Nutrition Risk Screen: no indicators present    RD assessment d/t follow up.    Recommend continue current diet order as medically appropriate and tolerated; monitor need to add Cardiac modification to current diet order. Encourage good PO intakes. If PO intake declines, monitor need for oral nutrition supplement to promote adequate oral intake.    Per H&P,   "88-year-old lady seen emergency room department.  Patient presents after having a fall when she was trying to go to the restroom at her home.  Patient sustained a left femur fracture.  Patient complains of moderate to severe pain in the left hip area.  Patient also complains of chest tightness, she rates it a 5/10 in the chest tightness has been intubate.  Patient also was seen in emergency room department earlier this week per her daughter.  Patient is found to have dehydration and a urinary tract infection.  Current she denies any shortness of breath.  She does not give a history of coronary artery disease." Per MD note, "look into swing bed placement." Per orthropedic surgery note, pt had revision of total hip placement arthroplasty left hip for periprosthetic hip fracture 12/22.     Patient currently receiving Regular Diet since 12/23 0910 with no PO % intake documented. Monitor need for Cardiac diet modification as pt with HTN. Encourage good PO intakes. Monitor need for oral nutrition supplement if PO intake declines.    CBW 63.6kg considered in IBW range, BMI considered WNL per geriatric guidelines. Her weight was up from 130# to 140# from 12/25-12/26. Unsure of reason for weight difference. Will continue to monitor weight trends.     Last BM 12/29 per flowsheets - pt receiving docusate sodium, senna and polyethylene glycol. Will continue to monitor PO intake, meds, labs, updates in patient " condition. RD following.    Malnutrition  Is Patient Malnourished: No    Nutrition Diagnosis  Decreased nutrient needs of Na, cholesterol, saturated fats   related to Chronic illness as evidenced by pt with HTN    Nutrition Diagnosis Status: Chronic/ continues    Nutrition Risk  Level of Risk/Frequency of Follow-up: low - moderate   Chewing or Swallowing Difficulty?: No Chewing or swallowing difficulty    Estimated/Assessed Needs    Temp: 97.2 °F (36.2 °C)Tympanic  Weight Used For Calorie Calculations: 59.5 kg (131 lb 2.8 oz)   Energy Need Method: Kcal/kg (25-30 kcal/kg) Energy Calorie Requirements (kcal): 3189-1832 kcal  Weight Used For Protein Calculations: 59.5 kg (131 lb 2.8 oz)  Protein Requirements: 60-72 g pro (1.0-1.2 g pro/kg)       RDA Method (mL): 1488     Nutrition Prescription / Recommendations  Recommendation/Intervention: Recommend continue current diet order as medically appropriate and tolerated; monitor need to add Cardiac modification to current diet order. Encourage good PO intakes. If PO intake declines, monitor need for oral nutrition supplement to promote adequate oral intake.  Goals: PO intake >75%, weight maintenance  Nutrition Goal Status: new  Current Diet Order: Regular Diet  Nutrition Order Comments: Monitor need for Cardiac Diet  Recommended Diet: Regular Monitor need for Cardiac Diet  Recommended Oral Supplement: No Oral Supplements  Is Nutrition Support Recommended: No  Is Education Recommended: No    Monitor and Evaluation  % current Intake: Unknown/ No P.O. intake documented  % intake to meet estimated needs: 75 - 100 %  Food and Nutrient Intake: energy intake  Food and Nutrient Adminstration: diet order  Knowledge/Beliefs/Attitudes: food and nutrition knowledge/skill, beliefs and attitudes  Physical Activity and Function: nutrition-related ADLs and IADLs, factors affecting access to physical activity  Anthropometric Measurements: weight change, weight, body mass index  Biochemical  "Data, Medical Tests and Procedures: electrolyte and renal panel, gastrointestinal profile, glucose/endocrine profile, inflammatory profile, lipid profile  Nutrition-Focused Physical Findings: overall appearance, extremities, muscles and bones, head and eyes, skin     Current Medical Diagnosis and Past Medical History  Diagnosis: other (see comments) (scott-prosthetic fracture around prosthetic hip)  Past Medical History:   Diagnosis Date    Hypertension     Osteoporosis     Thyroid disorder      Nutrition/Diet History  Spiritual, Cultural Beliefs, Church Practices, Values that Affect Care: no  Food Allergies: NKFA    Lab/Procedures/Meds  Recent Labs   Lab 12/29/22  0329      K 4.8   BUN 17   CREATININE 0.68   GLU 85   CALCIUM 8.0*   ALBUMIN 1.7*      ALT 8*   AST 26       Last A1c: No results found for: HGBA1C  Lab Results   Component Value Date    RBC 2.94 (L) 12/29/2022    HGB 9.0 (L) 12/29/2022    HCT 27.6 (L) 12/29/2022    MCV 93.9 12/29/2022    MCH 30.6 12/29/2022    MCHC 32.6 12/29/2022     Pertinent Labs Reviewed: reviewed  Na 131 (L) - replete to WNL as needed, BUN 22 (H), BUN/CREAT RATIO 23 (H), eGFR 56 (L), Ca 7.9 (L) - pt with no PMH of CKD, will continue to monitor,  (H) - pt with no PMH of DM, pt receiving prednisone, alk phos 43 (L), total protein 5.3 (L), albumin 2.6 (L), osmolality 271 (L) - unsure etiology, will continue to monitor labs  Pertinent Medications Reviewed: reviewed  Alprazolam, maxipime, diltiazem, docusate sodium, enoxaparin, gabapentin, hydralazine, levothyroxine, lisinopril, lopressor, mupirocin, nystatin, pantoprazole, polyethylene glycol, prednisone, hydrocodone-acetaminophen PRN    Anthropometrics  Temp: 97.2 °F (36.2 °C)  Height Method: Stated  Height: 5' 2" (157.5 cm)  Height (inches): 62 in  Weight Method: Bed Scale  Weight: 63.6 kg (140 lb 3.4 oz)  Weight (lb): 140.21 lb  Ideal Body Weight (IBW), Female: 110 lb  % Ideal Body Weight, Female (lb): 118.18 " %  BMI (Calculated): 25.6     Nutrition by Nursing  Diet/Nutrition Received: regular  Last Bowel Movement: 12/29/22    Nutrition Follow-Up  RD Follow-up?: Yes

## 2022-12-29 NOTE — NURSING
"Daughter came to the desk and said that "she wants to know if she can get something for pain".      Upon assessment of patient's pain she stated that it is "about an '8'" on a 0-10 scale.   Will check orders for prn pain medication.  "

## 2022-12-29 NOTE — PROGRESS NOTES
[facility] - [address] [place] [address] Medicine  Progress Note    Patient Name: Zandra Veloz  MRN: [id number]  Patient Class: IP- Inpatient   Admission Date: 12/17/2022  Length of Stay: 12 days  Attending Physician: Shai Guerra DO  Primary Care Provider: Brandon Thornton MD        Subjective:     Principal Problem:Delmy-prosthetic fracture around prosthetic hip        HPI:  88-year-old lady seen emergency room department.  Patient presents after having a fall when she was trying to go to the restroom at her home.  Patient sustained a left femur fracture.  Patient complains of moderate to severe pain in the left hip area.  Patient also complains of chest tightness, she rates it a 5/10 in the chest tightness has been intubate.  Patient also was seen in emergency room department earlier this week per her daughter.  Patient is found to have dehydration and a urinary tract infection.  Current she denies any shortness of breath.  She does not give a history of coronary artery disease.      Overview/Hospital Course:  12/19 - records reviewed. Fall without LOC and has left hip fx. No other complaints currently. Seen by cardiology with some CP felt musculoskeletal.  Echo mild AS and mod MR with nl EF. Age increase cardiac risk for surgery but discussed with daughter surgery is urgent and see no benefit in delay medically. Feel low to moderate risk for surgery. Question about UTI. No symptoms. Had UTI in 10.22 not surprising. Lab to do culture on urine in lab. Cover with ATN for prior culture with ATB started. Discussed with Dr Singer and cardiology.  12/20 Tachycardia and elevated BP. Cardiology adjusted meds. Plan hold off surgery until 12/22 to adjust meds and treat UTI. Family in room. Pt not sleeping well.   12/21 Didn't sleep well prior night. Apparently been on xanax for a time. Also recently started on Neurontin. Family with question. No recent BM. Some increase stool on KUB but not  severe. Surgery for am. BP some up but better. HR good.   12/22 Seen prior to surgery and apparently add better night. Sore mouth / throat better with mycostatin. For surgery. Family in room.  12/23 patient with pain but otherwise seemed to be doing relatively well.  Daughter in room.  Apparently been taking prednisone for some time and this was restarted per family request.  Look into swing bed placement  12/24 patient had better night rested and everyone's in better spirits today.  Tolerating some diet.   Therapy worked with patient.  Look into swing bed placement next week  12/25 remained in good spirits.  Less pain.  Had good bowel movement and ordered Dulcolax not given.  Because of dressing to leg, Burk was not removed to keep it from getting soiled.  Wound care to check 12/27.  Discuss this with patient and daughter.  On antibiotics for UTI  12/26-will dc to swingbed once accepted. Needs continued wound care.  12/27-DC when bed available  12/28- issues with wound care yesterday.  Dr. Singer had to come remove dressings himself due to adherence to subcutaneous tissue.  Family refusing transfer to [facility] due to lack of specially trained wound care team availability.  Planning to transfer to specialty but resistant to that as well and requesting to speak to administration.    12/29- agreeable to transfer to specialty.  This will be the best place for her to receive wound care.  DC after family tours      Interval History: naeo    Review of Systems   Constitutional:  Negative for chills, fatigue, fever and unexpected weight change.   HENT:  Negative for congestion, mouth sores and sore throat.    Eyes:  Negative for photophobia and visual disturbance.   Respiratory:  Negative for cough, chest tightness, shortness of breath and wheezing.    Cardiovascular:  Negative for chest pain, palpitations and leg swelling.   Gastrointestinal:  Negative for abdominal pain, diarrhea, nausea and vomiting.   Endocrine:  Negative for cold intolerance and heat intolerance.   Genitourinary:  Negative for difficulty urinating, dysuria, frequency and urgency.   Musculoskeletal:  Positive for arthralgias. Negative for back pain and myalgias.   Skin:  Negative for pallor and rash.   Neurological:  Negative for tremors, seizures, syncope, weakness, numbness and headaches.   Hematological:  Does not bruise/bleed easily.   Psychiatric/Behavioral:  Negative for agitation, confusion, hallucinations and suicidal ideas.    Objective:     Vital Signs (Most Recent):  Temp: 97.2 °F (36.2 °C) (12/29/22 0712)  Pulse: (!) 59 (12/29/22 0712)  Resp: 20 (12/29/22 0820)  BP: (!) 142/52 (12/29/22 0712)  SpO2: 97 % (12/29/22 0712)   Vital Signs (24h Range):  Temp:  [97.2 °F (36.2 °C)-98.6 °F (37 °C)] 97.2 °F (36.2 °C)  Pulse:  [55-60] 59  Resp:  [16-20] 20  SpO2:  [95 %-100 %] 97 %  BP: (118-146)/(41-55) 142/52     Weight: 63.6 kg (140 lb 3.4 oz)  Body mass index is 25.65 kg/m².    Intake/Output Summary (Last 24 hours) at 12/29/2022 1058  Last data filed at 12/29/2022 0400  Gross per 24 hour   Intake --   Output 800 ml   Net -800 ml        Physical Exam  Vitals reviewed.   Constitutional:       Appearance: Normal appearance.   HENT:      Head: Normocephalic and atraumatic.   Eyes:      Extraocular Movements: Extraocular movements intact.   Cardiovascular:      Rate and Rhythm: Normal rate and regular rhythm.      Pulses: Normal pulses.   Pulmonary:      Effort: Pulmonary effort is normal.      Breath sounds: Normal breath sounds.   Abdominal:      General: Abdomen is flat.      Palpations: Abdomen is soft.   Musculoskeletal:         General: Tenderness and signs of injury present.      Comments: Dressed left lower extremity, painful to touch   Skin:     General: Skin is warm and dry.      Capillary Refill: Capillary refill takes less than 2 seconds.   Neurological:      General: No focal deficit present.      Mental Status: She is alert and oriented to  person, place, and time.   Psychiatric:         Mood and Affect: Mood normal.         Behavior: Behavior normal.       Significant Labs: All pertinent labs within the past 24 hours have been reviewed.    Significant Imaging: I have reviewed all pertinent imaging results/findings within the past 24 hours.      Assessment/Plan:      * Delmy-prosthetic fracture around prosthetic hip  S/p fixation  Stable for dc to swingbed    Unspecified open wound, left lower leg, initial encounter  2/2 protective device placed in surgery. Injury into subcutaneous tissue layer.  Consulted general surgery yesterday for issues with dressing adherence.  She needs continued wound care.    Hopefully to Specialty today for wound care      Skin tear of upper arm without complication, right, initial encounter    Wound care    Heart murmur    Echo Summary this admit     The left ventricle is normal in size with hyperdynamic systolic function.   The estimated ejection fraction is 75%.   Left ventricular diastolic dysfunction.   Normal right ventricular size.   There is mild aortic valve stenosis.   Aortic valve area is 1.94 cm2; peak velocity is 3.0 m/s; mean gradient is 18 mmHg.   Moderate-to-severe mitral regurgitation.   Mild tricuspid regurgitation.   Mild pulmonic regurgitation.   Normal central venous pressure (3 mmHg).   The estimated PA systolic pressure is 32 mmHg.   Trivial pericardial effusion.    PCP follow up    Palpitations  resolved      Other fracture of left femur, initial encounter for closed fracture  S/p OR      Chest pain  resolved      Acute cystitis without hematuria  Pseudomonas  Continue cefepime for 7 days   Exchange chao    Primary osteoarthritis of right knee  OP follow up      Primary osteoarthritis of right shoulder  OP follow up      Lumbar radiculopathy  OP follow up  Pain control      Closed fracture of left hip    Surgery 12/22    Hypertension  Adjust medications as needed    Arthritis    Tylenol  p.r.n..      VTE Risk Mitigation (From admission, onward)         Ordered     apixaban tablet 2.5 mg  2 times daily         12/26/22 0717     IP VTE HIGH RISK PATIENT  Once         12/26/22 0717     Place KARINA hose  Until discontinued         12/26/22 0717     Place sequential compression device  Until discontinued         12/26/22 0717     Place sequential compression device  Until discontinued         12/17/22 1253                Discharge Planning   YANIV:      Code Status: Full Code   Is the patient medically ready for discharge?:     Reason for patient still in hospital (select all that apply): Treatment  Discharge Plan A: Home                  Shai Guerra DO  Department of Hospital Medicine   [facility] - [facility] Telemetry

## 2022-12-29 NOTE — NURSING
"Called to room to assist with patient clean up.  ISABELLE Romo also in room.      When patient turned on her right side I noticed that the surgical dressing placed on 12/27/22 appeared to have fluid underneath.      Leslie Gold RN called to room to assist.    Dressing removed slowly without incident of skin tears.  Stable intact, wound well approximated, no redness or additional drainage noted.      Daughter took a picture while dressing was being removed.  [place] a sound from her phone that sounded like she was video or recording also.      When asked if she was videoing she stated, "Oh no I'm not videoing, I wouldn't want to be videoed either".  I explained to her that I thought pictures were okay but that videoing wasn't allow.  She voiced understanding.      Incision wiped clean and new surgical  dressing applied.  Patient repositioned for comfort.    "

## 2022-12-29 NOTE — PT/OT/SLP PROGRESS
Physical Therapy Treatment    Patient Name:  Zandra Veloz   MRN:  [id number]    Recommendations:     Discharge Recommendations: nursing facility, skilled, LTACH (long-term acute care hospital)  Discharge Equipment Recommendations: hospital bed, lift device  Barriers to discharge: Inaccessible home and Decreased caregiver support    Assessment:     Zandra Veloz is a 88 y.o. female admitted with a medical diagnosis of Victoriano-prosthetic fracture around prosthetic hip.  She presents with the following impairments/functional limitations: weakness, impaired endurance, impaired functional mobility, gait instability, impaired balance, decreased lower extremity function, pain, decreased ROM, impaired skin, edema, orthopedic precautions Pt complained of more pain in hip today and requested not to sit in chair due to pain and possible transfer. Pt is slowing improving in tolerance to ROM. She also improved with sitting balance. Very limited in activity due to severe wounds on LE.    Rehab Prognosis: Fair; patient would benefit from acute skilled PT services to address these deficits and reach maximum level of function.    Recent Surgery: Procedure(s) (LRB):  REVISION, TOTAL ARTHROPLASTY, HIP, VICTORIANO PROSTHETIC FX (Left) 7 Days Post-Op    Plan:     During this hospitalization, patient to be seen BID (5x/wk; daily 2x/wk) to address the identified rehab impairments via gait training, therapeutic activities, therapeutic exercises, wheelchair management/training, neuromuscular re-education and progress toward the following goals:    Plan of Care Expires:  01/23/23    Subjective     Chief Complaint: left hip fracture  Patient/Family Comments/goals: Pt states she is in more pain and requests to not sit in chair  Pain/Comfort:  Pain Rating 1: 8/10  Location - Side 1: Left  Location 1: thigh  Pain Addressed 1: Pre-medicate for activity  Pain Rating Post-Intervention 1: 6/10      Objective:     Communicated with ANA  CLYDE Rojo prior to session.  Patient found supine with peripheral IV, telemetry, chao catheter upon PT entry to room.     General Precautions: Standard, fall  Orthopedic Precautions: LLE posterior precautions, LLE toe touch weight bearing  Braces: N/A  Respiratory Status: Room air     Functional Mobility:  Bed Mobility:     Scooting: maximal assistance  Supine to Sit: maximal assistance  Sit to Supine: maximal assistance  Balance: fair, minimal assistance but improved to contact guard assistance while sitting at edge of bed       AM-PAC 6 CLICK MOBILITY  Turning over in bed (including adjusting bedclothes, sheets and blankets)?: 2  Sitting down on and standing up from a chair with arms (e.g., wheelchair, bedside commode, etc.): 2  Moving from lying on back to sitting on the side of the bed?: 2  Moving to and from a bed to a chair (including a wheelchair)?: 2  Need to walk in hospital room?: 1  Climbing 3-5 steps with a railing?: 1  Basic Mobility Total Score: 10       Treatment & Education:  Pt performed bilateral LE: ankle pumps, heel slides, hip abduction/adduction, and Long arc quads x 30 each  Bed mobility with maximum assistance, edge of bed sitting x 10 minutes with contact guard assistance to minimal assistance for trunk support, forward reaching activity, lateral seated weight shifts    Patient left supine with all lines intact and call button in reach..    GOALS:   Multidisciplinary Problems       Physical Therapy Goals          Problem: Physical Therapy    Goal Priority Disciplines Outcome Goal Variances Interventions   Physical Therapy Goal     PT, PT/OT Ongoing, Progressing     Description: Short term goals:  1. Supine to sit with Moderate Assistance  2. Rolling to Left and Right with Moderate Assistance.  3. Bed to chair transfer with Maximum Assistance using stand pivot transfer  4. Sitting at edge of bed x15 minutes with Minimal Assistance    Long term goals:  1. Supine to sit with MInimal  Assistance  2. Sit to stand transfer with Minimal Assistance  3. Bed to chair transfer with Moderate Assistance using Stand pivot transfer  4. Sitting at edge of bed x20 minutes with Modified [place]                         Time Tracking:     PT Received On: 12/29/22  PT Start Time: 1059     PT Stop Time: 1129  PT Total Time (min): 30 min     Billable Minutes: Therapeutic Activity 15 and Therapeutic Exercise 15    Treatment Type: Treatment  PT/PTA: PT     PTA Visit Number: 0     12/29/2022

## 2022-12-29 NOTE — NURSING
"Patient sitting up in bed eating pudding.  No distress noted.  Said that she did good with her lunch.      Asked about her pain and she stated, " I was gonna ask for something".  The sitter stated that Dr. Guerra said that he would order her something to bridge her pain.     Patient stated that pain is an "8" on a 0-10 scale and said that Dr. Guerra said that her would give her something to bridge her pain if she couldn't wait.    Daughter said that they want it when she can have it.  I explained that it is a prn medicine and that she would still have to request it.      Notified Dr. Guerra regarding request for pain medicine to bridge her pain and he will order Toradol.     "

## 2022-12-29 NOTE — PT/OT/SLP PROGRESS
Occupational Therapy   Treatment    Name: Zandra Veloz  MRN: [id number]  Admitting Diagnosis:  Delmy-prosthetic fracture around prosthetic hip  7 Days Post-Op    Recommendations:     Discharge Recommendations: nursing facility, skilled  Discharge Equipment Recommendations:   (to be determined)  Barriers to discharge:  None    Assessment:     Zandra Veloz is a 88 y.o. female with a medical diagnosis of Delmy-prosthetic fracture around prosthetic hip.  She presents with weakness, decreased functional mobility, and decline in ADLs. Performance deficits affecting function are weakness, impaired endurance, impaired functional mobility, impaired self care skills, gait instability, impaired balance, pain, orthopedic precautions.     Rehab Prognosis:  Good; patient would benefit from acute skilled OT services to address these deficits and reach maximum level of function.       Plan:     Patient to be seen 5 x/week to address the above listed problems via self-care/home management, therapeutic activities, therapeutic exercises  Plan of Care Expires: 01/20/23  Plan of Care Reviewed with: patient    Subjective     Pain/Comfort:  Pain Rating 1: 5/10  Location - Side 1: Left  Location 1: thigh  Pain Addressed 1: Nurse notified    Objective:     Communicated with: CLYDE Vargas prior to session.  Patient found HOB elevated with peripheral IV upon OT entry to room.    General Precautions: Standard, fall    Orthopedic Precautions:LLE posterior precautions, LLE toe touch weight bearing  Braces: N/A  Respiratory Status: Room air     Occupational Performance:     Bed Mobility:    Not performed     Functional Mobility/Transfers:  Not performed    Activities of Daily Living:  Not performed      [facility] 6 Click ADL:      Treatment & Education:  Pt performed 2 sets x 15 reps each bicep curls 1#db, chest press 1#db, IR/ER 1#db, rows red tband, and tricep press red tband in order to improve BUE strength and endurance to perform  ADL and ADL t/f with less assist.     Patient left HOB elevated with all lines intact, call button in reach, and sitter present    GOALS:   Multidisciplinary Problems       Occupational Therapy Goals          Problem: Occupational Therapy    Goal Priority Disciplines Outcome Interventions   Occupational Therapy Goal     OT, PT/OT Ongoing, Progressing    Description: STG:  Pt will perform grooming with setup  Pt will bathe anterior upper body with Liset with setup from bed  Pt will perform UE dressing with Liset  Pt will perform LE dressing with MaxA with AD   Pt will sit EOB x 10 min with CGA   Pt will transfer bed/chair/bsc with MaxA with stand pivot t/f with RW  Pt will tolerate 15 minutes of tx without fatigue      LT.Restore to max I with self care and mobility.                           Time Tracking:     OT Date of Treatment: 22  OT Start Time: 1305  OT Stop Time: 1328  OT Total Time (min): 23 min    Billable Minutes:Therapeutic Exercise 23 minutes    OT/BRIGHT: OT          2022

## 2022-12-29 NOTE — ASSESSMENT & PLAN NOTE
2/2 protective device placed in surgery. Injury into subcutaneous tissue layer.  Consulted general surgery yesterday for issues with dressing adherence.  She needs continued wound care.    Hopefully to Specialty today for wound care

## 2022-12-29 NOTE — NURSING
Sitter and daughter at side. No distress noted. Daughter states they are going to talk to dr andrew tomorrow regarding speciality.

## 2022-12-29 NOTE — SUBJECTIVE & OBJECTIVE
Interval History: naeo    Review of Systems   Constitutional:  Negative for chills, fatigue, fever and unexpected weight change.   HENT:  Negative for congestion, mouth sores and sore throat.    Eyes:  Negative for photophobia and visual disturbance.   Respiratory:  Negative for cough, chest tightness, shortness of breath and wheezing.    Cardiovascular:  Negative for chest pain, palpitations and leg swelling.   Gastrointestinal:  Negative for abdominal pain, diarrhea, nausea and vomiting.   Endocrine: Negative for cold intolerance and heat intolerance.   Genitourinary:  Negative for difficulty urinating, dysuria, frequency and urgency.   Musculoskeletal:  Positive for arthralgias. Negative for back pain and myalgias.   Skin:  Negative for pallor and rash.   Neurological:  Negative for tremors, seizures, syncope, weakness, numbness and headaches.   Hematological:  Does not bruise/bleed easily.   Psychiatric/Behavioral:  Negative for agitation, confusion, hallucinations and suicidal ideas.    Objective:     Vital Signs (Most Recent):  Temp: 97.2 °F (36.2 °C) (12/29/22 0712)  Pulse: (!) 59 (12/29/22 0712)  Resp: 20 (12/29/22 0820)  BP: (!) 142/52 (12/29/22 0712)  SpO2: 97 % (12/29/22 0712)   Vital Signs (24h Range):  Temp:  [97.2 °F (36.2 °C)-98.6 °F (37 °C)] 97.2 °F (36.2 °C)  Pulse:  [55-60] 59  Resp:  [16-20] 20  SpO2:  [95 %-100 %] 97 %  BP: (118-146)/(41-55) 142/52     Weight: 63.6 kg (140 lb 3.4 oz)  Body mass index is 25.65 kg/m².    Intake/Output Summary (Last 24 hours) at 12/29/2022 1058  Last data filed at 12/29/2022 0400  Gross per 24 hour   Intake --   Output 800 ml   Net -800 ml        Physical Exam  Vitals reviewed.   Constitutional:       Appearance: Normal appearance.   HENT:      Head: Normocephalic and atraumatic.   Eyes:      Extraocular Movements: Extraocular movements intact.   Cardiovascular:      Rate and Rhythm: Normal rate and regular rhythm.      Pulses: Normal pulses.   Pulmonary:       Effort: Pulmonary effort is normal.      Breath sounds: Normal breath sounds.   Abdominal:      General: Abdomen is flat.      Palpations: Abdomen is soft.   Musculoskeletal:         General: Tenderness and signs of injury present.      Comments: Dressed left lower extremity, painful to touch   Skin:     General: Skin is warm and dry.      Capillary Refill: Capillary refill takes less than 2 seconds.   Neurological:      General: No focal deficit present.      Mental Status: She is alert and oriented to person, place, and time.   Psychiatric:         Mood and Affect: Mood normal.         Behavior: Behavior normal.       Significant Labs: All pertinent labs within the past 24 hours have been reviewed.    Significant Imaging: I have reviewed all pertinent imaging results/findings within the past 24 hours.

## 2022-12-30 PROBLEM — M19.012 PRIMARY OSTEOARTHRITIS OF BOTH SHOULDERS: Status: RESOLVED | Noted: 2022-08-11 | Resolved: 2022-12-30

## 2022-12-30 PROBLEM — T14.90XA WOUNDS AND INJURIES: Status: ACTIVE | Noted: 2022-12-30

## 2022-12-30 PROBLEM — M19.011 PRIMARY OSTEOARTHRITIS OF BOTH SHOULDERS: Status: RESOLVED | Noted: 2022-08-11 | Resolved: 2022-12-30

## 2022-12-30 LAB
ANION GAP SERPL CALCULATED.3IONS-SCNC: 9 MMOL/L (ref 7–16)
BASOPHILS # BLD AUTO: 0.12 K/UL (ref 0–0.2)
BASOPHILS NFR BLD AUTO: 0.8 % (ref 0–1)
BUN SERPL-MCNC: 19 MG/DL (ref 7–18)
BUN/CREAT SERPL: 28 (ref 6–20)
CALCIUM SERPL-MCNC: 8.1 MG/DL (ref 8.5–10.1)
CHLORIDE SERPL-SCNC: 103 MMOL/L (ref 98–107)
CO2 SERPL-SCNC: 29 MMOL/L (ref 21–32)
CREAT SERPL-MCNC: 0.69 MG/DL (ref 0.55–1.02)
DIFFERENTIAL METHOD BLD: ABNORMAL
EGFR (NO RACE VARIABLE) (RUSH/TITUS): 84 ML/MIN/1.73M²
EOSINOPHIL # BLD AUTO: 0.36 K/UL (ref 0–0.5)
EOSINOPHIL NFR BLD AUTO: 2.5 % (ref 1–4)
EOSINOPHIL NFR BLD MANUAL: 3 % (ref 1–4)
ERYTHROCYTE [DISTWIDTH] IN BLOOD BY AUTOMATED COUNT: 14.3 % (ref 11.5–14.5)
GLUCOSE SERPL-MCNC: 94 MG/DL (ref 74–106)
HCT VFR BLD AUTO: 29 % (ref 38–47)
HGB BLD-MCNC: 9 G/DL (ref 12–16)
IMM GRANULOCYTES # BLD AUTO: 1.46 K/UL (ref 0–0.04)
IMM GRANULOCYTES NFR BLD: 10.1 % (ref 0–0.4)
LYMPHOCYTES # BLD AUTO: 2 K/UL (ref 1–4.8)
LYMPHOCYTES NFR BLD AUTO: 13.9 % (ref 27–41)
LYMPHOCYTES NFR BLD MANUAL: 18 % (ref 27–41)
MACROCYTES BLD QL SMEAR: ABNORMAL
MAGNESIUM SERPL-MCNC: 2 MG/DL (ref 1.7–2.3)
MCH RBC QN AUTO: 31.3 PG (ref 27–31)
MCHC RBC AUTO-ENTMCNC: 31 G/DL (ref 32–36)
MCV RBC AUTO: 100.7 FL (ref 80–96)
METAMYELOCYTES NFR BLD MANUAL: 5 %
MONOCYTES # BLD AUTO: 0.97 K/UL (ref 0–0.8)
MONOCYTES NFR BLD AUTO: 6.7 % (ref 2–6)
MONOCYTES NFR BLD MANUAL: 7 % (ref 2–6)
MPC BLD CALC-MCNC: 8.8 FL (ref 9.4–12.4)
MYELOCYTES NFR BLD MANUAL: 2 %
NEUTROPHILS # BLD AUTO: 9.53 K/UL (ref 1.8–7.7)
NEUTROPHILS NFR BLD AUTO: 66 % (ref 53–65)
NEUTS SEG NFR BLD MANUAL: 65 % (ref 50–62)
NRBC # BLD AUTO: 0.02 X10E3/UL
NRBC BLD MANUAL-RTO: 2 /100 WBC
NRBC, AUTO (.00): 0.1 %
PHOSPHATE SERPL-MCNC: 3.2 MG/DL (ref 2.5–4.5)
PLATELET # BLD AUTO: 490 K/UL (ref 150–400)
PLATELET MORPHOLOGY: ABNORMAL
POLYCHROMASIA BLD QL SMEAR: ABNORMAL
POTASSIUM SERPL-SCNC: 4.8 MMOL/L (ref 3.5–5.1)
RBC # BLD AUTO: 2.88 M/UL (ref 4.2–5.4)
SODIUM SERPL-SCNC: 136 MMOL/L (ref 136–145)
WBC # BLD AUTO: 14.44 K/UL (ref 4.5–11)

## 2022-12-30 PROCEDURE — 84100 ASSAY OF PHOSPHORUS: CPT | Performed by: STUDENT IN AN ORGANIZED HEALTH CARE EDUCATION/TRAINING PROGRAM

## 2022-12-30 PROCEDURE — 99222 1ST HOSP IP/OBS MODERATE 55: CPT | Mod: AI,,, | Performed by: STUDENT IN AN ORGANIZED HEALTH CARE EDUCATION/TRAINING PROGRAM

## 2022-12-30 PROCEDURE — 97166 OT EVAL MOD COMPLEX 45 MIN: CPT

## 2022-12-30 PROCEDURE — 97162 PT EVAL MOD COMPLEX 30 MIN: CPT

## 2022-12-30 PROCEDURE — 83735 ASSAY OF MAGNESIUM: CPT | Performed by: STUDENT IN AN ORGANIZED HEALTH CARE EDUCATION/TRAINING PROGRAM

## 2022-12-30 PROCEDURE — 85025 COMPLETE CBC W/AUTO DIFF WBC: CPT | Performed by: STUDENT IN AN ORGANIZED HEALTH CARE EDUCATION/TRAINING PROGRAM

## 2022-12-30 PROCEDURE — 25000003 PHARM REV CODE 250: Performed by: STUDENT IN AN ORGANIZED HEALTH CARE EDUCATION/TRAINING PROGRAM

## 2022-12-30 PROCEDURE — 99222 PR INITIAL HOSPITAL CARE,LEVL II: ICD-10-PCS | Mod: AI,,, | Performed by: STUDENT IN AN ORGANIZED HEALTH CARE EDUCATION/TRAINING PROGRAM

## 2022-12-30 PROCEDURE — 80048 BASIC METABOLIC PNL TOTAL CA: CPT | Performed by: STUDENT IN AN ORGANIZED HEALTH CARE EDUCATION/TRAINING PROGRAM

## 2022-12-30 PROCEDURE — 63600175 PHARM REV CODE 636 W HCPCS: Performed by: STUDENT IN AN ORGANIZED HEALTH CARE EDUCATION/TRAINING PROGRAM

## 2022-12-30 PROCEDURE — 36415 COLL VENOUS BLD VENIPUNCTURE: CPT | Performed by: STUDENT IN AN ORGANIZED HEALTH CARE EDUCATION/TRAINING PROGRAM

## 2022-12-30 PROCEDURE — 11000001 HC ACUTE MED/SURG PRIVATE ROOM

## 2022-12-30 RX ORDER — MORPHINE SULFATE 2 MG/ML
2 INJECTION, SOLUTION INTRAMUSCULAR; INTRAVENOUS EVERY 4 HOURS PRN
Status: DISCONTINUED | OUTPATIENT
Start: 2022-12-30 | End: 2023-01-04

## 2022-12-30 RX ORDER — GABAPENTIN 100 MG/1
100 CAPSULE ORAL 3 TIMES DAILY
Status: DISCONTINUED | OUTPATIENT
Start: 2022-12-30 | End: 2023-01-08

## 2022-12-30 RX ORDER — KETOROLAC TROMETHAMINE 30 MG/ML
15 INJECTION, SOLUTION INTRAMUSCULAR; INTRAVENOUS EVERY 6 HOURS PRN
Status: DISCONTINUED | OUTPATIENT
Start: 2022-12-30 | End: 2022-12-30

## 2022-12-30 RX ADMIN — NYSTATIN 500000 UNITS: 100000 SUSPENSION ORAL at 12:12

## 2022-12-30 RX ADMIN — NYSTATIN 500000 UNITS: 100000 SUSPENSION ORAL at 08:12

## 2022-12-30 RX ADMIN — MUPIROCIN: 20 OINTMENT TOPICAL at 09:12

## 2022-12-30 RX ADMIN — HYDROCODONE BITARTRATE AND ACETAMINOPHEN 2 TABLET: 10; 325 TABLET ORAL at 06:12

## 2022-12-30 RX ADMIN — NYSTATIN 500000 UNITS: 100000 SUSPENSION ORAL at 09:12

## 2022-12-30 RX ADMIN — KETOROLAC TROMETHAMINE 15 MG: 30 INJECTION, SOLUTION INTRAMUSCULAR; INTRAVENOUS at 11:12

## 2022-12-30 RX ADMIN — FAMOTIDINE 20 MG: 20 TABLET ORAL at 09:12

## 2022-12-30 RX ADMIN — ALPRAZOLAM 1 MG: 0.25 TABLET ORAL at 08:12

## 2022-12-30 RX ADMIN — NYSTATIN 500000 UNITS: 100000 SUSPENSION ORAL at 05:12

## 2022-12-30 RX ADMIN — DILTIAZEM HYDROCHLORIDE 120 MG: 120 CAPSULE, COATED, EXTENDED RELEASE ORAL at 09:12

## 2022-12-30 RX ADMIN — HYDRALAZINE HYDROCHLORIDE 25 MG: 25 TABLET ORAL at 06:12

## 2022-12-30 RX ADMIN — CEFEPIME 1 G: 1 INJECTION, POWDER, FOR SOLUTION INTRAMUSCULAR; INTRAVENOUS at 09:12

## 2022-12-30 RX ADMIN — APIXABAN 2.5 MG: 2.5 TABLET, FILM COATED ORAL at 09:12

## 2022-12-30 RX ADMIN — APIXABAN 2.5 MG: 2.5 TABLET, FILM COATED ORAL at 08:12

## 2022-12-30 RX ADMIN — LEVOTHYROXINE SODIUM 125 MCG: 0.12 TABLET ORAL at 06:12

## 2022-12-30 RX ADMIN — PANTOPRAZOLE SODIUM 40 MG: 40 TABLET, DELAYED RELEASE ORAL at 09:12

## 2022-12-30 RX ADMIN — MORPHINE SULFATE 2 MG: 2 INJECTION, SOLUTION INTRAMUSCULAR; INTRAVENOUS at 11:12

## 2022-12-30 RX ADMIN — METOPROLOL TARTRATE 50 MG: 50 TABLET, FILM COATED ORAL at 09:12

## 2022-12-30 RX ADMIN — HYDROCODONE BITARTRATE AND ACETAMINOPHEN 2 TABLET: 10; 325 TABLET ORAL at 08:12

## 2022-12-30 RX ADMIN — MORPHINE SULFATE 2 MG: 2 INJECTION, SOLUTION INTRAMUSCULAR; INTRAVENOUS at 06:12

## 2022-12-30 RX ADMIN — ONDANSETRON 4 MG: 2 INJECTION INTRAMUSCULAR; INTRAVENOUS at 05:12

## 2022-12-30 RX ADMIN — ALPRAZOLAM 1 MG: 0.25 TABLET ORAL at 09:12

## 2022-12-30 RX ADMIN — DOCUSATE SODIUM 100 MG: 100 CAPSULE, LIQUID FILLED ORAL at 08:12

## 2022-12-30 RX ADMIN — GABAPENTIN 100 MG: 100 CAPSULE ORAL at 08:12

## 2022-12-30 RX ADMIN — MUPIROCIN: 20 OINTMENT TOPICAL at 10:12

## 2022-12-30 RX ADMIN — HYDRALAZINE HYDROCHLORIDE 25 MG: 25 TABLET ORAL at 02:12

## 2022-12-30 RX ADMIN — HYDROCODONE BITARTRATE AND ACETAMINOPHEN 2 TABLET: 10; 325 TABLET ORAL at 12:12

## 2022-12-30 RX ADMIN — GABAPENTIN 100 MG: 100 CAPSULE ORAL at 02:12

## 2022-12-30 RX ADMIN — METOPROLOL TARTRATE 50 MG: 50 TABLET, FILM COATED ORAL at 08:12

## 2022-12-30 RX ADMIN — POLYETHYLENE GLYCOL 3350 34 G: 17 POWDER, FOR SOLUTION ORAL at 08:12

## 2022-12-30 NOTE — PLAN OF CARE
Problem: Physical Therapy  Goal: Physical Therapy Goal  Description: Short Term Goals to be met by 1/10/2023    Patient will increase functional independence and safety with mobility by performing    1.   Rolling right Moderate Assist; roll left Moderate Assist  2.   Supine to sit Moderate Assist  3.   Sitting balance edge of the bed x 15 minutes Stand by assist  4.   Sit to stand Moderate Assist  using manual assistance with gait belt and NWB L LE  5.   Bed to chair Moderate Assist using manual assistance with gait belt and NWB left LE  6.   Lower extremity exercises x 30 reps with assist as necessary and no rest breaks      Long Term Goals to be met by 2/30/2023    Patient to require less than or equal to Stand by assist for functional mobility to ease caregiver burden   Outcome: Ongoing, Progressing     Patient putting forth a good effort with PT evaluation but all mobility and rehab will be complicated by severe degloving injuries to L UE and bilateral LE's along with revision of left THR post periprosthetic fracture. Per Dr Singer, pt would have been allowed 30 lb weight bearing but due to wounds and poor ability to  same, patient will remain NWB at this time. Anticipate transition to swing bed once wounds have improved significantly.

## 2022-12-30 NOTE — PT/OT/SLP EVAL
"Physical Therapy Evaluation    Patient Name:  Zandra Veloz   MRN:  [id number]    Recommendations:     Discharge Recommendations: nursing facility, skilled, rehabilitation facility   Discharge Equipment Recommendations: other (see comments) (to be determined)   Barriers to discharge:  ongoing wound care; decreased functional mobility    Assessment:     Zandra Veloz is a 88 y.o. female admitted with a medical diagnosis of Urinary tract infection; degloving injury B LE/L UE; revision left THR due to periprosthetic fracture.  She presents with the following impairments/functional limitations: weakness, impaired endurance, impaired self care skills, impaired functional mobility, gait instability, impaired balance, decreased upper extremity function, decreased lower extremity function, pain, impaired skin, edema, orthopedic precautions .    Patient putting forth a good effort with PT evaluation but all mobility and rehab will be complicated by severe degloving injuries to L UE and bilateral LE's along with revision of left THR post periprosthetic fracture. Per Dr Singer, pt would have been allowed 30 lb weight bearing but due to wounds and poor ability to  same, patient will remain NWB at this time. Anticipate transition to swing bed once wounds have improved significantly.    Rehab Prognosis: Good; patient would benefit from acute skilled PT services to address these deficits and reach maximum level of function.    Recent Surgery: * No surgery found *      Plan:     During this hospitalization, patient to be seen 5 x/week to address the identified rehab impairments via gait training, therapeutic activities, therapeutic exercises, neuromuscular re-education and progress toward the following goals:    Plan of Care Expires:  01/30/23    Subjective     Chief Complaint: severe degloving injury left UE; B LE; revision THR after periprosthetic fracture  Patient/Family Comments/goals: "I just have a " "lot of pain. The doctor told me not to try to put weight on this left leg due to the wounds and all."  Pain/Comfort:  Pain Rating 1: 6/10  Location - Side 1: Bilateral  Location 1: leg  Pain Addressed 1: Pre-medicate for activity, Reposition, Cessation of Activity, Nurse notified  Pain Rating Post-Intervention 1: 6/10    Patients cultural, spiritual, Lutheran conflicts given the current situation: no    Living Environment:  Pt lives with her family in a single level home with 1 steps to enter  Prior to admission, patients level of function was independent with use of a RW.  Equipment used at home: walker, rolling, bedside commode, shower chair.  DME owned (not currently used): none.  Upon discharge, patient will have assistance from swing bed staff.    Objective:     Communicated with Jessica Wooten RN prior to session.  Patient found supine with peripheral IV, chao catheter  upon PT entry to room.    General Precautions: Standard, fall  Orthopedic Precautions:LLE non weight bearing, LLE posterior precautions   Braces: N/A  Respiratory Status: Room air    Exams:  Cognitive Exam:  Patient is oriented to Person, Place, Time, Situation, and mild fear of falling  Sensation:    -       Intact  Skin Integrity/Edema:      -       Skin integrity: wounds dressed; degloving injury left UE and bilateral LE's. Very fragile skin.  RLE ROM: Deficits: hip/knee flexion 90/90; ankle DF to neutral  RLE Strength: Deficits: 3-/5  LLE ROM: Deficits: as per right  LLE Strength: Deficits: as per right    Functional Mobility:  Bed Mobility:     Supine to Sit: maximal assistance and of 2 persons  Sit to Supine: dependence  Transfers:     Sit to Stand:  maximal assistance with manual assist via gait belt; NWB left; pt c/o pain from wound left UE  Bed to chair deferred due to pending dressing change/wound care evaluation  Gait: unable      AM-PAC 6 CLICK MOBILITY  Total Score:10       Treatment & Education:  Pt and daughter educated on " PT role/POC.   Importance of OOB activity with staff assistance.  Importance of sitting up in the chair or using elevation of HOB throughout the day as tolerated, especially for meals   Safety during functional t/f and mobility   White board updated   Multiple self-care tasks/functional mobility completed- assistance level noted above   All questions/concerns answered within PT scope of practice     Patient left HOB elevated with all lines intact, call button in reach, Jessica Wooten RN notified, and family present.    GOALS:   Multidisciplinary Problems       Physical Therapy Goals          Problem: Physical Therapy    Goal Priority Disciplines Outcome Goal Variances Interventions   Physical Therapy Goal     PT, PT/OT Ongoing, Progressing     Description: Short Term Goals to be met by 1/10/2023    Patient will increase functional independence and safety with mobility by performing    1.   Rolling right Moderate Assist; roll left Moderate Assist  2.   Supine to sit Moderate Assist  3.   Sitting balance edge of the bed x 15 minutes Stand by assist  4.   Sit to stand Moderate Assist  using manual assistance with gait belt and NWB L LE  5.   Bed to chair Moderate Assist using manual assistance with gait belt and NWB left LE  6.   Lower extremity exercises x 30 reps with assist as necessary and no rest breaks      Long Term Goals to be met by 2/30/2023    Patient to require less than or equal to Stand by assist for functional mobility to ease caregiver burden                        History:     Past Medical History:   Diagnosis Date    Hypertension     Osteoporosis     Thyroid disorder        Past Surgical History:   Procedure Laterality Date    BLADDER SURGERY      HEMIARTHROPLASTY OF HIP Left 9/21/2021    Procedure: HEMIARTHROPLASTY, HIP;  Surgeon: Dequan Singer MD;  Location: [facility];  Service: Orthopedics;  Laterality: Left;    PARTIAL HIP ARTHROPLASTY Right     Dr Singer    REVISION TOTAL HIP  ARTHROPLASTY Left 12/22/2022    Procedure: REVISION, TOTAL ARTHROPLASTY, HIP, VICTORIANO PROSTHETIC FX;  Surgeon: Dequan Singer MD;  Location: [facility];  Service: Orthopedics;  Laterality: Left;       Time Tracking:     PT Received On: 12/30/22  PT Start Time: 1000     PT Stop Time: 1022  PT Total Time (min): 22 min     Billable Minutes: Evaluation Moderate complexity      12/30/2022

## 2022-12-30 NOTE — NURSING
PT C/o pain 7/10 to her legs specifically. Prn pain meds admnistered and pt left lying on left side. Pt has refused to be turned most of the night due to discomfort. Rested very well and is very comfortable. Meds given and lab at bedside for AM labs. Sitter present.   Wounds cdi; per note to be changed today once patient has been receiving pain meds consistently after admission.

## 2022-12-30 NOTE — PLAN OF CARE
[facility]  Initial Discharge Assessment       Primary Care Provider: Brandon Thornton MD    Admission Diagnosis: Urinary tract infection [N39.0]    Admission Date: 12/29/2022  Expected Discharge Date:     Discharge Barriers Identified: None    Payor: MEDICARE / Plan: MEDICARE PART A & B / Product Type: Government /     Extended Emergency Contact Information  Primary Emergency Contact: JOSSY PRITCHETT  Home Phone: [phone]  Mobile Phone: [phone]  Relation: Daughter  Preferred language: English   needed? No  Secondary Emergency Contact: AYDINRUDI  Mobile Phone: [phone]  Relation: Daughter  Preferred language: English    Discharge Plan A: Skilled Nursing Facility  Discharge Plan B: Home with family, Home Health      [facility] - [place], [place] - [address]  [address]  [facility] [address]  Phone: [phone] Fax: [phone]    [id number] [place], [place] - [address] [facility]  [facility]  [address]  [facility] [address]  Phone: [phone] Fax: [phone]    [facility], [place] - [address]  [address]  [facility] [address]  Phone: [phone] Fax: [phone]      Initial Assessment (most recent)       Adult Discharge Assessment - 12/30/22 1126          Discharge Assessment    Assessment Type Discharge Planning Assessment     Confirmed/corrected address, phone number and insurance Yes     Confirmed Demographics Correct on Facesheet     Source of Information patient     Communicated YANIV with patient/caregiver Date not available/Unable to determine     People in Home alone     Do you expect to return to your current living situation? Yes     Do you have help at home or someone to help you manage your care at home? Yes     Who are your caregiver(s) and their phone number(s)? Daughters     Prior to hospitilization cognitive status: Alert/Oriented      Current cognitive status: Alert/Oriented     Walking or Climbing Stairs ambulation difficulty, requires equipment     Mobility Management Walker     Dressing/Bathing bathing difficulty, assistance 1 person     Home Accessibility wheelchair accessible;stairs to enter home     Number of Stairs, Main Entrance one     Home Layout Able to live on 1st floor     Equipment Currently Used at Home wheelchair;bedside commode     Do you currently have service(s) that help you manage your care at home? Yes     Name and Contact number of agency [facility]     Is the pt/caregiver preference to resume services with current agency Yes     Do you take prescription medications? Yes     Do you have prescription coverage? Yes     Do you have any problems affording any of your prescribed medications? No     Is the patient taking medications as prescribed? yes     How do you get to doctors appointments? family or friend will provide     Are you on dialysis? No     Do you take coumadin? No     Discharge Plan A Skilled Nursing Facility     Discharge Plan B Home with family;Home Health     DME Needed Upon Discharge  --   unsure currently    Discharge Plan discussed with: Patient;Adult children     Discharge Barriers Identified None                        SS spoke with pt and her daughter. Pt was at home and has 24 hour sitters prior to admission. Pt had [facility]. Password set and is Hernandez Hernandez. Informed of IDTM and would like to attend. SS will follow for discharge needs.

## 2022-12-30 NOTE — H&P
[facility] Hospital Medicine  History & Physical    Patient Name: Zandra Veloz  MRN: [id number]  Patient Class: IP- Inpatient  Admission Date: 12/29/2022  Attending Physician: Shai Guerra DO   Primary Care Provider: Brandon Thornton MD         Patient information was obtained from patient and past medical records.     Subjective:     Principal Problem:Urinary tract infection    Chief Complaint: No chief complaint on file.       HPI: HPI:   88-year-old lady seen emergency room department.  Patient presents after having a fall when she was trying to go to the restroom at her home.  Patient sustained a left femur fracture.  Patient complains of moderate to severe pain in the left hip area.  Patient also complains of chest tightness, she rates it a 5/10 in the chest tightness has been intubate.  Patient also was seen in emergency room department earlier this week per her daughter.  Patient is found to have dehydration and a urinary tract infection.  Current she denies any shortness of breath.  She does not give a history of coronary artery disease.        Procedure(s) (LRB):  REVISION, TOTAL ARTHROPLASTY, HIP, VICTORIANO PROSTHETIC FX (Left)       Hospital Course:   12/19 - records reviewed. Fall without LOC and has left hip fx. No other complaints currently. Seen by cardiology with some CP felt musculoskeletal.  Echo mild AS and mod MR with nl EF. Age increase cardiac risk for surgery but discussed with daughter surgery is urgent and see no benefit in delay medically. Feel low to moderate risk for surgery. Question about UTI. No symptoms. Had UTI in 10.22 not surprising. Lab to do culture on urine in lab. Cover with ATN for prior culture with ATB started. Discussed with Dr Singer and cardiology.  12/20 Tachycardia and elevated BP. Cardiology adjusted meds. Plan hold off surgery until 12/22 to adjust meds and treat UTI. Family in room. Pt not sleeping well.   12/21 Didn't sleep  well prior night. Apparently been on xanax for a time. Also recently started on Neurontin. Family with question. No recent BM. Some increase stool on KUB but not severe. Surgery for am. BP some up but better. HR good.   12/22 Seen prior to surgery and apparently add better night. Sore mouth / throat better with mycostatin. For surgery. Family in room.  12/23 patient with pain but otherwise seemed to be doing relatively well.  Daughter in room.  Apparently been taking prednisone for some time and this was restarted per family request.  Look into swing bed placement  12/24 patient had better night rested and everyone's in better spirits today.  Tolerating some diet.   Therapy worked with patient.  Look into swing bed placement next week  12/25 remained in good spirits.  Less pain.  Had good bowel movement and ordered Dulcolax not given.  Because of dressing to leg, Burk was not removed to keep it from getting soiled.  Wound care to check 12/27.  Discuss this with patient and daughter.  On antibiotics for UTI  12/26-will dc to swingbed once accepted. Needs continued wound care.  12/27-DC when bed available  12/28- issues with wound care yesterday.  Dr. Singer had to come remove dressings himself due to adherence to subcutaneous tissue.  Family refusing transfer to [facility] due to lack of specially trained wound care team availability.  Planning to transfer to specialty but resistant to that as well and requesting to speak to administration.    12/29- agreeable to transfer to specialty.  This will be the best place for her to receive wound care.  DC after family tours     12/29-needs continued wound care and therapy[facility]    12/30-doing well today, still with pain         Past Medical History:   Diagnosis Date    Hypertension     Osteoporosis     Thyroid disorder        Past Surgical History:   Procedure Laterality Date    BLADDER SURGERY      HEMIARTHROPLASTY OF HIP Left 9/21/2021     Procedure: HEMIARTHROPLASTY, HIP;  Surgeon: Dequan Singer MD;  Location: [facility];  Service: Orthopedics;  Laterality: Left;    PARTIAL HIP ARTHROPLASTY Right     Dr Singer    REVISION TOTAL HIP ARTHROPLASTY Left 12/22/2022    Procedure: REVISION, TOTAL ARTHROPLASTY, HIP, VICTORIANO PROSTHETIC FX;  Surgeon: Dequan Singer MD;  Location: [facility];  Service: Orthopedics;  Laterality: Left;       Review of patient's allergies indicates:   Allergen Reactions    Bactrim [sulfamethoxazole-trimethoprim]     Codeine     Levaquin [levofloxacin]        Current Facility-Administered Medications on File Prior to Encounter   Medication    [COMPLETED] ketorolac injection 15 mg    [DISCONTINUED] 0.9%  NaCl infusion (for blood administration)    [DISCONTINUED] ALPRAZolam tablet 1 mg    [DISCONTINUED] apixaban tablet 2.5 mg    [DISCONTINUED] bisacodyL EC tablet 10 mg    [DISCONTINUED] bisacodyL suppository 10 mg    [DISCONTINUED] ceFEPIme (MAXIPIME) 1 g in dextrose 5 % in water (D5W) 5 % 50 mL IVPB (MB+)    [DISCONTINUED] dextrose 10% bolus 125 mL 125 mL    [DISCONTINUED] dextrose 10% bolus 250 mL 250 mL    [DISCONTINUED] diltiaZEM 24 hr capsule 120 mg    [DISCONTINUED] diphenhydrAMINE injection 25 mg    [DISCONTINUED] docusate sodium capsule 100 mg    [DISCONTINUED] gabapentin capsule 100 mg    [DISCONTINUED] glucagon (human recombinant) injection 1 mg    [DISCONTINUED] hydrALAZINE injection 10 mg    [DISCONTINUED] hydrALAZINE tablet 25 mg    [DISCONTINUED] HYDROcodone-acetaminophen  mg per tablet 2 tablet    [DISCONTINUED] insulin aspart U-100 injection 0-5 Units    [DISCONTINUED] lactated ringers infusion    [DISCONTINUED] levothyroxine tablet 125 mcg    [DISCONTINUED] LIDOcaine HCl 2% urojet    [DISCONTINUED] lisinopriL tablet 20 mg    [DISCONTINUED] magnesium oxide tablet 800 mg    [DISCONTINUED] magnesium oxide tablet 800 mg    [DISCONTINUED] melatonin tablet 6 mg     [DISCONTINUED] metoprolol tartrate (LOPRESSOR) tablet 50 mg    [DISCONTINUED] naloxone 0.4 mg/mL injection 0.02 mg    [DISCONTINUED] nystatin 100,000 unit/mL suspension 500,000 Units    [DISCONTINUED] ondansetron injection 4 mg    [DISCONTINUED] pantoprazole EC tablet 40 mg    [DISCONTINUED] polyethylene glycol packet 34 g    [DISCONTINUED] potassium bicarbonate disintegrating tablet 35 mEq    [DISCONTINUED] potassium bicarbonate disintegrating tablet 50 mEq    [DISCONTINUED] potassium bicarbonate disintegrating tablet 60 mEq    [DISCONTINUED] potassium, sodium phosphates 280-160-250 mg packet 2 packet    [DISCONTINUED] potassium, sodium phosphates 280-160-250 mg packet 2 packet    [DISCONTINUED] potassium, sodium phosphates 280-160-250 mg packet 2 packet    [DISCONTINUED] predniSONE tablet 5 mg    [DISCONTINUED] senna tablet 8.6 mg    [DISCONTINUED] sodium chloride 0.9% flush 10 mL     Current Outpatient Medications on File Prior to Encounter   Medication Sig    ALPRAZolam (XANAX) 1 MG tablet Take 1 tablet (1 mg total) by mouth 2 (two) times daily.    apixaban (ELIQUIS) 2.5 mg Tab Take 1 tablet (2.5 mg total) by mouth 2 (two) times daily.    [] bisacodyL (DULCOLAX) 5 mg EC tablet Take 2 tablets (10 mg total) by mouth once. for 1 dose    dextrose 5 % in water (D5W) 5 % PgBk 50 mL with ceFEPIme 1 gram SolR 1 g Inject 1 g into the vein every 12 (twelve) hours.    diltiaZEM (CARDIZEM CD) 120 MG Cp24 Take 1 capsule (120 mg total) by mouth once daily.    docusate sodium (COLACE) 100 MG capsule Take 1 capsule (100 mg total) by mouth 2 (two) times daily.    famotidine (PEPCID) 20 MG tablet Take 1 tablet (20 mg total) by mouth once daily.    gabapentin (NEURONTIN) 100 MG capsule Take 1 capsule (100 mg total) by mouth 3 (three) times daily.    hydrALAZINE (APRESOLINE) 25 MG tablet Take 1 tablet (25 mg total) by mouth every 8 (eight) hours.    HYDROcodone-acetaminophen (NORCO)  mg per  tablet Take 2 tablets by mouth every 6 (six) hours as needed for Pain.    levothyroxine (SYNTHROID) 125 MCG tablet Take 1 tablet (125 mcg total) by mouth before breakfast.    lisinopriL (PRINIVIL,ZESTRIL) 20 MG tablet Take 1 tablet (20 mg total) by mouth once daily.    magnesium oxide (MAG-OX) 400 mg (241.3 mg magnesium) tablet Take 2 tablets (800 mg total) by mouth as needed (if magnesium level is 1.5-1.8 mg/dL give 800 mg every 4 hours x 2 doses).    melatonin (MELATIN) 3 mg tablet Take 2 tablets (6 mg total) by mouth nightly as needed for Insomnia.    metoprolol tartrate (LOPRESSOR) 50 MG tablet Take 1 tablet (50 mg total) by mouth 2 (two) times daily.    nystatin (MYCOSTATIN) 100,000 unit/mL suspension Take 5 mLs (500,000 Units total) by mouth 4 (four) times daily. for 10 days    ondansetron 4 mg/2 mL Soln Inject 4 mg into the vein every 8 (eight) hours as needed.    pantoprazole (PROTONIX) 40 MG tablet Take 1 tablet (40 mg total) by mouth once daily.    polyethylene glycol (GLYCOLAX) 17 gram PwPk Take 34 g by mouth 2 (two) times daily.    predniSONE (DELTASONE) 5 MG tablet Take 1 tablet (5 mg total) by mouth once daily.    senna (SENOKOT) 8.6 mg tablet Take 1 tablet by mouth once daily.     Family History       Problem Relation (Age of Onset)    Bladder Cancer Father    Heart attack Mother    Heart disease Mother    No Known Problems Sister, Brother, Maternal Grandmother, Maternal Grandfather, Paternal Grandmother, Paternal Grandfather          Tobacco Use    Smoking status: Former    Smokeless tobacco: Never   Substance and Sexual Activity    Alcohol use: Never    Drug use: Never    Sexual activity: Not Currently     Review of Systems   Constitutional:  Negative for chills, fatigue, fever and unexpected weight change.   HENT:  Negative for congestion, mouth sores and sore throat.    Eyes:  Negative for photophobia and visual disturbance.   Respiratory:  Negative for cough, chest tightness,  shortness of breath and wheezing.    Cardiovascular:  Negative for chest pain, palpitations and leg swelling.   Gastrointestinal:  Negative for abdominal pain, diarrhea, nausea and vomiting.   Endocrine: Negative for cold intolerance and heat intolerance.   Genitourinary:  Negative for difficulty urinating, dysuria, frequency and urgency.   Musculoskeletal:  Positive for arthralgias. Negative for back pain and myalgias.   Skin:  Negative for pallor and rash.   Neurological:  Negative for tremors, seizures, syncope, weakness, numbness and headaches.   Hematological:  Does not bruise/bleed easily.   Psychiatric/Behavioral:  Negative for agitation, confusion, hallucinations and suicidal ideas.    Objective:     Vital Signs (Most Recent):  Temp: 98.2 °F (36.8 °C) (12/30/22 0800)  Pulse: (!) 56 (12/30/22 0800)  Resp: 18 (12/30/22 0800)  BP: (!) 127/51 (12/30/22 0800)  SpO2: (!) 93 % (12/30/22 0800)   Vital Signs (24h Range):  Temp:  [97.1 °F (36.2 °C)-98.6 °F (37 °C)] 98.2 °F (36.8 °C)  Pulse:  [56-60] 56  Resp:  [16-20] 18  SpO2:  [93 %-96 %] 93 %  BP: (125-140)/(48-64) 127/51     Weight: 59.1 kg (130 lb 3.2 oz)  Body mass index is 21.01 kg/m².    Physical Exam  Vitals reviewed.   Constitutional:       Appearance: Normal appearance.   HENT:      Head: Normocephalic and atraumatic.   Eyes:      Extraocular Movements: Extraocular movements intact.   Cardiovascular:      Rate and Rhythm: Normal rate and regular rhythm.      Pulses: Normal pulses.   Pulmonary:      Effort: Pulmonary effort is normal.      Breath sounds: Normal breath sounds.   Abdominal:      General: Abdomen is flat.      Palpations: Abdomen is soft.   Musculoskeletal:         General: Tenderness and signs of injury present.      Comments: Dressed left lower extremity, painful to touch   Skin:     General: Skin is warm and dry.      Capillary Refill: Capillary refill takes less than 2 seconds.   Neurological:      General: No focal deficit present.       Mental Status: She is alert and oriented to person, place, and time.   Psychiatric:         Mood and Affect: Mood normal.         Behavior: Behavior normal.           Significant Labs: All pertinent labs within the past 24 hours have been reviewed.    Significant Imaging: I have reviewed all pertinent imaging results/findings within the past 24 hours.    Assessment/Plan:     * Urinary tract infection  Continue cefepime for 7 days      Unspecified open wound, left lower leg, initial encounter  Wound care      Skin tear of upper arm without complication, right, initial encounter  Wound care      Delmy-prosthetic fracture around prosthetic hip  S/P fixation with Pomierski      Lumbar radiculopathy  Pain management  PT      Hypertension  Adjust medications as needed      Arthritis  Pain management        VTE Risk Mitigation (From admission, onward)         Ordered     apixaban tablet 2.5 mg  2 times daily         12/29/22 1630                   Shai Guerra DO  Department of Hospital Medicine   [facility]

## 2022-12-30 NOTE — NURSING
Spoke with Vonnie in wound care about pt having several wounds that will require pain meds prior to dressings. OK with Vonnie to leave dressings until tomorrow when pt can have adequate time for pain meds to take effect. Notified pt, daughter at bedside and sitter at bedside. They all voiced understanding.

## 2022-12-30 NOTE — SUBJECTIVE & OBJECTIVE
Past Medical History:   Diagnosis Date    Hypertension     Osteoporosis     Thyroid disorder        Past Surgical History:   Procedure Laterality Date    BLADDER SURGERY      HEMIARTHROPLASTY OF HIP Left 9/21/2021    Procedure: HEMIARTHROPLASTY, HIP;  Surgeon: Dequan Singer MD;  Location: [facility] OR;  Service: Orthopedics;  Laterality: Left;    PARTIAL HIP ARTHROPLASTY Right     Dr Singer    REVISION TOTAL HIP ARTHROPLASTY Left 12/22/2022    Procedure: REVISION, TOTAL ARTHROPLASTY, HIP, VICTORIANO PROSTHETIC FX;  Surgeon: Dequan Singer MD;  Location: [facility] OR;  Service: Orthopedics;  Laterality: Left;       Review of patient's allergies indicates:   Allergen Reactions    Bactrim [sulfamethoxazole-trimethoprim]     Codeine     Levaquin [levofloxacin]        Current Facility-Administered Medications on File Prior to Encounter   Medication    [COMPLETED] ketorolac injection 15 mg    [DISCONTINUED] 0.9%  NaCl infusion (for blood administration)    [DISCONTINUED] ALPRAZolam tablet 1 mg    [DISCONTINUED] apixaban tablet 2.5 mg    [DISCONTINUED] bisacodyL EC tablet 10 mg    [DISCONTINUED] bisacodyL suppository 10 mg    [DISCONTINUED] ceFEPIme (MAXIPIME) 1 g in dextrose 5 % in water (D5W) 5 % 50 mL IVPB (MB+)    [DISCONTINUED] dextrose 10% bolus 125 mL 125 mL    [DISCONTINUED] dextrose 10% bolus 250 mL 250 mL    [DISCONTINUED] diltiaZEM 24 hr capsule 120 mg    [DISCONTINUED] diphenhydrAMINE injection 25 mg    [DISCONTINUED] docusate sodium capsule 100 mg    [DISCONTINUED] gabapentin capsule 100 mg    [DISCONTINUED] glucagon (human recombinant) injection 1 mg    [DISCONTINUED] hydrALAZINE injection 10 mg    [DISCONTINUED] hydrALAZINE tablet 25 mg    [DISCONTINUED] HYDROcodone-acetaminophen  mg per tablet 2 tablet    [DISCONTINUED] insulin aspart U-100 injection 0-5 Units    [DISCONTINUED] lactated ringers infusion    [DISCONTINUED] levothyroxine tablet 125 mcg    [DISCONTINUED] LIDOcaine HCl  2% urojet    [DISCONTINUED] lisinopriL tablet 20 mg    [DISCONTINUED] magnesium oxide tablet 800 mg    [DISCONTINUED] magnesium oxide tablet 800 mg    [DISCONTINUED] melatonin tablet 6 mg    [DISCONTINUED] metoprolol tartrate (LOPRESSOR) tablet 50 mg    [DISCONTINUED] naloxone 0.4 mg/mL injection 0.02 mg    [DISCONTINUED] nystatin 100,000 unit/mL suspension 500,000 Units    [DISCONTINUED] ondansetron injection 4 mg    [DISCONTINUED] pantoprazole EC tablet 40 mg    [DISCONTINUED] polyethylene glycol packet 34 g    [DISCONTINUED] potassium bicarbonate disintegrating tablet 35 mEq    [DISCONTINUED] potassium bicarbonate disintegrating tablet 50 mEq    [DISCONTINUED] potassium bicarbonate disintegrating tablet 60 mEq    [DISCONTINUED] potassium, sodium phosphates 280-160-250 mg packet 2 packet    [DISCONTINUED] potassium, sodium phosphates 280-160-250 mg packet 2 packet    [DISCONTINUED] potassium, sodium phosphates 280-160-250 mg packet 2 packet    [DISCONTINUED] predniSONE tablet 5 mg    [DISCONTINUED] senna tablet 8.6 mg    [DISCONTINUED] sodium chloride 0.9% flush 10 mL     Current Outpatient Medications on File Prior to Encounter   Medication Sig    ALPRAZolam (XANAX) 1 MG tablet Take 1 tablet (1 mg total) by mouth 2 (two) times daily.    apixaban (ELIQUIS) 2.5 mg Tab Take 1 tablet (2.5 mg total) by mouth 2 (two) times daily.    [] bisacodyL (DULCOLAX) 5 mg EC tablet Take 2 tablets (10 mg total) by mouth once. for 1 dose    dextrose 5 % in water (D5W) 5 % PgBk 50 mL with ceFEPIme 1 gram SolR 1 g Inject 1 g into the vein every 12 (twelve) hours.    diltiaZEM (CARDIZEM CD) 120 MG Cp24 Take 1 capsule (120 mg total) by mouth once daily.    docusate sodium (COLACE) 100 MG capsule Take 1 capsule (100 mg total) by mouth 2 (two) times daily.    famotidine (PEPCID) 20 MG tablet Take 1 tablet (20 mg total) by mouth once daily.    gabapentin (NEURONTIN) 100 MG capsule Take 1 capsule (100 mg total) by mouth 3 (three)  times daily.    hydrALAZINE (APRESOLINE) 25 MG tablet Take 1 tablet (25 mg total) by mouth every 8 (eight) hours.    HYDROcodone-acetaminophen (NORCO)  mg per tablet Take 2 tablets by mouth every 6 (six) hours as needed for Pain.    levothyroxine (SYNTHROID) 125 MCG tablet Take 1 tablet (125 mcg total) by mouth before breakfast.    lisinopriL (PRINIVIL,ZESTRIL) 20 MG tablet Take 1 tablet (20 mg total) by mouth once daily.    magnesium oxide (MAG-OX) 400 mg (241.3 mg magnesium) tablet Take 2 tablets (800 mg total) by mouth as needed (if magnesium level is 1.5-1.8 mg/dL give 800 mg every 4 hours x 2 doses).    melatonin (MELATIN) 3 mg tablet Take 2 tablets (6 mg total) by mouth nightly as needed for Insomnia.    metoprolol tartrate (LOPRESSOR) 50 MG tablet Take 1 tablet (50 mg total) by mouth 2 (two) times daily.    nystatin (MYCOSTATIN) 100,000 unit/mL suspension Take 5 mLs (500,000 Units total) by mouth 4 (four) times daily. for 10 days    ondansetron 4 mg/2 mL Soln Inject 4 mg into the vein every 8 (eight) hours as needed.    pantoprazole (PROTONIX) 40 MG tablet Take 1 tablet (40 mg total) by mouth once daily.    polyethylene glycol (GLYCOLAX) 17 gram PwPk Take 34 g by mouth 2 (two) times daily.    predniSONE (DELTASONE) 5 MG tablet Take 1 tablet (5 mg total) by mouth once daily.    senna (SENOKOT) 8.6 mg tablet Take 1 tablet by mouth once daily.     Family History       Problem Relation (Age of Onset)    Bladder Cancer Father    Heart attack Mother    Heart disease Mother    No Known Problems Sister, Brother, Maternal Grandmother, Maternal Grandfather, Paternal Grandmother, Paternal Grandfather          Tobacco Use    Smoking status: Former    Smokeless tobacco: Never   Substance and Sexual Activity    Alcohol use: Never    Drug use: Never    Sexual activity: Not Currently     Review of Systems   Constitutional:  Negative for chills, fatigue, fever and unexpected weight change.   HENT:  Negative for  congestion, mouth sores and sore throat.    Eyes:  Negative for photophobia and visual disturbance.   Respiratory:  Negative for cough, chest tightness, shortness of breath and wheezing.    Cardiovascular:  Negative for chest pain, palpitations and leg swelling.   Gastrointestinal:  Negative for abdominal pain, diarrhea, nausea and vomiting.   Endocrine: Negative for cold intolerance and heat intolerance.   Genitourinary:  Negative for difficulty urinating, dysuria, frequency and urgency.   Musculoskeletal:  Positive for arthralgias. Negative for back pain and myalgias.   Skin:  Negative for pallor and rash.   Neurological:  Negative for tremors, seizures, syncope, weakness, numbness and headaches.   Hematological:  Does not bruise/bleed easily.   Psychiatric/Behavioral:  Negative for agitation, confusion, hallucinations and suicidal ideas.    Objective:     Vital Signs (Most Recent):  Temp: 98.2 °F (36.8 °C) (12/30/22 0800)  Pulse: (!) 56 (12/30/22 0800)  Resp: 18 (12/30/22 0800)  BP: (!) 127/51 (12/30/22 0800)  SpO2: (!) 93 % (12/30/22 0800)   Vital Signs (24h Range):  Temp:  [97.1 °F (36.2 °C)-98.6 °F (37 °C)] 98.2 °F (36.8 °C)  Pulse:  [56-60] 56  Resp:  [16-20] 18  SpO2:  [93 %-96 %] 93 %  BP: (125-140)/(48-64) 127/51     Weight: 59.1 kg (130 lb 3.2 oz)  Body mass index is 21.01 kg/m².    Physical Exam  Vitals reviewed.   Constitutional:       Appearance: Normal appearance.   HENT:      Head: Normocephalic and atraumatic.   Eyes:      Extraocular Movements: Extraocular movements intact.   Cardiovascular:      Rate and Rhythm: Normal rate and regular rhythm.      Pulses: Normal pulses.   Pulmonary:      Effort: Pulmonary effort is normal.      Breath sounds: Normal breath sounds.   Abdominal:      General: Abdomen is flat.      Palpations: Abdomen is soft.   Musculoskeletal:         General: Tenderness and signs of injury present.      Comments: Dressed left lower extremity, painful to touch   Skin:     General:  Skin is warm and dry.      Capillary Refill: Capillary refill takes less than 2 seconds.   Neurological:      General: No focal deficit present.      Mental Status: She is alert and oriented to person, place, and time.   Psychiatric:         Mood and Affect: Mood normal.         Behavior: Behavior normal.           Significant Labs: All pertinent labs within the past 24 hours have been reviewed.    Significant Imaging: I have reviewed all pertinent imaging results/findings within the past 24 hours.

## 2022-12-30 NOTE — PLAN OF CARE
Problem: Adult Inpatient Plan of Care  Goal: Plan of Care Review  Outcome: Ongoing, Progressing  Goal: Patient-Specific Goal (Individualized)  Outcome: Ongoing, Progressing  Goal: Absence of Hospital-Acquired Illness or Injury  Outcome: Ongoing, Progressing  Goal: Optimal Comfort and Wellbeing  Outcome: Ongoing, Progressing  Goal: Readiness for Transition of Care  Outcome: Ongoing, Progressing     Problem: Impaired Wound Healing  Goal: Optimal Wound Healing  Outcome: Ongoing, Progressing     Problem: Skin Injury Risk Increased  Goal: Skin Health and Integrity  Outcome: Ongoing, Progressing     Problem: Fall Injury Risk  Goal: Absence of Fall and Fall-Related Injury  Outcome: Ongoing, Progressing     Problem: Infection  Goal: Absence of Infection Signs and Symptoms  Outcome: Ongoing, Progressing

## 2022-12-30 NOTE — PROGRESS NOTES
Wound care note;  Patient skin was evaluated today.  Patient in bed , Alert. Daughter at bedside.   Spent 90 minutes performing dressing . Pain meds was administered .  Right upper medial arm skin tear with partial thickness skin loss, steri strip x 1 noted.   Left posterior arm skin tear healing noted , has x 1 area to upper lateral with raw pink tissue noted.   Left inner thigh skin tear with partial thickness skin loss .  Right inner thigh with pale pink/tan full thickness tissue.   Left medial lower leg with full thickness skin loss pink to black moist tissue noted.   Right posterior knee with purple discolored bruise noted.   Applied Xeroform dressing with dry gauze and kerlix. And applied Adaptic to dry purple bruise areas. Change dressings every 3 days. To be changed again on 01/01/23 per Dr Thakkar.   Has Burk catheter patent  Sacral and Bilateral heels with out pressure noted.  Cont turn protocol  Pillows to offload heels   On low air loss mattress  Wound care team to follow

## 2022-12-30 NOTE — HPI
HPI:   88-year-old lady seen emergency room department.  Patient presents after having a fall when she was trying to go to the restroom at her home.  Patient sustained a left femur fracture.  Patient complains of moderate to severe pain in the left hip area.  Patient also complains of chest tightness, she rates it a 5/10 in the chest tightness has been intubate.  Patient also was seen in emergency room department earlier this week per her daughter.  Patient is found to have dehydration and a urinary tract infection.  Current she denies any shortness of breath.  She does not give a history of coronary artery disease.        Procedure(s) (LRB):  REVISION, TOTAL ARTHROPLASTY, HIP, VICTORIANO PROSTHETIC FX (Left)       Hospital Course:   12/19 - records reviewed. Fall without LOC and has left hip fx. No other complaints currently. Seen by cardiology with some CP felt musculoskeletal.  Echo mild AS and mod MR with nl EF. Age increase cardiac risk for surgery but discussed with daughter surgery is urgent and see no benefit in delay medically. Feel low to moderate risk for surgery. Question about UTI. No symptoms. Had UTI in 10.22 not surprising. Lab to do culture on urine in lab. Cover with ATN for prior culture with ATB started. Discussed with Dr Singer and cardiology.  12/20 Tachycardia and elevated BP. Cardiology adjusted meds. Plan hold off surgery until 12/22 to adjust meds and treat UTI. Family in room. Pt not sleeping well.   12/21 Didn't sleep well prior night. Apparently been on xanax for a time. Also recently started on Neurontin. Family with question. No recent BM. Some increase stool on KUB but not severe. Surgery for am. BP some up but better. HR good.   12/22 Seen prior to surgery and apparently add better night. Sore mouth / throat better with mycostatin. For surgery. Family in room.  12/23 patient with pain but otherwise seemed to be doing relatively well.  Daughter in room.  Apparently been taking prednisone  for some time and this was restarted per family request.  Look into swing bed placement  12/24 patient had better night rested and everyone's in better spirits today.  Tolerating some diet.   Therapy worked with patient.  Look into swing bed placement next week  12/25 remained in good spirits.  Less pain.  Had good bowel movement and ordered Dulcolax not given.  Because of dressing to leg, Burk was not removed to keep it from getting soiled.  Wound care to check 12/27.  Discuss this with patient and daughter.  On antibiotics for UTI  12/26-will dc to swingbed once accepted. Needs continued wound care.  12/27-DC when bed available  12/28- issues with wound care yesterday.  Dr. Singer had to come remove dressings himself due to adherence to subcutaneous tissue.  Family refusing transfer to [facility] due to lack of specially trained wound care team availability.  Planning to transfer to specialty but resistant to that as well and requesting to speak to administration.    12/29- agreeable to transfer to specialty.  This will be the best place for her to receive wound care.  DC after family tours     12/29-needs continued wound care and therapy[facility]    12/30-doing well today, still with pain

## 2022-12-30 NOTE — NURSING
Received pt to room 186 via hospital bed, awake, alert and oriented x4. Daughters x2 at bedside and sitter at bedside also. Pt has dressings to the left hip, LUE, BLE and RUE. All dressings are dry and intact. Bruising noted to the pubic area. Pt denies pain at this time. Will monitor.

## 2022-12-31 LAB
ANION GAP SERPL CALCULATED.3IONS-SCNC: 11 MMOL/L (ref 7–16)
BUN SERPL-MCNC: 21 MG/DL (ref 7–18)
BUN/CREAT SERPL: 25 (ref 6–20)
CALCIUM SERPL-MCNC: 8.5 MG/DL (ref 8.5–10.1)
CHLORIDE SERPL-SCNC: 103 MMOL/L (ref 98–107)
CO2 SERPL-SCNC: 28 MMOL/L (ref 21–32)
CREAT SERPL-MCNC: 0.83 MG/DL (ref 0.55–1.02)
EGFR (NO RACE VARIABLE) (RUSH/TITUS): 68 ML/MIN/1.73M²
GLUCOSE SERPL-MCNC: 78 MG/DL (ref 74–106)
GLUCOSE SERPL-MCNC: 99 MG/DL (ref 70–105)
MAGNESIUM SERPL-MCNC: 2.1 MG/DL (ref 1.7–2.3)
PHOSPHATE SERPL-MCNC: 4 MG/DL (ref 2.5–4.5)
POTASSIUM SERPL-SCNC: 5.4 MMOL/L (ref 3.5–5.1)
SODIUM SERPL-SCNC: 137 MMOL/L (ref 136–145)

## 2022-12-31 PROCEDURE — 11000001 HC ACUTE MED/SURG PRIVATE ROOM

## 2022-12-31 PROCEDURE — 99232 PR SUBSEQUENT HOSPITAL CARE,LEVL II: ICD-10-PCS | Mod: ,,, | Performed by: STUDENT IN AN ORGANIZED HEALTH CARE EDUCATION/TRAINING PROGRAM

## 2022-12-31 PROCEDURE — 84100 ASSAY OF PHOSPHORUS: CPT | Performed by: STUDENT IN AN ORGANIZED HEALTH CARE EDUCATION/TRAINING PROGRAM

## 2022-12-31 PROCEDURE — 82962 GLUCOSE BLOOD TEST: CPT

## 2022-12-31 PROCEDURE — 80048 BASIC METABOLIC PNL TOTAL CA: CPT | Performed by: STUDENT IN AN ORGANIZED HEALTH CARE EDUCATION/TRAINING PROGRAM

## 2022-12-31 PROCEDURE — 63600175 PHARM REV CODE 636 W HCPCS: Performed by: STUDENT IN AN ORGANIZED HEALTH CARE EDUCATION/TRAINING PROGRAM

## 2022-12-31 PROCEDURE — 83735 ASSAY OF MAGNESIUM: CPT | Performed by: STUDENT IN AN ORGANIZED HEALTH CARE EDUCATION/TRAINING PROGRAM

## 2022-12-31 PROCEDURE — 25000003 PHARM REV CODE 250: Performed by: STUDENT IN AN ORGANIZED HEALTH CARE EDUCATION/TRAINING PROGRAM

## 2022-12-31 PROCEDURE — 99232 SBSQ HOSP IP/OBS MODERATE 35: CPT | Mod: ,,, | Performed by: STUDENT IN AN ORGANIZED HEALTH CARE EDUCATION/TRAINING PROGRAM

## 2022-12-31 PROCEDURE — 36415 COLL VENOUS BLD VENIPUNCTURE: CPT | Performed by: STUDENT IN AN ORGANIZED HEALTH CARE EDUCATION/TRAINING PROGRAM

## 2022-12-31 RX ADMIN — METOPROLOL TARTRATE 50 MG: 50 TABLET, FILM COATED ORAL at 09:12

## 2022-12-31 RX ADMIN — DILTIAZEM HYDROCHLORIDE 120 MG: 120 CAPSULE, COATED, EXTENDED RELEASE ORAL at 09:12

## 2022-12-31 RX ADMIN — DOCUSATE SODIUM 100 MG: 100 CAPSULE, LIQUID FILLED ORAL at 09:12

## 2022-12-31 RX ADMIN — GABAPENTIN 100 MG: 100 CAPSULE ORAL at 09:12

## 2022-12-31 RX ADMIN — SENNOSIDES 1 TABLET: 8.6 TABLET, FILM COATED ORAL at 09:12

## 2022-12-31 RX ADMIN — ALPRAZOLAM 1 MG: 0.25 TABLET ORAL at 09:12

## 2022-12-31 RX ADMIN — NYSTATIN 500000 UNITS: 100000 SUSPENSION ORAL at 09:12

## 2022-12-31 RX ADMIN — HYDROCODONE BITARTRATE AND ACETAMINOPHEN 2 TABLET: 10; 325 TABLET ORAL at 08:12

## 2022-12-31 RX ADMIN — CEFEPIME 1 G: 1 INJECTION, POWDER, FOR SOLUTION INTRAMUSCULAR; INTRAVENOUS at 09:12

## 2022-12-31 RX ADMIN — HYDRALAZINE HYDROCHLORIDE 25 MG: 25 TABLET ORAL at 09:12

## 2022-12-31 RX ADMIN — HYDROCODONE BITARTRATE AND ACETAMINOPHEN 2 TABLET: 10; 325 TABLET ORAL at 03:12

## 2022-12-31 RX ADMIN — LISINOPRIL 20 MG: 20 TABLET ORAL at 09:12

## 2022-12-31 RX ADMIN — LEVOTHYROXINE SODIUM 125 MCG: 0.12 TABLET ORAL at 05:12

## 2022-12-31 RX ADMIN — HYDRALAZINE HYDROCHLORIDE 25 MG: 25 TABLET ORAL at 05:12

## 2022-12-31 RX ADMIN — NYSTATIN 500000 UNITS: 100000 SUSPENSION ORAL at 05:12

## 2022-12-31 RX ADMIN — HYDRALAZINE HYDROCHLORIDE 25 MG: 25 TABLET ORAL at 01:12

## 2022-12-31 RX ADMIN — NYSTATIN 500000 UNITS: 100000 SUSPENSION ORAL at 01:12

## 2022-12-31 RX ADMIN — APIXABAN 2.5 MG: 2.5 TABLET, FILM COATED ORAL at 09:12

## 2022-12-31 RX ADMIN — POLYETHYLENE GLYCOL 3350 34 G: 17 POWDER, FOR SOLUTION ORAL at 09:12

## 2022-12-31 RX ADMIN — PREDNISONE 5 MG: 5 TABLET ORAL at 09:12

## 2022-12-31 RX ADMIN — FAMOTIDINE 20 MG: 20 TABLET ORAL at 09:12

## 2022-12-31 RX ADMIN — MUPIROCIN: 20 OINTMENT TOPICAL at 09:12

## 2022-12-31 RX ADMIN — GABAPENTIN 100 MG: 100 CAPSULE ORAL at 03:12

## 2022-12-31 RX ADMIN — PANTOPRAZOLE SODIUM 40 MG: 40 TABLET, DELAYED RELEASE ORAL at 09:12

## 2022-12-31 NOTE — PT/OT/SLP EVAL
Occupational Therapy   Evaluation    Name: Zandra Veloz  MRN: [id number]  Admitting Diagnosis: Urinary tract infection  Recent Surgery: * No surgery found *      Recommendations:     Discharge Recommendations: nursing facility, skilled, rehabilitation facility  Discharge Equipment Recommendations:   (to be determined)  Barriers to discharge:       Assessment:     Zandra Veloz is a 88 y.o. female with recent fall with periprosthetic hip fx, underwent a revisionof her THR and is  advised NWB to (L) LE. Pt also has UTI and large skin tear wounds on (R) UE and (B) LEs  She presents with alert and agreeable to evaluation, but fearful of hurting. Performance deficits affecting function: weakness, impaired balance, impaired endurance, impaired functional mobility, impaired self care skills, impaired skin, pain, orthopedic precautions, decreased lower extremity function, decreased ROM, decreased upper extremity function.      Rehab Prognosis: Good and Fair; patient would benefit from acute skilled OT services to address these deficits and reach maximum level of function.       Plan:     Patient to be seen 5 x/week to address the above listed problems via self-care/home management, therapeutic exercises, therapeutic activities  Plan of Care Expires: 02/06/23  Plan of Care Reviewed with: patient, daughter    Subjective     Chief Complaint: (L) UE pain, (B) LE pain  Patient/Family Comments/goals: Pt wants to be able to heal her wounds and regain her ability to function    Occupational Profile:  Living Environment: Pt lives in a single level home with 1 step to enter  Previous level of function: Pt was able to perform her own sponge baths and dress herself. Pt ambulated with RW.  Roles and Routines: Pt lives with her daughter  Equipment Used at Home: walker, rolling, bedside commode, shower chair  Assistance upon Discharge: Pt will likely require staff assistance at swingbed    Pain/Comfort:  Pain Rating  1: 6/10  Location - Side 1: Bilateral  Location 1: leg  Pain Addressed 1: Pre-medicate for activity, Reposition, Distraction, Cessation of Activity  Pain Rating Post-Intervention 1: 6/10  Pain Rating 2: 7/10  Location - Side 2: Left  Location 2: arm  Pain Addressed 2: Pre-medicate for activity, Reposition, Distraction, Cessation of Activity  Pain Rating Post-Intervention 2: 7/10    Patients cultural, spiritual, Orthodox conflicts given the current situation: no    Objective:     Communicated with: CLYDE Wooten prior to session.  Patient found HOB elevated with peripheral IV, oxygen, chao catheter upon OT entry to room.    General Precautions: Standard, fall  Orthopedic Precautions: LLE non weight bearing, LLE posterior precautions  Braces: N/A  Respiratory Status: Room air    Occupational Performance:    Bed Mobility:    Patient completed Supine to Sit with maximal assistance and 2 persons  Patient completed Sit to Supine with dependent    Functional Mobility/Transfers:  Patient completed Sit <> Stand Transfer with maximal assistance  with  gaitbelt and manual assist   Functional Mobility: Pt unable to use RW or take steps    Activities of Daily Living:  Upper Body Dressing: maximal assistance    Lower Body Dressing: total assistance      Cognitive/Visual Perceptual:  Cognitive/Psychosocial Skills:     -       Oriented to: Person, Place, and Situation   -       Follows Commands/attention:Follows one-step commands  -       Communication: clear/fluent  -       Safety awareness/insight to disability: intact   -       Mood/Affect/Coping skills/emotional control: Cooperative and Anxious    Physical Exam:  Dominant hand:    -       Right  Upper Extremity Range of Motion:     -       Right Upper Extremity: impaired due to pain with severe degloving injury to (R) UE  -       Left Upper Extremity: WFL except shoulder, injured with fall per pt  Upper Extremity Strength:    -       Right Upper Extremity: NT due to  pain  -       Left Upper Extremity: elbow and hand are grossly 3(+)/5   Strength:    -       Right Upper Extremity: 3(+)/5  -       Left Upper Extremity: 3(+)/5  Fine Motor Coordination:    -       Impaired  Left hand, finger to nose  , Right hand, finger to nose  , Left hand thumb/finger opposition skills  , and Right hand thumb/finger opposition skills    Gross motor coordination:   WFL    AMPAC 6 Click ADL:  AMPAC Total Score: 12    Treatment & Education:  Pt educated on OT role/POC.   Importance of OOB activity with staff assistance.  Importance of sitting up in the chair throughout the day as tolerated, especially for meals   Importance of assisting with self-care activities   All questions/concerns answered within OT scope of practice     Patient left HOB elevated with all lines intact, call button in reach, bed alarm on, and RN Jessica notified and daughter present    GOALS:   Multidisciplinary Problems       Occupational Therapy Goals          Problem: Occupational Therapy    Goal Priority Disciplines Outcome Interventions   Occupational Therapy Goal     OT, PT/OT Ongoing, Progressing    Description: STG: (In 2 weeks)  Pt will perform grooming with min(A)  Pt will bathe with setup and max A)  Pt will perform UE dressing with setup and min(A)  Pt will perform (L) UE AROM to 3/4 full range  Pt will sit EOB x 5 min with CGA assistance  Pt will transfer bed/chair/bsc with mod(A)  Pt will tolerate 20 minutes of tx without fatigue      LT.Restore to max I with self care and mobility.                          History:     Past Medical History:   Diagnosis Date    Hypertension     Osteoporosis     Thyroid disorder          Past Surgical History:   Procedure Laterality Date    BLADDER SURGERY      HEMIARTHROPLASTY OF HIP Left 2021    Procedure: HEMIARTHROPLASTY, HIP;  Surgeon: Dequan Singer MD;  Location: [facility];  Service: Orthopedics;  Laterality: Left;    PARTIAL HIP ARTHROPLASTY Right      Dr Singer    REVISION TOTAL HIP ARTHROPLASTY Left 12/22/2022    Procedure: REVISION, TOTAL ARTHROPLASTY, HIP, VICTORIANO PROSTHETIC FX;  Surgeon: Dequan Singer MD;  Location: [facility];  Service: Orthopedics;  Laterality: Left;       Time Tracking:     OT Date of Treatment: 12/30/22  OT Start Time: 1000  OT Stop Time: 1023  OT Total Time (min): 23 min    Billable Minutes:Evaluation moderate complexity    12/30/2022

## 2022-12-31 NOTE — PROGRESS NOTES
[facility] Hospital Medicine  Progress Note    Patient Name: Zandra Veloz  MRN: [id number]  Patient Class: IP- Inpatient   Admission Date: 12/29/2022  Length of Stay: 2 days  Attending Physician: Shai Guerra DO  Primary Care Provider: Brandon Thornton MD        Subjective:     Principal Problem:Urinary tract infection        HPI:  HPI:   88-year-old lady seen emergency room department.  Patient presents after having a fall when she was trying to go to the restroom at her home.  Patient sustained a left femur fracture.  Patient complains of moderate to severe pain in the left hip area.  Patient also complains of chest tightness, she rates it a 5/10 in the chest tightness has been intubate.  Patient also was seen in emergency room department earlier this week per her daughter.  Patient is found to have dehydration and a urinary tract infection.  Current she denies any shortness of breath.  She does not give a history of coronary artery disease.        Procedure(s) (LRB):  REVISION, TOTAL ARTHROPLASTY, HIP, VICTORIANO PROSTHETIC FX (Left)       Hospital Course:   12/19 - records reviewed. Fall without LOC and has left hip fx. No other complaints currently. Seen by cardiology with some CP felt musculoskeletal.  Echo mild AS and mod MR with nl EF. Age increase cardiac risk for surgery but discussed with daughter surgery is urgent and see no benefit in delay medically. Feel low to moderate risk for surgery. Question about UTI. No symptoms. Had UTI in 10.22 not surprising. Lab to do culture on urine in lab. Cover with ATN for prior culture with ATB started. Discussed with Dr Singer and cardiology.  12/20 Tachycardia and elevated BP. Cardiology adjusted meds. Plan hold off surgery until 12/22 to adjust meds and treat UTI. Family in room. Pt not sleeping well.   12/21 Didn't sleep well prior night. Apparently been on xanax for a time. Also recently started on Neurontin. Family with  question. No recent BM. Some increase stool on KUB but not severe. Surgery for am. BP some up but better. HR good.   12/22 Seen prior to surgery and apparently add better night. Sore mouth / throat better with mycostatin. For surgery. Family in room.  12/23 patient with pain but otherwise seemed to be doing relatively well.  Daughter in room.  Apparently been taking prednisone for some time and this was restarted per family request.  Look into swing bed placement  12/24 patient had better night rested and everyone's in better spirits today.  Tolerating some diet.   Therapy worked with patient.  Look into swing bed placement next week  12/25 remained in good spirits.  Less pain.  Had good bowel movement and ordered Dulcolax not given.  Because of dressing to leg, Burk was not removed to keep it from getting soiled.  Wound care to check 12/27.  Discuss this with patient and daughter.  On antibiotics for UTI  12/26-will dc to swingbed once accepted. Needs continued wound care.  12/27-DC when bed available  12/28- issues with wound care yesterday.  Dr. Singer had to come remove dressings himself due to adherence to subcutaneous tissue.  Family refusing transfer to [facility] due to lack of specially trained wound care team availability.  Planning to transfer to specialty but resistant to that as well and requesting to speak to administration.    12/29- agreeable to transfer to specialty.  This will be the best place for her to receive wound care.  DC after family tours     12/29-needs continued wound care and therapy[facility]    12/30-doing well today, still with pain         Overview/Hospital Course:  12/31-having some pain this am        Interval History: naeo    Review of Systems   Constitutional:  Negative for chills, fatigue, fever and unexpected weight change.   HENT:  Negative for congestion, mouth sores and sore throat.    Eyes:  Negative for photophobia and visual disturbance.   Respiratory:   Negative for cough, chest tightness, shortness of breath and wheezing.    Cardiovascular:  Negative for chest pain, palpitations and leg swelling.   Gastrointestinal:  Negative for abdominal pain, diarrhea, nausea and vomiting.   Endocrine: Negative for cold intolerance and heat intolerance.   Genitourinary:  Negative for difficulty urinating, dysuria, frequency and urgency.   Musculoskeletal:  Positive for arthralgias. Negative for back pain and myalgias.   Skin:  Negative for pallor and rash.   Neurological:  Negative for tremors, seizures, syncope, weakness, numbness and headaches.   Hematological:  Does not bruise/bleed easily.   Psychiatric/Behavioral:  Negative for agitation, confusion, hallucinations and suicidal ideas.    Objective:     Vital Signs (Most Recent):  Temp: 98.7 °F (37.1 °C) (12/31/22 0800)  Pulse: 64 (12/31/22 0800)  Resp: 18 (12/31/22 0824)  BP: (!) 115/39 (12/31/22 0800)  SpO2: (!) 93 % (12/31/22 0800)   Vital Signs (24h Range):  Temp:  [97.1 °F (36.2 °C)-98.7 °F (37.1 °C)] 98.7 °F (37.1 °C)  Pulse:  [51-64] 64  Resp:  [16-18] 18  SpO2:  [93 %-97 %] 93 %  BP: (115-135)/(35-62) 115/39     Weight: 59.1 kg (130 lb 3.2 oz)  Body mass index is 21.01 kg/m².    Intake/Output Summary (Last 24 hours) at 12/31/2022 1044  Last data filed at 12/30/2022 2047  Gross per 24 hour   Intake 185 ml   Output 325 ml   Net -140 ml      Physical Exam  Vitals reviewed.   Constitutional:       Appearance: Normal appearance.   HENT:      Head: Normocephalic and atraumatic.   Eyes:      Extraocular Movements: Extraocular movements intact.   Cardiovascular:      Rate and Rhythm: Normal rate and regular rhythm.      Pulses: Normal pulses.   Pulmonary:      Effort: Pulmonary effort is normal.      Breath sounds: Normal breath sounds.   Abdominal:      General: Abdomen is flat.      Palpations: Abdomen is soft.   Musculoskeletal:         General: Tenderness and signs of injury present.      Comments: Dressed left lower  extremity, painful to touch   Skin:     General: Skin is warm and dry.      Capillary Refill: Capillary refill takes less than 2 seconds.   Neurological:      General: No focal deficit present.      Mental Status: She is alert and oriented to person, place, and time.   Psychiatric:         Mood and Affect: Mood normal.         Behavior: Behavior normal.       Significant Labs: All pertinent labs within the past 24 hours have been reviewed.    Significant Imaging: I have reviewed all pertinent imaging results/findings within the past 24 hours.      Assessment/Plan:      * Urinary tract infection  Continue cefepime for 7 days      Wounds and injuries        Unspecified open wound, left lower leg, initial encounter  Wound care      Skin tear of upper arm without complication, right, initial encounter  Wound care      Delmy-prosthetic fracture around prosthetic hip  S/P fixation with Pomierski      Lumbar radiculopathy  Pain management  PT      Hypertension  Adjust medications as needed      Arthritis  Pain management        VTE Risk Mitigation (From admission, onward)         Ordered     apixaban tablet 2.5 mg  2 times daily         12/29/22 1630                Discharge Planning   YANIV:      Code Status: Full Code   Is the patient medically ready for discharge?:     Reason for patient still in hospital (select all that apply): Treatment  Discharge Plan A: Skilled Nursing Facility                  Shai Guerra DO  Department of Hospital Medicine   [facility]

## 2022-12-31 NOTE — HOSPITAL COURSE
In summary, patient admitted for a fall resulting in scott-prosthetic hip fracture now status-post surgical repair. Experienced soft tissue degloving-type injuries from protective/fixation devices used during surgical procedure, has been receiving IV antibiotics and extensive wound care. Hospital course generally uncomplicated aside from injuries noted above. Has been experiencing some decreased appetite, improved somewhat with Megace. Main barrier to transfer has been frequency of wound care, patient has now reached a point where this can be adequately managed by a [facility] facility. Antibiotics have been completed. Pain is reasonably well controlled, patient working well with PT/OT.     At this time, Zandra Veloz has reached maximum benefit of inpatient treatment. She is to discharge to [facility] for continued wound care and PT/OT. At time of discharge patient was hemodynamically stable with no complaints. Wound care instructions provided, discharge/transfer medications reconciled. Follow up with PCP, wound care, and orthopedics following final discharge from [facility] facility. Patient and family in agreement with plan, all questions addressed.     12/19 - records reviewed. Fall without LOC and has left hip fx. No other complaints currently. Seen by cardiology with some CP felt musculoskeletal.  Echo mild AS and mod MR with nl EF. Age increase cardiac risk for surgery but discussed with daughter surgery is urgent and see no benefit in delay medically. Feel low to moderate risk for surgery. Question about UTI. No symptoms. Had UTI in 10.22 not surprising. Lab to do culture on urine in lab. Cover with ATN for prior culture with ATB started. Discussed with Dr Singer and cardiology.  12/20 Tachycardia and elevated BP. Cardiology adjusted meds. Plan hold off surgery until 12/22 to adjust meds and treat UTI. Family in room. Pt not sleeping well.   12/21 Didn't sleep well prior night. Apparently been on xanax for a time. Also  recently started on Neurontin. Family with question. No recent BM. Some increase stool on KUB but not severe. Surgery for am. BP some up but better. HR good.   12/22 Seen prior to surgery and apparently add better night. Sore mouth / throat better with mycostatin. For surgery. Family in room.  12/23 patient with pain but otherwise seemed to be doing relatively well.  Daughter in room.  Apparently been taking prednisone for some time and this was restarted per family request.  Look into swing bed placement  12/24 patient had better night rested and everyone's in better spirits today.  Tolerating some diet.   Therapy worked with patient.  Look into swing bed placement next week  12/25 remained in good spirits.  Less pain.  Had good bowel movement and ordered Dulcolax not given.  Because of dressing to leg, Burk was not removed to keep it from getting soiled.  Wound care to check 12/27.  Discuss this with patient and daughter.  On antibiotics for UTI  12/26-will dc to swingbed once accepted. Needs continued wound care.  12/27-DC when bed available  12/28- issues with wound care yesterday.  Dr. Singer had to come remove dressings himself due to adherence to subcutaneous tissue.  Family refusing transfer to [facility] due to lack of specially trained wound care team availability.  Planning to transfer to specialty but resistant to that as well and requesting to speak to administration.    12/29- agreeable to transfer to specialty.  This will be the best place for her to receive wound care.  DC after family tours     12/29-needs continued wound care and therapy[facility]     12/30-doing well today, still with pain  12/31-having some pain this am  1/1- no complaints  1/2- doing well  1/3- continue wound care, last day cefepime tomorrow  1/4- some pain this AM.  Delay in mediations dues to staffing issues. Dsicussed with RN  1/5- no issues overnight  1/6- Still with pain, hip incision looks  good  1/7-diclofenac for shoulder pain  1/8-decrease laxatives, change benzos and carlo to PRN per patient request  1/9 some confusion overnight  1/10- no more confusion, doing well  1/11- no acute issues overnight  1/12- doing well, nausea today  1/13 -Dr. Lebron Cutler IV, DO taking over for Dr. Guerra for weekend.  Patient's pain appears to be well controlled this morning no complaints of nausea.  Continue wound care and PT/OT  1/14-patient appears well labs.  Continue wound care and PT/OT  1/15-patient still appears well.  No acute concerns or complaints.  No events overnight.  Continue wound care working with a PTOT.  Expiration is services estimated by February 6 1/16- no complaints, doing well  1/17-some nausea today  1/18-better today.  Still requiring twice weekly dressing changes.  Appetite stimulant  1/19- PWB with PT today.  Still with weeping wounds.  Will need wound care twice weekly-as these improve will be able to transfer to other facility  1/20-no complaints  1/21-no acute events overnight, some anxiety today  1/22-episode of SVT yesterday. Spontaneous resolution  1/23-no complaints  1/24- no complaints  1/25-doing well  1/26-wound care, pt  1/27- no complaints  1/28- patient seen examined today resting comfortably in bed, in no acute distress and no acute events overnight.  Patient states that she is doing well and denies pain.  States that she is been working well with PT/OT but not this weekend.  Patient otherwise doing well without complaints.  1/29-patient seen examined today resting comfortably in bed, in no acute distress with no acute events overnight.  Patient denies pain or other complaints at this time.  She continues with wound care PT/OT.  1/30-doing well this am  1/31-no complaints  2/1-no changes

## 2022-12-31 NOTE — PLAN OF CARE
Problem: Occupational Therapy  Goal: Occupational Therapy Goal  Description: STG: (In 2 weeks)  Pt will perform grooming with min(A)  Pt will bathe with setup and max A)  Pt will perform UE dressing with setup and min(A)  Pt will perform (L) UE AROM to 3/4 full range  Pt will sit EOB x 5 min with CGA assistance  Pt will transfer bed/chair/bsc with mod(A)  Pt will tolerate 20 minutes of tx without fatigue      LT.Restore to max I with self care and mobility.     Outcome: Ongoing, Progressing     OT evaluation performed. Pt is very limited by pain and multiple wounds. OT will treat pt daily 5x/wk to address the above treatment goals.

## 2022-12-31 NOTE — SUBJECTIVE & OBJECTIVE
Interval History: naeo    Review of Systems   Constitutional:  Negative for chills, fatigue, fever and unexpected weight change.   HENT:  Negative for congestion, mouth sores and sore throat.    Eyes:  Negative for photophobia and visual disturbance.   Respiratory:  Negative for cough, chest tightness, shortness of breath and wheezing.    Cardiovascular:  Negative for chest pain, palpitations and leg swelling.   Gastrointestinal:  Negative for abdominal pain, diarrhea, nausea and vomiting.   Endocrine: Negative for cold intolerance and heat intolerance.   Genitourinary:  Negative for difficulty urinating, dysuria, frequency and urgency.   Musculoskeletal:  Positive for arthralgias. Negative for back pain and myalgias.   Skin:  Negative for pallor and rash.   Neurological:  Negative for tremors, seizures, syncope, weakness, numbness and headaches.   Hematological:  Does not bruise/bleed easily.   Psychiatric/Behavioral:  Negative for agitation, confusion, hallucinations and suicidal ideas.    Objective:     Vital Signs (Most Recent):  Temp: 98.7 °F (37.1 °C) (12/31/22 0800)  Pulse: 64 (12/31/22 0800)  Resp: 18 (12/31/22 0824)  BP: (!) 115/39 (12/31/22 0800)  SpO2: (!) 93 % (12/31/22 0800)   Vital Signs (24h Range):  Temp:  [97.1 °F (36.2 °C)-98.7 °F (37.1 °C)] 98.7 °F (37.1 °C)  Pulse:  [51-64] 64  Resp:  [16-18] 18  SpO2:  [93 %-97 %] 93 %  BP: (115-135)/(35-62) 115/39     Weight: 59.1 kg (130 lb 3.2 oz)  Body mass index is 21.01 kg/m².    Intake/Output Summary (Last 24 hours) at 12/31/2022 1044  Last data filed at 12/30/2022 2047  Gross per 24 hour   Intake 185 ml   Output 325 ml   Net -140 ml      Physical Exam  Vitals reviewed.   Constitutional:       Appearance: Normal appearance.   HENT:      Head: Normocephalic and atraumatic.   Eyes:      Extraocular Movements: Extraocular movements intact.   Cardiovascular:      Rate and Rhythm: Normal rate and regular rhythm.      Pulses: Normal pulses.   Pulmonary:       Effort: Pulmonary effort is normal.      Breath sounds: Normal breath sounds.   Abdominal:      General: Abdomen is flat.      Palpations: Abdomen is soft.   Musculoskeletal:         General: Tenderness and signs of injury present.      Comments: Dressed left lower extremity, painful to touch   Skin:     General: Skin is warm and dry.      Capillary Refill: Capillary refill takes less than 2 seconds.   Neurological:      General: No focal deficit present.      Mental Status: She is alert and oriented to person, place, and time.   Psychiatric:         Mood and Affect: Mood normal.         Behavior: Behavior normal.       Significant Labs: All pertinent labs within the past 24 hours have been reviewed.    Significant Imaging: I have reviewed all pertinent imaging results/findings within the past 24 hours.

## 2023-01-01 PROCEDURE — 25000003 PHARM REV CODE 250: Performed by: STUDENT IN AN ORGANIZED HEALTH CARE EDUCATION/TRAINING PROGRAM

## 2023-01-01 PROCEDURE — 63600175 PHARM REV CODE 636 W HCPCS: Performed by: STUDENT IN AN ORGANIZED HEALTH CARE EDUCATION/TRAINING PROGRAM

## 2023-01-01 PROCEDURE — 99232 SBSQ HOSP IP/OBS MODERATE 35: CPT | Mod: ,,, | Performed by: STUDENT IN AN ORGANIZED HEALTH CARE EDUCATION/TRAINING PROGRAM

## 2023-01-01 PROCEDURE — 99232 PR SUBSEQUENT HOSPITAL CARE,LEVL II: ICD-10-PCS | Mod: ,,, | Performed by: STUDENT IN AN ORGANIZED HEALTH CARE EDUCATION/TRAINING PROGRAM

## 2023-01-01 PROCEDURE — 11000001 HC ACUTE MED/SURG PRIVATE ROOM

## 2023-01-01 RX ADMIN — GABAPENTIN 100 MG: 100 CAPSULE ORAL at 09:01

## 2023-01-01 RX ADMIN — ALPRAZOLAM 1 MG: 0.25 TABLET ORAL at 09:01

## 2023-01-01 RX ADMIN — HYDROCODONE BITARTRATE AND ACETAMINOPHEN 2 TABLET: 10; 325 TABLET ORAL at 06:01

## 2023-01-01 RX ADMIN — APIXABAN 2.5 MG: 2.5 TABLET, FILM COATED ORAL at 09:01

## 2023-01-01 RX ADMIN — HYDROCODONE BITARTRATE AND ACETAMINOPHEN 2 TABLET: 10; 325 TABLET ORAL at 01:01

## 2023-01-01 RX ADMIN — HYDROCODONE BITARTRATE AND ACETAMINOPHEN 2 TABLET: 10; 325 TABLET ORAL at 09:01

## 2023-01-01 RX ADMIN — NYSTATIN 500000 UNITS: 100000 SUSPENSION ORAL at 09:01

## 2023-01-01 RX ADMIN — LEVOTHYROXINE SODIUM 125 MCG: 0.12 TABLET ORAL at 06:01

## 2023-01-01 RX ADMIN — GABAPENTIN 100 MG: 100 CAPSULE ORAL at 02:01

## 2023-01-01 RX ADMIN — CEFEPIME 1 G: 1 INJECTION, POWDER, FOR SOLUTION INTRAMUSCULAR; INTRAVENOUS at 09:01

## 2023-01-01 RX ADMIN — DOCUSATE SODIUM 100 MG: 100 CAPSULE, LIQUID FILLED ORAL at 09:01

## 2023-01-01 RX ADMIN — PREDNISONE 5 MG: 5 TABLET ORAL at 09:01

## 2023-01-01 RX ADMIN — MUPIROCIN: 20 OINTMENT TOPICAL at 09:01

## 2023-01-01 RX ADMIN — METOPROLOL TARTRATE 50 MG: 50 TABLET, FILM COATED ORAL at 09:01

## 2023-01-01 RX ADMIN — LISINOPRIL 20 MG: 20 TABLET ORAL at 09:01

## 2023-01-01 RX ADMIN — PANTOPRAZOLE SODIUM 40 MG: 40 TABLET, DELAYED RELEASE ORAL at 09:01

## 2023-01-01 RX ADMIN — NYSTATIN 500000 UNITS: 100000 SUSPENSION ORAL at 01:01

## 2023-01-01 RX ADMIN — POLYETHYLENE GLYCOL 3350 34 G: 17 POWDER, FOR SOLUTION ORAL at 09:01

## 2023-01-01 RX ADMIN — NYSTATIN 500000 UNITS: 100000 SUSPENSION ORAL at 05:01

## 2023-01-01 RX ADMIN — HYDRALAZINE HYDROCHLORIDE 25 MG: 25 TABLET ORAL at 01:01

## 2023-01-01 RX ADMIN — MORPHINE SULFATE 2 MG: 2 INJECTION, SOLUTION INTRAMUSCULAR; INTRAVENOUS at 12:01

## 2023-01-01 RX ADMIN — FAMOTIDINE 20 MG: 20 TABLET ORAL at 09:01

## 2023-01-01 RX ADMIN — HYDRALAZINE HYDROCHLORIDE 25 MG: 25 TABLET ORAL at 06:01

## 2023-01-01 RX ADMIN — DILTIAZEM HYDROCHLORIDE 120 MG: 120 CAPSULE, COATED, EXTENDED RELEASE ORAL at 09:01

## 2023-01-01 NOTE — NURSING
Pt c/o pain to lower extremities reports 8/10 and agreeable to take po pain meds 2 hydrocodone 10 provided for pain after pt repositioned. Pt alert and sitter at bedside. Instructed to inform nurse if pain not relieved later. Will inform oncoming shift of f/u needed.

## 2023-01-01 NOTE — SUBJECTIVE & OBJECTIVE
Interval History: naeo    Review of Systems   Constitutional:  Negative for chills, fatigue, fever and unexpected weight change.   HENT:  Negative for congestion, mouth sores and sore throat.    Eyes:  Negative for photophobia and visual disturbance.   Respiratory:  Negative for cough, chest tightness, shortness of breath and wheezing.    Cardiovascular:  Negative for chest pain, palpitations and leg swelling.   Gastrointestinal:  Negative for abdominal pain, diarrhea, nausea and vomiting.   Endocrine: Negative for cold intolerance and heat intolerance.   Genitourinary:  Negative for difficulty urinating, dysuria, frequency and urgency.   Musculoskeletal:  Positive for arthralgias. Negative for back pain and myalgias.   Skin:  Negative for pallor and rash.   Neurological:  Negative for tremors, seizures, syncope, weakness, numbness and headaches.   Hematological:  Does not bruise/bleed easily.   Psychiatric/Behavioral:  Negative for agitation, confusion, hallucinations and suicidal ideas.    Objective:     Vital Signs (Most Recent):  Temp: 96.9 °F (36.1 °C) (01/01/23 0800)  Pulse: (!) 58 (01/01/23 0800)  Resp: 18 (01/01/23 1204)  BP: (!) 98/56 (01/01/23 0800)  SpO2: 96 % (01/01/23 0800)   Vital Signs (24h Range):  Temp:  [96.9 °F (36.1 °C)-99.6 °F (37.6 °C)] 96.9 °F (36.1 °C)  Pulse:  [54-62] 58  Resp:  [18] 18  SpO2:  [95 %-96 %] 96 %  BP: ()/(45-60) 98/56     Weight: 59.1 kg (130 lb 3.2 oz)  Body mass index is 21.01 kg/m².    Intake/Output Summary (Last 24 hours) at 1/1/2023 1247  Last data filed at 1/1/2023 0502  Gross per 24 hour   Intake --   Output 1300 ml   Net -1300 ml        Physical Exam  Vitals reviewed.   Constitutional:       Appearance: Normal appearance.   HENT:      Head: Normocephalic and atraumatic.   Eyes:      Extraocular Movements: Extraocular movements intact.   Cardiovascular:      Rate and Rhythm: Normal rate and regular rhythm.      Pulses: Normal pulses.   Pulmonary:      Effort:  Pulmonary effort is normal.      Breath sounds: Normal breath sounds.   Abdominal:      General: Abdomen is flat.      Palpations: Abdomen is soft.   Musculoskeletal:         General: Tenderness and signs of injury present.      Comments: Dressed left lower extremity, painful to touch   Skin:     General: Skin is warm and dry.      Capillary Refill: Capillary refill takes less than 2 seconds.   Neurological:      General: No focal deficit present.      Mental Status: She is alert and oriented to person, place, and time.   Psychiatric:         Mood and Affect: Mood normal.         Behavior: Behavior normal.       Significant Labs: All pertinent labs within the past 24 hours have been reviewed.    Significant Imaging: I have reviewed all pertinent imaging results/findings within the past 24 hours.

## 2023-01-01 NOTE — PROGRESS NOTES
[facility] Hospital Medicine  Progress Note    Patient Name: Zandra Veloz  MRN: [id number]  Patient Class: IP- Inpatient   Admission Date: 12/29/2022  Length of Stay: 3 days  Attending Physician: Shai Guerra DO  Primary Care Provider: Brandon Thornton MD        Subjective:     Principal Problem:Urinary tract infection        HPI:  HPI:   88-year-old lady seen emergency room department.  Patient presents after having a fall when she was trying to go to the restroom at her home.  Patient sustained a left femur fracture.  Patient complains of moderate to severe pain in the left hip area.  Patient also complains of chest tightness, she rates it a 5/10 in the chest tightness has been intubate.  Patient also was seen in emergency room department earlier this week per her daughter.  Patient is found to have dehydration and a urinary tract infection.  Current she denies any shortness of breath.  She does not give a history of coronary artery disease.        Procedure(s) (LRB):  REVISION, TOTAL ARTHROPLASTY, HIP, VICTORIANO PROSTHETIC FX (Left)       Hospital Course:   12/19 - records reviewed. Fall without LOC and has left hip fx. No other complaints currently. Seen by cardiology with some CP felt musculoskeletal.  Echo mild AS and mod MR with nl EF. Age increase cardiac risk for surgery but discussed with daughter surgery is urgent and see no benefit in delay medically. Feel low to moderate risk for surgery. Question about UTI. No symptoms. Had UTI in 10.22 not surprising. Lab to do culture on urine in lab. Cover with ATN for prior culture with ATB started. Discussed with Dr Singer and cardiology.  12/20 Tachycardia and elevated BP. Cardiology adjusted meds. Plan hold off surgery until 12/22 to adjust meds and treat UTI. Family in room. Pt not sleeping well.   12/21 Didn't sleep well prior night. Apparently been on xanax for a time. Also recently started on Neurontin. Family with  question. No recent BM. Some increase stool on KUB but not severe. Surgery for am. BP some up but better. HR good.   12/22 Seen prior to surgery and apparently add better night. Sore mouth / throat better with mycostatin. For surgery. Family in room.  12/23 patient with pain but otherwise seemed to be doing relatively well.  Daughter in room.  Apparently been taking prednisone for some time and this was restarted per family request.  Look into swing bed placement  12/24 patient had better night rested and everyone's in better spirits today.  Tolerating some diet.   Therapy worked with patient.  Look into swing bed placement next week  12/25 remained in good spirits.  Less pain.  Had good bowel movement and ordered Dulcolax not given.  Because of dressing to leg, Burk was not removed to keep it from getting soiled.  Wound care to check 12/27.  Discuss this with patient and daughter.  On antibiotics for UTI  12/26-will dc to swingbed once accepted. Needs continued wound care.  12/27-DC when bed available  12/28- issues with wound care yesterday.  Dr. Singer had to come remove dressings himself due to adherence to subcutaneous tissue.  Family refusing transfer to [facility] due to lack of specially trained wound care team availability.  Planning to transfer to specialty but resistant to that as well and requesting to speak to administration.    12/29- agreeable to transfer to specialty.  This will be the best place for her to receive wound care.  DC after family tours     12/29-needs continued wound care and therapy[facility]    12/30-doing well today, still with pain         Overview/Hospital Course:  12/31-having some pain this am  1/1- no complaints      Interval History: naeo    Review of Systems   Constitutional:  Negative for chills, fatigue, fever and unexpected weight change.   HENT:  Negative for congestion, mouth sores and sore throat.    Eyes:  Negative for photophobia and visual  disturbance.   Respiratory:  Negative for cough, chest tightness, shortness of breath and wheezing.    Cardiovascular:  Negative for chest pain, palpitations and leg swelling.   Gastrointestinal:  Negative for abdominal pain, diarrhea, nausea and vomiting.   Endocrine: Negative for cold intolerance and heat intolerance.   Genitourinary:  Negative for difficulty urinating, dysuria, frequency and urgency.   Musculoskeletal:  Positive for arthralgias. Negative for back pain and myalgias.   Skin:  Negative for pallor and rash.   Neurological:  Negative for tremors, seizures, syncope, weakness, numbness and headaches.   Hematological:  Does not bruise/bleed easily.   Psychiatric/Behavioral:  Negative for agitation, confusion, hallucinations and suicidal ideas.    Objective:     Vital Signs (Most Recent):  Temp: 96.9 °F (36.1 °C) (01/01/23 0800)  Pulse: (!) 58 (01/01/23 0800)  Resp: 18 (01/01/23 1204)  BP: (!) 98/56 (01/01/23 0800)  SpO2: 96 % (01/01/23 0800)   Vital Signs (24h Range):  Temp:  [96.9 °F (36.1 °C)-99.6 °F (37.6 °C)] 96.9 °F (36.1 °C)  Pulse:  [54-62] 58  Resp:  [18] 18  SpO2:  [95 %-96 %] 96 %  BP: ()/(45-60) 98/56     Weight: 59.1 kg (130 lb 3.2 oz)  Body mass index is 21.01 kg/m².    Intake/Output Summary (Last 24 hours) at 1/1/2023 1247  Last data filed at 1/1/2023 0502  Gross per 24 hour   Intake --   Output 1300 ml   Net -1300 ml        Physical Exam  Vitals reviewed.   Constitutional:       Appearance: Normal appearance.   HENT:      Head: Normocephalic and atraumatic.   Eyes:      Extraocular Movements: Extraocular movements intact.   Cardiovascular:      Rate and Rhythm: Normal rate and regular rhythm.      Pulses: Normal pulses.   Pulmonary:      Effort: Pulmonary effort is normal.      Breath sounds: Normal breath sounds.   Abdominal:      General: Abdomen is flat.      Palpations: Abdomen is soft.   Musculoskeletal:         General: Tenderness and signs of injury present.      Comments:  Dressed left lower extremity, painful to touch   Skin:     General: Skin is warm and dry.      Capillary Refill: Capillary refill takes less than 2 seconds.   Neurological:      General: No focal deficit present.      Mental Status: She is alert and oriented to person, place, and time.   Psychiatric:         Mood and Affect: Mood normal.         Behavior: Behavior normal.       Significant Labs: All pertinent labs within the past 24 hours have been reviewed.    Significant Imaging: I have reviewed all pertinent imaging results/findings within the past 24 hours.      Assessment/Plan:      * Urinary tract infection  Continue cefepime for 7 days      Wounds and injuries        Unspecified open wound, left lower leg, initial encounter  Wound care      Skin tear of upper arm without complication, right, initial encounter  Wound care      Delmy-prosthetic fracture around prosthetic hip  S/P fixation with Pomierski      Lumbar radiculopathy  Pain management  PT      Hypertension  Adjust medications as needed      Arthritis  Pain management        VTE Risk Mitigation (From admission, onward)         Ordered     apixaban tablet 2.5 mg  2 times daily         12/29/22 1630                Discharge Planning   YANIV:      Code Status: Full Code   Is the patient medically ready for discharge?:     Reason for patient still in hospital (select all that apply): Treatment  Discharge Plan A: Skilled Nursing Facility                  Shai Guerra DO  Department of Hospital Medicine   [facility]

## 2023-01-02 LAB
ANION GAP SERPL CALCULATED.3IONS-SCNC: 9 MMOL/L (ref 7–16)
BASOPHILS # BLD AUTO: 0.1 K/UL (ref 0–0.2)
BASOPHILS NFR BLD AUTO: 1 % (ref 0–1)
BUN SERPL-MCNC: 17 MG/DL (ref 7–18)
BUN/CREAT SERPL: 21 (ref 6–20)
CALCIUM SERPL-MCNC: 8.7 MG/DL (ref 8.5–10.1)
CHLORIDE SERPL-SCNC: 101 MMOL/L (ref 98–107)
CO2 SERPL-SCNC: 31 MMOL/L (ref 21–32)
CREAT SERPL-MCNC: 0.8 MG/DL (ref 0.55–1.02)
DIFFERENTIAL METHOD BLD: ABNORMAL
EGFR (NO RACE VARIABLE) (RUSH/TITUS): 71 ML/MIN/1.73M²
EOSINOPHIL # BLD AUTO: 0.18 K/UL (ref 0–0.5)
EOSINOPHIL NFR BLD AUTO: 1.8 % (ref 1–4)
EOSINOPHIL NFR BLD MANUAL: 2 % (ref 1–4)
ERYTHROCYTE [DISTWIDTH] IN BLOOD BY AUTOMATED COUNT: 14.1 % (ref 11.5–14.5)
GLUCOSE SERPL-MCNC: 85 MG/DL (ref 74–106)
HCT VFR BLD AUTO: 30.5 % (ref 38–47)
HGB BLD-MCNC: 9.3 G/DL (ref 12–16)
HYPOCHROMIA BLD QL SMEAR: ABNORMAL
IMM GRANULOCYTES # BLD AUTO: 0.56 K/UL (ref 0–0.04)
IMM GRANULOCYTES NFR BLD: 5.6 % (ref 0–0.4)
LYMPHOCYTES # BLD AUTO: 1.91 K/UL (ref 1–4.8)
LYMPHOCYTES NFR BLD AUTO: 19.1 % (ref 27–41)
LYMPHOCYTES NFR BLD MANUAL: 21 % (ref 27–41)
MCH RBC QN AUTO: 30.2 PG (ref 27–31)
MCHC RBC AUTO-ENTMCNC: 30.5 G/DL (ref 32–36)
MCV RBC AUTO: 99 FL (ref 80–96)
METAMYELOCYTES NFR BLD MANUAL: 1 %
MONOCYTES # BLD AUTO: 0.91 K/UL (ref 0–0.8)
MONOCYTES NFR BLD AUTO: 9.1 % (ref 2–6)
MONOCYTES NFR BLD MANUAL: 6 % (ref 2–6)
MPC BLD CALC-MCNC: 8.8 FL (ref 9.4–12.4)
MYELOCYTES NFR BLD MANUAL: 3 %
NEUTROPHILS # BLD AUTO: 6.33 K/UL (ref 1.8–7.7)
NEUTROPHILS NFR BLD AUTO: 63.4 % (ref 53–65)
NEUTS SEG NFR BLD MANUAL: 67 % (ref 50–62)
NRBC # BLD AUTO: 0 X10E3/UL
NRBC, AUTO (.00): 0 %
PLATELET # BLD AUTO: 521 K/UL (ref 150–400)
PLATELET MORPHOLOGY: ABNORMAL
POLYCHROMASIA BLD QL SMEAR: ABNORMAL
POTASSIUM SERPL-SCNC: 4.4 MMOL/L (ref 3.5–5.1)
RBC # BLD AUTO: 3.08 M/UL (ref 4.2–5.4)
SODIUM SERPL-SCNC: 137 MMOL/L (ref 136–145)
WBC # BLD AUTO: 9.99 K/UL (ref 4.5–11)

## 2023-01-02 PROCEDURE — 97110 THERAPEUTIC EXERCISES: CPT

## 2023-01-02 PROCEDURE — 63600175 PHARM REV CODE 636 W HCPCS: Performed by: STUDENT IN AN ORGANIZED HEALTH CARE EDUCATION/TRAINING PROGRAM

## 2023-01-02 PROCEDURE — 99231 SBSQ HOSP IP/OBS SF/LOW 25: CPT | Mod: ,,, | Performed by: STUDENT IN AN ORGANIZED HEALTH CARE EDUCATION/TRAINING PROGRAM

## 2023-01-02 PROCEDURE — 97530 THERAPEUTIC ACTIVITIES: CPT

## 2023-01-02 PROCEDURE — 97535 SELF CARE MNGMENT TRAINING: CPT

## 2023-01-02 PROCEDURE — 11000001 HC ACUTE MED/SURG PRIVATE ROOM

## 2023-01-02 PROCEDURE — 36415 COLL VENOUS BLD VENIPUNCTURE: CPT | Performed by: STUDENT IN AN ORGANIZED HEALTH CARE EDUCATION/TRAINING PROGRAM

## 2023-01-02 PROCEDURE — 99231 PR SUBSEQUENT HOSPITAL CARE,LEVL I: ICD-10-PCS | Mod: ,,, | Performed by: STUDENT IN AN ORGANIZED HEALTH CARE EDUCATION/TRAINING PROGRAM

## 2023-01-02 PROCEDURE — 25000003 PHARM REV CODE 250: Performed by: STUDENT IN AN ORGANIZED HEALTH CARE EDUCATION/TRAINING PROGRAM

## 2023-01-02 PROCEDURE — 85025 COMPLETE CBC W/AUTO DIFF WBC: CPT | Performed by: STUDENT IN AN ORGANIZED HEALTH CARE EDUCATION/TRAINING PROGRAM

## 2023-01-02 PROCEDURE — 80048 BASIC METABOLIC PNL TOTAL CA: CPT | Performed by: STUDENT IN AN ORGANIZED HEALTH CARE EDUCATION/TRAINING PROGRAM

## 2023-01-02 RX ADMIN — APIXABAN 2.5 MG: 2.5 TABLET, FILM COATED ORAL at 08:01

## 2023-01-02 RX ADMIN — NYSTATIN 500000 UNITS: 100000 SUSPENSION ORAL at 02:01

## 2023-01-02 RX ADMIN — DOCUSATE SODIUM 100 MG: 100 CAPSULE, LIQUID FILLED ORAL at 08:01

## 2023-01-02 RX ADMIN — GABAPENTIN 100 MG: 100 CAPSULE ORAL at 02:01

## 2023-01-02 RX ADMIN — HYDRALAZINE HYDROCHLORIDE 25 MG: 25 TABLET ORAL at 02:01

## 2023-01-02 RX ADMIN — MUPIROCIN: 20 OINTMENT TOPICAL at 08:01

## 2023-01-02 RX ADMIN — METOPROLOL TARTRATE 50 MG: 50 TABLET, FILM COATED ORAL at 08:01

## 2023-01-02 RX ADMIN — MUPIROCIN: 20 OINTMENT TOPICAL at 10:01

## 2023-01-02 RX ADMIN — NYSTATIN 500000 UNITS: 100000 SUSPENSION ORAL at 07:01

## 2023-01-02 RX ADMIN — APIXABAN 2.5 MG: 2.5 TABLET, FILM COATED ORAL at 10:01

## 2023-01-02 RX ADMIN — ALPRAZOLAM 1 MG: 0.25 TABLET ORAL at 08:01

## 2023-01-02 RX ADMIN — DILTIAZEM HYDROCHLORIDE 120 MG: 120 CAPSULE, COATED, EXTENDED RELEASE ORAL at 10:01

## 2023-01-02 RX ADMIN — CEFEPIME 1 G: 1 INJECTION, POWDER, FOR SOLUTION INTRAMUSCULAR; INTRAVENOUS at 10:01

## 2023-01-02 RX ADMIN — NYSTATIN 500000 UNITS: 100000 SUSPENSION ORAL at 10:01

## 2023-01-02 RX ADMIN — CEFEPIME 1 G: 1 INJECTION, POWDER, FOR SOLUTION INTRAMUSCULAR; INTRAVENOUS at 08:01

## 2023-01-02 RX ADMIN — FAMOTIDINE 20 MG: 20 TABLET ORAL at 10:01

## 2023-01-02 RX ADMIN — ALPRAZOLAM 1 MG: 0.25 TABLET ORAL at 10:01

## 2023-01-02 RX ADMIN — METOPROLOL TARTRATE 50 MG: 50 TABLET, FILM COATED ORAL at 10:01

## 2023-01-02 RX ADMIN — HYDROCODONE BITARTRATE AND ACETAMINOPHEN 2 TABLET: 10; 325 TABLET ORAL at 08:01

## 2023-01-02 RX ADMIN — POLYETHYLENE GLYCOL 3350 34 G: 17 POWDER, FOR SOLUTION ORAL at 08:01

## 2023-01-02 RX ADMIN — GABAPENTIN 100 MG: 100 CAPSULE ORAL at 10:01

## 2023-01-02 RX ADMIN — PANTOPRAZOLE SODIUM 40 MG: 40 TABLET, DELAYED RELEASE ORAL at 10:01

## 2023-01-02 RX ADMIN — PREDNISONE 5 MG: 5 TABLET ORAL at 10:01

## 2023-01-02 RX ADMIN — GABAPENTIN 100 MG: 100 CAPSULE ORAL at 08:01

## 2023-01-02 RX ADMIN — LISINOPRIL 20 MG: 20 TABLET ORAL at 10:01

## 2023-01-02 RX ADMIN — HYDROCODONE BITARTRATE AND ACETAMINOPHEN 2 TABLET: 10; 325 TABLET ORAL at 05:01

## 2023-01-02 RX ADMIN — NYSTATIN 500000 UNITS: 100000 SUSPENSION ORAL at 08:01

## 2023-01-02 RX ADMIN — SENNOSIDES 1 TABLET: 8.6 TABLET, FILM COATED ORAL at 10:01

## 2023-01-02 RX ADMIN — LEVOTHYROXINE SODIUM 125 MCG: 0.12 TABLET ORAL at 06:01

## 2023-01-02 RX ADMIN — DOCUSATE SODIUM 100 MG: 100 CAPSULE, LIQUID FILLED ORAL at 10:01

## 2023-01-02 NOTE — NURSING
Blood Pressure on 94/34 at 1600. Dr. Guerra ordered to hold B/P meds for the night. Blood Pressure meds held for the night. Blood Pressure increased to 140/53.     Pema, daughter, called. Discussed patient's B/P with her via phone. Teaching provieded that Morphine IV can lower B/P as well. Patient and Daughter verbalized understanding. Patient stated that she would take the Hydrocodone instead by mouth. Given and effective.

## 2023-01-02 NOTE — PROGRESS NOTES
[facility] Hospital Medicine  Progress Note    Patient Name: Zandra Veloz  MRN: [id number]  Patient Class: IP- Inpatient   Admission Date: 12/29/2022  Length of Stay: 4 days  Attending Physician: Shai Guerra DO  Primary Care Provider: Brandon Thornton MD        Subjective:     Principal Problem:Urinary tract infection        HPI:  HPI:   88-year-old lady seen emergency room department.  Patient presents after having a fall when she was trying to go to the restroom at her home.  Patient sustained a left femur fracture.  Patient complains of moderate to severe pain in the left hip area.  Patient also complains of chest tightness, she rates it a 5/10 in the chest tightness has been intubate.  Patient also was seen in emergency room department earlier this week per her daughter.  Patient is found to have dehydration and a urinary tract infection.  Current she denies any shortness of breath.  She does not give a history of coronary artery disease.        Procedure(s) (LRB):  REVISION, TOTAL ARTHROPLASTY, HIP, VICTORIANO PROSTHETIC FX (Left)       Hospital Course:   12/19 - records reviewed. Fall without LOC and has left hip fx. No other complaints currently. Seen by cardiology with some CP felt musculoskeletal.  Echo mild AS and mod MR with nl EF. Age increase cardiac risk for surgery but discussed with daughter surgery is urgent and see no benefit in delay medically. Feel low to moderate risk for surgery. Question about UTI. No symptoms. Had UTI in 10.22 not surprising. Lab to do culture on urine in lab. Cover with ATN for prior culture with ATB started. Discussed with Dr Singer and cardiology.  12/20 Tachycardia and elevated BP. Cardiology adjusted meds. Plan hold off surgery until 12/22 to adjust meds and treat UTI. Family in room. Pt not sleeping well.   12/21 Didn't sleep well prior night. Apparently been on xanax for a time. Also recently started on Neurontin. Family with  question. No recent BM. Some increase stool on KUB but not severe. Surgery for am. BP some up but better. HR good.   12/22 Seen prior to surgery and apparently add better night. Sore mouth / throat better with mycostatin. For surgery. Family in room.  12/23 patient with pain but otherwise seemed to be doing relatively well.  Daughter in room.  Apparently been taking prednisone for some time and this was restarted per family request.  Look into swing bed placement  12/24 patient had better night rested and everyone's in better spirits today.  Tolerating some diet.   Therapy worked with patient.  Look into swing bed placement next week  12/25 remained in good spirits.  Less pain.  Had good bowel movement and ordered Dulcolax not given.  Because of dressing to leg, Burk was not removed to keep it from getting soiled.  Wound care to check 12/27.  Discuss this with patient and daughter.  On antibiotics for UTI  12/26-will dc to swingbed once accepted. Needs continued wound care.  12/27-DC when bed available  12/28- issues with wound care yesterday.  Dr. Singer had to come remove dressings himself due to adherence to subcutaneous tissue.  Family refusing transfer to [facility] due to lack of specially trained wound care team availability.  Planning to transfer to specialty but resistant to that as well and requesting to speak to administration.    12/29- agreeable to transfer to specialty.  This will be the best place for her to receive wound care.  DC after family tours     12/29-needs continued wound care and therapy. [facility]    12/30-doing well today, still with pain         Overview/Hospital Course:  12/31-having some pain this am  1/1- no complaints  1/2- doing well      Interval History: naeo    Review of Systems   Constitutional:  Negative for chills, fatigue, fever and unexpected weight change.   HENT:  Negative for congestion, mouth sores and sore throat.    Eyes:  Negative for photophobia and  visual disturbance.   Respiratory:  Negative for cough, chest tightness, shortness of breath and wheezing.    Cardiovascular:  Negative for chest pain, palpitations and leg swelling.   Gastrointestinal:  Negative for abdominal pain, diarrhea, nausea and vomiting.   Endocrine: Negative for cold intolerance and heat intolerance.   Genitourinary:  Negative for difficulty urinating, dysuria, frequency and urgency.   Musculoskeletal:  Positive for arthralgias. Negative for back pain and myalgias.   Skin:  Negative for pallor and rash.   Neurological:  Negative for tremors, seizures, syncope, weakness, numbness and headaches.   Hematological:  Does not bruise/bleed easily.   Psychiatric/Behavioral:  Negative for agitation, confusion, hallucinations and suicidal ideas.    Objective:     Vital Signs (Most Recent):  Temp: 97.9 °F (36.6 °C) (01/02/23 0800)  Pulse: (!) 57 (01/02/23 0800)  Resp: 20 (01/02/23 0847)  BP: (!) 119/49 (01/02/23 1431)  SpO2: (!) 94 % (01/02/23 0800)   Vital Signs (24h Range):  Temp:  [97.2 °F (36.2 °C)-99.1 °F (37.3 °C)] 97.9 °F (36.6 °C)  Pulse:  [56-59] 57  Resp:  [18-20] 20  SpO2:  [94 %-97 %] 94 %  BP: ()/(34-53) 119/49     Weight: 59.1 kg (130 lb 3.2 oz)  Body mass index is 21.01 kg/m².    Intake/Output Summary (Last 24 hours) at 1/2/2023 1519  Last data filed at 1/2/2023 1113  Gross per 24 hour   Intake 100 ml   Output 1400 ml   Net -1300 ml        Physical Exam  Vitals reviewed.   Constitutional:       Appearance: Normal appearance.   HENT:      Head: Normocephalic and atraumatic.   Eyes:      Extraocular Movements: Extraocular movements intact.   Cardiovascular:      Rate and Rhythm: Normal rate and regular rhythm.      Pulses: Normal pulses.   Pulmonary:      Effort: Pulmonary effort is normal.      Breath sounds: Normal breath sounds.   Abdominal:      General: Abdomen is flat.      Palpations: Abdomen is soft.   Musculoskeletal:         General: Tenderness and signs of injury  present.      Comments: Dressed left lower extremity, painful to touch   Skin:     General: Skin is warm and dry.      Capillary Refill: Capillary refill takes less than 2 seconds.   Neurological:      General: No focal deficit present.      Mental Status: She is alert and oriented to person, place, and time.   Psychiatric:         Mood and Affect: Mood normal.         Behavior: Behavior normal.       Significant Labs: All pertinent labs within the past 24 hours have been reviewed.    Significant Imaging: I have reviewed all pertinent imaging results/findings within the past 24 hours.      Assessment/Plan:      * Urinary tract infection  Continue cefepime for 7 days      Wounds and injuries        Unspecified open wound, left lower leg, initial encounter  Wound care      Skin tear of upper arm without complication, right, initial encounter  Wound care      Delmy-prosthetic fracture around prosthetic hip  S/P fixation with Pomierski      Lumbar radiculopathy  Pain management  PT      Hypertension  Adjust medications as needed      Arthritis  Pain management        VTE Risk Mitigation (From admission, onward)         Ordered     apixaban tablet 2.5 mg  2 times daily         12/29/22 1630                Discharge Planning   YANIV:      Code Status: Full Code   Is the patient medically ready for discharge?:     Reason for patient still in hospital (select all that apply): Treatment  Discharge Plan A: Skilled Nursing Facility                  Shai Guerra DO  Department of Hospital Medicine   [facility]

## 2023-01-02 NOTE — PT/OT/SLP PROGRESS
Occupational Therapy   Treatment    Name: Zandra Veloz  MRN: [id number]  Admitting Diagnosis:  Urinary tract infection       Recommendations:     Discharge Recommendations: nursing facility, skilled, rehabilitation facility  Discharge Equipment Recommendations:   (to be determined)  Barriers to discharge:  None    Assessment:     Zandra Veloz is a 88 y.o. female with a medical diagnosis of Urinary tract infection.  She presents with alert and agreeable to treatment. Performance deficits affecting function are weakness, impaired endurance, impaired self care skills, impaired functional mobility, impaired skin, decreased ROM, decreased safety awareness, decreased upper extremity function.     Rehab Prognosis:  Good; patient would benefit from acute skilled OT services to address these deficits and reach maximum level of function.       Plan:     Patient to be seen 5 x/week to address the above listed problems via self-care/home management, therapeutic activities, therapeutic exercises  Plan of Care Expires: 02/06/23  Plan of Care Reviewed with: patient, family    Subjective     Pain/Comfort:  Pain Rating 1: 6/10  Location - Side 1: Left  Location 1: leg (and left arm)  Pain Addressed 1: Pre-medicate for activity, Distraction  Pain Addressed 2: Pre-medicate for activity, Reposition, Cessation of Activity  Pain Rating Post-Intervention 2: 6/10    Objective:     Communicated with: LESLEE Gilbert prior to session.  Patient found HOB elevated with peripheral IV, chao catheter upon OT entry to room.    General Precautions: Standard, fall    Orthopedic Precautions:LLE non weight bearing, LLE posterior precautions  Braces: N/A  Respiratory Status: Room air     Occupational Performance:     Bed Mobility:    Patient completed Rolling/Turning to Left with  moderate assistance with increased time and effort  Patient completed Rolling/Turning to Right with moderate assistance and maximal assistance with  increased time and effort      Activities of Daily Living:  Bathing: Pt bathed from bed level with setup for upper body anterior and for face and neck. Pt washed her perineum with moderate assistance due to recent BM    Upper Body Dressing: moderate assistance to don melanie flynnwn      [facility] 6 Click ADL: 13    Treatment & Education:  Pt was able to assist more that she and her family had expected for her to be able to do. Pt states that she felt better being able to perform some of her own self-care    Patient left HOB elevated with all lines intact, call button in reach, bed alarm on, and daughter present    GOALS:   Multidisciplinary Problems       Occupational Therapy Goals          Problem: Occupational Therapy    Goal Priority Disciplines Outcome Interventions   Occupational Therapy Goal     OT, PT/OT Ongoing, Progressing    Description: STG: (In 2 weeks)  Pt will perform grooming with min(A)  Pt will bathe with setup and max A)  Pt will perform UE dressing with setup and min(A)  Pt will perform (L) UE AROM to 3/4 full range  Pt will sit EOB x 5 min with CGA assistance  Pt will transfer bed/chair/bsc with mod(A)  Pt will tolerate 20 minutes of tx without fatigue      LT.Restore to max I with self care and mobility.                          Time Tracking:     OT Date of Treatment: 23  OT Start Time: 846  OT Stop Time: 920  OT Total Time (min): 34 min    Billable Minutes:Self Care/Home Management 34 min    OT/BRIGHT: OT          2023

## 2023-01-02 NOTE — PT/OT/SLP PROGRESS
Physical Therapy Treatment    Patient Name:  Zandra Veloz   MRN:  [id number]    Recommendations:     Discharge Recommendations: rehabilitation facility, nursing facility, skilled  Discharge Equipment Recommendations: other (see comments) (to be determined)  Barriers to discharge: ongoing medical treatment; decreased functional mobility    Assessment:     Zandra Veloz is a 88 y.o. female admitted with a medical diagnosis of Urinary tract infection; left hip endo post fracture; severe degloving injury L UE/ B LE.  She presents with the following impairments/functional limitations: weakness, impaired endurance, impaired self care skills, impaired functional mobility, gait instability, impaired balance, decreased upper extremity function, decreased lower extremity function, pain, decreased ROM, edema, impaired skin .    Patient lethargic due to pain meds but agreeable to PT treatment. Patient declined t/f to the chair due to planned wound care. Patient able to complete PT activities with no increase in pain complaint. Patient requires significant assist for all mobility. Anticipate swing bed when patient medically ready for discharge.    Rehab Prognosis: Good; patient would benefit from acute skilled PT services to address these deficits and reach maximum level of function.    Recent Surgery: * No surgery found *      Plan:     During this hospitalization, patient to be seen 5 x/week to address the identified rehab impairments via gait training, therapeutic activities, therapeutic exercises, neuromuscular re-education and progress toward the following goals:    Plan of Care Expires:  01/30/23    Subjective     Chief Complaint: wounds; hip endo  Patient/Family Comments/goals: Patient agreeable to PT treatment but declined t/f to the chair  Pain/Comfort:  Pain Rating 1: 6/10  Location - Side 1: Left  Location 1: leg  Pain Addressed 1: Pre-medicate for activity, Reposition, Distraction, Cessation of  Activity  Pain Rating Post-Intervention 1: 6/10      Objective:     Communicated with Lydia Hanley RN prior to session.  Patient found supine with peripheral IV, chao catheter upon PT entry to room.     General Precautions: Standard, fall  Orthopedic Precautions: LLE non weight bearing, LLE posterior precautions  Braces: N/A  Respiratory Status: Room air     Functional Mobility:  Bed Mobility:     Supine to Sit: moderate assistance and of 2 persons  Sit to Supine: maximal assistance and of 2 persons  Transfers:     Sit to Stand:  maximal assistance with manual assist with gait belt      AM-PAC 6 CLICK MOBILITY  Turning over in bed (including adjusting bedclothes, sheets and blankets)?: 2  Sitting down on and standing up from a chair with arms (e.g., wheelchair, bedside commode, etc.): 2  Moving from lying on back to sitting on the side of the bed?: 2  Moving to and from a bed to a chair (including a wheelchair)?: 1  Need to walk in hospital room?: 1  Climbing 3-5 steps with a railing?: 1  Basic Mobility Total Score: 9       Treatment & Education:  Bilateral lower extremity exercise x 30 reps: ankle pumps, Quad sets, glut sets, heel slides, hip abduction/adduction, and straight leg raises with active assist ROM, verbal cues, and tactile cues   Sitting EOB x 10 minutes CGA and verbal/tactile cues to advance center of mass over base of support  Sit to stand x 2 trials max A; manual assist via gait belt    Patient left HOB elevated with all lines intact, call button in reach, bed alarm on, Lydia Hanley RN notified, and family present..    GOALS:   Multidisciplinary Problems       Physical Therapy Goals          Problem: Physical Therapy    Goal Priority Disciplines Outcome Goal Variances Interventions   Physical Therapy Goal     PT, PT/OT Ongoing, Progressing     Description: Short Term Goals to be met by 1/10/2023    Patient will increase functional independence and safety with mobility by performing    1.    Rolling right Moderate Assist; roll left Moderate Assist  2.   Supine to sit Moderate Assist  3.   Sitting balance edge of the bed x 15 minutes Stand by assist  4.   Sit to stand Moderate Assist  using manual assistance with gait belt and NWB L LE  5.   Bed to chair Moderate Assist using manual assistance with gait belt and NWB left LE  6.   Lower extremity exercises x 30 reps with assist as necessary and no rest breaks      Long Term Goals to be met by 2/30/2023    Patient to require less than or equal to Stand by assist for functional mobility to ease caregiver burden                        Time Tracking:     PT Received On: 01/02/23  PT Start Time: 1010     PT Stop Time: 1047  PT Total Time (min): 37 min     Billable Minutes: Therapeutic Activity 15 minutes and Therapeutic Exercise 15 minutes    Treatment Type: Treatment  PT/PTA: PT     PTA Visit Number: 0     01/02/2023

## 2023-01-02 NOTE — SUBJECTIVE & OBJECTIVE
Interval History: naeo    Review of Systems   Constitutional:  Negative for chills, fatigue, fever and unexpected weight change.   HENT:  Negative for congestion, mouth sores and sore throat.    Eyes:  Negative for photophobia and visual disturbance.   Respiratory:  Negative for cough, chest tightness, shortness of breath and wheezing.    Cardiovascular:  Negative for chest pain, palpitations and leg swelling.   Gastrointestinal:  Negative for abdominal pain, diarrhea, nausea and vomiting.   Endocrine: Negative for cold intolerance and heat intolerance.   Genitourinary:  Negative for difficulty urinating, dysuria, frequency and urgency.   Musculoskeletal:  Positive for arthralgias. Negative for back pain and myalgias.   Skin:  Negative for pallor and rash.   Neurological:  Negative for tremors, seizures, syncope, weakness, numbness and headaches.   Hematological:  Does not bruise/bleed easily.   Psychiatric/Behavioral:  Negative for agitation, confusion, hallucinations and suicidal ideas.    Objective:     Vital Signs (Most Recent):  Temp: 97.9 °F (36.6 °C) (01/02/23 0800)  Pulse: (!) 57 (01/02/23 0800)  Resp: 20 (01/02/23 0847)  BP: (!) 119/49 (01/02/23 1431)  SpO2: (!) 94 % (01/02/23 0800)   Vital Signs (24h Range):  Temp:  [97.2 °F (36.2 °C)-99.1 °F (37.3 °C)] 97.9 °F (36.6 °C)  Pulse:  [56-59] 57  Resp:  [18-20] 20  SpO2:  [94 %-97 %] 94 %  BP: ()/(34-53) 119/49     Weight: 59.1 kg (130 lb 3.2 oz)  Body mass index is 21.01 kg/m².    Intake/Output Summary (Last 24 hours) at 1/2/2023 1519  Last data filed at 1/2/2023 1113  Gross per 24 hour   Intake 100 ml   Output 1400 ml   Net -1300 ml        Physical Exam  Vitals reviewed.   Constitutional:       Appearance: Normal appearance.   HENT:      Head: Normocephalic and atraumatic.   Eyes:      Extraocular Movements: Extraocular movements intact.   Cardiovascular:      Rate and Rhythm: Normal rate and regular rhythm.      Pulses: Normal pulses.   Pulmonary:       Effort: Pulmonary effort is normal.      Breath sounds: Normal breath sounds.   Abdominal:      General: Abdomen is flat.      Palpations: Abdomen is soft.   Musculoskeletal:         General: Tenderness and signs of injury present.      Comments: Dressed left lower extremity, painful to touch   Skin:     General: Skin is warm and dry.      Capillary Refill: Capillary refill takes less than 2 seconds.   Neurological:      General: No focal deficit present.      Mental Status: She is alert and oriented to person, place, and time.   Psychiatric:         Mood and Affect: Mood normal.         Behavior: Behavior normal.       Significant Labs: All pertinent labs within the past 24 hours have been reviewed.    Significant Imaging: I have reviewed all pertinent imaging results/findings within the past 24 hours.

## 2023-01-03 PROCEDURE — 96372 THER/PROPH/DIAG INJ SC/IM: CPT

## 2023-01-03 PROCEDURE — 97110 THERAPEUTIC EXERCISES: CPT

## 2023-01-03 PROCEDURE — 25000003 PHARM REV CODE 250: Performed by: STUDENT IN AN ORGANIZED HEALTH CARE EDUCATION/TRAINING PROGRAM

## 2023-01-03 PROCEDURE — 99232 SBSQ HOSP IP/OBS MODERATE 35: CPT | Mod: ,,, | Performed by: STUDENT IN AN ORGANIZED HEALTH CARE EDUCATION/TRAINING PROGRAM

## 2023-01-03 PROCEDURE — 99232 PR SUBSEQUENT HOSPITAL CARE,LEVL II: ICD-10-PCS | Mod: ,,, | Performed by: STUDENT IN AN ORGANIZED HEALTH CARE EDUCATION/TRAINING PROGRAM

## 2023-01-03 PROCEDURE — 97530 THERAPEUTIC ACTIVITIES: CPT

## 2023-01-03 PROCEDURE — 11000001 HC ACUTE MED/SURG PRIVATE ROOM

## 2023-01-03 PROCEDURE — 63600175 PHARM REV CODE 636 W HCPCS: Performed by: STUDENT IN AN ORGANIZED HEALTH CARE EDUCATION/TRAINING PROGRAM

## 2023-01-03 RX ADMIN — PREDNISONE 5 MG: 5 TABLET ORAL at 10:01

## 2023-01-03 RX ADMIN — FAMOTIDINE 20 MG: 20 TABLET ORAL at 10:01

## 2023-01-03 RX ADMIN — ALPRAZOLAM 1 MG: 0.25 TABLET ORAL at 08:01

## 2023-01-03 RX ADMIN — NYSTATIN 500000 UNITS: 100000 SUSPENSION ORAL at 08:01

## 2023-01-03 RX ADMIN — NYSTATIN 500000 UNITS: 100000 SUSPENSION ORAL at 10:01

## 2023-01-03 RX ADMIN — POLYETHYLENE GLYCOL 3350 34 G: 17 POWDER, FOR SOLUTION ORAL at 08:01

## 2023-01-03 RX ADMIN — HYDROCODONE BITARTRATE AND ACETAMINOPHEN 2 TABLET: 10; 325 TABLET ORAL at 06:01

## 2023-01-03 RX ADMIN — GABAPENTIN 100 MG: 100 CAPSULE ORAL at 10:01

## 2023-01-03 RX ADMIN — POLYETHYLENE GLYCOL 3350 34 G: 17 POWDER, FOR SOLUTION ORAL at 10:01

## 2023-01-03 RX ADMIN — NYSTATIN 500000 UNITS: 100000 SUSPENSION ORAL at 12:01

## 2023-01-03 RX ADMIN — MUPIROCIN: 20 OINTMENT TOPICAL at 10:01

## 2023-01-03 RX ADMIN — PANTOPRAZOLE SODIUM 40 MG: 40 TABLET, DELAYED RELEASE ORAL at 10:01

## 2023-01-03 RX ADMIN — LISINOPRIL 20 MG: 20 TABLET ORAL at 10:01

## 2023-01-03 RX ADMIN — HYDRALAZINE HYDROCHLORIDE 25 MG: 25 TABLET ORAL at 06:01

## 2023-01-03 RX ADMIN — METOPROLOL TARTRATE 50 MG: 50 TABLET, FILM COATED ORAL at 10:01

## 2023-01-03 RX ADMIN — APIXABAN 2.5 MG: 2.5 TABLET, FILM COATED ORAL at 10:01

## 2023-01-03 RX ADMIN — DILTIAZEM HYDROCHLORIDE 120 MG: 120 CAPSULE, COATED, EXTENDED RELEASE ORAL at 10:01

## 2023-01-03 RX ADMIN — SENNOSIDES 1 TABLET: 8.6 TABLET, FILM COATED ORAL at 10:01

## 2023-01-03 RX ADMIN — HYDROCODONE BITARTRATE AND ACETAMINOPHEN 2 TABLET: 10; 325 TABLET ORAL at 12:01

## 2023-01-03 RX ADMIN — LEVOTHYROXINE SODIUM 125 MCG: 0.12 TABLET ORAL at 06:01

## 2023-01-03 RX ADMIN — CEFEPIME 1 G: 1 INJECTION, POWDER, FOR SOLUTION INTRAMUSCULAR; INTRAVENOUS at 10:01

## 2023-01-03 RX ADMIN — DOCUSATE SODIUM 100 MG: 100 CAPSULE, LIQUID FILLED ORAL at 10:01

## 2023-01-03 RX ADMIN — HYDRALAZINE HYDROCHLORIDE 25 MG: 25 TABLET ORAL at 03:01

## 2023-01-03 RX ADMIN — CEFEPIME 1 G: 1 INJECTION, POWDER, FOR SOLUTION INTRAMUSCULAR; INTRAVENOUS at 08:01

## 2023-01-03 RX ADMIN — NYSTATIN 500000 UNITS: 100000 SUSPENSION ORAL at 06:01

## 2023-01-03 RX ADMIN — ALPRAZOLAM 1 MG: 0.25 TABLET ORAL at 10:01

## 2023-01-03 RX ADMIN — DOCUSATE SODIUM 100 MG: 100 CAPSULE, LIQUID FILLED ORAL at 08:01

## 2023-01-03 RX ADMIN — GABAPENTIN 100 MG: 100 CAPSULE ORAL at 08:01

## 2023-01-03 RX ADMIN — APIXABAN 2.5 MG: 2.5 TABLET, FILM COATED ORAL at 08:01

## 2023-01-03 RX ADMIN — GABAPENTIN 100 MG: 100 CAPSULE ORAL at 03:01

## 2023-01-03 NOTE — PLAN OF CARE
Problem: Occupational Therapy  Goal: Occupational Therapy Goal  Description: STG: (In 2 weeks)  Pt will perform grooming with min(A)-   Pt will bathe with setup and max (A)- achieved  Pt will perform UE dressing with setup and min(A)-progressing  Pt will perform (L) UE AROM to 3/4 full range- progressing  Pt will sit EOB x 5 min with CGA assistance- NT  Pt will transfer bed/chair/bsc with mod(A)- NT  Pt will tolerate 20 minutes of tx without fatigue- progressing    LT.Restore to max I with self care and mobility.     Outcome: Ongoing, Progressing     Pt was recently evaluated. OT will treat pt daily 5x/wk following the established POC.    Pt performed UE bathing with setup and min(A)  Pt performed UE dressing with moderate assistance for hospital gown  Pt performed perineum bathing with mod(A)  Pt required total assist for toileting

## 2023-01-03 NOTE — PLAN OF CARE
Problem: Physical Therapy  Goal: Physical Therapy Goal  Description: Short Term Goals to be met by 1/10/2023    Patient will increase functional independence and safety with mobility by performing    1.   Rolling right Moderate Assist; roll left Moderate Assist  2.   Supine to sit Moderate Assist  3.   Sitting balance edge of the bed x 15 minutes Stand by assist  4.   Sit to stand Moderate Assist  using manual assistance with gait belt and NWB L LE  5.   Bed to chair Moderate Assist using manual assistance with gait belt and NWB left LE  6.   Lower extremity exercises x 30 reps with assist as necessary and no rest breaks      Long Term Goals to be met by 2/30/2023    Patient to require less than or equal to Stand by assist for functional mobility to ease caregiver burden   Outcome: Ongoing, Progressing  Pt recently admitted and is participating to the best of her ability within her restrictions, pain level, and wounds. Pt will benefit from continued physical therapy treatment to safely progress functional mobility.     Supine to sit: mod to max A x2  Sit to stand to commode: max A x1  Gait: unable

## 2023-01-03 NOTE — PROGRESS NOTES
[facility] Hospital Medicine  Progress Note    Patient Name: Zandra Veloz  MRN: [id number]  Patient Class: IP- Inpatient   Admission Date: 12/29/2022  Length of Stay: 5 days  Attending Physician: Shai Guerra DO  Primary Care Provider: Brandon Thornton MD        Subjective:     Principal Problem:Urinary tract infection        HPI:  HPI:   88-year-old lady seen emergency room department.  Patient presents after having a fall when she was trying to go to the restroom at her home.  Patient sustained a left femur fracture.  Patient complains of moderate to severe pain in the left hip area.  Patient also complains of chest tightness, she rates it a 5/10 in the chest tightness has been intubate.  Patient also was seen in emergency room department earlier this week per her daughter.  Patient is found to have dehydration and a urinary tract infection.  Current she denies any shortness of breath.  She does not give a history of coronary artery disease.        Procedure(s) (LRB):  REVISION, TOTAL ARTHROPLASTY, HIP, VICTORIANO PROSTHETIC FX (Left)       Hospital Course:   12/19 - records reviewed. Fall without LOC and has left hip fx. No other complaints currently. Seen by cardiology with some CP felt musculoskeletal.  Echo mild AS and mod MR with nl EF. Age increase cardiac risk for surgery but discussed with daughter surgery is urgent and see no benefit in delay medically. Feel low to moderate risk for surgery. Question about UTI. No symptoms. Had UTI in 10.22 not surprising. Lab to do culture on urine in lab. Cover with ATN for prior culture with ATB started. Discussed with Dr Singer and cardiology.  12/20 Tachycardia and elevated BP. Cardiology adjusted meds. Plan hold off surgery until 12/22 to adjust meds and treat UTI. Family in room. Pt not sleeping well.   12/21 Didn't sleep well prior night. Apparently been on xanax for a time. Also recently started on Neurontin. Family with  question. No recent BM. Some increase stool on KUB but not severe. Surgery for am. BP some up but better. HR good.   12/22 Seen prior to surgery and apparently add better night. Sore mouth / throat better with mycostatin. For surgery. Family in room.  12/23 patient with pain but otherwise seemed to be doing relatively well.  Daughter in room.  Apparently been taking prednisone for some time and this was restarted per family request.  Look into swing bed placement  12/24 patient had better night rested and everyone's in better spirits today.  Tolerating some diet.   Therapy worked with patient.  Look into swing bed placement next week  12/25 remained in good spirits.  Less pain.  Had good bowel movement and ordered Dulcolax not given.  Because of dressing to leg, Burk was not removed to keep it from getting soiled.  Wound care to check 12/27.  Discuss this with patient and daughter.  On antibiotics for UTI  12/26-will dc to swingbed once accepted. Needs continued wound care.  12/27-DC when bed available  12/28- issues with wound care yesterday.  Dr. Singer had to come remove dressings himself due to adherence to subcutaneous tissue.  Family refusing transfer to [facility] due to lack of specially trained wound care team availability.  Planning to transfer to specialty but resistant to that as well and requesting to speak to administration.    12/29- agreeable to transfer to specialty.  This will be the best place for her to receive wound care.  DC after family tours     12/29-needs continued wound care and therapy[facility]    12/30-doing well today, still with pain         Overview/Hospital Course:  12/31-having some pain this am  1/1- no complaints  1/2- doing well  1/3- continue wound care, last day cefepime tomorrow      Interval History: naeo    Review of Systems   Constitutional:  Negative for chills, fatigue, fever and unexpected weight change.   HENT:  Negative for congestion, mouth sores and  sore throat.    Eyes:  Negative for photophobia and visual disturbance.   Respiratory:  Negative for cough, chest tightness, shortness of breath and wheezing.    Cardiovascular:  Negative for chest pain, palpitations and leg swelling.   Gastrointestinal:  Negative for abdominal pain, diarrhea, nausea and vomiting.   Endocrine: Negative for cold intolerance and heat intolerance.   Genitourinary:  Negative for difficulty urinating, dysuria, frequency and urgency.   Musculoskeletal:  Positive for arthralgias. Negative for back pain and myalgias.   Skin:  Negative for pallor and rash.   Neurological:  Negative for tremors, seizures, syncope, weakness, numbness and headaches.   Hematological:  Does not bruise/bleed easily.   Psychiatric/Behavioral:  Negative for agitation, confusion, hallucinations and suicidal ideas.    Objective:     Vital Signs (Most Recent):  Temp: 99 °F (37.2 °C) (01/03/23 0800)  Pulse: (!) 56 (01/03/23 0800)  Resp: 18 (01/03/23 0800)  BP: (!) 150/57 (01/03/23 0400)  SpO2: (!) 94 % (01/03/23 0800)   Vital Signs (24h Range):  Temp:  [97.7 °F (36.5 °C)-99 °F (37.2 °C)] 99 °F (37.2 °C)  Pulse:  [53-69] 56  Resp:  [16-20] 18  SpO2:  [91 %-97 %] 94 %  BP: (114-155)/(39-70) 150/57     Weight: 59.1 kg (130 lb 3.2 oz)  Body mass index is 21.01 kg/m².    Intake/Output Summary (Last 24 hours) at 1/3/2023 1007  Last data filed at 1/3/2023 0600  Gross per 24 hour   Intake 50 ml   Output 900 ml   Net -850 ml        Physical Exam  Vitals reviewed.   Constitutional:       Appearance: Normal appearance.   HENT:      Head: Normocephalic and atraumatic.   Eyes:      Extraocular Movements: Extraocular movements intact.   Cardiovascular:      Rate and Rhythm: Normal rate and regular rhythm.      Pulses: Normal pulses.   Pulmonary:      Effort: Pulmonary effort is normal.      Breath sounds: Normal breath sounds.   Abdominal:      General: Abdomen is flat.      Palpations: Abdomen is soft.   Musculoskeletal:          General: Tenderness and signs of injury present.      Comments: Dressed left lower extremity, painful to touch   Skin:     General: Skin is warm and dry.      Capillary Refill: Capillary refill takes less than 2 seconds.   Neurological:      General: No focal deficit present.      Mental Status: She is alert and oriented to person, place, and time.   Psychiatric:         Mood and Affect: Mood normal.         Behavior: Behavior normal.       Significant Labs: All pertinent labs within the past 24 hours have been reviewed.    Significant Imaging: I have reviewed all pertinent imaging results/findings within the past 24 hours.      Assessment/Plan:      * Urinary tract infection  Continue cefepime for 7 days      Wounds and injuries        Unspecified open wound, left lower leg, initial encounter  Wound care      Skin tear of upper arm without complication, right, initial encounter  Wound care      Delmy-prosthetic fracture around prosthetic hip  S/P fixation with Pomierski      Lumbar radiculopathy  Pain management  PT      Hypertension  Adjust medications as needed      Arthritis  Pain management        VTE Risk Mitigation (From admission, onward)         Ordered     apixaban tablet 2.5 mg  2 times daily         12/29/22 1630                Discharge Planning   YANIV:      Code Status: Full Code   Is the patient medically ready for discharge?:     Reason for patient still in hospital (select all that apply): Treatment  Discharge Plan A: Skilled Nursing Facility                  Shai Guerra DO  Department of Hospital Medicine   [facility]

## 2023-01-03 NOTE — PT/OT/SLP PROGRESS
Occupational Therapy   Treatment    Name: Zandra Veloz  MRN: [id number]  Admitting Diagnosis:  Urinary tract infection       Recommendations:     Discharge Recommendations: rehabilitation facility, nursing facility, skilled  Discharge Equipment Recommendations:   (to be determined)  Barriers to discharge:  None    Assessment:     Zandra Veloz is a 88 y.o. female with a medical diagnosis of Urinary tract infection.  She presents with alert and agreeable to treatment, just completed her PT session and has some increased bleeding from her (L) lower leg and drainage from bandage over hip incision. LESLEE Castaneda was informed. Performance deficits affecting function are weakness, impaired endurance, impaired self care skills, impaired skin, pain, orthopedic precautions, decreased ROM.     Rehab Prognosis:  Good; patient would benefit from acute skilled OT services to address these deficits and reach maximum level of function.       Plan:     Patient to be seen 5 x/week to address the above listed problems via self-care/home management, therapeutic activities, therapeutic exercises  Plan of Care Expires: 02/06/23  Plan of Care Reviewed with: patient, family    Subjective     Pain/Comfort:  Pain Rating 1: 4/10  Location - Side 1: Left  Location 1: leg (and arm)  Pain Addressed 1: Pre-medicate for activity, Distraction  Pain Rating Post-Intervention 1: 4/10  Location - Side 2: Left  Location 2: arm    Objective:     Communicated with: CLYDE Hanley prior to session.  Patient found supine with peripheral IV, chao catheter upon OT entry to room.    General Precautions: Standard, fall    Orthopedic Precautions:LLE non weight bearing, LLE posterior precautions  Braces: N/A  Respiratory Status: Room air     Occupational Performance:     Bed Mobility:    Patient completed Rolling/Turning to Left with  moderate assistance  Patient completed Rolling/Turning to Right with moderate assistance      Functional Mobility/Transfers:  NT- returned to bed following her PT session    Activities of Daily Living:  Upper Body Dressing: moderate assistance to change gown  Toileting: total assistance having loose stool incontinence and requires total (A) with hygiene      [facility] 6 Click ADL: 13    Treatment & Education:  Pt performed (B) UE ex:  Elbow flexion with 1# db 2x10 reps  Shoulder horizontal abd/adduction with 1# db 2x10 reps  Supination/pronation with 1# db 2x10 reps  (L) wrist flexion with 1# db 2x10 reps - (R) wrist not attempted   Hand helper with 2 bands x 20 reps  Elbow extension with red t-band 2x15 reps    Shoulder flexion was attempted with (L) UE but was not able to do much due to rotator cuff issues      Patient left supine with call button in reach, bed alarm on, SUNSHINEN Ofelia notified, and sitter present    GOALS:   Multidisciplinary Problems       Occupational Therapy Goals          Problem: Occupational Therapy    Goal Priority Disciplines Outcome Interventions   Occupational Therapy Goal     OT, PT/OT Ongoing, Progressing    Description: STG: (In 2 weeks)  Pt will perform grooming with min(A)  Pt will bathe with setup and max A)  Pt will perform UE dressing with setup and min(A)  Pt will perform (L) UE AROM to 3/4 full range  Pt will sit EOB x 5 min with CGA assistance  Pt will transfer bed/chair/bsc with mod(A)  Pt will tolerate 20 minutes of tx without fatigue      LT.Restore to max I with self care and mobility.                          Time Tracking:     OT Date of Treatment: 23  OT Start Time: 0950  OT Stop Time: 1030  OT Total Time (min): 40 min    Billable Minutes:Therapeutic Activity 15  Therapeutic Exercise 20    OT/BRIGHT: OT          1/3/2023

## 2023-01-03 NOTE — PT/OT/SLP PROGRESS
"Physical Therapy Treatment    Patient Name:  Zandra Veloz   MRN:  [id number]    Recommendations:     Discharge Recommendations: rehabilitation facility, nursing facility, skilled  Discharge Equipment Recommendations: other (see comments) (to be determined)  Barriers to discharge:  ongoing medical treatment, decreased functional mobility     Assessment:     Zandra Veloz is a 88 y.o. female admitted with a medical diagnosis of Urinary tract infection, left hip endo, severe degloving injury left UE and bilateral LE.  She presents with the following impairments/functional limitations: weakness, impaired endurance, impaired self care skills, impaired functional mobility, gait instability, impaired balance, decreased upper extremity function, decreased lower extremity function, pain, decreased ROM, edema, impaired skin.    Pt able to begin transfer training to bedside commode but requires max A and is unsafe to perform with nursing yet. Pt able to complete LE exercises with minimal assist and no increased complaints of pain. Pt will require extended rehab to regain independent function.    Rehab Prognosis: Good; patient would benefit from acute skilled PT services to address these deficits and reach maximum level of function.    Recent Surgery: * No surgery found *      Plan:     During this hospitalization, patient to be seen 5 x/week to address the identified rehab impairments via gait training, therapeutic activities, therapeutic exercises, neuromuscular re-education and progress toward the following goals:    Plan of Care Expires:  01/30/23    Subjective     Chief Complaint: Left hip endo, degloving injury  Patient/Family Comments/goals: "I want to be able to get on the bed side commode."  Pain/Comfort:  Pain Rating 1: 5/10  Location - Side 1: Bilateral  Location 1: leg  Pain Addressed 1: Pre-medicate for activity, Reposition, Distraction, Cessation of Activity  Pain Rating Post-Intervention 1: " 6/10      Objective:     Communicated with Ofelia Castaneda LPN prior to session.  Patient found supine with peripheral IV, chao catheter upon PT entry to room.     General Precautions: Standard, fall  Orthopedic Precautions: LLE non weight bearing, LLE posterior precautions  Braces: N/A  Respiratory Status: Room air     Functional Mobility:  Bed Mobility:     Supine to Sit: moderate assistance and of 2 persons  Sit to Supine: maximal assistance and of 2 persons  Transfers:     Sit to Stand:  maximal assistance with manual assist via gait belt  Toilet Transfer: maximal assistance with  manual assist via gait belt  using  Stand Pivot      AM-PAC 6 CLICK MOBILITY  Turning over in bed (including adjusting bedclothes, sheets and blankets)?: 2  Sitting down on and standing up from a chair with arms (e.g., wheelchair, bedside commode, etc.): 2  Moving from lying on back to sitting on the side of the bed?: 2  Moving to and from a bed to a chair (including a wheelchair)?: 2  Need to walk in hospital room?: 1  Climbing 3-5 steps with a railing?: 1  Basic Mobility Total Score: 10       Treatment & Education:  Transfers as above  Bilateral lower extremity exercise x 30 reps: ankle pumps, Quad sets, glut sets, heel slides, hip abduction/adduction, and straight leg raises with active assist ROM, verbal cues, and tactile cues     Patient left supine with all lines intact, call button in reach,  Ofelia Castaneda LPN notified, and daughter present..    GOALS:   Multidisciplinary Problems       Physical Therapy Goals          Problem: Physical Therapy    Goal Priority Disciplines Outcome Goal Variances Interventions   Physical Therapy Goal     PT, PT/OT Ongoing, Progressing     Description: Short Term Goals to be met by 1/10/2023    Patient will increase functional independence and safety with mobility by performing    1.   Rolling right Moderate Assist; roll left Moderate Assist  2.   Supine to sit Moderate Assist  3.   Sitting  balance edge of the bed x 15 minutes Stand by assist  4.   Sit to stand Moderate Assist  using manual assistance with gait belt and NWB L LE  5.   Bed to chair Moderate Assist using manual assistance with gait belt and NWB left LE  6.   Lower extremity exercises x 30 reps with assist as necessary and no rest breaks      Long Term Goals to be met by 2/30/2023    Patient to require less than or equal to Stand by assist for functional mobility to ease caregiver burden                        Time Tracking:     PT Received On: 01/03/23  PT Start Time: 0920     PT Stop Time: 0950  PT Total Time (min): 30 min     Billable Minutes: Therapeutic Activity 15 min and Therapeutic Exercise 15 min    Treatment Type: Treatment  PT/PTA: PT     PTA Visit Number: 0     01/03/2023

## 2023-01-03 NOTE — SUBJECTIVE & OBJECTIVE
Interval History: naeo    Review of Systems   Constitutional:  Negative for chills, fatigue, fever and unexpected weight change.   HENT:  Negative for congestion, mouth sores and sore throat.    Eyes:  Negative for photophobia and visual disturbance.   Respiratory:  Negative for cough, chest tightness, shortness of breath and wheezing.    Cardiovascular:  Negative for chest pain, palpitations and leg swelling.   Gastrointestinal:  Negative for abdominal pain, diarrhea, nausea and vomiting.   Endocrine: Negative for cold intolerance and heat intolerance.   Genitourinary:  Negative for difficulty urinating, dysuria, frequency and urgency.   Musculoskeletal:  Positive for arthralgias. Negative for back pain and myalgias.   Skin:  Negative for pallor and rash.   Neurological:  Negative for tremors, seizures, syncope, weakness, numbness and headaches.   Hematological:  Does not bruise/bleed easily.   Psychiatric/Behavioral:  Negative for agitation, confusion, hallucinations and suicidal ideas.    Objective:     Vital Signs (Most Recent):  Temp: 99 °F (37.2 °C) (01/03/23 0800)  Pulse: (!) 56 (01/03/23 0800)  Resp: 18 (01/03/23 0800)  BP: (!) 150/57 (01/03/23 0400)  SpO2: (!) 94 % (01/03/23 0800)   Vital Signs (24h Range):  Temp:  [97.7 °F (36.5 °C)-99 °F (37.2 °C)] 99 °F (37.2 °C)  Pulse:  [53-69] 56  Resp:  [16-20] 18  SpO2:  [91 %-97 %] 94 %  BP: (114-155)/(39-70) 150/57     Weight: 59.1 kg (130 lb 3.2 oz)  Body mass index is 21.01 kg/m².    Intake/Output Summary (Last 24 hours) at 1/3/2023 1007  Last data filed at 1/3/2023 0600  Gross per 24 hour   Intake 50 ml   Output 900 ml   Net -850 ml        Physical Exam  Vitals reviewed.   Constitutional:       Appearance: Normal appearance.   HENT:      Head: Normocephalic and atraumatic.   Eyes:      Extraocular Movements: Extraocular movements intact.   Cardiovascular:      Rate and Rhythm: Normal rate and regular rhythm.      Pulses: Normal pulses.   Pulmonary:      Effort:  Pulmonary effort is normal.      Breath sounds: Normal breath sounds.   Abdominal:      General: Abdomen is flat.      Palpations: Abdomen is soft.   Musculoskeletal:         General: Tenderness and signs of injury present.      Comments: Dressed left lower extremity, painful to touch   Skin:     General: Skin is warm and dry.      Capillary Refill: Capillary refill takes less than 2 seconds.   Neurological:      General: No focal deficit present.      Mental Status: She is alert and oriented to person, place, and time.   Psychiatric:         Mood and Affect: Mood normal.         Behavior: Behavior normal.       Significant Labs: All pertinent labs within the past 24 hours have been reviewed.    Significant Imaging: I have reviewed all pertinent imaging results/findings within the past 24 hours.

## 2023-01-03 NOTE — PLAN OF CARE
Problem: Adult Inpatient Plan of Care  Goal: Plan of Care Review  Outcome: Ongoing, Progressing     Problem: Adult Inpatient Plan of Care  Goal: Patient-Specific Goal (Individualized)  Outcome: Ongoing, Progressing     Problem: Impaired Wound Healing  Goal: Optimal Wound Healing  Outcome: Ongoing, Progressing     Problem: Skin Injury Risk Increased  Goal: Skin Health and Integrity  Outcome: Ongoing, Progressing     Problem: Fall Injury Risk  Goal: Absence of Fall and Fall-Related Injury  Outcome: Ongoing, Progressing

## 2023-01-04 LAB
ANION GAP SERPL CALCULATED.3IONS-SCNC: 12 MMOL/L (ref 7–16)
BUN SERPL-MCNC: 15 MG/DL (ref 7–18)
BUN/CREAT SERPL: 20 (ref 6–20)
CALCIUM SERPL-MCNC: 8.5 MG/DL (ref 8.5–10.1)
CHLORIDE SERPL-SCNC: 100 MMOL/L (ref 98–107)
CO2 SERPL-SCNC: 27 MMOL/L (ref 21–32)
CREAT SERPL-MCNC: 0.74 MG/DL (ref 0.55–1.02)
EGFR (NO RACE VARIABLE) (RUSH/TITUS): 78 ML/MIN/1.73M²
GLUCOSE SERPL-MCNC: 100 MG/DL (ref 74–106)
GLUCOSE SERPL-MCNC: 83 MG/DL (ref 70–105)
POTASSIUM SERPL-SCNC: 5 MMOL/L (ref 3.5–5.1)
SODIUM SERPL-SCNC: 134 MMOL/L (ref 136–145)

## 2023-01-04 PROCEDURE — 99232 SBSQ HOSP IP/OBS MODERATE 35: CPT | Mod: ,,, | Performed by: STUDENT IN AN ORGANIZED HEALTH CARE EDUCATION/TRAINING PROGRAM

## 2023-01-04 PROCEDURE — 82962 GLUCOSE BLOOD TEST: CPT

## 2023-01-04 PROCEDURE — 94761 N-INVAS EAR/PLS OXIMETRY MLT: CPT

## 2023-01-04 PROCEDURE — 36415 COLL VENOUS BLD VENIPUNCTURE: CPT | Performed by: STUDENT IN AN ORGANIZED HEALTH CARE EDUCATION/TRAINING PROGRAM

## 2023-01-04 PROCEDURE — 63600175 PHARM REV CODE 636 W HCPCS: Performed by: STUDENT IN AN ORGANIZED HEALTH CARE EDUCATION/TRAINING PROGRAM

## 2023-01-04 PROCEDURE — 11000001 HC ACUTE MED/SURG PRIVATE ROOM

## 2023-01-04 PROCEDURE — 97530 THERAPEUTIC ACTIVITIES: CPT

## 2023-01-04 PROCEDURE — 80048 BASIC METABOLIC PNL TOTAL CA: CPT | Performed by: STUDENT IN AN ORGANIZED HEALTH CARE EDUCATION/TRAINING PROGRAM

## 2023-01-04 PROCEDURE — 25000003 PHARM REV CODE 250: Performed by: STUDENT IN AN ORGANIZED HEALTH CARE EDUCATION/TRAINING PROGRAM

## 2023-01-04 PROCEDURE — 97110 THERAPEUTIC EXERCISES: CPT

## 2023-01-04 PROCEDURE — 99232 PR SUBSEQUENT HOSPITAL CARE,LEVL II: ICD-10-PCS | Mod: ,,, | Performed by: STUDENT IN AN ORGANIZED HEALTH CARE EDUCATION/TRAINING PROGRAM

## 2023-01-04 RX ADMIN — ALPRAZOLAM 1 MG: 0.25 TABLET ORAL at 10:01

## 2023-01-04 RX ADMIN — HYDRALAZINE HYDROCHLORIDE 25 MG: 25 TABLET ORAL at 01:01

## 2023-01-04 RX ADMIN — HYDROCODONE BITARTRATE AND ACETAMINOPHEN 2 TABLET: 10; 325 TABLET ORAL at 04:01

## 2023-01-04 RX ADMIN — NYSTATIN 500000 UNITS: 100000 SUSPENSION ORAL at 04:01

## 2023-01-04 RX ADMIN — METOPROLOL TARTRATE 50 MG: 50 TABLET, FILM COATED ORAL at 10:01

## 2023-01-04 RX ADMIN — HYDRALAZINE HYDROCHLORIDE 25 MG: 25 TABLET ORAL at 09:01

## 2023-01-04 RX ADMIN — APIXABAN 2.5 MG: 2.5 TABLET, FILM COATED ORAL at 10:01

## 2023-01-04 RX ADMIN — HYDRALAZINE HYDROCHLORIDE 25 MG: 25 TABLET ORAL at 05:01

## 2023-01-04 RX ADMIN — METOPROLOL TARTRATE 50 MG: 50 TABLET, FILM COATED ORAL at 09:01

## 2023-01-04 RX ADMIN — NYSTATIN 500000 UNITS: 100000 SUSPENSION ORAL at 10:01

## 2023-01-04 RX ADMIN — HYDROCODONE BITARTRATE AND ACETAMINOPHEN 2 TABLET: 10; 325 TABLET ORAL at 10:01

## 2023-01-04 RX ADMIN — LEVOTHYROXINE SODIUM 125 MCG: 0.12 TABLET ORAL at 05:01

## 2023-01-04 RX ADMIN — PREDNISONE 5 MG: 5 TABLET ORAL at 10:01

## 2023-01-04 RX ADMIN — GABAPENTIN 100 MG: 100 CAPSULE ORAL at 03:01

## 2023-01-04 RX ADMIN — LISINOPRIL 20 MG: 20 TABLET ORAL at 10:01

## 2023-01-04 RX ADMIN — NYSTATIN 500000 UNITS: 100000 SUSPENSION ORAL at 01:01

## 2023-01-04 RX ADMIN — GABAPENTIN 100 MG: 100 CAPSULE ORAL at 10:01

## 2023-01-04 RX ADMIN — CEFEPIME 1 G: 1 INJECTION, POWDER, FOR SOLUTION INTRAMUSCULAR; INTRAVENOUS at 09:01

## 2023-01-04 RX ADMIN — NYSTATIN 500000 UNITS: 100000 SUSPENSION ORAL at 09:01

## 2023-01-04 RX ADMIN — APIXABAN 2.5 MG: 2.5 TABLET, FILM COATED ORAL at 09:01

## 2023-01-04 RX ADMIN — CEFEPIME 1 G: 1 INJECTION, POWDER, FOR SOLUTION INTRAMUSCULAR; INTRAVENOUS at 10:01

## 2023-01-04 RX ADMIN — PANTOPRAZOLE SODIUM 40 MG: 40 TABLET, DELAYED RELEASE ORAL at 10:01

## 2023-01-04 RX ADMIN — DOCUSATE SODIUM 100 MG: 100 CAPSULE, LIQUID FILLED ORAL at 09:01

## 2023-01-04 RX ADMIN — DILTIAZEM HYDROCHLORIDE 120 MG: 120 CAPSULE, COATED, EXTENDED RELEASE ORAL at 10:01

## 2023-01-04 RX ADMIN — GABAPENTIN 100 MG: 100 CAPSULE ORAL at 09:01

## 2023-01-04 RX ADMIN — FAMOTIDINE 20 MG: 20 TABLET ORAL at 10:01

## 2023-01-04 RX ADMIN — ALPRAZOLAM 1 MG: 0.25 TABLET ORAL at 09:01

## 2023-01-04 NOTE — PT/OT/SLP PROGRESS
Physical Therapy Treatment    Patient Name:  Zandra Veloz   MRN:  [id number]    Recommendations:     Discharge Recommendations: rehabilitation facility, nursing facility, skilled  Discharge Equipment Recommendations: other (see comments) (to be determined)  Barriers to discharge:  ongoing medical care    Assessment:     Zandra Veloz is a 88 y.o. female admitted with a medical diagnosis of Urinary tract infection.  She presents with the following impairments/functional limitations: weakness, impaired endurance, impaired functional mobility, impaired balance, decreased lower extremity function, pain, decreased ROM, impaired skin, edema, orthopedic precautions.    Pt demo improved strength with BLE ex this session and improved EOB sitting balance. Pt still req max x 2 for supine>sit, sit<>stand, bed<>chair to maintain NWB LLE. Pt needs further rehab at d/c. Pt and sitter ed on the benefits of being up in chair and out of bed as much as pt can tolerate.     Rehab Prognosis: Fair; patient would benefit from acute skilled PT services to address these deficits and reach maximum level of function.    Recent Surgery: * No surgery found *      Plan:     During this hospitalization, patient to be seen 5 x/week to address the identified rehab impairments via gait training, therapeutic activities, therapeutic exercises, neuromuscular re-education and progress toward the following goals:    Plan of Care Expires:  01/30/23    Subjective     Chief Complaint: UTI, L hip fx  Patient/Family Comments/goals: agreeable to PT  Pain/Comfort:  Pain Rating 1: 6/10  Location - Side 1: Left  Location 1: leg  Pain Addressed 1: Reposition, Distraction, Nurse notified  Pain Rating Post-Intervention 1: 5/10  Pain Rating 2: 6/10  Location 2: back  Pain Addressed 2: Reposition, Distraction  Pain Rating Post-Intervention 2: 6/10      Objective:     Communicated with Lydia Hanley RN prior to session.  Patient found HOB elevated  with peripheral IV, chao catheter upon PT entry to room.     General Precautions: Standard, fall  Orthopedic Precautions: LLE non weight bearing, LLE posterior precautions  Braces: N/A  Respiratory Status: Room air     Functional Mobility:  Bed Mobility:     Rolling Left:  moderate assistance  Rolling Right: maximal assistance  Scooting: maximal assistance  Supine to Sit: maximal assistance and of 2 persons  Transfers:     Sit to Stand:  maximal assistance and of 2 persons with hand-held assist  Bed to Chair: maximal assistance and of 2 persons with  hand-held assist  using  Scoot Pivot  Balance: EOB sitting with SBA      AM-PAC 6 CLICK MOBILITY  Turning over in bed (including adjusting bedclothes, sheets and blankets)?: 2  Sitting down on and standing up from a chair with arms (e.g., wheelchair, bedside commode, etc.): 2  Moving from lying on back to sitting on the side of the bed?: 2  Moving to and from a bed to a chair (including a wheelchair)?: 2  Need to walk in hospital room?: 1  Climbing 3-5 steps with a railing?: 1  Basic Mobility Total Score: 10       Treatment & Education:Bilateral lower extremity exercise x 10 reps: ankle pumps, Quad sets, glut sets, short arc quads, heel slides, hip abduction/adduction, and straight leg raises with active assist ROM and verbal cues for sequencing and safety     Patient left up in chair with all lines intact, call button in reach, and sitter present..    GOALS:   Multidisciplinary Problems       Physical Therapy Goals          Problem: Physical Therapy    Goal Priority Disciplines Outcome Goal Variances Interventions   Physical Therapy Goal     PT, PT/OT Ongoing, Progressing     Description: Short Term Goals to be met by 1/10/2023    Patient will increase functional independence and safety with mobility by performing    1.   Rolling right Moderate Assist; roll left Moderate Assist  2.   Supine to sit Moderate Assist  3.   Sitting balance edge of the bed x 15 minutes  Stand by assist  4.   Sit to stand Moderate Assist  using manual assistance with gait belt and NWB L LE  5.   Bed to chair Moderate Assist using manual assistance with gait belt and NWB left LE  6.   Lower extremity exercises x 30 reps with assist as necessary and no rest breaks      Long Term Goals to be met by 2/30/2023    Patient to require less than or equal to Stand by assist for functional mobility to ease caregiver burden                        Time Tracking:     PT Received On: 01/04/23  PT Start Time: 0832     PT Stop Time: 0900  PT Total Time (min): 28 min     Billable Minutes: Therapeutic Activity 15 and Therapeutic Exercise 13    Treatment Type: Treatment  PT/PTA: PT     PTA Visit Number: 0     01/04/2023

## 2023-01-04 NOTE — SUBJECTIVE & OBJECTIVE
Interval History: naeo    Review of Systems   Constitutional:  Negative for chills, fatigue, fever and unexpected weight change.   HENT:  Negative for congestion, mouth sores and sore throat.    Eyes:  Negative for photophobia and visual disturbance.   Respiratory:  Negative for cough, chest tightness, shortness of breath and wheezing.    Cardiovascular:  Negative for chest pain, palpitations and leg swelling.   Gastrointestinal:  Negative for abdominal pain, diarrhea, nausea and vomiting.   Endocrine: Negative for cold intolerance and heat intolerance.   Genitourinary:  Negative for difficulty urinating, dysuria, frequency and urgency.   Musculoskeletal:  Positive for arthralgias. Negative for back pain and myalgias.   Skin:  Negative for pallor and rash.   Neurological:  Negative for tremors, seizures, syncope, weakness, numbness and headaches.   Hematological:  Does not bruise/bleed easily.   Psychiatric/Behavioral:  Negative for agitation, confusion, hallucinations and suicidal ideas.    Objective:     Vital Signs (Most Recent):  Temp: 99.1 °F (37.3 °C) (01/04/23 0815)  Pulse: 67 (01/04/23 0815)  Resp: 18 (01/04/23 1002)  BP: (!) 159/79 (01/04/23 1006)  SpO2: 96 % (01/04/23 0815)   Vital Signs (24h Range):  Temp:  [97.4 °F (36.3 °C)-99.1 °F (37.3 °C)] 99.1 °F (37.3 °C)  Pulse:  [52-77] 67  Resp:  [18-20] 18  SpO2:  [93 %-98 %] 96 %  BP: (103-164)/(44-79) 159/79     Weight: 59.1 kg (130 lb 3.2 oz)  Body mass index is 21.01 kg/m².    Intake/Output Summary (Last 24 hours) at 1/4/2023 1115  Last data filed at 1/4/2023 0720  Gross per 24 hour   Intake 240 ml   Output 500 ml   Net -260 ml        Physical Exam  Vitals reviewed.   Constitutional:       Appearance: Normal appearance.   HENT:      Head: Normocephalic and atraumatic.   Eyes:      Extraocular Movements: Extraocular movements intact.   Cardiovascular:      Rate and Rhythm: Normal rate and regular rhythm.      Pulses: Normal pulses.   Pulmonary:      Effort:  Pulmonary effort is normal.      Breath sounds: Normal breath sounds.   Abdominal:      General: Abdomen is flat.      Palpations: Abdomen is soft.   Musculoskeletal:         General: Tenderness and signs of injury present.      Comments: Dressed left lower extremity, painful to touch   Skin:     General: Skin is warm and dry.      Capillary Refill: Capillary refill takes less than 2 seconds.   Neurological:      General: No focal deficit present.      Mental Status: She is alert and oriented to person, place, and time.   Psychiatric:         Mood and Affect: Mood normal.         Behavior: Behavior normal.       Significant Labs: All pertinent labs within the past 24 hours have been reviewed.    Significant Imaging: I have reviewed all pertinent imaging results/findings within the past 24 hours.

## 2023-01-04 NOTE — PROGRESS NOTES
[facility] Hospital Medicine  Progress Note    Patient Name: Zandra Veloz  MRN: [id number]  Patient Class: IP- Inpatient   Admission Date: 12/29/2022  Length of Stay: 6 days  Attending Physician: Shai Guerra DO  Primary Care Provider: Brandon Thornton MD        Subjective:     Principal Problem:Urinary tract infection        HPI:  HPI:   88-year-old lady seen emergency room department.  Patient presents after having a fall when she was trying to go to the restroom at her home.  Patient sustained a left femur fracture.  Patient complains of moderate to severe pain in the left hip area.  Patient also complains of chest tightness, she rates it a 5/10 in the chest tightness has been intubate.  Patient also was seen in emergency room department earlier this week per her daughter.  Patient is found to have dehydration and a urinary tract infection.  Current she denies any shortness of breath.  She does not give a history of coronary artery disease.        Procedure(s) (LRB):  REVISION, TOTAL ARTHROPLASTY, HIP, VICTORIANO PROSTHETIC FX (Left)       Hospital Course:   12/19 - records reviewed. Fall without LOC and has left hip fx. No other complaints currently. Seen by cardiology with some CP felt musculoskeletal.  Echo mild AS and mod MR with nl EF. Age increase cardiac risk for surgery but discussed with daughter surgery is urgent and see no benefit in delay medically. Feel low to moderate risk for surgery. Question about UTI. No symptoms. Had UTI in 10.22 not surprising. Lab to do culture on urine in lab. Cover with ATN for prior culture with ATB started. Discussed with Dr Singer and cardiology.  12/20 Tachycardia and elevated BP. Cardiology adjusted meds. Plan hold off surgery until 12/22 to adjust meds and treat UTI. Family in room. Pt not sleeping well.   12/21 Didn't sleep well prior night. Apparently been on xanax for a time. Also recently started on Neurontin. Family with  question. No recent BM. Some increase stool on KUB but not severe. Surgery for am. BP some up but better. HR good.   12/22 Seen prior to surgery and apparently add better night. Sore mouth / throat better with mycostatin. For surgery. Family in room.  12/23 patient with pain but otherwise seemed to be doing relatively well.  Daughter in room.  Apparently been taking prednisone for some time and this was restarted per family request.  Look into swing bed placement  12/24 patient had better night rested and everyone's in better spirits today.  Tolerating some diet.   Therapy worked with patient.  Look into swing bed placement next week  12/25 remained in good spirits.  Less pain.  Had good bowel movement and ordered Dulcolax not given.  Because of dressing to leg, Burk was not removed to keep it from getting soiled.  Wound care to check 12/27.  Discuss this with patient and daughter.  On antibiotics for UTI  12/26-will dc to [facility] once accepted. Needs continued wound care.  12/27-DC when bed available  12/28- issues with wound care yesterday.  Dr. Singer had to come remove dressings himself due to adherence to subcutaneous tissue.  Family refusing transfer to [facility] due to lack of specially trained wound care team availability.  Planning to transfer to specialty but resistant to that as well and requesting to speak to administration.    12/29- agreeable to transfer to specialty.  This will be the best place for her to receive wound care.  DC after family tours     12/29-needs continued wound care and therapy[facility]    12/30-doing well today, still with pain         Overview/Hospital Course:  12/31-having some pain this am  1/1- no complaints  1/2- doing well  1/3- continue wound care, last day cefepime tomorrow  1/4- some pain this AM.  Delay in mediations dues to staffing issues. Dsicussed with RN      Interval History: naeo    Review of Systems   Constitutional:  Negative for chills,  fatigue, fever and unexpected weight change.   HENT:  Negative for congestion, mouth sores and sore throat.    Eyes:  Negative for photophobia and visual disturbance.   Respiratory:  Negative for cough, chest tightness, shortness of breath and wheezing.    Cardiovascular:  Negative for chest pain, palpitations and leg swelling.   Gastrointestinal:  Negative for abdominal pain, diarrhea, nausea and vomiting.   Endocrine: Negative for cold intolerance and heat intolerance.   Genitourinary:  Negative for difficulty urinating, dysuria, frequency and urgency.   Musculoskeletal:  Positive for arthralgias. Negative for back pain and myalgias.   Skin:  Negative for pallor and rash.   Neurological:  Negative for tremors, seizures, syncope, weakness, numbness and headaches.   Hematological:  Does not bruise/bleed easily.   Psychiatric/Behavioral:  Negative for agitation, confusion, hallucinations and suicidal ideas.    Objective:     Vital Signs (Most Recent):  Temp: 99.1 °F (37.3 °C) (01/04/23 0815)  Pulse: 67 (01/04/23 0815)  Resp: 18 (01/04/23 1002)  BP: (!) 159/79 (01/04/23 1006)  SpO2: 96 % (01/04/23 0815)   Vital Signs (24h Range):  Temp:  [97.4 °F (36.3 °C)-99.1 °F (37.3 °C)] 99.1 °F (37.3 °C)  Pulse:  [52-77] 67  Resp:  [18-20] 18  SpO2:  [93 %-98 %] 96 %  BP: (103-164)/(44-79) 159/79     Weight: 59.1 kg (130 lb 3.2 oz)  Body mass index is 21.01 kg/m².    Intake/Output Summary (Last 24 hours) at 1/4/2023 1115  Last data filed at 1/4/2023 0720  Gross per 24 hour   Intake 240 ml   Output 500 ml   Net -260 ml        Physical Exam  Vitals reviewed.   Constitutional:       Appearance: Normal appearance.   HENT:      Head: Normocephalic and atraumatic.   Eyes:      Extraocular Movements: Extraocular movements intact.   Cardiovascular:      Rate and Rhythm: Normal rate and regular rhythm.      Pulses: Normal pulses.   Pulmonary:      Effort: Pulmonary effort is normal.      Breath sounds: Normal breath sounds.   Abdominal:       General: Abdomen is flat.      Palpations: Abdomen is soft.   Musculoskeletal:         General: Tenderness and signs of injury present.      Comments: Dressed left lower extremity, painful to touch   Skin:     General: Skin is warm and dry.      Capillary Refill: Capillary refill takes less than 2 seconds.   Neurological:      General: No focal deficit present.      Mental Status: She is alert and oriented to person, place, and time.   Psychiatric:         Mood and Affect: Mood normal.         Behavior: Behavior normal.       Significant Labs: All pertinent labs within the past 24 hours have been reviewed.    Significant Imaging: I have reviewed all pertinent imaging results/findings within the past 24 hours.      Assessment/Plan:      * Urinary tract infection  Continue cefepime for 7 days      Wounds and injuries        Unspecified open wound, left lower leg, initial encounter  Wound care      Skin tear of upper arm without complication, right, initial encounter  Wound care      Delmy-prosthetic fracture around prosthetic hip  S/P fixation with Pomierski      Lumbar radiculopathy  Pain management  PT      Hypertension  Adjust medications as needed      Arthritis  Pain management        VTE Risk Mitigation (From admission, onward)         Ordered     apixaban tablet 2.5 mg  2 times daily         12/29/22 1630                Discharge Planning   YANIV:      Code Status: Full Code   Is the patient medically ready for discharge?:     Reason for patient still in hospital (select all that apply): Treatment  Discharge Plan A: Skilled Nursing Facility                  Shai Guerra DO  Department of Hospital Medicine   [facility]

## 2023-01-04 NOTE — PLAN OF CARE
Problem: Adult Inpatient Plan of Care  Goal: Patient-Specific Goal (Individualized)  Outcome: Ongoing, Progressing  Goal: Absence of Hospital-Acquired Illness or Injury  Outcome: Ongoing, Progressing     Problem: Impaired Wound Healing  Goal: Optimal Wound Healing  Outcome: Ongoing, Progressing     Problem: Skin Injury Risk Increased  Goal: Skin Health and Integrity  Outcome: Ongoing, Progressing     Problem: Fall Injury Risk  Goal: Absence of Fall and Fall-Related Injury  Outcome: Ongoing, Progressing

## 2023-01-05 ENCOUNTER — APPOINTMENT (OUTPATIENT)
Dept: RADIOLOGY | Facility: HOSPITAL | Age: 88
End: 2023-01-05
Payer: MEDICARE

## 2023-01-05 PROBLEM — T81.31XA DISRUPTION OF EXTERNAL SURGICAL WOUND: Status: ACTIVE | Noted: 2023-01-05

## 2023-01-05 PROBLEM — T14.8XXA MULTIPLE SKIN TEARS: Status: ACTIVE | Noted: 2022-12-28

## 2023-01-05 PROCEDURE — 87070 CULTURE OTHR SPECIMN AEROBIC: CPT | Performed by: NURSE PRACTITIONER

## 2023-01-05 PROCEDURE — 99232 SBSQ HOSP IP/OBS MODERATE 35: CPT | Mod: ,,, | Performed by: NURSE PRACTITIONER

## 2023-01-05 PROCEDURE — 93970 EXTREMITY STUDY: CPT

## 2023-01-05 PROCEDURE — 99232 SBSQ HOSP IP/OBS MODERATE 35: CPT | Mod: ,,, | Performed by: STUDENT IN AN ORGANIZED HEALTH CARE EDUCATION/TRAINING PROGRAM

## 2023-01-05 PROCEDURE — 97110 THERAPEUTIC EXERCISES: CPT

## 2023-01-05 PROCEDURE — 93970 EXTREMITY STUDY: CPT | Mod: TC

## 2023-01-05 PROCEDURE — 87075 CULTR BACTERIA EXCEPT BLOOD: CPT | Performed by: NURSE PRACTITIONER

## 2023-01-05 PROCEDURE — 99232 PR SUBSEQUENT HOSPITAL CARE,LEVL II: ICD-10-PCS | Mod: ,,, | Performed by: STUDENT IN AN ORGANIZED HEALTH CARE EDUCATION/TRAINING PROGRAM

## 2023-01-05 PROCEDURE — 63600175 PHARM REV CODE 636 W HCPCS: Performed by: STUDENT IN AN ORGANIZED HEALTH CARE EDUCATION/TRAINING PROGRAM

## 2023-01-05 PROCEDURE — 11000001 HC ACUTE MED/SURG PRIVATE ROOM

## 2023-01-05 PROCEDURE — 99232 PR SUBSEQUENT HOSPITAL CARE,LEVL II: ICD-10-PCS | Mod: ,,, | Performed by: NURSE PRACTITIONER

## 2023-01-05 PROCEDURE — 97530 THERAPEUTIC ACTIVITIES: CPT

## 2023-01-05 PROCEDURE — 25000003 PHARM REV CODE 250: Performed by: STUDENT IN AN ORGANIZED HEALTH CARE EDUCATION/TRAINING PROGRAM

## 2023-01-05 RX ORDER — IBUPROFEN 200 MG
400 TABLET ORAL EVERY 6 HOURS PRN
Status: DISCONTINUED | OUTPATIENT
Start: 2023-01-05 | End: 2023-01-07

## 2023-01-05 RX ORDER — MORPHINE SULFATE 2 MG/ML
2 INJECTION, SOLUTION INTRAMUSCULAR; INTRAVENOUS EVERY 6 HOURS PRN
Status: DISPENSED | OUTPATIENT
Start: 2023-01-05 | End: 2023-01-08

## 2023-01-05 RX ADMIN — HYDRALAZINE HYDROCHLORIDE 25 MG: 25 TABLET ORAL at 05:01

## 2023-01-05 RX ADMIN — APIXABAN 2.5 MG: 2.5 TABLET, FILM COATED ORAL at 09:01

## 2023-01-05 RX ADMIN — HYDRALAZINE HYDROCHLORIDE 25 MG: 25 TABLET ORAL at 02:01

## 2023-01-05 RX ADMIN — PANTOPRAZOLE SODIUM 40 MG: 40 TABLET, DELAYED RELEASE ORAL at 09:01

## 2023-01-05 RX ADMIN — POLYETHYLENE GLYCOL 3350 34 G: 17 POWDER, FOR SOLUTION ORAL at 09:01

## 2023-01-05 RX ADMIN — DOCUSATE SODIUM 100 MG: 100 CAPSULE, LIQUID FILLED ORAL at 09:01

## 2023-01-05 RX ADMIN — POLYETHYLENE GLYCOL 3350 34 G: 17 POWDER, FOR SOLUTION ORAL at 08:01

## 2023-01-05 RX ADMIN — GABAPENTIN 100 MG: 100 CAPSULE ORAL at 09:01

## 2023-01-05 RX ADMIN — HYDROCODONE BITARTRATE AND ACETAMINOPHEN 2 TABLET: 10; 325 TABLET ORAL at 01:01

## 2023-01-05 RX ADMIN — LEVOTHYROXINE SODIUM 125 MCG: 0.12 TABLET ORAL at 05:01

## 2023-01-05 RX ADMIN — DILTIAZEM HYDROCHLORIDE 120 MG: 120 CAPSULE, COATED, EXTENDED RELEASE ORAL at 09:01

## 2023-01-05 RX ADMIN — HYDROCODONE BITARTRATE AND ACETAMINOPHEN 2 TABLET: 10; 325 TABLET ORAL at 08:01

## 2023-01-05 RX ADMIN — NYSTATIN 500000 UNITS: 100000 SUSPENSION ORAL at 08:01

## 2023-01-05 RX ADMIN — GABAPENTIN 100 MG: 100 CAPSULE ORAL at 02:01

## 2023-01-05 RX ADMIN — ALPRAZOLAM 1 MG: 0.25 TABLET ORAL at 09:01

## 2023-01-05 RX ADMIN — NYSTATIN 500000 UNITS: 100000 SUSPENSION ORAL at 09:01

## 2023-01-05 RX ADMIN — NYSTATIN 500000 UNITS: 100000 SUSPENSION ORAL at 02:01

## 2023-01-05 RX ADMIN — METOPROLOL TARTRATE 50 MG: 50 TABLET, FILM COATED ORAL at 08:01

## 2023-01-05 RX ADMIN — CEFEPIME 1 G: 1 INJECTION, POWDER, FOR SOLUTION INTRAMUSCULAR; INTRAVENOUS at 05:01

## 2023-01-05 RX ADMIN — SENNOSIDES 1 TABLET: 8.6 TABLET, FILM COATED ORAL at 09:01

## 2023-01-05 RX ADMIN — MORPHINE SULFATE 2 MG: 2 INJECTION, SOLUTION INTRAMUSCULAR; INTRAVENOUS at 03:01

## 2023-01-05 RX ADMIN — ALPRAZOLAM 1 MG: 0.25 TABLET ORAL at 08:01

## 2023-01-05 RX ADMIN — GABAPENTIN 100 MG: 100 CAPSULE ORAL at 08:01

## 2023-01-05 RX ADMIN — FAMOTIDINE 20 MG: 20 TABLET ORAL at 09:01

## 2023-01-05 RX ADMIN — METOPROLOL TARTRATE 50 MG: 50 TABLET, FILM COATED ORAL at 09:01

## 2023-01-05 RX ADMIN — NYSTATIN 500000 UNITS: 100000 SUSPENSION ORAL at 05:01

## 2023-01-05 RX ADMIN — HYDRALAZINE HYDROCHLORIDE 25 MG: 25 TABLET ORAL at 09:01

## 2023-01-05 RX ADMIN — DOCUSATE SODIUM 100 MG: 100 CAPSULE, LIQUID FILLED ORAL at 08:01

## 2023-01-05 RX ADMIN — HYDROCODONE BITARTRATE AND ACETAMINOPHEN 2 TABLET: 10; 325 TABLET ORAL at 06:01

## 2023-01-05 RX ADMIN — PREDNISONE 5 MG: 5 TABLET ORAL at 09:01

## 2023-01-05 RX ADMIN — LISINOPRIL 20 MG: 20 TABLET ORAL at 09:01

## 2023-01-05 RX ADMIN — APIXABAN 2.5 MG: 2.5 TABLET, FILM COATED ORAL at 08:01

## 2023-01-05 NOTE — PT/OT/SLP PROGRESS
Physical Therapy Treatment    Patient Name:  Zandra Veloz   MRN:  [id number]    Recommendations:     Discharge Recommendations: rehabilitation facility, nursing facility, skilled  Discharge Equipment Recommendations: other (see comments) (to be determined)  Barriers to discharge:  Ongoing medical treatment    Assessment:     Zandra Veloz is a 88 y.o. female admitted with a medical diagnosis of Urinary tract infection.  She presents with the following impairments/functional limitations: weakness, impaired endurance, impaired self care skills, impaired functional mobility, gait instability, impaired balance, pain, orthopedic precautions, impaired skin, decreased ROM.    Pt was agreeable to PT treatment and able to perform LE exercises with minimal pain. Pt demonstrated decreased trunk control during bed mobility, EOB static sitting, and transfer. Pt will benefit from an extended rehabilitation program to regain functional mobility and increase her independence.     Rehab Prognosis: Good; patient would benefit from acute skilled PT services to address these deficits and reach maximum level of function.    Recent Surgery: * No surgery found *      Plan:     During this hospitalization, patient to be seen 5 x/week to address the identified rehab impairments via gait training, therapeutic activities, therapeutic exercises, neuromuscular re-education and progress toward the following goals:    Plan of Care Expires:  01/30/23    Subjective     Chief Complaint: Left hip endo.  Patient/Family Comments/goals: Pt stated her pain was less today due to pain medication administered in the am.  Pain/Comfort:  Pain Rating Post-Intervention 1: 0/10  Pain Rating Post-Intervention 2: 0/10      Objective:     Communicated with Stephanie Batista RN prior to session.  Patient found supine with peripheral IV, chao catheter upon PT entry to room.     General Precautions: Standard, fall  Orthopedic Precautions: LLE non  weight bearing, LLE posterior precautions  Braces: N/A  Respiratory Status: Room air     Functional Mobility:  Bed Mobility:     Supine to Sit: maximal assistance and of 2 persons  Transfers:     Bed to Chair: maximal assistance and of 2 persons with  slide board  using  Drawsheet and verbal cueing      AM-PAC 6 CLICK MOBILITY  Turning over in bed (including adjusting bedclothes, sheets and blankets)?: 2  Sitting down on and standing up from a chair with arms (e.g., wheelchair, bedside commode, etc.): 1  Moving from lying on back to sitting on the side of the bed?: 2  Moving to and from a bed to a chair (including a wheelchair)?: 2  Need to walk in hospital room?: 1  Climbing 3-5 steps with a railing?: 1  Basic Mobility Total Score: 9       Treatment & Education:  Transfers as above  Bilateral lower extremity exercise: ankle pumps x30 , Quad sets x10, glut sets x10, short arc quads x20, heel slides x20, hip abduction/adduction x30, and Long arc quads x20 with active assist ROM, verbal cues, and tactile cues     Patient left up in chair with all lines intact, call button in reach, Stephanie Batista RN notified, and caregiver present..    GOALS:   Multidisciplinary Problems       Physical Therapy Goals          Problem: Physical Therapy    Goal Priority Disciplines Outcome Goal Variances Interventions   Physical Therapy Goal     PT, PT/OT Ongoing, Progressing     Description: Short Term Goals to be met by 1/10/2023    Patient will increase functional independence and safety with mobility by performing    1.   Rolling right Moderate Assist; roll left Moderate Assist  2.   Supine to sit Moderate Assist  3.   Sitting balance edge of the bed x 15 minutes Stand by assist  4.   Sit to stand Moderate Assist  using manual assistance with gait belt and NWB L LE  5.   Bed to chair Moderate Assist using manual assistance with gait belt and NWB left LE  6.   Lower extremity exercises x 30 reps with assist as necessary and no rest  breaks      Long Term Goals to be met by 2/30/2023    Patient to require less than or equal to Stand by assist for functional mobility to ease caregiver burden                        Time Tracking:     PT Received On: 01/05/23  PT Start Time: 0855     PT Stop Time: 0923  PT Total Time (min): 28 min     Billable Minutes: Therapeutic Activity 15 minutes and Therapeutic Exercise 13 minutes    Treatment Type: Treatment  PT/PTA: PT     PTA Visit Number: 0     01/05/2023

## 2023-01-05 NOTE — HOSPITAL COURSE
1/5/23 - Complains of pain to right posterior knee, venous doppler ordered. Sutures and every other staple removed today. New local wound care orders. Cultures pending.   1/9/23 - Wounds on lower extremities healing well. Continue current POC. Bruising noted to left posterior knee, protect with mepitel and optifoam. Dressing to incision site saturated with green drainage. Cultures negative. Continue with drawtex and notify ortho of drainage.   1/11/2023 - Wounds are healing well. Continue current POC. Ultrasound pending, preliminary results concerning for fluid pocket, Dr. Singer going to evaluate today.   1/17/23 - Wounds continue to show improvement. Dr. Singer evaluated left hip today during wound vac change. Staples and wound vac d/c'd today. Dressing change twice weekly.   1/19/23 - Wounds continue to heal. Incision with minimal drainage. Puracol to left thigh today. Continue with current POC. Twice weekly dressing changes (M, Th)  1/23/23 - Wounds are improving. Continue twice weekly dressing changes.   1/30/23 - New skin tear to right thigh, used tweezers and q-tip to place skin over wound and applied transparent dressing. Wounds are smaller today with granulation tissue. Continue drawtex and bordered foam dressing to wounds.   2/2/23 - Discussed using silvadene with patient and family, patient is allergic to sulfa. Consider urea for dry skin on left lower leg, aquacel ag advantage with bordered foams to wounds.   2/6/23 - Removed dry skin and clots from left lower leg today. Applied silver seal wound dressing today to assist with wound healing and pain.   2/8/23 - Wounds are improving with new plan of care. Polymem silver to open wounds. Silvercel to scabbed areas. Change M, W, F  2/10/23 - Wounds have shown significant improvement with polymem. Continue current POC  2/13/23 - Wounds continue to show improvement with polymem. Skin tears are smaller. She continue to have pain with dressing changes.  Continue current POC.   2/16/23 - Wounds continue to improve. Continue current POC.   2/20/23 - Wounds are smaller today. Slough is improving with polymem. Patient is discharging to [facility] today, discharge orders in system.

## 2023-01-05 NOTE — PROGRESS NOTES
[facility]  Wound Care and Hyperbarics JOSE  Progress Note    Patient Name: Zandra Veloz  MRN: [id number]  Admission Date: 12/29/2022  Hospital Length of Stay: 7 days  Attending Physician: Shai Guerra DO  Primary Care Provider: Brandon Thornton MD         Subjective:     HPI:  87 yo female with multiple skin tears, traumatic wounds, and surgical incision to left thigh. She was admitted following a fall on 12/17 when she was trying to go to the restroom at her home.  Patient sustained a left femur fracture. She is status post ORIF on 12/20. Staples intact to left hip. Moderate amount of green drainage noted, cultures obtained today. Left lower leg sutures removed today. Slough noted in wound bed today, adjustments to local wound care. Significant PMH includes hypertension, bradycardia, osteoarthritis, hypothyroidism, osteoporosis, and multiple falls. Wound healing is complicated by limited mobility, multiple falls, infection, and pain.     Hospital Course:   1/5/23 - Complains of pain to right posterior knee, venous doppler ordered. Sutures and every other staple removed today. New local wound care orders. Cultures pending.           Scheduled Meds:   ALPRAZolam  1 mg Oral BID    apixaban  2.5 mg Oral BID    bisacodyL  10 mg Oral Once    ceFEPime (MAXIPIME) IVPB  1 g Intravenous Q6H    diltiaZEM  120 mg Oral Daily    docusate sodium  100 mg Oral BID    famotidine  20 mg Oral Daily    gabapentin  100 mg Oral TID    hydrALAZINE  25 mg Oral Q8H    levothyroxine  125 mcg Oral Before breakfast    lisinopriL  20 mg Oral Daily    metoprolol tartrate  50 mg Oral BID    nystatin  500,000 Units Oral QID    pantoprazole  40 mg Oral Daily    polyethylene glycol  34 g Oral BID    predniSONE  5 mg Oral Daily    senna  1 tablet Oral Daily     Continuous Infusions:  PRN Meds:dextrose 50%, dextrose 50%, glucagon (human recombinant), glucose, glucose, HYDROcodone-acetaminophen,  ibuprofen, magnesium oxide, melatonin, morphine, naloxone, ondansetron, potassium bicarbonate, potassium bicarbonate, potassium bicarbonate, potassium, sodium phosphates, potassium, sodium phosphates, potassium, sodium phosphates, sodium chloride 0.9%    Review of Systems   Constitutional:  Positive for activity change. Negative for chills and fever.   Respiratory:  Negative for chest tightness and shortness of breath.    Cardiovascular:  Positive for leg swelling. Negative for chest pain and palpitations.   Musculoskeletal:  Positive for arthralgias, back pain, gait problem and joint swelling.   Skin:  Positive for color change and wound.        wound   Neurological:  Positive for weakness.   Psychiatric/Behavioral:  Negative for agitation, behavioral problems, confusion and self-injury.    Objective:     Vital Signs (Most Recent):  Temp: 98.1 °F (36.7 °C) (01/05/23 1200)  Pulse: (!) 55 (01/05/23 0700)  Resp: 20 (01/05/23 1532)  BP: (!) 133/52 (Nurse Aware) (01/05/23 1200)  SpO2: (!) 94 % (01/05/23 1200)   Vital Signs (24h Range):  Temp:  [96.1 °F (35.6 °C)-98.2 °F (36.8 °C)] 98.1 °F (36.7 °C)  Pulse:  [55-60] 55  Resp:  [16-20] 20  SpO2:  [94 %-96 %] 94 %  BP: ()/(41-58) 133/52     Weight: 59.1 kg (130 lb 3.2 oz)  Body mass index is 21.01 kg/m².    Physical Exam  Vitals reviewed.   Constitutional:       Appearance: Normal appearance.   HENT:      Head: Normocephalic.   Cardiovascular:      Rate and Rhythm: Normal rate and regular rhythm.      Pulses: Normal pulses.      Heart sounds: Murmur heard.   Pulmonary:      Effort: Pulmonary effort is normal.      Breath sounds: Normal breath sounds.   Musculoskeletal:         General: Swelling, tenderness, deformity and signs of injury present.      Right lower leg: Edema present.      Left lower leg: Edema present.   Skin:     Findings: Bruising and erythema present.      Comments: Wound, see LDA for photo/measurements   Neurological:      Mental Status: She is  alert. Mental status is at baseline.      Motor: Weakness present.      Gait: Gait abnormal.           Assessment/Plan:     Disruption of external surgical wound  Cultures today  Clean with vashe  Apply drawtex to wound  Cover and secure with abd pad and paper tape  Change M, W, F and PRN for soilage    Open wound of left lower leg          Clean with vashe  Apply mepitel and  aquacel ag advantage to wound.  Cover and secure with 4x4s, kerlix, and paper tape  Change every M, W,F and PRN for soilage  Keep leg elevated and avoid pressure on wound.  Sutures removed today        Multiple skin tears                          Clean with vashe  Apply mepitel and  aquacel ag advantage to wound.  Cover and secure with 4x4s, kerlix, and paper tape  Change every M, W,F and PRN for soilage        ZURDO San  Wound Care and Hyperbarics JOSE  [facility]

## 2023-01-05 NOTE — ASSESSMENT & PLAN NOTE
Clean with vashe  Apply mepitel and  aquacel ag advantage to wound.  Cover and secure with 4x4s, kerlix, and paper tape  Change every M, W,F and PRN for soilage

## 2023-01-05 NOTE — ASSESSMENT & PLAN NOTE
Clean with vashe  Apply mepitel and  aquacel ag advantage to wound.  Cover and secure with 4x4s, kerlix, and paper tape  Change every M, W,F and PRN for soilage  Keep leg elevated and avoid pressure on wound.  Sutures removed today

## 2023-01-05 NOTE — PROGRESS NOTES
[facility] Hospital Medicine  Progress Note    Patient Name: Zandra Veloz  MRN: [id number]  Patient Class: IP- Inpatient   Admission Date: 12/29/2022  Length of Stay: 7 days  Attending Physician: Shai Guerra DO  Primary Care Provider: Brandon Thornton MD        Subjective:     Principal Problem:Urinary tract infection        HPI:  HPI:   88-year-old lady seen emergency room department.  Patient presents after having a fall when she was trying to go to the restroom at her home.  Patient sustained a left femur fracture.  Patient complains of moderate to severe pain in the left hip area.  Patient also complains of chest tightness, she rates it a 5/10 in the chest tightness has been intubate.  Patient also was seen in emergency room department earlier this week per her daughter.  Patient is found to have dehydration and a urinary tract infection.  Current she denies any shortness of breath.  She does not give a history of coronary artery disease.        Procedure(s) (LRB):  REVISION, TOTAL ARTHROPLASTY, HIP, VICTORIANO PROSTHETIC FX (Left)       Hospital Course:   12/19 - records reviewed. Fall without LOC and has left hip fx. No other complaints currently. Seen by cardiology with some CP felt musculoskeletal.  Echo mild AS and mod MR with nl EF. Age increase cardiac risk for surgery but discussed with daughter surgery is urgent and see no benefit in delay medically. Feel low to moderate risk for surgery. Question about UTI. No symptoms. Had UTI in 10.22 not surprising. Lab to do culture on urine in lab. Cover with ATN for prior culture with ATB started. Discussed with Dr Singer and cardiology.  12/20 Tachycardia and elevated BP. Cardiology adjusted meds. Plan hold off surgery until 12/22 to adjust meds and treat UTI. Family in room. Pt not sleeping well.   12/21 Didn't sleep well prior night. Apparently been on xanax for a time. Also recently started on Neurontin. Family with  question. No recent BM. Some increase stool on KUB but not severe. Surgery for am. BP some up but better. HR good.   12/22 Seen prior to surgery and apparently add better night. Sore mouth / throat better with mycostatin. For surgery. Family in room.  12/23 patient with pain but otherwise seemed to be doing relatively well.  Daughter in room.  Apparently been taking prednisone for some time and this was restarted per family request.  Look into swing bed placement  12/24 patient had better night rested and everyone's in better spirits today.  Tolerating some diet.   Therapy worked with patient.  Look into swing bed placement next week  12/25 remained in good spirits.  Less pain.  Had good bowel movement and ordered Dulcolax not given.  Because of dressing to leg, Burk was not removed to keep it from getting soiled.  Wound care to check 12/27.  Discuss this with patient and daughter.  On antibiotics for UTI  12/26-will dc to swingbed once accepted. Needs continued wound care.  12/27-DC when bed available  12/28- issues with wound care yesterday.  Dr. Singer had to come remove dressings himself due to adherence to subcutaneous tissue.  Family refusing transfer to [facility] due to lack of specially trained wound care team availability.  Planning to transfer to specialty but resistant to that as well and requesting to speak to administration.    12/29- agreeable to transfer to specialty.  This will be the best place for her to receive wound care.  DC after family tours     12/29-needs continued wound care and therapy[facility]    12/30-doing well today, still with pain         Overview/Hospital Course:  12/31-having some pain this am  1/1- no complaints  1/2- doing well  1/3- continue wound care, last day cefepime tomorrow  1/4- some pain this AM.  Delay in mediations dues to staffing issues. Dsicussed with RN  1/5- no issues overnight      Interval History: naeo    Review of Systems   Constitutional:   Negative for chills, fatigue, fever and unexpected weight change.   HENT:  Negative for congestion, mouth sores and sore throat.    Eyes:  Negative for photophobia and visual disturbance.   Respiratory:  Negative for cough, chest tightness, shortness of breath and wheezing.    Cardiovascular:  Negative for chest pain, palpitations and leg swelling.   Gastrointestinal:  Negative for abdominal pain, diarrhea, nausea and vomiting.   Endocrine: Negative for cold intolerance and heat intolerance.   Genitourinary:  Negative for difficulty urinating, dysuria, frequency and urgency.   Musculoskeletal:  Positive for arthralgias. Negative for back pain and myalgias.   Skin:  Negative for pallor and rash.   Neurological:  Negative for tremors, seizures, syncope, weakness, numbness and headaches.   Hematological:  Does not bruise/bleed easily.   Psychiatric/Behavioral:  Negative for agitation, confusion, hallucinations and suicidal ideas.    Objective:     Vital Signs (Most Recent):  Temp: 96.1 °F (35.6 °C) (01/05/23 0700)  Pulse: (!) 55 (01/05/23 0700)  Resp: 20 (01/05/23 0700)  BP: (!) 92/44 (01/05/23 0700)  SpO2: 96 % (01/05/23 0700)   Vital Signs (24h Range):  Temp:  [96.1 °F (35.6 °C)-98.7 °F (37.1 °C)] 96.1 °F (35.6 °C)  Pulse:  [55-70] 55  Resp:  [18-20] 20  SpO2:  [95 %-97 %] 96 %  BP: ()/(41-79) 92/44     Weight: 59.1 kg (130 lb 3.2 oz)  Body mass index is 21.01 kg/m².    Intake/Output Summary (Last 24 hours) at 1/5/2023 0833  Last data filed at 1/5/2023 0651  Gross per 24 hour   Intake --   Output 1650 ml   Net -1650 ml        Physical Exam  Vitals reviewed.   Constitutional:       Appearance: Normal appearance.   HENT:      Head: Normocephalic and atraumatic.   Eyes:      Extraocular Movements: Extraocular movements intact.   Cardiovascular:      Rate and Rhythm: Normal rate and regular rhythm.      Pulses: Normal pulses.   Pulmonary:      Effort: Pulmonary effort is normal.      Breath sounds: Normal breath  sounds.   Abdominal:      General: Abdomen is flat.      Palpations: Abdomen is soft.   Musculoskeletal:         General: Tenderness and signs of injury present.      Comments: Dressed left lower extremity, painful to touch   Skin:     General: Skin is warm and dry.      Capillary Refill: Capillary refill takes less than 2 seconds.   Neurological:      General: No focal deficit present.      Mental Status: She is alert and oriented to person, place, and time.   Psychiatric:         Mood and Affect: Mood normal.         Behavior: Behavior normal.       Significant Labs: All pertinent labs within the past 24 hours have been reviewed.    Significant Imaging: I have reviewed all pertinent imaging results/findings within the past 24 hours.      Assessment/Plan:      * Urinary tract infection  Treatment completed      Wounds and injuries        Unspecified open wound, left lower leg, initial encounter  Wound care      Skin tear of upper arm without complication, right, initial encounter  Wound care      Delmy-prosthetic fracture around prosthetic hip  S/P fixation with Pomierski      Lumbar radiculopathy  Pain management  PT      Hypertension  Adjust medications as needed      Arthritis  Pain management        VTE Risk Mitigation (From admission, onward)         Ordered     apixaban tablet 2.5 mg  2 times daily         12/29/22 1630                Discharge Planning   YANIV:      Code Status: Full Code   Is the patient medically ready for discharge?:     Reason for patient still in hospital (select all that apply): Treatment  Discharge Plan A: Skilled Nursing Facility                  Shai Guerra DO  Department of Hospital Medicine   [facility]

## 2023-01-05 NOTE — HPI
87 yo female with multiple skin tears, traumatic wounds, and surgical incision to left thigh. She was admitted following a fall on 12/17 when she was trying to go to the restroom at her home.  Patient sustained a left femur fracture. She is status post ORIF on 12/20. Staples intact to left hip. Moderate amount of green drainage noted, cultures obtained today. Left lower leg sutures removed today. Slough noted in wound bed today, adjustment to local wound care. Significant PMH includes hypertension, bradycardia, osteoarthritis, hypothyroidism, osteoporosis, and multiple falls. Wound healing is complicated by bradycardia, hx of a-fib, chronic pain, weakness, fragile skin, limited mobility, multiple falls, infection, and pain.

## 2023-01-05 NOTE — PT/OT/SLP PROGRESS
Occupational Therapy   Treatment    Name: Zandra Veloz  MRN: [id number]  Admitting Diagnosis:  Urinary tract infection       Recommendations:     Discharge Recommendations: rehabilitation facility, nursing facility, skilled  Discharge Equipment Recommendations:   (to be determined)  Barriers to discharge:  None    Assessment:     Zandra Veloz is a 88 y.o. female with a medical diagnosis of Urinary tract infection.  She presents with c/o severe (B) UE and (B) Le pain. Pt is up in chair and pt's sitter states that it is about time for pt to return to bed. Pt is agreeable to transfer back to bed, but declines any other activity Performance deficits affecting function are weakness, impaired endurance, impaired functional mobility, impaired balance, impaired skin, impaired self care skills, pain, orthopedic precautions, decreased ROM.     Rehab Prognosis:  Good; patient would benefit from acute skilled OT services to address these deficits and reach maximum level of function.       Plan:     Patient to be seen 5 x/week to address the above listed problems via self-care/home management, therapeutic activities, therapeutic exercises  Plan of Care Expires: 02/06/23  Plan of Care Reviewed with: patient, caregiver    Subjective     Pain/Comfort:  Pain Rating 1: 9/10  Location - Side 1: Bilateral  Location 1: arm (and legs)  Pain Addressed 1: Reposition, Cessation of Activity  Pain Rating Post-Intervention 1: 8/10    Objective:     Communicated with: CLYDE Hanley prior to session.  Patient found up in chair with peripheral IV, chao catheter upon OT entry to room.    General Precautions: Standard, fall    Orthopedic Precautions:LLE non weight bearing, LLE posterior precautions  Braces: N/A  Respiratory Status: Room air     Occupational Performance:     Bed Mobility:    Patient completed Rolling/Turning to Left with  moderate assistance  Patient completed Rolling/Turning to Right with moderate  assistance  Patient completed Sit to Supine with maximal assistance     Functional Mobility/Transfers:  Patient completed Sit <> Stand Transfer with maximal assistance and of 2 persons  with  gait belt and manual assist   Patient completed Bed <> Chair Transfer using Scoot Pivot technique with maximal assistance and of 2  persons with gait belt and manual assist  Functional Mobility: Pt is NWB to (L) LE, max(A) x2 to stand/ scoot pivot to bed from chair with gait belt and manual assist. Therapist and rehab Judi penaloza were very careful to use belt and not to grasp pt's arms during the transfer    Activities of Daily Living:  NT      [facility] 6 Click ADL: 13    Treatment & Education:  Pt was seen for transfer only. Pt was not able to participate due to severe pain and OT was not able to return to patient again in the PM.    Patient left HOB elevated with all lines intact, call button in reach, bed alarm on, RN Lydia notified, and sitter present    GOALS:   Multidisciplinary Problems       Occupational Therapy Goals          Problem: Occupational Therapy    Goal Priority Disciplines Outcome Interventions   Occupational Therapy Goal     OT, PT/OT Ongoing, Progressing    Description: STG: (In 2 weeks)  Pt will perform grooming with min(A)  Pt will bathe with setup and mod (A)  Pt will perform UE dressing with setup and min(A)  Pt will perform (L) UE AROM to 3/4 full range  Pt will sit EOB x 5 min with CGA assistance  Pt will transfer bed/chair/bsc with mod(A)  Pt will tolerate 20 minutes of tx without fatigue      LT.Restore to max I with self care and mobility.                          Time Tracking:     OT Date of Treatment: 23  OT Start Time: 1051  OT Stop Time: 1108  OT Total Time (min): 17 min    Billable Minutes:Total Time 17 min , but not billable as a significant portion of that time was spent waiting on assistance for performing the transfer.    OT/BRIGHT: OT          2023

## 2023-01-05 NOTE — ASSESSMENT & PLAN NOTE
Cultures today  Clean with vashe  Apply drawtex to wound  Cover and secure with abd pad and paper tape  Change M, W, F and PRN for soilage

## 2023-01-05 NOTE — SUBJECTIVE & OBJECTIVE
Interval History: naeo    Review of Systems   Constitutional:  Negative for chills, fatigue, fever and unexpected weight change.   HENT:  Negative for congestion, mouth sores and sore throat.    Eyes:  Negative for photophobia and visual disturbance.   Respiratory:  Negative for cough, chest tightness, shortness of breath and wheezing.    Cardiovascular:  Negative for chest pain, palpitations and leg swelling.   Gastrointestinal:  Negative for abdominal pain, diarrhea, nausea and vomiting.   Endocrine: Negative for cold intolerance and heat intolerance.   Genitourinary:  Negative for difficulty urinating, dysuria, frequency and urgency.   Musculoskeletal:  Positive for arthralgias. Negative for back pain and myalgias.   Skin:  Negative for pallor and rash.   Neurological:  Negative for tremors, seizures, syncope, weakness, numbness and headaches.   Hematological:  Does not bruise/bleed easily.   Psychiatric/Behavioral:  Negative for agitation, confusion, hallucinations and suicidal ideas.    Objective:     Vital Signs (Most Recent):  Temp: 96.1 °F (35.6 °C) (01/05/23 0700)  Pulse: (!) 55 (01/05/23 0700)  Resp: 20 (01/05/23 0700)  BP: (!) 92/44 (01/05/23 0700)  SpO2: 96 % (01/05/23 0700)   Vital Signs (24h Range):  Temp:  [96.1 °F (35.6 °C)-98.7 °F (37.1 °C)] 96.1 °F (35.6 °C)  Pulse:  [55-70] 55  Resp:  [18-20] 20  SpO2:  [95 %-97 %] 96 %  BP: ()/(41-79) 92/44     Weight: 59.1 kg (130 lb 3.2 oz)  Body mass index is 21.01 kg/m².    Intake/Output Summary (Last 24 hours) at 1/5/2023 0863  Last data filed at 1/5/2023 0651  Gross per 24 hour   Intake --   Output 1650 ml   Net -1650 ml        Physical Exam  Vitals reviewed.   Constitutional:       Appearance: Normal appearance.   HENT:      Head: Normocephalic and atraumatic.   Eyes:      Extraocular Movements: Extraocular movements intact.   Cardiovascular:      Rate and Rhythm: Normal rate and regular rhythm.      Pulses: Normal pulses.   Pulmonary:      Effort:  Pulmonary effort is normal.      Breath sounds: Normal breath sounds.   Abdominal:      General: Abdomen is flat.      Palpations: Abdomen is soft.   Musculoskeletal:         General: Tenderness and signs of injury present.      Comments: Dressed left lower extremity, painful to touch   Skin:     General: Skin is warm and dry.      Capillary Refill: Capillary refill takes less than 2 seconds.   Neurological:      General: No focal deficit present.      Mental Status: She is alert and oriented to person, place, and time.   Psychiatric:         Mood and Affect: Mood normal.         Behavior: Behavior normal.       Significant Labs: All pertinent labs within the past 24 hours have been reviewed.    Significant Imaging: I have reviewed all pertinent imaging results/findings within the past 24 hours.

## 2023-01-05 NOTE — SUBJECTIVE & OBJECTIVE
Subjective:     HPI:  89 yo female with multiple skin tears, traumatic wounds, and surgical incision to left thigh. She was admitted following a fall on 12/17 when she was trying to go to the restroom at her home.  Patient sustained a left femur fracture. She is status post ORIF on 12/20. Staples intact to left hip. Moderate amount of green drainage noted, cultures obtained today. Left lower leg sutures removed today. Slough noted in wound bed today, adjustments to local wound care. Significant PMH includes hypertension, bradycardia, osteoarthritis, hypothyroidism, osteoporosis, and multiple falls. Wound healing is complicated by limited mobility, multiple falls, infection, and pain.     Hospital Course:   1/5/23 - Complains of pain to right posterior knee, venous doppler ordered. Sutures and every other staple removed today. New local wound care orders. Cultures pending.           Scheduled Meds:   ALPRAZolam  1 mg Oral BID    apixaban  2.5 mg Oral BID    bisacodyL  10 mg Oral Once    ceFEPime (MAXIPIME) IVPB  1 g Intravenous Q6H    diltiaZEM  120 mg Oral Daily    docusate sodium  100 mg Oral BID    famotidine  20 mg Oral Daily    gabapentin  100 mg Oral TID    hydrALAZINE  25 mg Oral Q8H    levothyroxine  125 mcg Oral Before breakfast    lisinopriL  20 mg Oral Daily    metoprolol tartrate  50 mg Oral BID    nystatin  500,000 Units Oral QID    pantoprazole  40 mg Oral Daily    polyethylene glycol  34 g Oral BID    predniSONE  5 mg Oral Daily    senna  1 tablet Oral Daily     Continuous Infusions:  PRN Meds:dextrose 50%, dextrose 50%, glucagon (human recombinant), glucose, glucose, HYDROcodone-acetaminophen, ibuprofen, magnesium oxide, melatonin, morphine, naloxone, ondansetron, potassium bicarbonate, potassium bicarbonate, potassium bicarbonate, potassium, sodium phosphates, potassium, sodium phosphates, potassium, sodium phosphates, sodium chloride 0.9%    Review of Systems   Constitutional:  Positive for  activity change. Negative for chills and fever.   Respiratory:  Negative for chest tightness and shortness of breath.    Cardiovascular:  Positive for leg swelling. Negative for chest pain and palpitations.   Musculoskeletal:  Positive for arthralgias, back pain, gait problem and joint swelling.   Skin:  Positive for color change and wound.        wound   Neurological:  Positive for weakness.   Psychiatric/Behavioral:  Negative for agitation, behavioral problems, confusion and self-injury.    Objective:     Vital Signs (Most Recent):  Temp: 98.1 °F (36.7 °C) (01/05/23 1200)  Pulse: (!) 55 (01/05/23 0700)  Resp: 20 (01/05/23 1532)  BP: (!) 133/52 (Nurse Aware) (01/05/23 1200)  SpO2: (!) 94 % (01/05/23 1200)   Vital Signs (24h Range):  Temp:  [96.1 °F (35.6 °C)-98.2 °F (36.8 °C)] 98.1 °F (36.7 °C)  Pulse:  [55-60] 55  Resp:  [16-20] 20  SpO2:  [94 %-96 %] 94 %  BP: ()/(41-58) 133/52     Weight: 59.1 kg (130 lb 3.2 oz)  Body mass index is 21.01 kg/m².    Physical Exam  Vitals reviewed.   Constitutional:       Appearance: Normal appearance.   HENT:      Head: Normocephalic.   Cardiovascular:      Rate and Rhythm: Normal rate and regular rhythm.      Pulses: Normal pulses.      Heart sounds: Murmur heard.   Pulmonary:      Effort: Pulmonary effort is normal.      Breath sounds: Normal breath sounds.   Musculoskeletal:         General: Swelling, tenderness, deformity and signs of injury present.      Right lower leg: Edema present.      Left lower leg: Edema present.   Skin:     Findings: Bruising and erythema present.      Comments: Wound, see LDA for photo/measurements   Neurological:      Mental Status: She is alert. Mental status is at baseline.      Motor: Weakness present.      Gait: Gait abnormal.

## 2023-01-05 NOTE — PROGRESS NOTES
"[facility] [place]  Adult Nutrition  First Assessment Note         Reason for Assessment  Reason For Assessment: length of stay  Nutrition Risk Screen: no indicators present    RD assessment d/t LOS.    Recommend continue current diet order as medically appropriate and tolerated. Recommend adding Ensure Enlive oral nutrition supplement TID to promote adequate oral intake.     Recommend adding Arginaid (modular equivalent to Vamshi, can be used as Vamshi out of stock d/t supply issue) BID to aid in wound healing. Recommend adding 500mg Vit C BID + 220mg ZnSO4 BID + MVI daily to aid in wound healing.    Per MD notes,  "12/29-needs continued wound care and therapy. DC to [facility]  12/30-doing well today, still with pain  12/31-having some pain this am  1/1- no complaints  1/2- doing well  1/3- continue wound care, last day cefepime tomorrow  1/4- some pain this AM.  Delay in mediations dues to staffing issues. Dsicussed with RN  1/5- no issues overnight"    Patient currently receiving Regular Diet since 12/29 with 0% intake documented on 12/30, 25% intake documented on 1/2, and 50% intake documented on 1/3. Recommend adding Ensure Enlive oral nutrition supplement TID to promote adequate oral intake. Ensure Enlive provides 350kcal and 20g protein each.     Patient with multiple wounds. Recommend adding Arginaid (modular equivalent to Vamshi, can be used as Vamshi out of stock d/t supply issue) BID to aid in wound healing. Arginaid provides 30kcal each. Recommend 500mg Vit C BID + 220mg ZnSO4 BID + MVI daily to aid in wound healing.     Patient CBW and BMI considered WNL.    Last BM 1/4 per flowsheets -  pt receiving docusate sodium, senna and polyethylene glycol. Will continue to monitor PO intake, weight trend, meds, labs, updates in patient condition. RD following.    Malnutrition  Is Patient Malnourished: No    Nutrition Diagnosis  Increased protein Needs   related to Wound healing as " evidenced by pt with multiple wounds    Nutrition Diagnosis Status: Chronic/ continues    Nutrition Risk  Level of Risk/Frequency of Follow-up: moderate   Chewing or Swallowing Difficulty?: No Chewing or swallowing difficulty    Estimated/Assessed Needs    Temp: 96.1 °F (35.6 °C)Oral  Weight Used For Calorie Calculations: 59.1 kg (130 lb 4.7 oz)   Energy Need Method: Kcal/kg (35-40 kcal/kg) Energy Calorie Requirements (kcal): 6806-7353 kcal  Weight Used For Protein Calculations: 59.1 kg (130 lb 4.7 oz)  Protein Requirements: 59-118g pro (1.0-2.0g pro/kg d/t geriatric, wounds)  Estimated Fluid Requirement Method: RDA Method    RDA Method (mL): 2069     Nutrition Prescription / Recommendations  Recommendation/Intervention: Recommend continue current diet order as medically appropriate and tolerated. Recommend adding Ensure Enlive oral nutrition supplement TID to promote adequate oral intake. Recommend adding Arginaid (modular equivalent to Vamshi, can be used as Vamshi out of stock d/t supply issue) BID to aid in wound healing. Recommend adding 500mg Vit C BID + 220mg ZnSO4 BID + MVI daily to aid in wound healing.  Goals: PO intake %, weight maintenance  Nutrition Goal Status: new  Current Diet Order: Regular Diet  Nutrition Order Comments: Appropriate  Recommended Diet: Regular  Recommended Oral Supplement: Vamshi [90 kcals, 2.5g Protein, 10g Carbs(3g Sugar), 7g L-Arginine, 7g L-Glutamine, Vitamin C 300mg, 9.5mg Zinc] twice daily and Ensure Enlive [360 kcals, 20g Protein, 44g Carbs(3g Fiber, 20g Sugar), 11g Fat three times daily  Is Nutrition Support Recommended: No  Is Education Recommended: No    Monitor and Evaluation  % current Intake: P.O. intake of 25 - 50 %  % intake to meet estimated needs: 75 - 100 %  Food and Nutrient Intake: energy intake, food and beverage intake  Food and Nutrient Adminstration: diet order  Knowledge/Beliefs/Attitudes: food and nutrition knowledge/skill, beliefs and  "attitudes  Physical Activity and Function: nutrition-related ADLs and IADLs, factors affecting access to physical activity  Anthropometric Measurements: weight, weight change, body mass index  Biochemical Data, Medical Tests and Procedures: electrolyte and renal panel, gastrointestinal profile, glucose/endocrine profile, inflammatory profile, lipid profile  Nutrition-Focused Physical Findings: overall appearance, extremities, muscles and bones, head and eyes, skin     Current Medical Diagnosis and Past Medical History  Diagnosis: other (see comments) (urinary tract infection)  Past Medical History:   Diagnosis Date    Hypertension     Osteoporosis     Thyroid disorder      Nutrition/Diet History  Spiritual, Cultural Beliefs, Advent Practices, Values that Affect Care: no  Food Allergies: NKFA    Lab/Procedures/Meds  Recent Labs   Lab 01/04/23  0731   *   K 5.0   BUN 15   CREATININE 0.74      CALCIUM 8.5        Last A1c: No results found for: HGBA1C  Lab Results   Component Value Date    RBC 3.08 (L) 01/02/2023    HGB 9.3 (L) 01/02/2023    HCT 30.5 (L) 01/02/2023    MCV 99.0 (H) 01/02/2023    MCH 30.2 01/02/2023    MCHC 30.5 (L) 01/02/2023     Pertinent Labs Reviewed: reviewed  Na 134 (L) - replete to WNL as needed, total protein 5.0 (L), albumin 1.7 (L) - possibly r/t reduced PO intake  Pertinent Medications Reviewed: reviewed  Alprazolam, apixaban, bisacodyl, diltiazem, docusate sodium, famotidine, gabapentin, hydralazine, levothyroxine, lisinopril, nystatin, pantoprazole, polyethylene glycol, prednisone, senna, hydrocodone-acetaminophen PRN    Anthropometrics  Temp: 96.1 °F (35.6 °C)  Height: 5' 6" (167.6 cm)  Height (inches): 66 in  Weight Method: Bed Scale  Weight: 59.1 kg (130 lb 3.2 oz)  Weight (lb): 130.2 lb  Ideal Body Weight (IBW), Female: 130 lb  % Ideal Body Weight, Female (lb): 100.15 %  BMI (Calculated): 21     Nutrition by Nursing  Diet/Nutrition Received: regular  Intake (%): " 50%  Diet/Feeding Assistance: tray set-up  Diet/Feeding Tolerance: good  Last Bowel Movement: 01/04/23    Nutrition Follow-Up  RD Follow-up?: Yes

## 2023-01-05 NOTE — PT/OT/SLP PROGRESS
Occupational Therapy      Patient Name:  Zandra Veloz   MRN:  [id number]    Patient not seen today secondary to Patient fatigue(therapist attempted tx  times, 1 st doctor in room talking with pt and family,2nd and 3 rd attempt  wound care doing dressing changes, 4 th attempt pt too fatigued to participate  Will follow-up on next treatment day    1/5/2023

## 2023-01-06 LAB
ANION GAP SERPL CALCULATED.3IONS-SCNC: 10 MMOL/L (ref 7–16)
BASOPHILS # BLD AUTO: 0.14 K/UL (ref 0–0.2)
BASOPHILS NFR BLD AUTO: 1.2 % (ref 0–1)
BUN SERPL-MCNC: 15 MG/DL (ref 7–18)
BUN/CREAT SERPL: 20 (ref 6–20)
CALCIUM SERPL-MCNC: 8.5 MG/DL (ref 8.5–10.1)
CHLORIDE SERPL-SCNC: 102 MMOL/L (ref 98–107)
CO2 SERPL-SCNC: 27 MMOL/L (ref 21–32)
CREAT SERPL-MCNC: 0.75 MG/DL (ref 0.55–1.02)
DIFFERENTIAL METHOD BLD: ABNORMAL
EGFR (NO RACE VARIABLE) (RUSH/TITUS): 77 ML/MIN/1.73M²
EOSINOPHIL # BLD AUTO: 0.29 K/UL (ref 0–0.5)
EOSINOPHIL NFR BLD AUTO: 2.5 % (ref 1–4)
EOSINOPHIL NFR BLD MANUAL: 3 % (ref 1–4)
ERYTHROCYTE [DISTWIDTH] IN BLOOD BY AUTOMATED COUNT: 14.1 % (ref 11.5–14.5)
GLUCOSE SERPL-MCNC: 80 MG/DL (ref 74–106)
HCT VFR BLD AUTO: 29.5 % (ref 38–47)
HGB BLD-MCNC: 8.9 G/DL (ref 12–16)
HYPOCHROMIA BLD QL SMEAR: ABNORMAL
IMM GRANULOCYTES # BLD AUTO: 0.67 K/UL (ref 0–0.04)
IMM GRANULOCYTES NFR BLD: 5.9 % (ref 0–0.4)
LYMPHOCYTES # BLD AUTO: 1.92 K/UL (ref 1–4.8)
LYMPHOCYTES NFR BLD AUTO: 16.9 % (ref 27–41)
LYMPHOCYTES NFR BLD MANUAL: 12 % (ref 27–41)
MACROCYTES BLD QL SMEAR: ABNORMAL
MCH RBC QN AUTO: 30.3 PG (ref 27–31)
MCHC RBC AUTO-ENTMCNC: 30.2 G/DL (ref 32–36)
MCV RBC AUTO: 100.3 FL (ref 80–96)
MONOCYTES # BLD AUTO: 0.87 K/UL (ref 0–0.8)
MONOCYTES NFR BLD AUTO: 7.6 % (ref 2–6)
MONOCYTES NFR BLD MANUAL: 7 % (ref 2–6)
MPC BLD CALC-MCNC: 9.5 FL (ref 9.4–12.4)
NEUTROPHILS # BLD AUTO: 7.49 K/UL (ref 1.8–7.7)
NEUTROPHILS NFR BLD AUTO: 65.9 % (ref 53–65)
NEUTS BAND NFR BLD MANUAL: 2 % (ref 1–5)
NEUTS SEG NFR BLD MANUAL: 76 % (ref 50–62)
NRBC # BLD AUTO: 0 X10E3/UL
NRBC, AUTO (.00): 0 %
PLATELET # BLD AUTO: 459 K/UL (ref 150–400)
PLATELET MORPHOLOGY: ABNORMAL
POTASSIUM SERPL-SCNC: 4.3 MMOL/L (ref 3.5–5.1)
RBC # BLD AUTO: 2.94 M/UL (ref 4.2–5.4)
SODIUM SERPL-SCNC: 135 MMOL/L (ref 136–145)
WBC # BLD AUTO: 11.38 K/UL (ref 4.5–11)

## 2023-01-06 PROCEDURE — 11000001 HC ACUTE MED/SURG PRIVATE ROOM

## 2023-01-06 PROCEDURE — 85025 COMPLETE CBC W/AUTO DIFF WBC: CPT | Performed by: STUDENT IN AN ORGANIZED HEALTH CARE EDUCATION/TRAINING PROGRAM

## 2023-01-06 PROCEDURE — 25000003 PHARM REV CODE 250: Performed by: STUDENT IN AN ORGANIZED HEALTH CARE EDUCATION/TRAINING PROGRAM

## 2023-01-06 PROCEDURE — 80048 BASIC METABOLIC PNL TOTAL CA: CPT | Performed by: STUDENT IN AN ORGANIZED HEALTH CARE EDUCATION/TRAINING PROGRAM

## 2023-01-06 PROCEDURE — 96372 THER/PROPH/DIAG INJ SC/IM: CPT

## 2023-01-06 PROCEDURE — 36415 COLL VENOUS BLD VENIPUNCTURE: CPT | Performed by: STUDENT IN AN ORGANIZED HEALTH CARE EDUCATION/TRAINING PROGRAM

## 2023-01-06 PROCEDURE — 63600175 PHARM REV CODE 636 W HCPCS: Performed by: STUDENT IN AN ORGANIZED HEALTH CARE EDUCATION/TRAINING PROGRAM

## 2023-01-06 PROCEDURE — 99232 SBSQ HOSP IP/OBS MODERATE 35: CPT | Mod: ,,, | Performed by: STUDENT IN AN ORGANIZED HEALTH CARE EDUCATION/TRAINING PROGRAM

## 2023-01-06 PROCEDURE — A6212 FOAM DRG <=16 SQ IN W/BORDER: HCPCS

## 2023-01-06 PROCEDURE — 99232 PR SUBSEQUENT HOSPITAL CARE,LEVL II: ICD-10-PCS | Mod: ,,, | Performed by: STUDENT IN AN ORGANIZED HEALTH CARE EDUCATION/TRAINING PROGRAM

## 2023-01-06 RX ADMIN — HYDROCODONE BITARTRATE AND ACETAMINOPHEN 2 TABLET: 10; 325 TABLET ORAL at 09:01

## 2023-01-06 RX ADMIN — CEFEPIME 1 G: 1 INJECTION, POWDER, FOR SOLUTION INTRAMUSCULAR; INTRAVENOUS at 05:01

## 2023-01-06 RX ADMIN — NYSTATIN 500000 UNITS: 100000 SUSPENSION ORAL at 05:01

## 2023-01-06 RX ADMIN — CEFEPIME 1 G: 1 INJECTION, POWDER, FOR SOLUTION INTRAMUSCULAR; INTRAVENOUS at 12:01

## 2023-01-06 RX ADMIN — HYDRALAZINE HYDROCHLORIDE 25 MG: 25 TABLET ORAL at 05:01

## 2023-01-06 RX ADMIN — GABAPENTIN 100 MG: 100 CAPSULE ORAL at 03:01

## 2023-01-06 RX ADMIN — METOPROLOL TARTRATE 50 MG: 50 TABLET, FILM COATED ORAL at 08:01

## 2023-01-06 RX ADMIN — NYSTATIN 500000 UNITS: 100000 SUSPENSION ORAL at 08:01

## 2023-01-06 RX ADMIN — APIXABAN 2.5 MG: 2.5 TABLET, FILM COATED ORAL at 09:01

## 2023-01-06 RX ADMIN — FAMOTIDINE 20 MG: 20 TABLET ORAL at 09:01

## 2023-01-06 RX ADMIN — LEVOTHYROXINE SODIUM 125 MCG: 0.12 TABLET ORAL at 05:01

## 2023-01-06 RX ADMIN — ALPRAZOLAM 1 MG: 0.25 TABLET ORAL at 08:01

## 2023-01-06 RX ADMIN — ALPRAZOLAM 1 MG: 0.25 TABLET ORAL at 09:01

## 2023-01-06 RX ADMIN — PREDNISONE 5 MG: 5 TABLET ORAL at 09:01

## 2023-01-06 RX ADMIN — HYDROCODONE BITARTRATE AND ACETAMINOPHEN 2 TABLET: 10; 325 TABLET ORAL at 03:01

## 2023-01-06 RX ADMIN — PANTOPRAZOLE SODIUM 40 MG: 40 TABLET, DELAYED RELEASE ORAL at 09:01

## 2023-01-06 RX ADMIN — DILTIAZEM HYDROCHLORIDE 120 MG: 120 CAPSULE, COATED, EXTENDED RELEASE ORAL at 09:01

## 2023-01-06 RX ADMIN — HYDRALAZINE HYDROCHLORIDE 25 MG: 25 TABLET ORAL at 10:01

## 2023-01-06 RX ADMIN — HYDRALAZINE HYDROCHLORIDE 25 MG: 25 TABLET ORAL at 09:01

## 2023-01-06 RX ADMIN — DOCUSATE SODIUM 100 MG: 100 CAPSULE, LIQUID FILLED ORAL at 09:01

## 2023-01-06 RX ADMIN — APIXABAN 2.5 MG: 2.5 TABLET, FILM COATED ORAL at 08:01

## 2023-01-06 RX ADMIN — NYSTATIN 500000 UNITS: 100000 SUSPENSION ORAL at 09:01

## 2023-01-06 RX ADMIN — HYDRALAZINE HYDROCHLORIDE 25 MG: 25 TABLET ORAL at 03:01

## 2023-01-06 RX ADMIN — METOPROLOL TARTRATE 50 MG: 50 TABLET, FILM COATED ORAL at 09:01

## 2023-01-06 RX ADMIN — LISINOPRIL 20 MG: 20 TABLET ORAL at 09:01

## 2023-01-06 RX ADMIN — NYSTATIN 500000 UNITS: 100000 SUSPENSION ORAL at 12:01

## 2023-01-06 RX ADMIN — GABAPENTIN 100 MG: 100 CAPSULE ORAL at 09:01

## 2023-01-06 RX ADMIN — GABAPENTIN 100 MG: 100 CAPSULE ORAL at 08:01

## 2023-01-06 NOTE — PROGRESS NOTES
[facility] Hospital Medicine  Progress Note    Patient Name: Zandra Veloz  MRN: [id number]  Patient Class: IP- Inpatient   Admission Date: 12/29/2022  Length of Stay: 8 days  Attending Physician: Shai Guerra DO  Primary Care Provider: Brandon Thornton MD        Subjective:     Principal Problem:Urinary tract infection        HPI:  HPI:   88-year-old lady seen emergency room department.  Patient presents after having a fall when she was trying to go to the restroom at her home.  Patient sustained a left femur fracture.  Patient complains of moderate to severe pain in the left hip area.  Patient also complains of chest tightness, she rates it a 5/10 in the chest tightness has been intubate.  Patient also was seen in emergency room department earlier this week per her daughter.  Patient is found to have dehydration and a urinary tract infection.  Current she denies any shortness of breath.  She does not give a history of coronary artery disease.        Procedure(s) (LRB):  REVISION, TOTAL ARTHROPLASTY, HIP, VICTORIANO PROSTHETIC FX (Left)       Hospital Course:   12/19 - records reviewed. Fall without LOC and has left hip fx. No other complaints currently. Seen by cardiology with some CP felt musculoskeletal.  Echo mild AS and mod MR with nl EF. Age increase cardiac risk for surgery but discussed with daughter surgery is urgent and see no benefit in delay medically. Feel low to moderate risk for surgery. Question about UTI. No symptoms. Had UTI in 10.22 not surprising. Lab to do culture on urine in lab. Cover with ATN for prior culture with ATB started. Discussed with Dr Singer and cardiology.  12/20 Tachycardia and elevated BP. Cardiology adjusted meds. Plan hold off surgery until 12/22 to adjust meds and treat UTI. Family in room. Pt not sleeping well.   12/21 Didn't sleep well prior night. Apparently been on xanax for a time. Also recently started on Neurontin. Family with  question. No recent BM. Some increase stool on KUB but not severe. Surgery for am. BP some up but better. HR good.   12/22 Seen prior to surgery and apparently add better night. Sore mouth / throat better with mycostatin. For surgery. Family in room.  12/23 patient with pain but otherwise seemed to be doing relatively well.  Daughter in room.  Apparently been taking prednisone for some time and this was restarted per family request.  Look into swing bed placement  12/24 patient had better night rested and everyone's in better spirits today.  Tolerating some diet.   Therapy worked with patient.  Look into swing bed placement next week  12/25 remained in good spirits.  Less pain.  Had good bowel movement and ordered Dulcolax not given.  Because of dressing to leg, Burk was not removed to keep it from getting soiled.  Wound care to check 12/27.  Discuss this with patient and daughter.  On antibiotics for UTI  12/26-will dc to swingbed once accepted. Needs continued wound care.  12/27-DC when bed available  12/28- issues with wound care yesterday.  Dr. Singer had to come remove dressings himself due to adherence to subcutaneous tissue.  Family refusing transfer to [facility] due to lack of specially trained wound care team availability.  Planning to transfer to specialty but resistant to that as well and requesting to speak to administration.    12/29- agreeable to transfer to specialty.  This will be the best place for her to receive wound care.  DC after family tours     12/29-needs continued wound care and therapy[facility]    12/30-doing well today, still with pain         Overview/Hospital Course:  12/31-having some pain this am  1/1- no complaints  1/2- doing well  1/3- continue wound care, last day cefepime tomorrow  1/4- some pain this AM.  Delay in mediations dues to staffing issues. Dsicussed with RN  1/5- no issues overnight  1/6- Still with pain, hip incision looks good      Interval  History: naeo    Review of Systems   Constitutional:  Negative for chills, fatigue, fever and unexpected weight change.   HENT:  Negative for congestion, mouth sores and sore throat.    Eyes:  Negative for photophobia and visual disturbance.   Respiratory:  Negative for cough, chest tightness, shortness of breath and wheezing.    Cardiovascular:  Negative for chest pain, palpitations and leg swelling.   Gastrointestinal:  Negative for abdominal pain, diarrhea, nausea and vomiting.   Endocrine: Negative for cold intolerance and heat intolerance.   Genitourinary:  Negative for difficulty urinating, dysuria, frequency and urgency.   Musculoskeletal:  Positive for arthralgias. Negative for back pain and myalgias.   Skin:  Negative for pallor and rash.   Neurological:  Negative for tremors, seizures, syncope, weakness, numbness and headaches.   Hematological:  Does not bruise/bleed easily.   Psychiatric/Behavioral:  Negative for agitation, confusion, hallucinations and suicidal ideas.    Objective:     Vital Signs (Most Recent):  Temp: 99.1 °F (37.3 °C) (01/06/23 0715)  Pulse: (!) 56 (01/06/23 0715)  Resp: 18 (01/06/23 0907)  BP: (!) 163/63 (01/06/23 0715)  SpO2: 97 % (01/06/23 0715)   Vital Signs (24h Range):  Temp:  [97.5 °F (36.4 °C)-99.1 °F (37.3 °C)] 99.1 °F (37.3 °C)  Pulse:  [56-85] 56  Resp:  [17-20] 18  SpO2:  [95 %-100 %] 97 %  BP: (111-163)/(39-64) 163/63     Weight: 59.1 kg (130 lb 3.2 oz)  Body mass index is 21.01 kg/m².    Intake/Output Summary (Last 24 hours) at 1/6/2023 1401  Last data filed at 1/6/2023 0800  Gross per 24 hour   Intake 240 ml   Output 500 ml   Net -260 ml        Physical Exam  Vitals reviewed.   Constitutional:       Appearance: Normal appearance.   HENT:      Head: Normocephalic and atraumatic.   Eyes:      Extraocular Movements: Extraocular movements intact.   Cardiovascular:      Rate and Rhythm: Normal rate and regular rhythm.      Pulses: Normal pulses.   Pulmonary:      Effort:  Pulmonary effort is normal.      Breath sounds: Normal breath sounds.   Abdominal:      General: Abdomen is flat.      Palpations: Abdomen is soft.   Musculoskeletal:         General: Tenderness and signs of injury present.      Comments: Dressed left lower extremity, painful to touch   Skin:     General: Skin is warm and dry.      Capillary Refill: Capillary refill takes less than 2 seconds.   Neurological:      General: No focal deficit present.      Mental Status: She is alert and oriented to person, place, and time.   Psychiatric:         Mood and Affect: Mood normal.         Behavior: Behavior normal.       Significant Labs: All pertinent labs within the past 24 hours have been reviewed.    Significant Imaging: I have reviewed all pertinent imaging results/findings within the past 24 hours.      Assessment/Plan:      * Urinary tract infection  Treatment completed      Wounds and injuries        Open wound of left lower leg  Wound care      Multiple skin tears  Wound care      Delmy-prosthetic fracture around prosthetic hip  S/P fixation with Pomierski      Lumbar radiculopathy  Pain management  PT      Hypertension  Adjust medications as needed      Arthritis  Pain management        VTE Risk Mitigation (From admission, onward)         Ordered     apixaban tablet 2.5 mg  2 times daily         12/29/22 1630                Discharge Planning   YANIV:      Code Status: Full Code   Is the patient medically ready for discharge?:     Reason for patient still in hospital (select all that apply): Treatment  Discharge Plan A: Skilled Nursing Facility                  Shai Guerra DO  Department of Hospital Medicine   [facility]

## 2023-01-06 NOTE — SUBJECTIVE & OBJECTIVE
Interval History: naeo    Review of Systems   Constitutional:  Negative for chills, fatigue, fever and unexpected weight change.   HENT:  Negative for congestion, mouth sores and sore throat.    Eyes:  Negative for photophobia and visual disturbance.   Respiratory:  Negative for cough, chest tightness, shortness of breath and wheezing.    Cardiovascular:  Negative for chest pain, palpitations and leg swelling.   Gastrointestinal:  Negative for abdominal pain, diarrhea, nausea and vomiting.   Endocrine: Negative for cold intolerance and heat intolerance.   Genitourinary:  Negative for difficulty urinating, dysuria, frequency and urgency.   Musculoskeletal:  Positive for arthralgias. Negative for back pain and myalgias.   Skin:  Negative for pallor and rash.   Neurological:  Negative for tremors, seizures, syncope, weakness, numbness and headaches.   Hematological:  Does not bruise/bleed easily.   Psychiatric/Behavioral:  Negative for agitation, confusion, hallucinations and suicidal ideas.    Objective:     Vital Signs (Most Recent):  Temp: 99.1 °F (37.3 °C) (01/06/23 0715)  Pulse: (!) 56 (01/06/23 0715)  Resp: 18 (01/06/23 0907)  BP: (!) 163/63 (01/06/23 0715)  SpO2: 97 % (01/06/23 0715)   Vital Signs (24h Range):  Temp:  [97.5 °F (36.4 °C)-99.1 °F (37.3 °C)] 99.1 °F (37.3 °C)  Pulse:  [56-85] 56  Resp:  [17-20] 18  SpO2:  [95 %-100 %] 97 %  BP: (111-163)/(39-64) 163/63     Weight: 59.1 kg (130 lb 3.2 oz)  Body mass index is 21.01 kg/m².    Intake/Output Summary (Last 24 hours) at 1/6/2023 1401  Last data filed at 1/6/2023 0800  Gross per 24 hour   Intake 240 ml   Output 500 ml   Net -260 ml        Physical Exam  Vitals reviewed.   Constitutional:       Appearance: Normal appearance.   HENT:      Head: Normocephalic and atraumatic.   Eyes:      Extraocular Movements: Extraocular movements intact.   Cardiovascular:      Rate and Rhythm: Normal rate and regular rhythm.      Pulses: Normal pulses.   Pulmonary:       Effort: Pulmonary effort is normal.      Breath sounds: Normal breath sounds.   Abdominal:      General: Abdomen is flat.      Palpations: Abdomen is soft.   Musculoskeletal:         General: Tenderness and signs of injury present.      Comments: Dressed left lower extremity, painful to touch   Skin:     General: Skin is warm and dry.      Capillary Refill: Capillary refill takes less than 2 seconds.   Neurological:      General: No focal deficit present.      Mental Status: She is alert and oriented to person, place, and time.   Psychiatric:         Mood and Affect: Mood normal.         Behavior: Behavior normal.       Significant Labs: All pertinent labs within the past 24 hours have been reviewed.    Significant Imaging: I have reviewed all pertinent imaging results/findings within the past 24 hours.

## 2023-01-07 LAB — MICROORGANISM SPEC CULT: NO GROWTH

## 2023-01-07 PROCEDURE — 25000003 PHARM REV CODE 250: Performed by: STUDENT IN AN ORGANIZED HEALTH CARE EDUCATION/TRAINING PROGRAM

## 2023-01-07 PROCEDURE — 99232 SBSQ HOSP IP/OBS MODERATE 35: CPT | Mod: ,,, | Performed by: STUDENT IN AN ORGANIZED HEALTH CARE EDUCATION/TRAINING PROGRAM

## 2023-01-07 PROCEDURE — 97110 THERAPEUTIC EXERCISES: CPT

## 2023-01-07 PROCEDURE — 63600175 PHARM REV CODE 636 W HCPCS: Performed by: STUDENT IN AN ORGANIZED HEALTH CARE EDUCATION/TRAINING PROGRAM

## 2023-01-07 PROCEDURE — 11000001 HC ACUTE MED/SURG PRIVATE ROOM

## 2023-01-07 PROCEDURE — 97530 THERAPEUTIC ACTIVITIES: CPT

## 2023-01-07 PROCEDURE — 99232 PR SUBSEQUENT HOSPITAL CARE,LEVL II: ICD-10-PCS | Mod: ,,, | Performed by: STUDENT IN AN ORGANIZED HEALTH CARE EDUCATION/TRAINING PROGRAM

## 2023-01-07 RX ORDER — DICLOFENAC SODIUM 10 MG/G
4 GEL TOPICAL 3 TIMES DAILY PRN
Status: DISCONTINUED | OUTPATIENT
Start: 2023-01-07 | End: 2023-02-20 | Stop reason: HOSPADM

## 2023-01-07 RX ADMIN — HYDROCODONE BITARTRATE AND ACETAMINOPHEN 2 TABLET: 10; 325 TABLET ORAL at 12:01

## 2023-01-07 RX ADMIN — NYSTATIN 500000 UNITS: 100000 SUSPENSION ORAL at 09:01

## 2023-01-07 RX ADMIN — HYDROCODONE BITARTRATE AND ACETAMINOPHEN 2 TABLET: 10; 325 TABLET ORAL at 06:01

## 2023-01-07 RX ADMIN — METOPROLOL TARTRATE 50 MG: 50 TABLET, FILM COATED ORAL at 09:01

## 2023-01-07 RX ADMIN — CEFEPIME 1 G: 1 INJECTION, POWDER, FOR SOLUTION INTRAMUSCULAR; INTRAVENOUS at 12:01

## 2023-01-07 RX ADMIN — ALPRAZOLAM 1 MG: 0.25 TABLET ORAL at 09:01

## 2023-01-07 RX ADMIN — HYDRALAZINE HYDROCHLORIDE 25 MG: 25 TABLET ORAL at 02:01

## 2023-01-07 RX ADMIN — CEFEPIME 1 G: 1 INJECTION, POWDER, FOR SOLUTION INTRAMUSCULAR; INTRAVENOUS at 06:01

## 2023-01-07 RX ADMIN — DOCUSATE SODIUM 100 MG: 100 CAPSULE, LIQUID FILLED ORAL at 09:01

## 2023-01-07 RX ADMIN — GABAPENTIN 100 MG: 100 CAPSULE ORAL at 02:01

## 2023-01-07 RX ADMIN — GABAPENTIN 100 MG: 100 CAPSULE ORAL at 09:01

## 2023-01-07 RX ADMIN — NYSTATIN 500000 UNITS: 100000 SUSPENSION ORAL at 02:01

## 2023-01-07 RX ADMIN — POLYETHYLENE GLYCOL 3350 34 G: 17 POWDER, FOR SOLUTION ORAL at 09:01

## 2023-01-07 RX ADMIN — LEVOTHYROXINE SODIUM 125 MCG: 0.12 TABLET ORAL at 06:01

## 2023-01-07 RX ADMIN — DILTIAZEM HYDROCHLORIDE 120 MG: 120 CAPSULE, COATED, EXTENDED RELEASE ORAL at 09:01

## 2023-01-07 RX ADMIN — APIXABAN 2.5 MG: 2.5 TABLET, FILM COATED ORAL at 09:01

## 2023-01-07 RX ADMIN — NYSTATIN 500000 UNITS: 100000 SUSPENSION ORAL at 05:01

## 2023-01-07 RX ADMIN — LISINOPRIL 20 MG: 20 TABLET ORAL at 09:01

## 2023-01-07 RX ADMIN — HYDRALAZINE HYDROCHLORIDE 25 MG: 25 TABLET ORAL at 09:01

## 2023-01-07 RX ADMIN — HYDRALAZINE HYDROCHLORIDE 25 MG: 25 TABLET ORAL at 06:01

## 2023-01-07 RX ADMIN — PREDNISONE 5 MG: 5 TABLET ORAL at 09:01

## 2023-01-07 RX ADMIN — FAMOTIDINE 20 MG: 20 TABLET ORAL at 09:01

## 2023-01-07 RX ADMIN — PANTOPRAZOLE SODIUM 40 MG: 40 TABLET, DELAYED RELEASE ORAL at 09:01

## 2023-01-07 NOTE — PT/OT/SLP PROGRESS
Physical Therapy Treatment    Patient Name:  Zandra Veloz   MRN:  [id number]    Recommendations:     Discharge Recommendations: rehabilitation facility, nursing facility, skilled  Discharge Equipment Recommendations: other (see comments) (to be determined)  Barriers to discharge:  Ongoing medical treatment    Assessment:     Zandra Veloz is a 88 y.o. female admitted with a medical diagnosis of Urinary tract infection.  She presents with the following impairments/functional limitations: weakness, impaired endurance, impaired self care skills, impaired functional mobility, gait instability, impaired balance, pain, orthopedic precautions, impaired skin, decreased ROM.    Pt was agreeable to PT treatment and able to perform LE exercises with R shoulder pain. Pt demonstrated slightly increased trunk control during bed mobility, EOB static sitting, and transfer. Pt will benefit from an extended rehabilitation program to regain functional mobility and increase her independence.     Rehab Prognosis: Good; patient would benefit from acute skilled PT services to address these deficits and reach maximum level of function.    Recent Surgery: * No surgery found *      Plan:     During this hospitalization, patient to be seen 5 x/week to address the identified rehab impairments via gait training, therapeutic activities, therapeutic exercises, neuromuscular re-education and progress toward the following goals:    Plan of Care Expires:  01/30/23    Subjective     Chief Complaint: Left hip endo.  Patient/Family Comments/goals: Pt stated her pain was in her shoulder  Pain/Comfort:8/10 R shoulder         Objective:     Communicated with RN prior to session.  Patient found supine with peripheral IV, chao catheter upon PT entry to room.     General Precautions: Standard, fall  Orthopedic Precautions: LLE non weight bearing, LLE posterior precautions  Braces: N/A  Respiratory Status: Room air     Functional  Mobility:  Bed Mobility:     Supine to Sit: moderate and maximal assistance and of 2 persons  Transfers:     Bed to Chair: moderate assistance and of 2 persons with  slide board  using  pad and verbal/visual/tactile cueing for hand and foot placement      AM-PAC 6 CLICK MOBILITY          Treatment & Education:  Transfers as above    Bilateral lower extremity exercise x 15 reps: ankle pumps, Quad sets, glut sets, short arc quads, heel slides, and clams  with active assist ROM and LLE     Patient left up in chair with all lines intact, call button in reach, RN notified, and caregiver and daughter present.    GOALS:   Multidisciplinary Problems       Physical Therapy Goals          Problem: Physical Therapy    Goal Priority Disciplines Outcome Goal Variances Interventions   Physical Therapy Goal     PT, PT/OT Ongoing, Progressing     Description: Short Term Goals to be met by 1/10/2023    Patient will increase functional independence and safety with mobility by performing    1.   Rolling right Moderate Assist; roll left Moderate Assist  2.   Supine to sit Moderate Assist  3.   Sitting balance edge of the bed x 15 minutes Stand by assist  4.   Sit to stand Moderate Assist  using manual assistance with gait belt and NWB L LE  5.   Bed to chair Moderate Assist using manual assistance with gait belt and NWB left LE  6.   Lower extremity exercises x 30 reps with assist as necessary and no rest breaks      Long Term Goals to be met by 2/30/2023    Patient to require less than or equal to Stand by assist for functional mobility to ease caregiver burden                        Time Tracking:     PT Received On: 01/07/23  PT Start Time: 1046     PT Stop Time: 1118  PT Total Time (min): 32 min     Billable Minutes: Therapeutic Activity 12 minutes and Therapeutic Exercise 20 minutes    Treatment Type: Treatment  PT/PTA: PT     PTA Visit Number: 0     01/07/2023

## 2023-01-07 NOTE — PROGRESS NOTES
[facility] Hospital Medicine  Progress Note    Patient Name: Zandra Veloz  MRN: [id number]  Patient Class: IP- Inpatient   Admission Date: 12/29/2022  Length of Stay: 9 days  Attending Physician: Shai Guerra DO  Primary Care Provider: Brandon Thornton MD        Subjective:     Principal Problem:Urinary tract infection        HPI:  HPI:   88-year-old lady seen emergency room department.  Patient presents after having a fall when she was trying to go to the restroom at her home.  Patient sustained a left femur fracture.  Patient complains of moderate to severe pain in the left hip area.  Patient also complains of chest tightness, she rates it a 5/10 in the chest tightness has been intubate.  Patient also was seen in emergency room department earlier this week per her daughter.  Patient is found to have dehydration and a urinary tract infection.  Current she denies any shortness of breath.  She does not give a history of coronary artery disease.        Procedure(s) (LRB):  REVISION, TOTAL ARTHROPLASTY, HIP, VICTORIANO PROSTHETIC FX (Left)       Hospital Course:   12/19 - records reviewed. Fall without LOC and has left hip fx. No other complaints currently. Seen by cardiology with some CP felt musculoskeletal.  Echo mild AS and mod MR with nl EF. Age increase cardiac risk for surgery but discussed with daughter surgery is urgent and see no benefit in delay medically. Feel low to moderate risk for surgery. Question about UTI. No symptoms. Had UTI in 10.22 not surprising. Lab to do culture on urine in lab. Cover with ATN for prior culture with ATB started. Discussed with Dr Singer and cardiology.  12/20 Tachycardia and elevated BP. Cardiology adjusted meds. Plan hold off surgery until 12/22 to adjust meds and treat UTI. Family in room. Pt not sleeping well.   12/21 Didn't sleep well prior night. Apparently been on xanax for a time. Also recently started on Neurontin. Family with  question. No recent BM. Some increase stool on KUB but not severe. Surgery for am. BP some up but better. HR good.   12/22 Seen prior to surgery and apparently add better night. Sore mouth / throat better with mycostatin. For surgery. Family in room.  12/23 patient with pain but otherwise seemed to be doing relatively well.  Daughter in room.  Apparently been taking prednisone for some time and this was restarted per family request.  Look into swing bed placement  12/24 patient had better night rested and everyone's in better spirits today.  Tolerating some diet.   Therapy worked with patient.  Look into swing bed placement next week  12/25 remained in good spirits.  Less pain.  Had good bowel movement and ordered Dulcolax not given.  Because of dressing to leg, Burk was not removed to keep it from getting soiled.  Wound care to check 12/27.  Discuss this with patient and daughter.  On antibiotics for UTI  12/26-will dc to swingbed once accepted. Needs continued wound care.  12/27-DC when bed available  12/28- issues with wound care yesterday.  Dr. Singer had to come remove dressings himself due to adherence to subcutaneous tissue.  Family refusing transfer to [facility] due to lack of specially trained wound care team availability.  Planning to transfer to specialty but resistant to that as well and requesting to speak to administration.    12/29- agreeable to transfer to specialty.  This will be the best place for her to receive wound care.  DC after family tours     12/29-needs continued wound care and therapy[facility]    12/30-doing well today, still with pain         Overview/Hospital Course:  12/31-having some pain this am  1/1- no complaints  1/2- doing well  1/3- continue wound care, last day cefepime tomorrow  1/4- some pain this AM.  Delay in mediations dues to staffing issues. Dsicussed with RN  1/5- no issues overnight  1/6- Still with pain, hip incision looks good  1/7-diclofenac for  shoulder pain      Interval History: naeo    Review of Systems   Constitutional:  Negative for chills, fatigue, fever and unexpected weight change.   HENT:  Negative for congestion, mouth sores and sore throat.    Eyes:  Negative for photophobia and visual disturbance.   Respiratory:  Negative for cough, chest tightness, shortness of breath and wheezing.    Cardiovascular:  Negative for chest pain, palpitations and leg swelling.   Gastrointestinal:  Negative for abdominal pain, diarrhea, nausea and vomiting.   Endocrine: Negative for cold intolerance and heat intolerance.   Genitourinary:  Negative for difficulty urinating, dysuria, frequency and urgency.   Musculoskeletal:  Positive for arthralgias. Negative for back pain and myalgias.   Skin:  Negative for pallor and rash.   Neurological:  Negative for tremors, seizures, syncope, weakness, numbness and headaches.   Hematological:  Does not bruise/bleed easily.   Psychiatric/Behavioral:  Negative for agitation, confusion, hallucinations and suicidal ideas.    Objective:     Vital Signs (Most Recent):  Temp: 98.3 °F (36.8 °C) (01/07/23 1200)  Pulse: 78 (01/07/23 1200)  Resp: 18 (01/07/23 1215)  BP: (!) 141/57 (01/07/23 1200)  SpO2: 96 % (01/07/23 1200)   Vital Signs (24h Range):  Temp:  [97.3 °F (36.3 °C)-98.5 °F (36.9 °C)] 98.3 °F (36.8 °C)  Pulse:  [53-78] 78  Resp:  [18-19] 18  SpO2:  [96 %-97 %] 96 %  BP: (105-144)/(49-77) 141/57     Weight: 59.1 kg (130 lb 3.2 oz)  Body mass index is 21.01 kg/m².    Intake/Output Summary (Last 24 hours) at 1/7/2023 1438  Last data filed at 1/7/2023 1245  Gross per 24 hour   Intake 550 ml   Output --   Net 550 ml        Physical Exam  Vitals reviewed.   Constitutional:       Appearance: Normal appearance.   HENT:      Head: Normocephalic and atraumatic.   Eyes:      Extraocular Movements: Extraocular movements intact.   Cardiovascular:      Rate and Rhythm: Normal rate and regular rhythm.      Pulses: Normal pulses.   Pulmonary:       Effort: Pulmonary effort is normal.      Breath sounds: Normal breath sounds.   Abdominal:      General: Abdomen is flat.      Palpations: Abdomen is soft.   Musculoskeletal:         General: Tenderness and signs of injury present.      Comments: Dressed left lower extremity, painful to touch   Skin:     General: Skin is warm and dry.      Capillary Refill: Capillary refill takes less than 2 seconds.   Neurological:      General: No focal deficit present.      Mental Status: She is alert and oriented to person, place, and time.   Psychiatric:         Mood and Affect: Mood normal.         Behavior: Behavior normal.       Significant Labs: All pertinent labs within the past 24 hours have been reviewed.    Significant Imaging: I have reviewed all pertinent imaging results/findings within the past 24 hours.      Assessment/Plan:      * Urinary tract infection  Treatment completed      Disruption of external surgical wound        Wounds and injuries        Open wound of left lower leg  Wound care      Multiple skin tears  Wound care      Delmy-prosthetic fracture around prosthetic hip  S/P fixation with Pomierski      Lumbar radiculopathy  Pain management  PT      Hypertension  Adjust medications as needed      Arthritis  Pain management        VTE Risk Mitigation (From admission, onward)         Ordered     apixaban tablet 2.5 mg  2 times daily         12/29/22 1630                Discharge Planning   YANIV:      Code Status: Full Code   Is the patient medically ready for discharge?:     Reason for patient still in hospital (select all that apply): Treatment  Discharge Plan A: Skilled Nursing Facility                  Shai Guerra DO  Department of Hospital Medicine   [facility]

## 2023-01-07 NOTE — PLAN OF CARE
Problem: Adult Inpatient Plan of Care  Goal: Plan of Care Review  Outcome: Ongoing, Progressing  Goal: Patient-Specific Goal (Individualized)  Outcome: Ongoing, Progressing  Goal: Absence of Hospital-Acquired Illness or Injury  Outcome: Ongoing, Progressing  Goal: Optimal Comfort and Wellbeing  Outcome: Ongoing, Progressing  Goal: Readiness for Transition of Care  Outcome: Ongoing, Progressing     Problem: Impaired Wound Healing  Goal: Optimal Wound Healing  Outcome: Ongoing, Progressing     Problem: Skin Injury Risk Increased  Goal: Skin Health and Integrity  Outcome: Ongoing, Progressing     Problem: Infection  Goal: Absence of Infection Signs and Symptoms  Outcome: Ongoing, Progressing     Problem: Fall Injury Risk  Goal: Absence of Fall and Fall-Related Injury  Outcome: Ongoing, Progressing     Problem: Skin Injury Risk Increased  Goal: Skin Health and Integrity  Outcome: Ongoing, Progressing

## 2023-01-07 NOTE — PT/OT/SLP PROGRESS
Occupational Therapy   Treatment    Name: Zandra Veloz  MRN: [id number]  Admitting Diagnosis:  Urinary tract infection       Recommendations:     Discharge Recommendations: rehabilitation facility, nursing facility, skilled  Discharge Equipment Recommendations:   (to be determined)  Barriers to discharge:  None    Assessment:     Zandra Veloz is a 88 y.o. female with a medical diagnosis of Urinary tract infection.  She presents with complaint of (R) shoulder pain.Pt agreed to OT treatment. Performance deficits affecting function are weakness, impaired endurance, impaired functional mobility, decreased upper extremity function, decreased lower extremity function, pain, orthopedic precautions, impaired self care skills.     Rehab Prognosis:  Good; patient would benefit from acute skilled OT services to address these deficits and reach maximum level of function.       Plan:     Patient to be seen 5 x/week to address the above listed problems via therapeutic activities, therapeutic exercises  Plan of Care Expires: 02/06/23  Plan of Care Reviewed with: patient, caregiver, daughter    Subjective     Pain/Comfort:  Pain Rating 1: 8/10  Location - Side 1: Right  Location 1: shoulder  Pain Addressed 1: Reposition, Distraction  Pain Rating Post-Intervention 1: 8/10    Objective:     Communicated with: LESLEE Castaneda prior to session.  Patient found HOB elevated with chao catheter, peripheral IV upon OT entry to room.    General Precautions: Standard, fall    Orthopedic Precautions:LLE non weight bearing, LLE posterior precautions  Braces: N/A  Respiratory Status: Room air     Occupational Performance:     Bed Mobility:    Patient completed Supine to Sit with maximal assistance and x 2 persons     Functional Mobility/Transfers:  Patient completed Bed <> Chair Transfer using Slide Board technique with moderate assistance and of 2 persons with chair pad to assist  Functional Mobility:  NT    Activities of Daily Living:        [facility] 6 Click ADL:      Treatment & Education:  Pt performed UE ROM strengthening exercises. AAROM x 2 sets of 15 reps (R) shoulder for flexion/extension and adduction/abduction. 1 lb hand wt x 2 sets of 15 reps (L) shoulder flexion/extension and adduction/abduction and wrist flexion/extension with CGA. 2 lb hand wt x 2 sets of 15 reps (B) elbow flexion/extension. Red theraband x 2 sets of 10 reps (B) elbow extension.  Pt participated well with exercises with rest breaks provided as needed.    Patient left up in chair with all lines intact, call button in reach, and daughter and sitter present    GOALS:   Multidisciplinary Problems       Occupational Therapy Goals          Problem: Occupational Therapy    Goal Priority Disciplines Outcome Interventions   Occupational Therapy Goal     OT, PT/OT Ongoing, Progressing    Description: STG: (In 2 weeks)  Pt will perform grooming with min(A)  Pt will bathe with setup and mod (A)  Pt will perform UE dressing with setup and min(A)  Pt will perform (L) UE AROM to 3/4 full range  Pt will sit EOB x 5 min with CGA assistance  Pt will transfer bed/chair/bsc with mod(A)  Pt will tolerate 20 minutes of tx without fatigue      LT.Restore to max I with self care and mobility.                          Time Tracking:     OT Date of Treatment: 23  OT Start Time: 1105  OT Stop Time: 1137  OT Total Time (min): 32 min    Billable Minutes:Therapeutic Activity 12  Therapeutic Exercise 15    OT/BRIGHT: OT          2023

## 2023-01-07 NOTE — SUBJECTIVE & OBJECTIVE
Interval History: naeo    Review of Systems   Constitutional:  Negative for chills, fatigue, fever and unexpected weight change.   HENT:  Negative for congestion, mouth sores and sore throat.    Eyes:  Negative for photophobia and visual disturbance.   Respiratory:  Negative for cough, chest tightness, shortness of breath and wheezing.    Cardiovascular:  Negative for chest pain, palpitations and leg swelling.   Gastrointestinal:  Negative for abdominal pain, diarrhea, nausea and vomiting.   Endocrine: Negative for cold intolerance and heat intolerance.   Genitourinary:  Negative for difficulty urinating, dysuria, frequency and urgency.   Musculoskeletal:  Positive for arthralgias. Negative for back pain and myalgias.   Skin:  Negative for pallor and rash.   Neurological:  Negative for tremors, seizures, syncope, weakness, numbness and headaches.   Hematological:  Does not bruise/bleed easily.   Psychiatric/Behavioral:  Negative for agitation, confusion, hallucinations and suicidal ideas.    Objective:     Vital Signs (Most Recent):  Temp: 98.3 °F (36.8 °C) (01/07/23 1200)  Pulse: 78 (01/07/23 1200)  Resp: 18 (01/07/23 1215)  BP: (!) 141/57 (01/07/23 1200)  SpO2: 96 % (01/07/23 1200)   Vital Signs (24h Range):  Temp:  [97.3 °F (36.3 °C)-98.5 °F (36.9 °C)] 98.3 °F (36.8 °C)  Pulse:  [53-78] 78  Resp:  [18-19] 18  SpO2:  [96 %-97 %] 96 %  BP: (105-144)/(49-77) 141/57     Weight: 59.1 kg (130 lb 3.2 oz)  Body mass index is 21.01 kg/m².    Intake/Output Summary (Last 24 hours) at 1/7/2023 1438  Last data filed at 1/7/2023 1245  Gross per 24 hour   Intake 550 ml   Output --   Net 550 ml        Physical Exam  Vitals reviewed.   Constitutional:       Appearance: Normal appearance.   HENT:      Head: Normocephalic and atraumatic.   Eyes:      Extraocular Movements: Extraocular movements intact.   Cardiovascular:      Rate and Rhythm: Normal rate and regular rhythm.      Pulses: Normal pulses.   Pulmonary:      Effort:  Pulmonary effort is normal.      Breath sounds: Normal breath sounds.   Abdominal:      General: Abdomen is flat.      Palpations: Abdomen is soft.   Musculoskeletal:         General: Tenderness and signs of injury present.      Comments: Dressed left lower extremity, painful to touch   Skin:     General: Skin is warm and dry.      Capillary Refill: Capillary refill takes less than 2 seconds.   Neurological:      General: No focal deficit present.      Mental Status: She is alert and oriented to person, place, and time.   Psychiatric:         Mood and Affect: Mood normal.         Behavior: Behavior normal.       Significant Labs: All pertinent labs within the past 24 hours have been reviewed.    Significant Imaging: I have reviewed all pertinent imaging results/findings within the past 24 hours.

## 2023-01-08 LAB — BACTERIA SPEC ANAEROBE CULT: NORMAL

## 2023-01-08 PROCEDURE — 11000001 HC ACUTE MED/SURG PRIVATE ROOM

## 2023-01-08 PROCEDURE — 25000003 PHARM REV CODE 250: Performed by: STUDENT IN AN ORGANIZED HEALTH CARE EDUCATION/TRAINING PROGRAM

## 2023-01-08 PROCEDURE — 99232 SBSQ HOSP IP/OBS MODERATE 35: CPT | Mod: ,,, | Performed by: STUDENT IN AN ORGANIZED HEALTH CARE EDUCATION/TRAINING PROGRAM

## 2023-01-08 PROCEDURE — 63600175 PHARM REV CODE 636 W HCPCS: Performed by: STUDENT IN AN ORGANIZED HEALTH CARE EDUCATION/TRAINING PROGRAM

## 2023-01-08 PROCEDURE — 99232 PR SUBSEQUENT HOSPITAL CARE,LEVL II: ICD-10-PCS | Mod: ,,, | Performed by: STUDENT IN AN ORGANIZED HEALTH CARE EDUCATION/TRAINING PROGRAM

## 2023-01-08 PROCEDURE — 97110 THERAPEUTIC EXERCISES: CPT

## 2023-01-08 RX ORDER — POLYETHYLENE GLYCOL 3350 17 G/17G
34 POWDER, FOR SOLUTION ORAL DAILY
Status: DISCONTINUED | OUTPATIENT
Start: 2023-01-09 | End: 2023-01-25

## 2023-01-08 RX ORDER — GABAPENTIN 100 MG/1
100 CAPSULE ORAL 3 TIMES DAILY PRN
Status: DISCONTINUED | OUTPATIENT
Start: 2023-01-08 | End: 2023-01-09

## 2023-01-08 RX ORDER — ALPRAZOLAM 0.25 MG/1
1 TABLET ORAL 2 TIMES DAILY PRN
Status: DISCONTINUED | OUTPATIENT
Start: 2023-01-08 | End: 2023-01-09

## 2023-01-08 RX ADMIN — CEFEPIME 1 G: 1 INJECTION, POWDER, FOR SOLUTION INTRAMUSCULAR; INTRAVENOUS at 06:01

## 2023-01-08 RX ADMIN — GABAPENTIN 100 MG: 100 CAPSULE ORAL at 08:01

## 2023-01-08 RX ADMIN — PANTOPRAZOLE SODIUM 40 MG: 40 TABLET, DELAYED RELEASE ORAL at 08:01

## 2023-01-08 RX ADMIN — PREDNISONE 5 MG: 5 TABLET ORAL at 08:01

## 2023-01-08 RX ADMIN — NYSTATIN 500000 UNITS: 100000 SUSPENSION ORAL at 08:01

## 2023-01-08 RX ADMIN — CEFEPIME 1 G: 1 INJECTION, POWDER, FOR SOLUTION INTRAMUSCULAR; INTRAVENOUS at 12:01

## 2023-01-08 RX ADMIN — LEVOTHYROXINE SODIUM 125 MCG: 0.12 TABLET ORAL at 06:01

## 2023-01-08 RX ADMIN — HYDRALAZINE HYDROCHLORIDE 25 MG: 25 TABLET ORAL at 09:01

## 2023-01-08 RX ADMIN — CEFEPIME 1 G: 1 INJECTION, POWDER, FOR SOLUTION INTRAMUSCULAR; INTRAVENOUS at 05:01

## 2023-01-08 RX ADMIN — LISINOPRIL 20 MG: 20 TABLET ORAL at 08:01

## 2023-01-08 RX ADMIN — HYDROCODONE BITARTRATE AND ACETAMINOPHEN 2 TABLET: 10; 325 TABLET ORAL at 07:01

## 2023-01-08 RX ADMIN — DILTIAZEM HYDROCHLORIDE 120 MG: 120 CAPSULE, COATED, EXTENDED RELEASE ORAL at 08:01

## 2023-01-08 RX ADMIN — HYDRALAZINE HYDROCHLORIDE 25 MG: 25 TABLET ORAL at 06:01

## 2023-01-08 RX ADMIN — APIXABAN 2.5 MG: 2.5 TABLET, FILM COATED ORAL at 08:01

## 2023-01-08 RX ADMIN — NYSTATIN 500000 UNITS: 100000 SUSPENSION ORAL at 09:01

## 2023-01-08 RX ADMIN — FAMOTIDINE 20 MG: 20 TABLET ORAL at 08:01

## 2023-01-08 RX ADMIN — METOPROLOL TARTRATE 50 MG: 50 TABLET, FILM COATED ORAL at 09:01

## 2023-01-08 RX ADMIN — NYSTATIN 500000 UNITS: 100000 SUSPENSION ORAL at 05:01

## 2023-01-08 RX ADMIN — SENNOSIDES 1 TABLET: 8.6 TABLET, FILM COATED ORAL at 08:01

## 2023-01-08 RX ADMIN — HYDROCODONE BITARTRATE AND ACETAMINOPHEN 2 TABLET: 10; 325 TABLET ORAL at 08:01

## 2023-01-08 RX ADMIN — NYSTATIN 500000 UNITS: 100000 SUSPENSION ORAL at 12:01

## 2023-01-08 RX ADMIN — APIXABAN 2.5 MG: 2.5 TABLET, FILM COATED ORAL at 09:01

## 2023-01-08 RX ADMIN — ALPRAZOLAM 1 MG: 0.25 TABLET ORAL at 08:01

## 2023-01-08 RX ADMIN — DICLOFENAC SODIUM 4 G: 10 GEL TOPICAL at 12:01

## 2023-01-08 RX ADMIN — DOCUSATE SODIUM 100 MG: 100 CAPSULE, LIQUID FILLED ORAL at 08:01

## 2023-01-08 RX ADMIN — METOPROLOL TARTRATE 50 MG: 50 TABLET, FILM COATED ORAL at 08:01

## 2023-01-08 NOTE — PROGRESS NOTES
[facility] Hospital Medicine  Progress Note    Patient Name: Zandra Veloz  MRN: [id number]  Patient Class: IP- Inpatient   Admission Date: 12/29/2022  Length of Stay: 10 days  Attending Physician: Shai Guerra DO  Primary Care Provider: Brandon Thornton MD        Subjective:     Principal Problem:Urinary tract infection        HPI:  HPI:   88-year-old lady seen emergency room department.  Patient presents after having a fall when she was trying to go to the restroom at her home.  Patient sustained a left femur fracture.  Patient complains of moderate to severe pain in the left hip area.  Patient also complains of chest tightness, she rates it a 5/10 in the chest tightness has been intubate.  Patient also was seen in emergency room department earlier this week per her daughter.  Patient is found to have dehydration and a urinary tract infection.  Current she denies any shortness of breath.  She does not give a history of coronary artery disease.        Procedure(s) (LRB):  REVISION, TOTAL ARTHROPLASTY, HIP, VICTORIANO PROSTHETIC FX (Left)       Hospital Course:   12/19 - records reviewed. Fall without LOC and has left hip fx. No other complaints currently. Seen by cardiology with some CP felt musculoskeletal.  Echo mild AS and mod MR with nl EF. Age increase cardiac risk for surgery but discussed with daughter surgery is urgent and see no benefit in delay medically. Feel low to moderate risk for surgery. Question about UTI. No symptoms. Had UTI in 10.22 not surprising. Lab to do culture on urine in lab. Cover with ATN for prior culture with ATB started. Discussed with Dr Singer and cardiology.  12/20 Tachycardia and elevated BP. Cardiology adjusted meds. Plan hold off surgery until 12/22 to adjust meds and treat UTI. Family in room. Pt not sleeping well.   12/21 Didn't sleep well prior night. Apparently been on xanax for a time. Also recently started on Neurontin. Family with  question. No recent BM. Some increase stool on KUB but not severe. Surgery for am. BP some up but better. HR good.   12/22 Seen prior to surgery and apparently add better night. Sore mouth / throat better with mycostatin. For surgery. Family in room.  12/23 patient with pain but otherwise seemed to be doing relatively well.  Daughter in room.  Apparently been taking prednisone for some time and this was restarted per family request.  Look into swing bed placement  12/24 patient had better night rested and everyone's in better spirits today.  Tolerating some diet.   Therapy worked with patient.  Look into swing bed placement next week  12/25 remained in good spirits.  Less pain.  Had good bowel movement and ordered Dulcolax not given.  Because of dressing to leg, Burk was not removed to keep it from getting soiled.  Wound care to check 12/27.  Discuss this with patient and daughter.  On antibiotics for UTI  12/26-will dc to swingbed once accepted. Needs continued wound care.  12/27-DC when bed available  12/28- issues with wound care yesterday.  Dr. Singer had to come remove dressings himself due to adherence to subcutaneous tissue.  Family refusing transfer to [facility] due to lack of specially trained wound care team availability.  Planning to transfer to specialty but resistant to that as well and requesting to speak to administration.    12/29- agreeable to transfer to specialty.  This will be the best place for her to receive wound care.  DC after family tours     12/29-needs continued wound care and therapy[facility]    12/30-doing well today, still with pain         Overview/Hospital Course:  12/31-having some pain this am  1/1- no complaints  1/2- doing well  1/3- continue wound care, last day cefepime tomorrow  1/4- some pain this AM.  Delay in mediations dues to staffing issues. Dsicussed with RN  1/5- no issues overnight  1/6- Still with pain, hip incision looks good  1/7-diclofenac for  shoulder pain  1/8-decrease laxatives, change benzos and carlo to PRN per patient request      Interval History: naeo    Review of Systems   Constitutional:  Negative for chills, fatigue, fever and unexpected weight change.   HENT:  Negative for congestion, mouth sores and sore throat.    Eyes:  Negative for photophobia and visual disturbance.   Respiratory:  Negative for cough, chest tightness, shortness of breath and wheezing.    Cardiovascular:  Negative for chest pain, palpitations and leg swelling.   Gastrointestinal:  Negative for abdominal pain, diarrhea, nausea and vomiting.   Endocrine: Negative for cold intolerance and heat intolerance.   Genitourinary:  Negative for difficulty urinating, dysuria, frequency and urgency.   Musculoskeletal:  Positive for arthralgias. Negative for back pain and myalgias.   Skin:  Negative for pallor and rash.   Neurological:  Negative for tremors, seizures, syncope, weakness, numbness and headaches.   Hematological:  Does not bruise/bleed easily.   Psychiatric/Behavioral:  Negative for agitation, confusion, hallucinations and suicidal ideas.    Objective:     Vital Signs (Most Recent):  Temp: 98.9 °F (37.2 °C) (01/08/23 0735)  Pulse: (!) 58 (01/08/23 0735)  Resp: 18 (01/08/23 0827)  BP: (!) 145/46 (01/08/23 0735)  SpO2: 95 % (01/08/23 0735)   Vital Signs (24h Range):  Temp:  [97.4 °F (36.3 °C)-98.9 °F (37.2 °C)] 98.9 °F (37.2 °C)  Pulse:  [52-58] 58  Resp:  [17-20] 18  SpO2:  [95 %-97 %] 95 %  BP: ()/(40-54) 145/46     Weight: 59.1 kg (130 lb 3.2 oz)  Body mass index is 21.01 kg/m².    Intake/Output Summary (Last 24 hours) at 1/8/2023 1228  Last data filed at 1/8/2023 0735  Gross per 24 hour   Intake 100 ml   Output 1651 ml   Net -1551 ml        Physical Exam  Vitals reviewed.   Constitutional:       Appearance: Normal appearance.   HENT:      Head: Normocephalic and atraumatic.   Eyes:      Extraocular Movements: Extraocular movements intact.   Cardiovascular:      Rate  and Rhythm: Normal rate and regular rhythm.      Pulses: Normal pulses.   Pulmonary:      Effort: Pulmonary effort is normal.      Breath sounds: Normal breath sounds.   Abdominal:      General: Abdomen is flat.      Palpations: Abdomen is soft.   Musculoskeletal:         General: Tenderness and signs of injury present.      Comments: Dressed left lower extremity, painful to touch   Skin:     General: Skin is warm and dry.      Capillary Refill: Capillary refill takes less than 2 seconds.   Neurological:      General: No focal deficit present.      Mental Status: She is alert and oriented to person, place, and time.   Psychiatric:         Mood and Affect: Mood normal.         Behavior: Behavior normal.       Significant Labs: All pertinent labs within the past 24 hours have been reviewed.    Significant Imaging: I have reviewed all pertinent imaging results/findings within the past 24 hours.      Assessment/Plan:      * Urinary tract infection  Treatment completed      Disruption of external surgical wound        Wounds and injuries        Open wound of left lower leg  Wound care      Multiple skin tears  Wound care      Delmy-prosthetic fracture around prosthetic hip  S/P fixation with Pomierski      Lumbar radiculopathy  Pain management  PT      Hypertension  Adjust medications as needed      Arthritis  Pain management        VTE Risk Mitigation (From admission, onward)         Ordered     apixaban tablet 2.5 mg  2 times daily         12/29/22 1630                Discharge Planning   YANIV:      Code Status: Full Code   Is the patient medically ready for discharge?:     Reason for patient still in hospital (select all that apply): Treatment  Discharge Plan A: Skilled Nursing Facility                  Shai Guerra DO  Department of Hospital Medicine   [facility]

## 2023-01-08 NOTE — PLAN OF CARE
Problem: Adult Inpatient Plan of Care  Goal: Plan of Care Review  Outcome: Ongoing, Progressing  Goal: Optimal Comfort and Wellbeing  Outcome: Ongoing, Progressing     Problem: Impaired Wound Healing  Goal: Optimal Wound Healing  Outcome: Ongoing, Progressing

## 2023-01-08 NOTE — PT/OT/SLP PROGRESS
Occupational Therapy   Treatment    Name: Zandra Veloz  MRN: [id number]  Admitting Diagnosis:  Urinary tract infection       Recommendations:     Discharge Recommendations: rehabilitation facility, nursing facility, skilled  Discharge Equipment Recommendations:   (To be determined)  Barriers to discharge:  None    Assessment:     Zandra Veloz is a 88 y.o. female with a medical diagnosis of Urinary tract infection.  She presents with complaint of feeling tired,but agreed to OT treatment. Performance deficits affecting function are weakness, impaired endurance, pain.     Rehab Prognosis:  Good; patient would benefit from acute skilled OT services to address these deficits and reach maximum level of function.       Plan:     Patient to be seen 5 x/week to address the above listed problems via self-care/home management, therapeutic activities, therapeutic exercises  Plan of Care Expires: 02/06/23  Plan of Care Reviewed with: patient    Subjective     Pain/Comfort:  Pain Rating 1: 6/10  Location - Side 1: Bilateral  Location 1: arm  Pain Addressed 1: Nurse notified  Pain Rating Post-Intervention 1: 7/10    Objective:     Communicated with: CLYDE Del Cid prior to session.  Patient found HOB elevated with peripheral IV, chao catheter upon OT entry to room.    General Precautions: Standard, fall    Orthopedic Precautions:LLE non weight bearing, LLE posterior precautions  Braces: N/A  Respiratory Status: Room air     Occupational Performance:     Bed Mobility:         Functional Mobility/Transfers:    Functional Mobility:     Activities of Daily Living:        [facility] 6 Click ADL:      Treatment & Education:  Pt performed UE strengthening exercises. AAROM x 2 sets of 15 reps (R) shoulder flexion/extension and adduction/abduction. 1 lb hand wt x 2 sets of 15 reps  (L) shoulder flexion/extension, adduction/abduction. 2 lb hand wt x 2 sets of 15 reps (B) elbow and wrist flexion/extension. Hand  exerciser x 2 bands x 2 sets of 20 reps (B).   Red theraband x 2 sets of 10 reps (B) elbow flexion and extension.  Pt participated well with exercises with rest breaks provided as needed. UE position on a pillow at the end of tx.    Patient left HOB elevated with all lines intact, call button in reach, and sitter present    GOALS:   Multidisciplinary Problems       Occupational Therapy Goals          Problem: Occupational Therapy    Goal Priority Disciplines Outcome Interventions   Occupational Therapy Goal     OT, PT/OT Ongoing, Progressing    Description: STG: (In 2 weeks)  Pt will perform grooming with min(A)  Pt will bathe with setup and mod (A)  Pt will perform UE dressing with setup and min(A)  Pt will perform (L) UE AROM to 3/4 full range  Pt will sit EOB x 5 min with CGA assistance  Pt will transfer bed/chair/bsc with mod(A)  Pt will tolerate 20 minutes of tx without fatigue      LT.Restore to max I with self care and mobility.                          Time Tracking:     OT Date of Treatment: 23  OT Start Time: 1544  OT Stop Time: 1608  OT Total Time (min): 24 min    Billable Minutes:Therapeutic Exercise 20    OT/BRIGHT: OT          2023

## 2023-01-09 LAB
ANION GAP SERPL CALCULATED.3IONS-SCNC: 12 MMOL/L (ref 7–16)
BASOPHILS # BLD AUTO: 0.1 K/UL (ref 0–0.2)
BASOPHILS NFR BLD AUTO: 1 % (ref 0–1)
BASOPHILS NFR BLD MANUAL: 1 % (ref 0–1)
BUN SERPL-MCNC: 15 MG/DL (ref 7–18)
BUN/CREAT SERPL: 18 (ref 6–20)
CALCIUM SERPL-MCNC: 8.8 MG/DL (ref 8.5–10.1)
CHLORIDE SERPL-SCNC: 102 MMOL/L (ref 98–107)
CO2 SERPL-SCNC: 25 MMOL/L (ref 21–32)
CREAT SERPL-MCNC: 0.84 MG/DL (ref 0.55–1.02)
DIFFERENTIAL METHOD BLD: ABNORMAL
EGFR (NO RACE VARIABLE) (RUSH/TITUS): 67 ML/MIN/1.73M²
EOSINOPHIL # BLD AUTO: 0.33 K/UL (ref 0–0.5)
EOSINOPHIL NFR BLD AUTO: 3.5 % (ref 1–4)
EOSINOPHIL NFR BLD MANUAL: 5 % (ref 1–4)
ERYTHROCYTE [DISTWIDTH] IN BLOOD BY AUTOMATED COUNT: 14 % (ref 11.5–14.5)
GLUCOSE SERPL-MCNC: 89 MG/DL (ref 74–106)
HCT VFR BLD AUTO: 29.3 % (ref 38–47)
HGB BLD-MCNC: 8.9 G/DL (ref 12–16)
IMM GRANULOCYTES # BLD AUTO: 0.53 K/UL (ref 0–0.04)
IMM GRANULOCYTES NFR BLD: 5.5 % (ref 0–0.4)
LYMPHOCYTES # BLD AUTO: 1.55 K/UL (ref 1–4.8)
LYMPHOCYTES NFR BLD AUTO: 16.2 % (ref 27–41)
LYMPHOCYTES NFR BLD MANUAL: 15 % (ref 27–41)
MCH RBC QN AUTO: 29.3 PG (ref 27–31)
MCHC RBC AUTO-ENTMCNC: 30.4 G/DL (ref 32–36)
MCV RBC AUTO: 96.4 FL (ref 80–96)
MONOCYTES # BLD AUTO: 0.73 K/UL (ref 0–0.8)
MONOCYTES NFR BLD AUTO: 7.6 % (ref 2–6)
MONOCYTES NFR BLD MANUAL: 9 % (ref 2–6)
MPC BLD CALC-MCNC: 9.4 FL (ref 9.4–12.4)
MYELOCYTES NFR BLD MANUAL: 2 %
NEUTROPHILS # BLD AUTO: 6.32 K/UL (ref 1.8–7.7)
NEUTROPHILS NFR BLD AUTO: 66.2 % (ref 53–65)
NEUTS SEG NFR BLD MANUAL: 68 % (ref 50–62)
NRBC # BLD AUTO: 0 X10E3/UL
NRBC, AUTO (.00): 0 %
PLATELET # BLD AUTO: 332 K/UL (ref 150–400)
PLATELET MORPHOLOGY: NORMAL
POTASSIUM SERPL-SCNC: 4.2 MMOL/L (ref 3.5–5.1)
RBC # BLD AUTO: 3.04 M/UL (ref 4.2–5.4)
RBC MORPH BLD: NORMAL
SODIUM SERPL-SCNC: 135 MMOL/L (ref 136–145)
WBC # BLD AUTO: 9.56 K/UL (ref 4.5–11)

## 2023-01-09 PROCEDURE — 25000003 PHARM REV CODE 250: Performed by: INTERNAL MEDICINE

## 2023-01-09 PROCEDURE — 99232 SBSQ HOSP IP/OBS MODERATE 35: CPT | Mod: ,,, | Performed by: NURSE PRACTITIONER

## 2023-01-09 PROCEDURE — A6212 FOAM DRG <=16 SQ IN W/BORDER: HCPCS

## 2023-01-09 PROCEDURE — 63600175 PHARM REV CODE 636 W HCPCS: Performed by: STUDENT IN AN ORGANIZED HEALTH CARE EDUCATION/TRAINING PROGRAM

## 2023-01-09 PROCEDURE — 97530 THERAPEUTIC ACTIVITIES: CPT

## 2023-01-09 PROCEDURE — 99232 SBSQ HOSP IP/OBS MODERATE 35: CPT | Mod: ,,, | Performed by: STUDENT IN AN ORGANIZED HEALTH CARE EDUCATION/TRAINING PROGRAM

## 2023-01-09 PROCEDURE — 11000001 HC ACUTE MED/SURG PRIVATE ROOM

## 2023-01-09 PROCEDURE — 99232 PR SUBSEQUENT HOSPITAL CARE,LEVL II: ICD-10-PCS | Mod: ,,, | Performed by: STUDENT IN AN ORGANIZED HEALTH CARE EDUCATION/TRAINING PROGRAM

## 2023-01-09 PROCEDURE — 80048 BASIC METABOLIC PNL TOTAL CA: CPT | Performed by: STUDENT IN AN ORGANIZED HEALTH CARE EDUCATION/TRAINING PROGRAM

## 2023-01-09 PROCEDURE — 25000003 PHARM REV CODE 250: Performed by: STUDENT IN AN ORGANIZED HEALTH CARE EDUCATION/TRAINING PROGRAM

## 2023-01-09 PROCEDURE — 85025 COMPLETE CBC W/AUTO DIFF WBC: CPT | Performed by: STUDENT IN AN ORGANIZED HEALTH CARE EDUCATION/TRAINING PROGRAM

## 2023-01-09 PROCEDURE — 36415 COLL VENOUS BLD VENIPUNCTURE: CPT | Performed by: STUDENT IN AN ORGANIZED HEALTH CARE EDUCATION/TRAINING PROGRAM

## 2023-01-09 PROCEDURE — 99232 PR SUBSEQUENT HOSPITAL CARE,LEVL II: ICD-10-PCS | Mod: ,,, | Performed by: NURSE PRACTITIONER

## 2023-01-09 PROCEDURE — 97110 THERAPEUTIC EXERCISES: CPT

## 2023-01-09 RX ORDER — ACETAMINOPHEN 325 MG/1
650 TABLET ORAL EVERY 6 HOURS PRN
Status: DISCONTINUED | OUTPATIENT
Start: 2023-01-09 | End: 2023-01-09

## 2023-01-09 RX ORDER — GABAPENTIN 100 MG/1
100 CAPSULE ORAL 3 TIMES DAILY
Status: DISCONTINUED | OUTPATIENT
Start: 2023-01-09 | End: 2023-02-20 | Stop reason: HOSPADM

## 2023-01-09 RX ORDER — CIPROFLOXACIN 2 MG/ML
400 INJECTION, SOLUTION INTRAVENOUS
Status: DISCONTINUED | OUTPATIENT
Start: 2023-01-10 | End: 2023-01-10

## 2023-01-09 RX ORDER — ALPRAZOLAM 0.25 MG/1
1 TABLET ORAL NIGHTLY
Status: DISCONTINUED | OUTPATIENT
Start: 2023-01-09 | End: 2023-02-20 | Stop reason: HOSPADM

## 2023-01-09 RX ADMIN — FAMOTIDINE 20 MG: 20 TABLET ORAL at 10:01

## 2023-01-09 RX ADMIN — NYSTATIN 500000 UNITS: 100000 SUSPENSION ORAL at 10:01

## 2023-01-09 RX ADMIN — APIXABAN 2.5 MG: 2.5 TABLET, FILM COATED ORAL at 08:01

## 2023-01-09 RX ADMIN — ALPRAZOLAM 1 MG: 0.25 TABLET ORAL at 05:01

## 2023-01-09 RX ADMIN — HYDROCODONE BITARTRATE AND ACETAMINOPHEN 2 TABLET: 10; 325 TABLET ORAL at 08:01

## 2023-01-09 RX ADMIN — DOCUSATE SODIUM 100 MG: 100 CAPSULE, LIQUID FILLED ORAL at 08:01

## 2023-01-09 RX ADMIN — HYDRALAZINE HYDROCHLORIDE 25 MG: 25 TABLET ORAL at 09:01

## 2023-01-09 RX ADMIN — PREDNISONE 5 MG: 5 TABLET ORAL at 10:01

## 2023-01-09 RX ADMIN — METOPROLOL TARTRATE 50 MG: 50 TABLET, FILM COATED ORAL at 08:01

## 2023-01-09 RX ADMIN — CEFEPIME 1 G: 1 INJECTION, POWDER, FOR SOLUTION INTRAMUSCULAR; INTRAVENOUS at 12:01

## 2023-01-09 RX ADMIN — HYDRALAZINE HYDROCHLORIDE 25 MG: 25 TABLET ORAL at 05:01

## 2023-01-09 RX ADMIN — NYSTATIN 500000 UNITS: 100000 SUSPENSION ORAL at 04:01

## 2023-01-09 RX ADMIN — DOCUSATE SODIUM 100 MG: 100 CAPSULE, LIQUID FILLED ORAL at 10:01

## 2023-01-09 RX ADMIN — CEFEPIME 1 G: 1 INJECTION, POWDER, FOR SOLUTION INTRAMUSCULAR; INTRAVENOUS at 05:01

## 2023-01-09 RX ADMIN — LEVOTHYROXINE SODIUM 125 MCG: 0.12 TABLET ORAL at 05:01

## 2023-01-09 RX ADMIN — HYDRALAZINE HYDROCHLORIDE 25 MG: 25 TABLET ORAL at 01:01

## 2023-01-09 RX ADMIN — ACETAMINOPHEN 650 MG: 325 TABLET ORAL at 05:01

## 2023-01-09 RX ADMIN — METOPROLOL TARTRATE 50 MG: 50 TABLET, FILM COATED ORAL at 10:01

## 2023-01-09 RX ADMIN — NYSTATIN 500000 UNITS: 100000 SUSPENSION ORAL at 01:01

## 2023-01-09 RX ADMIN — PANTOPRAZOLE SODIUM 40 MG: 40 TABLET, DELAYED RELEASE ORAL at 10:01

## 2023-01-09 RX ADMIN — HYDROCODONE BITARTRATE AND ACETAMINOPHEN 2 TABLET: 10; 325 TABLET ORAL at 01:01

## 2023-01-09 RX ADMIN — DILTIAZEM HYDROCHLORIDE 120 MG: 120 CAPSULE, COATED, EXTENDED RELEASE ORAL at 10:01

## 2023-01-09 RX ADMIN — GABAPENTIN 100 MG: 100 CAPSULE ORAL at 08:01

## 2023-01-09 RX ADMIN — ALPRAZOLAM 1 MG: 0.25 TABLET ORAL at 08:01

## 2023-01-09 RX ADMIN — GABAPENTIN 100 MG: 100 CAPSULE ORAL at 04:01

## 2023-01-09 RX ADMIN — NYSTATIN 500000 UNITS: 100000 SUSPENSION ORAL at 08:01

## 2023-01-09 RX ADMIN — LISINOPRIL 20 MG: 20 TABLET ORAL at 09:01

## 2023-01-09 RX ADMIN — APIXABAN 2.5 MG: 2.5 TABLET, FILM COATED ORAL at 10:01

## 2023-01-09 NOTE — PROGRESS NOTES
[facility] Hospital Medicine  Progress Note    Patient Name: Zandra Veloz  MRN: [id number]  Patient Class: IP- Inpatient   Admission Date: 12/29/2022  Length of Stay: 11 days  Attending Physician: Shai Guerra DO  Primary Care Provider: Brandon Thornton MD        Subjective:     Principal Problem:Urinary tract infection        HPI:  HPI:   88-year-old lady seen emergency room department.  Patient presents after having a fall when she was trying to go to the restroom at her home.  Patient sustained a left femur fracture.  Patient complains of moderate to severe pain in the left hip area.  Patient also complains of chest tightness, she rates it a 5/10 in the chest tightness has been intubate.  Patient also was seen in emergency room department earlier this week per her daughter.  Patient is found to have dehydration and a urinary tract infection.  Current she denies any shortness of breath.  She does not give a history of coronary artery disease.        Procedure(s) (LRB):  REVISION, TOTAL ARTHROPLASTY, HIP, VICTORIANO PROSTHETIC FX (Left)       Hospital Course:   12/19 - records reviewed. Fall without LOC and has left hip fx. No other complaints currently. Seen by cardiology with some CP felt musculoskeletal.  Echo mild AS and mod MR with nl EF. Age increase cardiac risk for surgery but discussed with daughter surgery is urgent and see no benefit in delay medically. Feel low to moderate risk for surgery. Question about UTI. No symptoms. Had UTI in 10.22 not surprising. Lab to do culture on urine in lab. Cover with ATN for prior culture with ATB started. Discussed with Dr Singer and cardiology.  12/20 Tachycardia and elevated BP. Cardiology adjusted meds. Plan hold off surgery until 12/22 to adjust meds and treat UTI. Family in room. Pt not sleeping well.   12/21 Didn't sleep well prior night. Apparently been on xanax for a time. Also recently started on Neurontin. Family with  question. No recent BM. Some increase stool on KUB but not severe. Surgery for am. BP some up but better. HR good.   12/22 Seen prior to surgery and apparently add better night. Sore mouth / throat better with mycostatin. For surgery. Family in room.  12/23 patient with pain but otherwise seemed to be doing relatively well.  Daughter in room.  Apparently been taking prednisone for some time and this was restarted per family request.  Look into swing bed placement  12/24 patient had better night rested and everyone's in better spirits today.  Tolerating some diet.   Therapy worked with patient.  Look into swing bed placement next week  12/25 remained in good spirits.  Less pain.  Had good bowel movement and ordered Dulcolax not given.  Because of dressing to leg, Burk was not removed to keep it from getting soiled.  Wound care to check 12/27.  Discuss this with patient and daughter.  On antibiotics for UTI  12/26-will dc to swingbed once accepted. Needs continued wound care.  12/27-DC when bed available  12/28- issues with wound care yesterday.  Dr. Singer had to come remove dressings himself due to adherence to subcutaneous tissue.  Family refusing transfer to [facility] due to lack of specially trained wound care team availability.  Planning to transfer to specialty but resistant to that as well and requesting to speak to administration.    12/29- agreeable to transfer to specialty.  This will be the best place for her to receive wound care.  DC after family tours     12/29-needs continued wound care and therapy. [facility]    12/30-doing well today, still with pain         Overview/Hospital Course:  12/31-having some pain this am  1/1- no complaints  1/2- doing well  1/3- continue wound care, last day cefepime tomorrow  1/4- some pain this AM.  Delay in mediations dues to staffing issues. Dsicussed with RN  1/5- no issues overnight  1/6- Still with pain, hip incision looks good  1/7-diclofenac for  shoulder pain  1/8-decrease laxatives, change benzos and carlo to PRN per patient request  1/9 some confusion overnight      Interval History: naeo    Review of Systems   Constitutional:  Negative for chills, fatigue, fever and unexpected weight change.   HENT:  Negative for congestion, mouth sores and sore throat.    Eyes:  Negative for photophobia and visual disturbance.   Respiratory:  Negative for cough, chest tightness, shortness of breath and wheezing.    Cardiovascular:  Negative for chest pain, palpitations and leg swelling.   Gastrointestinal:  Negative for abdominal pain, diarrhea, nausea and vomiting.   Endocrine: Negative for cold intolerance and heat intolerance.   Genitourinary:  Negative for difficulty urinating, dysuria, frequency and urgency.   Musculoskeletal:  Positive for arthralgias. Negative for back pain and myalgias.   Skin:  Negative for pallor and rash.   Neurological:  Negative for tremors, seizures, syncope, weakness, numbness and headaches.   Hematological:  Does not bruise/bleed easily.   Psychiatric/Behavioral:  Negative for agitation, confusion, hallucinations and suicidal ideas.    Objective:     Vital Signs (Most Recent):  Temp: 98.4 °F (36.9 °C) (01/09/23 0750)  Pulse: 60 (01/09/23 0750)  Resp: 20 (01/09/23 0809)  BP: (!) 175/65 (01/09/23 0750)  SpO2: 96 % (01/09/23 0750)   Vital Signs (24h Range):  Temp:  [97.8 °F (36.6 °C)-99.5 °F (37.5 °C)] 98.4 °F (36.9 °C)  Pulse:  [54-83] 60  Resp:  [18-20] 20  SpO2:  [96 %-98 %] 96 %  BP: (100-178)/(43-65) 175/65     Weight: 59.1 kg (130 lb 3.2 oz)  Body mass index is 21.01 kg/m².    Intake/Output Summary (Last 24 hours) at 1/9/2023 1151  Last data filed at 1/8/2023 1245  Gross per 24 hour   Intake 50 ml   Output --   Net 50 ml        Physical Exam  Vitals reviewed.   Constitutional:       Appearance: Normal appearance.   HENT:      Head: Normocephalic and atraumatic.   Eyes:      Extraocular Movements: Extraocular movements intact.    Cardiovascular:      Rate and Rhythm: Normal rate and regular rhythm.      Pulses: Normal pulses.   Pulmonary:      Effort: Pulmonary effort is normal.      Breath sounds: Normal breath sounds.   Abdominal:      General: Abdomen is flat.      Palpations: Abdomen is soft.   Musculoskeletal:         General: Tenderness and signs of injury present.      Comments: Dressed left lower extremity, painful to touch   Skin:     General: Skin is warm and dry.      Capillary Refill: Capillary refill takes less than 2 seconds.   Neurological:      General: No focal deficit present.      Mental Status: She is alert and oriented to person, place, and time.   Psychiatric:         Mood and Affect: Mood normal.         Behavior: Behavior normal.       Significant Labs: All pertinent labs within the past 24 hours have been reviewed.    Significant Imaging: I have reviewed all pertinent imaging results/findings within the past 24 hours.      Assessment/Plan:      * Urinary tract infection  Treatment completed      Disruption of external surgical wound        Wounds and injuries        Open wound of left lower leg  Wound care      Multiple skin tears  Wound care      Delmy-prosthetic fracture around prosthetic hip  S/P fixation with Pomierski      Lumbar radiculopathy  Pain management  PT      Hypertension  Adjust medications as needed      Arthritis  Pain management        VTE Risk Mitigation (From admission, onward)         Ordered     apixaban tablet 2.5 mg  2 times daily         12/29/22 1630                Discharge Planning   YANIV:      Code Status: Full Code   Is the patient medically ready for discharge?:     Reason for patient still in hospital (select all that apply): Treatment  Discharge Plan A: Skilled Nursing Facility                  Shai Guerra DO  Department of Hospital Medicine   [facility]

## 2023-01-09 NOTE — ASSESSMENT & PLAN NOTE
Clean with vashe  Apply drawtex to wound  Cover and secure with abd pad and paper tape  Change M, W, F and PRN for soilage

## 2023-01-09 NOTE — PT/OT/SLP PROGRESS
"Physical Therapy Treatment    Patient Name:  Zandra Veloz   MRN:  [id number]    Recommendations:     Discharge Recommendations: rehabilitation facility, nursing facility, skilled  Discharge Equipment Recommendations: other (see comments) (to be determined)  Barriers to discharge:  ongoing medical care    Assessment:     Zandra Veloz is a 88 y.o. female admitted with a medical diagnosis of Urinary tract infection.  She presents with the following impairments/functional limitations: weakness, impaired endurance, impaired self care skills, impaired functional mobility, gait instability, impaired balance, pain, orthopedic precautions, impaired skin, decreased ROM. Pt and daughter both report not feeling well today, appears to have low energy. Pt is agreeable to sit up in recliner but would like to conserve some energy for bathing and woundcare as well as transfer back to bed.     Rehab Prognosis: Fair; patient would benefit from acute skilled PT services to address these deficits and reach maximum level of function.    Recent Surgery: * No surgery found *      Plan:     During this hospitalization, patient to be seen 5 x/week to address the identified rehab impairments via gait training, therapeutic activities, therapeutic exercises, neuromuscular re-education and progress toward the following goals:    Plan of Care Expires:  01/30/23    Subjective     Chief Complaint: UTI  Patient/Family Comments/goals: "moma's not feeling well today"  Pain/Comfort:         Objective:     Communicated with OTR prior to session.  Patient found HOB elevated with peripheral IV, chao catheter upon PT entry to room.     General Precautions: Standard, fall  Orthopedic Precautions: LLE non weight bearing, LLE posterior precautions  Braces: N/A  Respiratory Status: Room air     Functional Mobility:  Bed Mobility:     Scooting: maximal assistance and of 2 persons  Supine to Sit: maximal assistance and of 2 " persons  Transfers:     Bed to Chair: maximal assistance and of 2 persons with  slide board  using  Scoot Pivot  Balance: EOB mod A progressing to CGA      AM-PAC 6 CLICK MOBILITY          Treatment & Education:  Mobility as stated above.     Patient left up in chair with all lines intact, call button in reach, HERIBERTO Sinha RN  notified, and daughter and sitter present..    GOALS:   Multidisciplinary Problems       Physical Therapy Goals          Problem: Physical Therapy    Goal Priority Disciplines Outcome Goal Variances Interventions   Physical Therapy Goal     PT, PT/OT Ongoing, Progressing     Description: Short Term Goals to be met by 1/10/2023    Patient will increase functional independence and safety with mobility by performing    1.   Rolling right Moderate Assist; roll left Moderate Assist  2.   Supine to sit Moderate Assist  3.   Sitting balance edge of the bed x 15 minutes Stand by assist  4.   Sit to stand Moderate Assist  using manual assistance with gait belt and NWB L LE  5.   Bed to chair Moderate Assist using manual assistance with gait belt and NWB left LE  6.   Lower extremity exercises x 30 reps with assist as necessary and no rest breaks      Long Term Goals to be met by 2/30/2023    Patient to require less than or equal to Stand by assist for functional mobility to ease caregiver burden                        Time Tracking:     PT Received On: 01/09/23  PT Start Time: 1137     PT Stop Time: 1201  PT Total Time (min): 24 min     Billable Minutes: Therapeutic Activity 24    Treatment Type: Treatment  PT/PTA: PT     PTA Visit Number: 0     01/09/2023

## 2023-01-09 NOTE — SUBJECTIVE & OBJECTIVE
Interval History: naeo    Review of Systems   Constitutional:  Negative for chills, fatigue, fever and unexpected weight change.   HENT:  Negative for congestion, mouth sores and sore throat.    Eyes:  Negative for photophobia and visual disturbance.   Respiratory:  Negative for cough, chest tightness, shortness of breath and wheezing.    Cardiovascular:  Negative for chest pain, palpitations and leg swelling.   Gastrointestinal:  Negative for abdominal pain, diarrhea, nausea and vomiting.   Endocrine: Negative for cold intolerance and heat intolerance.   Genitourinary:  Negative for difficulty urinating, dysuria, frequency and urgency.   Musculoskeletal:  Positive for arthralgias. Negative for back pain and myalgias.   Skin:  Negative for pallor and rash.   Neurological:  Negative for tremors, seizures, syncope, weakness, numbness and headaches.   Hematological:  Does not bruise/bleed easily.   Psychiatric/Behavioral:  Negative for agitation, confusion, hallucinations and suicidal ideas.    Objective:     Vital Signs (Most Recent):  Temp: 98.4 °F (36.9 °C) (01/09/23 0750)  Pulse: 60 (01/09/23 0750)  Resp: 20 (01/09/23 0809)  BP: (!) 175/65 (01/09/23 0750)  SpO2: 96 % (01/09/23 0750)   Vital Signs (24h Range):  Temp:  [97.8 °F (36.6 °C)-99.5 °F (37.5 °C)] 98.4 °F (36.9 °C)  Pulse:  [54-83] 60  Resp:  [18-20] 20  SpO2:  [96 %-98 %] 96 %  BP: (100-178)/(43-65) 175/65     Weight: 59.1 kg (130 lb 3.2 oz)  Body mass index is 21.01 kg/m².    Intake/Output Summary (Last 24 hours) at 1/9/2023 1151  Last data filed at 1/8/2023 1245  Gross per 24 hour   Intake 50 ml   Output --   Net 50 ml        Physical Exam  Vitals reviewed.   Constitutional:       Appearance: Normal appearance.   HENT:      Head: Normocephalic and atraumatic.   Eyes:      Extraocular Movements: Extraocular movements intact.   Cardiovascular:      Rate and Rhythm: Normal rate and regular rhythm.      Pulses: Normal pulses.   Pulmonary:      Effort:  Pulmonary effort is normal.      Breath sounds: Normal breath sounds.   Abdominal:      General: Abdomen is flat.      Palpations: Abdomen is soft.   Musculoskeletal:         General: Tenderness and signs of injury present.      Comments: Dressed left lower extremity, painful to touch   Skin:     General: Skin is warm and dry.      Capillary Refill: Capillary refill takes less than 2 seconds.   Neurological:      General: No focal deficit present.      Mental Status: She is alert and oriented to person, place, and time.   Psychiatric:         Mood and Affect: Mood normal.         Behavior: Behavior normal.       Significant Labs: All pertinent labs within the past 24 hours have been reviewed.    Significant Imaging: I have reviewed all pertinent imaging results/findings within the past 24 hours.

## 2023-01-09 NOTE — ASSESSMENT & PLAN NOTE
Clean with vashe  Apply mepitel and  aquacel ag advantage to wound.  Cover and secure with 4x4s, kerlix, and paper tape  Change every M, W,F and PRN for soilage  Keep leg elevated and avoid pressure on wound.

## 2023-01-09 NOTE — SUBJECTIVE & OBJECTIVE
Subjective:     HPI:  87 yo female with multiple skin tears, traumatic wounds, and surgical incision to left thigh. She was admitted following a fall on 12/17 when she was trying to go to the restroom at her home.  Patient sustained a left femur fracture. She is status post ORIF on 12/20. Staples intact to left hip. Moderate amount of green drainage noted, cultures obtained today. Left lower leg sutures removed today. Slough noted in wound bed today, adjustments to local wound care. Significant PMH includes hypertension, bradycardia, osteoarthritis, hypothyroidism, osteoporosis, and multiple falls. Wound healing is complicated by limited mobility, multiple falls, infection, and pain.     Hospital Course:   1/5/23 - Complains of pain to right posterior knee, venous doppler ordered. Sutures and every other staple removed today. New local wound care orders. Cultures pending.   1/9/23 - Wounds on lower extremities healing well. Continue current POC. Bruising noted to left posterior knee, protect with mepitel and optifoam. Dressing to incision site saturated with green drainage. Cultures negative. Continue with drawtex and notify ortho of drainage.           Scheduled Meds:   ALPRAZolam  1 mg Oral QHS    apixaban  2.5 mg Oral BID    [START ON 1/10/2023] ciprofloxacin  400 mg Intravenous Q24H    diltiaZEM  120 mg Oral Daily    docusate sodium  100 mg Oral BID    famotidine  20 mg Oral Daily    gabapentin  100 mg Oral TID    hydrALAZINE  25 mg Oral Q8H    levothyroxine  125 mcg Oral Before breakfast    lisinopriL  20 mg Oral Daily    metoprolol tartrate  50 mg Oral BID    nystatin  500,000 Units Oral QID    pantoprazole  40 mg Oral Daily    polyethylene glycol  34 g Oral Daily    predniSONE  5 mg Oral Daily    senna  1 tablet Oral Daily     Continuous Infusions:  PRN Meds:dextrose 50%, dextrose 50%, diclofenac sodium, glucagon (human recombinant), glucose, glucose, HYDROcodone-acetaminophen, magnesium oxide, melatonin,  naloxone, ondansetron, potassium bicarbonate, potassium bicarbonate, potassium bicarbonate, potassium, sodium phosphates, potassium, sodium phosphates, potassium, sodium phosphates, sodium chloride 0.9%    Review of Systems   Constitutional:  Positive for activity change. Negative for chills and fever.   Respiratory:  Negative for chest tightness and shortness of breath.    Cardiovascular:  Positive for leg swelling. Negative for chest pain and palpitations.   Musculoskeletal:  Positive for arthralgias, back pain, gait problem and joint swelling.   Skin:  Positive for color change and wound.        wound   Neurological:  Positive for weakness.   Psychiatric/Behavioral:  Negative for agitation, behavioral problems, confusion and self-injury.    Objective:     Vital Signs (Most Recent):  Temp: 98.1 °F (36.7 °C) (01/09/23 1200)  Pulse: (!) 51 (01/09/23 1200)  Resp: 16 (01/09/23 1345)  BP: (!) 152/96 (01/09/23 1200)  SpO2: 95 % (01/09/23 1200)   Vital Signs (24h Range):  Temp:  [97.8 °F (36.6 °C)-99.5 °F (37.5 °C)] 98.1 °F (36.7 °C)  Pulse:  [51-83] 51  Resp:  [16-20] 16  SpO2:  [95 %-98 %] 95 %  BP: (100-178)/(43-96) 152/96     Weight: 59.1 kg (130 lb 3.2 oz)  Body mass index is 21.01 kg/m².    Physical Exam  Vitals reviewed.   Constitutional:       Appearance: Normal appearance.   HENT:      Head: Normocephalic.   Cardiovascular:      Rate and Rhythm: Normal rate and regular rhythm.      Pulses: Normal pulses.      Heart sounds: Murmur heard.   Pulmonary:      Effort: Pulmonary effort is normal.      Breath sounds: Normal breath sounds.   Musculoskeletal:         General: Swelling, tenderness, deformity and signs of injury present.      Right lower leg: Edema present.      Left lower leg: Edema present.   Skin:     Findings: Bruising and erythema present.      Comments: Wound, see LDA for photo/measurements   Neurological:      Mental Status: She is alert. Mental status is at baseline.      Motor: Weakness present.       Gait: Gait abnormal.

## 2023-01-09 NOTE — PROGRESS NOTES
[facility]  Wound care and Hyperbaric [facility]  Progress Note    Patient Name: Zandra Veloz  MRN: [id number]  Admission Date: 12/29/2022  Hospital Length of Stay: 11 days  Attending Physician: Shai Guerra DO  Primary Care Provider: Brandon Thornton MD         Subjective:     HPI:  89 yo female with multiple skin tears, traumatic wounds, and surgical incision to left thigh. She was admitted following a fall on 12/17 when she was trying to go to the restroom at her home.  Patient sustained a left femur fracture. She is status post ORIF on 12/20. Staples intact to left hip. Moderate amount of green drainage noted, cultures obtained today. Left lower leg sutures removed today. Slough noted in wound bed today, adjustments to local wound care. Significant PMH includes hypertension, bradycardia, osteoarthritis, hypothyroidism, osteoporosis, and multiple falls. Wound healing is complicated by limited mobility, multiple falls, infection, and pain.     Hospital Course:   1/5/23 - Complains of pain to right posterior knee, venous doppler ordered. Sutures and every other staple removed today. New local wound care orders. Cultures pending.   1/9/23 - Wounds on lower extremities healing well. Continue current POC. Bruising noted to left posterior knee, protect with mepitel and optifoam. Dressing to incision site saturated with green drainage. Cultures negative. Continue with drawtex and notify ortho of drainage.           Scheduled Meds:   ALPRAZolam  1 mg Oral QHS    apixaban  2.5 mg Oral BID    [START ON 1/10/2023] ciprofloxacin  400 mg Intravenous Q24H    diltiaZEM  120 mg Oral Daily    docusate sodium  100 mg Oral BID    famotidine  20 mg Oral Daily    gabapentin  100 mg Oral TID    hydrALAZINE  25 mg Oral Q8H    levothyroxine  125 mcg Oral Before breakfast    lisinopriL  20 mg Oral Daily    metoprolol tartrate  50 mg Oral BID    nystatin  500,000 Units Oral QID    pantoprazole   40 mg Oral Daily    polyethylene glycol  34 g Oral Daily    predniSONE  5 mg Oral Daily    senna  1 tablet Oral Daily     Continuous Infusions:  PRN Meds:dextrose 50%, dextrose 50%, diclofenac sodium, glucagon (human recombinant), glucose, glucose, HYDROcodone-acetaminophen, magnesium oxide, melatonin, naloxone, ondansetron, potassium bicarbonate, potassium bicarbonate, potassium bicarbonate, potassium, sodium phosphates, potassium, sodium phosphates, potassium, sodium phosphates, sodium chloride 0.9%    Review of Systems   Constitutional:  Positive for activity change. Negative for chills and fever.   Respiratory:  Negative for chest tightness and shortness of breath.    Cardiovascular:  Positive for leg swelling. Negative for chest pain and palpitations.   Musculoskeletal:  Positive for arthralgias, back pain, gait problem and joint swelling.   Skin:  Positive for color change and wound.        wound   Neurological:  Positive for weakness.   Psychiatric/Behavioral:  Negative for agitation, behavioral problems, confusion and self-injury.    Objective:     Vital Signs (Most Recent):  Temp: 98.1 °F (36.7 °C) (01/09/23 1200)  Pulse: (!) 51 (01/09/23 1200)  Resp: 16 (01/09/23 1345)  BP: (!) 152/96 (01/09/23 1200)  SpO2: 95 % (01/09/23 1200)   Vital Signs (24h Range):  Temp:  [97.8 °F (36.6 °C)-99.5 °F (37.5 °C)] 98.1 °F (36.7 °C)  Pulse:  [51-83] 51  Resp:  [16-20] 16  SpO2:  [95 %-98 %] 95 %  BP: (100-178)/(43-96) 152/96     Weight: 59.1 kg (130 lb 3.2 oz)  Body mass index is 21.01 kg/m².    Physical Exam  Vitals reviewed.   Constitutional:       Appearance: Normal appearance.   HENT:      Head: Normocephalic.   Cardiovascular:      Rate and Rhythm: Normal rate and regular rhythm.      Pulses: Normal pulses.      Heart sounds: Murmur heard.   Pulmonary:      Effort: Pulmonary effort is normal.      Breath sounds: Normal breath sounds.   Musculoskeletal:         General: Swelling, tenderness, deformity and signs of  injury present.      Right lower leg: Edema present.      Left lower leg: Edema present.   Skin:     Findings: Bruising and erythema present.      Comments: Wound, see LDA for photo/measurements   Neurological:      Mental Status: She is alert. Mental status is at baseline.      Motor: Weakness present.      Gait: Gait abnormal.         Assessment/Plan:     Disruption of external surgical wound  Clean with vashe  Apply drawtex to wound  Cover and secure with abd pad and paper tape  Change M, W, F and PRN for soilage    Open wound of left lower leg  Clean with vashe  Apply mepitel and  aquacel ag advantage to wound.  Cover and secure with 4x4s, kerlix, and paper tape  Change every M, W,F and PRN for soilage  Keep leg elevated and avoid pressure on wound.          Multiple skin tears  Clean with vashe  Apply mepitel and  aquacel ag advantage to wound.  Cover and secure with 4x4s, kerlix, and paper tape  Change every M, W,F and PRN for soilage        ZURDO San  Wound care and Hyperbaric [facility]  [facility]

## 2023-01-10 PROCEDURE — 25000003 PHARM REV CODE 250: Performed by: STUDENT IN AN ORGANIZED HEALTH CARE EDUCATION/TRAINING PROGRAM

## 2023-01-10 PROCEDURE — 11000001 HC ACUTE MED/SURG PRIVATE ROOM

## 2023-01-10 PROCEDURE — 63600175 PHARM REV CODE 636 W HCPCS: Performed by: STUDENT IN AN ORGANIZED HEALTH CARE EDUCATION/TRAINING PROGRAM

## 2023-01-10 PROCEDURE — 97110 THERAPEUTIC EXERCISES: CPT

## 2023-01-10 PROCEDURE — 99231 PR SUBSEQUENT HOSPITAL CARE,LEVL I: ICD-10-PCS | Mod: ,,, | Performed by: STUDENT IN AN ORGANIZED HEALTH CARE EDUCATION/TRAINING PROGRAM

## 2023-01-10 PROCEDURE — 97530 THERAPEUTIC ACTIVITIES: CPT

## 2023-01-10 PROCEDURE — 96372 THER/PROPH/DIAG INJ SC/IM: CPT

## 2023-01-10 PROCEDURE — 99231 SBSQ HOSP IP/OBS SF/LOW 25: CPT | Mod: ,,, | Performed by: STUDENT IN AN ORGANIZED HEALTH CARE EDUCATION/TRAINING PROGRAM

## 2023-01-10 RX ORDER — CIPROFLOXACIN 250 MG/1
500 TABLET, FILM COATED ORAL EVERY 12 HOURS
Status: COMPLETED | OUTPATIENT
Start: 2023-01-10 | End: 2023-01-12

## 2023-01-10 RX ADMIN — HYDRALAZINE HYDROCHLORIDE 25 MG: 25 TABLET ORAL at 09:01

## 2023-01-10 RX ADMIN — METOPROLOL TARTRATE 50 MG: 50 TABLET, FILM COATED ORAL at 08:01

## 2023-01-10 RX ADMIN — APIXABAN 2.5 MG: 2.5 TABLET, FILM COATED ORAL at 08:01

## 2023-01-10 RX ADMIN — DOCUSATE SODIUM 100 MG: 100 CAPSULE, LIQUID FILLED ORAL at 08:01

## 2023-01-10 RX ADMIN — DILTIAZEM HYDROCHLORIDE 120 MG: 120 CAPSULE, COATED, EXTENDED RELEASE ORAL at 08:01

## 2023-01-10 RX ADMIN — ALPRAZOLAM 1 MG: 0.25 TABLET ORAL at 08:01

## 2023-01-10 RX ADMIN — LISINOPRIL 20 MG: 20 TABLET ORAL at 08:01

## 2023-01-10 RX ADMIN — GABAPENTIN 100 MG: 100 CAPSULE ORAL at 08:01

## 2023-01-10 RX ADMIN — HYDRALAZINE HYDROCHLORIDE 25 MG: 25 TABLET ORAL at 05:01

## 2023-01-10 RX ADMIN — NYSTATIN 500000 UNITS: 100000 SUSPENSION ORAL at 02:01

## 2023-01-10 RX ADMIN — CIPROFLOXACIN 500 MG: 250 TABLET, FILM COATED ORAL at 08:01

## 2023-01-10 RX ADMIN — CIPROFLOXACIN 400 MG: 2 INJECTION, SOLUTION INTRAVENOUS at 12:01

## 2023-01-10 RX ADMIN — PANTOPRAZOLE SODIUM 40 MG: 40 TABLET, DELAYED RELEASE ORAL at 08:01

## 2023-01-10 RX ADMIN — LEVOTHYROXINE SODIUM 125 MCG: 0.12 TABLET ORAL at 05:01

## 2023-01-10 RX ADMIN — HYDROCODONE BITARTRATE AND ACETAMINOPHEN 2 TABLET: 10; 325 TABLET ORAL at 08:01

## 2023-01-10 RX ADMIN — PREDNISONE 5 MG: 5 TABLET ORAL at 08:01

## 2023-01-10 RX ADMIN — NYSTATIN 500000 UNITS: 100000 SUSPENSION ORAL at 04:01

## 2023-01-10 RX ADMIN — NYSTATIN 500000 UNITS: 100000 SUSPENSION ORAL at 08:01

## 2023-01-10 RX ADMIN — GABAPENTIN 100 MG: 100 CAPSULE ORAL at 02:01

## 2023-01-10 RX ADMIN — HYDRALAZINE HYDROCHLORIDE 25 MG: 25 TABLET ORAL at 02:01

## 2023-01-10 RX ADMIN — HYDROCODONE BITARTRATE AND ACETAMINOPHEN 2 TABLET: 10; 325 TABLET ORAL at 04:01

## 2023-01-10 RX ADMIN — FAMOTIDINE 20 MG: 20 TABLET ORAL at 08:01

## 2023-01-10 NOTE — SUBJECTIVE & OBJECTIVE
Interval History: naeo    Review of Systems   Constitutional:  Negative for chills, fatigue, fever and unexpected weight change.   HENT:  Negative for congestion, mouth sores and sore throat.    Eyes:  Negative for photophobia and visual disturbance.   Respiratory:  Negative for cough, chest tightness, shortness of breath and wheezing.    Cardiovascular:  Negative for chest pain, palpitations and leg swelling.   Gastrointestinal:  Negative for abdominal pain, diarrhea, nausea and vomiting.   Endocrine: Negative for cold intolerance and heat intolerance.   Genitourinary:  Negative for difficulty urinating, dysuria, frequency and urgency.   Musculoskeletal:  Positive for arthralgias. Negative for back pain and myalgias.   Skin:  Negative for pallor and rash.   Neurological:  Negative for tremors, seizures, syncope, weakness, numbness and headaches.   Hematological:  Does not bruise/bleed easily.   Psychiatric/Behavioral:  Negative for agitation, confusion, hallucinations and suicidal ideas.    Objective:     Vital Signs (Most Recent):  Temp: 99.3 °F (37.4 °C) (01/10/23 1200)  Pulse: 60 (01/10/23 1200)  Resp: 19 (01/10/23 1200)  BP: (!) 143/70 (01/10/23 1200)  SpO2: (!) 94 % (01/10/23 1200)   Vital Signs (24h Range):  Temp:  [97.6 °F (36.4 °C)-99.7 °F (37.6 °C)] 99.3 °F (37.4 °C)  Pulse:  [53-65] 60  Resp:  [18-19] 19  SpO2:  [92 %-97 %] 94 %  BP: (143-167)/(51-71) 143/70     Weight: 59.1 kg (130 lb 3.2 oz)  Body mass index is 21.01 kg/m².    Intake/Output Summary (Last 24 hours) at 1/10/2023 1435  Last data filed at 1/10/2023 0630  Gross per 24 hour   Intake --   Output 1300 ml   Net -1300 ml        Physical Exam  Vitals reviewed.   Constitutional:       Appearance: Normal appearance.   HENT:      Head: Normocephalic and atraumatic.   Eyes:      Extraocular Movements: Extraocular movements intact.   Cardiovascular:      Rate and Rhythm: Normal rate and regular rhythm.      Pulses: Normal pulses.   Pulmonary:       Effort: Pulmonary effort is normal.      Breath sounds: Normal breath sounds.   Abdominal:      General: Abdomen is flat.      Palpations: Abdomen is soft.   Musculoskeletal:         General: Tenderness and signs of injury present.      Comments: Dressed left lower extremity, painful to touch   Skin:     General: Skin is warm and dry.      Capillary Refill: Capillary refill takes less than 2 seconds.   Neurological:      General: No focal deficit present.      Mental Status: She is alert and oriented to person, place, and time.   Psychiatric:         Mood and Affect: Mood normal.         Behavior: Behavior normal.       Significant Labs: All pertinent labs within the past 24 hours have been reviewed.    Significant Imaging: I have reviewed all pertinent imaging results/findings within the past 24 hours.

## 2023-01-10 NOTE — PROGRESS NOTES
Pharmacist Renal Dose Adjustment Note    Zandra Veloz is a 88 y.o. female being treated with the medication ciprofloxacin    Patient Data:    Vital Signs (Most Recent):  Temp: 99.7 °F (37.6 °C) (01/10/23 0800)  Pulse: 65 (01/10/23 0800)  Resp: 18 (01/10/23 0828)  BP: (!) 167/69 (01/10/23 0800)  SpO2: 95 % (01/10/23 0800)   Vital Signs (72h Range):  Temp:  [97.4 °F (36.3 °C)-99.7 °F (37.6 °C)]   Pulse:  [51-83]   Resp:  [16-20]   BP: ()/(40-96)   SpO2:  [92 %-98 %]      Recent Labs   Lab 01/04/23  0731 01/06/23  0358 01/09/23  0656   CREATININE 0.74 0.75 0.84     Serum creatinine: 0.84 mg/dL 01/09/23 0656  Estimated creatinine clearance: 43.2 mL/min    Medication:ciprofloxacin dose: 500 mg frequency daily will be changed to medication:ciprofloxacin dose:500 mg frequency:q12h    Pharmacist's Name: Emerald Adler  Pharmacist's Extension: [phone]

## 2023-01-10 NOTE — PROGRESS NOTES
Pharmacist Intervention IV to PO Note    Zandra Veloz is a 88 y.o. female being treated with IV medication ciprofloxacin    Patient Data:    Vital Signs (Most Recent):  Temp: 99.7 °F (37.6 °C) (01/10/23 0800)  Pulse: 65 (01/10/23 0800)  Resp: 18 (01/10/23 0828)  BP: (!) 167/69 (01/10/23 0800)  SpO2: 95 % (01/10/23 0800)   Vital Signs (72h Range):  Temp:  [97.4 °F (36.3 °C)-99.7 °F (37.6 °C)]   Pulse:  [51-83]   Resp:  [16-20]   BP: ()/(40-96)   SpO2:  [92 %-98 %]      CBC:  Recent Labs   Lab 01/06/23  0358 01/09/23  0656   WBC 11.38* 9.56   RBC 2.94* 3.04*   HGB 8.9* 8.9*   HCT 29.5* 29.3*   * 332   .3* 96.4*   MCH 30.3 29.3   MCHC 30.2* 30.4*     CMP:     Recent Labs   Lab 01/04/23  0731 01/06/23  0358 01/09/23  0656    80 89   CALCIUM 8.5 8.5 8.8   * 135* 135*   K 5.0 4.3 4.2   CO2 27 27 25    102 102   BUN 15 15 15   CREATININE 0.74 0.75 0.84       Dietary Orders:  Diet Orders            Dietary nutrition supplements Rush; Vamshi - Any flavor starting at 01/05 1800    Dietary nutrition supplements Rush; Ensure Enlive - Any Flavor starting at 01/05 1300    Diet Adult Regular (IDDSI Level 7): Regular starting at 12/29 1631            Based on the following criteria, this patient qualifies for intravenous to oral conversion:  [x] The patients gastrointestinal tract is functioning (tolerating medications via oral or enteral route for 24 hours and tolerating food or enteral feeds for 24 hours).  [x] The patient shows clinical improvement (afebrile for at least 24 hours and white blood cell count downtrending or normalized). Additionally, the patient must be non-neutropenic (absolute neutrophil count >500 cells/mm3).  [x] For antimicrobials, the patient has received IV therapy for at least 24 hours.    IV medication ciprofloxacin will be changed to oral medication ciprofloxacin    Pharmacist's Name: Emerald Adler  Pharmacist's Extension: [phone]

## 2023-01-10 NOTE — PT/OT/SLP PROGRESS
Occupational Therapy   Treatment    Name: Zandra Veloz  MRN: [id number]  Admitting Diagnosis:  Urinary tract infection       Recommendations:     Discharge Recommendations: rehabilitation facility, nursing facility, skilled  Discharge Equipment Recommendations:   (to be determined)  Barriers to discharge:  None    Assessment:     Zandra Veloz is a 88 y.o. female with a medical diagnosis of Urinary tract infection.  She presents with complaint of (R) shoulder pain and generally not feeling well. Pt agreed to UE exercises and transfer to chair. Performance deficits affecting function are weakness, impaired endurance, pain.     Rehab Prognosis:  Good; patient would benefit from acute skilled OT services to address these deficits and reach maximum level of function.       Plan:     Patient to be seen 5 x/week to address the above listed problems via self-care/home management, therapeutic activities, therapeutic exercises  Plan of Care Expires: 02/06/23  Plan of Care Reviewed with: patient    Subjective     Pain/Comfort:  Pain Rating 1: 6/10  Location - Side 1: Right  Location 1: shoulder  Pain Rating Post-Intervention 1: 6/10    Objective:     Communicated with: CLYDE Sinha prior to session.  Patient found HOB elevated with peripheral IV, chao catheter upon OT entry to room.    General Precautions: Standard, fall    Orthopedic Precautions:LLE non weight bearing, LLE posterior precautions  Braces: N/A  Respiratory Status: Room air     Occupational Performance:     Bed Mobility:    Patient completed Supine to Sit with maximal assistance and x 2 persons     Functional Mobility/Transfers:  Patient completed Bed <> Chair Transfer using Slide Board technique with maximal assistance and of 2 persons with slide board  Functional Mobility: NT    Activities of Daily Living:  Upper Body Dressing: maximal assistance isac villatoro as a melanie      [facility] 6 Click ADL:      Treatment & Education:  Pt  performed UE ROM/Strengthening exercises.AAROM x 2 sets of 15 reps to tolerance (R) shoulder flexion/extension and adduction/abduction.1 lb hand wt (R) elbow and wrist flexion/extension. 1 lb hand wt (L) shoulder flexion/extension and adduction/abduction. 2 lb hand wt x 2 sets of 15 reps (L) elbow and wrist flexion/extension.    Patient left up in chair with all lines intact, call button in reach, nurse notified, and daughter and sitter present    GOALS:   Multidisciplinary Problems       Occupational Therapy Goals          Problem: Occupational Therapy    Goal Priority Disciplines Outcome Interventions   Occupational Therapy Goal     OT, PT/OT Ongoing, Progressing    Description: STG: (In 2 weeks)  Pt will perform grooming with min(A)  Pt will bathe with setup and mod (A)  Pt will perform UE dressing with setup and min(A)  Pt will perform (L) UE AROM to 3/4 full range  Pt will sit EOB x 5 min with CGA assistance  Pt will transfer bed/chair/bsc with mod(A)  Pt will tolerate 20 minutes of tx without fatigue      LT.Restore to max I with self care and mobility.                          Time Tracking:     OT Date of Treatment: 23  OT Start Time: 1124  OT Stop Time: 1157  OT Total Time (min): 33 min    Billable Minutes:Therapeutic Activity 15  Therapeutic Exercise 15    OT/BRIGHT: OT          2023

## 2023-01-10 NOTE — PROGRESS NOTES
[facility] Hospital Medicine  Progress Note    Patient Name: Zandra Veloz  MRN: [id number]  Patient Class: IP- Inpatient   Admission Date: 12/29/2022  Length of Stay: 12 days  Attending Physician: Sahi Guerra DO  Primary Care Provider: Brandon Thornton MD        Subjective:     Principal Problem:Urinary tract infection        HPI:  HPI:   88-year-old lady seen emergency room department.  Patient presents after having a fall when she was trying to go to the restroom at her home.  Patient sustained a left femur fracture.  Patient complains of moderate to severe pain in the left hip area.  Patient also complains of chest tightness, she rates it a 5/10 in the chest tightness has been intubate.  Patient also was seen in emergency room department earlier this week per her daughter.  Patient is found to have dehydration and a urinary tract infection.  Current she denies any shortness of breath.  She does not give a history of coronary artery disease.        Procedure(s) (LRB):  REVISION, TOTAL ARTHROPLASTY, HIP, VICTORIANO PROSTHETIC FX (Left)       Hospital Course:   12/19 - records reviewed. Fall without LOC and has left hip fx. No other complaints currently. Seen by cardiology with some CP felt musculoskeletal.  Echo mild AS and mod MR with nl EF. Age increase cardiac risk for surgery but discussed with daughter surgery is urgent and see no benefit in delay medically. Feel low to moderate risk for surgery. Question about UTI. No symptoms. Had UTI in 10.22 not surprising. Lab to do culture on urine in lab. Cover with ATN for prior culture with ATB started. Discussed with Dr Singer and cardiology.  12/20 Tachycardia and elevated BP. Cardiology adjusted meds. Plan hold off surgery until 12/22 to adjust meds and treat UTI. Family in room. Pt not sleeping well.   12/21 Didn't sleep well prior night. Apparently been on xanax for a time. Also recently started on Neurontin. Family with  question. No recent BM. Some increase stool on KUB but not severe. Surgery for am. BP some up but better. HR good.   12/22 Seen prior to surgery and apparently add better night. Sore mouth / throat better with mycostatin. For surgery. Family in room.  12/23 patient with pain but otherwise seemed to be doing relatively well.  Daughter in room.  Apparently been taking prednisone for some time and this was restarted per family request.  Look into swing bed placement  12/24 patient had better night rested and everyone's in better spirits today.  Tolerating some diet.   Therapy worked with patient.  Look into swing bed placement next week  12/25 remained in good spirits.  Less pain.  Had good bowel movement and ordered Dulcolax not given.  Because of dressing to leg, Burk was not removed to keep it from getting soiled.  Wound care to check 12/27.  Discuss this with patient and daughter.  On antibiotics for UTI  12/26-will dc to swingbed once accepted. Needs continued wound care.  12/27-DC when bed available  12/28- issues with wound care yesterday.  Dr. Singer had to come remove dressings himself due to adherence to subcutaneous tissue.  Family refusing transfer to [facility] due to lack of specially trained wound care team availability.  Planning to transfer to specialty but resistant to that as well and requesting to speak to administration.    12/29- agreeable to transfer to specialty.  This will be the best place for her to receive wound care.  DC after family tours     12/29-needs continued wound care and [facility]    12/30-doing well today, still with pain         Overview/Hospital Course:  12/31-having some pain this am  1/1- no complaints  1/2- doing well  1/3- continue wound care, last day cefepime tomorrow  1/4- some pain this AM.  Delay in mediations dues to staffing issues. Dsicussed with RN  1/5- no issues overnight  1/6- Still with pain, hip incision looks good  1/7-diclofenac for  shoulder pain  1/8-decrease laxatives, change benzos and carlo to PRN per patient request  1/9 some confusion overnight  1/10- no more confusion, doing well      Interval History: naeo    Review of Systems   Constitutional:  Negative for chills, fatigue, fever and unexpected weight change.   HENT:  Negative for congestion, mouth sores and sore throat.    Eyes:  Negative for photophobia and visual disturbance.   Respiratory:  Negative for cough, chest tightness, shortness of breath and wheezing.    Cardiovascular:  Negative for chest pain, palpitations and leg swelling.   Gastrointestinal:  Negative for abdominal pain, diarrhea, nausea and vomiting.   Endocrine: Negative for cold intolerance and heat intolerance.   Genitourinary:  Negative for difficulty urinating, dysuria, frequency and urgency.   Musculoskeletal:  Positive for arthralgias. Negative for back pain and myalgias.   Skin:  Negative for pallor and rash.   Neurological:  Negative for tremors, seizures, syncope, weakness, numbness and headaches.   Hematological:  Does not bruise/bleed easily.   Psychiatric/Behavioral:  Negative for agitation, confusion, hallucinations and suicidal ideas.    Objective:     Vital Signs (Most Recent):  Temp: 99.3 °F (37.4 °C) (01/10/23 1200)  Pulse: 60 (01/10/23 1200)  Resp: 19 (01/10/23 1200)  BP: (!) 143/70 (01/10/23 1200)  SpO2: (!) 94 % (01/10/23 1200)   Vital Signs (24h Range):  Temp:  [97.6 °F (36.4 °C)-99.7 °F (37.6 °C)] 99.3 °F (37.4 °C)  Pulse:  [53-65] 60  Resp:  [18-19] 19  SpO2:  [92 %-97 %] 94 %  BP: (143-167)/(51-71) 143/70     Weight: 59.1 kg (130 lb 3.2 oz)  Body mass index is 21.01 kg/m².    Intake/Output Summary (Last 24 hours) at 1/10/2023 1435  Last data filed at 1/10/2023 0630  Gross per 24 hour   Intake --   Output 1300 ml   Net -1300 ml        Physical Exam  Vitals reviewed.   Constitutional:       Appearance: Normal appearance.   HENT:      Head: Normocephalic and atraumatic.   Eyes:      Extraocular  Movements: Extraocular movements intact.   Cardiovascular:      Rate and Rhythm: Normal rate and regular rhythm.      Pulses: Normal pulses.   Pulmonary:      Effort: Pulmonary effort is normal.      Breath sounds: Normal breath sounds.   Abdominal:      General: Abdomen is flat.      Palpations: Abdomen is soft.   Musculoskeletal:         General: Tenderness and signs of injury present.      Comments: Dressed left lower extremity, painful to touch   Skin:     General: Skin is warm and dry.      Capillary Refill: Capillary refill takes less than 2 seconds.   Neurological:      General: No focal deficit present.      Mental Status: She is alert and oriented to person, place, and time.   Psychiatric:         Mood and Affect: Mood normal.         Behavior: Behavior normal.       Significant Labs: All pertinent labs within the past 24 hours have been reviewed.    Significant Imaging: I have reviewed all pertinent imaging results/findings within the past 24 hours.      Assessment/Plan:      * Urinary tract infection  Treatment completed      Disruption of external surgical wound        Wounds and injuries        Open wound of left lower leg  Wound care      Multiple skin tears  Wound care      Delmy-prosthetic fracture around prosthetic hip  S/P fixation with Pomierski      Lumbar radiculopathy  Pain management  PT      Hypertension  Adjust medications as needed      Arthritis  Pain management        VTE Risk Mitigation (From admission, onward)         Ordered     apixaban tablet 2.5 mg  2 times daily         12/29/22 1630                Discharge Planning   YANIV:      Code Status: Full Code   Is the patient medically ready for discharge?:     Reason for patient still in hospital (select all that apply): Treatment  Discharge Plan A: Skilled Nursing Facility                  Shai Guerra DO  Department of Hospital Medicine   [facility]

## 2023-01-11 LAB
ANION GAP SERPL CALCULATED.3IONS-SCNC: 16 MMOL/L (ref 7–16)
BUN SERPL-MCNC: 16 MG/DL (ref 7–18)
BUN/CREAT SERPL: 20 (ref 6–20)
CALCIUM SERPL-MCNC: 8.6 MG/DL (ref 8.5–10.1)
CHLORIDE SERPL-SCNC: 99 MMOL/L (ref 98–107)
CO2 SERPL-SCNC: 24 MMOL/L (ref 21–32)
CREAT SERPL-MCNC: 0.8 MG/DL (ref 0.55–1.02)
EGFR (NO RACE VARIABLE) (RUSH/TITUS): 71 ML/MIN/1.73M²
GLUCOSE SERPL-MCNC: 84 MG/DL (ref 74–106)
POTASSIUM SERPL-SCNC: 4.6 MMOL/L (ref 3.5–5.1)
SODIUM SERPL-SCNC: 134 MMOL/L (ref 136–145)

## 2023-01-11 PROCEDURE — 25000003 PHARM REV CODE 250: Performed by: STUDENT IN AN ORGANIZED HEALTH CARE EDUCATION/TRAINING PROGRAM

## 2023-01-11 PROCEDURE — 99231 SBSQ HOSP IP/OBS SF/LOW 25: CPT | Mod: ,,, | Performed by: STUDENT IN AN ORGANIZED HEALTH CARE EDUCATION/TRAINING PROGRAM

## 2023-01-11 PROCEDURE — 99231 PR SUBSEQUENT HOSPITAL CARE,LEVL I: ICD-10-PCS | Mod: ,,, | Performed by: STUDENT IN AN ORGANIZED HEALTH CARE EDUCATION/TRAINING PROGRAM

## 2023-01-11 PROCEDURE — 36415 COLL VENOUS BLD VENIPUNCTURE: CPT | Performed by: STUDENT IN AN ORGANIZED HEALTH CARE EDUCATION/TRAINING PROGRAM

## 2023-01-11 PROCEDURE — 11000001 HC ACUTE MED/SURG PRIVATE ROOM

## 2023-01-11 PROCEDURE — 99232 SBSQ HOSP IP/OBS MODERATE 35: CPT | Mod: ,,, | Performed by: NURSE PRACTITIONER

## 2023-01-11 PROCEDURE — 63600175 PHARM REV CODE 636 W HCPCS: Performed by: STUDENT IN AN ORGANIZED HEALTH CARE EDUCATION/TRAINING PROGRAM

## 2023-01-11 PROCEDURE — 97110 THERAPEUTIC EXERCISES: CPT

## 2023-01-11 PROCEDURE — 80048 BASIC METABOLIC PNL TOTAL CA: CPT | Performed by: STUDENT IN AN ORGANIZED HEALTH CARE EDUCATION/TRAINING PROGRAM

## 2023-01-11 PROCEDURE — 97530 THERAPEUTIC ACTIVITIES: CPT

## 2023-01-11 PROCEDURE — 99232 PR SUBSEQUENT HOSPITAL CARE,LEVL II: ICD-10-PCS | Mod: ,,, | Performed by: NURSE PRACTITIONER

## 2023-01-11 PROCEDURE — 25000003 PHARM REV CODE 250: Performed by: NURSE PRACTITIONER

## 2023-01-11 RX ORDER — MORPHINE SULFATE 2 MG/ML
2 INJECTION, SOLUTION INTRAMUSCULAR; INTRAVENOUS EVERY 6 HOURS PRN
Status: DISCONTINUED | OUTPATIENT
Start: 2023-01-11 | End: 2023-01-17

## 2023-01-11 RX ORDER — PROCHLORPERAZINE MALEATE 10 MG
10 TABLET ORAL 3 TIMES DAILY PRN
Status: DISCONTINUED | OUTPATIENT
Start: 2023-01-11 | End: 2023-02-20 | Stop reason: HOSPADM

## 2023-01-11 RX ORDER — MORPHINE SULFATE 2 MG/ML
2 INJECTION, SOLUTION INTRAMUSCULAR; INTRAVENOUS EVERY 6 HOURS PRN
Status: DISCONTINUED | OUTPATIENT
Start: 2023-01-11 | End: 2023-01-11

## 2023-01-11 RX ORDER — ONDANSETRON 4 MG/1
4 TABLET, ORALLY DISINTEGRATING ORAL ONCE
Status: COMPLETED | OUTPATIENT
Start: 2023-01-11 | End: 2023-01-11

## 2023-01-11 RX ADMIN — HYDRALAZINE HYDROCHLORIDE 25 MG: 25 TABLET ORAL at 10:01

## 2023-01-11 RX ADMIN — DOCUSATE SODIUM 100 MG: 100 CAPSULE, LIQUID FILLED ORAL at 08:01

## 2023-01-11 RX ADMIN — PROCHLORPERAZINE MALEATE 10 MG: 10 TABLET ORAL at 05:01

## 2023-01-11 RX ADMIN — LEVOTHYROXINE SODIUM 125 MCG: 0.12 TABLET ORAL at 05:01

## 2023-01-11 RX ADMIN — CIPROFLOXACIN 500 MG: 250 TABLET, FILM COATED ORAL at 08:01

## 2023-01-11 RX ADMIN — NYSTATIN 500000 UNITS: 100000 SUSPENSION ORAL at 09:01

## 2023-01-11 RX ADMIN — ALPRAZOLAM 1 MG: 0.25 TABLET ORAL at 08:01

## 2023-01-11 RX ADMIN — MORPHINE SULFATE 2 MG: 2 INJECTION, SOLUTION INTRAMUSCULAR; INTRAVENOUS at 02:01

## 2023-01-11 RX ADMIN — HYDRALAZINE HYDROCHLORIDE 25 MG: 25 TABLET ORAL at 05:01

## 2023-01-11 RX ADMIN — NYSTATIN 500000 UNITS: 100000 SUSPENSION ORAL at 08:01

## 2023-01-11 RX ADMIN — PREDNISONE 5 MG: 5 TABLET ORAL at 09:01

## 2023-01-11 RX ADMIN — CIPROFLOXACIN 500 MG: 250 TABLET, FILM COATED ORAL at 09:01

## 2023-01-11 RX ADMIN — GABAPENTIN 100 MG: 100 CAPSULE ORAL at 09:01

## 2023-01-11 RX ADMIN — NYSTATIN 500000 UNITS: 100000 SUSPENSION ORAL at 12:01

## 2023-01-11 RX ADMIN — HYDROCODONE BITARTRATE AND ACETAMINOPHEN 2 TABLET: 10; 325 TABLET ORAL at 06:01

## 2023-01-11 RX ADMIN — GABAPENTIN 100 MG: 100 CAPSULE ORAL at 08:01

## 2023-01-11 RX ADMIN — DILTIAZEM HYDROCHLORIDE 120 MG: 120 CAPSULE, COATED, EXTENDED RELEASE ORAL at 09:01

## 2023-01-11 RX ADMIN — ONDANSETRON 4 MG: 4 TABLET, ORALLY DISINTEGRATING ORAL at 10:01

## 2023-01-11 RX ADMIN — DOCUSATE SODIUM 100 MG: 100 CAPSULE, LIQUID FILLED ORAL at 09:01

## 2023-01-11 RX ADMIN — APIXABAN 2.5 MG: 2.5 TABLET, FILM COATED ORAL at 09:01

## 2023-01-11 RX ADMIN — NYSTATIN 500000 UNITS: 100000 SUSPENSION ORAL at 05:01

## 2023-01-11 RX ADMIN — Medication: at 01:01

## 2023-01-11 RX ADMIN — APIXABAN 2.5 MG: 2.5 TABLET, FILM COATED ORAL at 08:01

## 2023-01-11 RX ADMIN — FAMOTIDINE 20 MG: 20 TABLET ORAL at 09:01

## 2023-01-11 RX ADMIN — HYDROCODONE BITARTRATE AND ACETAMINOPHEN 2 TABLET: 10; 325 TABLET ORAL at 12:01

## 2023-01-11 RX ADMIN — GABAPENTIN 100 MG: 100 CAPSULE ORAL at 02:01

## 2023-01-11 RX ADMIN — PANTOPRAZOLE SODIUM 40 MG: 40 TABLET, DELAYED RELEASE ORAL at 09:01

## 2023-01-11 NOTE — SUBJECTIVE & OBJECTIVE
Interval History: naeo    Review of Systems   Constitutional:  Negative for chills, fatigue, fever and unexpected weight change.   HENT:  Negative for congestion, mouth sores and sore throat.    Eyes:  Negative for photophobia and visual disturbance.   Respiratory:  Negative for cough, chest tightness, shortness of breath and wheezing.    Cardiovascular:  Negative for chest pain, palpitations and leg swelling.   Gastrointestinal:  Negative for abdominal pain, diarrhea, nausea and vomiting.   Endocrine: Negative for cold intolerance and heat intolerance.   Genitourinary:  Negative for difficulty urinating, dysuria, frequency and urgency.   Musculoskeletal:  Positive for arthralgias. Negative for back pain and myalgias.   Skin:  Negative for pallor and rash.   Neurological:  Negative for tremors, seizures, syncope, weakness, numbness and headaches.   Hematological:  Does not bruise/bleed easily.   Psychiatric/Behavioral:  Negative for agitation, confusion, hallucinations and suicidal ideas.    Objective:     Vital Signs (Most Recent):  Temp: 98.1 °F (36.7 °C) (01/11/23 0500)  Pulse: (!) 58 (01/11/23 0500)  Resp: 18 (01/11/23 0641)  BP: (!) 151/60 (01/11/23 0547)  SpO2: (!) 58 % (01/11/23 0547)   Vital Signs (24h Range):  Temp:  [97.9 °F (36.6 °C)-99.7 °F (37.6 °C)] 98.1 °F (36.7 °C)  Pulse:  [52-65] 58  Resp:  [18-19] 18  SpO2:  [58 %-96 %] 58 %  BP: (113-167)/(48-70) 151/60     Weight: 59.1 kg (130 lb 3.2 oz)  Body mass index is 21.01 kg/m².  No intake or output data in the 24 hours ending 01/11/23 0710     Physical Exam  Vitals reviewed.   Constitutional:       Appearance: Normal appearance.   HENT:      Head: Normocephalic and atraumatic.   Eyes:      Extraocular Movements: Extraocular movements intact.   Cardiovascular:      Rate and Rhythm: Normal rate and regular rhythm.      Pulses: Normal pulses.   Pulmonary:      Effort: Pulmonary effort is normal.      Breath sounds: Normal breath sounds.   Abdominal:       General: Abdomen is flat.      Palpations: Abdomen is soft.   Musculoskeletal:         General: Tenderness and signs of injury present.      Comments: Dressed left lower extremity, painful to touch   Skin:     General: Skin is warm and dry.      Capillary Refill: Capillary refill takes less than 2 seconds.   Neurological:      General: No focal deficit present.      Mental Status: She is alert and oriented to person, place, and time.   Psychiatric:         Mood and Affect: Mood normal.         Behavior: Behavior normal.       Significant Labs: All pertinent labs within the past 24 hours have been reviewed.    Significant Imaging: I have reviewed all pertinent imaging results/findings within the past 24 hours.

## 2023-01-11 NOTE — NURSING
During previous shift nurse state pt IV would not flush so it was removed. No IV in place. When I saw pt to do vital signs and to let her know about her needing a new IV, pt state if we are not using it she did not want it.  I notified Dr. Solis and she said do not restart and will re-eval in A.M.

## 2023-01-11 NOTE — PLAN OF CARE
Problem: Adult Inpatient Plan of Care  Goal: Absence of Hospital-Acquired Illness or Injury  Intervention: Identify and Manage Fall Risk  Flowsheets (Taken 1/10/2023 1803)  Safety Promotion/Fall Prevention:   assistive device/personal item within reach   family to remain at bedside   medications reviewed   nonskid shoes/socks when out of bed     Problem: Skin Injury Risk Increased  Goal: Skin Health and Integrity  Intervention: Optimize Skin Protection  Flowsheets (Taken 1/10/2023 1803)  Pressure Reduction Techniques: pressure points protected  Pressure Reduction Devices:   positioning supports utilized   specialty bed utilized  Skin Protection: incontinence pads utilized  Head of Bed (HOB) Positioning: HOB at 30 degrees     Problem: Fall Injury Risk  Goal: Absence of Fall and Fall-Related Injury  Intervention: Identify and Manage Contributors  Flowsheets (Taken 1/10/2023 1803)  Self-Care Promotion: BADL personal objects within reach  Medication Review/Management: medications reviewed

## 2023-01-11 NOTE — PROGRESS NOTES
"[facility]  Adult Nutrition  First Assessment Note         Reason for Assessment  Reason For Assessment: length of stay/ follow up note  Nutrition Risk Screen: no indicators present    RD follow up assessment.    Recommend continue current diet order as medically appropriate and tolerated. Recommend continue Ensure Enlive oral nutrition supplement TID to promote adequate oral intake.     Recommend continue Arginaid (modular equivalent to Vamshi, can be used as Vamshi out of stock d/t supply issue) BID to aid in wound healing. Recommend adding 500mg Vit C BID + 220mg ZnSO4 BID + MVI daily to aid in wound healing.    Per MD notes,  "12/29-needs continued wound care and therapy. DC to [facility]  12/30-doing well today, still with pain  12/31-having some pain this am  1/1- no complaints  1/2- doing well  1/3- continue wound care, last day cefepime tomorrow  1/4- some pain this AM.  Delay in mediations dues to staffing issues. Dsicussed with RN  1/5- no issues overnight"    Patient currently receiving Regular Diet.  Ensure Enlive provides 350kcal and 20g protein each.     Patient with multiple wounds. Recommend continue Arginaid (modular equivalent to Vamshi, can be used as Vamshi out of stock d/t supply issue) BID to aid in wound healing. Arginaid provides 30kcal each. Recommend 500mg Vit C BID + 220mg ZnSO4 BID + MVI daily to aid in wound healing.     Patient CBW and BMI considered WNL.     Last BM 1/10 per flowsheets -  pt receiving docusate sodium, senna and polyethylene glycol. Will continue to monitor PO intake, weight trend, meds, labs, updates in patient condition. RD following.    Malnutrition  Is Patient Malnourished: No    Nutrition Diagnosis  Increased protein Needs   related to Wound healing as evidenced by pt with multiple wounds    Nutrition Diagnosis Status: Chronic/ continues    Nutrition Risk  Level of Risk/Frequency of Follow-up: moderate   Chewing or Swallowing " Difficulty?: No Chewing or swallowing difficulty    Estimated/Assessed Needs    Temp: 99.9 °F (37.7 °C)Oral  Weight Used For Calorie Calculations: 59.1 kg (130 lb 4.7 oz)   Energy Need Method: Kcal/kg (35-40 kcal/kg) Energy Calorie Requirements (kcal): 2069-2364 kcal  Weight Used For Protein Calculations: 59.1 kg (130 lb 4.7 oz)  Protein Requirements: 59-118g pro (1.0-2.0g pro/kg d/t geriatric, wounds)  Estimated Fluid Requirement Method: RDA Method    RDA Method (mL): 2069     Nutrition Prescription / Recommendations  Recommendation/Intervention: Recommend continue current diet order as medically appropriate and tolerated. Recommend adding Ensure Enlive oral nutrition supplement TID to promote adequate oral intake. Recommend adding Arginaid (modular equivalent to Vamshi, can be used as Vamshi out of stock d/t supply issue) BID to aid in wound healing. Recommend adding 500mg Vit C BID + 220mg ZnSO4 BID + MVI daily to aid in wound healing.  Goals: PO intake %, weight maintenance  Nutrition Goal Status: new  Current Diet Order: Regular Diet  Nutrition Order Comments: Appropriate  Recommended Diet: Regular  Recommended Oral Supplement: Vamshi [90 kcals, 2.5g Protein, 10g Carbs(3g Sugar), 7g L-Arginine, 7g L-Glutamine, Vitamin C 300mg, 9.5mg Zinc] twice daily and Ensure Enlive [360 kcals, 20g Protein, 44g Carbs(3g Fiber, 20g Sugar), 11g Fat three times daily  Is Nutrition Support Recommended: No  Is Education Recommended: No    Monitor and Evaluation  % current Intake: P.O. intake of 25 - 50 %  % intake to meet estimated needs: 75 - 100 %  Food and Nutrient Intake: energy intake, food and beverage intake  Food and Nutrient Adminstration: diet order  Knowledge/Beliefs/Attitudes: food and nutrition knowledge/skill, beliefs and attitudes  Physical Activity and Function: nutrition-related ADLs and IADLs, factors affecting access to physical activity  Anthropometric Measurements: weight, weight change, body mass  "index  Biochemical Data, Medical Tests and Procedures: electrolyte and renal panel, gastrointestinal profile, glucose/endocrine profile, inflammatory profile, lipid profile  Nutrition-Focused Physical Findings: overall appearance, extremities, muscles and bones, head and eyes, skin     Current Medical Diagnosis and Past Medical History  Diagnosis: other (see comments) (urinary tract infection)  Past Medical History:   Diagnosis Date    Hypertension     Osteoporosis     Thyroid disorder      Nutrition/Diet History  Spiritual, Cultural Beliefs, Mosque Practices, Values that Affect Care: no  Food Allergies: NKFA    Lab/Procedures/Meds  Recent Labs   Lab 01/11/23  0440   *   K 4.6   BUN 16   CREATININE 0.80   GLU 84   CALCIUM 8.6   CL 99     NA low -replete as needed  Last A1c: No results found for: HGBA1C  Lab Results   Component Value Date    RBC 3.04 (L) 01/09/2023    HGB 8.9 (L) 01/09/2023    HCT 29.3 (L) 01/09/2023    MCV 96.4 (H) 01/09/2023    MCH 29.3 01/09/2023    MCHC 30.4 (L) 01/09/2023     Pertinent Labs Reviewed: reviewed  Pertinent Medications Reviewed: reviewed  Alprazolam, apixaban, bisacodyl, diltiazem, docusate sodium, famotidine, gabapentin, hydralazine, levothyroxine, lisinopril, nystatin, pantoprazole, polyethylene glycol, prednisone, senna, hydrocodone-acetaminophen PRN    Anthropometrics  Temp: 99.9 °F (37.7 °C)  Height: 5' 6" (167.6 cm)  Height (inches): 66 in  Weight Method: Bed Scale  Weight: 59.1 kg (130 lb 3.2 oz)  Weight (lb): 130.2 lb  Ideal Body Weight (IBW), Female: 130 lb  % Ideal Body Weight, Female (lb): 100.15 %  BMI (Calculated): 21     Nutrition by Nursing  Diet/Nutrition Received: regular  Intake (%): 75%  Diet/Feeding Assistance: tray set-up  Diet/Feeding Tolerance: good  Last Bowel Movement: 01/10/23    Nutrition Follow-Up  RD Follow-up?: Yes  "

## 2023-01-11 NOTE — PLAN OF CARE
Problem: Occupational Therapy  Goal: Occupational Therapy Goal  Description: STG: (In 2 weeks)  Pt will perform grooming with min(A)  Pt will bathe with setup and mod (A)  Pt will perform UE dressing with setup and min(A)  Pt will perform (L) UE AROM to 3/4 full range  Pt will sit EOB x 5 min with CGA assistance  Pt will transfer bed/chair/bsc with mod(A)  Pt will tolerate 20 minutes of tx without fatigue      LT.Restore to max I with self care and mobility.     Outcome: Ongoing, Progressing   Pt is making progress toward goals. Tx has focused on EOB sitting, UE exercises and sliding board transfers to chair. Pt is limited due to pain in (R) shoulder and (L) arm.  Upper Body Dressing: maximal assistance donjessica dillardn as a robe  Pt sat EOB x approx 5 min with SBA today.     Functional Mobility/Transfers:  Patient completed Bed <> Chair Transfer using Slide Board technique with moderate assistance and of 2 persons with transfer pad to assist  Plan is to continue tx addressing goals.

## 2023-01-11 NOTE — PROGRESS NOTES
[facility]  Wound care and Hyperbarics JOSE  Progress Note    Patient Name: Zandra Veloz  MRN: [id number]  Admission Date: 12/29/2022  Hospital Length of Stay: 13 days  Attending Physician: Shai Guerra DO  Primary Care Provider: Brandon Thornton MD         Subjective:     HPI:  87 yo female with multiple skin tears, traumatic wounds, and surgical incision to left thigh. She was admitted following a fall on 12/17 when she was trying to go to the restroom at her home.  Patient sustained a left femur fracture. She is status post ORIF on 12/20. Staples intact to left hip. Moderate amount of green drainage noted, cultures obtained today. Left lower leg sutures removed today. Slough noted in wound bed today, adjustments to local wound care. Significant PMH includes hypertension, bradycardia, osteoarthritis, hypothyroidism, osteoporosis, and multiple falls. Wound healing is complicated by limited mobility, multiple falls, infection, and pain.     Hospital Course:   1/5/23 - Complains of pain to right posterior knee, venous doppler ordered. Sutures and every other staple removed today. New local wound care orders. Cultures pending.   1/9/23 - Wounds on lower extremities healing well. Continue current POC. Bruising noted to left posterior knee, protect with mepitel and optifoam. Dressing to incision site saturated with green drainage. Cultures negative. Continue with drawtex and notify ortho of drainage.   1/11/2023 - Wounds are healing well. Continue current POC. Ultrasound pending, preliminary results concerning for fluid pocket, Dr. Dixon going to evaluate today.           Scheduled Meds:   ALPRAZolam  1 mg Oral QHS    apixaban  2.5 mg Oral BID    ciprofloxacin HCl  500 mg Oral Q12H    diltiaZEM  120 mg Oral Daily    docusate sodium  100 mg Oral BID    famotidine  20 mg Oral Daily    gabapentin  100 mg Oral TID    hydrALAZINE  25 mg Oral Q8H    levothyroxine  125 mcg  Oral Before breakfast    lisinopriL  20 mg Oral Daily    metoprolol tartrate  50 mg Oral BID    nystatin  500,000 Units Oral QID    pantoprazole  40 mg Oral Daily    polyethylene glycol  34 g Oral Daily    predniSONE  5 mg Oral Daily    senna  1 tablet Oral Daily     Continuous Infusions:  PRN Meds:dextrose 50%, dextrose 50%, diclofenac sodium, glucagon (human recombinant), glucose, glucose, HYDROcodone-acetaminophen, magnesium oxide, melatonin, morphine, naloxone, potassium bicarbonate, potassium bicarbonate, potassium bicarbonate, potassium, sodium phosphates, potassium, sodium phosphates, potassium, sodium phosphates, sodium chloride 0.9%, white petrolatum    Review of Systems   Constitutional:  Positive for activity change. Negative for chills and fever.   Respiratory:  Negative for chest tightness and shortness of breath.    Cardiovascular:  Positive for leg swelling. Negative for chest pain and palpitations.   Musculoskeletal:  Positive for arthralgias, back pain, gait problem and joint swelling.   Skin:  Positive for color change and wound.        wound   Neurological:  Positive for weakness.   Psychiatric/Behavioral:  Negative for agitation, behavioral problems, confusion and self-injury.    Objective:     Vital Signs (Most Recent):  Temp: 99.5 °F (37.5 °C) (01/11/23 1322)  Pulse: (!) 51 (01/11/23 1322)  Resp: 18 (01/11/23 1456)  BP: (!) 142/56 (01/11/23 1322)  SpO2: (!) 94 % (01/11/23 1322)   Vital Signs (24h Range):  Temp:  [97.9 °F (36.6 °C)-99.9 °F (37.7 °C)] 99.5 °F (37.5 °C)  Pulse:  [51-58] 51  Resp:  [18-20] 18  SpO2:  [58 %-96 %] 94 %  BP: (113-152)/(45-60) 142/56     Weight: 54.6 kg (120 lb 4.8 oz)  Body mass index is 19.42 kg/m².    Physical Exam  Vitals reviewed.   Constitutional:       Appearance: Normal appearance.   HENT:      Head: Normocephalic.   Cardiovascular:      Rate and Rhythm: Normal rate and regular rhythm.      Pulses: Normal pulses.      Heart sounds: Murmur heard.    Pulmonary:      Effort: Pulmonary effort is normal.      Breath sounds: Normal breath sounds.   Musculoskeletal:         General: Swelling, tenderness, deformity and signs of injury present.      Right lower leg: Edema present.      Left lower leg: Edema present.   Skin:     Findings: Bruising and erythema present.      Comments: Wound, see LDA for photo/measurements   Neurological:      Mental Status: She is alert. Mental status is at baseline.      Motor: Weakness present.      Gait: Gait abnormal.       Assessment/Plan:     Disruption of external surgical wound  Ultrasound pending, preliminary report concerning for fluid pocket. Dr. Singer to evaluate.  Clean with vashe  Apply drawtex to wound  Cover and secure with abd pad and paper tape  Change M, W, F and PRN for soilage    Open wound of left lower leg                  Clean with vashe  Apply mepitel and  aquacel ag advantage to wound.  Cover and secure with 4x4s, kerlix, and paper tape  Change every M, W,F and PRN for soilage  Keep leg elevated and avoid pressure on wound.          Multiple skin tears  Clean with vashe  Apply mepitel and  aquacel ag advantage to wound.  Cover and secure with 4x4s, kerlix, and paper tape  Change every M, W,F and PRN for soilage        ZURDO San  Wound care and Hyperbarics [facility]  [facility]

## 2023-01-11 NOTE — PLAN OF CARE
Problem: Adult Inpatient Plan of Care  Goal: Absence of Hospital-Acquired Illness or Injury  Intervention: Identify and Manage Fall Risk  1/10/2023 1805 by Yolanda Sinha RN  Flowsheets (Taken 1/10/2023 1805)  Safety Promotion/Fall Prevention:   assistive device/personal item within reach   medications reviewed   nonskid shoes/socks when out of bed  1/10/2023 1803 by Yolanda Sinha RN  Flowsheets (Taken 1/10/2023 1803)  Safety Promotion/Fall Prevention:   assistive device/personal item within reach   family to remain at bedside   medications reviewed   nonskid shoes/socks when out of bed     Problem: Skin Injury Risk Increased  Goal: Skin Health and Integrity  Intervention: Optimize Skin Protection  1/10/2023 1805 by Yolanda Sinha RN  Flowsheets (Taken 1/10/2023 1805)  Pressure Reduction Techniques: pressure points protected  Pressure Reduction Devices:   pressure-redistributing mattress utilized   specialty bed utilized  Skin Protection: incontinence pads utilized  Head of Bed (HOB) Positioning: HOB at 20-30 degrees  1/10/2023 1803 by Yolanda Sinha RN  Flowsheets (Taken 1/10/2023 1803)  Pressure Reduction Techniques: pressure points protected  Pressure Reduction Devices:   positioning supports utilized   specialty bed utilized  Skin Protection: incontinence pads utilized  Head of Bed (HOB) Positioning: HOB at 30 degrees     Problem: Fall Injury Risk  Goal: Absence of Fall and Fall-Related Injury  Intervention: Identify and Manage Contributors  Flowsheets (Taken 1/10/2023 1803)  Self-Care Promotion: BADL personal objects within reach  Medication Review/Management: medications reviewed

## 2023-01-11 NOTE — PT/OT/SLP PROGRESS
"Physical Therapy Treatment    Patient Name:  Zandra Veloz   MRN:  [id number]    Recommendations:     Discharge Recommendations: rehabilitation facility, nursing facility, skilled  Discharge Equipment Recommendations: other (see comments) (to be determined)  Barriers to discharge:  ongoing medical treatment; decreased functional moblity    Assessment:     Zandra Veloz is a 88 y.o. female admitted with a medical diagnosis of Urinary tract infection.  She presents with the following impairments/functional limitations: weakness, impaired endurance, impaired self care skills, impaired functional mobility, gait instability, impaired balance, impaired skin, orthopedic precautions .  Patient refused t/f to the chair today but did participate with sitting EOB. Patient's daughter, Lisa participated with treatment to become confident with assisting patient with transfers to EOB if pt so desired.    Rehab Prognosis: Good; patient would benefit from acute skilled PT services to address these deficits and reach maximum level of function.    Recent Surgery: * No surgery found *      Plan:     During this hospitalization, patient to be seen 5 x/week to address the identified rehab impairments via gait training, therapeutic activities, therapeutic exercises, neuromuscular re-education and progress toward the following goals:    Plan of Care Expires:  01/30/23    Subjective     Chief Complaint: scott prosthetic fracture; degloving injury  Patient/Family Comments/goals: "I just don't want to sit in the chair"  Pain/Comfort:  Pain Rating 1: 8/10  Location - Side 1: Left  Location 1: leg  Pain Addressed 1: Reposition, Distraction, Cessation of Activity (RN administered morphine)  Pain Rating Post-Intervention 1: 6/10      Objective:     Communicated with Christiano Fiore RN prior to session.  Patient found supine with peripheral IV, chao catheter upon PT entry to room.     General Precautions: Standard, " fall  Orthopedic Precautions: LLE non weight bearing, LLE posterior precautions  Braces: N/A  Respiratory Status: Room air     Functional Mobility:  Bed Mobility:     Rolling Left:  moderate assistance  Rolling Right: moderate assistance  Supine to Sit: moderate assistance and of 2 persons  Sit to Supine: maximal assistance and of 2 persons  Transfers:     Bed to Chair: patient refused to participate with same today due to pain and general malaise      AM-PAC 6 CLICK MOBILITY  Turning over in bed (including adjusting bedclothes, sheets and blankets)?: 2  Sitting down on and standing up from a chair with arms (e.g., wheelchair, bedside commode, etc.): 1  Moving from lying on back to sitting on the side of the bed?: 2  Moving to and from a bed to a chair (including a wheelchair)?: 1  Need to walk in hospital room?: 1  Climbing 3-5 steps with a railing?: 1  Basic Mobility Total Score: 8       Treatment & Education:  Bed mobility and transfers as above  Sitting EOB x 10 minutes with min A decreasing to SBA after pt achieved midline  Bilateral lower extremity exercise x 30 reps: ankle pumps, Quad sets, glut sets, heel slides, hip abduction/adduction, straight leg raises, and Long arc quads with active assist ROM, verbal cues, and tactile cues     Patient left HOB elevated with all lines intact, call button in reach, bed alarm on, Christiano Fiore RN notified, and sitter and two daughters present..    GOALS:   Multidisciplinary Problems       Physical Therapy Goals          Problem: Physical Therapy    Goal Priority Disciplines Outcome Goal Variances Interventions   Physical Therapy Goal     PT, PT/OT Ongoing, Progressing     Description: Short Term Goals to be met by 1/10/2023    Patient will increase functional independence and safety with mobility by performing    1.   Rolling right Moderate Assist; roll left Moderate Assist  2.   Supine to sit Moderate Assist  3.   Sitting balance edge of the bed x 15 minutes Stand  by assist  4.   Sit to stand Moderate Assist  using manual assistance with gait belt and NWB L LE  5.   Bed to chair Moderate Assist using manual assistance with gait belt and NWB left LE  6.   Lower extremity exercises x 30 reps with assist as necessary and no rest breaks      Long Term Goals to be met by 2/30/2023    Patient to require less than or equal to Stand by assist for functional mobility to ease caregiver burden                        Time Tracking:     PT Received On: 01/11/23  PT Start Time: 0939     PT Stop Time: 1007  PT Total Time (min): 28 min     Billable Minutes: Therapeutic Activity 10 minutes and Therapeutic Exercise 15 minutes    Treatment Type: Treatment  PT/PTA: PT     PTA Visit Number: 0     01/11/2023

## 2023-01-11 NOTE — ASSESSMENT & PLAN NOTE
Ultrasound pending, preliminary report concerning for fluid pocket. Dr. Singer to evaluate.  Clean with vashe  Apply drawtex to wound  Cover and secure with abd pad and paper tape  Change M, W, F and PRN for soilage

## 2023-01-11 NOTE — SUBJECTIVE & OBJECTIVE
Subjective:     HPI:  87 yo female with multiple skin tears, traumatic wounds, and surgical incision to left thigh. She was admitted following a fall on 12/17 when she was trying to go to the restroom at her home.  Patient sustained a left femur fracture. She is status post ORIF on 12/20. Staples intact to left hip. Moderate amount of green drainage noted, cultures obtained today. Left lower leg sutures removed today. Slough noted in wound bed today, adjustments to local wound care. Significant PMH includes hypertension, bradycardia, osteoarthritis, hypothyroidism, osteoporosis, and multiple falls. Wound healing is complicated by limited mobility, multiple falls, infection, and pain.     Hospital Course:   1/5/23 - Complains of pain to right posterior knee, venous doppler ordered. Sutures and every other staple removed today. New local wound care orders. Cultures pending.   1/9/23 - Wounds on lower extremities healing well. Continue current POC. Bruising noted to left posterior knee, protect with mepitel and optifoam. Dressing to incision site saturated with green drainage. Cultures negative. Continue with drawtex and notify ortho of drainage.   1/11/2023 - Wounds are healing well. Continue current POC. Ultrasound pending, preliminary results concerning for fluid pocket, Dr. Dixon going to evaluate today.           Scheduled Meds:   ALPRAZolam  1 mg Oral QHS    apixaban  2.5 mg Oral BID    ciprofloxacin HCl  500 mg Oral Q12H    diltiaZEM  120 mg Oral Daily    docusate sodium  100 mg Oral BID    famotidine  20 mg Oral Daily    gabapentin  100 mg Oral TID    hydrALAZINE  25 mg Oral Q8H    levothyroxine  125 mcg Oral Before breakfast    lisinopriL  20 mg Oral Daily    metoprolol tartrate  50 mg Oral BID    nystatin  500,000 Units Oral QID    pantoprazole  40 mg Oral Daily    polyethylene glycol  34 g Oral Daily    predniSONE  5 mg Oral Daily    senna  1 tablet Oral Daily     Continuous Infusions:  PRN  Meds:dextrose 50%, dextrose 50%, diclofenac sodium, glucagon (human recombinant), glucose, glucose, HYDROcodone-acetaminophen, magnesium oxide, melatonin, morphine, naloxone, potassium bicarbonate, potassium bicarbonate, potassium bicarbonate, potassium, sodium phosphates, potassium, sodium phosphates, potassium, sodium phosphates, sodium chloride 0.9%, white petrolatum    Review of Systems   Constitutional:  Positive for activity change. Negative for chills and fever.   Respiratory:  Negative for chest tightness and shortness of breath.    Cardiovascular:  Positive for leg swelling. Negative for chest pain and palpitations.   Musculoskeletal:  Positive for arthralgias, back pain, gait problem and joint swelling.   Skin:  Positive for color change and wound.        wound   Neurological:  Positive for weakness.   Psychiatric/Behavioral:  Negative for agitation, behavioral problems, confusion and self-injury.    Objective:     Vital Signs (Most Recent):  Temp: 99.5 °F (37.5 °C) (01/11/23 1322)  Pulse: (!) 51 (01/11/23 1322)  Resp: 18 (01/11/23 1456)  BP: (!) 142/56 (01/11/23 1322)  SpO2: (!) 94 % (01/11/23 1322)   Vital Signs (24h Range):  Temp:  [97.9 °F (36.6 °C)-99.9 °F (37.7 °C)] 99.5 °F (37.5 °C)  Pulse:  [51-58] 51  Resp:  [18-20] 18  SpO2:  [58 %-96 %] 94 %  BP: (113-152)/(45-60) 142/56     Weight: 54.6 kg (120 lb 4.8 oz)  Body mass index is 19.42 kg/m².    Physical Exam  Vitals reviewed.   Constitutional:       Appearance: Normal appearance.   HENT:      Head: Normocephalic.   Cardiovascular:      Rate and Rhythm: Normal rate and regular rhythm.      Pulses: Normal pulses.      Heart sounds: Murmur heard.   Pulmonary:      Effort: Pulmonary effort is normal.      Breath sounds: Normal breath sounds.   Musculoskeletal:         General: Swelling, tenderness, deformity and signs of injury present.      Right lower leg: Edema present.      Left lower leg: Edema present.   Skin:     Findings: Bruising and erythema  present.      Comments: Wound, see LDA for photo/measurements   Neurological:      Mental Status: She is alert. Mental status is at baseline.      Motor: Weakness present.      Gait: Gait abnormal.

## 2023-01-11 NOTE — PT/OT/SLP PROGRESS
Occupational Therapy   Treatment    Name: Zandra Veloz  MRN: [id number]  Admitting Diagnosis:  Urinary tract infection       Recommendations:     Discharge Recommendations: rehabilitation facility, nursing facility, skilled  Discharge Equipment Recommendations:   (to be determined)  Barriers to discharge:  None    Assessment:     Zandra Veloz is a 88 y.o. female with a medical diagnosis of Urinary tract infection.  She presents with complaint of (R) arm pain. Pt agreed to OT treatment. Performance deficits affecting function are weakness, impaired endurance, impaired functional mobility, pain.     Rehab Prognosis:  Good; patient would benefit from acute skilled OT services to address these deficits and reach maximum level of function.       Plan:     Patient to be seen 5 x/week to address the above listed problems via self-care/home management, therapeutic activities, therapeutic exercises  Plan of Care Expires: 02/06/23  Plan of Care Reviewed with: patient, caregiver    Subjective     Pain/Comfort:  Pain Rating 1: 7/10  Location - Side 1: Left  Location 1: arm  Pain Rating Post-Intervention 1: 7/10    Objective:     Communicated with: CLYDE Sinha prior to session.  Patient found HOB elevated with peripheral IV, chao catheter upon OT entry to room.    General Precautions: Standard, fall    Orthopedic Precautions:LLE non weight bearing, LLE posterior precautions  Braces: N/A  Respiratory Status: Room air     Occupational Performance:     Bed Mobility:    Patient completed Supine to Sit with moderate assistance and 2 persons   Pt sat EOB x approx 5 min with SBA today.    Functional Mobility/Transfers:  Patient completed Bed <> Chair Transfer using Slide Board technique with moderate assistance and of 2 persons with transfer pad to assist  Functional Mobility: NT    Activities of Daily Living:        AMPAC 6 Click ADL:      Treatment & Education:  Pt performed UE strengthening exercises. 1  lb hand wt x 2 sets of 15 reps (L) shoulder flexion/extension, adduction/abduction and (B) elbow and wrist flexion/extension. Red theraband x 2 sets of 15 reps (B) elbow flexion and extension.    Pt tolerated exercises well. Pt with improvement noted with EOB sitting balance today.    Patient left up in chair with all lines intact, call button in reach, nurse notified, and sitter present    GOALS:   Multidisciplinary Problems       Occupational Therapy Goals          Problem: Occupational Therapy    Goal Priority Disciplines Outcome Interventions   Occupational Therapy Goal     OT, PT/OT Ongoing, Progressing    Description: STG: (In 2 weeks)  Pt will perform grooming with min(A)  Pt will bathe with setup and mod (A)  Pt will perform UE dressing with setup and min(A)  Pt will perform (L) UE AROM to 3/4 full range  Pt will sit EOB x 5 min with CGA assistance  Pt will transfer bed/chair/bsc with mod(A)  Pt will tolerate 20 minutes of tx without fatigue      LT.Restore to max I with self care and mobility.                          Time Tracking:     OT Date of Treatment: 01/10/23  OT Start Time:   OT Stop Time: 1459  OT Total Time (min): 26 min    Billable Minutes:Therapeutic Activity 10  Therapeutic Exercise 15    OT/BRIGHT: OT          1/10/2023

## 2023-01-11 NOTE — PT/OT/SLP PROGRESS
Physical Therapy Treatment    Patient Name:  Zandra Veloz   MRN:  [id number]    Recommendations:     Discharge Recommendations: rehabilitation facility, nursing facility, skilled  Discharge Equipment Recommendations: other (see comments) (to be determined)  Barriers to discharge:  Ongoing medical treatment    Assessment:     Zandra Veloz is a 88 y.o. female admitted with a medical diagnosis of Urinary tract infection.  She presents with the following impairments/functional limitations: weakness, impaired endurance, impaired self care skills, impaired functional mobility, gait instability, impaired balance, pain, orthopedic precautions, impaired skin, decreased ROM.    Pt demonstrated slightly increased trunk control during bed mobility, EOB static sitting, and transfer. Pt will benefit from an extended rehabilitation program to regain functional mobility and increase her independence.     Rehab Prognosis: Good; patient would benefit from acute skilled PT services to address these deficits and reach maximum level of function.    Recent Surgery: * No surgery found *      Plan:     During this hospitalization, patient to be seen 5 x/week to address the identified rehab impairments via gait training, therapeutic activities, therapeutic exercises, neuromuscular re-education and progress toward the following goals:    Plan of Care Expires:  01/30/23    Subjective     Chief Complaint: Left hip endo.  Patient/Family Comments/goals: Pt states she feels better this afternoon  Pain/Comfort:         Objective:     Communicated with RN prior to session.  Patient found supine with peripheral IV, chao catheter upon PT entry to room.     General Precautions: Standard, fall  Orthopedic Precautions: LLE non weight bearing, LLE posterior precautions  Braces: N/A  Respiratory Status: Room air     Functional Mobility:  Bed Mobility:     Supine to Sit: moderate and maximal assistance and of 2 persons  Transfers:      Bed to Chair: moderate assistance and of 2 persons with  slide board  using  pad and verbal/visual/tactile cueing for hand and foot placement      AM-PAC 6 CLICK MOBILITY          Treatment & Education:  Transfers as above    Bilateral lower extremity exercise x 15 reps: ankle pumps, short arc quads, heel slides, hip abduction/adduction, Marching, and clams  with verbal cues for sequencing and safety     Patient left up in chair with all lines intact, call button in reach, RN notified, and caregiver present.    GOALS:   Multidisciplinary Problems       Physical Therapy Goals          Problem: Physical Therapy    Goal Priority Disciplines Outcome Goal Variances Interventions   Physical Therapy Goal     PT, PT/OT Ongoing, Progressing     Description: Short Term Goals to be met by 1/10/2023    Patient will increase functional independence and safety with mobility by performing    1.   Rolling right Moderate Assist; roll left Moderate Assist  2.   Supine to sit Moderate Assist  3.   Sitting balance edge of the bed x 15 minutes Stand by assist  4.   Sit to stand Moderate Assist  using manual assistance with gait belt and NWB L LE  5.   Bed to chair Moderate Assist using manual assistance with gait belt and NWB left LE  6.   Lower extremity exercises x 30 reps with assist as necessary and no rest breaks      Long Term Goals to be met by 2/30/2023    Patient to require less than or equal to Stand by assist for functional mobility to ease caregiver burden                        Time Tracking:     PT Received On: 01/10/23  PT Start Time: 1432     PT Stop Time: 1459  PT Total Time (min): 27 min     Billable Minutes: Therapeutic Activity 15 minutes and Therapeutic Exercise 12 minutes    Treatment Type: Treatment  PT/PTA: PT     PTA Visit Number: 0     01/11/2023

## 2023-01-11 NOTE — PROGRESS NOTES
[facility] Hospital Medicine  Progress Note    Patient Name: Zandra Veloz  MRN: [id number]  Patient Class: IP- Inpatient   Admission Date: 12/29/2022  Length of Stay: 13 days  Attending Physician: Shai Guerra DO  Primary Care Provider: Brandon Thornton MD        Subjective:     Principal Problem:Urinary tract infection        HPI:  HPI:   88-year-old lady seen emergency room department.  Patient presents after having a fall when she was trying to go to the restroom at her home.  Patient sustained a left femur fracture.  Patient complains of moderate to severe pain in the left hip area.  Patient also complains of chest tightness, she rates it a 5/10 in the chest tightness has been intubate.  Patient also was seen in emergency room department earlier this week per her daughter.  Patient is found to have dehydration and a urinary tract infection.  Current she denies any shortness of breath.  She does not give a history of coronary artery disease.        Procedure(s) (LRB):  REVISION, TOTAL ARTHROPLASTY, HIP, VICTORIANO PROSTHETIC FX (Left)       Hospital Course:   12/19 - records reviewed. Fall without LOC and has left hip fx. No other complaints currently. Seen by cardiology with some CP felt musculoskeletal.  Echo mild AS and mod MR with nl EF. Age increase cardiac risk for surgery but discussed with daughter surgery is urgent and see no benefit in delay medically. Feel low to moderate risk for surgery. Question about UTI. No symptoms. Had UTI in 10.22 not surprising. Lab to do culture on urine in lab. Cover with ATN for prior culture with ATB started. Discussed with Dr Singer and cardiology.  12/20 Tachycardia and elevated BP. Cardiology adjusted meds. Plan hold off surgery until 12/22 to adjust meds and treat UTI. Family in room. Pt not sleeping well.   12/21 Didn't sleep well prior night. Apparently been on xanax for a time. Also recently started on Neurontin. Family with  question. No recent BM. Some increase stool on KUB but not severe. Surgery for am. BP some up but better. HR good.   12/22 Seen prior to surgery and apparently add better night. Sore mouth / throat better with mycostatin. For surgery. Family in room.  12/23 patient with pain but otherwise seemed to be doing relatively well.  Daughter in room.  Apparently been taking prednisone for some time and this was restarted per family request.  Look into swing bed placement  12/24 patient had better night rested and everyone's in better spirits today.  Tolerating some diet.   Therapy worked with patient.  Look into swing bed placement next week  12/25 remained in good spirits.  Less pain.  Had good bowel movement and ordered Dulcolax not given.  Because of dressing to leg, Burk was not removed to keep it from getting soiled.  Wound care to check 12/27.  Discuss this with patient and daughter.  On antibiotics for UTI  12/26-will dc to swingbed once accepted. Needs continued wound care.  12/27-DC when bed available  12/28- issues with wound care yesterday.  Dr. Singer had to come remove dressings himself due to adherence to subcutaneous tissue.  Family refusing transfer to [facility] due to lack of specially trained wound care team availability.  Planning to transfer to specialty but resistant to that as well and requesting to speak to administration.    12/29- agreeable to transfer to specialty.  This will be the best place for her to receive wound care.  DC after family tours     12/29-needs continued wound care and therapy[facility]    12/30-doing well today, still with pain         Overview/Hospital Course:  12/31-having some pain this am  1/1- no complaints  1/2- doing well  1/3- continue wound care, last day cefepime tomorrow  1/4- some pain this AM.  Delay in mediations dues to staffing issues. Dsicussed with RN  1/5- no issues overnight  1/6- Still with pain, hip incision looks good  1/7-diclofenac for  shoulder pain  1/8-decrease laxatives, change benzos and carlo to PRN per patient request  1/9 some confusion overnight  1/10- no more confusion, doing well  1/11- no acute issues overnight      Interval History: naeo    Review of Systems   Constitutional:  Negative for chills, fatigue, fever and unexpected weight change.   HENT:  Negative for congestion, mouth sores and sore throat.    Eyes:  Negative for photophobia and visual disturbance.   Respiratory:  Negative for cough, chest tightness, shortness of breath and wheezing.    Cardiovascular:  Negative for chest pain, palpitations and leg swelling.   Gastrointestinal:  Negative for abdominal pain, diarrhea, nausea and vomiting.   Endocrine: Negative for cold intolerance and heat intolerance.   Genitourinary:  Negative for difficulty urinating, dysuria, frequency and urgency.   Musculoskeletal:  Positive for arthralgias. Negative for back pain and myalgias.   Skin:  Negative for pallor and rash.   Neurological:  Negative for tremors, seizures, syncope, weakness, numbness and headaches.   Hematological:  Does not bruise/bleed easily.   Psychiatric/Behavioral:  Negative for agitation, confusion, hallucinations and suicidal ideas.    Objective:     Vital Signs (Most Recent):  Temp: 98.1 °F (36.7 °C) (01/11/23 0500)  Pulse: (!) 58 (01/11/23 0500)  Resp: 18 (01/11/23 0641)  BP: (!) 151/60 (01/11/23 0547)  SpO2: (!) 58 % (01/11/23 0547)   Vital Signs (24h Range):  Temp:  [97.9 °F (36.6 °C)-99.7 °F (37.6 °C)] 98.1 °F (36.7 °C)  Pulse:  [52-65] 58  Resp:  [18-19] 18  SpO2:  [58 %-96 %] 58 %  BP: (113-167)/(48-70) 151/60     Weight: 59.1 kg (130 lb 3.2 oz)  Body mass index is 21.01 kg/m².  No intake or output data in the 24 hours ending 01/11/23 0710     Physical Exam  Vitals reviewed.   Constitutional:       Appearance: Normal appearance.   HENT:      Head: Normocephalic and atraumatic.   Eyes:      Extraocular Movements: Extraocular movements intact.   Cardiovascular:       Rate and Rhythm: Normal rate and regular rhythm.      Pulses: Normal pulses.   Pulmonary:      Effort: Pulmonary effort is normal.      Breath sounds: Normal breath sounds.   Abdominal:      General: Abdomen is flat.      Palpations: Abdomen is soft.   Musculoskeletal:         General: Tenderness and signs of injury present.      Comments: Dressed left lower extremity, painful to touch   Skin:     General: Skin is warm and dry.      Capillary Refill: Capillary refill takes less than 2 seconds.   Neurological:      General: No focal deficit present.      Mental Status: She is alert and oriented to person, place, and time.   Psychiatric:         Mood and Affect: Mood normal.         Behavior: Behavior normal.       Significant Labs: All pertinent labs within the past 24 hours have been reviewed.    Significant Imaging: I have reviewed all pertinent imaging results/findings within the past 24 hours.      Assessment/Plan:      * Urinary tract infection  Treatment completed      Disruption of external surgical wound        Wounds and injuries        Open wound of left lower leg  Wound care      Multiple skin tears  Wound care      Delmy-prosthetic fracture around prosthetic hip  S/P fixation with Pomierski      Lumbar radiculopathy  Pain management  PT      Hypertension  Adjust medications as needed      Arthritis  Pain management        VTE Risk Mitigation (From admission, onward)         Ordered     apixaban tablet 2.5 mg  2 times daily         12/29/22 1630                Discharge Planning   YANIV:      Code Status: Full Code   Is the patient medically ready for discharge?:     Reason for patient still in hospital (select all that apply): Treatment  Discharge Plan A: Skilled Nursing Facility                  Shai Guerra DO  Department of Hospital Medicine   [facility]

## 2023-01-12 ENCOUNTER — APPOINTMENT (OUTPATIENT)
Dept: RADIOLOGY | Facility: HOSPITAL | Age: 88
End: 2023-01-12
Attending: STUDENT IN AN ORGANIZED HEALTH CARE EDUCATION/TRAINING PROGRAM
Payer: MEDICARE

## 2023-01-12 PROCEDURE — 25000003 PHARM REV CODE 250: Performed by: STUDENT IN AN ORGANIZED HEALTH CARE EDUCATION/TRAINING PROGRAM

## 2023-01-12 PROCEDURE — 73552 X-RAY EXAM OF FEMUR 2/>: CPT

## 2023-01-12 PROCEDURE — 11000001 HC ACUTE MED/SURG PRIVATE ROOM

## 2023-01-12 PROCEDURE — 63600175 PHARM REV CODE 636 W HCPCS: Performed by: STUDENT IN AN ORGANIZED HEALTH CARE EDUCATION/TRAINING PROGRAM

## 2023-01-12 PROCEDURE — 73552 X-RAY EXAM OF FEMUR 2/>: CPT | Mod: TC,LT

## 2023-01-12 PROCEDURE — 97110 THERAPEUTIC EXERCISES: CPT

## 2023-01-12 PROCEDURE — 73551 XR FEMUR 2 VIEW LEFT: ICD-10-PCS | Mod: 26,LT,, | Performed by: RADIOLOGY

## 2023-01-12 PROCEDURE — 99231 SBSQ HOSP IP/OBS SF/LOW 25: CPT | Mod: ,,, | Performed by: STUDENT IN AN ORGANIZED HEALTH CARE EDUCATION/TRAINING PROGRAM

## 2023-01-12 PROCEDURE — 97530 THERAPEUTIC ACTIVITIES: CPT

## 2023-01-12 PROCEDURE — 99231 PR SUBSEQUENT HOSPITAL CARE,LEVL I: ICD-10-PCS | Mod: ,,, | Performed by: STUDENT IN AN ORGANIZED HEALTH CARE EDUCATION/TRAINING PROGRAM

## 2023-01-12 PROCEDURE — 73551 X-RAY EXAM OF FEMUR 1: CPT | Mod: 26,LT,, | Performed by: RADIOLOGY

## 2023-01-12 RX ORDER — ONDANSETRON 4 MG/1
4 TABLET, ORALLY DISINTEGRATING ORAL EVERY 6 HOURS PRN
Status: DISCONTINUED | OUTPATIENT
Start: 2023-01-12 | End: 2023-01-15

## 2023-01-12 RX ADMIN — DOCUSATE SODIUM 100 MG: 100 CAPSULE, LIQUID FILLED ORAL at 10:01

## 2023-01-12 RX ADMIN — PREDNISONE 5 MG: 5 TABLET ORAL at 10:01

## 2023-01-12 RX ADMIN — LISINOPRIL 20 MG: 20 TABLET ORAL at 10:01

## 2023-01-12 RX ADMIN — APIXABAN 2.5 MG: 2.5 TABLET, FILM COATED ORAL at 09:01

## 2023-01-12 RX ADMIN — NYSTATIN 500000 UNITS: 100000 SUSPENSION ORAL at 05:01

## 2023-01-12 RX ADMIN — CIPROFLOXACIN 500 MG: 250 TABLET, FILM COATED ORAL at 10:01

## 2023-01-12 RX ADMIN — DOCUSATE SODIUM 100 MG: 100 CAPSULE, LIQUID FILLED ORAL at 09:01

## 2023-01-12 RX ADMIN — APIXABAN 2.5 MG: 2.5 TABLET, FILM COATED ORAL at 10:01

## 2023-01-12 RX ADMIN — METOPROLOL TARTRATE 50 MG: 50 TABLET, FILM COATED ORAL at 10:01

## 2023-01-12 RX ADMIN — HYDROCODONE BITARTRATE AND ACETAMINOPHEN 2 TABLET: 10; 325 TABLET ORAL at 10:01

## 2023-01-12 RX ADMIN — HYDRALAZINE HYDROCHLORIDE 25 MG: 25 TABLET ORAL at 06:01

## 2023-01-12 RX ADMIN — ALPRAZOLAM 1 MG: 0.25 TABLET ORAL at 09:01

## 2023-01-12 RX ADMIN — DILTIAZEM HYDROCHLORIDE 120 MG: 120 CAPSULE, COATED, EXTENDED RELEASE ORAL at 10:01

## 2023-01-12 RX ADMIN — GABAPENTIN 100 MG: 100 CAPSULE ORAL at 03:01

## 2023-01-12 RX ADMIN — NYSTATIN 500000 UNITS: 100000 SUSPENSION ORAL at 02:01

## 2023-01-12 RX ADMIN — GABAPENTIN 100 MG: 100 CAPSULE ORAL at 09:01

## 2023-01-12 RX ADMIN — GABAPENTIN 100 MG: 100 CAPSULE ORAL at 10:01

## 2023-01-12 RX ADMIN — SENNOSIDES 1 TABLET: 8.6 TABLET, FILM COATED ORAL at 10:01

## 2023-01-12 RX ADMIN — CIPROFLOXACIN 500 MG: 250 TABLET, FILM COATED ORAL at 09:01

## 2023-01-12 RX ADMIN — FAMOTIDINE 20 MG: 20 TABLET ORAL at 10:01

## 2023-01-12 RX ADMIN — LEVOTHYROXINE SODIUM 125 MCG: 0.12 TABLET ORAL at 06:01

## 2023-01-12 RX ADMIN — HYDROCODONE BITARTRATE AND ACETAMINOPHEN 2 TABLET: 10; 325 TABLET ORAL at 05:01

## 2023-01-12 RX ADMIN — HYDRALAZINE HYDROCHLORIDE 25 MG: 25 TABLET ORAL at 02:01

## 2023-01-12 RX ADMIN — POLYETHYLENE GLYCOL 3350 17 G: 17 POWDER, FOR SOLUTION ORAL at 10:01

## 2023-01-12 RX ADMIN — NYSTATIN 500000 UNITS: 100000 SUSPENSION ORAL at 09:01

## 2023-01-12 RX ADMIN — PROCHLORPERAZINE MALEATE 10 MG: 10 TABLET ORAL at 11:01

## 2023-01-12 RX ADMIN — NYSTATIN 500000 UNITS: 100000 SUSPENSION ORAL at 10:01

## 2023-01-12 RX ADMIN — METOPROLOL TARTRATE 50 MG: 50 TABLET, FILM COATED ORAL at 09:01

## 2023-01-12 RX ADMIN — PANTOPRAZOLE SODIUM 40 MG: 40 TABLET, DELAYED RELEASE ORAL at 10:01

## 2023-01-12 NOTE — PROGRESS NOTES
[facility] Hospital Medicine  Progress Note    Patient Name: Zandra Veloz  MRN: [id number]  Patient Class: IP- Inpatient   Admission Date: 12/29/2022  Length of Stay: 14 days  Attending Physician: Shai Guerra DO  Primary Care Provider: Brandon Thornton MD        Subjective:     Principal Problem:Urinary tract infection        HPI:  HPI:   88-year-old lady seen emergency room department.  Patient presents after having a fall when she was trying to go to the restroom at her home.  Patient sustained a left femur fracture.  Patient complains of moderate to severe pain in the left hip area.  Patient also complains of chest tightness, she rates it a 5/10 in the chest tightness has been intubate.  Patient also was seen in emergency room department earlier this week per her daughter.  Patient is found to have dehydration and a urinary tract infection.  Current she denies any shortness of breath.  She does not give a history of coronary artery disease.        Procedure(s) (LRB):  REVISION, TOTAL ARTHROPLASTY, HIP, VICTORIANO PROSTHETIC FX (Left)       Hospital Course:   12/19 - records reviewed. Fall without LOC and has left hip fx. No other complaints currently. Seen by cardiology with some CP felt musculoskeletal.  Echo mild AS and mod MR with nl EF. Age increase cardiac risk for surgery but discussed with daughter surgery is urgent and see no benefit in delay medically. Feel low to moderate risk for surgery. Question about UTI. No symptoms. Had UTI in 10.22 not surprising. Lab to do culture on urine in lab. Cover with ATN for prior culture with ATB started. Discussed with Dr Singer and cardiology.  12/20 Tachycardia and elevated BP. Cardiology adjusted meds. Plan hold off surgery until 12/22 to adjust meds and treat UTI. Family in room. Pt not sleeping well.   12/21 Didn't sleep well prior night. Apparently been on xanax for a time. Also recently started on Neurontin. Family with  question. No recent BM. Some increase stool on KUB but not severe. Surgery for am. BP some up but better. HR good.   12/22 Seen prior to surgery and apparently add better night. Sore mouth / throat better with mycostatin. For surgery. Family in room.  12/23 patient with pain but otherwise seemed to be doing relatively well.  Daughter in room.  Apparently been taking prednisone for some time and this was restarted per family request.  Look into swing bed placement  12/24 patient had better night rested and everyone's in better spirits today.  Tolerating some diet.   Therapy worked with patient.  Look into swing bed placement next week  12/25 remained in good spirits.  Less pain.  Had good bowel movement and ordered Dulcolax not given.  Because of dressing to leg, Burk was not removed to keep it from getting soiled.  Wound care to check 12/27.  Discuss this with patient and daughter.  On antibiotics for UTI  12/26-will dc to swingbed once accepted. Needs continued wound care.  12/27-DC when bed available  12/28- issues with wound care yesterday.  Dr. Singer had to come remove dressings himself due to adherence to subcutaneous tissue.  [facility] team [facility].  Planning to transfer to specialty but resistant to that as well and requesting to speak to administration.    12/29- agreeable to transfer to specialty.  This will be the best place for her to receive wound care.  DC after family tours     12/29-needs continued wound care and therapy. [facility]    12/30-doing well today, still with pain         Overview/Hospital Course:  12/31-having some pain this am  1/1- no complaints  1/2- doing well  1/3- continue wound care, last day cefepime tomorrow  1/4- some pain this AM.  Delay in mediations dues to staffing issues. Dsicussed with RN  1/5- no issues overnight  1/6- Still with pain, hip incision looks good  1/7-diclofenac for  shoulder pain  1/8-decrease laxatives, change benzos and carlo to PRN per patient request  1/9 some confusion overnight  1/10- no more confusion, doing well  1/11- no acute issues overnight  1/12- doing well, nausea today      Interval History: naeo    Review of Systems   Constitutional:  Negative for chills, fatigue, fever and unexpected weight change.   HENT:  Negative for congestion, mouth sores and sore throat.    Eyes:  Negative for photophobia and visual disturbance.   Respiratory:  Negative for cough, chest tightness, shortness of breath and wheezing.    Cardiovascular:  Negative for chest pain, palpitations and leg swelling.   Gastrointestinal:  Negative for abdominal pain, diarrhea, nausea and vomiting.   Endocrine: Negative for cold intolerance and heat intolerance.   Genitourinary:  Negative for difficulty urinating, dysuria, frequency and urgency.   Musculoskeletal:  Positive for arthralgias. Negative for back pain and myalgias.   Skin:  Negative for pallor and rash.   Neurological:  Negative for tremors, seizures, syncope, weakness, numbness and headaches.   Hematological:  Does not bruise/bleed easily.   Psychiatric/Behavioral:  Negative for agitation, confusion, hallucinations and suicidal ideas.    Objective:     Vital Signs (Most Recent):  Temp: 98.4 °F (36.9 °C) (01/12/23 1412)  Pulse: (!) 50 (01/12/23 1412)  Resp: 18 (01/12/23 1412)  BP: 126/63 (01/12/23 1412)  SpO2: (!) 94 % (01/12/23 1412)   Vital Signs (24h Range):  Temp:  [97.8 °F (36.6 °C)-98.6 °F (37 °C)] 98.4 °F (36.9 °C)  Pulse:  [47-66] 50  Resp:  [18-19] 18  SpO2:  [94 %-96 %] 94 %  BP: (116-144)/(42-76) 126/63     Weight: 54.6 kg (120 lb 4.8 oz)  Body mass index is 19.42 kg/m².    Intake/Output Summary (Last 24 hours) at 1/12/2023 1538  Last data filed at 1/11/2023 1855  Gross per 24 hour   Intake 120 ml   Output --   Net 120 ml        Physical Exam  Vitals reviewed.   Constitutional:       Appearance: Normal appearance.   HENT:       Head: Normocephalic and atraumatic.   Eyes:      Extraocular Movements: Extraocular movements intact.   Cardiovascular:      Rate and Rhythm: Normal rate and regular rhythm.      Pulses: Normal pulses.   Pulmonary:      Effort: Pulmonary effort is normal.      Breath sounds: Normal breath sounds.   Abdominal:      General: Abdomen is flat.      Palpations: Abdomen is soft.   Musculoskeletal:         General: Tenderness and signs of injury present.      Comments: Dressed left lower extremity, painful to touch   Skin:     General: Skin is warm and dry.      Capillary Refill: Capillary refill takes less than 2 seconds.   Neurological:      General: No focal deficit present.      Mental Status: She is alert and oriented to person, place, and time.   Psychiatric:         Mood and Affect: Mood normal.         Behavior: Behavior normal.       Significant Labs: All pertinent labs within the past 24 hours have been reviewed.    Significant Imaging: I have reviewed all pertinent imaging results/findings within the past 24 hours.      Assessment/Plan:      * Urinary tract infection  Treatment completed      Disruption of external surgical wound        Wounds and injuries        Open wound of left lower leg  Wound care      Multiple skin tears                              Wound care      Delmy-prosthetic fracture around prosthetic hip  S/P fixation with Pomierski      Lumbar radiculopathy  Pain management  PT      Hypertension  Adjust medications as needed      Arthritis  Pain management        VTE Risk Mitigation (From admission, onward)         Ordered     apixaban tablet 2.5 mg  2 times daily         12/29/22 1630                Discharge Planning   YANIV:      Code Status: Full Code   Is the patient medically ready for discharge?:     Reason for patient still in hospital (select all that apply): Treatment  Discharge Plan A: Skilled Nursing Facility                  Shai Guerra DO  Department of Hospital Medicine   [facility]

## 2023-01-12 NOTE — PT/OT/SLP PROGRESS
"Physical Therapy Treatment    Patient Name:  Zandra Veloz   MRN:  [id number]    Recommendations:     Discharge Recommendations: nursing facility, skilled, rehabilitation facility  Discharge Equipment Recommendations: other (see comments) (to be determined)  Barriers to discharge:  Ongoing medical treament     Assessment:     Zandra Veloz is a 88 y.o. female admitted with a medical diagnosis of Urinary tract infection.  She presents with the following impairments/functional limitations: weakness, impaired endurance, impaired self care skills, impaired functional mobility, gait instability, impaired balance, decreased upper extremity function, decreased lower extremity function, decreased safety awareness, pain, impaired skin, orthopedic precautions.    Pt participated well with SB transfer training despite initial misgivings. Pt's skin remains extremely fragile with small skin tear noted to right lateral thigh. Mepalex dressing applied per Delia Del Cid RN recommendation. PT will resume sit to stand training tomorrow.    Rehab Prognosis: Good; patient would benefit from acute skilled PT services to address these deficits and reach maximum level of function.    Recent Surgery: * No surgery found *      Plan:     During this hospitalization, patient to be seen 5 x/week to address the identified rehab impairments via gait training, therapeutic activities, therapeutic exercises, neuromuscular re-education and progress toward the following goals:    Plan of Care Expires:  01/30/23    Subjective     Chief Complaint: L hip periprosthetic fracture with multiple wounds   Patient/Family Comments/goals: "I guess I'll try."  Pain/Comfort:  Pain Rating 1: 0/10  Pain Rating Post-Intervention 1: 0/10      Objective:     Communicated with Delia Del Cid RN  prior to session.  Patient found supine with chao catheter upon PT entry to room.     General Precautions: Standard, fall  Orthopedic Precautions: LLE " non weight bearing, LLE posterior precautions  Braces: N/A  Respiratory Status: Room air     Functional Mobility:  Bed Mobility:     Supine to Sit: minimum assistance and of 2 persons with increased time and effort  Transfers:     Bed to Chair: moderate assistance x 2 with  slide board. Heavy verbal and tactile cues for sequencing. Good trunk control and use of UE.      AM-PAC 6 CLICK MOBILITY  Turning over in bed (including adjusting bedclothes, sheets and blankets)?: 3  Sitting down on and standing up from a chair with arms (e.g., wheelchair, bedside commode, etc.): 1  Moving from lying on back to sitting on the side of the bed?: 3  Moving to and from a bed to a chair (including a wheelchair)?: 2  Need to walk in hospital room?: 1  Climbing 3-5 steps with a railing?: 1  Basic Mobility Total Score: 11       Treatment & Education:  Transfers as above.  Bilateral lower extremity exercise x 30 reps: ankle pumps, Quad sets, glut sets, heel slides, hip abduction/adduction, straight leg raises, and Long arc quads with active assist ROM, verbal cues, and tactile cues     Patient left up in chair with all lines intact, call button in reach,  Delia Del Cid RN notified, and family present..    GOALS:   Multidisciplinary Problems       Physical Therapy Goals          Problem: Physical Therapy    Goal Priority Disciplines Outcome Goal Variances Interventions   Physical Therapy Goal     PT, PT/OT Ongoing, Progressing     Description: Short Term Goals to be met by 1/25/2023    Patient will increase functional independence and safety with mobility by performing    1.   Rolling side to side with Minimal assist   2.   Supine to sit Moderate Assist  3.   Sitting balance edge of the bed x 15 minutes Supervision  4.   Sit to stand Moderate Assist  using manual assistance with gait belt and NWB L LE  5.   Bed to chair Minimal  Assist using sliding board and NWB left LE  6.   Lower extremity exercises x 30 reps with assist as  necessary and no rest breaks      Long Term Goals to be met by 2/10/2023    Patient to require less than or equal to Stand by assist for functional mobility to ease caregiver burden                        Time Tracking:     PT Received On: 01/12/23  PT Start Time: 1352     PT Stop Time: 1421  PT Total Time (min): 29 min     Billable Minutes: Therapeutic Activity 10 min and Therapeutic Exercise 15 min    Treatment Type: Treatment  PT/PTA: PT     PTA Visit Number: 0     01/12/2023

## 2023-01-12 NOTE — PT/OT/SLP PROGRESS
Occupational Therapy   Treatment    Name: Zandra Veloz  MRN: [id number]  Admitting Diagnosis:  Urinary tract infection       Recommendations:     Discharge Recommendations: nursing facility, skilled  Discharge Equipment Recommendations:   (to be determined)  Barriers to discharge:  None    Assessment:     Zandra Veloz is a 88 y.o. female with a medical diagnosis of Urinary tract infection.  She presents with alert and agreeable to treatment. Performance deficits affecting function are weakness, impaired balance, impaired cognition, impaired endurance, impaired functional mobility, impaired self care skills, impaired skin, orthopedic precautions.     Rehab Prognosis:  Fair; patient would benefit from acute skilled OT services to address these deficits and reach maximum level of function.       Plan:     Patient to be seen 5 x/week to address the above listed problems via self-care/home management, therapeutic activities, therapeutic exercises  Plan of Care Expires: 02/06/23  Plan of Care Reviewed with: patient, family    Subjective     Pain/Comfort:  Pain Rating 1: 0/10  Pain Rating Post-Intervention 1: 0/10    Objective:     Communicated with: CLYDE Del Cid prior to session.  Patient found up in chair with chao catheter upon OT entry to room.    General Precautions: Standard, fall    Orthopedic Precautions:LLE non weight bearing, LLE posterior precautions  Braces: N/A  Respiratory Status: Nasal cannula, flow 2 L/min     Occupational Performance:     Bed Mobility:    NT    Functional Mobility/Transfers:  NT    Activities of Daily Living:  NT      [facility]    Treatment & Education:  Pt performed (B) UE ex for 2x15 reps of each:  Elbow flexion with 1# db  Punches with 1# db  ER/IR with 1# db   Wrist flex/extension with 1# db  Shoulder extension with red t-band  Elbow extension with red t-band    Patient left up in chair with all lines intact, call button in reach, and daughter  and sitter present    GOALS:   Multidisciplinary Problems       Occupational Therapy Goals          Problem: Occupational Therapy    Goal Priority Disciplines Outcome Interventions   Occupational Therapy Goal     OT, PT/OT Ongoing, Progressing    Description: STG: (In 2 weeks)  Pt will perform grooming with min(A)  Pt will bathe with setup and mod (A)  Pt will perform UE dressing with setup and min(A)  Pt will perform (L) UE AROM to 3/4 full range  Pt will sit EOB x 5 min with CGA assistance  Pt will transfer bed/chair/bsc with mod(A)  Pt will tolerate 20 minutes of tx without fatigue      LT.Restore to max I with self care and mobility.                          Time Tracking:     OT Date of Treatment: 23  OT Start Time: 1451  OT Stop Time: 1508  OT Total Time (min): 17 min    Billable Minutes:Therapeutic Exercise 17 min    OT/BRIGHT: OT          2023

## 2023-01-12 NOTE — CONSULTS
Zandra Veloz is an 88-year-old female who underwent ORIF/revision comminuted displaced periprosthetic fracture of the left hip on 12/22 2022.  She is presently convalescing in [facility].  Serous drainage he has been noted intermittently from the left hip incision line.  There is no history of fever or leukocytosis.  She has minimal pain.  Ultrasound was performed revealing a 24 cm fluid connection subcutaneous tissue region beneath the incision.  Inspection of the incision shows skin margins opposed with no draining sinus.  Every other skin staple has been previously removed.  I am unable to express any fluid.  Been report of pus formation and there is no erythema along the skin margins.  She allows gentle motion of the left hip without any significant discomfort.  Impression:  Postsurgical seroma-left hip.  Presently there are no signs of infection.  Aspiration of the seroma would likely lead to rapid reaccumulation.  At this juncture would recommend daily dressing changes or as needed keep the area dry.  I will order routine check of the fracture tomorrow with x-ray.  Anticipate the wound should seal and the possibility of removing the remaining sutures on Tuesday of next week.

## 2023-01-12 NOTE — PLAN OF CARE
Problem: Physical Therapy  Goal: Physical Therapy Goal  Description: Short Term Goals to be met by 1/25/2023    Patient will increase functional independence and safety with mobility by performing    1.   Rolling side to side with Minimal assist   2.   Supine to sit Moderate Assist  3.   Sitting balance edge of the bed x 15 minutes Supervision  4.   Sit to stand Moderate Assist  using manual assistance with gait belt and NWB L LE  5.   Bed to chair Minimal  Assist using sliding board and NWB left LE  6.   Lower extremity exercises x 30 reps with assist as necessary and no rest breaks      Long Term Goals to be met by 2/10/2023    Patient to require less than or equal to Stand by assist for functional mobility to ease caregiver burden   1/12/2023 1028 by Montse Dillard, PT  Outcome: Ongoing, Progressing    Current function:  Bed Mobility:     Rolling Left:  moderate assistance  Rolling Right: moderate assistance  Supine to Sit: moderate assistance and of 2 persons  Sit to Supine: maximal assistance and of 2 persons  Transfers:     Bed to Chair: moderate assist of 2 persons with sliding board    Pt is progressing towards set goals with less pain. Pt is most limited by delicate skin integrity which impacts transfer techniques.

## 2023-01-12 NOTE — NURSING
When pt returned to floor via bed from radiology she complained of being left out in hallway at radiology and forgotten about. She verbally voiced again to her 2 daughters at bedside about being left in hallway. States she is in pain and I provided her with her norco and morning medications.

## 2023-01-12 NOTE — SUBJECTIVE & OBJECTIVE
Interval History: naeo    Review of Systems   Constitutional:  Negative for chills, fatigue, fever and unexpected weight change.   HENT:  Negative for congestion, mouth sores and sore throat.    Eyes:  Negative for photophobia and visual disturbance.   Respiratory:  Negative for cough, chest tightness, shortness of breath and wheezing.    Cardiovascular:  Negative for chest pain, palpitations and leg swelling.   Gastrointestinal:  Negative for abdominal pain, diarrhea, nausea and vomiting.   Endocrine: Negative for cold intolerance and heat intolerance.   Genitourinary:  Negative for difficulty urinating, dysuria, frequency and urgency.   Musculoskeletal:  Positive for arthralgias. Negative for back pain and myalgias.   Skin:  Negative for pallor and rash.   Neurological:  Negative for tremors, seizures, syncope, weakness, numbness and headaches.   Hematological:  Does not bruise/bleed easily.   Psychiatric/Behavioral:  Negative for agitation, confusion, hallucinations and suicidal ideas.    Objective:     Vital Signs (Most Recent):  Temp: 98.4 °F (36.9 °C) (01/12/23 1412)  Pulse: (!) 50 (01/12/23 1412)  Resp: 18 (01/12/23 1412)  BP: 126/63 (01/12/23 1412)  SpO2: (!) 94 % (01/12/23 1412)   Vital Signs (24h Range):  Temp:  [97.8 °F (36.6 °C)-98.6 °F (37 °C)] 98.4 °F (36.9 °C)  Pulse:  [47-66] 50  Resp:  [18-19] 18  SpO2:  [94 %-96 %] 94 %  BP: (116-144)/(42-76) 126/63     Weight: 54.6 kg (120 lb 4.8 oz)  Body mass index is 19.42 kg/m².    Intake/Output Summary (Last 24 hours) at 1/12/2023 1538  Last data filed at 1/11/2023 1855  Gross per 24 hour   Intake 120 ml   Output --   Net 120 ml        Physical Exam  Vitals reviewed.   Constitutional:       Appearance: Normal appearance.   HENT:      Head: Normocephalic and atraumatic.   Eyes:      Extraocular Movements: Extraocular movements intact.   Cardiovascular:      Rate and Rhythm: Normal rate and regular rhythm.      Pulses: Normal pulses.   Pulmonary:      Effort:  Pulmonary effort is normal.      Breath sounds: Normal breath sounds.   Abdominal:      General: Abdomen is flat.      Palpations: Abdomen is soft.   Musculoskeletal:         General: Tenderness and signs of injury present.      Comments: Dressed left lower extremity, painful to touch   Skin:     General: Skin is warm and dry.      Capillary Refill: Capillary refill takes less than 2 seconds.   Neurological:      General: No focal deficit present.      Mental Status: She is alert and oriented to person, place, and time.   Psychiatric:         Mood and Affect: Mood normal.         Behavior: Behavior normal.       Significant Labs: All pertinent labs within the past 24 hours have been reviewed.    Significant Imaging: I have reviewed all pertinent imaging results/findings within the past 24 hours.

## 2023-01-12 NOTE — PROGRESS NOTES
Dr Singer assessment Left hip today after dressing change. Nurse notified to place NPWT to area in am.

## 2023-01-13 LAB
ANION GAP SERPL CALCULATED.3IONS-SCNC: 9 MMOL/L (ref 7–16)
BASOPHILS # BLD AUTO: 0.1 K/UL (ref 0–0.2)
BASOPHILS NFR BLD AUTO: 1.2 % (ref 0–1)
BUN SERPL-MCNC: 13 MG/DL (ref 7–18)
BUN/CREAT SERPL: 17 (ref 6–20)
CALCIUM SERPL-MCNC: 8.4 MG/DL (ref 8.5–10.1)
CHLORIDE SERPL-SCNC: 100 MMOL/L (ref 98–107)
CO2 SERPL-SCNC: 27 MMOL/L (ref 21–32)
CREAT SERPL-MCNC: 0.78 MG/DL (ref 0.55–1.02)
DIFFERENTIAL METHOD BLD: ABNORMAL
EGFR (NO RACE VARIABLE) (RUSH/TITUS): 73 ML/MIN/1.73M²
EOSINOPHIL # BLD AUTO: 0.32 K/UL (ref 0–0.5)
EOSINOPHIL NFR BLD AUTO: 3.9 % (ref 1–4)
ERYTHROCYTE [DISTWIDTH] IN BLOOD BY AUTOMATED COUNT: 13.9 % (ref 11.5–14.5)
GLUCOSE SERPL-MCNC: 97 MG/DL (ref 74–106)
HCT VFR BLD AUTO: 29 % (ref 38–47)
HGB BLD-MCNC: 9.2 G/DL (ref 12–16)
IMM GRANULOCYTES # BLD AUTO: 0.31 K/UL (ref 0–0.04)
IMM GRANULOCYTES NFR BLD: 3.8 % (ref 0–0.4)
LYMPHOCYTES # BLD AUTO: 1.97 K/UL (ref 1–4.8)
LYMPHOCYTES NFR BLD AUTO: 23.9 % (ref 27–41)
MCH RBC QN AUTO: 30.5 PG (ref 27–31)
MCHC RBC AUTO-ENTMCNC: 31.7 G/DL (ref 32–36)
MCV RBC AUTO: 96 FL (ref 80–96)
MONOCYTES # BLD AUTO: 0.83 K/UL (ref 0–0.8)
MONOCYTES NFR BLD AUTO: 10.1 % (ref 2–6)
MPC BLD CALC-MCNC: 9.3 FL (ref 9.4–12.4)
NEUTROPHILS # BLD AUTO: 4.72 K/UL (ref 1.8–7.7)
NEUTROPHILS NFR BLD AUTO: 57.1 % (ref 53–65)
NRBC # BLD AUTO: 0 X10E3/UL
NRBC, AUTO (.00): 0 %
PLATELET # BLD AUTO: 275 K/UL (ref 150–400)
POTASSIUM SERPL-SCNC: 4.3 MMOL/L (ref 3.5–5.1)
RBC # BLD AUTO: 3.02 M/UL (ref 4.2–5.4)
SODIUM SERPL-SCNC: 132 MMOL/L (ref 136–145)
WBC # BLD AUTO: 8.25 K/UL (ref 4.5–11)

## 2023-01-13 PROCEDURE — 36415 COLL VENOUS BLD VENIPUNCTURE: CPT | Performed by: STUDENT IN AN ORGANIZED HEALTH CARE EDUCATION/TRAINING PROGRAM

## 2023-01-13 PROCEDURE — 63600175 PHARM REV CODE 636 W HCPCS: Performed by: STUDENT IN AN ORGANIZED HEALTH CARE EDUCATION/TRAINING PROGRAM

## 2023-01-13 PROCEDURE — 99233 PR SUBSEQUENT HOSPITAL CARE,LEVL III: ICD-10-PCS | Mod: ,,, | Performed by: FAMILY MEDICINE

## 2023-01-13 PROCEDURE — 11000001 HC ACUTE MED/SURG PRIVATE ROOM

## 2023-01-13 PROCEDURE — 97110 THERAPEUTIC EXERCISES: CPT

## 2023-01-13 PROCEDURE — 80048 BASIC METABOLIC PNL TOTAL CA: CPT | Performed by: STUDENT IN AN ORGANIZED HEALTH CARE EDUCATION/TRAINING PROGRAM

## 2023-01-13 PROCEDURE — 99233 SBSQ HOSP IP/OBS HIGH 50: CPT | Mod: ,,, | Performed by: FAMILY MEDICINE

## 2023-01-13 PROCEDURE — 85025 COMPLETE CBC W/AUTO DIFF WBC: CPT | Performed by: STUDENT IN AN ORGANIZED HEALTH CARE EDUCATION/TRAINING PROGRAM

## 2023-01-13 PROCEDURE — 25000003 PHARM REV CODE 250: Performed by: STUDENT IN AN ORGANIZED HEALTH CARE EDUCATION/TRAINING PROGRAM

## 2023-01-13 RX ADMIN — APIXABAN 2.5 MG: 2.5 TABLET, FILM COATED ORAL at 09:01

## 2023-01-13 RX ADMIN — HYDROCODONE BITARTRATE AND ACETAMINOPHEN 2 TABLET: 10; 325 TABLET ORAL at 05:01

## 2023-01-13 RX ADMIN — HYDROCODONE BITARTRATE AND ACETAMINOPHEN 2 TABLET: 10; 325 TABLET ORAL at 10:01

## 2023-01-13 RX ADMIN — ALPRAZOLAM 1 MG: 0.25 TABLET ORAL at 09:01

## 2023-01-13 RX ADMIN — SENNOSIDES 1 TABLET: 8.6 TABLET, FILM COATED ORAL at 09:01

## 2023-01-13 RX ADMIN — PANTOPRAZOLE SODIUM 40 MG: 40 TABLET, DELAYED RELEASE ORAL at 09:01

## 2023-01-13 RX ADMIN — NYSTATIN 500000 UNITS: 100000 SUSPENSION ORAL at 01:01

## 2023-01-13 RX ADMIN — HYDROCODONE BITARTRATE AND ACETAMINOPHEN 2 TABLET: 10; 325 TABLET ORAL at 03:01

## 2023-01-13 RX ADMIN — GABAPENTIN 100 MG: 100 CAPSULE ORAL at 02:01

## 2023-01-13 RX ADMIN — POLYETHYLENE GLYCOL 3350 34 G: 17 POWDER, FOR SOLUTION ORAL at 09:01

## 2023-01-13 RX ADMIN — GABAPENTIN 100 MG: 100 CAPSULE ORAL at 09:01

## 2023-01-13 RX ADMIN — PREDNISONE 5 MG: 5 TABLET ORAL at 09:01

## 2023-01-13 RX ADMIN — NYSTATIN 500000 UNITS: 100000 SUSPENSION ORAL at 09:01

## 2023-01-13 RX ADMIN — DOCUSATE SODIUM 100 MG: 100 CAPSULE, LIQUID FILLED ORAL at 09:01

## 2023-01-13 RX ADMIN — FAMOTIDINE 20 MG: 20 TABLET ORAL at 09:01

## 2023-01-13 RX ADMIN — LISINOPRIL 20 MG: 20 TABLET ORAL at 09:01

## 2023-01-13 RX ADMIN — DILTIAZEM HYDROCHLORIDE 120 MG: 120 CAPSULE, COATED, EXTENDED RELEASE ORAL at 09:01

## 2023-01-13 RX ADMIN — LEVOTHYROXINE SODIUM 125 MCG: 0.12 TABLET ORAL at 06:01

## 2023-01-13 NOTE — PROGRESS NOTES
[facility] Hospital Medicine  Progress Note    Patient Name: Zandra Veloz  MRN: [id number]  Patient Class: IP- Inpatient   Admission Date: 12/29/2022  Length of Stay: 15 days  Attending Physician: Lebron Cutler IV, DO  Primary Care Provider: Brandon Thornton MD        Subjective:     Principal Problem:Urinary tract infection        HPI:  HPI:   88-year-old lady seen emergency room department.  Patient presents after having a fall when she was trying to go to the restroom at her home.  Patient sustained a left femur fracture.  Patient complains of moderate to severe pain in the left hip area.  Patient also complains of chest tightness, she rates it a 5/10 in the chest tightness has been intubate.  Patient also was seen in emergency room department earlier this week per her daughter.  Patient is found to have dehydration and a urinary tract infection.  Current she denies any shortness of breath.  She does not give a history of coronary artery disease.        Procedure(s) (LRB):  REVISION, TOTAL ARTHROPLASTY, HIP, VICTORIANO PROSTHETIC FX (Left)       Hospital Course:   12/19 - records reviewed. Fall without LOC and has left hip fx. No other complaints currently. Seen by cardiology with some CP felt musculoskeletal.  Echo mild AS and mod MR with nl EF. Age increase cardiac risk for surgery but discussed with daughter surgery is urgent and see no benefit in delay medically. Feel low to moderate risk for surgery. Question about UTI. No symptoms. Had UTI in 10.22 not surprising. Lab to do culture on urine in lab. Cover with ATN for prior culture with ATB started. Discussed with Dr Singer and cardiology.  12/20 Tachycardia and elevated BP. Cardiology adjusted meds. Plan hold off surgery until 12/22 to adjust meds and treat UTI. Family in room. Pt not sleeping well.   12/21 Didn't sleep well prior night. Apparently been on xanax for a time. Also recently started on Neurontin. Family  with question. No recent BM. Some increase stool on KUB but not severe. Surgery for am. BP some up but better. HR good.   12/22 Seen prior to surgery and apparently add better night. Sore mouth / throat better with mycostatin. For surgery. Family in room.  12/23 patient with pain but otherwise seemed to be doing relatively well.  Daughter in room.  Apparently been taking prednisone for some time and this was restarted per family request.  Look into swing bed placement  12/24 patient had better night rested and everyone's in better spirits today.  Tolerating some diet.   Therapy worked with patient.  Look into swing bed placement next week  12/25 remained in good spirits.  Less pain.  Had good bowel movement and ordered Dulcolax not given.  Because of dressing to leg, Burk was not removed to keep it from getting soiled.  Wound care to check 12/27.  Discuss this with patient and daughter.  On antibiotics for UTI  12/26-will dc to swingbed once accepted. Needs continued wound care.  12/27-DC when bed available  12/28- issues with wound care yesterday.  Dr. Singer had to come remove dressings himself due to adherence to subcutaneous tissue.  Family refusing transfer to [facility] due to lack of specially trained wound care team availability.  Planning to transfer to specialty but resistant to that as well and requesting to speak to administration.    12/29- agreeable to transfer to specialty.  This will be the best place for her to receive wound care.  DC after family tours     12/29-needs continued wound care and therapy[facility]    12/30-doing well today, still with pain         Overview/Hospital Course:  12/31-having some pain this am  1/1- no complaints  1/2- doing well  1/3- continue wound care, last day cefepime tomorrow  1/4- some pain this AM.  Delay in mediations dues to staffing issues. Dsicussed with RN  1/5- no issues overnight  1/6- Still with pain, hip incision looks good  1/7-diclofenac  for shoulder pain  1/8-decrease laxatives, change benzos and carlo to PRN per patient request  1/9 some confusion overnight  1/10- no more confusion, doing well  1/11- no acute issues overnight  1/12- doing well, nausea today  1/13 -Dr. Lebron Cutler IV, DO taking over for Dr. Guerra for weekend.  Patient's pain appears to be well controlled this morning no complaints of nausea.  Continue wound care and PT/OT      Interval History:  As above    Review of Systems   Constitutional:  Negative for chills, fatigue, fever and unexpected weight change.   HENT:  Negative for congestion, mouth sores and sore throat.    Eyes:  Negative for photophobia and visual disturbance.   Respiratory:  Negative for cough, chest tightness, shortness of breath and wheezing.    Cardiovascular:  Negative for chest pain, palpitations and leg swelling.   Gastrointestinal:  Negative for abdominal pain, diarrhea, nausea and vomiting.   Endocrine: Negative for cold intolerance and heat intolerance.   Genitourinary:  Negative for difficulty urinating, dysuria, frequency and urgency.   Musculoskeletal:  Positive for arthralgias. Negative for back pain and myalgias.   Skin:  Negative for pallor and rash.   Neurological:  Negative for tremors, seizures, syncope, weakness, numbness and headaches.   Hematological:  Does not bruise/bleed easily.   Psychiatric/Behavioral:  Negative for agitation, confusion, hallucinations and suicidal ideas.    Objective:     Vital Signs (Most Recent):  Temp: 99 °F (37.2 °C) (01/13/23 0800)  Pulse: (!) 50 (01/13/23 0907)  Resp: 18 (01/13/23 1042)  BP: 132/60 (01/13/23 0907)  SpO2: 96 % (01/13/23 0800)   Vital Signs (24h Range):  Temp:  [97.6 °F (36.4 °C)-99 °F (37.2 °C)] 99 °F (37.2 °C)  Pulse:  [50-72] 50  Resp:  [16-18] 18  SpO2:  [94 %-97 %] 96 %  BP: (121-153)/(43-72) 132/60     Weight: 54.6 kg (120 lb 4.8 oz)  Body mass index is 19.42 kg/m².  No intake or output data in the 24 hours ending 01/13/23 1102   Physical  Exam  Vitals reviewed.   Constitutional:       Appearance: Normal appearance.   HENT:      Head: Normocephalic and atraumatic.   Eyes:      Extraocular Movements: Extraocular movements intact.   Cardiovascular:      Rate and Rhythm: Normal rate and regular rhythm.      Pulses: Normal pulses.   Pulmonary:      Effort: Pulmonary effort is normal.      Breath sounds: Normal breath sounds.   Abdominal:      General: Abdomen is flat.      Palpations: Abdomen is soft.   Musculoskeletal:         General: Tenderness and signs of injury present.      Comments: Dressed left lower extremity, painful to touch   Skin:     General: Skin is warm and dry.      Capillary Refill: Capillary refill takes less than 2 seconds.   Neurological:      General: No focal deficit present.      Mental Status: She is alert and oriented to person, place, and time.   Psychiatric:         Mood and Affect: Mood normal.         Behavior: Behavior normal.       Significant Labs: All pertinent labs within the past 24 hours have been reviewed.    Significant Imaging: I have reviewed all pertinent imaging results/findings within the past 24 hours.      Assessment/Plan:      * Urinary tract infection  Treatment completed      Disruption of external surgical wound        Wounds and injuries        Open wound of left lower leg  Wound care  -appreciate the assistance    Multiple skin tears  Wound care  -appreciate the assistance    Delmy-prosthetic fracture around prosthetic hip  S/P fixation with Pomierski      Lumbar radiculopathy  Pain management  PT      Hypertension  Adjust medications as needed  Most recently normotensive at 136/62    Arthritis  Pain management        VTE Risk Mitigation (From admission, onward)         Ordered     apixaban tablet 2.5 mg  2 times daily         12/29/22 1630                Discharge Planning   YANIV:      Code Status: Full Code   Is the patient medically ready for discharge?:     Reason for patient still in hospital (select  all that apply): Treatment  Discharge Plan A: Skilled Nursing Facility                  Lebron Cutler IV, DO  Department of Hospital Medicine   [facility]

## 2023-01-13 NOTE — SUBJECTIVE & OBJECTIVE
Interval History:  As above    Review of Systems   Constitutional:  Negative for chills, fatigue, fever and unexpected weight change.   HENT:  Negative for congestion, mouth sores and sore throat.    Eyes:  Negative for photophobia and visual disturbance.   Respiratory:  Negative for cough, chest tightness, shortness of breath and wheezing.    Cardiovascular:  Negative for chest pain, palpitations and leg swelling.   Gastrointestinal:  Negative for abdominal pain, diarrhea, nausea and vomiting.   Endocrine: Negative for cold intolerance and heat intolerance.   Genitourinary:  Negative for difficulty urinating, dysuria, frequency and urgency.   Musculoskeletal:  Positive for arthralgias. Negative for back pain and myalgias.   Skin:  Negative for pallor and rash.   Neurological:  Negative for tremors, seizures, syncope, weakness, numbness and headaches.   Hematological:  Does not bruise/bleed easily.   Psychiatric/Behavioral:  Negative for agitation, confusion, hallucinations and suicidal ideas.    Objective:     Vital Signs (Most Recent):  Temp: 99 °F (37.2 °C) (01/13/23 0800)  Pulse: (!) 50 (01/13/23 0907)  Resp: 18 (01/13/23 1042)  BP: 132/60 (01/13/23 0907)  SpO2: 96 % (01/13/23 0800)   Vital Signs (24h Range):  Temp:  [97.6 °F (36.4 °C)-99 °F (37.2 °C)] 99 °F (37.2 °C)  Pulse:  [50-72] 50  Resp:  [16-18] 18  SpO2:  [94 %-97 %] 96 %  BP: (121-153)/(43-72) 132/60     Weight: 54.6 kg (120 lb 4.8 oz)  Body mass index is 19.42 kg/m².  No intake or output data in the 24 hours ending 01/13/23 1102   Physical Exam  Vitals reviewed.   Constitutional:       Appearance: Normal appearance.   HENT:      Head: Normocephalic and atraumatic.   Eyes:      Extraocular Movements: Extraocular movements intact.   Cardiovascular:      Rate and Rhythm: Normal rate and regular rhythm.      Pulses: Normal pulses.   Pulmonary:      Effort: Pulmonary effort is normal.      Breath sounds: Normal breath sounds.   Abdominal:      General:  Abdomen is flat.      Palpations: Abdomen is soft.   Musculoskeletal:         General: Tenderness and signs of injury present.      Comments: Dressed left lower extremity, painful to touch   Skin:     General: Skin is warm and dry.      Capillary Refill: Capillary refill takes less than 2 seconds.   Neurological:      General: No focal deficit present.      Mental Status: She is alert and oriented to person, place, and time.   Psychiatric:         Mood and Affect: Mood normal.         Behavior: Behavior normal.       Significant Labs: All pertinent labs within the past 24 hours have been reviewed.    Significant Imaging: I have reviewed all pertinent imaging results/findings within the past 24 hours.

## 2023-01-13 NOTE — PROGRESS NOTES
Wound care NP at bedside. New dressing applied to right thigh due to pain/discomfort. Patient reports improvement in pain with bordered foam.     Wound care will change dressings on Tuesday.     Patient and family aware of change in days of dressing changes.

## 2023-01-13 NOTE — PT/OT/SLP PROGRESS
"Physical Therapy Treatment    Patient Name:  Zandra Veloz   MRN:  [id number]    Recommendations:     Discharge Recommendations: nursing facility, skilled, rehabilitation facility  Discharge Equipment Recommendations: other (see comments) (to be determined)  Barriers to discharge:  Ongoing medical treatment    Assessment:     Zandra Veloz is a 88 y.o. female admitted with a medical diagnosis of Urinary tract infection.  She presents with the following impairments/functional limitations: weakness, impaired endurance, impaired self care skills, impaired functional mobility, gait instability, impaired balance, decreased upper extremity function, decreased lower extremity function, decreased safety awareness, pain, impaired skin, orthopedic precautions.    Pt participated with LE exercises after strong encouragement but refused EOB or to chair. Wound care nurse in to apply wound vac to left hip incision. Pt able to complete all LE exercises with active assist and cueing.      Rehab Prognosis: Good; patient would benefit from acute skilled PT services to address these deficits and reach maximum level of function.    Recent Surgery: * No surgery found *      Plan:     During this hospitalization, patient to be seen 5 x/week to address the identified rehab impairments via gait training, therapeutic activities, therapeutic exercises, neuromuscular re-education and progress toward the following goals:    Plan of Care Expires:  01/30/23    Subjective     Chief Complaint: L hip periprosthetic fracture with THR revision and wounds   Patient/Family Comments/goals: "I don't feel good. I don't want to do anything."  Pain/Comfort:  Pain Rating 1: 6/10  Location - Side 1: Left  Location 1: leg  Pain Addressed 1: Pre-medicate for activity, Reposition, Distraction, Cessation of Activity  Pain Rating Post-Intervention 1: 6/10      Objective:     Communicated with Madie Wood RN prior to session.  Patient found " supine with chao catheter upon PT entry to room.     General Precautions: Standard, fall  Orthopedic Precautions: LLE non weight bearing, LLE posterior precautions  Braces: N/A  Respiratory Status: Room air     Functional Mobility:  Bed Mobility:     Rolling Right: moderate assistance      AM-PAC 6 CLICK MOBILITY  Turning over in bed (including adjusting bedclothes, sheets and blankets)?: 2  Sitting down on and standing up from a chair with arms (e.g., wheelchair, bedside commode, etc.): 1  Moving from lying on back to sitting on the side of the bed?: 3  Moving to and from a bed to a chair (including a wheelchair)?: 2  Need to walk in hospital room?: 1  Climbing 3-5 steps with a railing?: 1  Basic Mobility Total Score: 10       Treatment & Education:  Bilateral lower extremity exercise x 30 reps: ankle pumps, Quad sets, glut sets, heel slides, hip abduction/adduction, and straight leg raises with active assist ROM, verbal cues, and tactile cues     Patient left supine with all lines intact, call button in reach,  Madie Wood RN notified, and family and wound care RN (Maame Evans) present..    GOALS:   Multidisciplinary Problems       Physical Therapy Goals          Problem: Physical Therapy    Goal Priority Disciplines Outcome Goal Variances Interventions   Physical Therapy Goal     PT, PT/OT Ongoing, Progressing     Description: Short Term Goals to be met by 1/25/2023    Patient will increase functional independence and safety with mobility by performing    1.   Rolling side to side with Minimal assist   2.   Supine to sit Moderate Assist  3.   Sitting balance edge of the bed x 15 minutes Supervision  4.   Sit to stand Moderate Assist  using manual assistance with gait belt and NWB L LE  5.   Bed to chair Minimal  Assist using sliding board and NWB left LE  6.   Lower extremity exercises x 30 reps with assist as necessary and no rest breaks      Long Term Goals to be met by 2/10/2023    Patient to require  less than or equal to Stand by assist for functional mobility to ease caregiver burden                        Time Tracking:     PT Received On: 01/13/23  PT Start Time: 1043     PT Stop Time: 1112  PT Total Time (min): 29 min     Billable Minutes: Therapeutic Exercise 15 min    Treatment Type: Treatment  PT/PTA: PT     PTA Visit Number: 0     01/13/2023

## 2023-01-14 PROCEDURE — 63600175 PHARM REV CODE 636 W HCPCS: Performed by: STUDENT IN AN ORGANIZED HEALTH CARE EDUCATION/TRAINING PROGRAM

## 2023-01-14 PROCEDURE — 99233 SBSQ HOSP IP/OBS HIGH 50: CPT | Mod: ,,, | Performed by: FAMILY MEDICINE

## 2023-01-14 PROCEDURE — 99233 PR SUBSEQUENT HOSPITAL CARE,LEVL III: ICD-10-PCS | Mod: ,,, | Performed by: FAMILY MEDICINE

## 2023-01-14 PROCEDURE — 11000001 HC ACUTE MED/SURG PRIVATE ROOM

## 2023-01-14 PROCEDURE — 25000003 PHARM REV CODE 250: Performed by: STUDENT IN AN ORGANIZED HEALTH CARE EDUCATION/TRAINING PROGRAM

## 2023-01-14 PROCEDURE — 97535 SELF CARE MNGMENT TRAINING: CPT

## 2023-01-14 RX ADMIN — GABAPENTIN 100 MG: 100 CAPSULE ORAL at 09:01

## 2023-01-14 RX ADMIN — FAMOTIDINE 20 MG: 20 TABLET ORAL at 09:01

## 2023-01-14 RX ADMIN — HYDROCODONE BITARTRATE AND ACETAMINOPHEN 2 TABLET: 10; 325 TABLET ORAL at 06:01

## 2023-01-14 RX ADMIN — DILTIAZEM HYDROCHLORIDE 120 MG: 120 CAPSULE, COATED, EXTENDED RELEASE ORAL at 09:01

## 2023-01-14 RX ADMIN — HYDROCODONE BITARTRATE AND ACETAMINOPHEN 2 TABLET: 10; 325 TABLET ORAL at 09:01

## 2023-01-14 RX ADMIN — SENNOSIDES 1 TABLET: 8.6 TABLET, FILM COATED ORAL at 09:01

## 2023-01-14 RX ADMIN — HYDRALAZINE HYDROCHLORIDE 25 MG: 25 TABLET ORAL at 05:01

## 2023-01-14 RX ADMIN — APIXABAN 2.5 MG: 2.5 TABLET, FILM COATED ORAL at 09:01

## 2023-01-14 RX ADMIN — PANTOPRAZOLE SODIUM 40 MG: 40 TABLET, DELAYED RELEASE ORAL at 09:01

## 2023-01-14 RX ADMIN — APIXABAN 2.5 MG: 2.5 TABLET, FILM COATED ORAL at 08:01

## 2023-01-14 RX ADMIN — ALPRAZOLAM 1 MG: 0.25 TABLET ORAL at 08:01

## 2023-01-14 RX ADMIN — DOCUSATE SODIUM 100 MG: 100 CAPSULE, LIQUID FILLED ORAL at 09:01

## 2023-01-14 RX ADMIN — PREDNISONE 5 MG: 5 TABLET ORAL at 09:01

## 2023-01-14 RX ADMIN — DOCUSATE SODIUM 100 MG: 100 CAPSULE, LIQUID FILLED ORAL at 08:01

## 2023-01-14 RX ADMIN — POLYETHYLENE GLYCOL 3350 34 G: 17 POWDER, FOR SOLUTION ORAL at 09:01

## 2023-01-14 RX ADMIN — GABAPENTIN 100 MG: 100 CAPSULE ORAL at 02:01

## 2023-01-14 RX ADMIN — METOPROLOL TARTRATE 50 MG: 50 TABLET, FILM COATED ORAL at 09:01

## 2023-01-14 RX ADMIN — LISINOPRIL 20 MG: 20 TABLET ORAL at 09:01

## 2023-01-14 RX ADMIN — LEVOTHYROXINE SODIUM 125 MCG: 0.12 TABLET ORAL at 05:01

## 2023-01-14 RX ADMIN — GABAPENTIN 100 MG: 100 CAPSULE ORAL at 08:01

## 2023-01-14 NOTE — PLAN OF CARE
Problem: Skin Injury Risk Increased  Goal: Skin Health and Integrity  Outcome: Ongoing, Progressing     Problem: Fall Injury Risk  Goal: Absence of Fall and Fall-Related Injury  Outcome: Ongoing, Progressing     Problem: Infection  Goal: Absence of Infection Signs and Symptoms  Outcome: Ongoing, Progressing     Problem: Skin Injury Risk Increased  Goal: Skin Health and Integrity  Outcome: Ongoing, Progressing     Problem: Fall Injury Risk  Goal: Absence of Fall and Fall-Related Injury  Outcome: Ongoing, Progressing     Problem: Infection  Goal: Absence of Infection Signs and Symptoms  Outcome: Ongoing, Progressing

## 2023-01-14 NOTE — PT/OT/SLP PROGRESS
Occupational Therapy   Treatment    Name: Zandra Veloz  MRN: [id number]  Admitting Diagnosis:  Urinary tract infection       Recommendations:     Discharge Recommendations: nursing facility, skilled  Discharge Equipment Recommendations:   (to be determined)  Barriers to discharge:  None    Assessment:     Zandra Veloz is a 88 y.o. female with a medical diagnosis of Urinary tract infection.  She presents with alert and agreeable to treatment. Pt is asking to use the bed pan upon OT arrival. Performance deficits affecting function are weakness, impaired balance, impaired cognition, impaired coordination, impaired endurance, impaired functional mobility, impaired self care skills, impaired skin, decreased ROM, decreased upper extremity function.     Rehab Prognosis:  Fair; patient would benefit from acute skilled OT services to address these deficits and reach maximum level of function.       Plan:     Patient to be seen 5 x/week to address the above listed problems via self-care/home management, therapeutic activities, therapeutic exercises  Plan of Care Expires: 02/06/23  Plan of Care Reviewed with: patient    Subjective     Pain/Comfort:  Pain Rating 1: 4/10  Location - Side 1: Left  Location 1: leg  Pain Addressed 1: Pre-medicate for activity, Reposition, Distraction, Cessation of Activity  Pain Rating Post-Intervention 1: 5/10  Pain Rating Post-Intervention 2: 0/10    Objective:     Communicated with: CLYDE Wood prior to session.  Patient found HOB elevated with chao catheter upon OT entry to room.    General Precautions: Standard, fall    Orthopedic Precautions:LLE non weight bearing, LLE posterior precautions  Braces: N/A  Respiratory Status: Room air     Occupational Performance:     Bed Mobility:    Patient completed Rolling/Turning to Left with  moderate assistance  Patient completed Rolling/Turning to Right with moderate assistance     Functional  Mobility/Transfers:  NT    Activities of Daily Living:  Grooming: Pt performed oraly hygiene with toothbrush and toothpaste with access to supplies    Bathing: Pt bathed her upper body anterior with setup and washed her perineum with setup, Pt washed upper thighs with setup, max(A) for (B) Legs and total (A) for feet, back and buttocks    Upper Body Dressing: moderate assistance to don gown  Lower Body Dressing: dependence for socks  Toileting: Pt required total (A) for hygiene         [facility] 6 Click ADL: 13    Treatment & Education:  Pt assisted with her ADLS and with her bed mobility. Pt is very limited with pain and healing skin tears limiting her ability to progress with her ADLs.    Patient left HOB elevated with all lines intact, call button in reach, bed alarm on, RN Madie notified, and sitter present    GOALS:   Multidisciplinary Problems       Occupational Therapy Goals          Problem: Occupational Therapy    Goal Priority Disciplines Outcome Interventions   Occupational Therapy Goal     OT, PT/OT Ongoing, Progressing    Description: STG: (In 2 weeks)  Pt will perform grooming with min(A)  Pt will bathe with setup and mod (A)  Pt will perform UE dressing with setup and min(A)  Pt will perform (L) UE AROM to 3/4 full range  Pt will sit EOB x 5 min with CGA assistance  Pt will transfer bed/chair/bsc with mod(A)  Pt will tolerate 20 minutes of tx without fatigue      LT.Restore to max I with self care and mobility.                          Time Tracking:     OT Date of Treatment: 23  OT Start Time: 830  OT Stop Time: 916  OT Total Time (min): 46 min    Billable Minutes:Self Care/Home Management 46 min    OT/BRIGHT: OT          2023

## 2023-01-14 NOTE — PROGRESS NOTES
[facility] Hospital Medicine  Progress Note    Patient Name: Zandra Veloz  MRN: [id number]  Patient Class: IP- Inpatient   Admission Date: 12/29/2022  Length of Stay: 16 days  Attending Physician:  JN Westfall IVO.  Primary Care Provider: Brandon Thornton MD        Subjective:     Principal Problem:Urinary tract infection        HPI:  HPI:   88-year-old lady seen emergency room department.  Patient presents after having a fall when she was trying to go to the restroom at her home.  Patient sustained a left femur fracture.  Patient complains of moderate to severe pain in the left hip area.  Patient also complains of chest tightness, she rates it a 5/10 in the chest tightness has been intubate.  Patient also was seen in emergency room department earlier this week per her daughter.  Patient is found to have dehydration and a urinary tract infection.  Current she denies any shortness of breath.  She does not give a history of coronary artery disease.        Procedure(s) (LRB):  REVISION, TOTAL ARTHROPLASTY, HIP, VICTORIANO PROSTHETIC FX (Left)       Hospital Course:   12/19 - records reviewed. Fall without LOC and has left hip fx. No other complaints currently. Seen by cardiology with some CP felt musculoskeletal.  Echo mild AS and mod MR with nl EF. Age increase cardiac risk for surgery but discussed with daughter surgery is urgent and see no benefit in delay medically. Feel low to moderate risk for surgery. Question about UTI. No symptoms. Had UTI in 10.22 not surprising. Lab to do culture on urine in lab. Cover with ATN for prior culture with ATB started. Discussed with Dr Singer and cardiology.  12/20 Tachycardia and elevated BP. Cardiology adjusted meds. Plan hold off surgery until 12/22 to adjust meds and treat UTI. Family in room. Pt not sleeping well.   12/21 Didn't sleep well prior night. Apparently been on xanax for a time. Also recently started on Neurontin. Family  with question. No recent BM. Some increase stool on KUB but not severe. Surgery for am. BP some up but better. HR good.   12/22 Seen prior to surgery and apparently add better night. Sore mouth / throat better with mycostatin. For surgery. Family in room.  12/23 patient with pain but otherwise seemed to be doing relatively well.  Daughter in room.  Apparently been taking prednisone for some time and this was restarted per family request.  Look into swing bed placement  12/24 patient had better night rested and everyone's in better spirits today.  Tolerating some diet.   Therapy worked with patient.  Look into swing bed placement next week  12/25 remained in good spirits.  Less pain.  Had good bowel movement and ordered Dulcolax not given.  Because of dressing to leg, Burk was not removed to keep it from getting soiled.  Wound care to check 12/27.  Discuss this with patient and daughter.  On antibiotics for UTI  12/26-will dc to swingbed once accepted. Needs continued wound care.  12/27-DC when bed available  12/28- issues with wound care yesterday.  Dr. Singer had to come remove dressings himself due to adherence to subcutaneous tissue.  Family refusing transfer to [facility] due to lack of specially trained wound care team availability.  Planning to transfer to specialty but resistant to that as well and requesting to speak to administration.    12/29- agreeable to transfer to specialty.  This will be the best place for her to receive wound care.  DC after family tours     12/29-needs continued wound care and therapy[facility]    12/30-doing well today, still with pain         Overview/Hospital Course:  12/31-having some pain this am  1/1- no complaints  1/2- doing well  1/3- continue wound care, last day cefepime tomorrow  1/4- some pain this AM.  Delay in mediations dues to staffing issues. Dsicussed with RN  1/5- no issues overnight  1/6- Still with pain, hip incision looks good  1/7-diclofenac  for shoulder pain  1/8-decrease laxatives, change benzos and carlo to PRN per patient request  1/9 some confusion overnight  1/10- no more confusion, doing well  1/11- no acute issues overnight  1/12- doing well, nausea today  1/13 -Dr. Lebron Cutler IV, DO taking over for Dr. Guerra for weekend.  Patient's pain appears to be well controlled this morning no complaints of nausea.  Continue wound care and PT/OT  1/14-patient appears well labs.  Continue wound care and PT/OT      Interval History:  As above    Review of Systems   Constitutional:  Negative for chills, fatigue, fever and unexpected weight change.   HENT:  Negative for congestion, mouth sores and sore throat.    Eyes:  Negative for photophobia and visual disturbance.   Respiratory:  Negative for cough, chest tightness, shortness of breath and wheezing.    Cardiovascular:  Negative for chest pain, palpitations and leg swelling.   Gastrointestinal:  Negative for abdominal pain, diarrhea, nausea and vomiting.   Endocrine: Negative for cold intolerance and heat intolerance.   Genitourinary:  Negative for difficulty urinating, dysuria, frequency and urgency.   Musculoskeletal:  Positive for arthralgias. Negative for back pain and myalgias.   Skin:  Negative for pallor and rash.   Neurological:  Negative for tremors, seizures, syncope, weakness, numbness and headaches.   Hematological:  Does not bruise/bleed easily.   Psychiatric/Behavioral:  Negative for agitation, confusion, hallucinations and suicidal ideas.    Objective:     Vital Signs (Most Recent):  Temp: 98.7 °F (37.1 °C) (01/14/23 1200)  Pulse: (!) 57 (Nurse aware) (01/14/23 1200)  Resp: 18 (01/14/23 1200)  BP: (!) 137/55 (01/14/23 1200)  SpO2: 95 % (01/14/23 1200)   Vital Signs (24h Range):  Temp:  [98.1 °F (36.7 °C)-99.1 °F (37.3 °C)] 98.7 °F (37.1 °C)  Pulse:  [53-65] 57  Resp:  [16-20] 18  SpO2:  [94 %-98 %] 95 %  BP: (137-183)/(48-71) 137/55     Weight: 54.6 kg (120 lb 4.8 oz)  Body mass index is  19.42 kg/m².    Intake/Output Summary (Last 24 hours) at 1/14/2023 1535  Last data filed at 1/14/2023 0546  Gross per 24 hour   Intake --   Output 550 ml   Net -550 ml      Physical Exam  Vitals reviewed.   Constitutional:       Appearance: Normal appearance.   HENT:      Head: Normocephalic and atraumatic.   Eyes:      Extraocular Movements: Extraocular movements intact.   Cardiovascular:      Rate and Rhythm: Normal rate and regular rhythm.      Pulses: Normal pulses.   Pulmonary:      Effort: Pulmonary effort is normal.      Breath sounds: Normal breath sounds.   Abdominal:      General: Abdomen is flat.      Palpations: Abdomen is soft.   Musculoskeletal:         General: Tenderness and signs of injury present.      Comments: Dressed left lower extremity, painful to touch   Skin:     General: Skin is warm and dry.      Capillary Refill: Capillary refill takes less than 2 seconds.   Neurological:      General: No focal deficit present.      Mental Status: She is alert and oriented to person, place, and time.   Psychiatric:         Mood and Affect: Mood normal.         Behavior: Behavior normal.       Significant Labs: All pertinent labs within the past 24 hours have been reviewed.    Significant Imaging: I have reviewed all pertinent imaging results/findings within the past 24 hours.      Assessment/Plan:      * Urinary tract infection  Treatment completed      Disruption of external surgical wound        Wounds and injuries  Wound care consulted and following    Open wound of left lower leg  Wound care  -appreciate the assistance    Multiple skin tears  Wound care  -appreciate the assistance    Delmy-prosthetic fracture around prosthetic hip  S/P fixation with Pomierski      Lumbar radiculopathy  Pain management  PT      Hypertension  Labile throughout day.    Currently 137/55.    Titrate accordingly    Arthritis  Pain management        VTE Risk Mitigation (From admission, onward)         Ordered     apixaban  tablet 2.5 mg  2 times daily         12/29/22 1630                Discharge Planning   YANIV:      Code Status: Full Code   Is the patient medically ready for discharge?:     Reason for patient still in hospital (select all that apply): Treatment, Consult recommendations, PT / OT recommendations and Pending disposition  Discharge Plan A: Skilled Nursing Facility                  Lebron Cutler IV, DO  Department of Hospital Medicine   [facility]

## 2023-01-14 NOTE — SUBJECTIVE & OBJECTIVE
Interval History:  As above    Review of Systems   Constitutional:  Negative for chills, fatigue, fever and unexpected weight change.   HENT:  Negative for congestion, mouth sores and sore throat.    Eyes:  Negative for photophobia and visual disturbance.   Respiratory:  Negative for cough, chest tightness, shortness of breath and wheezing.    Cardiovascular:  Negative for chest pain, palpitations and leg swelling.   Gastrointestinal:  Negative for abdominal pain, diarrhea, nausea and vomiting.   Endocrine: Negative for cold intolerance and heat intolerance.   Genitourinary:  Negative for difficulty urinating, dysuria, frequency and urgency.   Musculoskeletal:  Positive for arthralgias. Negative for back pain and myalgias.   Skin:  Negative for pallor and rash.   Neurological:  Negative for tremors, seizures, syncope, weakness, numbness and headaches.   Hematological:  Does not bruise/bleed easily.   Psychiatric/Behavioral:  Negative for agitation, confusion, hallucinations and suicidal ideas.    Objective:     Vital Signs (Most Recent):  Temp: 98.7 °F (37.1 °C) (01/14/23 1200)  Pulse: (!) 57 (Nurse aware) (01/14/23 1200)  Resp: 18 (01/14/23 1200)  BP: (!) 137/55 (01/14/23 1200)  SpO2: 95 % (01/14/23 1200)   Vital Signs (24h Range):  Temp:  [98.1 °F (36.7 °C)-99.1 °F (37.3 °C)] 98.7 °F (37.1 °C)  Pulse:  [53-65] 57  Resp:  [16-20] 18  SpO2:  [94 %-98 %] 95 %  BP: (137-183)/(48-71) 137/55     Weight: 54.6 kg (120 lb 4.8 oz)  Body mass index is 19.42 kg/m².    Intake/Output Summary (Last 24 hours) at 1/14/2023 1535  Last data filed at 1/14/2023 0546  Gross per 24 hour   Intake --   Output 550 ml   Net -550 ml      Physical Exam  Vitals reviewed.   Constitutional:       Appearance: Normal appearance.   HENT:      Head: Normocephalic and atraumatic.   Eyes:      Extraocular Movements: Extraocular movements intact.   Cardiovascular:      Rate and Rhythm: Normal rate and regular rhythm.      Pulses: Normal pulses.    Pulmonary:      Effort: Pulmonary effort is normal.      Breath sounds: Normal breath sounds.   Abdominal:      General: Abdomen is flat.      Palpations: Abdomen is soft.   Musculoskeletal:         General: Tenderness and signs of injury present.      Comments: Dressed left lower extremity, painful to touch   Skin:     General: Skin is warm and dry.      Capillary Refill: Capillary refill takes less than 2 seconds.   Neurological:      General: No focal deficit present.      Mental Status: She is alert and oriented to person, place, and time.   Psychiatric:         Mood and Affect: Mood normal.         Behavior: Behavior normal.       Significant Labs: All pertinent labs within the past 24 hours have been reviewed.    Significant Imaging: I have reviewed all pertinent imaging results/findings within the past 24 hours.

## 2023-01-15 PROBLEM — N39.0 URINARY TRACT INFECTION: Status: RESOLVED | Noted: 2021-09-21 | Resolved: 2023-01-15

## 2023-01-15 PROCEDURE — 97110 THERAPEUTIC EXERCISES: CPT

## 2023-01-15 PROCEDURE — 97530 THERAPEUTIC ACTIVITIES: CPT

## 2023-01-15 PROCEDURE — 25000003 PHARM REV CODE 250: Performed by: STUDENT IN AN ORGANIZED HEALTH CARE EDUCATION/TRAINING PROGRAM

## 2023-01-15 PROCEDURE — 99233 PR SUBSEQUENT HOSPITAL CARE,LEVL III: ICD-10-PCS | Mod: ,,, | Performed by: FAMILY MEDICINE

## 2023-01-15 PROCEDURE — 25000003 PHARM REV CODE 250: Performed by: HOSPITALIST

## 2023-01-15 PROCEDURE — 63600175 PHARM REV CODE 636 W HCPCS: Performed by: STUDENT IN AN ORGANIZED HEALTH CARE EDUCATION/TRAINING PROGRAM

## 2023-01-15 PROCEDURE — 99233 SBSQ HOSP IP/OBS HIGH 50: CPT | Mod: ,,, | Performed by: FAMILY MEDICINE

## 2023-01-15 PROCEDURE — 11000001 HC ACUTE MED/SURG PRIVATE ROOM

## 2023-01-15 RX ORDER — GUAIFENESIN/DEXTROMETHORPHAN 100-10MG/5
10 SYRUP ORAL EVERY 6 HOURS PRN
Status: DISCONTINUED | OUTPATIENT
Start: 2023-01-15 | End: 2023-02-20 | Stop reason: HOSPADM

## 2023-01-15 RX ORDER — BISACODYL 5 MG
10 TABLET, DELAYED RELEASE (ENTERIC COATED) ORAL DAILY PRN
Status: DISCONTINUED | OUTPATIENT
Start: 2023-01-15 | End: 2023-02-20 | Stop reason: HOSPADM

## 2023-01-15 RX ORDER — ONDANSETRON 2 MG/ML
8 INJECTION INTRAMUSCULAR; INTRAVENOUS EVERY 6 HOURS PRN
Status: DISCONTINUED | OUTPATIENT
Start: 2023-01-15 | End: 2023-02-20 | Stop reason: HOSPADM

## 2023-01-15 RX ORDER — SIMETHICONE 80 MG
1 TABLET,CHEWABLE ORAL 3 TIMES DAILY PRN
Status: DISCONTINUED | OUTPATIENT
Start: 2023-01-15 | End: 2023-02-20 | Stop reason: HOSPADM

## 2023-01-15 RX ORDER — ACETAMINOPHEN 500 MG
1000 TABLET ORAL EVERY 6 HOURS PRN
Status: DISCONTINUED | OUTPATIENT
Start: 2023-01-15 | End: 2023-02-05

## 2023-01-15 RX ORDER — TRAZODONE HYDROCHLORIDE 50 MG/1
50 TABLET ORAL NIGHTLY PRN
Status: DISCONTINUED | OUTPATIENT
Start: 2023-01-15 | End: 2023-02-20 | Stop reason: HOSPADM

## 2023-01-15 RX ADMIN — HYDROCODONE BITARTRATE AND ACETAMINOPHEN 2 TABLET: 10; 325 TABLET ORAL at 01:01

## 2023-01-15 RX ADMIN — PREDNISONE 5 MG: 5 TABLET ORAL at 08:01

## 2023-01-15 RX ADMIN — FAMOTIDINE 20 MG: 20 TABLET ORAL at 08:01

## 2023-01-15 RX ADMIN — LISINOPRIL 20 MG: 20 TABLET ORAL at 08:01

## 2023-01-15 RX ADMIN — METOPROLOL TARTRATE 50 MG: 50 TABLET, FILM COATED ORAL at 08:01

## 2023-01-15 RX ADMIN — APIXABAN 2.5 MG: 2.5 TABLET, FILM COATED ORAL at 09:01

## 2023-01-15 RX ADMIN — PANTOPRAZOLE SODIUM 40 MG: 40 TABLET, DELAYED RELEASE ORAL at 08:01

## 2023-01-15 RX ADMIN — DILTIAZEM HYDROCHLORIDE 120 MG: 120 CAPSULE, COATED, EXTENDED RELEASE ORAL at 08:01

## 2023-01-15 RX ADMIN — SENNOSIDES 1 TABLET: 8.6 TABLET, FILM COATED ORAL at 08:01

## 2023-01-15 RX ADMIN — HYDRALAZINE HYDROCHLORIDE 25 MG: 25 TABLET ORAL at 01:01

## 2023-01-15 RX ADMIN — DOCUSATE SODIUM 100 MG: 100 CAPSULE, LIQUID FILLED ORAL at 08:01

## 2023-01-15 RX ADMIN — HYDRALAZINE HYDROCHLORIDE 25 MG: 25 TABLET ORAL at 09:01

## 2023-01-15 RX ADMIN — HYDROCODONE BITARTRATE AND ACETAMINOPHEN 2 TABLET: 10; 325 TABLET ORAL at 07:01

## 2023-01-15 RX ADMIN — GABAPENTIN 100 MG: 100 CAPSULE ORAL at 08:01

## 2023-01-15 RX ADMIN — SIMETHICONE 80 MG: 80 TABLET, CHEWABLE ORAL at 09:01

## 2023-01-15 RX ADMIN — GABAPENTIN 100 MG: 100 CAPSULE ORAL at 09:01

## 2023-01-15 RX ADMIN — GABAPENTIN 100 MG: 100 CAPSULE ORAL at 03:01

## 2023-01-15 RX ADMIN — DOCUSATE SODIUM 100 MG: 100 CAPSULE, LIQUID FILLED ORAL at 09:01

## 2023-01-15 RX ADMIN — APIXABAN 2.5 MG: 2.5 TABLET, FILM COATED ORAL at 08:01

## 2023-01-15 RX ADMIN — LEVOTHYROXINE SODIUM 125 MCG: 0.12 TABLET ORAL at 05:01

## 2023-01-15 RX ADMIN — HYDRALAZINE HYDROCHLORIDE 25 MG: 25 TABLET ORAL at 05:01

## 2023-01-15 RX ADMIN — ALPRAZOLAM 1 MG: 0.25 TABLET ORAL at 09:01

## 2023-01-15 NOTE — PLAN OF CARE
Problem: Skin Injury Risk Increased  Goal: Skin Health and Integrity  Outcome: Ongoing, Progressing     Problem: Skin Injury Risk Increased  Goal: Skin Health and Integrity  Outcome: Ongoing, Progressing     Problem: Fall Injury Risk  Goal: Absence of Fall and Fall-Related Injury  Outcome: Ongoing, Progressing     Problem: Fall Injury Risk  Goal: Absence of Fall and Fall-Related Injury  Outcome: Ongoing, Progressing

## 2023-01-15 NOTE — SUBJECTIVE & OBJECTIVE
Interval History:  As above    Review of Systems   Constitutional:  Negative for chills, fatigue, fever and unexpected weight change.   HENT:  Negative for congestion, mouth sores and sore throat.    Eyes:  Negative for photophobia and visual disturbance.   Respiratory:  Negative for cough, chest tightness, shortness of breath and wheezing.    Cardiovascular:  Negative for chest pain, palpitations and leg swelling.   Gastrointestinal:  Negative for abdominal pain, diarrhea, nausea and vomiting.   Endocrine: Negative for cold intolerance and heat intolerance.   Genitourinary:  Negative for difficulty urinating, dysuria, frequency and urgency.   Musculoskeletal:  Positive for arthralgias. Negative for back pain and myalgias.   Skin:  Negative for pallor and rash.   Neurological:  Negative for tremors, seizures, syncope, weakness, numbness and headaches.   Hematological:  Does not bruise/bleed easily.   Psychiatric/Behavioral:  Negative for agitation, confusion, hallucinations and suicidal ideas.    Objective:     Vital Signs (Most Recent):  Temp: 98.4 °F (36.9 °C) (01/15/23 1200)  Pulse: (!) 50 (Nurse aware) (01/15/23 1200)  Resp: 18 (01/15/23 1309)  BP: (!) 146/62 (01/15/23 1309)  SpO2: 96 % (01/15/23 1200)   Vital Signs (24h Range):  Temp:  [97.6 °F (36.4 °C)-98.9 °F (37.2 °C)] 98.4 °F (36.9 °C)  Pulse:  [50-61] 50  Resp:  [16-19] 18  SpO2:  [96 %-98 %] 96 %  BP: (142-173)/(49-68) 146/62     Weight: 54.6 kg (120 lb 4.8 oz)  Body mass index is 19.42 kg/m².    Intake/Output Summary (Last 24 hours) at 1/15/2023 1551  Last data filed at 1/15/2023 1300  Gross per 24 hour   Intake 0 ml   Output 400 ml   Net -400 ml      Physical Exam  Vitals reviewed.   Constitutional:       Appearance: Normal appearance.   HENT:      Head: Normocephalic and atraumatic.   Eyes:      Extraocular Movements: Extraocular movements intact.   Cardiovascular:      Rate and Rhythm: Normal rate and regular rhythm.      Pulses: Normal pulses.    Pulmonary:      Effort: Pulmonary effort is normal.      Breath sounds: Normal breath sounds.   Abdominal:      General: Abdomen is flat.      Palpations: Abdomen is soft.   Musculoskeletal:         General: Tenderness and signs of injury present.      Comments: Dressed left lower extremity, painful to touch   Skin:     General: Skin is warm and dry.      Capillary Refill: Capillary refill takes less than 2 seconds.   Neurological:      General: No focal deficit present.      Mental Status: She is alert and oriented to person, place, and time.   Psychiatric:         Mood and Affect: Mood normal.         Behavior: Behavior normal.       Significant Labs: All pertinent labs within the past 24 hours have been reviewed.    Significant Imaging: I have reviewed all pertinent imaging results/findings within the past 24 hours.

## 2023-01-15 NOTE — PROGRESS NOTES
[facility] Hospital Medicine  Progress Note    Patient Name: Zandra Veloz  MRN: [id number]  Patient Class: IP- Inpatient   Admission Date: 12/29/2022  Length of Stay: 17 days  Attending Physician: Shai Guerra DO  Primary Care Provider: Brandon Thornton MD        Subjective:     Principal Problem:Urinary tract infection        HPI:  HPI:   88-year-old lady seen emergency room department.  Patient presents after having a fall when she was trying to go to the restroom at her home.  Patient sustained a left femur fracture.  Patient complains of moderate to severe pain in the left hip area.  Patient also complains of chest tightness, she rates it a 5/10 in the chest tightness has been intubate.  Patient also was seen in emergency room department earlier this week per her daughter.  Patient is found to have dehydration and a urinary tract infection.  Current she denies any shortness of breath.  She does not give a history of coronary artery disease.        Procedure(s) (LRB):  REVISION, TOTAL ARTHROPLASTY, HIP, VICTORIANO PROSTHETIC FX (Left)       Hospital Course:   12/19 - records reviewed. Fall without LOC and has left hip fx. No other complaints currently. Seen by cardiology with some CP felt musculoskeletal.  Echo mild AS and mod MR with nl EF. Age increase cardiac risk for surgery but discussed with daughter surgery is urgent and see no benefit in delay medically. Feel low to moderate risk for surgery. Question about UTI. No symptoms. Had UTI in 10.22 not surprising. Lab to do culture on urine in lab. Cover with ATN for prior culture with ATB started. Discussed with Dr Singer and cardiology.  12/20 Tachycardia and elevated BP. Cardiology adjusted meds. Plan hold off surgery until 12/22 to adjust meds and treat UTI. Family in room. Pt not sleeping well.   12/21 Didn't sleep well prior night. Apparently been on xanax for a time. Also recently started on Neurontin. Family with  question. No recent BM. Some increase stool on KUB but not severe. Surgery for am. BP some up but better. HR good.   12/22 Seen prior to surgery and apparently add better night. Sore mouth / throat better with mycostatin. For surgery. Family in room.  12/23 patient with pain but otherwise seemed to be doing relatively well.  Daughter in room.  Apparently been taking prednisone for some time and this was restarted per family request.  Look into swing bed placement  12/24 patient had better night rested and everyone's in better spirits today.  Tolerating some diet.   Therapy worked with patient.  Look into swing bed placement next week  12/25 remained in good spirits.  Less pain.  Had good bowel movement and ordered Dulcolax not given.  Because of dressing to leg, Burk was not removed to keep it from getting soiled.  Wound care to check 12/27.  Discuss this with patient and daughter.  On antibiotics for UTI  12/26-will dc to swingbed once accepted. Needs continued wound care.  12/27-DC when bed available  12/28- issues with wound care yesterday.  Dr. Singer had to come remove dressings himself due to adherence to subcutaneous tissue.  Family refusing transfer to [facility] due to lack of specially trained wound care team availability.  Planning to transfer to specialty but resistant to that as well and requesting to speak to administration.    12/29- agreeable to transfer to specialty.  This will be the best place for her to receive wound care.  DC after family tours     12/29-needs continued wound care and therapy[facility]    12/30-doing well today, still with pain         Overview/Hospital Course:  12/31-having some pain this am  1/1- no complaints  1/2- doing well  1/3- continue wound care, last day cefepime tomorrow  1/4- some pain this AM.  Delay in mediations dues to staffing issues. Dsicussed with RN  1/5- no issues overnight  1/6- Still with pain, hip incision looks good  1/7-diclofenac for  shoulder pain  1/8-decrease laxatives, change benzos and carlo to PRN per patient request  1/9 some confusion overnight  1/10- no more confusion, doing well  1/11- no acute issues overnight  1/12- doing well, nausea today  1/13 -Dr. Lebron Cutler IV, DO taking over for Dr. Guerra for weekend.  Patient's pain appears to be well controlled this morning no complaints of nausea.  Continue wound care and PT/OT  1/14-patient appears well labs.  Continue wound care and PT/OT  1/15-patient still appears well.  No acute concerns or complaints.  No events overnight.  Continue wound care working with a PTOT.  Expiration is services estimated by February 6      Interval History:  As above    Review of Systems   Constitutional:  Negative for chills, fatigue, fever and unexpected weight change.   HENT:  Negative for congestion, mouth sores and sore throat.    Eyes:  Negative for photophobia and visual disturbance.   Respiratory:  Negative for cough, chest tightness, shortness of breath and wheezing.    Cardiovascular:  Negative for chest pain, palpitations and leg swelling.   Gastrointestinal:  Negative for abdominal pain, diarrhea, nausea and vomiting.   Endocrine: Negative for cold intolerance and heat intolerance.   Genitourinary:  Negative for difficulty urinating, dysuria, frequency and urgency.   Musculoskeletal:  Positive for arthralgias. Negative for back pain and myalgias.   Skin:  Negative for pallor and rash.   Neurological:  Negative for tremors, seizures, syncope, weakness, numbness and headaches.   Hematological:  Does not bruise/bleed easily.   Psychiatric/Behavioral:  Negative for agitation, confusion, hallucinations and suicidal ideas.    Objective:     Vital Signs (Most Recent):  Temp: 98.4 °F (36.9 °C) (01/15/23 1200)  Pulse: (!) 50 (Nurse aware) (01/15/23 1200)  Resp: 18 (01/15/23 1309)  BP: (!) 146/62 (01/15/23 1309)  SpO2: 96 % (01/15/23 1200)   Vital Signs (24h Range):  Temp:  [97.6 °F (36.4 °C)-98.9 °F  (37.2 °C)] 98.4 °F (36.9 °C)  Pulse:  [50-61] 50  Resp:  [16-19] 18  SpO2:  [96 %-98 %] 96 %  BP: (142-173)/(49-68) 146/62     Weight: 54.6 kg (120 lb 4.8 oz)  Body mass index is 19.42 kg/m².    Intake/Output Summary (Last 24 hours) at 1/15/2023 1551  Last data filed at 1/15/2023 1300  Gross per 24 hour   Intake 0 ml   Output 400 ml   Net -400 ml      Physical Exam  Vitals reviewed.   Constitutional:       Appearance: Normal appearance.   HENT:      Head: Normocephalic and atraumatic.   Eyes:      Extraocular Movements: Extraocular movements intact.   Cardiovascular:      Rate and Rhythm: Normal rate and regular rhythm.      Pulses: Normal pulses.   Pulmonary:      Effort: Pulmonary effort is normal.      Breath sounds: Normal breath sounds.   Abdominal:      General: Abdomen is flat.      Palpations: Abdomen is soft.   Musculoskeletal:         General: Tenderness and signs of injury present.      Comments: Dressed left lower extremity, painful to touch   Skin:     General: Skin is warm and dry.      Capillary Refill: Capillary refill takes less than 2 seconds.   Neurological:      General: No focal deficit present.      Mental Status: She is alert and oriented to person, place, and time.   Psychiatric:         Mood and Affect: Mood normal.         Behavior: Behavior normal.       Significant Labs: All pertinent labs within the past 24 hours have been reviewed.    Significant Imaging: I have reviewed all pertinent imaging results/findings within the past 24 hours.      Assessment/Plan:      Disruption of external surgical wound        Wounds and injuries  Wound care consulted and following    Open wound of left lower leg  Wound care  -appreciate the assistance    Multiple skin tears  Wound care  -appreciate the assistance    Delmy-prosthetic fracture around prosthetic hip  S/P fixation with Pomierski      Lumbar radiculopathy  Pain management  PT      Hypertension  Labile throughout day.    Currently 146/62.     Titrate accordingly    Arthritis  Pain management        VTE Risk Mitigation (From admission, onward)         Ordered     apixaban tablet 2.5 mg  2 times daily         12/29/22 1630                Discharge Planning   YANIV:      Code Status: Full Code   Is the patient medically ready for discharge?:     Reason for patient still in hospital (select all that apply): PT / OT recommendations and Pending disposition  Discharge Plan A: Skilled Nursing Facility                  Lebron Cutler IV, DO  Department of Hospital Medicine   [facility]

## 2023-01-15 NOTE — PT/OT/SLP PROGRESS
Occupational Therapy   Treatment    Name: Zandra Veloz  MRN: [id number]  Admitting Diagnosis:  Urinary tract infection       Recommendations:     Discharge Recommendations: nursing facility, skilled  Discharge Equipment Recommendations:   (to be determined)  Barriers to discharge:  None    Assessment:     Zandra Veloz is a 88 y.o. female with a medical diagnosis of Urinary tract infection s/o (L) hip repair with multiple wounds.  She presents with alert, wanting to get up to the chair for lunch. Performance deficits affecting function are weakness, impaired balance, impaired cardiopulmonary response to activity, impaired cognition, impaired functional mobility, impaired self care skills, impaired skin, impaired endurance, decreased lower extremity function, decreased ROM, decreased upper extremity function.     Rehab Prognosis:  Good and Fair; patient would benefit from acute skilled OT services to address these deficits and reach maximum level of function.       Plan:     Patient to be seen 5 x/week to address the above listed problems via self-care/home management, therapeutic activities, therapeutic exercises  Plan of Care Expires: 02/06/23  Plan of Care Reviewed with: patient    Subjective     Pain/Comfort:  Pain Rating 1: 4/10  Location - Side 1: Left  Location 1: leg  Pain Addressed 1: Pre-medicate for activity, Reposition, Cessation of Activity  Pain Rating Post-Intervention 1: 4/10  Pain Rating 2: 0/10  Location - Side 2: Right  Location 2: leg  Pain Addressed 2: Nurse notified, Cessation of Activity  Pain Rating Post-Intervention 2: 4/10 (due to skim tear that occured during sliding board transfer)    Objective:     Communicated with: CLYDE Hanley prior to session.  Patient found HOB elevated with chao catheter upon OT entry to room.    General Precautions: Standard, fall    Orthopedic Precautions:LLE non weight bearing, LLE posterior precautions  Braces: N/A  Respiratory Status:  Room air     Occupational Performance:     Bed Mobility:    Patient completed Rolling/Turning to Left with  moderate assistance  Patient completed Rolling/Turning to Right with maximal assistance  Patient completed Supine to Sit with maximal assistance and 2 persons     Functional Mobility/Transfers:  Patient completed Bed <> Chair Transfer using Slide Board technique with moderate assistance, maximal assistance, and of 2 persons with slide board  Functional Mobility: Pt performed slide board transfer with mod to max(A) x2 persons. Pt with mild tendency to lean posteriorly, so therapist placed leg against pt's (R) leg to block pt from sliding forward off of the sliding board. Pt sustained a skin tear to (R) leg from the friction of therapist's pants leg. Pt was positioned in chair, nursing was notified and pressure was applied to wound until nursing arrived to dress it. Therapist apologized to pt and asked if there was anything more that therapist could do or get for her.     Activities of Daily Living:  NT      [facility] 6 Click ADL: 13    Treatment & Education:  Pt declined all offers for further therapy following her skin tear.    Patient left up in chair with all lines intact, call button in reach, and RN Lydia and daughter and sitter are  present    GOALS:   Multidisciplinary Problems       Occupational Therapy Goals          Problem: Occupational Therapy    Goal Priority Disciplines Outcome Interventions   Occupational Therapy Goal     OT, PT/OT Ongoing, Progressing    Description: STG: (In 2 weeks)  Pt will perform grooming with min(A)  Pt will bathe with setup and mod (A)  Pt will perform UE dressing with setup and min(A)  Pt will perform (L) UE AROM to 3/4 full range  Pt will sit EOB x 5 min with CGA assistance  Pt will transfer bed/chair/bsc with mod(A)  Pt will tolerate 20 minutes of tx without fatigue      LT.Restore to max I with self care and mobility.                          Time Tracking:     OT  Date of Treatment: 01/15/23  OT Start Time: 1058  OT Stop Time: 1120  OT Total Time (min): 22 min    Billable Minutes:Therapeutic Activity 13 min    OT/BRIGHT: OT          1/15/2023

## 2023-01-15 NOTE — PT/OT/SLP PROGRESS
"Physical Therapy Treatment    Patient Name:  Zandra Veloz   MRN:  [id number]    Recommendations:     Discharge Recommendations: nursing facility, skilled, rehabilitation facility  Discharge Equipment Recommendations: other (see comments) (to be determined)  Barriers to discharge:  Ongoing medical treatment    Assessment:     Zandra Veloz is a 88 y.o. female admitted with a medical diagnosis of Urinary tract infection, left periprosthetic hip fracture with THR revision, and multiple wounds.  She presents with the following impairments/functional limitations: weakness, impaired endurance, impaired self care skills, impaired functional mobility, gait instability, impaired balance, decreased upper extremity function, decreased lower extremity function, decreased safety awareness, pain, impaired skin, orthopedic precautions.    Pt required more assistance to complete the SB transfer back to bed than reports of previous Sb transfer to the chair. Pt remains limited by extremely fragile skin, deconditioning, and fear of falling. Pt will require extended rehab to regain independent function.     Rehab Prognosis: Good; patient would benefit from acute skilled PT services to address these deficits and reach maximum level of function.    Recent Surgery: * No surgery found *      Plan:     During this hospitalization, patient to be seen 5 x/week to address the identified rehab impairments via gait training, therapeutic activities, therapeutic exercises, neuromuscular re-education and progress toward the following goals:    Plan of Care Expires:  01/30/23    Subjective     Chief Complaint: left periprosthetic hip fracture with THR revision and multiple wounds  Patient/Family Comments/goals: "Im ready to get back to bed."  Pain/Comfort:  Pain Rating 1: 2/10  Location 1: leg  Pain Rating Post-Intervention 1: 2/10      Objective:     Communicated with Lydia Hanley RN prior to session.  Patient found up in " chair with wound vac, chao catheter upon PT entry to room.     General Precautions: Standard, fall  Orthopedic Precautions: LLE non weight bearing, LLE posterior precautions  Braces: N/A  Respiratory Status: Room air     Functional Mobility:  Bed Mobility:     Sit to Supine: moderate assistance and of 2 persons  Transfers:     Bed to Chair: maximal assistance and of 2 persons with  heavy verbal and tactile cues for sequencing/safety  using  Slide Board      AM-PAC 6 CLICK MOBILITY  Turning over in bed (including adjusting bedclothes, sheets and blankets)?: 2  Sitting down on and standing up from a chair with arms (e.g., wheelchair, bedside commode, etc.): 1  Moving from lying on back to sitting on the side of the bed?: 2  Moving to and from a bed to a chair (including a wheelchair)?: 2  Need to walk in hospital room?: 1  Climbing 3-5 steps with a railing?: 1  Basic Mobility Total Score: 9       Treatment & Education:  Transfers as above.  Bilateral lower extremity exercise x 30 reps: ankle pumps, Quad sets, glut sets, heel slides, hip abduction/adduction, and straight leg raises with active assist ROM, verbal cues, and tactile cues     Patient left supine with all lines intact, call button in reach, Lydia Hanley RN notified, and daughter present..    GOALS:   Multidisciplinary Problems       Physical Therapy Goals          Problem: Physical Therapy    Goal Priority Disciplines Outcome Goal Variances Interventions   Physical Therapy Goal     PT, PT/OT Ongoing, Progressing     Description: Short Term Goals to be met by 1/25/2023    Patient will increase functional independence and safety with mobility by performing    1.   Rolling side to side with Minimal assist   2.   Supine to sit Moderate Assist  3.   Sitting balance edge of the bed x 15 minutes Supervision  4.   Sit to stand Moderate Assist  using manual assistance with gait belt and NWB L LE  5.   Bed to chair Minimal  Assist using sliding board and NWB left  LE  6.   Lower extremity exercises x 30 reps with assist as necessary and no rest breaks      Long Term Goals to be met by 2/10/2023    Patient to require less than or equal to Stand by assist for functional mobility to ease caregiver burden                        Time Tracking:     PT Received On: 01/15/23  PT Start Time: 1308     PT Stop Time: 1334  PT Total Time (min): 26 min     Billable Minutes: Therapeutic Activity 10 min and Therapeutic Exercise 15 min    Treatment Type: Treatment  PT/PTA: PT     PTA Visit Number: 0     01/15/2023

## 2023-01-16 LAB
ANION GAP SERPL CALCULATED.3IONS-SCNC: 9 MMOL/L (ref 7–16)
BASOPHILS # BLD AUTO: 0.12 K/UL (ref 0–0.2)
BASOPHILS NFR BLD AUTO: 1.5 % (ref 0–1)
BUN SERPL-MCNC: 12 MG/DL (ref 7–18)
BUN/CREAT SERPL: 17 (ref 6–20)
CALCIUM SERPL-MCNC: 8.5 MG/DL (ref 8.5–10.1)
CHLORIDE SERPL-SCNC: 95 MMOL/L (ref 98–107)
CO2 SERPL-SCNC: 27 MMOL/L (ref 21–32)
CREAT SERPL-MCNC: 0.69 MG/DL (ref 0.55–1.02)
DIFFERENTIAL METHOD BLD: ABNORMAL
EGFR (NO RACE VARIABLE) (RUSH/TITUS): 84 ML/MIN/1.73M²
EOSINOPHIL # BLD AUTO: 0.21 K/UL (ref 0–0.5)
EOSINOPHIL NFR BLD AUTO: 2.6 % (ref 1–4)
ERYTHROCYTE [DISTWIDTH] IN BLOOD BY AUTOMATED COUNT: 13.5 % (ref 11.5–14.5)
GLUCOSE SERPL-MCNC: 87 MG/DL (ref 74–106)
HCT VFR BLD AUTO: 31.3 % (ref 38–47)
HGB BLD-MCNC: 9.9 G/DL (ref 12–16)
IMM GRANULOCYTES # BLD AUTO: 0.18 K/UL (ref 0–0.04)
IMM GRANULOCYTES NFR BLD: 2.2 % (ref 0–0.4)
LYMPHOCYTES # BLD AUTO: 2.17 K/UL (ref 1–4.8)
LYMPHOCYTES NFR BLD AUTO: 27 % (ref 27–41)
MCH RBC QN AUTO: 29.6 PG (ref 27–31)
MCHC RBC AUTO-ENTMCNC: 31.6 G/DL (ref 32–36)
MCV RBC AUTO: 93.7 FL (ref 80–96)
MONOCYTES # BLD AUTO: 0.73 K/UL (ref 0–0.8)
MONOCYTES NFR BLD AUTO: 9.1 % (ref 2–6)
MPC BLD CALC-MCNC: 9.6 FL (ref 9.4–12.4)
NEUTROPHILS # BLD AUTO: 4.62 K/UL (ref 1.8–7.7)
NEUTROPHILS NFR BLD AUTO: 57.6 % (ref 53–65)
NRBC # BLD AUTO: 0 X10E3/UL
NRBC, AUTO (.00): 0 %
PLATELET # BLD AUTO: 291 K/UL (ref 150–400)
POTASSIUM SERPL-SCNC: 4.4 MMOL/L (ref 3.5–5.1)
RBC # BLD AUTO: 3.34 M/UL (ref 4.2–5.4)
SODIUM SERPL-SCNC: 127 MMOL/L (ref 136–145)
WBC # BLD AUTO: 8.03 K/UL (ref 4.5–11)

## 2023-01-16 PROCEDURE — 11000001 HC ACUTE MED/SURG PRIVATE ROOM

## 2023-01-16 PROCEDURE — 25000003 PHARM REV CODE 250: Performed by: STUDENT IN AN ORGANIZED HEALTH CARE EDUCATION/TRAINING PROGRAM

## 2023-01-16 PROCEDURE — 99232 PR SUBSEQUENT HOSPITAL CARE,LEVL II: ICD-10-PCS | Mod: ,,, | Performed by: STUDENT IN AN ORGANIZED HEALTH CARE EDUCATION/TRAINING PROGRAM

## 2023-01-16 PROCEDURE — 99232 SBSQ HOSP IP/OBS MODERATE 35: CPT | Mod: ,,, | Performed by: STUDENT IN AN ORGANIZED HEALTH CARE EDUCATION/TRAINING PROGRAM

## 2023-01-16 PROCEDURE — 51702 INSERT TEMP BLADDER CATH: CPT

## 2023-01-16 PROCEDURE — 63600175 PHARM REV CODE 636 W HCPCS: Performed by: STUDENT IN AN ORGANIZED HEALTH CARE EDUCATION/TRAINING PROGRAM

## 2023-01-16 PROCEDURE — 85025 COMPLETE CBC W/AUTO DIFF WBC: CPT | Performed by: STUDENT IN AN ORGANIZED HEALTH CARE EDUCATION/TRAINING PROGRAM

## 2023-01-16 PROCEDURE — 36415 COLL VENOUS BLD VENIPUNCTURE: CPT | Performed by: STUDENT IN AN ORGANIZED HEALTH CARE EDUCATION/TRAINING PROGRAM

## 2023-01-16 PROCEDURE — 80048 BASIC METABOLIC PNL TOTAL CA: CPT | Performed by: STUDENT IN AN ORGANIZED HEALTH CARE EDUCATION/TRAINING PROGRAM

## 2023-01-16 RX ADMIN — GABAPENTIN 100 MG: 100 CAPSULE ORAL at 03:01

## 2023-01-16 RX ADMIN — PANTOPRAZOLE SODIUM 40 MG: 40 TABLET, DELAYED RELEASE ORAL at 09:01

## 2023-01-16 RX ADMIN — HYDROCODONE BITARTRATE AND ACETAMINOPHEN 2 TABLET: 10; 325 TABLET ORAL at 06:01

## 2023-01-16 RX ADMIN — HYDRALAZINE HYDROCHLORIDE 25 MG: 25 TABLET ORAL at 06:01

## 2023-01-16 RX ADMIN — DOCUSATE SODIUM 100 MG: 100 CAPSULE, LIQUID FILLED ORAL at 09:01

## 2023-01-16 RX ADMIN — METOPROLOL TARTRATE 50 MG: 50 TABLET, FILM COATED ORAL at 09:01

## 2023-01-16 RX ADMIN — HYDRALAZINE HYDROCHLORIDE 25 MG: 25 TABLET ORAL at 09:01

## 2023-01-16 RX ADMIN — ALPRAZOLAM 1 MG: 0.25 TABLET ORAL at 09:01

## 2023-01-16 RX ADMIN — LISINOPRIL 20 MG: 20 TABLET ORAL at 09:01

## 2023-01-16 RX ADMIN — GABAPENTIN 100 MG: 100 CAPSULE ORAL at 09:01

## 2023-01-16 RX ADMIN — DILTIAZEM HYDROCHLORIDE 120 MG: 120 CAPSULE, COATED, EXTENDED RELEASE ORAL at 09:01

## 2023-01-16 RX ADMIN — POLYETHYLENE GLYCOL 3350 17 G: 17 POWDER, FOR SOLUTION ORAL at 09:01

## 2023-01-16 RX ADMIN — SENNOSIDES 1 TABLET: 8.6 TABLET, FILM COATED ORAL at 09:01

## 2023-01-16 RX ADMIN — PREDNISONE 5 MG: 5 TABLET ORAL at 09:01

## 2023-01-16 RX ADMIN — APIXABAN 2.5 MG: 2.5 TABLET, FILM COATED ORAL at 09:01

## 2023-01-16 RX ADMIN — HYDRALAZINE HYDROCHLORIDE 25 MG: 25 TABLET ORAL at 03:01

## 2023-01-16 RX ADMIN — HYDROCODONE BITARTRATE AND ACETAMINOPHEN 2 TABLET: 10; 325 TABLET ORAL at 03:01

## 2023-01-16 RX ADMIN — LEVOTHYROXINE SODIUM 125 MCG: 0.12 TABLET ORAL at 06:01

## 2023-01-16 RX ADMIN — FAMOTIDINE 20 MG: 20 TABLET ORAL at 09:01

## 2023-01-16 NOTE — PROGRESS NOTES
[facility] Hospital Medicine  Progress Note    Patient Name: Zandra Veloz  MRN: [id number]  Patient Class: IP- Inpatient   Admission Date: 12/29/2022  Length of Stay: 18 days  Attending Physician: Shai Guerra DO  Primary Care Provider: Brandon Thornton MD        Subjective:     Principal Problem:Urinary tract infection        HPI:  HPI:   88-year-old lady seen emergency room department.  Patient presents after having a fall when she was trying to go to the restroom at her home.  Patient sustained a left femur fracture.  Patient complains of moderate to severe pain in the left hip area.  Patient also complains of chest tightness, she rates it a 5/10 in the chest tightness has been intubate.  Patient also was seen in emergency room department earlier this week per her daughter.  Patient is found to have dehydration and a urinary tract infection.  Current she denies any shortness of breath.  She does not give a history of coronary artery disease.        Procedure(s) (LRB):  REVISION, TOTAL ARTHROPLASTY, HIP, VICTORIANO PROSTHETIC FX (Left)       Hospital Course:   12/19 - records reviewed. Fall without LOC and has left hip fx. No other complaints currently. Seen by cardiology with some CP felt musculoskeletal.  Echo mild AS and mod MR with nl EF. Age increase cardiac risk for surgery but discussed with daughter surgery is urgent and see no benefit in delay medically. Feel low to moderate risk for surgery. Question about UTI. No symptoms. Had UTI in 10.22 not surprising. Lab to do culture on urine in lab. Cover with ATN for prior culture with ATB started. Discussed with Dr Singer and cardiology.  12/20 Tachycardia and elevated BP. Cardiology adjusted meds. Plan hold off surgery until 12/22 to adjust meds and treat UTI. Family in room. Pt not sleeping well.   12/21 Didn't sleep well prior night. Apparently been on xanax for a time. Also recently started on Neurontin. Family with  question. No recent BM. Some increase stool on KUB but not severe. Surgery for am. BP some up but better. HR good.   12/22 Seen prior to surgery and apparently add better night. Sore mouth / throat better with mycostatin. For surgery. Family in room.  12/23 patient with pain but otherwise seemed to be doing relatively well.  Daughter in room.  Apparently been taking prednisone for some time and this was restarted per family request.  Look into swing bed placement  12/24 patient had better night rested and everyone's in better spirits today.  Tolerating some diet.   Therapy worked with patient.  Look into swing bed placement next week  12/25 remained in good spirits.  Less pain.  Had good bowel movement and ordered Dulcolax not given.  Because of dressing to leg, Burk was not removed to keep it from getting soiled.  Wound care to check 12/27.  Discuss this with patient and daughter.  On antibiotics for UTI  12/26-will dc to swingbed once accepted. Needs continued wound care.  12/27-DC when bed available  12/28- issues with wound care yesterday.  Dr. Singer had to come remove dressings himself due to adherence to subcutaneous tissue.  Family refusing transfer to [facility] due to lack of specially trained wound care team availability.  Planning to transfer to specialty but resistant to that as well and requesting to speak to administration.    12/29- agreeable to transfer to specialty.  This will be the best place for her to receive wound care.  DC after family tours     12/29-needs continued wound care and therapy[facility]    12/30-doing well today, still with pain         Overview/Hospital Course:  12/31-having some pain this am  1/1- no complaints  1/2- doing well  1/3- continue wound care, last day cefepime tomorrow  1/4- some pain this AM.  Delay in mediations dues to staffing issues. Dsicussed with RN  1/5- no issues overnight  1/6- Still with pain, hip incision looks good  1/7-diclofenac for  shoulder pain  1/8-decrease laxatives, change benzos and carlo to PRN per patient request  1/9 some confusion overnight  1/10- no more confusion, doing well  1/11- no acute issues overnight  1/12- doing well, nausea today  1/13 -Dr. Lebron Cutler IV, DO taking over for Dr. Guerra for weekend.  Patient's pain appears to be well controlled this morning no complaints of nausea.  Continue wound care and PT/OT  1/14-patient appears well labs.  Continue wound care and PT/OT  1/15-patient still appears well.  No acute concerns or complaints.  No events overnight.  Continue wound care working with a PTOT.  Expiration is services estimated by February 6 1/16- no complaints, doing well      Interval History: naeo    Review of Systems   Constitutional:  Negative for chills, fatigue, fever and unexpected weight change.   HENT:  Negative for congestion, mouth sores and sore throat.    Eyes:  Negative for photophobia and visual disturbance.   Respiratory:  Negative for cough, chest tightness, shortness of breath and wheezing.    Cardiovascular:  Negative for chest pain, palpitations and leg swelling.   Gastrointestinal:  Negative for abdominal pain, diarrhea, nausea and vomiting.   Endocrine: Negative for cold intolerance and heat intolerance.   Genitourinary:  Negative for difficulty urinating, dysuria, frequency and urgency.   Musculoskeletal:  Positive for arthralgias. Negative for back pain and myalgias.   Skin:  Negative for pallor and rash.   Neurological:  Negative for tremors, seizures, syncope, weakness, numbness and headaches.   Hematological:  Does not bruise/bleed easily.   Psychiatric/Behavioral:  Negative for agitation, confusion, hallucinations and suicidal ideas.    Objective:     Vital Signs (Most Recent):  Temp: 98 °F (36.7 °C) (01/16/23 1200)  Pulse: (!) 49 (01/16/23 1200)  Resp: 18 (01/16/23 1200)  BP: (!) 136/50 (01/16/23 1200)  SpO2: 96 % (01/16/23 1200)   Vital Signs (24h Range):  Temp:  [98 °F (36.7  °C)-98.8 °F (37.1 °C)] 98 °F (36.7 °C)  Pulse:  [48-99] 49  Resp:  [17-20] 18  SpO2:  [95 %-99 %] 96 %  BP: (117-158)/(50-82) 136/50     Weight: 54.6 kg (120 lb 4.8 oz)  Body mass index is 19.42 kg/m².    Intake/Output Summary (Last 24 hours) at 1/16/2023 1501  Last data filed at 1/16/2023 0702  Gross per 24 hour   Intake --   Output 375 ml   Net -375 ml        Physical Exam  Vitals reviewed.   Constitutional:       Appearance: Normal appearance.   HENT:      Head: Normocephalic and atraumatic.   Eyes:      Extraocular Movements: Extraocular movements intact.   Cardiovascular:      Rate and Rhythm: Normal rate and regular rhythm.      Pulses: Normal pulses.   Pulmonary:      Effort: Pulmonary effort is normal.      Breath sounds: Normal breath sounds.   Abdominal:      General: Abdomen is flat.      Palpations: Abdomen is soft.   Musculoskeletal:         General: Tenderness and signs of injury present.      Comments: Dressed left lower extremity, painful to touch   Skin:     General: Skin is warm and dry.      Capillary Refill: Capillary refill takes less than 2 seconds.   Neurological:      General: No focal deficit present.      Mental Status: She is alert and oriented to person, place, and time.   Psychiatric:         Mood and Affect: Mood normal.         Behavior: Behavior normal.       Significant Labs: All pertinent labs within the past 24 hours have been reviewed.    Significant Imaging: I have reviewed all pertinent imaging results/findings within the past 24 hours.      Assessment/Plan:      Disruption of external surgical wound        Wounds and injuries  Wound care consulted and following    Open wound of left lower leg  Wound care      Multiple skin tears  Wound care      Delmy-prosthetic fracture around prosthetic hip  S/P fixation with Pomierski      Lumbar radiculopathy  Pain management  PT      Hypertension  Adjust medications as needed    Arthritis  Pain management        VTE Risk Mitigation (From  admission, onward)         Ordered     apixaban tablet 2.5 mg  2 times daily         12/29/22 1630                Discharge Planning   YANIV:      Code Status: Full Code   Is the patient medically ready for discharge?:     Reason for patient still in hospital (select all that apply): Treatment  Discharge Plan A: Skilled Nursing Facility                  Shai Guerra DO  Department of Hospital Medicine   [facility]

## 2023-01-16 NOTE — NURSING
Cleansed left upper arm with wound solution and applied foam. The dressing to right upper arm pt and family said it was okay and did not want that one changed at present time. They did let me know wound care will change dressings tomorrow. Tolerated dressing change well. Voiced no other needs. Daughter at bedside along with caregiver. Will continue to monitor.

## 2023-01-16 NOTE — SUBJECTIVE & OBJECTIVE
Interval History: naeo    Review of Systems   Constitutional:  Negative for chills, fatigue, fever and unexpected weight change.   HENT:  Negative for congestion, mouth sores and sore throat.    Eyes:  Negative for photophobia and visual disturbance.   Respiratory:  Negative for cough, chest tightness, shortness of breath and wheezing.    Cardiovascular:  Negative for chest pain, palpitations and leg swelling.   Gastrointestinal:  Negative for abdominal pain, diarrhea, nausea and vomiting.   Endocrine: Negative for cold intolerance and heat intolerance.   Genitourinary:  Negative for difficulty urinating, dysuria, frequency and urgency.   Musculoskeletal:  Positive for arthralgias. Negative for back pain and myalgias.   Skin:  Negative for pallor and rash.   Neurological:  Negative for tremors, seizures, syncope, weakness, numbness and headaches.   Hematological:  Does not bruise/bleed easily.   Psychiatric/Behavioral:  Negative for agitation, confusion, hallucinations and suicidal ideas.    Objective:     Vital Signs (Most Recent):  Temp: 98 °F (36.7 °C) (01/16/23 1200)  Pulse: (!) 49 (01/16/23 1200)  Resp: 18 (01/16/23 1200)  BP: (!) 136/50 (01/16/23 1200)  SpO2: 96 % (01/16/23 1200)   Vital Signs (24h Range):  Temp:  [98 °F (36.7 °C)-98.8 °F (37.1 °C)] 98 °F (36.7 °C)  Pulse:  [48-99] 49  Resp:  [17-20] 18  SpO2:  [95 %-99 %] 96 %  BP: (117-158)/(50-82) 136/50     Weight: 54.6 kg (120 lb 4.8 oz)  Body mass index is 19.42 kg/m².    Intake/Output Summary (Last 24 hours) at 1/16/2023 1501  Last data filed at 1/16/2023 0702  Gross per 24 hour   Intake --   Output 375 ml   Net -375 ml        Physical Exam  Vitals reviewed.   Constitutional:       Appearance: Normal appearance.   HENT:      Head: Normocephalic and atraumatic.   Eyes:      Extraocular Movements: Extraocular movements intact.   Cardiovascular:      Rate and Rhythm: Normal rate and regular rhythm.      Pulses: Normal pulses.   Pulmonary:      Effort:  Pulmonary effort is normal.      Breath sounds: Normal breath sounds.   Abdominal:      General: Abdomen is flat.      Palpations: Abdomen is soft.   Musculoskeletal:         General: Tenderness and signs of injury present.      Comments: Dressed left lower extremity, painful to touch   Skin:     General: Skin is warm and dry.      Capillary Refill: Capillary refill takes less than 2 seconds.   Neurological:      General: No focal deficit present.      Mental Status: She is alert and oriented to person, place, and time.   Psychiatric:         Mood and Affect: Mood normal.         Behavior: Behavior normal.       Significant Labs: All pertinent labs within the past 24 hours have been reviewed.    Significant Imaging: I have reviewed all pertinent imaging results/findings within the past 24 hours.

## 2023-01-17 PROCEDURE — 11000001 HC ACUTE MED/SURG PRIVATE ROOM

## 2023-01-17 PROCEDURE — 63600175 PHARM REV CODE 636 W HCPCS: Performed by: STUDENT IN AN ORGANIZED HEALTH CARE EDUCATION/TRAINING PROGRAM

## 2023-01-17 PROCEDURE — 99232 SBSQ HOSP IP/OBS MODERATE 35: CPT | Mod: ,,, | Performed by: STUDENT IN AN ORGANIZED HEALTH CARE EDUCATION/TRAINING PROGRAM

## 2023-01-17 PROCEDURE — 99232 SBSQ HOSP IP/OBS MODERATE 35: CPT | Mod: ,,, | Performed by: NURSE PRACTITIONER

## 2023-01-17 PROCEDURE — 25000003 PHARM REV CODE 250: Performed by: STUDENT IN AN ORGANIZED HEALTH CARE EDUCATION/TRAINING PROGRAM

## 2023-01-17 PROCEDURE — 99232 PR SUBSEQUENT HOSPITAL CARE,LEVL II: ICD-10-PCS | Mod: ,,, | Performed by: NURSE PRACTITIONER

## 2023-01-17 PROCEDURE — 99232 PR SUBSEQUENT HOSPITAL CARE,LEVL II: ICD-10-PCS | Mod: ,,, | Performed by: STUDENT IN AN ORGANIZED HEALTH CARE EDUCATION/TRAINING PROGRAM

## 2023-01-17 PROCEDURE — 97530 THERAPEUTIC ACTIVITIES: CPT | Mod: CQ

## 2023-01-17 PROCEDURE — 96372 THER/PROPH/DIAG INJ SC/IM: CPT

## 2023-01-17 PROCEDURE — 97110 THERAPEUTIC EXERCISES: CPT

## 2023-01-17 PROCEDURE — 97110 THERAPEUTIC EXERCISES: CPT | Mod: CQ

## 2023-01-17 RX ADMIN — HYDROCODONE BITARTRATE AND ACETAMINOPHEN 2 TABLET: 10; 325 TABLET ORAL at 10:01

## 2023-01-17 RX ADMIN — HYDROCODONE BITARTRATE AND ACETAMINOPHEN 2 TABLET: 10; 325 TABLET ORAL at 02:01

## 2023-01-17 RX ADMIN — HYDRALAZINE HYDROCHLORIDE 25 MG: 25 TABLET ORAL at 02:01

## 2023-01-17 RX ADMIN — PROCHLORPERAZINE MALEATE 10 MG: 10 TABLET ORAL at 03:01

## 2023-01-17 RX ADMIN — METOPROLOL TARTRATE 50 MG: 50 TABLET, FILM COATED ORAL at 08:01

## 2023-01-17 RX ADMIN — GABAPENTIN 100 MG: 100 CAPSULE ORAL at 09:01

## 2023-01-17 RX ADMIN — DILTIAZEM HYDROCHLORIDE 120 MG: 120 CAPSULE, COATED, EXTENDED RELEASE ORAL at 09:01

## 2023-01-17 RX ADMIN — PANTOPRAZOLE SODIUM 40 MG: 40 TABLET, DELAYED RELEASE ORAL at 09:01

## 2023-01-17 RX ADMIN — LEVOTHYROXINE SODIUM 125 MCG: 0.12 TABLET ORAL at 07:01

## 2023-01-17 RX ADMIN — GABAPENTIN 100 MG: 100 CAPSULE ORAL at 02:01

## 2023-01-17 RX ADMIN — MORPHINE SULFATE 2 MG: 2 INJECTION, SOLUTION INTRAMUSCULAR; INTRAVENOUS at 11:01

## 2023-01-17 RX ADMIN — DOCUSATE SODIUM 100 MG: 100 CAPSULE, LIQUID FILLED ORAL at 08:01

## 2023-01-17 RX ADMIN — HYDRALAZINE HYDROCHLORIDE 25 MG: 25 TABLET ORAL at 10:01

## 2023-01-17 RX ADMIN — METOPROLOL TARTRATE 50 MG: 50 TABLET, FILM COATED ORAL at 09:01

## 2023-01-17 RX ADMIN — APIXABAN 2.5 MG: 2.5 TABLET, FILM COATED ORAL at 08:01

## 2023-01-17 RX ADMIN — FAMOTIDINE 20 MG: 20 TABLET ORAL at 09:01

## 2023-01-17 RX ADMIN — APIXABAN 2.5 MG: 2.5 TABLET, FILM COATED ORAL at 09:01

## 2023-01-17 RX ADMIN — HYDROCODONE BITARTRATE AND ACETAMINOPHEN 2 TABLET: 10; 325 TABLET ORAL at 07:01

## 2023-01-17 RX ADMIN — HYDRALAZINE HYDROCHLORIDE 25 MG: 25 TABLET ORAL at 06:01

## 2023-01-17 RX ADMIN — LISINOPRIL 20 MG: 20 TABLET ORAL at 09:01

## 2023-01-17 RX ADMIN — PREDNISONE 5 MG: 5 TABLET ORAL at 09:01

## 2023-01-17 RX ADMIN — GABAPENTIN 100 MG: 100 CAPSULE ORAL at 08:01

## 2023-01-17 RX ADMIN — DOCUSATE SODIUM 100 MG: 100 CAPSULE, LIQUID FILLED ORAL at 09:01

## 2023-01-17 RX ADMIN — ALPRAZOLAM 1 MG: 0.25 TABLET ORAL at 08:01

## 2023-01-17 NOTE — PLAN OF CARE
Chart reviewed. Pt shows improvement. Continued wound care and PT/OT. No complaints. Ss following.

## 2023-01-17 NOTE — PLAN OF CARE
Problem: Physical Therapy  Goal: Physical Therapy Goal  Description: Short Term Goals to be met by 1/25/2023    Patient will increase functional independence and safety with mobility by performing    1.   Rolling side to side with Minimal assist   2.   Supine to sit Moderate Assist  3.   Sitting balance edge of the bed x 15 minutes Supervision  4.   Sit to stand Moderate Assist  using manual assistance with gait belt and NWB L LE  5.   Bed to chair Minimal  Assist using sliding board and NWB left LE  6.   Lower extremity exercises x 30 reps with assist as necessary and no rest breaks      Long Term Goals to be met by 2/10/2023    Patient to require less than or equal to Stand by assist for functional mobility to ease caregiver burden   Outcome: Ongoing, Progressing       Bed Mobility:     Rolling Left:  contact guard assistance to minimum assistance  Rolling Right: contact guard assistance to minimum assistance  Supine to Sit: minimum assistance x 1-2 persons and increased time  Sit to Supine: minimum assistance x 1-2 persons to moderate assistance x 1-2 persons  Bed to chair: moderate assistance x 2 persons to maximum assistance x 2 person via sliding board      Pt slowly improving with all functional mobility and will benefit from cont PT to farther improve same

## 2023-01-17 NOTE — PROGRESS NOTES
[facility] Hospital Medicine  Progress Note    Patient Name: Zandra Veloz  MRN: [id number]  Patient Class: IP- Inpatient   Admission Date: 12/29/2022  Length of Stay: 19 days  Attending Physician: Shai Guerra DO  Primary Care Provider: Brandon Thornton MD        Subjective:     Principal Problem:Urinary tract infection        HPI:  HPI:   88-year-old lady seen emergency room department.  Patient presents after having a fall when she was trying to go to the restroom at her home.  Patient sustained a left femur fracture.  Patient complains of moderate to severe pain in the left hip area.  Patient also complains of chest tightness, she rates it a 5/10 in the chest tightness has been intubate.  Patient also was seen in emergency room department earlier this week per her daughter.  Patient is found to have dehydration and a urinary tract infection.  Current she denies any shortness of breath.  She does not give a history of coronary artery disease.        Procedure(s) (LRB):  REVISION, TOTAL ARTHROPLASTY, HIP, VICTORIANO PROSTHETIC FX (Left)       Hospital Course:   12/19 - records reviewed. Fall without LOC and has left hip fx. No other complaints currently. Seen by cardiology with some CP felt musculoskeletal.  Echo mild AS and mod MR with nl EF. Age increase cardiac risk for surgery but discussed with daughter surgery is urgent and see no benefit in delay medically. Feel low to moderate risk for surgery. Question about UTI. No symptoms. Had UTI in 10.22 not surprising. Lab to do culture on urine in lab. Cover with ATN for prior culture with ATB started. Discussed with Dr Singer and cardiology.  12/20 Tachycardia and elevated BP. Cardiology adjusted meds. Plan hold off surgery until 12/22 to adjust meds and treat UTI. Family in room. Pt not sleeping well.   12/21 Didn't sleep well prior night. Apparently been on xanax for a time. Also recently started on Neurontin. Family with  question. No recent BM. Some increase stool on KUB but not severe. Surgery for am. BP some up but better. HR good.   12/22 Seen prior to surgery and apparently add better night. Sore mouth / throat better with mycostatin. For surgery. Family in room.  12/23 patient with pain but otherwise seemed to be doing relatively well.  Daughter in room.  Apparently been taking prednisone for some time and this was restarted per family request.  Look into swing bed placement  12/24 patient had better night rested and everyone's in better spirits today.  Tolerating some diet.   Therapy worked with patient.  Look into swing bed placement next week  12/25 remained in good spirits.  Less pain.  Had good bowel movement and ordered Dulcolax not given.  Because of dressing to leg, Burk was not removed to keep it from getting soiled.  Wound care to check 12/27.  Discuss this with patient and daughter.  On antibiotics for UTI  12/26-will dc to swingbed once accepted. Needs continued wound care.  12/27-DC when bed available  12/28- issues with wound care yesterday.  Dr. Singer had to come remove dressings himself due to adherence to subcutaneous tissue.  Family refusing transfer to [facility] due to lack of specially trained wound care team availability.  Planning to transfer to specialty but resistant to that as well and requesting to speak to administration.    12/29- agreeable to transfer to specialty.  This will be the best place for her to receive wound care.  DC after family tours     12/29-needs continued wound care and therapy. [facility]    12/30-doing well today, still with pain         Overview/Hospital Course:  12/31-having some pain this am  1/1- no complaints  1/2- doing well  1/3- continue wound care, last day cefepime tomorrow  1/4- some pain this AM.  Delay in mediations dues to staffing issues. Dsicussed with RN  1/5- no issues overnight  1/6- Still with pain, hip incision looks good  1/7-diclofenac for  shoulder pain  1/8-decrease laxatives, change benzos and carlo to PRN per patient request  1/9 some confusion overnight  1/10- no more confusion, doing well  1/11- no acute issues overnight  1/12- doing well, nausea today  1/13 -Dr. Lebron Cutler IV, DO taking over for Dr. Guerra for weekend.  Patient's pain appears to be well controlled this morning no complaints of nausea.  Continue wound care and PT/OT  1/14-patient appears well labs.  Continue wound care and PT/OT  1/15-patient still appears well.  No acute concerns or complaints.  No events overnight.  Continue wound care working with a PTOT.  Expiration is services estimated by February 6 1/16- no complaints, doing well  1/17-some nausea today      Interval History: naeo    Review of Systems   Constitutional:  Negative for chills, fatigue, fever and unexpected weight change.   HENT:  Negative for congestion, mouth sores and sore throat.    Eyes:  Negative for photophobia and visual disturbance.   Respiratory:  Negative for cough, chest tightness, shortness of breath and wheezing.    Cardiovascular:  Negative for chest pain, palpitations and leg swelling.   Gastrointestinal:  Negative for abdominal pain, diarrhea, nausea and vomiting.   Endocrine: Negative for cold intolerance and heat intolerance.   Genitourinary:  Negative for difficulty urinating, dysuria, frequency and urgency.   Musculoskeletal:  Positive for arthralgias. Negative for back pain and myalgias.   Skin:  Negative for pallor and rash.   Neurological:  Negative for tremors, seizures, syncope, weakness, numbness and headaches.   Hematological:  Does not bruise/bleed easily.   Psychiatric/Behavioral:  Negative for agitation, confusion, hallucinations and suicidal ideas.    Objective:     Vital Signs (Most Recent):  Temp: 97.4 °F (36.3 °C) (01/17/23 1200)  Pulse: (!) 48 (01/17/23 1200)  Resp: 20 (01/17/23 1200)  BP: (!) 120/47 (01/17/23 1200)  SpO2: 95 % (01/17/23 1200)   Vital Signs (24h  Range):  Temp:  [97.4 °F (36.3 °C)-99.1 °F (37.3 °C)] 97.4 °F (36.3 °C)  Pulse:  [48-58] 48  Resp:  [16-20] 20  SpO2:  [95 %-99 %] 95 %  BP: (120-161)/(44-75) 120/47     Weight: 54.6 kg (120 lb 4.8 oz)  Body mass index is 19.42 kg/m².    Intake/Output Summary (Last 24 hours) at 1/17/2023 1357  Last data filed at 1/17/2023 0716  Gross per 24 hour   Intake --   Output 700 ml   Net -700 ml        Physical Exam  Vitals reviewed.   Constitutional:       Appearance: Normal appearance.   HENT:      Head: Normocephalic and atraumatic.   Eyes:      Extraocular Movements: Extraocular movements intact.   Cardiovascular:      Rate and Rhythm: Normal rate and regular rhythm.      Pulses: Normal pulses.   Pulmonary:      Effort: Pulmonary effort is normal.      Breath sounds: Normal breath sounds.   Abdominal:      General: Abdomen is flat.      Palpations: Abdomen is soft.   Musculoskeletal:         General: Tenderness and signs of injury present.      Comments: Dressed left lower extremity, painful to touch   Skin:     General: Skin is warm and dry.      Capillary Refill: Capillary refill takes less than 2 seconds.   Neurological:      General: No focal deficit present.      Mental Status: She is alert and oriented to person, place, and time.   Psychiatric:         Mood and Affect: Mood normal.         Behavior: Behavior normal.       Significant Labs: All pertinent labs within the past 24 hours have been reviewed.    Significant Imaging: I have reviewed all pertinent imaging results/findings within the past 24 hours.      Assessment/Plan:      Disruption of external surgical wound        Wounds and injuries  Wound care consulted and following    Open wound of left lower leg  Wound care      Multiple skin tears  Wound care      Delmy-prosthetic fracture around prosthetic hip  S/P fixation with Pomierski      Lumbar radiculopathy  Pain management  PT      Hypertension  Adjust medications as needed    Arthritis  Pain  management        VTE Risk Mitigation (From admission, onward)         Ordered     apixaban tablet 2.5 mg  2 times daily         12/29/22 1630                Discharge Planning   YANIV:      Code Status: Full Code   Is the patient medically ready for discharge?:     Reason for patient still in hospital (select all that apply): Treatment  Discharge Plan A: Skilled Nursing Facility                  Shai Guerra DO  Department of Hospital Medicine   [facility]

## 2023-01-17 NOTE — ASSESSMENT & PLAN NOTE
Staples removed and wound vac d/c'd per Dr. Singer.   Clean with vashe  Apply dry dressing  Cover and secure with abd pad and paper tape

## 2023-01-17 NOTE — SUBJECTIVE & OBJECTIVE
Interval History: naeo    Review of Systems   Constitutional:  Negative for chills, fatigue, fever and unexpected weight change.   HENT:  Negative for congestion, mouth sores and sore throat.    Eyes:  Negative for photophobia and visual disturbance.   Respiratory:  Negative for cough, chest tightness, shortness of breath and wheezing.    Cardiovascular:  Negative for chest pain, palpitations and leg swelling.   Gastrointestinal:  Negative for abdominal pain, diarrhea, nausea and vomiting.   Endocrine: Negative for cold intolerance and heat intolerance.   Genitourinary:  Negative for difficulty urinating, dysuria, frequency and urgency.   Musculoskeletal:  Positive for arthralgias. Negative for back pain and myalgias.   Skin:  Negative for pallor and rash.   Neurological:  Negative for tremors, seizures, syncope, weakness, numbness and headaches.   Hematological:  Does not bruise/bleed easily.   Psychiatric/Behavioral:  Negative for agitation, confusion, hallucinations and suicidal ideas.    Objective:     Vital Signs (Most Recent):  Temp: 97.4 °F (36.3 °C) (01/17/23 1200)  Pulse: (!) 48 (01/17/23 1200)  Resp: 20 (01/17/23 1200)  BP: (!) 120/47 (01/17/23 1200)  SpO2: 95 % (01/17/23 1200)   Vital Signs (24h Range):  Temp:  [97.4 °F (36.3 °C)-99.1 °F (37.3 °C)] 97.4 °F (36.3 °C)  Pulse:  [48-58] 48  Resp:  [16-20] 20  SpO2:  [95 %-99 %] 95 %  BP: (120-161)/(44-75) 120/47     Weight: 54.6 kg (120 lb 4.8 oz)  Body mass index is 19.42 kg/m².    Intake/Output Summary (Last 24 hours) at 1/17/2023 1357  Last data filed at 1/17/2023 0716  Gross per 24 hour   Intake --   Output 700 ml   Net -700 ml        Physical Exam  Vitals reviewed.   Constitutional:       Appearance: Normal appearance.   HENT:      Head: Normocephalic and atraumatic.   Eyes:      Extraocular Movements: Extraocular movements intact.   Cardiovascular:      Rate and Rhythm: Normal rate and regular rhythm.      Pulses: Normal pulses.   Pulmonary:       Effort: Pulmonary effort is normal.      Breath sounds: Normal breath sounds.   Abdominal:      General: Abdomen is flat.      Palpations: Abdomen is soft.   Musculoskeletal:         General: Tenderness and signs of injury present.      Comments: Dressed left lower extremity, painful to touch   Skin:     General: Skin is warm and dry.      Capillary Refill: Capillary refill takes less than 2 seconds.   Neurological:      General: No focal deficit present.      Mental Status: She is alert and oriented to person, place, and time.   Psychiatric:         Mood and Affect: Mood normal.         Behavior: Behavior normal.       Significant Labs: All pertinent labs within the past 24 hours have been reviewed.    Significant Imaging: I have reviewed all pertinent imaging results/findings within the past 24 hours.

## 2023-01-17 NOTE — SUBJECTIVE & OBJECTIVE
Subjective:     HPI:  87 yo female with multiple skin tears, traumatic wounds, and surgical incision to left thigh. She was admitted following a fall on 12/17 when she was trying to go to the restroom at her home.  Patient sustained a left femur fracture. She is status post ORIF on 12/20. Staples intact to left hip. Moderate amount of green drainage noted, cultures obtained today. Left lower leg sutures removed today. Slough noted in wound bed today, adjustment to local wound care. Significant PMH includes hypertension, bradycardia, osteoarthritis, hypothyroidism, osteoporosis, and multiple falls. Wound healing is complicated by bradycardia, hx of a-fib, chronic pain, weakness, fragile skin, limited mobility, multiple falls, infection, and pain.     Hospital Course:   1/5/23 - Complains of pain to right posterior knee, venous doppler ordered. Sutures and every other staple removed today. New local wound care orders. Cultures pending.   1/9/23 - Wounds on lower extremities healing well. Continue current POC. Bruising noted to left posterior knee, protect with mepitel and optifoam. Dressing to incision site saturated with green drainage. Cultures negative. Continue with drawtex and notify ortho of drainage.   1/11/2023 - Wounds are healing well. Continue current POC. Ultrasound pending, preliminary results concerning for fluid pocket, Dr. Singer going to evaluate today.   1/17/23 - Wounds continue to show improvement. Dr. Singer evaluated left hip today during wound vac change. Staples and wound vac d/c'd today. Dressing change twice weekly.           Scheduled Meds:   ALPRAZolam  1 mg Oral QHS    apixaban  2.5 mg Oral BID    diltiaZEM  120 mg Oral Daily    docusate sodium  100 mg Oral BID    famotidine  20 mg Oral Daily    gabapentin  100 mg Oral TID    hydrALAZINE  25 mg Oral Q8H    levothyroxine  125 mcg Oral Before breakfast    lisinopriL  20 mg Oral Daily    megestroL  40 mg Oral Daily    metoprolol  tartrate  50 mg Oral BID    pantoprazole  40 mg Oral Daily    polyethylene glycol  34 g Oral Daily    predniSONE  5 mg Oral Daily    senna  1 tablet Oral Daily     Continuous Infusions:  PRN Meds:acetaminophen, bisacodyL, dextromethorphan-guaiFENesin  mg/5 ml, dextrose 50%, dextrose 50%, diclofenac sodium, glucagon (human recombinant), glucose, glucose, HYDROcodone-acetaminophen, magnesium oxide, melatonin, naloxone, ondansetron, potassium bicarbonate, potassium bicarbonate, potassium bicarbonate, potassium, sodium phosphates, potassium, sodium phosphates, potassium, sodium phosphates, prochlorperazine, simethicone, sodium chloride 0.9%, traZODone, white petrolatum    Review of Systems   Constitutional:  Positive for activity change. Negative for chills and fever.   Respiratory:  Negative for chest tightness and shortness of breath.    Cardiovascular:  Positive for leg swelling. Negative for chest pain and palpitations.   Musculoskeletal:  Positive for arthralgias, back pain, gait problem and joint swelling.   Skin:  Positive for color change and wound.        wound   Neurological:  Positive for weakness.   Psychiatric/Behavioral:  Negative for agitation, behavioral problems, confusion and self-injury.    Objective:     Vital Signs (Most Recent):  Temp: 97.2 °F (36.2 °C) (01/18/23 1200)  Pulse: 64 (01/18/23 1200)  Resp: 16 (01/18/23 1233)  BP: (!) 158/70 (01/18/23 1200)  SpO2: 98 % (01/18/23 1200)   Vital Signs (24h Range):  Temp:  [97.2 °F (36.2 °C)-99.1 °F (37.3 °C)] 97.2 °F (36.2 °C)  Pulse:  [46-64] 64  Resp:  [16-20] 16  SpO2:  [95 %-99 %] 98 %  BP: (115-194)/(53-72) 158/70     Weight: 57.6 kg (126 lb 15.8 oz)  Body mass index is 20.5 kg/m².    Physical Exam  Vitals reviewed.   Constitutional:       Appearance: Normal appearance.   HENT:      Head: Normocephalic.   Cardiovascular:      Rate and Rhythm: Regular rhythm. Bradycardia present.      Pulses: Normal pulses.      Heart sounds: Murmur heard.    Pulmonary:      Effort: Pulmonary effort is normal.      Breath sounds: Normal breath sounds.   Musculoskeletal:         General: Swelling, tenderness, deformity and signs of injury present.      Right lower leg: Edema present.      Left lower leg: Edema present.   Skin:     Findings: Bruising and erythema present.      Comments: Wound, see LDA for photo/measurements   Neurological:      Mental Status: She is alert. Mental status is at baseline.      Motor: Weakness present.      Gait: Gait abnormal.

## 2023-01-17 NOTE — PT/OT/SLP PROGRESS
Physical Therapy Treatment    Patient Name:  Zandra Veloz   MRN:  [id number]    Recommendations:     Discharge Recommendations: nursing facility, skilled  Discharge Equipment Recommendations: other (see comments) (to be determined)  Barriers to discharge:  ongoing medical treatment    Assessment:     Zandra Veloz is a 88 y.o. female admitted with a medical diagnosis of Urinary tract infection.  She presents with the following impairments/functional limitations: weakness, impaired functional mobility, impaired endurance, impaired self care skills, impaired skin, orthopedic precautions, decreased lower extremity function, pain.    Pt able to assist with all activities much better than anticipated, however, pt complained of increased pain/stinging at recently changed bandage site left inner thigh    PT POC discussed with Chikis Lynne DPT     Rehab Prognosis: Fair; patient would benefit from acute skilled PT services to address these deficits and reach maximum level of function.    Recent Surgery: * No surgery found *      Plan:     During this hospitalization, patient to be seen 5 x/week to address the identified rehab impairments via gait training, therapeutic activities, therapeutic exercises, neuromuscular re-education and progress toward the following goals:    Plan of Care Expires:  01/30/23    Subjective     Chief Complaint: wounds; left hip fracture with revision   Patient/Family Comments/goals: pt agreeable to PT  Pain/Comfort:  Pain Rating 1: 8/10  Location - Side 1: Left  Location - Orientation 1: lateral  Location 1: thigh      Objective:     Communicated with Brooklynn Willingham RN prior to session.  Patient found HOB elevated with chao catheter upon PT entry to room.     General Precautions: Standard, fall  Orthopedic Precautions: LLE non weight bearing, LLE posterior precautions  Braces: N/A  Respiratory Status: Room air     Functional Mobility:  Bed Mobility:     Rolling Left:   contact guard assistance  Rolling Right: contact guard assistance  Supine to Sit: minimum assistance and increased time  Sit to Supine: minimum assistance and of 1-2 persons      AM-PAC 6 CLICK MOBILITY  Turning over in bed (including adjusting bedclothes, sheets and blankets)?: 3  Sitting down on and standing up from a chair with arms (e.g., wheelchair, bedside commode, etc.): 1  Moving from lying on back to sitting on the side of the bed?: 3  Moving to and from a bed to a chair (including a wheelchair)?: 2  Need to walk in hospital room?: 1  Climbing 3-5 steps with a railing?: 1  Basic Mobility Total Score: 11       Treatment & Education:  Pt performed 2 x 15 reps (B) LE exercises: ap, quad set, glut set, straight leg raise, hip ab/adduction, long arc quad, heel slide with active assist range of motion     Standby assist to contact guard assist sitting EOB x 8 minutes; posterior lean with no LOB    Patient left HOB elevated with all lines intact, call button in reach, and daughter and caregiver present..    GOALS:   Multidisciplinary Problems       Physical Therapy Goals          Problem: Physical Therapy    Goal Priority Disciplines Outcome Goal Variances Interventions   Physical Therapy Goal     PT, PT/OT Ongoing, Progressing     Description: Short Term Goals to be met by 1/25/2023    Patient will increase functional independence and safety with mobility by performing    1.   Rolling side to side with Minimal assist   2.   Supine to sit Moderate Assist  3.   Sitting balance edge of the bed x 15 minutes Supervision  4.   Sit to stand Moderate Assist  using manual assistance with gait belt and NWB L LE  5.   Bed to chair Minimal  Assist using sliding board and NWB left LE  6.   Lower extremity exercises x 30 reps with assist as necessary and no rest breaks      Long Term Goals to be met by 2/10/2023    Patient to require less than or equal to Stand by assist for functional mobility to ease caregiver burden                         Time Tracking:     PT Received On: 01/17/23  PT Start Time: 1442     PT Stop Time: 1513  PT Total Time (min): 31 min     Billable Minutes: Therapeutic Activity 8 and Therapeutic Exercise 15    Treatment Type: Treatment  PT/PTA: PTA     PTA Visit Number: 1     01/17/2023

## 2023-01-17 NOTE — PROGRESS NOTES
Zandra Veloz is an 88-year-old female who underwent repair of a displaced periprosthetic fracture of the left proximal femur.  [facility] and being treated for several extensive skin tears.  The drainage for her left hip has ceased with the use of the wound VAC. This morning she reportedly complained of incisional pain to her daughter.  When I saw her at noon pain and resolved.  Skin margins are intact with no obvious signs of infection.  The remaining staples can be removed today.  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  Discussed the potential etiologies to include swelling or stretch neurapraxia.  Observation recommended at this time.  Nerve conduction studies can be obtained 3 months from injury if condition persists.  When she is able to be mobilized an AFO may be necessary.

## 2023-01-17 NOTE — PROGRESS NOTES
[facility]  Wound Care and Hyperbarics JOSE  Progress Note    Patient Name: Zandra Veloz  MRN: [id number]  Admission Date: 12/29/2022  Hospital Length of Stay: 20 days  Attending Physician: Shai Guerra DO  Primary Care Provider: Brandon Thornton MD         Subjective:     HPI:  87 yo female with multiple skin tears, traumatic wounds, and surgical incision to left thigh. She was admitted following a fall on 12/17 when she was trying to go to the restroom at her home.  Patient sustained a left femur fracture. She is status post ORIF on 12/20. Staples intact to left hip. Moderate amount of green drainage noted, cultures obtained today. Left lower leg sutures removed today. Slough noted in wound bed today, adjustment to local wound care. Significant PMH includes hypertension, bradycardia, osteoarthritis, hypothyroidism, osteoporosis, and multiple falls. Wound healing is complicated by bradycardia, hx of a-fib, chronic pain, weakness, fragile skin, limited mobility, multiple falls, infection, and pain.     Hospital Course:   1/5/23 - Complains of pain to right posterior knee, venous doppler ordered. Sutures and every other staple removed today. New local wound care orders. Cultures pending.   1/9/23 - Wounds on lower extremities healing well. Continue current POC. Bruising noted to left posterior knee, protect with mepitel and optifoam. Dressing to incision site saturated with green drainage. Cultures negative. Continue with drawtex and notify ortho of drainage.   1/11/2023 - Wounds are healing well. Continue current POC. Ultrasound pending, preliminary results concerning for fluid pocket, Dr. Singer going to evaluate today.   1/17/23 - Wounds continue to show improvement. Dr. Singer evaluated left hip today during wound vac change. Staples and wound vac d/c'd today. Dressing change twice weekly.           Scheduled Meds:   ALPRAZolam  1 mg Oral QHS    apixaban  2.5 mg Oral  BID    diltiaZEM  120 mg Oral Daily    docusate sodium  100 mg Oral BID    famotidine  20 mg Oral Daily    gabapentin  100 mg Oral TID    hydrALAZINE  25 mg Oral Q8H    levothyroxine  125 mcg Oral Before breakfast    lisinopriL  20 mg Oral Daily    megestroL  40 mg Oral Daily    metoprolol tartrate  50 mg Oral BID    pantoprazole  40 mg Oral Daily    polyethylene glycol  34 g Oral Daily    predniSONE  5 mg Oral Daily    senna  1 tablet Oral Daily     Continuous Infusions:  PRN Meds:acetaminophen, bisacodyL, dextromethorphan-guaiFENesin  mg/5 ml, dextrose 50%, dextrose 50%, diclofenac sodium, glucagon (human recombinant), glucose, glucose, HYDROcodone-acetaminophen, magnesium oxide, melatonin, naloxone, ondansetron, potassium bicarbonate, potassium bicarbonate, potassium bicarbonate, potassium, sodium phosphates, potassium, sodium phosphates, potassium, sodium phosphates, prochlorperazine, simethicone, sodium chloride 0.9%, traZODone, white petrolatum    Review of Systems   Constitutional:  Positive for activity change. Negative for chills and fever.   Respiratory:  Negative for chest tightness and shortness of breath.    Cardiovascular:  Positive for leg swelling. Negative for chest pain and palpitations.   Musculoskeletal:  Positive for arthralgias, back pain, gait problem and joint swelling.   Skin:  Positive for color change and wound.        wound   Neurological:  Positive for weakness.   Psychiatric/Behavioral:  Negative for agitation, behavioral problems, confusion and self-injury.    Objective:     Vital Signs (Most Recent):  Temp: 97.2 °F (36.2 °C) (01/18/23 1200)  Pulse: 64 (01/18/23 1200)  Resp: 16 (01/18/23 1233)  BP: (!) 158/70 (01/18/23 1200)  SpO2: 98 % (01/18/23 1200)   Vital Signs (24h Range):  Temp:  [97.2 °F (36.2 °C)-99.1 °F (37.3 °C)] 97.2 °F (36.2 °C)  Pulse:  [46-64] 64  Resp:  [16-20] 16  SpO2:  [95 %-99 %] 98 %  BP: (115-194)/(53-72) 158/70     Weight: 57.6 kg (126 lb 15.8 oz)  Body  mass index is 20.5 kg/m².    Physical Exam  Vitals reviewed.   Constitutional:       Appearance: Normal appearance.   HENT:      Head: Normocephalic.   Cardiovascular:      Rate and Rhythm: Regular rhythm. Bradycardia present.      Pulses: Normal pulses.      Heart sounds: Murmur heard.   Pulmonary:      Effort: Pulmonary effort is normal.      Breath sounds: Normal breath sounds.   Musculoskeletal:         General: Swelling, tenderness, deformity and signs of injury present.      Right lower leg: Edema present.      Left lower leg: Edema present.   Skin:     Findings: Bruising and erythema present.      Comments: Wound, see LDA for photo/measurements   Neurological:      Mental Status: She is alert. Mental status is at baseline.      Motor: Weakness present.      Gait: Gait abnormal.     Assessment/Plan:     Disruption of external surgical wound                      Staples removed and wound vac d/c'd per Dr. Singer.   Clean with vashe  Apply dry dressing  Cover and secure with abd pad and paper tape      Open wound of left lower leg                  Clean with vashe  Apply mepitel and  aquacel ag advantage to wound.  Cover and secure with 4x4s, kerlix, and paper tape  Change every M, W,F and PRN for soilage  Keep leg elevated and avoid pressure on wound.          Multiple skin tears                              Clean with vashe  Apply mepitel and  aquacel ag advantage to wound.  Cover and secure with 4x4s, kerlix, and paper tape  Change every M, W,F and PRN for soilage        ZURDO San  Wound Care and Hyperbarics JOSE  [facility]

## 2023-01-18 LAB
ANION GAP SERPL CALCULATED.3IONS-SCNC: 12 MMOL/L (ref 7–16)
BUN SERPL-MCNC: 11 MG/DL (ref 7–18)
BUN/CREAT SERPL: 19 (ref 6–20)
CALCIUM SERPL-MCNC: 8.5 MG/DL (ref 8.5–10.1)
CHLORIDE SERPL-SCNC: 99 MMOL/L (ref 98–107)
CO2 SERPL-SCNC: 26 MMOL/L (ref 21–32)
CREAT SERPL-MCNC: 0.59 MG/DL (ref 0.55–1.02)
EGFR (NO RACE VARIABLE) (RUSH/TITUS): 87 ML/MIN/1.73M²
GLUCOSE SERPL-MCNC: 84 MG/DL (ref 74–106)
POTASSIUM SERPL-SCNC: 4.2 MMOL/L (ref 3.5–5.1)
SODIUM SERPL-SCNC: 133 MMOL/L (ref 136–145)

## 2023-01-18 PROCEDURE — 97110 THERAPEUTIC EXERCISES: CPT

## 2023-01-18 PROCEDURE — 27000789 HC KIT, VAC DRESSING BLACK, ANY SIZE

## 2023-01-18 PROCEDURE — 27201920 HC DRESSING, AQUACEL AG

## 2023-01-18 PROCEDURE — 11000001 HC ACUTE MED/SURG PRIVATE ROOM

## 2023-01-18 PROCEDURE — 25000003 PHARM REV CODE 250: Performed by: STUDENT IN AN ORGANIZED HEALTH CARE EDUCATION/TRAINING PROGRAM

## 2023-01-18 PROCEDURE — 27202170 HC DRESSING, MEPITEL

## 2023-01-18 PROCEDURE — 99232 SBSQ HOSP IP/OBS MODERATE 35: CPT | Mod: ,,, | Performed by: STUDENT IN AN ORGANIZED HEALTH CARE EDUCATION/TRAINING PROGRAM

## 2023-01-18 PROCEDURE — 80048 BASIC METABOLIC PNL TOTAL CA: CPT | Performed by: STUDENT IN AN ORGANIZED HEALTH CARE EDUCATION/TRAINING PROGRAM

## 2023-01-18 PROCEDURE — A6212 FOAM DRG <=16 SQ IN W/BORDER: HCPCS

## 2023-01-18 PROCEDURE — 99232 PR SUBSEQUENT HOSPITAL CARE,LEVL II: ICD-10-PCS | Mod: ,,, | Performed by: STUDENT IN AN ORGANIZED HEALTH CARE EDUCATION/TRAINING PROGRAM

## 2023-01-18 PROCEDURE — 36415 COLL VENOUS BLD VENIPUNCTURE: CPT | Performed by: STUDENT IN AN ORGANIZED HEALTH CARE EDUCATION/TRAINING PROGRAM

## 2023-01-18 PROCEDURE — 97530 THERAPEUTIC ACTIVITIES: CPT

## 2023-01-18 PROCEDURE — 63600175 PHARM REV CODE 636 W HCPCS: Performed by: STUDENT IN AN ORGANIZED HEALTH CARE EDUCATION/TRAINING PROGRAM

## 2023-01-18 RX ORDER — MEGESTROL ACETATE 40 MG/1
40 TABLET ORAL DAILY
Status: DISCONTINUED | OUTPATIENT
Start: 2023-01-18 | End: 2023-02-20 | Stop reason: HOSPADM

## 2023-01-18 RX ADMIN — APIXABAN 2.5 MG: 2.5 TABLET, FILM COATED ORAL at 10:01

## 2023-01-18 RX ADMIN — GABAPENTIN 100 MG: 100 CAPSULE ORAL at 09:01

## 2023-01-18 RX ADMIN — HYDROCODONE BITARTRATE AND ACETAMINOPHEN 2 TABLET: 10; 325 TABLET ORAL at 10:01

## 2023-01-18 RX ADMIN — APIXABAN 2.5 MG: 2.5 TABLET, FILM COATED ORAL at 09:01

## 2023-01-18 RX ADMIN — PREDNISONE 5 MG: 5 TABLET ORAL at 09:01

## 2023-01-18 RX ADMIN — FAMOTIDINE 20 MG: 20 TABLET ORAL at 09:01

## 2023-01-18 RX ADMIN — HYDRALAZINE HYDROCHLORIDE 25 MG: 25 TABLET ORAL at 01:01

## 2023-01-18 RX ADMIN — DOCUSATE SODIUM 100 MG: 100 CAPSULE, LIQUID FILLED ORAL at 10:01

## 2023-01-18 RX ADMIN — GABAPENTIN 100 MG: 100 CAPSULE ORAL at 10:01

## 2023-01-18 RX ADMIN — LISINOPRIL 20 MG: 20 TABLET ORAL at 09:01

## 2023-01-18 RX ADMIN — HYDROCODONE BITARTRATE AND ACETAMINOPHEN 2 TABLET: 10; 325 TABLET ORAL at 12:01

## 2023-01-18 RX ADMIN — ALPRAZOLAM 1 MG: 0.25 TABLET ORAL at 10:01

## 2023-01-18 RX ADMIN — PANTOPRAZOLE SODIUM 40 MG: 40 TABLET, DELAYED RELEASE ORAL at 09:01

## 2023-01-18 RX ADMIN — LEVOTHYROXINE SODIUM 125 MCG: 0.12 TABLET ORAL at 05:01

## 2023-01-18 RX ADMIN — METOPROLOL TARTRATE 50 MG: 50 TABLET, FILM COATED ORAL at 10:01

## 2023-01-18 RX ADMIN — MEGESTROL ACETATE 40 MG: 40 TABLET ORAL at 12:01

## 2023-01-18 RX ADMIN — HYDRALAZINE HYDROCHLORIDE 25 MG: 25 TABLET ORAL at 05:01

## 2023-01-18 RX ADMIN — DOCUSATE SODIUM 100 MG: 100 CAPSULE, LIQUID FILLED ORAL at 09:01

## 2023-01-18 RX ADMIN — GABAPENTIN 100 MG: 100 CAPSULE ORAL at 02:01

## 2023-01-18 RX ADMIN — DILTIAZEM HYDROCHLORIDE 120 MG: 120 CAPSULE, COATED, EXTENDED RELEASE ORAL at 09:01

## 2023-01-18 RX ADMIN — HYDRALAZINE HYDROCHLORIDE 25 MG: 25 TABLET ORAL at 10:01

## 2023-01-18 NOTE — PROGRESS NOTES
"[facility]  Adult [facility]  First Assessment Note         Reason for Assessment  Reason For Assessment: RD follow-up/ follow up note  Nutrition Risk Screen: large or nonhealing wound, burn or pressure injury    RD follow up assessment.    Recommend continue current diet order as medically appropriate and tolerated. Recommend continue Ensure Enlive oral nutrition supplement TID to promote adequate oral intake.     Recommend continue Arginaid (modular equivalent to Vamshi, can be used as Vamshi out of stock d/t supply issue) BID to aid in wound healing. Recommend adding 500mg Vit C BID + 220mg ZnSO4 BID + MVI daily to aid in wound healing.    Per MD notes,  "12/29-needs continued wound care and therapy. DC to [facility]  12/30-doing well today, still with pain  12/31-having some pain this am  1/1- no complaints  1/2- doing well  1/3- continue wound care, last day cefepime tomorrow  1/4- some pain this AM.  Delay in mediations dues to staffing issues. Dsicussed with RN  1/5- no issues overnight"    Patient currently receiving Regular Diet.  Ensure Enlive provides 350kcal and 20g protein each.     Patient with multiple wounds. Recommend continue Arginaid (modular equivalent to Vamshi, can be used as Vamshi out of stock d/t supply issue) BID to aid in wound healing. Arginaid provides 30kcal each. Recommend 500mg Vit C BID + 220mg ZnSO4 BID + MVI daily to aid in wound healing.     Patient CBW and BMI considered WNL.     Last BM 1/17 per flowsheets -  pt receiving docusate sodium, senna and polyethylene glycol. Will continue to monitor PO intake, weight trend, meds, labs, updates in patient condition. RD following.    Malnutrition  Is Patient Malnourished: No    Nutrition Diagnosis  Increased protein Needs   related to Wound healing as evidenced by pt with multiple wounds    Nutrition Diagnosis Status: Chronic/ continues    Nutrition Risk  Level of Risk/Frequency of Follow-up: moderate   " Chewing or Swallowing Difficulty?: No Chewing or swallowing difficulty    Estimated/Assessed Needs    Temp: 97.5 °F (36.4 °C)Oral  Weight Used For Calorie Calculations: 59.1 kg (130 lb 4.7 oz)   Energy Need Method: Kcal/kg (35-40 kcal/kg) Energy Calorie Requirements (kcal): 2069-2364 kcal  Weight Used For Protein Calculations: 59.1 kg (130 lb 4.7 oz)  Protein Requirements: 59-118g pro (1.0-2.0g pro/kg d/t geriatric, wounds)  Estimated Fluid Requirement Method: RDA Method    RDA Method (mL): 2069     Nutrition Prescription / Recommendations  Recommendation/Intervention: Recommend continue current diet order as medically appropriate and tolerated. Recommend adding Ensure Enlive oral nutrition supplement TID to promote adequate oral intake. Recommend adding Arginaid (modular equivalent to Vamshi, can be used as Vamshi out of stock d/t supply issue) BID to aid in wound healing. Recommend adding 500mg Vit C BID + 220mg ZnSO4 BID + MVI daily to aid in wound healing.  Goals: PO intake %, weight maintenance  Nutrition Goal Status: progressing towards goal  Current Diet Order: Regular Diet  Nutrition Order Comments: Appropriate  Recommended Diet: Regular  Recommended Oral Supplement: Vamshi [90 kcals, 2.5g Protein, 10g Carbs(3g Sugar), 7g L-Arginine, 7g L-Glutamine, Vitamin C 300mg, 9.5mg Zinc] twice daily and Ensure Enlive [360 kcals, 20g Protein, 44g Carbs(3g Fiber, 20g Sugar), 11g Fat three times daily  Is Nutrition Support Recommended: No  Is Education Recommended: No    Monitor and Evaluation  % current Intake: P.O. intake of 25 - 50 %  % intake to meet estimated needs: 75 - 100 %  Food and Nutrient Intake: energy intake, food and beverage intake  Food and Nutrient Adminstration: diet order  Knowledge/Beliefs/Attitudes: food and nutrition knowledge/skill, beliefs and attitudes  Physical Activity and Function: nutrition-related ADLs and IADLs, factors affecting access to physical activity  Anthropometric Measurements:  "weight, weight change, body mass index  Biochemical Data, Medical Tests and Procedures: electrolyte and renal panel, gastrointestinal profile, glucose/endocrine profile, inflammatory profile, lipid profile  Nutrition-Focused Physical Findings: overall appearance, extremities, muscles and bones, head and eyes, skin     Current Medical Diagnosis and Past Medical History  Diagnosis: other (see comments) (urinary tract infection)  Past Medical History:   Diagnosis Date    Hypertension     Osteoporosis     Thyroid disorder      Nutrition/Diet History  Spiritual, Cultural Beliefs, [employer] Practices, Values that Affect Care: no  Food Allergies: NKFA    Lab/Procedures/Meds  Recent Labs   Lab 01/16/23  0512   *   K 4.4   BUN 12   CREATININE 0.69   GLU 87   CALCIUM 8.5   CL 95*     NA low -replete as needed  Last A1c: No results found for: HGBA1C  Lab Results   Component Value Date    RBC 3.34 (L) 01/16/2023    HGB 9.9 (L) 01/16/2023    HCT 31.3 (L) 01/16/2023    MCV 93.7 01/16/2023    MCH 29.6 01/16/2023    MCHC 31.6 (L) 01/16/2023     Pertinent Labs Reviewed: reviewed  Pertinent Medications Reviewed: reviewed  Alprazolam, apixaban, bisacodyl, diltiazem, docusate sodium, famotidine, gabapentin, hydralazine, levothyroxine, lisinopril, nystatin, pantoprazole, polyethylene glycol, prednisone, senna, hydrocodone-acetaminophen PRN    Anthropometrics  Temp: 97.5 °F (36.4 °C)  Height: 5' 6" (167.6 cm)  Height (inches): 66 in  Weight Method: Bed Scale  Weight: 57.6 kg (126 lb 15.8 oz)  Weight (lb): 126.99 lb  Ideal Body Weight (IBW), Female: 130 lb  % Ideal Body Weight, Female (lb): 100.15 %  BMI (Calculated): 20.5     Nutrition by Nursing  Diet/Nutrition Received: regular  Intake (%): 0%  Diet/Feeding Assistance: tray set-up  Diet/Feeding Tolerance: no appetite  Last Bowel Movement: 01/17/23    Nutrition Follow-Up  RD Follow-up?: Yes  "

## 2023-01-18 NOTE — PROGRESS NOTES
[facility] Hospital Medicine  Progress Note    Patient Name: Zandra Veloz  MRN: [id number]  Patient Class: IP- Inpatient   Admission Date: 12/29/2022  Length of Stay: 20 days  Attending Physician: Shai Guerra DO  Primary Care Provider: Brandon Thornton MD        Subjective:     Principal Problem:Urinary tract infection        HPI:  HPI:   88-year-old lady seen emergency room department.  Patient presents after having a fall when she was trying to go to the restroom at her home.  Patient sustained a left femur fracture.  Patient complains of moderate to severe pain in the left hip area.  Patient also complains of chest tightness, she rates it a 5/10 in the chest tightness has been intubate.  Patient also was seen in emergency room department earlier this week per her daughter.  Patient is found to have dehydration and a urinary tract infection.  Current she denies any shortness of breath.  She does not give a history of coronary artery disease.        Procedure(s) (LRB):  REVISION, TOTAL ARTHROPLASTY, HIP, VICTORIANO PROSTHETIC FX (Left)       Hospital Course:   12/19 - records reviewed. Fall without LOC and has left hip fx. No other complaints currently. Seen by cardiology with some CP felt musculoskeletal.  Echo mild AS and mod MR with nl EF. Age increase cardiac risk for surgery but discussed with daughter surgery is urgent and see no benefit in delay medically. Feel low to moderate risk for surgery. Question about UTI. No symptoms. Had UTI in 10.22 not surprising. Lab to do culture on urine in lab. Cover with ATN for prior culture with ATB started. Discussed with Dr Singer and cardiology.  12/20 Tachycardia and elevated BP. Cardiology adjusted meds. Plan hold off surgery until 12/22 to adjust meds and treat UTI. Family in room. Pt not sleeping well.   12/21 Didn't sleep well prior night. Apparently been on xanax for a time. Also recently started on Neurontin. Family with  question. No recent BM. Some increase stool on KUB but not severe. Surgery for am. BP some up but better. HR good.   12/22 Seen prior to surgery and apparently add better night. Sore mouth / throat better with mycostatin. For surgery. Family in room.  12/23 patient with pain but otherwise seemed to be doing relatively well.  Daughter in room.  Apparently been taking prednisone for some time and this was restarted per family request.  Look into swing bed placement  12/24 patient had better night rested and everyone's in better spirits today.  Tolerating some diet.   Therapy worked with patient.  Look into swing bed placement next week  12/25 remained in good spirits.  Less pain.  Had good bowel movement and ordered Dulcolax not given.  Because of dressing to leg, Burk was not removed to keep it from getting soiled.  Wound care to check 12/27.  Discuss this with patient and daughter.  On antibiotics for UTI  12/26-will dc to swingbed once accepted. Needs continued wound care.  12/27-DC when bed available  12/28- issues with wound care yesterday.  Dr. Singer had to come remove dressings himself due to adherence to subcutaneous tissue.  Family refusing transfer to [facility] due to lack of specially trained wound care team availability.  Planning to transfer to specialty but resistant to that as well and requesting to speak to administration.    12/29- agreeable to transfer to specialty.  This will be the best place for her to receive wound care.  DC after family tours     12/29-needs continued wound care and therapy[facility]    12/30-doing well today, still with pain         Overview/Hospital Course:  12/31-having some pain this am  1/1- no complaints  1/2- doing well  1/3- continue wound care, last day cefepime tomorrow  1/4- some pain this AM.  Delay in mediations dues to staffing issues. Dsicussed with RN  1/5- no issues overnight  1/6- Still with pain, hip incision looks good  1/7-diclofenac for  shoulder pain  1/8-decrease laxatives, change benzos and carlo to PRN per patient request  1/9 some confusion overnight  1/10- no more confusion, doing well  1/11- no acute issues overnight  1/12- doing well, nausea today  1/13 -Dr. Lebron Cutler IV, DO taking over for Dr. Guerra for weekend.  Patient's pain appears to be well controlled this morning no complaints of nausea.  Continue wound care and PT/OT  1/14-patient appears well labs.  Continue wound care and PT/OT  1/15-patient still appears well.  No acute concerns or complaints.  No events overnight.  Continue wound care working with a PTOT.  Expiration is services estimated by February 6 1/16- no complaints, doing well  1/17-some nausea today  1/18-better today.  Still requiring twice weekly dressing changes.  Appetite stimulant      Interval History: naeo    Review of Systems   Constitutional:  Negative for chills, fatigue, fever and unexpected weight change.   HENT:  Negative for congestion, mouth sores and sore throat.    Eyes:  Negative for photophobia and visual disturbance.   Respiratory:  Negative for cough, chest tightness, shortness of breath and wheezing.    Cardiovascular:  Negative for chest pain, palpitations and leg swelling.   Gastrointestinal:  Negative for abdominal pain, diarrhea, nausea and vomiting.   Endocrine: Negative for cold intolerance and heat intolerance.   Genitourinary:  Negative for difficulty urinating, dysuria, frequency and urgency.   Musculoskeletal:  Positive for arthralgias. Negative for back pain and myalgias.   Skin:  Negative for pallor and rash.   Neurological:  Negative for tremors, seizures, syncope, weakness, numbness and headaches.   Hematological:  Does not bruise/bleed easily.   Psychiatric/Behavioral:  Negative for agitation, confusion, hallucinations and suicidal ideas.    Objective:     Vital Signs (Most Recent):  Temp: 97.2 °F (36.2 °C) (01/18/23 1200)  Pulse: 64 (01/18/23 1200)  Resp: 16 (01/18/23  1233)  BP: (!) 158/70 (01/18/23 1200)  SpO2: 98 % (01/18/23 1200)   Vital Signs (24h Range):  Temp:  [97.2 °F (36.2 °C)-99.1 °F (37.3 °C)] 97.2 °F (36.2 °C)  Pulse:  [46-64] 64  Resp:  [16-20] 16  SpO2:  [95 %-99 %] 98 %  BP: (115-194)/(53-72) 158/70     Weight: 57.6 kg (126 lb 15.8 oz)  Body mass index is 20.5 kg/m².    Intake/Output Summary (Last 24 hours) at 1/18/2023 1425  Last data filed at 1/18/2023 0603  Gross per 24 hour   Intake --   Output 400 ml   Net -400 ml        Physical Exam  Vitals reviewed.   Constitutional:       Appearance: Normal appearance.   HENT:      Head: Normocephalic and atraumatic.   Eyes:      Extraocular Movements: Extraocular movements intact.   Cardiovascular:      Rate and Rhythm: Normal rate and regular rhythm.      Pulses: Normal pulses.   Pulmonary:      Effort: Pulmonary effort is normal.      Breath sounds: Normal breath sounds.   Abdominal:      General: Abdomen is flat.      Palpations: Abdomen is soft.   Musculoskeletal:         General: Tenderness and signs of injury present.      Comments: Dressed left lower extremity, painful to touch   Skin:     General: Skin is warm and dry.      Capillary Refill: Capillary refill takes less than 2 seconds.   Neurological:      General: No focal deficit present.      Mental Status: She is alert and oriented to person, place, and time.   Psychiatric:         Mood and Affect: Mood normal.         Behavior: Behavior normal.       Significant Labs: All pertinent labs within the past 24 hours have been reviewed.    Significant Imaging: I have reviewed all pertinent imaging results/findings within the past 24 hours.      Assessment/Plan:      Disruption of external surgical wound        Wounds and injuries  Wound care consulted and following    Open wound of left lower leg  Wound care      Multiple skin tears  Wound care      Delmy-prosthetic fracture around prosthetic hip  S/P fixation with Pomierski      Lumbar radiculopathy  Pain  management  PT      Hypertension  Adjust medications as needed    Arthritis  Pain management        VTE Risk Mitigation (From admission, onward)         Ordered     apixaban tablet 2.5 mg  2 times daily         12/29/22 1630                Discharge Planning   YANIV:      Code Status: Full Code   Is the patient medically ready for discharge?:     Reason for patient still in hospital (select all that apply): Treatment  Discharge Plan A: Skilled Nursing Facility                  Shai Guerra DO  Department of Hospital Medicine   [facility]

## 2023-01-18 NOTE — PLAN OF CARE
Problem: Adult Inpatient Plan of Care  Goal: Absence of Hospital-Acquired Illness or Injury  Intervention: Identify and Manage Fall Risk  Flowsheets (Taken 1/18/2023 1632)  Safety Promotion/Fall Prevention:   assistive device/personal item within reach   bed alarm set   medications reviewed   room near unit station     Problem: Skin Injury Risk Increased  Goal: Skin Health and Integrity  Intervention: Optimize Skin Protection  Flowsheets (Taken 1/18/2023 1632)  Pressure Reduction Techniques: pressure points protected  Pressure Reduction Devices:   positioning supports utilized   pressure-redistributing mattress utilized   specialty bed utilized  Skin Protection: incontinence pads utilized  Head of Bed (HOB) Positioning: HOB elevated     Problem: Fall Injury Risk  Goal: Absence of Fall and Fall-Related Injury  Intervention: Identify and Manage Contributors  Flowsheets (Taken 1/18/2023 1632)  Self-Care Promotion: BADL personal objects within reach  Medication Review/Management: medications reviewed

## 2023-01-18 NOTE — PT/OT/SLP PROGRESS
"Physical Therapy Treatment    Patient Name:  Zandra Veloz   MRN:  [id number]    Recommendations:     Discharge Recommendations: nursing facility, skilled  Discharge Equipment Recommendations: other (see comments) (to be determined)  Barriers to discharge:  Ongoing medical treatment    Assessment:     Zandra Veloz is a 88 y.o. female admitted with a medical diagnosis of Urinary tract infection.  She presents with the following impairments/functional limitations: weakness, impaired functional mobility, impaired endurance, impaired self care skills, impaired skin, orthopedic precautions, decreased lower extremity function, pain.    Pt presented with less discomfort with performing LE exercises than with previous sessions. Pt able to transfer from bed to bedside commode, to reclining chair but required max A and constant verbal and tactile cueing for sequencing and safety. During transfers, pt was L LE NWB but after the session was complete, Dr. Dequan Singer confirmed the pt could now perform with L LE PWB. Pt will benefit from swing bed placement once medically stable.    Rehab Prognosis: Good; patient would benefit from acute skilled PT services to address these deficits and reach maximum level of function.    Recent Surgery: * No surgery found *      Plan:     During this hospitalization, patient to be seen 5 x/week to address the identified rehab impairments via gait training, therapeutic activities, therapeutic exercises, neuromuscular re-education and progress toward the following goals:    Plan of Care Expires:  01/30/23    Subjective     Chief Complaint: L total hip replacement revision and multiple wounds  Patient/Family Comments/goals: "My wound dressing has been bothering me." (L inner thigh dressing). Wound care nurse advised.   Pain/Comfort:  Pain Rating 1: 0/10  Pain Rating Post-Intervention 1: 0/10      Objective:     Communicated with Yolanda Sinha RN  prior to session.  " Patient found supine with chao catheter, peripheral IV upon PT entry to room.     General Precautions: Standard, fall  Orthopedic Precautions: LLE posterior precautions, LLE partial weight bearing  Braces: N/A  Respiratory Status: Room air     Functional Mobility:  Bed Mobility:     Supine to Sit: minimum assistance  Transfers:     Sit to Stand:  minimum assistance and moderate assistance of 1 person depending on trial and seat height with manual assist via gait belt.  Toilet Transfer: maximum assistance with  manual assist via gait belt  using Stand Pivot      AM-PAC 6 CLICK MOBILITY  Turning over in bed (including adjusting bedclothes, sheets and blankets)?: 3  Sitting down on and standing up from a chair with arms (e.g., wheelchair, bedside commode, etc.): 2  Moving from lying on back to sitting on the side of the bed?: 3  Moving to and from a bed to a chair (including a wheelchair)?: 2  Need to walk in hospital room?: 1  Climbing 3-5 steps with a railing?: 1  Basic Mobility Total Score: 12       Treatment & Education:  Transfers as above.  Bilateral lower extremity exercise x 30 reps: ankle pumps, Quad sets, glut sets, heel slides, hip abduction/adduction, and straight leg raises with active assist ROM, verbal cues for sequencing and safety, and tactile cues     Patient left up in chair with all lines intact, call button in reach, and Yolanda Sinha RN  notified..    GOALS:   Multidisciplinary Problems       Physical Therapy Goals          Problem: Physical Therapy    Goal Priority Disciplines Outcome Goal Variances Interventions   Physical Therapy Goal     PT, PT/OT Ongoing, Progressing     Description: Short Term Goals to be met by 1/25/2023    Patient will increase functional independence and safety with mobility by performing    1.   Rolling side to side with Minimal assist   2.   Supine to sit Moderate Assist  3.   Sitting balance edge of the bed x 15 minutes Supervision  4.   Sit to stand Moderate  Assist  using manual assistance with gait belt and NWB L LE  5.   Bed to chair Minimal  Assist using sliding board and NWB left LE  6.   Lower extremity exercises x 30 reps with assist as necessary and no rest breaks      Long Term Goals to be met by 2/10/2023    Patient to require less than or equal to Stand by assist for functional mobility to ease caregiver burden                        Time Tracking:     PT Received On: 01/18/23  PT Start Time: 0939     PT Stop Time: 1012  PT Total Time (min): 33 min     Billable Minutes: Therapeutic Activity 15 min and Therapeutic Exercise 15 min    Treatment Type: Treatment  PT/PTA: PT     PTA Visit Number: 0     01/18/2023

## 2023-01-18 NOTE — PLAN OF CARE
Problem: Skin Injury Risk Increased  Goal: Skin Health and Integrity  Outcome: Ongoing, Progressing  Intervention: Optimize Skin Protection  Flowsheets (Taken 1/17/2023 2010)  Pressure Reduction Techniques:   frequent weight shift encouraged   heels elevated off bed   weight shift assistance provided  Pressure Reduction Devices: heel offloading device utilized  Skin Protection:   incontinence pads utilized   tubing/devices free from skin contact  Head of Bed (HOB) Positioning: HOB at 30 degrees  Intervention: Promote and Optimize Oral Intake  Flowsheets (Taken 1/17/2023 2010)  Oral Nutrition Promotion:   safe use of adaptive equipment encouraged   rest periods promoted

## 2023-01-18 NOTE — SUBJECTIVE & OBJECTIVE
Interval History: naeo    Review of Systems   Constitutional:  Negative for chills, fatigue, fever and unexpected weight change.   HENT:  Negative for congestion, mouth sores and sore throat.    Eyes:  Negative for photophobia and visual disturbance.   Respiratory:  Negative for cough, chest tightness, shortness of breath and wheezing.    Cardiovascular:  Negative for chest pain, palpitations and leg swelling.   Gastrointestinal:  Negative for abdominal pain, diarrhea, nausea and vomiting.   Endocrine: Negative for cold intolerance and heat intolerance.   Genitourinary:  Negative for difficulty urinating, dysuria, frequency and urgency.   Musculoskeletal:  Positive for arthralgias. Negative for back pain and myalgias.   Skin:  Negative for pallor and rash.   Neurological:  Negative for tremors, seizures, syncope, weakness, numbness and headaches.   Hematological:  Does not bruise/bleed easily.   Psychiatric/Behavioral:  Negative for agitation, confusion, hallucinations and suicidal ideas.    Objective:     Vital Signs (Most Recent):  Temp: 97.2 °F (36.2 °C) (01/18/23 1200)  Pulse: 64 (01/18/23 1200)  Resp: 16 (01/18/23 1233)  BP: (!) 158/70 (01/18/23 1200)  SpO2: 98 % (01/18/23 1200)   Vital Signs (24h Range):  Temp:  [97.2 °F (36.2 °C)-99.1 °F (37.3 °C)] 97.2 °F (36.2 °C)  Pulse:  [46-64] 64  Resp:  [16-20] 16  SpO2:  [95 %-99 %] 98 %  BP: (115-194)/(53-72) 158/70     Weight: 57.6 kg (126 lb 15.8 oz)  Body mass index is 20.5 kg/m².    Intake/Output Summary (Last 24 hours) at 1/18/2023 1425  Last data filed at 1/18/2023 0603  Gross per 24 hour   Intake --   Output 400 ml   Net -400 ml        Physical Exam  Vitals reviewed.   Constitutional:       Appearance: Normal appearance.   HENT:      Head: Normocephalic and atraumatic.   Eyes:      Extraocular Movements: Extraocular movements intact.   Cardiovascular:      Rate and Rhythm: Normal rate and regular rhythm.      Pulses: Normal pulses.   Pulmonary:      Effort:  Pulmonary effort is normal.      Breath sounds: Normal breath sounds.   Abdominal:      General: Abdomen is flat.      Palpations: Abdomen is soft.   Musculoskeletal:         General: Tenderness and signs of injury present.      Comments: Dressed left lower extremity, painful to touch   Skin:     General: Skin is warm and dry.      Capillary Refill: Capillary refill takes less than 2 seconds.   Neurological:      General: No focal deficit present.      Mental Status: She is alert and oriented to person, place, and time.   Psychiatric:         Mood and Affect: Mood normal.         Behavior: Behavior normal.       Significant Labs: All pertinent labs within the past 24 hours have been reviewed.    Significant Imaging: I have reviewed all pertinent imaging results/findings within the past 24 hours.

## 2023-01-18 NOTE — PLAN OF CARE
Problem: Occupational Therapy  Goal: Occupational Therapy Goal  Description: STG: (In 2 weeks)  Pt will perform grooming with min(A)--achieved for oral hygiene. Family/sitter assists with hair.  Pt will bathe with setup and mod (A)--progressing  Pt will perform UE dressing with setup and min(A)-- progressing  Pt will perform (L) UE AROM to 3/4 full range---progressing  Pt will sit EOB x 5 min with CGA assistance---progressing  Pt will transfer bed/chair/bsc with mod(A)--progressing  Pt will tolerate 20 minutes of tx without fatigue---progressing.      LT.Restore to max I with self care and mobility.     Outcome: Ongoing, Progressing  Pt is progressing toward goals. Tx has focused on adl's, transfers and UE strengthening. Plan is to continue tx addressing goals.     Activities of Daily Living:  Grooming: Pt performed oraly hygiene with toothbrush and toothpaste with access to supplies    Bathing: Pt bathed her upper body anterior with setup and washed her perineum with setup, Pt washed upper thighs with setup, max(A) for (B) Legs and total (A) for feet, back and buttocks    Upper Body Dressing: moderate assistance to don gown  Lower Body Dressing: dependence for socks  Toileting: Pt required total (A) for hygiene        Patient completed Rolling/Turning to Left with  moderate assistance  Patient completed Rolling/Turning to Right with maximal assistance  Patient completed Supine to Sit with maximal assistance and 2 persons      Functional Mobility/Transfers:  Patient completed Bed <> Chair Transfer using Slide Board technique with moderate assistance, maximal assistance, and of 2 persons with slide board  Functional Mobility: Pt performed slide board transfer with mod to max(A) x2 persons

## 2023-01-18 NOTE — PT/OT/SLP PROGRESS
Occupational Therapy   Treatment    Name: Zandra Veloz  MRN: [id number]  Admitting Diagnosis:  Urinary tract infection       Recommendations:     Discharge Recommendations: nursing facility, skilled  Discharge Equipment Recommendations:   (to be determined)  Barriers to discharge:  None    Assessment:     Zandra Veloz is a 88 y.o. female with a medical diagnosis of Urinary tract infection.  She presents with alert and agreeable to treatment upon the 3rd attempt. Performance deficits affecting function are weakness, impaired endurance, impaired functional mobility, impaired self care skills, impaired skin, decreased ROM, decreased lower extremity function, decreased upper extremity function, orthopedic precautions, pain.     Rehab Prognosis:  Fair; patient would benefit from acute skilled OT services to address these deficits and reach maximum level of function.       Plan:     Patient to be seen 5 x/week to address the above listed problems via self-care/home management, therapeutic activities, therapeutic exercises  Plan of Care Expires: 02/06/23  Plan of Care Reviewed with: patient, caregiver, daughter    Subjective     Pain/Comfort:  Pain Rating 1: 3/10  Location - Side 1: Left  Location 1: thigh  Pain Addressed 1: Pre-medicate for activity (CLYDE Shankar made an adjustment with pt's bandage to decrease her pain.)  Pain Rating Post-Intervention 1: 2/10    Objective:     Communicated with: CLYDE Willingham prior to session.  Patient found up in chair with chao catheter upon OT entry to room.    General Precautions: Standard, fall    Orthopedic Precautions:LLE non weight bearing, LLE posterior precautions  Braces: N/A  Respiratory Status: Nasal cannula, flow 2 L/min     Occupational Performance:     Bed Mobility:    NT    Functional Mobility/Transfers:  NT    Activities of Daily Living:  NT      AMPAC 6 Click ADL: 13    Treatment & Education:  Pt performed (B) UE ex for 2x15 reps of  each:  Elbow flexion with 1# db  Punches with 1# db  ER/IR with 1# db   Supination/pronation with 1# db  Shoulder extension with red t-band  Elbow extension with red t-band    Patient left up in chair with all lines intact, call button in reach, and daughter and sitter present    GOALS:   Multidisciplinary Problems       Occupational Therapy Goals          Problem: Occupational Therapy    Goal Priority Disciplines Outcome Interventions   Occupational Therapy Goal     OT, PT/OT Ongoing, Progressing    Description: STG: (In 2 weeks)  Pt will perform grooming with min(A)  Pt will bathe with setup and mod (A)  Pt will perform UE dressing with setup and min(A)  Pt will perform (L) UE AROM to 3/4 full range  Pt will sit EOB x 5 min with CGA assistance  Pt will transfer bed/chair/bsc with mod(A)  Pt will tolerate 20 minutes of tx without fatigue      LT.Restore to max I with self care and mobility.                          Time Tracking:     OT Date of Treatment: 23  OT Start Time: 1533  OT Stop Time: 1557  OT Total Time (min): 24 min    Billable Minutes:Therapeutic Exercise 19 min    OT/BRIGHT: OT          2023

## 2023-01-19 DIAGNOSIS — M21.372 LEFT FOOT DROP: Primary | ICD-10-CM

## 2023-01-19 PROCEDURE — 99232 PR SUBSEQUENT HOSPITAL CARE,LEVL II: ICD-10-PCS | Mod: ,,, | Performed by: STUDENT IN AN ORGANIZED HEALTH CARE EDUCATION/TRAINING PROGRAM

## 2023-01-19 PROCEDURE — 25000003 PHARM REV CODE 250: Performed by: STUDENT IN AN ORGANIZED HEALTH CARE EDUCATION/TRAINING PROGRAM

## 2023-01-19 PROCEDURE — 97110 THERAPEUTIC EXERCISES: CPT

## 2023-01-19 PROCEDURE — 99232 SBSQ HOSP IP/OBS MODERATE 35: CPT | Mod: ,,, | Performed by: STUDENT IN AN ORGANIZED HEALTH CARE EDUCATION/TRAINING PROGRAM

## 2023-01-19 PROCEDURE — 97530 THERAPEUTIC ACTIVITIES: CPT

## 2023-01-19 PROCEDURE — 97110 THERAPEUTIC EXERCISES: CPT | Mod: CQ

## 2023-01-19 PROCEDURE — 51798 US URINE CAPACITY MEASURE: CPT

## 2023-01-19 PROCEDURE — 97530 THERAPEUTIC ACTIVITIES: CPT | Mod: CQ

## 2023-01-19 PROCEDURE — 63600175 PHARM REV CODE 636 W HCPCS: Performed by: STUDENT IN AN ORGANIZED HEALTH CARE EDUCATION/TRAINING PROGRAM

## 2023-01-19 PROCEDURE — 99232 PR SUBSEQUENT HOSPITAL CARE,LEVL II: ICD-10-PCS | Mod: ,,, | Performed by: NURSE PRACTITIONER

## 2023-01-19 PROCEDURE — 11000001 HC ACUTE MED/SURG PRIVATE ROOM

## 2023-01-19 PROCEDURE — 99232 SBSQ HOSP IP/OBS MODERATE 35: CPT | Mod: ,,, | Performed by: NURSE PRACTITIONER

## 2023-01-19 RX ORDER — TAMSULOSIN HYDROCHLORIDE 0.4 MG/1
0.4 CAPSULE ORAL DAILY
Status: DISCONTINUED | OUTPATIENT
Start: 2023-01-19 | End: 2023-02-20 | Stop reason: HOSPADM

## 2023-01-19 RX ADMIN — ALPRAZOLAM 1 MG: 0.25 TABLET ORAL at 09:01

## 2023-01-19 RX ADMIN — DOCUSATE SODIUM 100 MG: 100 CAPSULE, LIQUID FILLED ORAL at 10:01

## 2023-01-19 RX ADMIN — TAMSULOSIN HYDROCHLORIDE 0.4 MG: 0.4 CAPSULE ORAL at 05:01

## 2023-01-19 RX ADMIN — PREDNISONE 5 MG: 5 TABLET ORAL at 10:01

## 2023-01-19 RX ADMIN — LISINOPRIL 20 MG: 20 TABLET ORAL at 10:01

## 2023-01-19 RX ADMIN — HYDRALAZINE HYDROCHLORIDE 25 MG: 25 TABLET ORAL at 06:01

## 2023-01-19 RX ADMIN — GABAPENTIN 100 MG: 100 CAPSULE ORAL at 04:01

## 2023-01-19 RX ADMIN — HYDROCODONE BITARTRATE AND ACETAMINOPHEN 2 TABLET: 10; 325 TABLET ORAL at 06:01

## 2023-01-19 RX ADMIN — HYDROCODONE BITARTRATE AND ACETAMINOPHEN 2 TABLET: 10; 325 TABLET ORAL at 12:01

## 2023-01-19 RX ADMIN — FAMOTIDINE 20 MG: 20 TABLET ORAL at 10:01

## 2023-01-19 RX ADMIN — DILTIAZEM HYDROCHLORIDE 120 MG: 120 CAPSULE, COATED, EXTENDED RELEASE ORAL at 10:01

## 2023-01-19 RX ADMIN — GABAPENTIN 100 MG: 100 CAPSULE ORAL at 10:01

## 2023-01-19 RX ADMIN — LEVOTHYROXINE SODIUM 125 MCG: 0.12 TABLET ORAL at 06:01

## 2023-01-19 RX ADMIN — APIXABAN 2.5 MG: 2.5 TABLET, FILM COATED ORAL at 10:01

## 2023-01-19 RX ADMIN — APIXABAN 2.5 MG: 2.5 TABLET, FILM COATED ORAL at 09:01

## 2023-01-19 RX ADMIN — PANTOPRAZOLE SODIUM 40 MG: 40 TABLET, DELAYED RELEASE ORAL at 10:01

## 2023-01-19 RX ADMIN — GABAPENTIN 100 MG: 100 CAPSULE ORAL at 09:01

## 2023-01-19 RX ADMIN — MEGESTROL ACETATE 40 MG: 40 TABLET ORAL at 10:01

## 2023-01-19 RX ADMIN — DOCUSATE SODIUM 100 MG: 100 CAPSULE, LIQUID FILLED ORAL at 09:01

## 2023-01-19 NOTE — PT/OT/SLP PROGRESS
Physical Therapy Treatment    Patient Name:  Zandra Veloz   MRN:  [id number]    Recommendations:     Discharge Recommendations: nursing facility, skilled  Discharge Equipment Recommendations: other (see comments) (to bed determined)  Barriers to discharge:  ongoing medical treatment    Assessment:     Zandra Veloz is a 88 y.o. female admitted with a medical diagnosis of Urinary tract infection.  She presents with the following impairments/functional limitations: weakness, impaired endurance, orthopedic precautions, impaired self care skills, impaired functional mobility, gait instability, impaired skin.    Pt able to assist with sit to stand and transfer to recliner chair with RW, however, required increased verbal cueing to adhere to partial WB precautions.  Pt is easily fatigued during all activities    Rehab Prognosis: Fair; patient would benefit from acute skilled PT services to address these deficits and reach maximum level of function.    Recent Surgery: * No surgery found *      Plan:     During this hospitalization, patient to be seen 5 x/week to address the identified rehab impairments via gait training, therapeutic activities, therapeutic exercises and progress toward the following goals:    Plan of Care Expires:  01/30/23    Subjective     Chief Complaint: wounds; left hip revision  Patient/Family Comments/goals: pt agreeable to therapy  Pain/Comfort:         Objective:     Communicated with Yolanda Sinha RN prior to session.  Patient found HOB elevated with peripheral IV upon PT entry to room.     General Precautions: Standard, fall  Orthopedic Precautions: LLE posterior precautions, LLE partial weight bearing  Braces: N/A  Respiratory Status: Room air     Functional Mobility:  Bed Mobility:     Rolling Left:  contact guard assistance and minimum assistance  Rolling Right: contact guard assistance and minimum assistance  Supine to Sit: minimum assistance and increased  time  Transfers:     Sit to Stand:  moderate assistance and of 2 persons with rolling walker  Bed to Chair: moderate assistance and of 2 persons with  rolling walker  using  Step Transfer  Gait: 3 shallow steps around to chair      AM-PAC 6 CLICK MOBILITY  Turning over in bed (including adjusting bedclothes, sheets and blankets)?: 3  Sitting down on and standing up from a chair with arms (e.g., wheelchair, bedside commode, etc.): 2  Moving from lying on back to sitting on the side of the bed?: 3  Moving to and from a bed to a chair (including a wheelchair)?: 2  Need to walk in hospital room?: 1  Climbing 3-5 steps with a railing?: 1  Basic Mobility Total Score: 12       Treatment & Education:  Pt performed 2 x 15 reps (B) LE exercises: ap, quad set, glut set, straight leg raise, hip ab/adduction, long arc quad, heel slide with active assist range of motion     Contact guard assist to standby assist sitting EOB x 5 minutes    Patient left up in chair with all lines intact, call button in reach, and daughters and caregiver present..    GOALS:   Multidisciplinary Problems       Physical Therapy Goals          Problem: Physical Therapy    Goal Priority Disciplines Outcome Goal Variances Interventions   Physical Therapy Goal     PT, PT/OT Ongoing, Progressing     Description: Short Term Goals to be met by 1/25/2023    Patient will increase functional independence and safety with mobility by performing    1.   Rolling side to side with Minimal assist   2.   Supine to sit Moderate Assist  3.   Sitting balance edge of the bed x 15 minutes Supervision  4.   Sit to stand Moderate Assist  using manual assistance with gait belt and NWB L LE  5.   Bed to chair Minimal  Assist using sliding board and NWB left LE  6.   Lower extremity exercises x 30 reps with assist as necessary and no rest breaks      Long Term Goals to be met by 2/10/2023    Patient to require less than or equal to Stand by assist for functional mobility to ease  caregiver burden                        Time Tracking:     PT Received On: 01/19/23  PT Start Time: 1458     PT Stop Time: 1525  PT Total Time (min): 27 min     Billable Minutes: Therapeutic Activity 8 and Therapeutic Exercise 15    Treatment Type: Treatment  PT/PTA: PTA     PTA Visit Number: 1     01/19/2023

## 2023-01-19 NOTE — PLAN OF CARE
[facility] received correspondence from Nurse Denny that pt will need brace for LLE. Order faxed to [facility] from Dr. Singer's nurse, Teofilo. Referral called in and faxed to [facility]. Farrah ([facility]) stated that referral would be given to their staff that arranges braces and if he cannot come fit patient on this evening, it will be on tomorrow. Ss following.

## 2023-01-19 NOTE — PT/OT/SLP PROGRESS
Occupational Therapy   Treatment    Name: Zandra Veloz  MRN: [id number]  Admitting Diagnosis:  Urinary tract infection       Recommendations:     Discharge Recommendations: nursing facility, skilled  Discharge Equipment Recommendations:   (to be determined)  Barriers to discharge:  None    Assessment:     Zandra Veloz is a 88 y.o. female with a medical diagnosis of Urinary tract infection.  She presents with no complaints. Pt agreed to OT treatment consisting of UE strengthening and exercises.. Performance deficits affecting function are orthopedic precautions, impaired self care skills, impaired functional mobility, weakness, impaired endurance, gait instability, impaired skin.     Rehab Prognosis:  Good; patient would benefit from acute skilled OT services to address these deficits and reach maximum level of function.       Plan:     Patient to be seen 5 x/week to address the above listed problems via self-care/home management, therapeutic activities, therapeutic exercises  Plan of Care Expires: 02/06/23  Plan of Care Reviewed with: patient, caregiver, daughter    Subjective     Pain/Comfort:  Pain Rating 1: 0/10  Pain Rating Post-Intervention 1: 0/10    Objective:     Communicated with: CLYDE Sinha prior to session.  Patient found HOB elevated with peripheral IV upon OT entry to room.    General Precautions: Standard, fall    Orthopedic Precautions:LLE posterior precautions, LLE partial weight bearing  Braces: N/A  Respiratory Status: Room air     Occupational Performance:     Bed Mobility:    Patient completed Supine to Sit with minimum assistance     Functional Mobility/Transfers:  Patient completed Sit <> Stand Transfer with moderate assistance and of x 2 persons  with  gait belt to stand to RW   Patient completed Bed <> Chair Transfer using Step Transfer technique with moderate assistance and of x 2 persons with rolling walker and gait belt for support and cueing for PWB on  (L)  Functional Mobility: Mod a x 2 with RW with PWB on (L)    Activities of Daily Living:  Upper Body Dressing: maximal assistance isac villatoro as a robe      [facility] 6 Click ADL:      Treatment & Education:  Pt performed UE strengthening. 1 lb hand wt x 2 sets of 10 reps  with CGA (B) shoulder flexion/extension, adduction/abduction and (B) elbow and wrist flexion/extension. Hand exerciser x 2 bands x 2 sets of 20 reps (B). Red theraband x 2 sets of  10 reps (B) elbow flexion and extension.    Patient left up in chair with all lines intact, call button in reach, nurse notified, and daughter and sitter present    GOALS:   Multidisciplinary Problems       Occupational Therapy Goals          Problem: Occupational Therapy    Goal Priority Disciplines Outcome Interventions   Occupational Therapy Goal     OT, PT/OT Ongoing, Progressing    Description: STG: (In 2 weeks)  Pt will perform grooming with min(A)  Pt will bathe with setup and mod (A)  Pt will perform UE dressing with setup and min(A)  Pt will perform (L) UE AROM to 3/4 full range  Pt will sit EOB x 5 min with CGA assistance  Pt will transfer bed/chair/bsc with mod(A)  Pt will tolerate 20 minutes of tx without fatigue      LT.Restore to max I with self care and mobility.                          Time Tracking:     OT Date of Treatment: 23  OT Start Time: 1500 (16:08)  OT Stop Time: 1510 (16:29)  OT Total Time (min): 10 min    Billable Minutes:Therapeutic Activity 10  Therapeutic Exercise 19    OT/BRIGHT: OT          2023

## 2023-01-19 NOTE — PLAN OF CARE
Problem: Adult Inpatient Plan of Care  Goal: Absence of Hospital-Acquired Illness or Injury  Intervention: Identify and Manage Fall Risk  Flowsheets (Taken 1/19/2023 1753)  Safety Promotion/Fall Prevention:   assistive device/personal item within reach   family to remain at bedside   medications reviewed   nonskid shoes/socks when out of bed     Problem: Skin Injury Risk Increased  Goal: Skin Health and Integrity  Intervention: Optimize Skin Protection  Flowsheets (Taken 1/19/2023 1753)  Pressure Reduction Techniques: pressure points protected  Pressure Reduction Devices:   pressure-redistributing mattress utilized   specialty bed utilized   positioning supports utilized  Skin Protection: incontinence pads utilized  Head of Bed (HOB) Positioning: HOB elevated

## 2023-01-19 NOTE — SUBJECTIVE & OBJECTIVE
Interval History: naeo    Review of Systems   Constitutional:  Negative for chills, fatigue, fever and unexpected weight change.   HENT:  Negative for congestion, mouth sores and sore throat.    Eyes:  Negative for photophobia and visual disturbance.   Respiratory:  Negative for cough, chest tightness, shortness of breath and wheezing.    Cardiovascular:  Negative for chest pain, palpitations and leg swelling.   Gastrointestinal:  Negative for abdominal pain, diarrhea, nausea and vomiting.   Endocrine: Negative for cold intolerance and heat intolerance.   Genitourinary:  Negative for difficulty urinating, dysuria, frequency and urgency.   Musculoskeletal:  Positive for arthralgias. Negative for back pain and myalgias.   Skin:  Negative for pallor and rash.   Neurological:  Negative for tremors, seizures, syncope, weakness, numbness and headaches.   Hematological:  Does not bruise/bleed easily.   Psychiatric/Behavioral:  Negative for agitation, confusion, hallucinations and suicidal ideas.    Objective:     Vital Signs (Most Recent):  Temp: 98.1 °F (36.7 °C) (01/19/23 0740)  Pulse: (!) 55 (01/19/23 0740)  Resp: 16 (01/19/23 0740)  BP: (!) 115/45 (01/19/23 0740)  SpO2: 95 % (01/19/23 0740)   Vital Signs (24h Range):  Temp:  [97.2 °F (36.2 °C)-98.8 °F (37.1 °C)] 98.1 °F (36.7 °C)  Pulse:  [46-72] 55  Resp:  [16-20] 16  SpO2:  [95 %-98 %] 95 %  BP: (109-160)/(39-72) 115/45     Weight: 57.6 kg (126 lb 15.8 oz)  Body mass index is 20.5 kg/m².    Intake/Output Summary (Last 24 hours) at 1/19/2023 1041  Last data filed at 1/18/2023 1729  Gross per 24 hour   Intake --   Output 325 ml   Net -325 ml        Physical Exam  Vitals reviewed.   Constitutional:       Appearance: Normal appearance.   HENT:      Head: Normocephalic and atraumatic.   Eyes:      Extraocular Movements: Extraocular movements intact.   Cardiovascular:      Rate and Rhythm: Normal rate and regular rhythm.      Pulses: Normal pulses.   Pulmonary:       Effort: Pulmonary effort is normal.      Breath sounds: Normal breath sounds.   Abdominal:      General: Abdomen is flat.      Palpations: Abdomen is soft.   Musculoskeletal:         General: Tenderness and signs of injury present.      Comments: Dressed left lower extremity, painful to touch   Skin:     General: Skin is warm and dry.      Capillary Refill: Capillary refill takes less than 2 seconds.   Neurological:      General: No focal deficit present.      Mental Status: She is alert and oriented to person, place, and time.   Psychiatric:         Mood and Affect: Mood normal.         Behavior: Behavior normal.       Significant Labs: All pertinent labs within the past 24 hours have been reviewed.    Significant Imaging: I have reviewed all pertinent imaging results/findings within the past 24 hours.

## 2023-01-19 NOTE — PT/OT/SLP PROGRESS
Occupational Therapy   Treatment    Name: Zandra Veloz  MRN: [id number]  Admitting Diagnosis:  Urinary tract infection       Recommendations:     Discharge Recommendations: nursing facility, skilled  Discharge Equipment Recommendations:   (to be determined)  Barriers to discharge:  None    Assessment:     Zandra Veloz is a 88 y.o. female with a medical diagnosis of Urinary tract infection.  She presents with alert and agreeable to treatment . Performance deficits affecting function are weakness, impaired endurance, impaired functional mobility, impaired self care skills, impaired skin, decreased ROM, decreased lower extremity function, decreased upper extremity function, orthopedic precautions, pain.     Rehab Prognosis:  Fair; patient would benefit from acute skilled OT services to address these deficits and reach maximum level of function.       Plan:     Patient to be seen 5 x/week to address the above listed problems via self-care/home management, therapeutic activities, therapeutic exercises  Plan of Care Expires: 02/06/23  Plan of Care Reviewed with: patient, caregiver    Subjective     Pain/Comfort:  Pain Rating 1: 0/10  Pain Rating Post-Intervention 1: 0/10    Objective:     Communicated with: CLYDE Sinha prior to session.  Patient found up in chair with chao catheter, peripheral IV, wound vac upon OT entry to room.    General Precautions: Standard, fall    Orthopedic Precautions:LLE posterior precautions, LLE partial weight bearing  Braces: N/A  Respiratory Status: Nasal cannula, flow 2 L/min     Occupational Performance:     Bed Mobility:    NT    Functional Mobility/Transfers:  NT    Activities of Daily Living:  NT      [facility] 6 Click ADL:      Treatment & Education:  Pt performed (B) UE ex for 2x15 reps of each:  Elbow flexion with 1# db  Punches with 1# db  ER/IR with 1# db   Supination/pronation with 1# db  Shoulder extension with red t-band  Elbow extension with red  t-band    Patient left up in chair with all lines intact, call button in reach, and daughter and sitter present    GOALS:   Multidisciplinary Problems       Occupational Therapy Goals          Problem: Occupational Therapy    Goal Priority Disciplines Outcome Interventions   Occupational Therapy Goal     OT, PT/OT Ongoing, Progressing    Description: STG: (In 2 weeks)  Pt will perform grooming with min(A)  Pt will bathe with setup and mod (A)  Pt will perform UE dressing with setup and min(A)  Pt will perform (L) UE AROM to 3/4 full range  Pt will sit EOB x 5 min with CGA assistance  Pt will transfer bed/chair/bsc with mod(A)  Pt will tolerate 20 minutes of tx without fatigue      LT.Restore to max I with self care and mobility.                          Time Tracking:     OT Date of Treatment: 23  OT Start Time: 1314  OT Stop Time: 1332  OT Total Time (min): 18 min    Billable Minutes:Therapeutic Exercise 19 min    OT/BRIGHT: OT          2023

## 2023-01-19 NOTE — PROGRESS NOTES
[facility] Hospital Medicine  Progress Note    Patient Name: Zandra Veloz  MRN: [id number]  Patient Class: IP- Inpatient   Admission Date: 12/29/2022  Length of Stay: 21 days  Attending Physician: Shai Guerra DO  Primary Care Provider: Brandon Thornton MD        Subjective:     Principal Problem:Urinary tract infection        HPI:  HPI:   88-year-old lady seen emergency room department.  Patient presents after having a fall when she was trying to go to the restroom at her home.  Patient sustained a left femur fracture.  Patient complains of moderate to severe pain in the left hip area.  Patient also complains of chest tightness, she rates it a 5/10 in the chest tightness has been intubate.  Patient also was seen in emergency room department earlier this week per her daughter.  Patient is found to have dehydration and a urinary tract infection.  Current she denies any shortness of breath.  She does not give a history of coronary artery disease.        Procedure(s) (LRB):  REVISION, TOTAL ARTHROPLASTY, HIP, VICTORIANO PROSTHETIC FX (Left)       Hospital Course:   12/19 - records reviewed. Fall without LOC and has left hip fx. No other complaints currently. Seen by cardiology with some CP felt musculoskeletal.  Echo mild AS and mod MR with nl EF. Age increase cardiac risk for surgery but discussed with daughter surgery is urgent and see no benefit in delay medically. Feel low to moderate risk for surgery. Question about UTI. No symptoms. Had UTI in 10.22 not surprising. Lab to do culture on urine in lab. Cover with ATN for prior culture with ATB started. Discussed with Dr Singer and cardiology.  12/20 Tachycardia and elevated BP. Cardiology adjusted meds. Plan hold off surgery until 12/22 to adjust meds and treat UTI. Family in room. Pt not sleeping well.   12/21 Didn't sleep well prior night. Apparently been on xanax for a time. Also recently started on Neurontin. Family with  question. No recent BM. Some increase stool on KUB but not severe. Surgery for am. BP some up but better. HR good.   12/22 Seen prior to surgery and apparently add better night. Sore mouth / throat better with mycostatin. For surgery. Family in room.  12/23 patient with pain but otherwise seemed to be doing relatively well.  Daughter in room.  Apparently been taking prednisone for some time and this was restarted per family request.  Look into swing bed placement  12/24 patient had better night rested and everyone's in better spirits today.  Tolerating some diet.   Therapy worked with patient.  Look into swing bed placement next week  12/25 remained in good spirits.  Less pain.  Had good bowel movement and ordered Dulcolax not given.  Because of dressing to leg, Burk was not removed to keep it from getting soiled.  Wound care to check 12/27.  Discuss this with patient and daughter.  On antibiotics for UTI  12/26-will dc to swingbed once accepted. Needs continued wound care.  12/27-DC when bed available  12/28- issues with wound care yesterday.  Dr. Singer had to come remove dressings himself due to adherence to subcutaneous tissue.  Family refusing transfer to [facility] due to lack of specially trained wound care team availability.  Planning to transfer to specialty but resistant to that as well and requesting to speak to administration.    12/29- agreeable to transfer to specialty.  This will be the best place for her to receive wound care.  DC after family tours     12/29-needs continued wound care and therapy[facility]    12/30-doing well today, still with pain         Overview/Hospital Course:  12/31-having some pain this am  1/1- no complaints  1/2- doing well  1/3- continue wound care, last day cefepime tomorrow  1/4- some pain this AM.  Delay in mediations dues to staffing issues. Dsicussed with RN  1/5- no issues overnight  1/6- Still with pain, hip incision looks good  1/7-diclofenac for  shoulder pain  1/8-decrease laxatives, change benzos and carlo to PRN per patient request  1/9 some confusion overnight  1/10- no more confusion, doing well  1/11- no acute issues overnight  1/12- doing well, nausea today  1/13 -Dr. Lebron Cutler IV, DO taking over for Dr. Guerra for weekend.  Patient's pain appears to be well controlled this morning no complaints of nausea.  Continue wound care and PT/OT  1/14-patient appears well labs.  Continue wound care and PT/OT  1/15-patient still appears well.  No acute concerns or complaints.  No events overnight.  Continue wound care working with a PTOT.  Expiration is services estimated by February 6 1/16- no complaints, doing well  1/17-some nausea today  1/18-better today.  Still requiring twice weekly dressing changes.  Appetite stimulant  1/19- PWB with PT today.  Still with weeping wounds.  Will need wound care twice weekly-as these improve will be able to transfer to other facility      Interval History: naeo    Review of Systems   Constitutional:  Negative for chills, fatigue, fever and unexpected weight change.   HENT:  Negative for congestion, mouth sores and sore throat.    Eyes:  Negative for photophobia and visual disturbance.   Respiratory:  Negative for cough, chest tightness, shortness of breath and wheezing.    Cardiovascular:  Negative for chest pain, palpitations and leg swelling.   Gastrointestinal:  Negative for abdominal pain, diarrhea, nausea and vomiting.   Endocrine: Negative for cold intolerance and heat intolerance.   Genitourinary:  Negative for difficulty urinating, dysuria, frequency and urgency.   Musculoskeletal:  Positive for arthralgias. Negative for back pain and myalgias.   Skin:  Negative for pallor and rash.   Neurological:  Negative for tremors, seizures, syncope, weakness, numbness and headaches.   Hematological:  Does not bruise/bleed easily.   Psychiatric/Behavioral:  Negative for agitation, confusion, hallucinations and suicidal  ideas.    Objective:     Vital Signs (Most Recent):  Temp: 98.1 °F (36.7 °C) (01/19/23 0740)  Pulse: (!) 55 (01/19/23 0740)  Resp: 16 (01/19/23 0740)  BP: (!) 115/45 (01/19/23 0740)  SpO2: 95 % (01/19/23 0740)   Vital Signs (24h Range):  Temp:  [97.2 °F (36.2 °C)-98.8 °F (37.1 °C)] 98.1 °F (36.7 °C)  Pulse:  [46-72] 55  Resp:  [16-20] 16  SpO2:  [95 %-98 %] 95 %  BP: (109-160)/(39-72) 115/45     Weight: 57.6 kg (126 lb 15.8 oz)  Body mass index is 20.5 kg/m².    Intake/Output Summary (Last 24 hours) at 1/19/2023 1041  Last data filed at 1/18/2023 1729  Gross per 24 hour   Intake --   Output 325 ml   Net -325 ml        Physical Exam  Vitals reviewed.   Constitutional:       Appearance: Normal appearance.   HENT:      Head: Normocephalic and atraumatic.   Eyes:      Extraocular Movements: Extraocular movements intact.   Cardiovascular:      Rate and Rhythm: Normal rate and regular rhythm.      Pulses: Normal pulses.   Pulmonary:      Effort: Pulmonary effort is normal.      Breath sounds: Normal breath sounds.   Abdominal:      General: Abdomen is flat.      Palpations: Abdomen is soft.   Musculoskeletal:         General: Tenderness and signs of injury present.      Comments: Dressed left lower extremity, painful to touch   Skin:     General: Skin is warm and dry.      Capillary Refill: Capillary refill takes less than 2 seconds.   Neurological:      General: No focal deficit present.      Mental Status: She is alert and oriented to person, place, and time.   Psychiatric:         Mood and Affect: Mood normal.         Behavior: Behavior normal.       Significant Labs: All pertinent labs within the past 24 hours have been reviewed.    Significant Imaging: I have reviewed all pertinent imaging results/findings within the past 24 hours.      Assessment/Plan:      Disruption of external surgical wound        Wounds and injuries  Wound care consulted and following    Open wound of left lower leg  Wound care      Multiple  skin tears  Wound care      Delmy-prosthetic fracture around prosthetic hip  S/P fixation with Pomierski      Lumbar radiculopathy  Pain management  PT      Hypertension  Adjust medications as needed    Arthritis  Pain management        VTE Risk Mitigation (From admission, onward)         Ordered     apixaban tablet 2.5 mg  2 times daily         12/29/22 1630                Discharge Planning   YANIV:      Code Status: Full Code   Is the patient medically ready for discharge?:     Reason for patient still in hospital (select all that apply): Treatment  Discharge Plan A: Skilled Nursing Facility                  Shai Guerra DO  Department of Hospital Medicine   [facility]

## 2023-01-20 LAB
ANION GAP SERPL CALCULATED.3IONS-SCNC: 12 MMOL/L (ref 7–16)
BASOPHILS # BLD AUTO: 0.06 K/UL (ref 0–0.2)
BASOPHILS NFR BLD AUTO: 0.8 % (ref 0–1)
BUN SERPL-MCNC: 16 MG/DL (ref 7–18)
BUN/CREAT SERPL: 20 (ref 6–20)
CALCIUM SERPL-MCNC: 8.7 MG/DL (ref 8.5–10.1)
CHLORIDE SERPL-SCNC: 101 MMOL/L (ref 98–107)
CO2 SERPL-SCNC: 27 MMOL/L (ref 21–32)
CREAT SERPL-MCNC: 0.8 MG/DL (ref 0.55–1.02)
DIFFERENTIAL METHOD BLD: ABNORMAL
EGFR (NO RACE VARIABLE) (RUSH/TITUS): 71 ML/MIN/1.73M²
EOSINOPHIL # BLD AUTO: 0.21 K/UL (ref 0–0.5)
EOSINOPHIL NFR BLD AUTO: 2.9 % (ref 1–4)
ERYTHROCYTE [DISTWIDTH] IN BLOOD BY AUTOMATED COUNT: 13.5 % (ref 11.5–14.5)
GLUCOSE SERPL-MCNC: 82 MG/DL (ref 74–106)
HCT VFR BLD AUTO: 30.4 % (ref 38–47)
HGB BLD-MCNC: 9.6 G/DL (ref 12–16)
IMM GRANULOCYTES # BLD AUTO: 0.14 K/UL (ref 0–0.04)
IMM GRANULOCYTES NFR BLD: 1.9 % (ref 0–0.4)
LYMPHOCYTES # BLD AUTO: 2 K/UL (ref 1–4.8)
LYMPHOCYTES NFR BLD AUTO: 27.3 % (ref 27–41)
MCH RBC QN AUTO: 30 PG (ref 27–31)
MCHC RBC AUTO-ENTMCNC: 31.6 G/DL (ref 32–36)
MCV RBC AUTO: 95 FL (ref 80–96)
MONOCYTES # BLD AUTO: 0.65 K/UL (ref 0–0.8)
MONOCYTES NFR BLD AUTO: 8.9 % (ref 2–6)
MPC BLD CALC-MCNC: 9.7 FL (ref 9.4–12.4)
NEUTROPHILS # BLD AUTO: 4.26 K/UL (ref 1.8–7.7)
NEUTROPHILS NFR BLD AUTO: 58.2 % (ref 53–65)
NRBC # BLD AUTO: 0 X10E3/UL
NRBC, AUTO (.00): 0 %
PLATELET # BLD AUTO: 268 K/UL (ref 150–400)
POTASSIUM SERPL-SCNC: 4.6 MMOL/L (ref 3.5–5.1)
RBC # BLD AUTO: 3.2 M/UL (ref 4.2–5.4)
SODIUM SERPL-SCNC: 135 MMOL/L (ref 136–145)
WBC # BLD AUTO: 7.32 K/UL (ref 4.5–11)

## 2023-01-20 PROCEDURE — 11000001 HC ACUTE MED/SURG PRIVATE ROOM

## 2023-01-20 PROCEDURE — 99232 SBSQ HOSP IP/OBS MODERATE 35: CPT | Mod: ,,, | Performed by: STUDENT IN AN ORGANIZED HEALTH CARE EDUCATION/TRAINING PROGRAM

## 2023-01-20 PROCEDURE — 25000003 PHARM REV CODE 250: Performed by: STUDENT IN AN ORGANIZED HEALTH CARE EDUCATION/TRAINING PROGRAM

## 2023-01-20 PROCEDURE — 63600175 PHARM REV CODE 636 W HCPCS: Performed by: STUDENT IN AN ORGANIZED HEALTH CARE EDUCATION/TRAINING PROGRAM

## 2023-01-20 PROCEDURE — 36415 COLL VENOUS BLD VENIPUNCTURE: CPT | Performed by: STUDENT IN AN ORGANIZED HEALTH CARE EDUCATION/TRAINING PROGRAM

## 2023-01-20 PROCEDURE — 80048 BASIC METABOLIC PNL TOTAL CA: CPT | Performed by: STUDENT IN AN ORGANIZED HEALTH CARE EDUCATION/TRAINING PROGRAM

## 2023-01-20 PROCEDURE — 99232 PR SUBSEQUENT HOSPITAL CARE,LEVL II: ICD-10-PCS | Mod: ,,, | Performed by: STUDENT IN AN ORGANIZED HEALTH CARE EDUCATION/TRAINING PROGRAM

## 2023-01-20 PROCEDURE — 85025 COMPLETE CBC W/AUTO DIFF WBC: CPT | Performed by: STUDENT IN AN ORGANIZED HEALTH CARE EDUCATION/TRAINING PROGRAM

## 2023-01-20 RX ADMIN — HYDRALAZINE HYDROCHLORIDE 25 MG: 25 TABLET ORAL at 06:01

## 2023-01-20 RX ADMIN — PREDNISONE 5 MG: 5 TABLET ORAL at 09:01

## 2023-01-20 RX ADMIN — HYDROCODONE BITARTRATE AND ACETAMINOPHEN 2 TABLET: 10; 325 TABLET ORAL at 06:01

## 2023-01-20 RX ADMIN — GABAPENTIN 100 MG: 100 CAPSULE ORAL at 09:01

## 2023-01-20 RX ADMIN — ALPRAZOLAM 1 MG: 0.25 TABLET ORAL at 08:01

## 2023-01-20 RX ADMIN — GABAPENTIN 100 MG: 100 CAPSULE ORAL at 08:01

## 2023-01-20 RX ADMIN — APIXABAN 2.5 MG: 2.5 TABLET, FILM COATED ORAL at 08:01

## 2023-01-20 RX ADMIN — GABAPENTIN 100 MG: 100 CAPSULE ORAL at 03:01

## 2023-01-20 RX ADMIN — PANTOPRAZOLE SODIUM 40 MG: 40 TABLET, DELAYED RELEASE ORAL at 09:01

## 2023-01-20 RX ADMIN — FAMOTIDINE 20 MG: 20 TABLET ORAL at 09:01

## 2023-01-20 RX ADMIN — APIXABAN 2.5 MG: 2.5 TABLET, FILM COATED ORAL at 09:01

## 2023-01-20 RX ADMIN — HYDROCODONE BITARTRATE AND ACETAMINOPHEN 2 TABLET: 10; 325 TABLET ORAL at 01:01

## 2023-01-20 RX ADMIN — MEGESTROL ACETATE 40 MG: 40 TABLET ORAL at 09:01

## 2023-01-20 RX ADMIN — DOCUSATE SODIUM 100 MG: 100 CAPSULE, LIQUID FILLED ORAL at 09:01

## 2023-01-20 RX ADMIN — TAMSULOSIN HYDROCHLORIDE 0.4 MG: 0.4 CAPSULE ORAL at 09:01

## 2023-01-20 RX ADMIN — LEVOTHYROXINE SODIUM 125 MCG: 0.12 TABLET ORAL at 06:01

## 2023-01-20 RX ADMIN — DOCUSATE SODIUM 100 MG: 100 CAPSULE, LIQUID FILLED ORAL at 08:01

## 2023-01-20 NOTE — PLAN OF CARE
DILLON received correspondence from Farrah at [facility] stating that pt must come by [facility] to get fitted for brace. DILLON notified Teofilo (Nurse at Dr. Singer's office). Dillon attempted to notify pt's daughter Eunice, no answer. Will try calling again later this day.    1410 [facility]. Referral called in to Gricelda ([facility] admissions person) and sending at this time. Referral faxed to [facility] as well. Dillon attempted to call in referral to [facility], dillon informed that [facility] Admissions leaves at 1200 on Fridays. Sw to f/u on Monday.

## 2023-01-20 NOTE — ASSESSMENT & PLAN NOTE
Clean with vashe  Apply mepitel and  aquacel ag advantage to wound.  Cover and secure with 4x4s, kerlix, and paper tape  Change twice weekly and PRN for soilage (M, Th)  Keep leg elevated and avoid pressure on wound.

## 2023-01-20 NOTE — PROGRESS NOTES
[facility] Hospital Medicine  Progress Note    Patient Name: Zandra Veloz  MRN: [id number]  Patient Class: IP- Inpatient   Admission Date: 12/29/2022  Length of Stay: 22 days  Attending Physician: Shai Guerra DO  Primary Care Provider: Brandon Thornton MD        Subjective:     Principal Problem:Urinary tract infection        HPI:  HPI:   88-year-old lady seen emergency room department.  Patient presents after having a fall when she was trying to go to the restroom at her home.  Patient sustained a left femur fracture.  Patient complains of moderate to severe pain in the left hip area.  Patient also complains of chest tightness, she rates it a 5/10 in the chest tightness has been intubate.  Patient also was seen in emergency room department earlier this week per her daughter.  Patient is found to have dehydration and a urinary tract infection.  Current she denies any shortness of breath.  She does not give a history of coronary artery disease.        Procedure(s) (LRB):  REVISION, TOTAL ARTHROPLASTY, HIP, VICTORIANO PROSTHETIC FX (Left)       Hospital Course:   12/19 - records reviewed. Fall without LOC and has left hip fx. No other complaints currently. Seen by cardiology with some CP felt musculoskeletal.  Echo mild AS and mod MR with nl EF. Age increase cardiac risk for surgery but discussed with daughter surgery is urgent and see no benefit in delay medically. Feel low to moderate risk for surgery. Question about UTI. No symptoms. Had UTI in 10.22 not surprising. Lab to do culture on urine in lab. Cover with ATN for prior culture with ATB started. Discussed with Dr Singer and cardiology.  12/20 Tachycardia and elevated BP. Cardiology adjusted meds. Plan hold off surgery until 12/22 to adjust meds and treat UTI. Family in room. Pt not sleeping well.   12/21 Didn't sleep well prior night. Apparently been on xanax for a time. Also recently started on Neurontin. Family with  question. No recent BM. Some increase stool on KUB but not severe. Surgery for am. BP some up but better. HR good.   12/22 Seen prior to surgery and apparently add better night. Sore mouth / throat better with mycostatin. For surgery. Family in room.  12/23 patient with pain but otherwise seemed to be doing relatively well.  Daughter in room.  Apparently been taking prednisone for some time and this was restarted per family request.  Look into swing bed placement  12/24 patient had better night rested and everyone's in better spirits today.  Tolerating some diet.   Therapy worked with patient.  Look into swing bed placement next week  12/25 remained in good spirits.  Less pain.  Had good bowel movement and ordered Dulcolax not given.  Because of dressing to leg, Burk was not removed to keep it from getting soiled.  Wound care to check 12/27.  Discuss this with patient and daughter.  On antibiotics for UTI  12/26-will dc to swingbed once accepted. Needs continued wound care.  12/27-DC when bed available  12/28- issues with wound care yesterday.  Dr. Singer had to come remove dressings himself due to adherence to subcutaneous tissue.  Family refusing transfer to [facility] due to lack of specially trained wound care team availability.  Planning to transfer to specialty but resistant to that as well and requesting to speak to administration.    12/29- agreeable to transfer to specialty.  This will be the best place for her to receive wound care.  DC after family tours     12/29-needs continued wound care and therapy[facility]    12/30-doing well today, still with pain         Overview/Hospital Course:  12/31-having some pain this am  1/1- no complaints  1/2- doing well  1/3- continue wound care, last day cefepime tomorrow  1/4- some pain this AM.  Delay in mediations dues to staffing issues. Dsicussed with RN  1/5- no issues overnight  1/6- Still with pain, hip incision looks good  1/7-diclofenac for  shoulder pain  1/8-decrease laxatives, change benzos and carlo to PRN per patient request  1/9 some confusion overnight  1/10- no more confusion, doing well  1/11- no acute issues overnight  1/12- doing well, nausea today  1/13 -Dr. Lebron Cutler IV, DO taking over for Dr. Guerra for weekend.  Patient's pain appears to be well controlled this morning no complaints of nausea.  Continue wound care and PT/OT  1/14-patient appears well labs.  Continue wound care and PT/OT  1/15-patient still appears well.  No acute concerns or complaints.  No events overnight.  Continue wound care working with a PTOT.  Expiration is services estimated by February 6 1/16- no complaints, doing well  1/17-some nausea today  1/18-better today.  Still requiring twice weekly dressing changes.  Appetite stimulant  1/19- PWB with PT today.  Still with weeping wounds.  Will need wound care twice weekly-as these improve will be able to transfer to other facility  1/20-no complaints      Interval History: naeo    Review of Systems   Constitutional:  Negative for chills, fatigue, fever and unexpected weight change.   HENT:  Negative for congestion, mouth sores and sore throat.    Eyes:  Negative for photophobia and visual disturbance.   Respiratory:  Negative for cough, chest tightness, shortness of breath and wheezing.    Cardiovascular:  Negative for chest pain, palpitations and leg swelling.   Gastrointestinal:  Negative for abdominal pain, diarrhea, nausea and vomiting.   Endocrine: Negative for cold intolerance and heat intolerance.   Genitourinary:  Negative for difficulty urinating, dysuria, frequency and urgency.   Musculoskeletal:  Positive for arthralgias. Negative for back pain and myalgias.   Skin:  Negative for pallor and rash.   Neurological:  Negative for tremors, seizures, syncope, weakness, numbness and headaches.   Hematological:  Does not bruise/bleed easily.   Psychiatric/Behavioral:  Negative for agitation, confusion,  hallucinations and suicidal ideas.    Objective:     Vital Signs (Most Recent):  Temp: 98.2 °F (36.8 °C) (01/20/23 1210)  Pulse: (!) 56 (01/20/23 1210)  Resp: 18 (01/20/23 1310)  BP: (!) 129/42 (01/20/23 1210)  SpO2: 97 % (01/20/23 1210)   Vital Signs (24h Range):  Temp:  [49.8 °F (9.9 °C)-98.4 °F (36.9 °C)] 98.2 °F (36.8 °C)  Pulse:  [48-56] 56  Resp:  [16-20] 18  SpO2:  [96 %-97 %] 97 %  BP: ()/(37-53) 129/42     Weight: 57.6 kg (126 lb 15.8 oz)  Body mass index is 20.5 kg/m².    Intake/Output Summary (Last 24 hours) at 1/20/2023 1607  Last data filed at 1/20/2023 1255  Gross per 24 hour   Intake --   Output 301 ml   Net -301 ml        Physical Exam  Vitals reviewed.   Constitutional:       Appearance: Normal appearance.   HENT:      Head: Normocephalic and atraumatic.   Eyes:      Extraocular Movements: Extraocular movements intact.   Cardiovascular:      Rate and Rhythm: Normal rate and regular rhythm.      Pulses: Normal pulses.   Pulmonary:      Effort: Pulmonary effort is normal.      Breath sounds: Normal breath sounds.   Abdominal:      General: Abdomen is flat.      Palpations: Abdomen is soft.   Musculoskeletal:         General: Tenderness and signs of injury present.      Comments: Dressed left lower extremity, painful to touch   Skin:     General: Skin is warm and dry.      Capillary Refill: Capillary refill takes less than 2 seconds.   Neurological:      General: No focal deficit present.      Mental Status: She is alert and oriented to person, place, and time.   Psychiatric:         Mood and Affect: Mood normal.         Behavior: Behavior normal.       Significant Labs: All pertinent labs within the past 24 hours have been reviewed.    Significant Imaging: I have reviewed all pertinent imaging results/findings within the past 24 hours.      Assessment/Plan:      Disruption of external surgical wound        Wounds and injuries  Wound care consulted and following    Open wound of left lower  leg  Wound care      Multiple skin tears  Wound care      Delmy-prosthetic fracture around prosthetic hip  S/P fixation with Pomierski      Lumbar radiculopathy  Pain management  PT      Hypertension  Adjust medications as needed    Arthritis  Pain management        VTE Risk Mitigation (From admission, onward)         Ordered     apixaban tablet 2.5 mg  2 times daily         12/29/22 1630                Discharge Planning   YANIV:      Code Status: Full Code   Is the patient medically ready for discharge?:     Reason for patient still in hospital (select all that apply): Treatment  Discharge Plan A: Skilled Nursing Facility                  Shai Guerra DO  Department of Hospital Medicine   [facility]

## 2023-01-20 NOTE — SUBJECTIVE & OBJECTIVE
Interval History: naeo    Review of Systems   Constitutional:  Negative for chills, fatigue, fever and unexpected weight change.   HENT:  Negative for congestion, mouth sores and sore throat.    Eyes:  Negative for photophobia and visual disturbance.   Respiratory:  Negative for cough, chest tightness, shortness of breath and wheezing.    Cardiovascular:  Negative for chest pain, palpitations and leg swelling.   Gastrointestinal:  Negative for abdominal pain, diarrhea, nausea and vomiting.   Endocrine: Negative for cold intolerance and heat intolerance.   Genitourinary:  Negative for difficulty urinating, dysuria, frequency and urgency.   Musculoskeletal:  Positive for arthralgias. Negative for back pain and myalgias.   Skin:  Negative for pallor and rash.   Neurological:  Negative for tremors, seizures, syncope, weakness, numbness and headaches.   Hematological:  Does not bruise/bleed easily.   Psychiatric/Behavioral:  Negative for agitation, confusion, hallucinations and suicidal ideas.    Objective:     Vital Signs (Most Recent):  Temp: 98.2 °F (36.8 °C) (01/20/23 1210)  Pulse: (!) 56 (01/20/23 1210)  Resp: 18 (01/20/23 1310)  BP: (!) 129/42 (01/20/23 1210)  SpO2: 97 % (01/20/23 1210)   Vital Signs (24h Range):  Temp:  [49.8 °F (9.9 °C)-98.4 °F (36.9 °C)] 98.2 °F (36.8 °C)  Pulse:  [48-56] 56  Resp:  [16-20] 18  SpO2:  [96 %-97 %] 97 %  BP: ()/(37-53) 129/42     Weight: 57.6 kg (126 lb 15.8 oz)  Body mass index is 20.5 kg/m².    Intake/Output Summary (Last 24 hours) at 1/20/2023 1607  Last data filed at 1/20/2023 1255  Gross per 24 hour   Intake --   Output 301 ml   Net -301 ml        Physical Exam  Vitals reviewed.   Constitutional:       Appearance: Normal appearance.   HENT:      Head: Normocephalic and atraumatic.   Eyes:      Extraocular Movements: Extraocular movements intact.   Cardiovascular:      Rate and Rhythm: Normal rate and regular rhythm.      Pulses: Normal pulses.   Pulmonary:      Effort:  Pulmonary effort is normal.      Breath sounds: Normal breath sounds.   Abdominal:      General: Abdomen is flat.      Palpations: Abdomen is soft.   Musculoskeletal:         General: Tenderness and signs of injury present.      Comments: Dressed left lower extremity, painful to touch   Skin:     General: Skin is warm and dry.      Capillary Refill: Capillary refill takes less than 2 seconds.   Neurological:      General: No focal deficit present.      Mental Status: She is alert and oriented to person, place, and time.   Psychiatric:         Mood and Affect: Mood normal.         Behavior: Behavior normal.       Significant Labs: All pertinent labs within the past 24 hours have been reviewed.    Significant Imaging: I have reviewed all pertinent imaging results/findings within the past 24 hours.

## 2023-01-20 NOTE — NURSING
Rodrick RT here to do bladder scan. I walked in room and talked with pt and caregiver. Pt state that she used bedpan last night but was not sure what she done. I also asked her about doing bladder scan and she wanted us to wait and do it later if she need to. I will monitor and check back with pt.

## 2023-01-20 NOTE — SUBJECTIVE & OBJECTIVE
Subjective:     HPI:  89 yo female with multiple skin tears, traumatic wounds, and surgical incision to left thigh. She was admitted following a fall on 12/17 when she was trying to go to the restroom at her home.  Patient sustained a left femur fracture. She is status post ORIF on 12/20. Staples intact to left hip. Moderate amount of green drainage noted, cultures obtained today. Left lower leg sutures removed today. Slough noted in wound bed today, adjustment to local wound care. Significant PMH includes hypertension, bradycardia, osteoarthritis, hypothyroidism, osteoporosis, and multiple falls. Wound healing is complicated by bradycardia, hx of a-fib, chronic pain, weakness, fragile skin, limited mobility, multiple falls, infection, and pain.     Hospital Course:   1/5/23 - Complains of pain to right posterior knee, venous doppler ordered. Sutures and every other staple removed today. New local wound care orders. Cultures pending.   1/9/23 - Wounds on lower extremities healing well. Continue current POC. Bruising noted to left posterior knee, protect with mepitel and optifoam. Dressing to incision site saturated with green drainage. Cultures negative. Continue with drawtex and notify ortho of drainage.   1/11/2023 - Wounds are healing well. Continue current POC. Ultrasound pending, preliminary results concerning for fluid pocket, Dr. Singer going to evaluate today.   1/17/23 - Wounds continue to show improvement. Dr. Singer evaluated left hip today during wound vac change. Staples and wound vac d/c'd today. Dressing change twice weekly.   1/19/22 - Wounds continue to heal. Incision with minimal drainage. Puracol to left thigh today. Continue with current POC. Twice weekly dressing changes (M, Th)          Scheduled Meds:   ALPRAZolam  1 mg Oral QHS    apixaban  2.5 mg Oral BID    diltiaZEM  120 mg Oral Daily    docusate sodium  100 mg Oral BID    famotidine  20 mg Oral Daily    gabapentin  100 mg Oral TID     hydrALAZINE  25 mg Oral Q8H    levothyroxine  125 mcg Oral Before breakfast    lisinopriL  20 mg Oral Daily    megestroL  40 mg Oral Daily    metoprolol tartrate  50 mg Oral BID    pantoprazole  40 mg Oral Daily    polyethylene glycol  34 g Oral Daily    predniSONE  5 mg Oral Daily    senna  1 tablet Oral Daily    tamsulosin  0.4 mg Oral Daily     Continuous Infusions:  PRN Meds:acetaminophen, bisacodyL, dextromethorphan-guaiFENesin  mg/5 ml, dextrose 50%, dextrose 50%, diclofenac sodium, glucagon (human recombinant), glucose, glucose, HYDROcodone-acetaminophen, magnesium oxide, melatonin, naloxone, ondansetron, potassium bicarbonate, potassium bicarbonate, potassium bicarbonate, potassium, sodium phosphates, potassium, sodium phosphates, potassium, sodium phosphates, prochlorperazine, simethicone, sodium chloride 0.9%, traZODone, white petrolatum    Review of Systems   Constitutional:  Positive for activity change. Negative for chills and fever.   Respiratory:  Negative for chest tightness and shortness of breath.    Cardiovascular:  Positive for leg swelling. Negative for chest pain and palpitations.   Musculoskeletal:  Positive for arthralgias, back pain, gait problem and joint swelling.   Skin:  Positive for color change and wound.        wound   Neurological:  Positive for weakness.   Psychiatric/Behavioral:  Negative for agitation, behavioral problems, confusion and self-injury.    Objective:     Vital Signs (Most Recent):  Temp: 97.4 °F (36.3 °C) (01/19/23 1142)  Pulse: (!) 53 (01/19/23 1142)  Resp: 16 (01/19/23 1856)  BP: (!) 145/48 (01/19/23 1142)  SpO2: 97 % (01/19/23 1142)   Vital Signs (24h Range):  Temp:  [97.4 °F (36.3 °C)-98.8 °F (37.1 °C)] 97.4 °F (36.3 °C)  Pulse:  [46-55] 53  Resp:  [16-18] 16  SpO2:  [95 %-97 %] 97 %  BP: (109-160)/(39-51) 145/48     Weight: 57.6 kg (126 lb 15.8 oz)  Body mass index is 20.5 kg/m².    Physical Exam  Vitals reviewed.   Constitutional:       Appearance:  Normal appearance.   HENT:      Head: Normocephalic.   Cardiovascular:      Rate and Rhythm: Regular rhythm. Bradycardia present.      Pulses: Normal pulses.      Heart sounds: Murmur heard.   Pulmonary:      Effort: Pulmonary effort is normal.      Breath sounds: Normal breath sounds.   Musculoskeletal:         General: Swelling, tenderness, deformity and signs of injury present.      Right lower leg: Edema present.      Left lower leg: Edema present.   Skin:     Findings: Bruising and erythema present.      Comments: Wound, see LDA for photo/measurements   Neurological:      Mental Status: She is alert. Mental status is at baseline.      Motor: Weakness present.      Gait: Gait abnormal.

## 2023-01-20 NOTE — PROGRESS NOTES
[facility]  [facility]  Progress Note    Patient Name: Zandra Veloz  MRN: [id number]  Admission Date: 12/29/2022  Hospital Length of Stay: 21 days  Attending Physician: Shai Guerra DO  Primary Care Provider: Brandon Thornton MD         Subjective:     HPI:  89 yo female with multiple skin tears, traumatic wounds, and surgical incision to left thigh. She was admitted following a fall on 12/17 when she was trying to go to the restroom at her home.  Patient sustained a left femur fracture. She is status post ORIF on 12/20. Staples intact to left hip. Moderate amount of green drainage noted, cultures obtained today. Left lower leg sutures removed today. Slough noted in wound bed today, adjustment to local wound care. Significant PMH includes hypertension, bradycardia, osteoarthritis, hypothyroidism, osteoporosis, and multiple falls. Wound healing is complicated by bradycardia, hx of a-fib, chronic pain, weakness, fragile skin, limited mobility, multiple falls, infection, and pain.     Hospital Course:   1/5/23 - Complains of pain to right posterior knee, venous doppler ordered. Sutures and every other staple removed today. New local wound care orders. Cultures pending.   1/9/23 - Wounds on lower extremities healing well. Continue current POC. Bruising noted to left posterior knee, protect with mepitel and optifoam. Dressing to incision site saturated with green drainage. Cultures negative. Continue with drawtex and notify ortho of drainage.   1/11/2023 - Wounds are healing well. Continue current POC. Ultrasound pending, preliminary results concerning for fluid pocket, Dr. Singer going to evaluate today.   1/17/23 - Wounds continue to show improvement. Dr. Singer evaluated left hip today during wound vac change. Staples and wound vac d/c'd today. Dressing change twice weekly.   1/19/22 - Wounds continue to heal. Incision with minimal drainage. Puracol to left thigh  today. Continue with current POC. Twice weekly dressing changes (M, Th)          Scheduled Meds:   ALPRAZolam  1 mg Oral QHS    apixaban  2.5 mg Oral BID    diltiaZEM  120 mg Oral Daily    docusate sodium  100 mg Oral BID    famotidine  20 mg Oral Daily    gabapentin  100 mg Oral TID    hydrALAZINE  25 mg Oral Q8H    levothyroxine  125 mcg Oral Before breakfast    lisinopriL  20 mg Oral Daily    megestroL  40 mg Oral Daily    metoprolol tartrate  50 mg Oral BID    pantoprazole  40 mg Oral Daily    polyethylene glycol  34 g Oral Daily    predniSONE  5 mg Oral Daily    senna  1 tablet Oral Daily    tamsulosin  0.4 mg Oral Daily     Continuous Infusions:  PRN Meds:acetaminophen, bisacodyL, dextromethorphan-guaiFENesin  mg/5 ml, dextrose 50%, dextrose 50%, diclofenac sodium, glucagon (human recombinant), glucose, glucose, HYDROcodone-acetaminophen, magnesium oxide, melatonin, naloxone, ondansetron, potassium bicarbonate, potassium bicarbonate, potassium bicarbonate, potassium, sodium phosphates, potassium, sodium phosphates, potassium, sodium phosphates, prochlorperazine, simethicone, sodium chloride 0.9%, traZODone, white petrolatum    Review of Systems   Constitutional:  Positive for activity change. Negative for chills and fever.   Respiratory:  Negative for chest tightness and shortness of breath.    Cardiovascular:  Positive for leg swelling. Negative for chest pain and palpitations.   Musculoskeletal:  Positive for arthralgias, back pain, gait problem and joint swelling.   Skin:  Positive for color change and wound.        wound   Neurological:  Positive for weakness.   Psychiatric/Behavioral:  Negative for agitation, behavioral problems, confusion and self-injury.    Objective:     Vital Signs (Most Recent):  Temp: 97.4 °F (36.3 °C) (01/19/23 1142)  Pulse: (!) 53 (01/19/23 1142)  Resp: 16 (01/19/23 1856)  BP: (!) 145/48 (01/19/23 1142)  SpO2: 97 % (01/19/23 1142)   Vital Signs (24h  Range):  Temp:  [97.4 °F (36.3 °C)-98.8 °F (37.1 °C)] 97.4 °F (36.3 °C)  Pulse:  [46-55] 53  Resp:  [16-18] 16  SpO2:  [95 %-97 %] 97 %  BP: (109-160)/(39-51) 145/48     Weight: 57.6 kg (126 lb 15.8 oz)  Body mass index is 20.5 kg/m².    Physical Exam  Vitals reviewed.   Constitutional:       Appearance: Normal appearance.   HENT:      Head: Normocephalic.   Cardiovascular:      Rate and Rhythm: Regular rhythm. Bradycardia present.      Pulses: Normal pulses.      Heart sounds: Murmur heard.   Pulmonary:      Effort: Pulmonary effort is normal.      Breath sounds: Normal breath sounds.   Musculoskeletal:         General: Swelling, tenderness, deformity and signs of injury present.      Right lower leg: Edema present.      Left lower leg: Edema present.   Skin:     Findings: Bruising and erythema present.      Comments: Wound, see LDA for photo/measurements   Neurological:      Mental Status: She is alert. Mental status is at baseline.      Motor: Weakness present.      Gait: Gait abnormal.         Assessment/Plan:     Open wound of left lower leg                  Clean with vashe  Apply mepitel and  aquacel ag advantage to wound.  Cover and secure with 4x4s, kerlix, and paper tape  Change twice weekly and PRN for soilage (M, Th)  Keep leg elevated and avoid pressure on wound.          Multiple skin tears  Clean with vashe  Apply mepitel and  aquacel ag advantage to wound.  Cover and secure with 4x4s, kerlix, and paper tape  Change every twice weekly and PRN for soilage        ZURDO San  [facility]  [facility]

## 2023-01-20 NOTE — ASSESSMENT & PLAN NOTE
Clean with vashe  Apply mepitel and  aquacel ag advantage to wound.  Cover and secure with 4x4s, kerlix, and paper tape  Change every twice weekly and PRN for soilage

## 2023-01-21 PROCEDURE — 93005 ELECTROCARDIOGRAM TRACING: CPT

## 2023-01-21 PROCEDURE — 99232 PR SUBSEQUENT HOSPITAL CARE,LEVL II: ICD-10-PCS | Mod: ,,, | Performed by: STUDENT IN AN ORGANIZED HEALTH CARE EDUCATION/TRAINING PROGRAM

## 2023-01-21 PROCEDURE — 63600175 PHARM REV CODE 636 W HCPCS: Performed by: STUDENT IN AN ORGANIZED HEALTH CARE EDUCATION/TRAINING PROGRAM

## 2023-01-21 PROCEDURE — 99232 SBSQ HOSP IP/OBS MODERATE 35: CPT | Mod: ,,, | Performed by: STUDENT IN AN ORGANIZED HEALTH CARE EDUCATION/TRAINING PROGRAM

## 2023-01-21 PROCEDURE — 93010 EKG 12-LEAD: ICD-10-PCS | Mod: ,,, | Performed by: STUDENT IN AN ORGANIZED HEALTH CARE EDUCATION/TRAINING PROGRAM

## 2023-01-21 PROCEDURE — 25000003 PHARM REV CODE 250: Performed by: STUDENT IN AN ORGANIZED HEALTH CARE EDUCATION/TRAINING PROGRAM

## 2023-01-21 PROCEDURE — 11000001 HC ACUTE MED/SURG PRIVATE ROOM

## 2023-01-21 PROCEDURE — 93010 ELECTROCARDIOGRAM REPORT: CPT | Mod: ,,, | Performed by: STUDENT IN AN ORGANIZED HEALTH CARE EDUCATION/TRAINING PROGRAM

## 2023-01-21 PROCEDURE — 63600175 PHARM REV CODE 636 W HCPCS: Performed by: HOSPITALIST

## 2023-01-21 RX ORDER — ADENOSINE 3 MG/ML
6 INJECTION, SOLUTION INTRAVENOUS ONCE
Status: DISCONTINUED | OUTPATIENT
Start: 2023-01-21 | End: 2023-02-02

## 2023-01-21 RX ORDER — ADENOSINE 3 MG/ML
12 INJECTION, SOLUTION INTRAVENOUS ONCE
Status: DISCONTINUED | OUTPATIENT
Start: 2023-01-21 | End: 2023-02-02

## 2023-01-21 RX ORDER — ALPRAZOLAM 0.25 MG/1
0.5 TABLET ORAL ONCE AS NEEDED
Status: COMPLETED | OUTPATIENT
Start: 2023-01-21 | End: 2023-01-21

## 2023-01-21 RX ADMIN — TAMSULOSIN HYDROCHLORIDE 0.4 MG: 0.4 CAPSULE ORAL at 09:01

## 2023-01-21 RX ADMIN — MEGESTROL ACETATE 40 MG: 40 TABLET ORAL at 09:01

## 2023-01-21 RX ADMIN — PROCHLORPERAZINE MALEATE 10 MG: 10 TABLET ORAL at 01:01

## 2023-01-21 RX ADMIN — HYDROCODONE BITARTRATE AND ACETAMINOPHEN 2 TABLET: 10; 325 TABLET ORAL at 05:01

## 2023-01-21 RX ADMIN — DILTIAZEM HYDROCHLORIDE 120 MG: 120 CAPSULE, COATED, EXTENDED RELEASE ORAL at 09:01

## 2023-01-21 RX ADMIN — SENNOSIDES 1 TABLET: 8.6 TABLET, FILM COATED ORAL at 09:01

## 2023-01-21 RX ADMIN — GABAPENTIN 100 MG: 100 CAPSULE ORAL at 03:01

## 2023-01-21 RX ADMIN — PREDNISONE 5 MG: 5 TABLET ORAL at 09:01

## 2023-01-21 RX ADMIN — ALPRAZOLAM 0.5 MG: 0.25 TABLET ORAL at 01:01

## 2023-01-21 RX ADMIN — APIXABAN 2.5 MG: 2.5 TABLET, FILM COATED ORAL at 09:01

## 2023-01-21 RX ADMIN — HYDRALAZINE HYDROCHLORIDE 25 MG: 25 TABLET ORAL at 05:01

## 2023-01-21 RX ADMIN — ONDANSETRON 8 MG: 2 INJECTION INTRAMUSCULAR; INTRAVENOUS at 02:01

## 2023-01-21 RX ADMIN — DOCUSATE SODIUM 100 MG: 100 CAPSULE, LIQUID FILLED ORAL at 09:01

## 2023-01-21 RX ADMIN — HYDRALAZINE HYDROCHLORIDE 25 MG: 25 TABLET ORAL at 09:01

## 2023-01-21 RX ADMIN — POLYETHYLENE GLYCOL 3350 17 G: 17 POWDER, FOR SOLUTION ORAL at 09:01

## 2023-01-21 RX ADMIN — LEVOTHYROXINE SODIUM 125 MCG: 0.12 TABLET ORAL at 05:01

## 2023-01-21 RX ADMIN — PANTOPRAZOLE SODIUM 40 MG: 40 TABLET, DELAYED RELEASE ORAL at 09:01

## 2023-01-21 RX ADMIN — FAMOTIDINE 20 MG: 20 TABLET ORAL at 09:01

## 2023-01-21 RX ADMIN — DICLOFENAC SODIUM 4 G: 10 GEL TOPICAL at 09:01

## 2023-01-21 RX ADMIN — METOPROLOL TARTRATE 50 MG: 50 TABLET, FILM COATED ORAL at 09:01

## 2023-01-21 RX ADMIN — HYDROCODONE BITARTRATE AND ACETAMINOPHEN 2 TABLET: 10; 325 TABLET ORAL at 07:01

## 2023-01-21 RX ADMIN — GABAPENTIN 100 MG: 100 CAPSULE ORAL at 09:01

## 2023-01-21 RX ADMIN — ALPRAZOLAM 1 MG: 0.25 TABLET ORAL at 09:01

## 2023-01-21 NOTE — PROGRESS NOTES
[facility] Hospital Medicine  Progress Note    Patient Name: Zandra Veloz  MRN: [id number]  Patient Class: IP- Inpatient   Admission Date: 12/29/2022  Length of Stay: 23 days  Attending Physician: Shai Guerra DO  Primary Care Provider: Brandon Thornton MD        Subjective:     Principal Problem:Urinary tract infection        HPI:  HPI:   88-year-old lady seen emergency room department.  Patient presents after having a fall when she was trying to go to the restroom at her home.  Patient sustained a left femur fracture.  Patient complains of moderate to severe pain in the left hip area.  Patient also complains of chest tightness, she rates it a 5/10 in the chest tightness has been intubate.  Patient also was seen in emergency room department earlier this week per her daughter.  Patient is found to have dehydration and a urinary tract infection.  Current she denies any shortness of breath.  She does not give a history of coronary artery disease.        Procedure(s) (LRB):  REVISION, TOTAL ARTHROPLASTY, HIP, VICTORIANO PROSTHETIC FX (Left)       Hospital Course:   12/19 - records reviewed. Fall without LOC and has left hip fx. No other complaints currently. Seen by cardiology with some CP felt musculoskeletal.  Echo mild AS and mod MR with nl EF. Age increase cardiac risk for surgery but discussed with daughter surgery is urgent and see no benefit in delay medically. Feel low to moderate risk for surgery. Question about UTI. No symptoms. Had UTI in 10.22 not surprising. Lab to do culture on urine in lab. Cover with ATN for prior culture with ATB started. Discussed with Dr Singer and cardiology.  12/20 Tachycardia and elevated BP. Cardiology adjusted meds. Plan hold off surgery until 12/22 to adjust meds and treat UTI. Family in room. Pt not sleeping well.   12/21 Didn't sleep well prior night. Apparently been on xanax for a time. Also recently started on Neurontin. Family with  question. No recent BM. Some increase stool on KUB but not severe. Surgery for am. BP some up but better. HR good.   12/22 Seen prior to surgery and apparently add better night. Sore mouth / throat better with mycostatin. For surgery. Family in room.  12/23 patient with pain but otherwise seemed to be doing relatively well.  Daughter in room.  Apparently been taking prednisone for some time and this was restarted per family request.  Look into swing bed placement  12/24 patient had better night rested and everyone's in better spirits today.  Tolerating some diet.   Therapy worked with patient.  Look into swing bed placement next week  12/25 remained in good spirits.  Less pain.  Had good bowel movement and ordered Dulcolax not given.  Because of dressing to leg, Burk was not removed to keep it from getting soiled.  Wound care to check 12/27.  Discuss this with patient and daughter.  On antibiotics for UTI  12/26-will dc to swingbed once accepted. Needs continued wound care.  12/27-DC when bed available  12/28- issues with wound care yesterday.  Dr. Singer had to come remove dressings himself due to adherence to subcutaneous tissue.  Family refusing transfer to [facility] due to lack of specially trained wound care team availability.  Planning to transfer to specialty but resistant to that as well and requesting to speak to administration.    12/29- agreeable to transfer to specialty.  This will be the best place for her to receive wound care.  DC after family tours     12/29-needs continued wound care and therapy[facility]    12/30-doing well today, still with pain         Overview/Hospital Course:  12/31-having some pain this am  1/1- no complaints  1/2- doing well  1/3- continue wound care, last day cefepime tomorrow  1/4- some pain this AM.  Delay in mediations dues to staffing issues. Dsicussed with RN  1/5- no issues overnight  1/6- Still with pain, hip incision looks good  1/7-diclofenac for  shoulder pain  1/8-decrease laxatives, change benzos and carlo to PRN per patient request  1/9 some confusion overnight  1/10- no more confusion, doing well  1/11- no acute issues overnight  1/12- doing well, nausea today  1/13 -Dr. Lebron Cutler IV, DO taking over for Dr. Guerra for weekend.  Patient's pain appears to be well controlled this morning no complaints of nausea.  Continue wound care and PT/OT  1/14-patient appears well labs.  Continue wound care and PT/OT  1/15-patient still appears well.  No acute concerns or complaints.  No events overnight.  Continue wound care working with a PTOT.  Expiration is services estimated by February 6 1/16- no complaints, doing well  1/17-some nausea today  1/18-better today.  Still requiring twice weekly dressing changes.  Appetite stimulant  1/19- PWB with PT today.  Still with weeping wounds.  Will need wound care twice weekly-as these improve will be able to transfer to other facility  1/20-no complaints  1/21-no acute events overnight, some anxiety today      Interval History: naeo    Review of Systems   Constitutional:  Negative for chills, fatigue, fever and unexpected weight change.   HENT:  Negative for congestion, mouth sores and sore throat.    Eyes:  Negative for photophobia and visual disturbance.   Respiratory:  Negative for cough, chest tightness, shortness of breath and wheezing.    Cardiovascular:  Negative for chest pain, palpitations and leg swelling.   Gastrointestinal:  Negative for abdominal pain, diarrhea, nausea and vomiting.   Endocrine: Negative for cold intolerance and heat intolerance.   Genitourinary:  Negative for difficulty urinating, dysuria, frequency and urgency.   Musculoskeletal:  Positive for arthralgias. Negative for back pain and myalgias.   Skin:  Negative for pallor and rash.   Neurological:  Negative for tremors, seizures, syncope, weakness, numbness and headaches.   Hematological:  Does not bruise/bleed easily.    Psychiatric/Behavioral:  Negative for agitation, confusion, hallucinations and suicidal ideas.    Objective:     Vital Signs (Most Recent):  Temp: 98.3 °F (36.8 °C) (01/21/23 1200)  Pulse: 73 (01/21/23 1200)  Resp: 18 (01/21/23 1200)  BP: (!) 145/51 (01/21/23 1200)  SpO2: 96 % (01/21/23 1200)   Vital Signs (24h Range):  Temp:  [98.3 °F (36.8 °C)-99.5 °F (37.5 °C)] 98.3 °F (36.8 °C)  Pulse:  [60-84] 73  Resp:  [16-18] 18  SpO2:  [96 %-97 %] 96 %  BP: (102-179)/(48-67) 145/51     Weight: 57.6 kg (126 lb 15.8 oz)  Body mass index is 20.5 kg/m².    Intake/Output Summary (Last 24 hours) at 1/21/2023 1339  Last data filed at 1/21/2023 1000  Gross per 24 hour   Intake 240 ml   Output --   Net 240 ml        Physical Exam  Vitals reviewed.   Constitutional:       Appearance: Normal appearance.   HENT:      Head: Normocephalic and atraumatic.   Eyes:      Extraocular Movements: Extraocular movements intact.   Cardiovascular:      Rate and Rhythm: Normal rate and regular rhythm.      Pulses: Normal pulses.   Pulmonary:      Effort: Pulmonary effort is normal.      Breath sounds: Normal breath sounds.   Abdominal:      General: Abdomen is flat.      Palpations: Abdomen is soft.   Musculoskeletal:         General: Tenderness and signs of injury present.      Comments: Dressed left lower extremity, painful to touch   Skin:     General: Skin is warm and dry.      Capillary Refill: Capillary refill takes less than 2 seconds.   Neurological:      General: No focal deficit present.      Mental Status: She is alert and oriented to person, place, and time.   Psychiatric:         Mood and Affect: Mood normal.         Behavior: Behavior normal.       Significant Labs: All pertinent labs within the past 24 hours have been reviewed.    Significant Imaging: I have reviewed all pertinent imaging results/findings within the past 24 hours.      Assessment/Plan:      Disruption of external surgical wound        Wounds and injuries  Wound care  consulted and following    Open wound of left lower leg  Wound care      Multiple skin tears  Wound care      Delmy-prosthetic fracture around prosthetic hip  S/P fixation with Pomierski      Lumbar radiculopathy  Pain management  PT      Hypertension  Adjust medications as needed    Arthritis  Pain management        VTE Risk Mitigation (From admission, onward)         Ordered     apixaban tablet 2.5 mg  2 times daily         12/29/22 1630                Discharge Planning   YANIV:      Code Status: Full Code   Is the patient medically ready for discharge?:     Reason for patient still in hospital (select all that apply): Treatment  Discharge Plan A: Skilled Nursing Facility                  Shai Guerra DO  Department of Hospital Medicine   [facility]

## 2023-01-21 NOTE — SUBJECTIVE & OBJECTIVE
Interval History: naeo    Review of Systems   Constitutional:  Negative for chills, fatigue, fever and unexpected weight change.   HENT:  Negative for congestion, mouth sores and sore throat.    Eyes:  Negative for photophobia and visual disturbance.   Respiratory:  Negative for cough, chest tightness, shortness of breath and wheezing.    Cardiovascular:  Negative for chest pain, palpitations and leg swelling.   Gastrointestinal:  Negative for abdominal pain, diarrhea, nausea and vomiting.   Endocrine: Negative for cold intolerance and heat intolerance.   Genitourinary:  Negative for difficulty urinating, dysuria, frequency and urgency.   Musculoskeletal:  Positive for arthralgias. Negative for back pain and myalgias.   Skin:  Negative for pallor and rash.   Neurological:  Negative for tremors, seizures, syncope, weakness, numbness and headaches.   Hematological:  Does not bruise/bleed easily.   Psychiatric/Behavioral:  Negative for agitation, confusion, hallucinations and suicidal ideas.    Objective:     Vital Signs (Most Recent):  Temp: 98.3 °F (36.8 °C) (01/21/23 1200)  Pulse: 73 (01/21/23 1200)  Resp: 18 (01/21/23 1200)  BP: (!) 145/51 (01/21/23 1200)  SpO2: 96 % (01/21/23 1200)   Vital Signs (24h Range):  Temp:  [98.3 °F (36.8 °C)-99.5 °F (37.5 °C)] 98.3 °F (36.8 °C)  Pulse:  [60-84] 73  Resp:  [16-18] 18  SpO2:  [96 %-97 %] 96 %  BP: (102-179)/(48-67) 145/51     Weight: 57.6 kg (126 lb 15.8 oz)  Body mass index is 20.5 kg/m².    Intake/Output Summary (Last 24 hours) at 1/21/2023 1339  Last data filed at 1/21/2023 1000  Gross per 24 hour   Intake 240 ml   Output --   Net 240 ml        Physical Exam  Vitals reviewed.   Constitutional:       Appearance: Normal appearance.   HENT:      Head: Normocephalic and atraumatic.   Eyes:      Extraocular Movements: Extraocular movements intact.   Cardiovascular:      Rate and Rhythm: Normal rate and regular rhythm.      Pulses: Normal pulses.   Pulmonary:      Effort:  Pulmonary effort is normal.      Breath sounds: Normal breath sounds.   Abdominal:      General: Abdomen is flat.      Palpations: Abdomen is soft.   Musculoskeletal:         General: Tenderness and signs of injury present.      Comments: Dressed left lower extremity, painful to touch   Skin:     General: Skin is warm and dry.      Capillary Refill: Capillary refill takes less than 2 seconds.   Neurological:      General: No focal deficit present.      Mental Status: She is alert and oriented to person, place, and time.   Psychiatric:         Mood and Affect: Mood normal.         Behavior: Behavior normal.       Significant Labs: All pertinent labs within the past 24 hours have been reviewed.    Significant Imaging: I have reviewed all pertinent imaging results/findings within the past 24 hours.

## 2023-01-22 PROCEDURE — 25000003 PHARM REV CODE 250: Performed by: STUDENT IN AN ORGANIZED HEALTH CARE EDUCATION/TRAINING PROGRAM

## 2023-01-22 PROCEDURE — 99232 SBSQ HOSP IP/OBS MODERATE 35: CPT | Mod: ,,, | Performed by: STUDENT IN AN ORGANIZED HEALTH CARE EDUCATION/TRAINING PROGRAM

## 2023-01-22 PROCEDURE — 99232 PR SUBSEQUENT HOSPITAL CARE,LEVL II: ICD-10-PCS | Mod: ,,, | Performed by: STUDENT IN AN ORGANIZED HEALTH CARE EDUCATION/TRAINING PROGRAM

## 2023-01-22 PROCEDURE — 11000001 HC ACUTE MED/SURG PRIVATE ROOM

## 2023-01-22 PROCEDURE — 63600175 PHARM REV CODE 636 W HCPCS: Performed by: STUDENT IN AN ORGANIZED HEALTH CARE EDUCATION/TRAINING PROGRAM

## 2023-01-22 RX ADMIN — HYDROCODONE BITARTRATE AND ACETAMINOPHEN 2 TABLET: 10; 325 TABLET ORAL at 05:01

## 2023-01-22 RX ADMIN — DILTIAZEM HYDROCHLORIDE 120 MG: 120 CAPSULE, COATED, EXTENDED RELEASE ORAL at 09:01

## 2023-01-22 RX ADMIN — GABAPENTIN 100 MG: 100 CAPSULE ORAL at 02:01

## 2023-01-22 RX ADMIN — MEGESTROL ACETATE 40 MG: 40 TABLET ORAL at 09:01

## 2023-01-22 RX ADMIN — DOCUSATE SODIUM 100 MG: 100 CAPSULE, LIQUID FILLED ORAL at 08:01

## 2023-01-22 RX ADMIN — HYDROCODONE BITARTRATE AND ACETAMINOPHEN 2 TABLET: 10; 325 TABLET ORAL at 12:01

## 2023-01-22 RX ADMIN — DOCUSATE SODIUM 100 MG: 100 CAPSULE, LIQUID FILLED ORAL at 09:01

## 2023-01-22 RX ADMIN — PANTOPRAZOLE SODIUM 40 MG: 40 TABLET, DELAYED RELEASE ORAL at 09:01

## 2023-01-22 RX ADMIN — SENNOSIDES 1 TABLET: 8.6 TABLET, FILM COATED ORAL at 09:01

## 2023-01-22 RX ADMIN — ALPRAZOLAM 1 MG: 0.25 TABLET ORAL at 08:01

## 2023-01-22 RX ADMIN — FAMOTIDINE 20 MG: 20 TABLET ORAL at 09:01

## 2023-01-22 RX ADMIN — LEVOTHYROXINE SODIUM 125 MCG: 0.12 TABLET ORAL at 05:01

## 2023-01-22 RX ADMIN — PREDNISONE 5 MG: 5 TABLET ORAL at 09:01

## 2023-01-22 RX ADMIN — APIXABAN 2.5 MG: 2.5 TABLET, FILM COATED ORAL at 09:01

## 2023-01-22 RX ADMIN — APIXABAN 2.5 MG: 2.5 TABLET, FILM COATED ORAL at 08:01

## 2023-01-22 RX ADMIN — GABAPENTIN 100 MG: 100 CAPSULE ORAL at 08:01

## 2023-01-22 RX ADMIN — GABAPENTIN 100 MG: 100 CAPSULE ORAL at 09:01

## 2023-01-22 RX ADMIN — TAMSULOSIN HYDROCHLORIDE 0.4 MG: 0.4 CAPSULE ORAL at 09:01

## 2023-01-22 RX ADMIN — HYDROCODONE BITARTRATE AND ACETAMINOPHEN 2 TABLET: 10; 325 TABLET ORAL at 08:01

## 2023-01-22 RX ADMIN — POLYETHYLENE GLYCOL 3350 17 G: 17 POWDER, FOR SOLUTION ORAL at 09:01

## 2023-01-22 NOTE — SUBJECTIVE & OBJECTIVE
Interval History: naeo    Review of Systems   Constitutional:  Negative for chills, fatigue, fever and unexpected weight change.   HENT:  Negative for congestion, mouth sores and sore throat.    Eyes:  Negative for photophobia and visual disturbance.   Respiratory:  Negative for cough, chest tightness, shortness of breath and wheezing.    Cardiovascular:  Negative for chest pain, palpitations and leg swelling.   Gastrointestinal:  Negative for abdominal pain, diarrhea, nausea and vomiting.   Endocrine: Negative for cold intolerance and heat intolerance.   Genitourinary:  Negative for difficulty urinating, dysuria, frequency and urgency.   Musculoskeletal:  Positive for arthralgias. Negative for back pain and myalgias.   Skin:  Negative for pallor and rash.   Neurological:  Negative for tremors, seizures, syncope, weakness, numbness and headaches.   Hematological:  Does not bruise/bleed easily.   Psychiatric/Behavioral:  Negative for agitation, confusion, hallucinations and suicidal ideas.    Objective:     Vital Signs (Most Recent):  Temp: 98.3 °F (36.8 °C) (01/22/23 1218)  Pulse: (!) 57 (01/22/23 1218)  Resp: 18 (01/22/23 1218)  BP: (!) 116/47 (01/22/23 1218)  SpO2: 96 % (01/22/23 1218)   Vital Signs (24h Range):  Temp:  [97.7 °F (36.5 °C)-98.8 °F (37.1 °C)] 98.3 °F (36.8 °C)  Pulse:  [] 57  Resp:  [18] 18  SpO2:  [96 %-98 %] 96 %  BP: ()/(39-63) 116/47     Weight: 57.6 kg (126 lb 15.8 oz)  Body mass index is 20.5 kg/m².    Intake/Output Summary (Last 24 hours) at 1/22/2023 1250  Last data filed at 1/21/2023 1347  Gross per 24 hour   Intake 320 ml   Output --   Net 320 ml        Physical Exam  Vitals reviewed.   Constitutional:       Appearance: Normal appearance.   HENT:      Head: Normocephalic and atraumatic.   Eyes:      Extraocular Movements: Extraocular movements intact.   Cardiovascular:      Rate and Rhythm: Normal rate and regular rhythm.      Pulses: Normal pulses.   Pulmonary:      Effort:  Pulmonary effort is normal.      Breath sounds: Normal breath sounds.   Abdominal:      General: Abdomen is flat.      Palpations: Abdomen is soft.   Musculoskeletal:         General: Tenderness and signs of injury present.      Comments: Dressed left lower extremity, painful to touch   Skin:     General: Skin is warm and dry.      Capillary Refill: Capillary refill takes less than 2 seconds.   Neurological:      General: No focal deficit present.      Mental Status: She is alert and oriented to person, place, and time.   Psychiatric:         Mood and Affect: Mood normal.         Behavior: Behavior normal.       Significant Labs: All pertinent labs within the past 24 hours have been reviewed.    Significant Imaging: I have reviewed all pertinent imaging results/findings within the past 24 hours.

## 2023-01-22 NOTE — PROGRESS NOTES
[facility] Hospital Medicine  Progress Note    Patient Name: Zandra Veloz  MRN: [id number]  Patient Class: IP- Inpatient   Admission Date: 12/29/2022  Length of Stay: 24 days  Attending Physician: Shai Guerra DO  Primary Care Provider: Brandon Thornton MD        Subjective:     Principal Problem:Urinary tract infection        HPI:  HPI:   88-year-old lady seen emergency room department.  Patient presents after having a fall when she was trying to go to the restroom at her home.  Patient sustained a left femur fracture.  Patient complains of moderate to severe pain in the left hip area.  Patient also complains of chest tightness, she rates it a 5/10 in the chest tightness has been intubate.  Patient also was seen in emergency room department earlier this week per her daughter.  Patient is found to have dehydration and a urinary tract infection.  Current she denies any shortness of breath.  She does not give a history of coronary artery disease.        Procedure(s) (LRB):  REVISION, TOTAL ARTHROPLASTY, HIP, VICTORIANO PROSTHETIC FX (Left)       Hospital Course:   12/19 - records reviewed. Fall without LOC and has left hip fx. No other complaints currently. Seen by cardiology with some CP felt musculoskeletal.  Echo mild AS and mod MR with nl EF. Age increase cardiac risk for surgery but discussed with daughter surgery is urgent and see no benefit in delay medically. Feel low to moderate risk for surgery. Question about UTI. No symptoms. Had UTI in 10.22 not surprising. Lab to do culture on urine in lab. Cover with ATN for prior culture with ATB started. Discussed with Dr Singer and cardiology.  12/20 Tachycardia and elevated BP. Cardiology adjusted meds. Plan hold off surgery until 12/22 to adjust meds and treat UTI. Family in room. Pt not sleeping well.   12/21 Didn't sleep well prior night. Apparently been on xanax for a time. Also recently started on Neurontin. Family with  question. No recent BM. Some increase stool on KUB but not severe. Surgery for am. BP some up but better. HR good.   12/22 Seen prior to surgery and apparently add better night. Sore mouth / throat better with mycostatin. For surgery. Family in room.  12/23 patient with pain but otherwise seemed to be doing relatively well.  Daughter in room.  Apparently been taking prednisone for some time and this was restarted per family request.  Look into swing bed placement  12/24 patient had better night rested and everyone's in better spirits today.  Tolerating some diet.   Therapy worked with patient.  Look into swing bed placement next week  12/25 remained in good spirits.  Less pain.  Had good bowel movement and ordered Dulcolax not given.  Because of dressing to leg, Burk was not removed to keep it from getting soiled.  Wound care to check 12/27.  Discuss this with patient and daughter.  On antibiotics for UTI  12/26-will dc to swingbed once accepted. Needs continued wound care.  12/27-DC when bed available  12/28- issues with wound care yesterday.  Dr. Singer had to come remove dressings himself due to adherence to subcutaneous tissue.  Family refusing transfer to [facility] due to lack of specially trained wound care team availability.  Planning to transfer to specialty but resistant to that as well and requesting to speak to administration.    12/29- agreeable to transfer to specialty.  This will be the best place for her to receive wound care.  DC after family tours     12/29-needs continued wound care and therapy. [facility]    12/30-doing well today, still with pain         Overview/Hospital Course:  12/31-having some pain this am  1/1- no complaints  1/2- doing well  1/3- continue wound care, last day cefepime tomorrow  1/4- some pain this AM.  Delay in mediations dues to staffing issues. Dsicussed with RN  1/5- no issues overnight  1/6- Still with pain, hip incision looks good  1/7-diclofenac for  shoulder pain  1/8-decrease laxatives, change benzos and carlo to PRN per patient request  1/9 some confusion overnight  1/10- no more confusion, doing well  1/11- no acute issues overnight  1/12- doing well, nausea today  1/13 -Dr. Lebron Cutler IV, DO taking over for Dr. Guerra for weekend.  Patient's pain appears to be well controlled this morning no complaints of nausea.  Continue wound care and PT/OT  1/14-patient appears well labs.  Continue wound care and PT/OT  1/15-patient still appears well.  No acute concerns or complaints.  No events overnight.  Continue wound care working with a PTOT.  Expiration is services estimated by February 6 1/16- no complaints, doing well  1/17-some nausea today  1/18-better today.  Still requiring twice weekly dressing changes.  Appetite stimulant  1/19- PWB with PT today.  Still with weeping wounds.  Will need wound care twice weekly-as these improve will be able to transfer to other facility  1/20-no complaints  1/21-no acute events overnight, some anxiety today  1/22-episode of SVT yesterday. Spontaneous resolution      Interval History: naeo    Review of Systems   Constitutional:  Negative for chills, fatigue, fever and unexpected weight change.   HENT:  Negative for congestion, mouth sores and sore throat.    Eyes:  Negative for photophobia and visual disturbance.   Respiratory:  Negative for cough, chest tightness, shortness of breath and wheezing.    Cardiovascular:  Negative for chest pain, palpitations and leg swelling.   Gastrointestinal:  Negative for abdominal pain, diarrhea, nausea and vomiting.   Endocrine: Negative for cold intolerance and heat intolerance.   Genitourinary:  Negative for difficulty urinating, dysuria, frequency and urgency.   Musculoskeletal:  Positive for arthralgias. Negative for back pain and myalgias.   Skin:  Negative for pallor and rash.   Neurological:  Negative for tremors, seizures, syncope, weakness, numbness and headaches.    Hematological:  Does not bruise/bleed easily.   Psychiatric/Behavioral:  Negative for agitation, confusion, hallucinations and suicidal ideas.    Objective:     Vital Signs (Most Recent):  Temp: 98.3 °F (36.8 °C) (01/22/23 1218)  Pulse: (!) 57 (01/22/23 1218)  Resp: 18 (01/22/23 1218)  BP: (!) 116/47 (01/22/23 1218)  SpO2: 96 % (01/22/23 1218)   Vital Signs (24h Range):  Temp:  [97.7 °F (36.5 °C)-98.8 °F (37.1 °C)] 98.3 °F (36.8 °C)  Pulse:  [] 57  Resp:  [18] 18  SpO2:  [96 %-98 %] 96 %  BP: ()/(39-63) 116/47     Weight: 57.6 kg (126 lb 15.8 oz)  Body mass index is 20.5 kg/m².    Intake/Output Summary (Last 24 hours) at 1/22/2023 1250  Last data filed at 1/21/2023 1347  Gross per 24 hour   Intake 320 ml   Output --   Net 320 ml        Physical Exam  Vitals reviewed.   Constitutional:       Appearance: Normal appearance.   HENT:      Head: Normocephalic and atraumatic.   Eyes:      Extraocular Movements: Extraocular movements intact.   Cardiovascular:      Rate and Rhythm: Normal rate and regular rhythm.      Pulses: Normal pulses.   Pulmonary:      Effort: Pulmonary effort is normal.      Breath sounds: Normal breath sounds.   Abdominal:      General: Abdomen is flat.      Palpations: Abdomen is soft.   Musculoskeletal:         General: Tenderness and signs of injury present.      Comments: Dressed left lower extremity, painful to touch   Skin:     General: Skin is warm and dry.      Capillary Refill: Capillary refill takes less than 2 seconds.   Neurological:      General: No focal deficit present.      Mental Status: She is alert and oriented to person, place, and time.   Psychiatric:         Mood and Affect: Mood normal.         Behavior: Behavior normal.       Significant Labs: All pertinent labs within the past 24 hours have been reviewed.    Significant Imaging: I have reviewed all pertinent imaging results/findings within the past 24 hours.      Assessment/Plan:      Disruption of external  surgical wound        Wounds and injuries  Wound care consulted and following    Open wound of left lower leg  Wound care      Multiple skin tears  Wound care      Delmy-prosthetic fracture around prosthetic hip  S/P fixation with Pomierski      Lumbar radiculopathy  Pain management  PT      Hypertension  Adjust medications as needed    Arthritis  Pain management        VTE Risk Mitigation (From admission, onward)         Ordered     apixaban tablet 2.5 mg  2 times daily         12/29/22 1630                Discharge Planning   YANIV:      Code Status: Full Code   Is the patient medically ready for discharge?:     Reason for patient still in hospital (select all that apply): Treatment  Discharge Plan A: Skilled Nursing Facility                  Shai Guerra DO  Department of Hospital Medicine   [facility]

## 2023-01-22 NOTE — NURSING
Pt in room complained of nausea and heavy in chest. Provided something for nausea began to vomit pt wanted her nausea medication. Will standing in room I contacted Dr. Guerra and notified him of what was going on with pt. Provided anxiety medication she vomit again. Dr. Guerra placed orders. Vital signs done. EKG done. Pt also wanted to use bed pan at the same time. Zofran provided. Family arrived and Dr. Guerra spoke with family. Pt heart rate changed and state she felt better.  1541-Pt did take gabapentin state she feels a lot better. Resting in bed with caregiver and daughter at bedside. Voiced no needs. Will continue to monitor.

## 2023-01-23 LAB
ANION GAP SERPL CALCULATED.3IONS-SCNC: 13 MMOL/L (ref 7–16)
BASOPHILS # BLD AUTO: 0.07 K/UL (ref 0–0.2)
BASOPHILS NFR BLD AUTO: 1 % (ref 0–1)
BUN SERPL-MCNC: 14 MG/DL (ref 7–18)
BUN/CREAT SERPL: 18 (ref 6–20)
CALCIUM SERPL-MCNC: 8.5 MG/DL (ref 8.5–10.1)
CHLORIDE SERPL-SCNC: 104 MMOL/L (ref 98–107)
CO2 SERPL-SCNC: 24 MMOL/L (ref 21–32)
CREAT SERPL-MCNC: 0.76 MG/DL (ref 0.55–1.02)
DIFFERENTIAL METHOD BLD: ABNORMAL
EGFR (NO RACE VARIABLE) (RUSH/TITUS): 75 ML/MIN/1.73M²
EOSINOPHIL # BLD AUTO: 0.29 K/UL (ref 0–0.5)
EOSINOPHIL NFR BLD AUTO: 4 % (ref 1–4)
ERYTHROCYTE [DISTWIDTH] IN BLOOD BY AUTOMATED COUNT: 13.4 % (ref 11.5–14.5)
GLUCOSE SERPL-MCNC: 87 MG/DL (ref 74–106)
HCT VFR BLD AUTO: 31.4 % (ref 38–47)
HGB BLD-MCNC: 9.5 G/DL (ref 12–16)
IMM GRANULOCYTES # BLD AUTO: 0.11 K/UL (ref 0–0.04)
IMM GRANULOCYTES NFR BLD: 1.5 % (ref 0–0.4)
LYMPHOCYTES # BLD AUTO: 1.71 K/UL (ref 1–4.8)
LYMPHOCYTES NFR BLD AUTO: 23.5 % (ref 27–41)
MCH RBC QN AUTO: 30 PG (ref 27–31)
MCHC RBC AUTO-ENTMCNC: 30.3 G/DL (ref 32–36)
MCV RBC AUTO: 99.1 FL (ref 80–96)
MONOCYTES # BLD AUTO: 0.65 K/UL (ref 0–0.8)
MONOCYTES NFR BLD AUTO: 8.9 % (ref 2–6)
MPC BLD CALC-MCNC: 9.4 FL (ref 9.4–12.4)
NEUTROPHILS # BLD AUTO: 4.44 K/UL (ref 1.8–7.7)
NEUTROPHILS NFR BLD AUTO: 61.1 % (ref 53–65)
NRBC # BLD AUTO: 0 X10E3/UL
NRBC, AUTO (.00): 0 %
PLATELET # BLD AUTO: 236 K/UL (ref 150–400)
POTASSIUM SERPL-SCNC: 4.5 MMOL/L (ref 3.5–5.1)
RBC # BLD AUTO: 3.17 M/UL (ref 4.2–5.4)
SODIUM SERPL-SCNC: 136 MMOL/L (ref 136–145)
WBC # BLD AUTO: 7.27 K/UL (ref 4.5–11)

## 2023-01-23 PROCEDURE — 25000003 PHARM REV CODE 250: Performed by: STUDENT IN AN ORGANIZED HEALTH CARE EDUCATION/TRAINING PROGRAM

## 2023-01-23 PROCEDURE — 25000003 PHARM REV CODE 250: Performed by: HOSPITALIST

## 2023-01-23 PROCEDURE — 11000001 HC ACUTE MED/SURG PRIVATE ROOM

## 2023-01-23 PROCEDURE — 97530 THERAPEUTIC ACTIVITIES: CPT | Mod: CQ

## 2023-01-23 PROCEDURE — 97530 THERAPEUTIC ACTIVITIES: CPT

## 2023-01-23 PROCEDURE — 63600175 PHARM REV CODE 636 W HCPCS: Performed by: STUDENT IN AN ORGANIZED HEALTH CARE EDUCATION/TRAINING PROGRAM

## 2023-01-23 PROCEDURE — 36415 COLL VENOUS BLD VENIPUNCTURE: CPT | Performed by: STUDENT IN AN ORGANIZED HEALTH CARE EDUCATION/TRAINING PROGRAM

## 2023-01-23 PROCEDURE — 97110 THERAPEUTIC EXERCISES: CPT | Mod: CQ

## 2023-01-23 PROCEDURE — 99232 PR SUBSEQUENT HOSPITAL CARE,LEVL II: ICD-10-PCS | Mod: ,,, | Performed by: NURSE PRACTITIONER

## 2023-01-23 PROCEDURE — 99232 SBSQ HOSP IP/OBS MODERATE 35: CPT | Mod: ,,, | Performed by: NURSE PRACTITIONER

## 2023-01-23 PROCEDURE — 99231 PR SUBSEQUENT HOSPITAL CARE,LEVL I: ICD-10-PCS | Mod: ,,, | Performed by: STUDENT IN AN ORGANIZED HEALTH CARE EDUCATION/TRAINING PROGRAM

## 2023-01-23 PROCEDURE — 80048 BASIC METABOLIC PNL TOTAL CA: CPT | Performed by: STUDENT IN AN ORGANIZED HEALTH CARE EDUCATION/TRAINING PROGRAM

## 2023-01-23 PROCEDURE — 97110 THERAPEUTIC EXERCISES: CPT

## 2023-01-23 PROCEDURE — 99231 SBSQ HOSP IP/OBS SF/LOW 25: CPT | Mod: ,,, | Performed by: STUDENT IN AN ORGANIZED HEALTH CARE EDUCATION/TRAINING PROGRAM

## 2023-01-23 PROCEDURE — 85025 COMPLETE CBC W/AUTO DIFF WBC: CPT | Performed by: STUDENT IN AN ORGANIZED HEALTH CARE EDUCATION/TRAINING PROGRAM

## 2023-01-23 RX ORDER — POLYETHYLENE GLYCOL 3350 17 G/17G
17 POWDER, FOR SOLUTION ORAL DAILY PRN
Status: ON HOLD | COMMUNITY
End: 2023-02-20 | Stop reason: HOSPADM

## 2023-01-23 RX ORDER — BISACODYL 5 MG
10 TABLET, DELAYED RELEASE (ENTERIC COATED) ORAL DAILY PRN
COMMUNITY

## 2023-01-23 RX ORDER — DOCUSATE SODIUM 100 MG/1
100 CAPSULE, LIQUID FILLED ORAL DAILY PRN
Status: ON HOLD | COMMUNITY
End: 2023-03-02 | Stop reason: HOSPADM

## 2023-01-23 RX ADMIN — MEGESTROL ACETATE 40 MG: 40 TABLET ORAL at 09:01

## 2023-01-23 RX ADMIN — GABAPENTIN 100 MG: 100 CAPSULE ORAL at 09:01

## 2023-01-23 RX ADMIN — POLYETHYLENE GLYCOL 3350 34 G: 17 POWDER, FOR SOLUTION ORAL at 09:01

## 2023-01-23 RX ADMIN — METOPROLOL TARTRATE 50 MG: 50 TABLET, FILM COATED ORAL at 09:01

## 2023-01-23 RX ADMIN — ACETAMINOPHEN 1000 MG: 500 TABLET, FILM COATED ORAL at 09:01

## 2023-01-23 RX ADMIN — HYDRALAZINE HYDROCHLORIDE 25 MG: 25 TABLET ORAL at 09:01

## 2023-01-23 RX ADMIN — TAMSULOSIN HYDROCHLORIDE 0.4 MG: 0.4 CAPSULE ORAL at 09:01

## 2023-01-23 RX ADMIN — HYDROCODONE BITARTRATE AND ACETAMINOPHEN 2 TABLET: 10; 325 TABLET ORAL at 02:01

## 2023-01-23 RX ADMIN — DOCUSATE SODIUM 100 MG: 100 CAPSULE, LIQUID FILLED ORAL at 09:01

## 2023-01-23 RX ADMIN — ACETAMINOPHEN 1000 MG: 500 TABLET, FILM COATED ORAL at 11:01

## 2023-01-23 RX ADMIN — APIXABAN 2.5 MG: 2.5 TABLET, FILM COATED ORAL at 09:01

## 2023-01-23 RX ADMIN — FAMOTIDINE 20 MG: 20 TABLET ORAL at 09:01

## 2023-01-23 RX ADMIN — LEVOTHYROXINE SODIUM 125 MCG: 0.12 TABLET ORAL at 05:01

## 2023-01-23 RX ADMIN — GABAPENTIN 100 MG: 100 CAPSULE ORAL at 02:01

## 2023-01-23 RX ADMIN — ALPRAZOLAM 1 MG: 0.25 TABLET ORAL at 09:01

## 2023-01-23 RX ADMIN — SENNOSIDES 1 TABLET: 8.6 TABLET, FILM COATED ORAL at 09:01

## 2023-01-23 RX ADMIN — PANTOPRAZOLE SODIUM 40 MG: 40 TABLET, DELAYED RELEASE ORAL at 09:01

## 2023-01-23 RX ADMIN — PREDNISONE 5 MG: 5 TABLET ORAL at 09:01

## 2023-01-23 RX ADMIN — HYDRALAZINE HYDROCHLORIDE 25 MG: 25 TABLET ORAL at 05:01

## 2023-01-23 NOTE — PLAN OF CARE
SS spoke with Grace at [facility], they do not currently have any beds available, but she is checking to see if they will have any beds available later in the week. SS received call from Gricelda at [facility], patient has been accepted. Dr. Guerra notified of above. SS following.

## 2023-01-23 NOTE — ASSESSMENT & PLAN NOTE
Clean with vashe  Apply mepitel and  puracol ag advantage to wound.  Cover and secure with 4x4s, kerlix, and paper tape  Change every twice weekly and PRN for soilage

## 2023-01-23 NOTE — SUBJECTIVE & OBJECTIVE
Interval History: naeo    Review of Systems   Constitutional:  Negative for chills, fatigue, fever and unexpected weight change.   HENT:  Negative for congestion, mouth sores and sore throat.    Eyes:  Negative for photophobia and visual disturbance.   Respiratory:  Negative for cough, chest tightness, shortness of breath and wheezing.    Cardiovascular:  Negative for chest pain, palpitations and leg swelling.   Gastrointestinal:  Negative for abdominal pain, diarrhea, nausea and vomiting.   Endocrine: Negative for cold intolerance and heat intolerance.   Genitourinary:  Negative for difficulty urinating, dysuria, frequency and urgency.   Musculoskeletal:  Positive for arthralgias. Negative for back pain and myalgias.   Skin:  Negative for pallor and rash.   Neurological:  Negative for tremors, seizures, syncope, weakness, numbness and headaches.   Hematological:  Does not bruise/bleed easily.   Psychiatric/Behavioral:  Negative for agitation, confusion, hallucinations and suicidal ideas.    Objective:     Vital Signs (Most Recent):  Temp: 98.5 °F (36.9 °C) (01/23/23 0800)  Pulse: (!) 59 (01/23/23 0800)  Resp: 16 (01/23/23 0800)  BP: (!) 135/44 (01/23/23 0800)  SpO2: 95 % (01/23/23 0800)   Vital Signs (24h Range):  Temp:  [98.3 °F (36.8 °C)-99.2 °F (37.3 °C)] 98.5 °F (36.9 °C)  Pulse:  [57-63] 59  Resp:  [16-18] 16  SpO2:  [95 %-97 %] 95 %  BP: (116-137)/(44-59) 135/44     Weight: 57.6 kg (126 lb 15.8 oz)  Body mass index is 20.5 kg/m².    Intake/Output Summary (Last 24 hours) at 1/23/2023 1035  Last data filed at 1/23/2023 0800  Gross per 24 hour   Intake 0 ml   Output 100 ml   Net -100 ml        Physical Exam  Vitals reviewed.   Constitutional:       Appearance: Normal appearance.   HENT:      Head: Normocephalic and atraumatic.   Eyes:      Extraocular Movements: Extraocular movements intact.   Cardiovascular:      Rate and Rhythm: Normal rate and regular rhythm.      Pulses: Normal pulses.   Pulmonary:       Effort: Pulmonary effort is normal.      Breath sounds: Normal breath sounds.   Abdominal:      General: Abdomen is flat.      Palpations: Abdomen is soft.   Musculoskeletal:         General: Tenderness and signs of injury present.      Comments: Dressed left lower extremity, painful to touch   Skin:     General: Skin is warm and dry.      Capillary Refill: Capillary refill takes less than 2 seconds.   Neurological:      General: No focal deficit present.      Mental Status: She is alert and oriented to person, place, and time.   Psychiatric:         Mood and Affect: Mood normal.         Behavior: Behavior normal.       Significant Labs: All pertinent labs within the past 24 hours have been reviewed.    Significant Imaging: I have reviewed all pertinent imaging results/findings within the past 24 hours.

## 2023-01-23 NOTE — PROGRESS NOTES
[facility]  Wound Care and Hyperbaric [facility]  Progress Note    Patient Name: Zandra Veloz  MRN: [id number]  Admission Date: 12/29/2022  Hospital Length of Stay: 25 days  Attending Physician: Shai Guerra DO  Primary Care Provider: Brandon Thornton MD         Subjective:     HPI:  89 yo female with multiple skin tears, traumatic wounds, and surgical incision to left thigh. She was admitted following a fall on 12/17 when she was trying to go to the restroom at her home.  Patient sustained a left femur fracture. She is status post ORIF on 12/20. Staples intact to left hip. Moderate amount of green drainage noted, cultures obtained today. Left lower leg sutures removed today. Slough noted in wound bed today, adjustment to local wound care. Significant PMH includes hypertension, bradycardia, osteoarthritis, hypothyroidism, osteoporosis, and multiple falls. Wound healing is complicated by bradycardia, hx of a-fib, chronic pain, weakness, fragile skin, limited mobility, multiple falls, infection, and pain.     Hospital Course:   1/5/23 - Complains of pain to right posterior knee, venous doppler ordered. Sutures and every other staple removed today. New local wound care orders. Cultures pending.   1/9/23 - Wounds on lower extremities healing well. Continue current POC. Bruising noted to left posterior knee, protect with mepitel and optifoam. Dressing to incision site saturated with green drainage. Cultures negative. Continue with drawtex and notify ortho of drainage.   1/11/2023 - Wounds are healing well. Continue current POC. Ultrasound pending, preliminary results concerning for fluid pocket, Dr. Singer going to evaluate today.   1/17/23 - Wounds continue to show improvement. Dr. Singer evaluated left hip today during wound vac change. Staples and wound vac d/c'd today. Dressing change twice weekly.   1/19/23 - Wounds continue to heal. Incision with minimal drainage. Puracol to  left thigh today. Continue with current POC. Twice weekly dressing changes (M, Th)  1/23/23 - Wounds are improving. Continue twice weekly dressing changes.           Scheduled Meds:   adenosine  12 mg Intravenous Once    adenosine  6 mg Intravenous Once    ALPRAZolam  1 mg Oral QHS    apixaban  2.5 mg Oral BID    diltiaZEM  120 mg Oral Daily    docusate sodium  100 mg Oral BID    famotidine  20 mg Oral Daily    gabapentin  100 mg Oral TID    hydrALAZINE  25 mg Oral Q8H    levothyroxine  125 mcg Oral Before breakfast    lisinopriL  20 mg Oral Daily    megestroL  40 mg Oral Daily    metoprolol tartrate  50 mg Oral BID    pantoprazole  40 mg Oral Daily    polyethylene glycol  34 g Oral Daily    predniSONE  5 mg Oral Daily    senna  1 tablet Oral Daily    tamsulosin  0.4 mg Oral Daily     Continuous Infusions:  PRN Meds:acetaminophen, bisacodyL, dextromethorphan-guaiFENesin  mg/5 ml, dextrose 50%, dextrose 50%, diclofenac sodium, glucagon (human recombinant), glucose, glucose, HYDROcodone-acetaminophen, magnesium oxide, melatonin, naloxone, ondansetron, potassium bicarbonate, potassium bicarbonate, potassium bicarbonate, potassium, sodium phosphates, potassium, sodium phosphates, potassium, sodium phosphates, prochlorperazine, simethicone, sodium chloride 0.9%, traZODone, white petrolatum    Review of Systems   Constitutional:  Positive for activity change. Negative for chills and fever.   Respiratory:  Negative for chest tightness and shortness of breath.    Cardiovascular:  Positive for leg swelling. Negative for chest pain and palpitations.   Musculoskeletal:  Positive for arthralgias, back pain, gait problem and joint swelling.   Skin:  Positive for color change and wound.        wound   Neurological:  Positive for weakness.   Psychiatric/Behavioral:  Negative for agitation, behavioral problems, confusion and self-injury.    Objective:     Vital Signs (Most Recent):  Temp: 98.5 °F (36.9 °C)  (01/23/23 0800)  Pulse: (!) 59 (01/23/23 0800)  Resp: 16 (01/23/23 0800)  BP: (!) 135/44 (01/23/23 0800)  SpO2: 95 % (01/23/23 0800)   Vital Signs (24h Range):  Temp:  [98.5 °F (36.9 °C)-99.2 °F (37.3 °C)] 98.5 °F (36.9 °C)  Pulse:  [58-63] 59  Resp:  [16-18] 16  SpO2:  [95 %-97 %] 95 %  BP: (123-137)/(44-59) 135/44     Weight: 57.6 kg (126 lb 15.8 oz)  Body mass index is 20.5 kg/m².    Physical Exam  Vitals reviewed.   Constitutional:       Appearance: Normal appearance.   HENT:      Head: Normocephalic.   Cardiovascular:      Rate and Rhythm: Regular rhythm. Bradycardia present.      Pulses: Normal pulses.      Heart sounds: Murmur heard.   Pulmonary:      Effort: Pulmonary effort is normal.      Breath sounds: Normal breath sounds.   Musculoskeletal:         General: Swelling, tenderness, deformity and signs of injury present.      Right lower leg: Edema present.      Left lower leg: Edema present.   Skin:     Findings: Bruising and erythema present.      Comments: Wound, see LDA for photo/measurements   Neurological:      Mental Status: She is alert. Mental status is at baseline.      Motor: Weakness present.      Gait: Gait abnormal.       Assessment/Plan:     Open wound of left lower leg                      Clean with vashe  Apply mepitel and puracol ag to wound.  Cover and secure with 4x4s, kerlix, and paper tape  Change twice weekly and PRN for soilage (M, Th)  Keep leg elevated and avoid pressure on wound.          Multiple skin tears  Clean with vashe  Apply mepitel and  puracol ag advantage to wound.  Cover and secure with 4x4s, kerlix, and paper tape  Change every twice weekly and PRN for soilage        ZURDO San  Wound Care and Hyperbarics JO[facility]  [facility]

## 2023-01-23 NOTE — PT/OT/SLP PROGRESS
Occupational Therapy   Treatment    Name: Zandra Veloz  MRN: [id number]  Admitting Diagnosis:  Urinary tract infection       Recommendations:     Discharge Recommendations: nursing facility, skilled  Discharge Equipment Recommendations:   (To be determined)  Barriers to discharge:  None    Assessment:     Zandra Veloz is a 88 y.o. female with a medical diagnosis of Urinary tract infection.  She presents with complaint of not feeling well. Pt agreed to OT treatment. Performance deficits affecting function are weakness, impaired endurance, impaired self care skills, impaired functional mobility, gait instability, impaired skin.     Rehab Prognosis:  Good; patient would benefit from acute skilled OT services to address these deficits and reach maximum level of function.       Plan:     Patient to be seen 5 x/week to address the above listed problems via self-care/home management, therapeutic activities, therapeutic exercises  Plan of Care Expires: 02/06/23  Plan of Care Reviewed with: patient, caregiver, daughter    Subjective     Pain/Comfort:  Pain Rating 1: 0/10  Pain Rating Post-Intervention 1: 0/10    Objective:     Communicated with: CLYDE Wood prior to session.  Patient found HOB elevated with peripheral IV upon OT entry to room.    General Precautions: Standard, fall    Orthopedic Precautions:LLE posterior precautions, LLE partial weight bearing  Braces: N/A  Respiratory Status: Room air     Occupational Performance:     Bed Mobility:    Patient completed Rolling/Turning to Left with  minimum assistance  Patient completed Rolling/Turning to Right with minimum assistance  Patient completed Supine to Sit with minimum assistance     Functional Mobility/Transfers:  Patient completed Sit <> Stand Transfer with moderate assistance and of x 2 persons  with  gait belt to stand to RW   Patient completed Bed <> Chair Transfer using Step Transfer technique with moderate assistance and of x 2  persons with rolling walker and gait belt  Functional Mobility: Mod a x 2    Activities of Daily Living:  Upper Body Dressing: maximal assistance isac villatoro as a robe      [facility] 6 Click ADL:      Treatment & Education:  Pt performed UE strengthening exercises. AAROM x 2 sets of 15 reps with 1 lb hand wt x 2 sets of 15 reps (B) shoulder flexion/extension and adduction/abduction. AROM 1 lb wt x 2 sets of 15 reps (B) elbow and wrist flexion/extension. Red theraband x 2 sets of 15 reps (B) elbow extension.  Pt participated well with tx. Exercises performed to tolerance.    Patient left up in chair with all lines intact, call button in reach, and daughter and sitter present    GOALS:   Multidisciplinary Problems       Occupational Therapy Goals          Problem: Occupational Therapy    Goal Priority Disciplines Outcome Interventions   Occupational Therapy Goal     OT, PT/OT Ongoing, Progressing    Description: STG: (In 2 weeks)  Pt will perform grooming with min(A)  Pt will bathe with setup and mod (A)  Pt will perform UE dressing with setup and min(A)  Pt will perform (L) UE AROM to 3/4 full range  Pt will sit EOB x 5 min with CGA assistance  Pt will transfer bed/chair/bsc with mod(A)  Pt will tolerate 20 minutes of tx without fatigue      LT.Restore to max I with self care and mobility.                          Time Tracking:     OT Date of Treatment: 23  OT Start Time: 1322 (14:18)  OT Stop Time: 1340 (14:38)  OT Total Time (min): 18 min    Billable Minutes:Therapeutic Activity 15  Therapeutic Exercise 15    OT/BRIGHT: OT          2023

## 2023-01-23 NOTE — ASSESSMENT & PLAN NOTE
Clean with vashe  Apply mepitel and puracol ag to wound.  Cover and secure with 4x4s, kerlix, and paper tape  Change twice weekly and PRN for soilage (M, Th)  Keep leg elevated and avoid pressure on wound.

## 2023-01-23 NOTE — SUBJECTIVE & OBJECTIVE
Subjective:     HPI:  89 yo female with multiple skin tears, traumatic wounds, and surgical incision to left thigh. She was admitted following a fall on 12/17 when she was trying to go to the restroom at her home.  Patient sustained a left femur fracture. She is status post ORIF on 12/20. Staples intact to left hip. Moderate amount of green drainage noted, cultures obtained today. Left lower leg sutures removed today. Slough noted in wound bed today, adjustment to local wound care. Significant PMH includes hypertension, bradycardia, osteoarthritis, hypothyroidism, osteoporosis, and multiple falls. Wound healing is complicated by bradycardia, hx of a-fib, chronic pain, weakness, fragile skin, limited mobility, multiple falls, infection, and pain.     Hospital Course:   1/5/23 - Complains of pain to right posterior knee, venous doppler ordered. Sutures and every other staple removed today. New local wound care orders. Cultures pending.   1/9/23 - Wounds on lower extremities healing well. Continue current POC. Bruising noted to left posterior knee, protect with mepitel and optifoam. Dressing to incision site saturated with green drainage. Cultures negative. Continue with drawtex and notify ortho of drainage.   1/11/2023 - Wounds are healing well. Continue current POC. Ultrasound pending, preliminary results concerning for fluid pocket, Dr. Singer going to evaluate today.   1/17/23 - Wounds continue to show improvement. Dr. Singer evaluated left hip today during wound vac change. Staples and wound vac d/c'd today. Dressing change twice weekly.   1/19/23 - Wounds continue to heal. Incision with minimal drainage. Puracol to left thigh today. Continue with current POC. Twice weekly dressing changes (M, Th)  1/23/23 - Wounds are improving. Continue twice weekly dressing changes.           Scheduled Meds:   adenosine  12 mg Intravenous Once    adenosine  6 mg Intravenous Once    ALPRAZolam  1 mg Oral QHS    apixaban   2.5 mg Oral BID    diltiaZEM  120 mg Oral Daily    docusate sodium  100 mg Oral BID    famotidine  20 mg Oral Daily    gabapentin  100 mg Oral TID    hydrALAZINE  25 mg Oral Q8H    levothyroxine  125 mcg Oral Before breakfast    lisinopriL  20 mg Oral Daily    megestroL  40 mg Oral Daily    metoprolol tartrate  50 mg Oral BID    pantoprazole  40 mg Oral Daily    polyethylene glycol  34 g Oral Daily    predniSONE  5 mg Oral Daily    senna  1 tablet Oral Daily    tamsulosin  0.4 mg Oral Daily     Continuous Infusions:  PRN Meds:acetaminophen, bisacodyL, dextromethorphan-guaiFENesin  mg/5 ml, dextrose 50%, dextrose 50%, diclofenac sodium, glucagon (human recombinant), glucose, glucose, HYDROcodone-acetaminophen, magnesium oxide, melatonin, naloxone, ondansetron, potassium bicarbonate, potassium bicarbonate, potassium bicarbonate, potassium, sodium phosphates, potassium, sodium phosphates, potassium, sodium phosphates, prochlorperazine, simethicone, sodium chloride 0.9%, traZODone, white petrolatum    Review of Systems   Constitutional:  Positive for activity change. Negative for chills and fever.   Respiratory:  Negative for chest tightness and shortness of breath.    Cardiovascular:  Positive for leg swelling. Negative for chest pain and palpitations.   Musculoskeletal:  Positive for arthralgias, back pain, gait problem and joint swelling.   Skin:  Positive for color change and wound.        wound   Neurological:  Positive for weakness.   Psychiatric/Behavioral:  Negative for agitation, behavioral problems, confusion and self-injury.    Objective:     Vital Signs (Most Recent):  Temp: 98.5 °F (36.9 °C) (01/23/23 0800)  Pulse: (!) 59 (01/23/23 0800)  Resp: 16 (01/23/23 0800)  BP: (!) 135/44 (01/23/23 0800)  SpO2: 95 % (01/23/23 0800)   Vital Signs (24h Range):  Temp:  [98.5 °F (36.9 °C)-99.2 °F (37.3 °C)] 98.5 °F (36.9 °C)  Pulse:  [58-63] 59  Resp:  [16-18] 16  SpO2:  [95 %-97 %] 95 %  BP: (123-137)/(44-59)  135/44     Weight: 57.6 kg (126 lb 15.8 oz)  Body mass index is 20.5 kg/m².    Physical Exam  Vitals reviewed.   Constitutional:       Appearance: Normal appearance.   HENT:      Head: Normocephalic.   Cardiovascular:      Rate and Rhythm: Regular rhythm. Bradycardia present.      Pulses: Normal pulses.      Heart sounds: Murmur heard.   Pulmonary:      Effort: Pulmonary effort is normal.      Breath sounds: Normal breath sounds.   Musculoskeletal:         General: Swelling, tenderness, deformity and signs of injury present.      Right lower leg: Edema present.      Left lower leg: Edema present.   Skin:     Findings: Bruising and erythema present.      Comments: Wound, see LDA for photo/measurements   Neurological:      Mental Status: She is alert. Mental status is at baseline.      Motor: Weakness present.      Gait: Gait abnormal.

## 2023-01-23 NOTE — PT/OT/SLP PROGRESS
Physical Therapy Treatment    Patient Name:  Zandra Veloz   MRN:  [id number]    Recommendations:     Discharge Recommendations: nursing facility, skilled  Discharge Equipment Recommendations: other (see comments) (to be determined)  Barriers to discharge:  ongoing medical treatment    Assessment:     Zandra Veloz is a 88 y.o. female admitted with a medical diagnosis of Urinary tract infection.  She presents with the following impairments/functional limitations: weakness, impaired functional mobility, impaired endurance, impaired self care skills, impaired skin, pain.    Pt slowly improving with bed mobs and transfers, however, cont to express increased pain with  partial WB left lower extremity and during steppage around to chair    Rehab Prognosis: Fair; patient would benefit from acute skilled PT services to address these deficits and reach maximum level of function.    Recent Surgery: * No surgery found *      Plan:     During this hospitalization, patient to be seen 5 x/week to address the identified rehab impairments via gait training, therapeutic activities and progress toward the following goals:    Plan of Care Expires:  01/30/23    Subjective     Chief Complaint: post left hip revision  Patient/Family Comments/goals: pt agreeable to OOB  Pain/Comfort:         Objective:     Communicated with Madie Wood RN prior to session.  Patient found HOB elevated with peripheral IV upon PT entry to room.     General Precautions: Standard, fall  Orthopedic Precautions: LLE partial weight bearing, LLE posterior precautions  Braces: N/A  Respiratory Status: Room air     Functional Mobility:  Bed Mobility:     Rolling Left:  contact guard assistance and minimum assistance  Rolling Right: contact guard assistance  Supine to Sit: minimum assistance and increased time  Transfers:     Sit to Stand:  moderate assistance and of 1-2 persons with rolling walker  Bed to Chair: moderate assistance and of 1-2  persons with  rolling walker  using  Step Transfer and PWB L LE  Gait: 4-6 steps around to recliner chair with moderate assist x 1-2 persons with RW      AM-PAC 6 CLICK MOBILITY  Turning over in bed (including adjusting bedclothes, sheets and blankets)?: 3  Sitting down on and standing up from a chair with arms (e.g., wheelchair, bedside commode, etc.): 2  Moving from lying on back to sitting on the side of the bed?: 3  Moving to and from a bed to a chair (including a wheelchair)?: 2  Need to walk in hospital room?: 2 (around to chair)  Climbing 3-5 steps with a railing?: 1  Basic Mobility Total Score: 13       Treatment & Education:  Pt performed 2 x 15 reps (B) LE exercises: ap, quad set, glut set, straight leg raise, hip ab/adduction, long arc quad, heel slide with active assist range of motion    Contact guard assist to standby assist sitting EOB     Patient left up in chair with all lines intact, call button in reach, and sitter and daughter present..    GOALS:   Multidisciplinary Problems       Physical Therapy Goals          Problem: Physical Therapy    Goal Priority Disciplines Outcome Goal Variances Interventions   Physical Therapy Goal     PT, PT/OT Ongoing, Progressing     Description: Short Term Goals to be met by 1/25/2023    Patient will increase functional independence and safety with mobility by performing    1.   Rolling side to side with Minimal assist   2.   Supine to sit Moderate Assist  3.   Sitting balance edge of the bed x 15 minutes Supervision  4.   Sit to stand Moderate Assist  using manual assistance with gait belt and NWB L LE  5.   Bed to chair Minimal  Assist using sliding board and NWB left LE  6.   Lower extremity exercises x 30 reps with assist as necessary and no rest breaks      Long Term Goals to be met by 2/10/2023    Patient to require less than or equal to Stand by assist for functional mobility to ease caregiver burden                        Time Tracking:     PT Received On:  01/23/23  PT Start Time: 1325     PT Stop Time: 1357  PT Total Time (min): 32 min     Billable Minutes: Therapeutic Activity 8 and Therapeutic Exercise 15    Treatment Type: Treatment  PT/PTA: PTA     PTA Visit Number: 2     01/23/2023

## 2023-01-23 NOTE — PROGRESS NOTES
[facility] Hospital Medicine  Progress Note    Patient Name: Zandra Veloz  MRN: [id number]  Patient Class: IP- Inpatient   Admission Date: 12/29/2022  Length of Stay: 25 days  Attending Physician: Shai Guerra DO  Primary Care Provider: Brandon Thornton MD        Subjective:     Principal Problem:Urinary tract infection        HPI:  HPI:   88-year-old lady seen emergency room department.  Patient presents after having a fall when she was trying to go to the restroom at her home.  Patient sustained a left femur fracture.  Patient complains of moderate to severe pain in the left hip area.  Patient also complains of chest tightness, she rates it a 5/10 in the chest tightness has been intubate.  Patient also was seen in emergency room department earlier this week per her daughter.  Patient is found to have dehydration and a urinary tract infection.  Current she denies any shortness of breath.  She does not give a history of coronary artery disease.        Procedure(s) (LRB):  REVISION, TOTAL ARTHROPLASTY, HIP, VICTORIANO PROSTHETIC FX (Left)       Hospital Course:   12/19 - records reviewed. Fall without LOC and has left hip fx. No other complaints currently. Seen by cardiology with some CP felt musculoskeletal.  Echo mild AS and mod MR with nl EF. Age increase cardiac risk for surgery but discussed with daughter surgery is urgent and see no benefit in delay medically. Feel low to moderate risk for surgery. Question about UTI. No symptoms. Had UTI in 10.22 not surprising. Lab to do culture on urine in lab. Cover with ATN for prior culture with ATB started. Discussed with Dr Singer and cardiology.  12/20 Tachycardia and elevated BP. Cardiology adjusted meds. Plan hold off surgery until 12/22 to adjust meds and treat UTI. Family in room. Pt not sleeping well.   12/21 Didn't sleep well prior night. Apparently been on xanax for a time. Also recently started on Neurontin. Family with  question. No recent BM. Some increase stool on KUB but not severe. Surgery for am. BP some up but better. HR good.   12/22 Seen prior to surgery and apparently add better night. Sore mouth / throat better with mycostatin. For surgery. Family in room.  12/23 patient with pain but otherwise seemed to be doing relatively well.  Daughter in room.  Apparently been taking prednisone for some time and this was restarted per family request.  Look into swing bed placement  12/24 patient had better night rested and everyone's in better spirits today.  Tolerating some diet.   Therapy worked with patient.  Look into swing bed placement next week  12/25 remained in good spirits.  Less pain.  Had good bowel movement and ordered Dulcolax not given.  Because of dressing to leg, Burk was not removed to keep it from getting soiled.  Wound care to check 12/27.  Discuss this with patient and daughter.  On antibiotics for UTI  12/26-will dc to swingbed once accepted. Needs continued wound care.  12/27-DC when bed available  12/28- issues with wound care yesterday.  Dr. Singer had to come remove dressings himself due to adherence to subcutaneous tissue.  Family refusing transfer to [facility] due to lack of specially trained wound care team availability.  Planning to transfer to specialty but resistant to that as well and requesting to speak to administration.    12/29- agreeable to transfer to specialty.  This will be the best place for her to receive wound care.  DC after family tours     12/29-needs continued wound care and therapy[facility]    12/30-doing well today, still with pain         Overview/Hospital Course:  12/31-having some pain this am  1/1- no complaints  1/2- doing well  1/3- continue wound care, last day cefepime tomorrow  1/4- some pain this AM.  Delay in mediations dues to staffing issues. Dsicussed with RN  1/5- no issues overnight  1/6- Still with pain, hip incision looks good  1/7-diclofenac for  shoulder pain  1/8-decrease laxatives, change benzos and carlo to PRN per patient request  1/9 some confusion overnight  1/10- no more confusion, doing well  1/11- no acute issues overnight  1/12- doing well, nausea today  1/13 -Dr. Lebron Culter IV, DO taking over for Dr. Guerra for weekend.  Patient's pain appears to be well controlled this morning no complaints of nausea.  Continue wound care and PT/OT  1/14-patient appears well labs.  Continue wound care and PT/OT  1/15-patient still appears well.  No acute concerns or complaints.  No events overnight.  Continue wound care working with a PTOT.  Expiration is services estimated by February 6 1/16- no complaints, doing well  1/17-some nausea today  1/18-better today.  Still requiring twice weekly dressing changes.  Appetite stimulant  1/19- PWB with PT today.  Still with weeping wounds.  Will need wound care twice weekly-as these improve will be able to transfer to other facility  1/20-no complaints  1/21-no acute events overnight, some anxiety today  1/22-episode of SVT yesterday. Spontaneous resolution  1/23-no complaints      Interval History: naeo    Review of Systems   Constitutional:  Negative for chills, fatigue, fever and unexpected weight change.   HENT:  Negative for congestion, mouth sores and sore throat.    Eyes:  Negative for photophobia and visual disturbance.   Respiratory:  Negative for cough, chest tightness, shortness of breath and wheezing.    Cardiovascular:  Negative for chest pain, palpitations and leg swelling.   Gastrointestinal:  Negative for abdominal pain, diarrhea, nausea and vomiting.   Endocrine: Negative for cold intolerance and heat intolerance.   Genitourinary:  Negative for difficulty urinating, dysuria, frequency and urgency.   Musculoskeletal:  Positive for arthralgias. Negative for back pain and myalgias.   Skin:  Negative for pallor and rash.   Neurological:  Negative for tremors, seizures, syncope, weakness, numbness and  headaches.   Hematological:  Does not bruise/bleed easily.   Psychiatric/Behavioral:  Negative for agitation, confusion, hallucinations and suicidal ideas.    Objective:     Vital Signs (Most Recent):  Temp: 98.5 °F (36.9 °C) (01/23/23 0800)  Pulse: (!) 59 (01/23/23 0800)  Resp: 16 (01/23/23 0800)  BP: (!) 135/44 (01/23/23 0800)  SpO2: 95 % (01/23/23 0800)   Vital Signs (24h Range):  Temp:  [98.3 °F (36.8 °C)-99.2 °F (37.3 °C)] 98.5 °F (36.9 °C)  Pulse:  [57-63] 59  Resp:  [16-18] 16  SpO2:  [95 %-97 %] 95 %  BP: (116-137)/(44-59) 135/44     Weight: 57.6 kg (126 lb 15.8 oz)  Body mass index is 20.5 kg/m².    Intake/Output Summary (Last 24 hours) at 1/23/2023 1035  Last data filed at 1/23/2023 0800  Gross per 24 hour   Intake 0 ml   Output 100 ml   Net -100 ml        Physical Exam  Vitals reviewed.   Constitutional:       Appearance: Normal appearance.   HENT:      Head: Normocephalic and atraumatic.   Eyes:      Extraocular Movements: Extraocular movements intact.   Cardiovascular:      Rate and Rhythm: Normal rate and regular rhythm.      Pulses: Normal pulses.   Pulmonary:      Effort: Pulmonary effort is normal.      Breath sounds: Normal breath sounds.   Abdominal:      General: Abdomen is flat.      Palpations: Abdomen is soft.   Musculoskeletal:         General: Tenderness and signs of injury present.      Comments: Dressed left lower extremity, painful to touch   Skin:     General: Skin is warm and dry.      Capillary Refill: Capillary refill takes less than 2 seconds.   Neurological:      General: No focal deficit present.      Mental Status: She is alert and oriented to person, place, and time.   Psychiatric:         Mood and Affect: Mood normal.         Behavior: Behavior normal.       Significant Labs: All pertinent labs within the past 24 hours have been reviewed.    Significant Imaging: I have reviewed all pertinent imaging results/findings within the past 24 hours.      Assessment/Plan:      Disruption  of external surgical wound        Wounds and injuries  Wound care consulted and following    Open wound of left lower leg  Wound care      Multiple skin tears  Wound care      Delmy-prosthetic fracture around prosthetic hip  S/P fixation with Pomierski      Lumbar radiculopathy  Pain management  PT      Hypertension  Adjust medications as needed    Arthritis  Pain management        VTE Risk Mitigation (From admission, onward)         Ordered     apixaban tablet 2.5 mg  2 times daily         12/29/22 1630                Discharge Planning   YANIV:      Code Status: Full Code   Is the patient medically ready for discharge?:     Reason for patient still in hospital (select all that apply): Treatment  Discharge Plan A: Skilled Nursing Facility                  Shai Guerra DO  Department of Hospital Medicine   [facility]

## 2023-01-24 PROCEDURE — 97530 THERAPEUTIC ACTIVITIES: CPT | Mod: CQ

## 2023-01-24 PROCEDURE — A6212 FOAM DRG <=16 SQ IN W/BORDER: HCPCS

## 2023-01-24 PROCEDURE — 11000001 HC ACUTE MED/SURG PRIVATE ROOM

## 2023-01-24 PROCEDURE — 99231 PR SUBSEQUENT HOSPITAL CARE,LEVL I: ICD-10-PCS | Mod: ,,, | Performed by: STUDENT IN AN ORGANIZED HEALTH CARE EDUCATION/TRAINING PROGRAM

## 2023-01-24 PROCEDURE — 99231 SBSQ HOSP IP/OBS SF/LOW 25: CPT | Mod: ,,, | Performed by: STUDENT IN AN ORGANIZED HEALTH CARE EDUCATION/TRAINING PROGRAM

## 2023-01-24 PROCEDURE — 97110 THERAPEUTIC EXERCISES: CPT

## 2023-01-24 PROCEDURE — 97530 THERAPEUTIC ACTIVITIES: CPT

## 2023-01-24 PROCEDURE — 63600175 PHARM REV CODE 636 W HCPCS: Performed by: STUDENT IN AN ORGANIZED HEALTH CARE EDUCATION/TRAINING PROGRAM

## 2023-01-24 PROCEDURE — 97110 THERAPEUTIC EXERCISES: CPT | Mod: CQ

## 2023-01-24 PROCEDURE — 25000003 PHARM REV CODE 250: Performed by: STUDENT IN AN ORGANIZED HEALTH CARE EDUCATION/TRAINING PROGRAM

## 2023-01-24 RX ORDER — HYDROCODONE BITARTRATE AND ACETAMINOPHEN 7.5; 325 MG/1; MG/1
1 TABLET ORAL EVERY 8 HOURS PRN
COMMUNITY
End: 2023-03-06 | Stop reason: SDUPTHER

## 2023-01-24 RX ADMIN — METOPROLOL TARTRATE 50 MG: 50 TABLET, FILM COATED ORAL at 09:01

## 2023-01-24 RX ADMIN — DILTIAZEM HYDROCHLORIDE 120 MG: 120 CAPSULE, COATED, EXTENDED RELEASE ORAL at 09:01

## 2023-01-24 RX ADMIN — PANTOPRAZOLE SODIUM 40 MG: 40 TABLET, DELAYED RELEASE ORAL at 09:01

## 2023-01-24 RX ADMIN — HYDRALAZINE HYDROCHLORIDE 25 MG: 25 TABLET ORAL at 10:01

## 2023-01-24 RX ADMIN — APIXABAN 2.5 MG: 2.5 TABLET, FILM COATED ORAL at 09:01

## 2023-01-24 RX ADMIN — DOCUSATE SODIUM 100 MG: 100 CAPSULE, LIQUID FILLED ORAL at 09:01

## 2023-01-24 RX ADMIN — LISINOPRIL 20 MG: 20 TABLET ORAL at 09:01

## 2023-01-24 RX ADMIN — TAMSULOSIN HYDROCHLORIDE 0.4 MG: 0.4 CAPSULE ORAL at 09:01

## 2023-01-24 RX ADMIN — SENNOSIDES 1 TABLET: 8.6 TABLET, FILM COATED ORAL at 09:01

## 2023-01-24 RX ADMIN — FAMOTIDINE 20 MG: 20 TABLET ORAL at 09:01

## 2023-01-24 RX ADMIN — GABAPENTIN 100 MG: 100 CAPSULE ORAL at 03:01

## 2023-01-24 RX ADMIN — PREDNISONE 5 MG: 5 TABLET ORAL at 09:01

## 2023-01-24 RX ADMIN — ALPRAZOLAM 1 MG: 0.25 TABLET ORAL at 09:01

## 2023-01-24 RX ADMIN — HYDRALAZINE HYDROCHLORIDE 25 MG: 25 TABLET ORAL at 05:01

## 2023-01-24 RX ADMIN — POLYETHYLENE GLYCOL 3350 34 G: 17 POWDER, FOR SOLUTION ORAL at 09:01

## 2023-01-24 RX ADMIN — MEGESTROL ACETATE 40 MG: 40 TABLET ORAL at 09:01

## 2023-01-24 RX ADMIN — GABAPENTIN 100 MG: 100 CAPSULE ORAL at 09:01

## 2023-01-24 RX ADMIN — LEVOTHYROXINE SODIUM 125 MCG: 0.12 TABLET ORAL at 05:01

## 2023-01-24 RX ADMIN — HYDROCODONE BITARTRATE AND ACETAMINOPHEN 2 TABLET: 10; 325 TABLET ORAL at 06:01

## 2023-01-24 RX ADMIN — HYDROCODONE BITARTRATE AND ACETAMINOPHEN 2 TABLET: 10; 325 TABLET ORAL at 11:01

## 2023-01-24 NOTE — PT/OT/SLP PROGRESS
Physical Therapy Treatment    Patient Name:  Zandra Veloz   MRN:  [id number]    Recommendations:     Discharge Recommendations: nursing facility, skilled  Discharge Equipment Recommendations: other (see comments) (to be determined)  Barriers to discharge:  ongoing medical treatment    Assessment:     Zandra Veloz is a 88 y.o. female admitted with a medical diagnosis of Urinary tract infection.  She presents with the following impairments/functional limitations: weakness, impaired endurance, impaired self care skills, impaired balance, impaired functional mobility, impaired skin, pain, orthopedic precautions.    Pt slowly improving with functional mobility despite complaint of increased left lower extremity pain especially during weight bearing    Rehab Prognosis: Fair; patient would benefit from acute skilled PT services to address these deficits and reach maximum level of function.    Recent Surgery: * No surgery found *      Plan:     During this hospitalization, patient to be seen 5 x/week to address the identified rehab impairments via gait training, therapeutic activities and progress toward the following goals:    Plan of Care Expires:  01/30/23    Subjective     Chief Complaint: pain left lower extremity  Patient/Family Comments/goals: pt agreeable  Pain/Comfort:  Pain Rating 1: 8/10  Location - Side 1: Left  Location - Orientation 1: lower  Location 1: thigh      Objective:     Communicated with Madie Wood RN prior to session.  Patient found HOB elevated with peripheral IV upon PT entry to room.     General Precautions: Standard, fall  Orthopedic Precautions: LLE partial weight bearing, LLE posterior precautions  Braces: N/A  Respiratory Status: Room air     Functional Mobility:  Bed Mobility:     Rolling Left:  minimum assistance  Rolling Right: minimum assistance  Supine to Sit: minimum assistance and increased time  Transfers:     Sit to Stand:  minimum assistance, moderate  assistance, and of 1-2 persons with rolling walker  Bed to Chair: minimum assistance and of 2 persons with  rolling walker  using  Step Transfer  Gait: 3' around to chair with RW and minimum assist x 2       AM-PAC 6 CLICK MOBILITY  Turning over in bed (including adjusting bedclothes, sheets and blankets)?: 3  Sitting down on and standing up from a chair with arms (e.g., wheelchair, bedside commode, etc.): 2  Moving from lying on back to sitting on the side of the bed?: 3  Moving to and from a bed to a chair (including a wheelchair)?: 3  Need to walk in hospital room?: 3 (steps around to chair)  Climbing 3-5 steps with a railing?: 1  Basic Mobility Total Score: 15       Treatment & Education:  Pt performed 2 x 15 reps (B) LE exercises: ap, quad set, glut set, straight leg raise, hip ab/adduction, long arc quad, heel slide with active assist range of motion     Contact guard assist to standby assist sitting EOB x 7 minutes    Patient left up in chair with all lines intact, call button in reach, and caregiver present..    GOALS:   Multidisciplinary Problems       Physical Therapy Goals          Problem: Physical Therapy    Goal Priority Disciplines Outcome Goal Variances Interventions   Physical Therapy Goal     PT, PT/OT Ongoing, Progressing     Description: Short Term Goals to be met by 1/25/2023    Patient will increase functional independence and safety with mobility by performing    1.   Rolling side to side with Minimal assist   2.   Supine to sit Moderate Assist  3.   Sitting balance edge of the bed x 15 minutes Supervision  4.   Sit to stand Moderate Assist  using manual assistance with gait belt and NWB L LE  5.   Bed to chair Minimal  Assist using sliding board and NWB left LE  6.   Lower extremity exercises x 30 reps with assist as necessary and no rest breaks      Long Term Goals to be met by 2/10/2023    Patient to require less than or equal to Stand by assist for functional mobility to ease caregiver  burden                        Time Tracking:     PT Received On: 01/24/23  PT Start Time: 0946     PT Stop Time: 1018  PT Total Time (min): 32 min     Billable Minutes: Therapeutic Activity 10 and Therapeutic Exercise 15    Treatment Type: Treatment  PT/PTA: PTA     PTA Visit Number: 3     01/24/2023

## 2023-01-24 NOTE — PLAN OF CARE
Problem: Physical Therapy  Goal: Physical Therapy Goal  Description: Short Term Goals to be met by 1/25/2023    Patient will increase functional independence and safety with mobility by performing    1.   Rolling side to side with Minimal assist   2.   Supine to sit Moderate Assist  3.   Sitting balance edge of the bed x 15 minutes Supervision  4.   Sit to stand Moderate Assist  using manual assistance with gait belt and NWB L LE  5.   Bed to chair Minimal  Assist using sliding board and NWB left LE  6.   Lower extremity exercises x 30 reps with assist as necessary and no rest breaks      Long Term Goals to be met by 2/10/2023    Patient to require less than or equal to Stand by assist for functional mobility to ease caregiver burden   Outcome: Ongoing, Progressing                      MET STG# - 1, 2    Rolling Left:  minimum assistance to contact guard assistance  Rolling Right: minimum assistance to contact guard assistance  Supine to Sit: minimum assistance and increased time   Sitting EOB: contact guard assistance to standby assistance   Sit to Stand:  minimum assistance x 2 person to moderate assistance x 1-2 persons with rolling walker  Bed to Chair: minimum assistance  x 2 persons to moderate assist x 2 persons with  rolling walker  using  Step Transfer  Gait: 3' around to chair with RW and minimum assist x 2 to moderate assist x 2      Pt making slow steady progress with all functional mobility, however, cont to express increased left hip/lower extremity pain with all movement.  Feel pt will benefit from additional therapy as discharge

## 2023-01-24 NOTE — PROGRESS NOTES
[facility] Hospital Medicine  Progress Note    Patient Name: Zandra Veloz  MRN: [id number]  Patient Class: IP- Inpatient   Admission Date: 12/29/2022  Length of Stay: 26 days  Attending Physician: Shai Guerra DO  Primary Care Provider: Brandon Thornton MD        Subjective:     Principal Problem:Urinary tract infection        HPI:  HPI:   88-year-old lady seen emergency room department.  Patient presents after having a fall when she was trying to go to the restroom at her home.  Patient sustained a left femur fracture.  Patient complains of moderate to severe pain in the left hip area.  Patient also complains of chest tightness, she rates it a 5/10 in the chest tightness has been intubate.  Patient also was seen in emergency room department earlier this week per her daughter.  Patient is found to have dehydration and a urinary tract infection.  Current she denies any shortness of breath.  She does not give a history of coronary artery disease.        Procedure(s) (LRB):  REVISION, TOTAL ARTHROPLASTY, HIP, VICTORIANO PROSTHETIC FX (Left)       Hospital Course:   12/19 - records reviewed. Fall without LOC and has left hip fx. No other complaints currently. Seen by cardiology with some CP felt musculoskeletal.  Echo mild AS and mod MR with nl EF. Age increase cardiac risk for surgery but discussed with daughter surgery is urgent and see no benefit in delay medically. Feel low to moderate risk for surgery. Question about UTI. No symptoms. Had UTI in 10.22 not surprising. Lab to do culture on urine in lab. Cover with ATN for prior culture with ATB started. Discussed with Dr Singer and cardiology.  12/20 Tachycardia and elevated BP. Cardiology adjusted meds. Plan hold off surgery until 12/22 to adjust meds and treat UTI. Family in room. Pt not sleeping well.   12/21 Didn't sleep well prior night. Apparently been on xanax for a time. Also recently started on Neurontin. Family with  question. No recent BM. Some increase stool on KUB but not severe. Surgery for am. BP some up but better. HR good.   12/22 Seen prior to surgery and apparently add better night. Sore mouth / throat better with mycostatin. For surgery. Family in room.  12/23 patient with pain but otherwise seemed to be doing relatively well.  Daughter in room.  Apparently been taking prednisone for some time and this was restarted per family request.  Look into swing bed placement  12/24 patient had better night rested and everyone's in better spirits today.  Tolerating some diet.   Therapy worked with patient.  Look into swing bed placement next week  12/25 remained in good spirits.  Less pain.  Had good bowel movement and ordered Dulcolax not given.  Because of dressing to leg, Burk was not removed to keep it from getting soiled.  Wound care to check 12/27.  Discuss this with patient and daughter.  On antibiotics for UTI  12/26-will dc to swingbed once accepted. Needs continued wound care.  12/27-DC when bed available  12/28- issues with wound care yesterday.  Dr. Singer had to come remove dressings himself due to adherence to subcutaneous tissue.  Family refusing transfer to [facility] due to lack of specially trained wound care team availability.  Planning to transfer to specialty but resistant to that as well and requesting to speak to administration.    12/29- agreeable to transfer to specialty.  This will be the best place for her to receive wound care.  DC after family tours     12/29-needs continued wound care and therapy. [facility]    12/30-doing well today, still with pain         Overview/Hospital Course:  12/31-having some pain this am  1/1- no complaints  1/2- doing well  1/3- continue wound care, last day cefepime tomorrow  1/4- some pain this AM.  Delay in mediations dues to staffing issues. Dsicussed with RN  1/5- no issues overnight  1/6- Still with pain, hip incision looks good  1/7-diclofenac for  shoulder pain  1/8-decrease laxatives, change benzos and carlo to PRN per patient request  1/9 some confusion overnight  1/10- no more confusion, doing well  1/11- no acute issues overnight  1/12- doing well, nausea today  1/13 -Dr. Lebron Cutler IV, DO taking over for Dr. Guerra for weekend.  Patient's pain appears to be well controlled this morning no complaints of nausea.  Continue wound care and PT/OT  1/14-patient appears well labs.  Continue wound care and PT/OT  1/15-patient still appears well.  No acute concerns or complaints.  No events overnight.  Continue wound care working with a PTOT.  Expiration is services estimated by February 6 1/16- no complaints, doing well  1/17-some nausea today  1/18-better today.  Still requiring twice weekly dressing changes.  Appetite stimulant  1/19- PWB with PT today.  Still with weeping wounds.  Will need wound care twice weekly-as these improve will be able to transfer to other facility  1/20-no complaints  1/21-no acute events overnight, some anxiety today  1/22-episode of SVT yesterday. Spontaneous resolution  1/23-no complaints  1/24- no complaints      Interval History: naeo    Review of Systems   Constitutional:  Negative for chills, fatigue, fever and unexpected weight change.   HENT:  Negative for congestion, mouth sores and sore throat.    Eyes:  Negative for photophobia and visual disturbance.   Respiratory:  Negative for cough, chest tightness, shortness of breath and wheezing.    Cardiovascular:  Negative for chest pain, palpitations and leg swelling.   Gastrointestinal:  Negative for abdominal pain, diarrhea, nausea and vomiting.   Endocrine: Negative for cold intolerance and heat intolerance.   Genitourinary:  Negative for difficulty urinating, dysuria, frequency and urgency.   Musculoskeletal:  Positive for arthralgias. Negative for back pain and myalgias.   Skin:  Negative for pallor and rash.   Neurological:  Negative for tremors, seizures, syncope,  weakness, numbness and headaches.   Hematological:  Does not bruise/bleed easily.   Psychiatric/Behavioral:  Negative for agitation, confusion, hallucinations and suicidal ideas.    Objective:     Vital Signs (Most Recent):  Temp: 98.6 °F (37 °C) (01/24/23 0800)  Pulse: 63 (01/24/23 0800)  Resp: 18 (01/24/23 1116)  BP: (!) 147/65 (01/24/23 0800)  SpO2: 98 % (01/24/23 0800)   Vital Signs (24h Range):  Temp:  [97.7 °F (36.5 °C)-100.3 °F (37.9 °C)] 98.6 °F (37 °C)  Pulse:  [59-68] 63  Resp:  [16-18] 18  SpO2:  [96 %-98 %] 98 %  BP: (102-147)/(50-65) 147/65     Weight: 57.6 kg (126 lb 15.8 oz)  Body mass index is 20.5 kg/m².    Intake/Output Summary (Last 24 hours) at 1/24/2023 1232  Last data filed at 1/24/2023 0800  Gross per 24 hour   Intake 0 ml   Output --   Net 0 ml        Physical Exam  Vitals reviewed.   Constitutional:       Appearance: Normal appearance.   HENT:      Head: Normocephalic and atraumatic.   Eyes:      Extraocular Movements: Extraocular movements intact.   Cardiovascular:      Rate and Rhythm: Normal rate and regular rhythm.      Pulses: Normal pulses.   Pulmonary:      Effort: Pulmonary effort is normal.      Breath sounds: Normal breath sounds.   Abdominal:      General: Abdomen is flat.      Palpations: Abdomen is soft.   Musculoskeletal:         General: Tenderness and signs of injury present.      Comments: Dressed left lower extremity, painful to touch   Skin:     General: Skin is warm and dry.      Capillary Refill: Capillary refill takes less than 2 seconds.   Neurological:      General: No focal deficit present.      Mental Status: She is alert and oriented to person, place, and time.   Psychiatric:         Mood and Affect: Mood normal.         Behavior: Behavior normal.       Significant Labs: All pertinent labs within the past 24 hours have been reviewed.    Significant Imaging: I have reviewed all pertinent imaging results/findings within the past 24 hours.      Assessment/Plan:       Disruption of external surgical wound        Wounds and injuries  Wound care consulted and following    Open wound of left lower leg  Wound care      Multiple skin tears  Wound care      Delmy-prosthetic fracture around prosthetic hip  S/P fixation with Pomierski      Lumbar radiculopathy  Pain management  PT      Hypertension  Adjust medications as needed    Arthritis  Pain management        VTE Risk Mitigation (From admission, onward)         Ordered     apixaban tablet 2.5 mg  2 times daily         12/29/22 1630                Discharge Planning   YANIV:      Code Status: Full Code   Is the patient medically ready for discharge?:     Reason for patient still in hospital (select all that apply): Treatment  Discharge Plan A: Skilled Nursing Facility                  Shai Guerra DO  Department of Hospital Medicine   [facility]

## 2023-01-24 NOTE — SUBJECTIVE & OBJECTIVE
Interval History: naeo    Review of Systems   Constitutional:  Negative for chills, fatigue, fever and unexpected weight change.   HENT:  Negative for congestion, mouth sores and sore throat.    Eyes:  Negative for photophobia and visual disturbance.   Respiratory:  Negative for cough, chest tightness, shortness of breath and wheezing.    Cardiovascular:  Negative for chest pain, palpitations and leg swelling.   Gastrointestinal:  Negative for abdominal pain, diarrhea, nausea and vomiting.   Endocrine: Negative for cold intolerance and heat intolerance.   Genitourinary:  Negative for difficulty urinating, dysuria, frequency and urgency.   Musculoskeletal:  Positive for arthralgias. Negative for back pain and myalgias.   Skin:  Negative for pallor and rash.   Neurological:  Negative for tremors, seizures, syncope, weakness, numbness and headaches.   Hematological:  Does not bruise/bleed easily.   Psychiatric/Behavioral:  Negative for agitation, confusion, hallucinations and suicidal ideas.    Objective:     Vital Signs (Most Recent):  Temp: 98.6 °F (37 °C) (01/24/23 0800)  Pulse: 63 (01/24/23 0800)  Resp: 18 (01/24/23 1116)  BP: (!) 147/65 (01/24/23 0800)  SpO2: 98 % (01/24/23 0800)   Vital Signs (24h Range):  Temp:  [97.7 °F (36.5 °C)-100.3 °F (37.9 °C)] 98.6 °F (37 °C)  Pulse:  [59-68] 63  Resp:  [16-18] 18  SpO2:  [96 %-98 %] 98 %  BP: (102-147)/(50-65) 147/65     Weight: 57.6 kg (126 lb 15.8 oz)  Body mass index is 20.5 kg/m².    Intake/Output Summary (Last 24 hours) at 1/24/2023 1232  Last data filed at 1/24/2023 0800  Gross per 24 hour   Intake 0 ml   Output --   Net 0 ml        Physical Exam  Vitals reviewed.   Constitutional:       Appearance: Normal appearance.   HENT:      Head: Normocephalic and atraumatic.   Eyes:      Extraocular Movements: Extraocular movements intact.   Cardiovascular:      Rate and Rhythm: Normal rate and regular rhythm.      Pulses: Normal pulses.   Pulmonary:      Effort: Pulmonary  effort is normal.      Breath sounds: Normal breath sounds.   Abdominal:      General: Abdomen is flat.      Palpations: Abdomen is soft.   Musculoskeletal:         General: Tenderness and signs of injury present.      Comments: Dressed left lower extremity, painful to touch   Skin:     General: Skin is warm and dry.      Capillary Refill: Capillary refill takes less than 2 seconds.   Neurological:      General: No focal deficit present.      Mental Status: She is alert and oriented to person, place, and time.   Psychiatric:         Mood and Affect: Mood normal.         Behavior: Behavior normal.       Significant Labs: All pertinent labs within the past 24 hours have been reviewed.    Significant Imaging: I have reviewed all pertinent imaging results/findings within the past 24 hours.

## 2023-01-25 PROCEDURE — 25000003 PHARM REV CODE 250: Performed by: STUDENT IN AN ORGANIZED HEALTH CARE EDUCATION/TRAINING PROGRAM

## 2023-01-25 PROCEDURE — 63600175 PHARM REV CODE 636 W HCPCS: Performed by: STUDENT IN AN ORGANIZED HEALTH CARE EDUCATION/TRAINING PROGRAM

## 2023-01-25 PROCEDURE — 11000001 HC ACUTE MED/SURG PRIVATE ROOM

## 2023-01-25 PROCEDURE — 97110 THERAPEUTIC EXERCISES: CPT

## 2023-01-25 PROCEDURE — 97116 GAIT TRAINING THERAPY: CPT

## 2023-01-25 PROCEDURE — 99231 PR SUBSEQUENT HOSPITAL CARE,LEVL I: ICD-10-PCS | Mod: ,,, | Performed by: STUDENT IN AN ORGANIZED HEALTH CARE EDUCATION/TRAINING PROGRAM

## 2023-01-25 PROCEDURE — 99231 SBSQ HOSP IP/OBS SF/LOW 25: CPT | Mod: ,,, | Performed by: STUDENT IN AN ORGANIZED HEALTH CARE EDUCATION/TRAINING PROGRAM

## 2023-01-25 RX ORDER — POLYETHYLENE GLYCOL 3350 17 G/17G
17 POWDER, FOR SOLUTION ORAL DAILY
Status: DISCONTINUED | OUTPATIENT
Start: 2023-01-26 | End: 2023-02-04

## 2023-01-25 RX ADMIN — APIXABAN 2.5 MG: 2.5 TABLET, FILM COATED ORAL at 09:01

## 2023-01-25 RX ADMIN — GABAPENTIN 100 MG: 100 CAPSULE ORAL at 03:01

## 2023-01-25 RX ADMIN — PANTOPRAZOLE SODIUM 40 MG: 40 TABLET, DELAYED RELEASE ORAL at 10:01

## 2023-01-25 RX ADMIN — MEGESTROL ACETATE 40 MG: 40 TABLET ORAL at 10:01

## 2023-01-25 RX ADMIN — HYDRALAZINE HYDROCHLORIDE 25 MG: 25 TABLET ORAL at 06:01

## 2023-01-25 RX ADMIN — TAMSULOSIN HYDROCHLORIDE 0.4 MG: 0.4 CAPSULE ORAL at 10:01

## 2023-01-25 RX ADMIN — ALPRAZOLAM 1 MG: 0.25 TABLET ORAL at 09:01

## 2023-01-25 RX ADMIN — DOCUSATE SODIUM 100 MG: 100 CAPSULE, LIQUID FILLED ORAL at 09:01

## 2023-01-25 RX ADMIN — LISINOPRIL 20 MG: 20 TABLET ORAL at 10:01

## 2023-01-25 RX ADMIN — LEVOTHYROXINE SODIUM 125 MCG: 0.12 TABLET ORAL at 06:01

## 2023-01-25 RX ADMIN — HYDROCODONE BITARTRATE AND ACETAMINOPHEN 2 TABLET: 10; 325 TABLET ORAL at 09:01

## 2023-01-25 RX ADMIN — DOCUSATE SODIUM 100 MG: 100 CAPSULE, LIQUID FILLED ORAL at 10:01

## 2023-01-25 RX ADMIN — FAMOTIDINE 20 MG: 20 TABLET ORAL at 10:01

## 2023-01-25 RX ADMIN — PREDNISONE 5 MG: 5 TABLET ORAL at 10:01

## 2023-01-25 RX ADMIN — GABAPENTIN 100 MG: 100 CAPSULE ORAL at 09:01

## 2023-01-25 RX ADMIN — HYDROCODONE BITARTRATE AND ACETAMINOPHEN 2 TABLET: 10; 325 TABLET ORAL at 10:01

## 2023-01-25 RX ADMIN — METOPROLOL TARTRATE 50 MG: 50 TABLET, FILM COATED ORAL at 10:01

## 2023-01-25 RX ADMIN — APIXABAN 2.5 MG: 2.5 TABLET, FILM COATED ORAL at 10:01

## 2023-01-25 RX ADMIN — DILTIAZEM HYDROCHLORIDE 120 MG: 120 CAPSULE, COATED, EXTENDED RELEASE ORAL at 10:01

## 2023-01-25 RX ADMIN — GABAPENTIN 100 MG: 100 CAPSULE ORAL at 10:01

## 2023-01-25 NOTE — PT/OT/SLP PROGRESS
"Physical Therapy Treatment    Patient Name:  Zandra Veloz   MRN:  [id number]    Recommendations:     Discharge Recommendations: nursing facility, skilled  Discharge Equipment Recommendations: other (see comments) (to be determined)  Barriers to discharge:  Ongoing medical treatment    Assessment:     Zandra Veloz is a 88 y.o. female admitted with a medical diagnosis of Urinary tract infection.  She presents with the following impairments/functional limitations: weakness, impaired endurance, impaired self care skills, impaired balance, impaired functional mobility, impaired skin, pain, orthopedic precautions.    Pt premedicated before therapy treatment. Pt required more assistance with transfers than previous session but was able to increase gait distance. Pt will require extended rehab to regain independent function.     Rehab Prognosis: Good; patient would benefit from acute skilled PT services to address these deficits and reach maximum level of function.    Recent Surgery: * No surgery found *      Plan:     During this hospitalization, patient to be seen 5 x/week to address the identified rehab impairments via gait training, therapeutic activities and progress toward the following goals:    Plan of Care Expires:  01/30/23    Subjective     Chief Complaint: Wound, left hip revision  Patient/Family Comments/goals: "I don't feel like I am getting stronger."  Pain/Comfort:  Pain Rating 1: 0/10 at rest. Complaints of up to 8/10 L LE during ambulation trial.   Pain Rating Post-Intervention 1: 0/10      Objective:     Communicated with Delia Del Cid RN prior to session.  Patient found supine with peripheral IV upon PT entry to room.     General Precautions: Standard, fall  Orthopedic Precautions: LLE partial weight bearing, LLE posterior precautions  Braces: N/A  Respiratory Status: Room air     Functional Mobility:  Bed Mobility:     Rolling Left:  contact guard assistance to min A  Rolling " "Right: contact guard assistance to min A  Transfers:     Sit to Stand:  moderate assistance and of 2 persons with rolling walker  Gait: Pt able to ambulate 6' using RW with min A x 2. Pt able to perform ambulation despite constantly stating, "I can't do this". Pt demonstrated decreased step lengths, shuffling gait, step to gait pattern, and decreased endurance.       AM-PAC 6 CLICK MOBILITY  Turning over in bed (including adjusting bedclothes, sheets and blankets)?: 3  Sitting down on and standing up from a chair with arms (e.g., wheelchair, bedside commode, etc.): 2  Moving from lying on back to sitting on the side of the bed?: 3  Moving to and from a bed to a chair (including a wheelchair)?: 3  Need to walk in hospital room?: 3  Climbing 3-5 steps with a railing?: 1  Basic Mobility Total Score: 15       Treatment & Education:  Gait and transfers as above.  Bilateral lower extremity exercise x 30 reps: ankle pumps, Quad sets, glut sets, heel slides, hip abduction/adduction, and straight leg raises with active assist ROM, verbal cues for sequencing and safety, and tactile cues     Patient left up in chair with all lines intact, call button in reach, Delia Del Cid RN notified, and caregiver present..    GOALS:   Multidisciplinary Problems       Physical Therapy Goals          Problem: Physical Therapy    Goal Priority Disciplines Outcome Goal Variances Interventions   Physical Therapy Goal     PT, PT/OT Ongoing, Progressing     Description: Short Term Goals to be met by 2/25/2023    Patient will increase functional independence and safety with mobility by performing    1.   Rolling side to side with standby assist  2.   Supine to sit standby Assist  3.   Sit to stand Minimal Assist using manual assistance with gait belt and PWB L LE  4.   Ambulate 20ft with RW, PWB LLE. Min A.   5.   Lower extremity exercises x 30 reps with assist as necessary and no rest breaks      Long Term Goals to be met by " 3/10/2023    Patient to require less than or equal to Stand by assist for functional mobility to ease caregiver burden                        Time Tracking:     PT Received On: 01/25/23  PT Start Time: 1051     PT Stop Time: 1115  PT Total Time (min): 24 min     Billable Minutes: Gait Training 15 min and Therapeutic Exercise 8 min    Treatment Type: Treatment  PT/PTA: PT     PTA Visit Number: 0     01/25/2023

## 2023-01-25 NOTE — PLAN OF CARE
Plans of care ongoing  Problem: Adult Inpatient Plan of Care  Goal: Plan of Care Review  Outcome: Ongoing, Progressing  Goal: Patient-Specific Goal (Individualized)  Outcome: Ongoing, Progressing  Goal: Absence of Hospital-Acquired Illness or Injury  Outcome: Ongoing, Progressing  Goal: Optimal Comfort and Wellbeing  Outcome: Ongoing, Progressing  Goal: Readiness for Transition of Care  Outcome: Ongoing, Progressing     Problem: Impaired Wound Healing  Goal: Optimal Wound Healing  Outcome: Ongoing, Progressing     Problem: Skin Injury Risk Increased  Goal: Skin Health and Integrity  Outcome: Ongoing, Progressing     Problem: Fall Injury Risk  Goal: Absence of Fall and Fall-Related Injury  Outcome: Ongoing, Progressing     Problem: Infection  Goal: Absence of Infection Signs and Symptoms  Outcome: Ongoing, Progressing

## 2023-01-25 NOTE — PT/OT/SLP PROGRESS
Occupational Therapy   Treatment    Name: Zandra Veloz  MRN: [id number]  Admitting Diagnosis:  Urinary tract infection       Recommendations:     Discharge Recommendations: nursing facility, skilled  Discharge Equipment Recommendations:   (to be determined)  Barriers to discharge:  None    Assessment:     Zandra Veloz is a 88 y.o. female with a medical diagnosis of Urinary tract infection.  She presents with complaint of not feeling well. Pt agreed to OT treatment. Performance deficits affecting function are weakness, impaired endurance, impaired self care skills, impaired functional mobility, orthopedic precautions, impaired skin.     Pt making progress with functional mobility. Pt has complained of not feeling well the past two days.    Rehab Prognosis:  Good; patient would benefit from acute skilled OT services to address these deficits and reach maximum level of function.       Plan:     Patient to be seen 5 x/week to address the above listed problems via self-care/home management, therapeutic activities, therapeutic exercises  Plan of Care Expires: 02/06/23  Plan of Care Reviewed with: patient, caregiver    Subjective     Pain/Comfort:  Pain Rating 1: 0/10    Objective:     Communicated with: Madie Wood  prior to session.  Patient found HOB elevated with peripheral IV upon OT entry to room.    General Precautions: Standard, fall    Orthopedic Precautions:LLE partial weight bearing, LLE posterior precautions  Braces: N/A  Respiratory Status: Room air     Occupational Performance:     Bed Mobility:    Patient completed Rolling/Turning to Left with  minimum assistance  Patient completed Rolling/Turning to Right with minimum assistance  Patient completed Supine to Sit with minimum assistance     Functional Mobility/Transfers:  Patient completed Sit <> Stand Transfer with minimum assistance and of x 2 persons  with  gait belt to stand to RW   Patient completed Bed <> Chair Transfer using  Step Transfer technique with minimum assistance and of 2 persons with rolling walker and cues for WBS  Functional Mobility: Min a x 2 with RW and gait belt    Activities of Daily Living:  Upper Body Dressing: moderate assistance isac villatoro as a robe      [facility] 6 Click ADL:      Treatment & Education:  Pt performed UE strengthening exercises. AAROM with 1 lb hand wt (B) shoulder flexion/extension and adduction/abduction. AROM with 1 lb hand wt x 2 sets of 15 reps  (B) elbow and wrist flexion/extension. Red theraband x 2 sets of 15 reps (B) elbow flexion and extension.    Patient left up in chair with all lines intact, call button in reach, and PTA Shikha Bunny and ismael present    GOALS:   Multidisciplinary Problems       Occupational Therapy Goals          Problem: Occupational Therapy    Goal Priority Disciplines Outcome Interventions   Occupational Therapy Goal     OT, PT/OT Ongoing, Progressing    Description: STG: (In 2 weeks)  Pt will perform grooming with min(A)  Pt will bathe with setup and mod (A)  Pt will perform UE dressing with setup and min(A)  Pt will perform (L) UE AROM to 3/4 full range  Pt will sit EOB x 5 min with CGA assistance  Pt will transfer bed/chair/bsc with mod(A)  Pt will tolerate 20 minutes of tx without fatigue      LT.Restore to max I with self care and mobility.                          Time Tracking:     OT Date of Treatment: 23  OT Start Time: 935  OT Stop Time: 1005  OT Total Time (min): 30 min    Billable Minutes:Therapeutic Activity 10  Therapeutic Exercise 17    OT/BRIGHT: OT          2023

## 2023-01-25 NOTE — PROGRESS NOTES
"[facility] [place]  Adult Nutrition  First Assessment Note         Reason for Assessment  Reason For Assessment: RD follow-up/ follow up note  Nutrition Risk Screen: large or nonhealing wound, burn or pressure injury    RD follow up assessment.    Recommend continue current diet order as medically appropriate and tolerated. Recommend continue Ensure Enlive oral nutrition supplement TID to promote adequate oral intake.     Recommend continue Arginaid (modular equivalent to Vamshi, can be used as Vamshi out of stock d/t supply issue) BID to aid in wound healing. Recommend adding 500mg Vit C BID + 220mg ZnSO4 BID + MVI daily to aid in wound healing.    Per MD notes,  "12/29-needs continued wound care and therapy. DC to [facility]  12/30-doing well today, still with pain  12/31-having some pain this am  1/1- no complaints  1/2- doing well  1/3- continue wound care, last day cefepime tomorrow  1/4- some pain this AM.  Delay in mediations dues to staffing issues. Dsicussed with RN  1/5- no issues overnight"  1/6- Still with pain, hip incision looks good  1/7-diclofenac for shoulder pain  1/8-decrease laxatives, change benzos and carlo to PRN per patient request  1/9 some confusion overnight  1/10- no more confusion, doing well  1/11- no acute issues overnight  1/12- doing well, nausea today  1/13 -Dr. Lebron Cutler IV, DO taking over for Dr. Guerra for weekend.  Patient's pain appears to be well controlled this morning no complaints of nausea.  Continue wound care and PT/OT  1/14-patient appears well labs.  Continue wound care and PT/OT  1/15-patient still appears well.  No acute concerns or complaints.  No events overnight.  Continue wound care working with a PTOT.  Expiration is services estimated by February 6 1/16- no complaints, doing well  1/17-some nausea today  1/18-better today.  Still requiring twice weekly dressing changes.  Appetite stimulant  1/19- PWB with PT today.  Still with " weeping wounds.  Will need wound care twice weekly-as these improve will be able to transfer to other facility  1/20-no complaints  1/21-no acute events overnight, some anxiety today  1/22-episode of SVT yesterday. Spontaneous resolution  1/23-no complaints  1/24- no complaints    Patient currently receiving Regular Diet.  Ensure Enlive provides 350kcal and 20g protein each.     Patient with multiple wounds. Recommend continue Arginaid (modular equivalent to Vamshi, can be used as Vamshi out of stock d/t supply issue) BID to aid in wound healing. Arginaid provides 30kcal each. Recommend 500mg Vit C BID + 220mg ZnSO4 BID + MVI daily to aid in wound healing.     Patient CBW and BMI considered WNL.     Last BM 1/21 per flowsheets -  pt receiving docusate sodium, senna and polyethylene glycol. Will continue to monitor PO intake, weight trend, meds, labs, updates in patient condition. RD following.    Megace added 1/18 for appetite.    Malnutrition  Is Patient Malnourished: No    Nutrition Diagnosis  Increased protein Needs   related to Wound healing as evidenced by pt with multiple wounds    Nutrition Diagnosis Status: Chronic/ continues    Nutrition Risk  Level of Risk/Frequency of Follow-up: moderate   Chewing or Swallowing Difficulty?: No Chewing or swallowing difficulty    Estimated/Assessed Needs    Temp: 98 °F (36.7 °C)Oral  Weight Used For Calorie Calculations: 59.1 kg (130 lb 4.7 oz)   Energy Need Method: Kcal/kg (35-40 kcal/kg) Energy Calorie Requirements (kcal): 1583-8842 kcal  Weight Used For Protein Calculations: 59.1 kg (130 lb 4.7 oz)  Protein Requirements: 59-118g pro (1.0-2.0g pro/kg d/t geriatric, wounds)  Estimated Fluid Requirement Method: RDA Method    RDA Method (mL): 2069     Nutrition Prescription / Recommendations  Recommendation/Intervention: Recommend continue current diet order as medically appropriate and tolerated. Recommend adding Ensure Enlive oral nutrition supplement TID to promote  adequate oral intake. Recommend adding Arginaid (modular equivalent to Vamshi, can be used as Vamshi out of stock d/t supply issue) BID to aid in wound healing. Recommend adding 500mg Vit C BID + 220mg ZnSO4 BID + MVI daily to aid in wound healing.  Goals: PO intake %, weight maintenance  Nutrition Goal Status: progressing towards goal  Current Diet Order: Regular Diet  Nutrition Order Comments: Appropriate  Recommended Diet: Regular  Recommended Oral Supplement: Vamshi [90 kcals, 2.5g Protein, 10g Carbs(3g Sugar), 7g L-Arginine, 7g L-Glutamine, Vitamin C 300mg, 9.5mg Zinc] twice daily and Ensure Enlive [360 kcals, 20g Protein, 44g Carbs(3g Fiber, 20g Sugar), 11g Fat three times daily  Is Nutrition Support Recommended: No  Is Education Recommended: No    Monitor and Evaluation  % current Intake: P.O. intake of 25 - 50 %  % intake to meet estimated needs: 75 - 100 %  Food and Nutrient Intake: energy intake, food and beverage intake  Food and Nutrient Adminstration: diet order  Knowledge/Beliefs/Attitudes: food and nutrition knowledge/skill, beliefs and attitudes  Physical Activity and Function: nutrition-related ADLs and IADLs, factors affecting access to physical activity  Anthropometric Measurements: weight, weight change, body mass index  Biochemical Data, Medical Tests and Procedures: electrolyte and renal panel, gastrointestinal profile, glucose/endocrine profile, inflammatory profile, lipid profile  Nutrition-Focused Physical Findings: overall appearance, extremities, muscles and bones, head and eyes, skin     Current Medical Diagnosis and Past Medical History  Diagnosis: other (see comments) (urinary tract infection)  Past Medical History:   Diagnosis Date    Hypertension     Osteoporosis     Thyroid disorder      Nutrition/Diet History  Spiritual, Cultural Beliefs, Muslim Practices, Values that Affect Care: no  Food Allergies: NKFA    Lab/Procedures/Meds  Recent Labs   Lab 01/23/23  0535      K  "4.5   BUN 14   CREATININE 0.76   GLU 87   CALCIUM 8.5        Labs WNL  Last A1c: No results found for: HGBA1C  Lab Results   Component Value Date    RBC 3.17 (L) 01/23/2023    HGB 9.5 (L) 01/23/2023    HCT 31.4 (L) 01/23/2023    MCV 99.1 (H) 01/23/2023    MCH 30.0 01/23/2023    MCHC 30.3 (L) 01/23/2023     Pertinent Labs Reviewed: reviewed  Pertinent Medications Reviewed: reviewed  Alprazolam, apixaban, bisacodyl, diltiazem, docusate sodium, famotidine, gabapentin, hydralazine, levothyroxine, lisinopril, nystatin, pantoprazole, polyethylene glycol, prednisone, senna, hydrocodone-acetaminophen PRN    Anthropometrics  Temp: 98 °F (36.7 °C)  Height: 5' 6" (167.6 cm)  Height (inches): 66 in  Weight Method: Bed Scale  Weight: 57.6 kg (126 lb 15.8 oz)  Weight (lb): 126.99 lb  Ideal Body Weight (IBW), Female: 130 lb  % Ideal Body Weight, Female (lb): 100.15 %  BMI (Calculated): 20.5     Nutrition by Nursing  Diet/Nutrition Received: regular  Intake (%): 50%  Diet/Feeding Assistance: tray set-up  Diet/Feeding Tolerance: no appetite  Last Bowel Movement: 01/24/23    Nutrition Follow-Up  RD Follow-up?: Yes  "

## 2023-01-25 NOTE — PT/OT/SLP PROGRESS
Occupational Therapy   Treatment    Name: Zandra Veloz  MRN: [id number]  Admitting Diagnosis:  Urinary tract infection       Recommendations:     Discharge Recommendations: nursing facility, skilled  Discharge Equipment Recommendations:   (to be determined)  Barriers to discharge:  None    Assessment:     Zandra Veloz is a 88 y.o. female with a medical diagnosis of Urinary tract infection.  She presents with no complaints. Pt agreed to OT treatment. Performance deficits affecting function are weakness, impaired endurance.     Rehab Prognosis:  Good; patient would benefit from acute skilled OT services to address these deficits and reach maximum level of function.       Plan:     Patient to be seen 5 x/week to address the above listed problems via self-care/home management, therapeutic activities, therapeutic exercises  Plan of Care Expires: 02/06/23  Plan of Care Reviewed with: patient, caregiver    Subjective     Pain/Comfort:  Pain Rating 1: 0/10  Pain Rating Post-Intervention 1: 0/10    Objective:     Communicated with: CLYDE Del Cid prior to session.  Patient found up in chair with peripheral IV upon OT entry to room.    General Precautions: Standard, fall    Orthopedic Precautions:LLE partial weight bearing, LLE posterior precautions  Braces: N/A  Respiratory Status: Room air     Occupational Performance:     Bed Mobility:         Functional Mobility/Transfers:    Functional Mobility:     Activities of Daily Living:  Grooming: Pt brushed teeth (I) with self setup  I with washing face and hands.      [facility] 6 Click ADL:      Treatment & Education:  Pt performed UE strengthening exercises. 1 lb hand wt x 2 sets of 15 reps (B) shoulder flexion/extension, adduction/abduction and (B) elbow and wrist flexion/extension. Hand exerciser x 2 sets of 20 reps x 3 bands. Red theraband x 2 sets of 15 reps (B) elbow flexion and extension.  Pt participated well with tx.    Patient left up in chair  with all lines intact, call button in reach, and sitter present    GOALS:   Multidisciplinary Problems       Occupational Therapy Goals          Problem: Occupational Therapy    Goal Priority Disciplines Outcome Interventions   Occupational Therapy Goal     OT, PT/OT Ongoing, Progressing    Description: STG: (In 2 weeks)  Pt will perform grooming with min(A)  Pt will bathe with setup and mod (A)  Pt will perform UE dressing with setup and min(A)  Pt will perform (L) UE AROM to 3/4 full range  Pt will sit EOB x 5 min with CGA assistance  Pt will transfer bed/chair/bsc with mod(A)  Pt will tolerate 20 minutes of tx without fatigue      LT.Restore to max I with self care and mobility.                          Time Tracking:     OT Date of Treatment: 23  OT Start Time: 1126  OT Stop Time: 1153  OT Total Time (min): 27 min    Billable Minutes:Therapeutic Exercise 20    OT/BRIGHT: OT          2023

## 2023-01-25 NOTE — PLAN OF CARE
Problem: Occupational Therapy  Goal: Occupational Therapy Goal  Description: STG: (In 2 weeks)  Pt will perform grooming with min(A)--achieved with brushing teeth and washing face and hands. Family assist with hair.  Pt will bathe with setup and mod (A)---NT this week  Pt will perform UE dressing with setup and min(A)---progressing  Pt will perform (L) UE AROM to 3/4 full range--progressing  Pt will sit EOB x 5 min with CGA assistance---achieved  Pt will transfer bed/chair/bsc with mod(A)---achieved  Pt will tolerate 20 minutes of tx without fatigue---Progressing      LT.Restore to max I with self care and mobility.     Outcome: Ongoing, Progressing    Pt is making progress toward goals. Improvement noted this week with bed/mobility and transfers. Pt able to perform grooming with self setup and progressing with donning gown. Plan is to continue    Grooming : I with brushing teeth with self setup. I with washing face and hands  UE dressing Mod/max a donning gown as a robe    Patient completed Rolling/Turning to Left with  minimum assistance  Patient completed Rolling/Turning to Right with minimum assistance  Patient completed Supine to Sit with minimum assistance      Functional Mobility/Transfers:  Patient completed Sit <> Stand Transfer with minimum assistance and of x 2 persons  with  gait belt to stand to RW   Patient completed Bed <> Chair Transfer using Step Transfer technique with minimum assistance and of 2 persons with rolling walker and cues for WBS  Functional Mobility: Min a x 2 with RW and gait belt

## 2023-01-25 NOTE — NURSING
Patient is awake and alert able to make needs known no voiced complaints at this time sitter at bedisde no distress noted .. safety measures ongoing

## 2023-01-25 NOTE — PROGRESS NOTES
[facility] Hospital Medicine  Progress Note    Patient Name: Zandra Veloz  MRN: [id number]  Patient Class: IP- Inpatient   Admission Date: 12/29/2022  Length of Stay: 27 days  Attending Physician: Shai Guerra DO  Primary Care Provider: Brandon Thornton MD        Subjective:     Principal Problem:Urinary tract infection        HPI:  HPI:   88-year-old lady seen emergency room department.  Patient presents after having a fall when she was trying to go to the restroom at her home.  Patient sustained a left femur fracture.  Patient complains of moderate to severe pain in the left hip area.  Patient also complains of chest tightness, she rates it a 5/10 in the chest tightness has been intubate.  Patient also was seen in emergency room department earlier this week per her daughter.  Patient is found to have dehydration and a urinary tract infection.  Current she denies any shortness of breath.  She does not give a history of coronary artery disease.        Procedure(s) (LRB):  REVISION, TOTAL ARTHROPLASTY, HIP, VICTORIANO PROSTHETIC FX (Left)       Hospital Course:   12/19 - records reviewed. Fall without LOC and has left hip fx. No other complaints currently. Seen by cardiology with some CP felt musculoskeletal.  Echo mild AS and mod MR with nl EF. Age increase cardiac risk for surgery but discussed with daughter surgery is urgent and see no benefit in delay medically. Feel low to moderate risk for surgery. Question about UTI. No symptoms. Had UTI in 10.22 not surprising. Lab to do culture on urine in lab. Cover with ATN for prior culture with ATB started. Discussed with Dr Singer and cardiology.  12/20 Tachycardia and elevated BP. Cardiology adjusted meds. Plan hold off surgery until 12/22 to adjust meds and treat UTI. Family in room. Pt not sleeping well.   12/21 Didn't sleep well prior night. Apparently been on xanax for a time. Also recently started on Neurontin. Family with  question. No recent BM. Some increase stool on KUB but not severe. Surgery for am. BP some up but better. HR good.   12/22 Seen prior to surgery and apparently add better night. Sore mouth / throat better with mycostatin. For surgery. Family in room.  12/23 patient with pain but otherwise seemed to be doing relatively well.  Daughter in room.  Apparently been taking prednisone for some time and this was restarted per family request.  Look into swing bed placement  12/24 patient had better night rested and everyone's in better spirits today.  Tolerating some diet.   Therapy worked with patient.  Look into swing bed placement next week  12/25 remained in good spirits.  Less pain.  Had good bowel movement and ordered Dulcolax not given.  Because of dressing to leg, Burk was not removed to keep it from getting soiled.  Wound care to check 12/27.  Discuss this with patient and daughter.  On antibiotics for UTI  12/26-will dc to swingbed once accepted. Needs continued wound care.  12/27-DC when bed available  12/28- issues with wound care yesterday.  Dr. Singer had to come remove dressings himself due to adherence to subcutaneous tissue.  Family refusing transfer to [facility] due to lack of specially trained wound care team availability.  Planning to transfer to specialty but resistant to that as well and requesting to speak to administration.    12/29- agreeable to transfer to specialty.  This will be the best place for her to receive wound care.  DC after family tours     12/29-needs continued wound care and therapy[facility]    12/30-doing well today, still with pain         Overview/Hospital Course:  12/31-having some pain this am  1/1- no complaints  1/2- doing well  1/3- continue wound care, last day cefepime tomorrow  1/4- some pain this AM.  Delay in mediations dues to staffing issues. Dsicussed with RN  1/5- no issues overnight  1/6- Still with pain, hip incision looks good  1/7-diclofenac for  shoulder pain  1/8-decrease laxatives, change benzos and carlo to PRN per patient request  1/9 some confusion overnight  1/10- no more confusion, doing well  1/11- no acute issues overnight  1/12- doing well, nausea today  1/13 -Dr. Lebron Cutler IV, DO taking over for Dr. Guerra for weekend.  Patient's pain appears to be well controlled this morning no complaints of nausea.  Continue wound care and PT/OT  1/14-patient appears well labs.  Continue wound care and PT/OT  1/15-patient still appears well.  No acute concerns or complaints.  No events overnight.  Continue wound care working with a PTOT.  Expiration is services estimated by February 6 1/16- no complaints, doing well  1/17-some nausea today  1/18-better today.  Still requiring twice weekly dressing changes.  Appetite stimulant  1/19- PWB with PT today.  Still with weeping wounds.  Will need wound care twice weekly-as these improve will be able to transfer to other facility  1/20-no complaints  1/21-no acute events overnight, some anxiety today  1/22-episode of SVT yesterday. Spontaneous resolution  1/23-no complaints  1/24- no complaints  1/25-doing well      Interval History: naeo    Review of Systems   Constitutional:  Negative for chills, fatigue, fever and unexpected weight change.   HENT:  Negative for congestion, mouth sores and sore throat.    Eyes:  Negative for photophobia and visual disturbance.   Respiratory:  Negative for cough, chest tightness, shortness of breath and wheezing.    Cardiovascular:  Negative for chest pain, palpitations and leg swelling.   Gastrointestinal:  Negative for abdominal pain, diarrhea, nausea and vomiting.   Endocrine: Negative for cold intolerance and heat intolerance.   Genitourinary:  Negative for difficulty urinating, dysuria, frequency and urgency.   Musculoskeletal:  Positive for arthralgias. Negative for back pain and myalgias.   Skin:  Negative for pallor and rash.   Neurological:  Negative for tremors,  seizures, syncope, weakness, numbness and headaches.   Hematological:  Does not bruise/bleed easily.   Psychiatric/Behavioral:  Negative for agitation, confusion, hallucinations and suicidal ideas.    Objective:     Vital Signs (Most Recent):  Temp: 98 °F (36.7 °C) (01/25/23 0735)  Pulse: (!) 53 (01/25/23 0735)  Resp: 18 (01/25/23 0735)  BP: (!) 132/53 (01/25/23 0735)  SpO2: 98 % (01/25/23 0735)   Vital Signs (24h Range):  Temp:  [97.6 °F (36.4 °C)-98.3 °F (36.8 °C)] 98 °F (36.7 °C)  Pulse:  [53-76] 53  Resp:  [18-20] 18  SpO2:  [95 %-98 %] 98 %  BP: ()/(41-59) 132/53     Weight: 57.6 kg (126 lb 15.8 oz)  Body mass index is 20.5 kg/m².    Intake/Output Summary (Last 24 hours) at 1/25/2023 1004  Last data filed at 1/24/2023 1707  Gross per 24 hour   Intake 0 ml   Output --   Net 0 ml        Physical Exam  Vitals reviewed.   Constitutional:       Appearance: Normal appearance.   HENT:      Head: Normocephalic and atraumatic.   Eyes:      Extraocular Movements: Extraocular movements intact.   Cardiovascular:      Rate and Rhythm: Normal rate and regular rhythm.      Pulses: Normal pulses.   Pulmonary:      Effort: Pulmonary effort is normal.      Breath sounds: Normal breath sounds.   Abdominal:      General: Abdomen is flat.      Palpations: Abdomen is soft.   Musculoskeletal:         General: Tenderness and signs of injury present.      Comments: Dressed left lower extremity, painful to touch   Skin:     General: Skin is warm and dry.      Capillary Refill: Capillary refill takes less than 2 seconds.   Neurological:      General: No focal deficit present.      Mental Status: She is alert and oriented to person, place, and time.   Psychiatric:         Mood and Affect: Mood normal.         Behavior: Behavior normal.       Significant Labs: All pertinent labs within the past 24 hours have been reviewed.    Significant Imaging: I have reviewed all pertinent imaging results/findings within the past 24  hours.      Assessment/Plan:      Disruption of external surgical wound        Wounds and injuries  Wound care consulted and following    Open wound of left lower leg  Wound care      Multiple skin tears  Wound care      Delmy-prosthetic fracture around prosthetic hip  S/P fixation with Pomierski      Lumbar radiculopathy  Pain management  PT      Hypertension  Adjust medications as needed    Arthritis  Pain management        VTE Risk Mitigation (From admission, onward)         Ordered     apixaban tablet 2.5 mg  2 times daily         12/29/22 1630                Discharge Planning   YANIV:      Code Status: Full Code   Is the patient medically ready for discharge?:     Reason for patient still in hospital (select all that apply): Treatment  Discharge Plan A: Skilled Nursing Facility                  Shai Guerra DO  Department of Hospital Medicine   [facility]

## 2023-01-25 NOTE — SUBJECTIVE & OBJECTIVE
Interval History: naeo    Review of Systems   Constitutional:  Negative for chills, fatigue, fever and unexpected weight change.   HENT:  Negative for congestion, mouth sores and sore throat.    Eyes:  Negative for photophobia and visual disturbance.   Respiratory:  Negative for cough, chest tightness, shortness of breath and wheezing.    Cardiovascular:  Negative for chest pain, palpitations and leg swelling.   Gastrointestinal:  Negative for abdominal pain, diarrhea, nausea and vomiting.   Endocrine: Negative for cold intolerance and heat intolerance.   Genitourinary:  Negative for difficulty urinating, dysuria, frequency and urgency.   Musculoskeletal:  Positive for arthralgias. Negative for back pain and myalgias.   Skin:  Negative for pallor and rash.   Neurological:  Negative for tremors, seizures, syncope, weakness, numbness and headaches.   Hematological:  Does not bruise/bleed easily.   Psychiatric/Behavioral:  Negative for agitation, confusion, hallucinations and suicidal ideas.    Objective:     Vital Signs (Most Recent):  Temp: 98 °F (36.7 °C) (01/25/23 0735)  Pulse: (!) 53 (01/25/23 0735)  Resp: 18 (01/25/23 0735)  BP: (!) 132/53 (01/25/23 0735)  SpO2: 98 % (01/25/23 0735)   Vital Signs (24h Range):  Temp:  [97.6 °F (36.4 °C)-98.3 °F (36.8 °C)] 98 °F (36.7 °C)  Pulse:  [53-76] 53  Resp:  [18-20] 18  SpO2:  [95 %-98 %] 98 %  BP: ()/(41-59) 132/53     Weight: 57.6 kg (126 lb 15.8 oz)  Body mass index is 20.5 kg/m².    Intake/Output Summary (Last 24 hours) at 1/25/2023 1004  Last data filed at 1/24/2023 1707  Gross per 24 hour   Intake 0 ml   Output --   Net 0 ml        Physical Exam  Vitals reviewed.   Constitutional:       Appearance: Normal appearance.   HENT:      Head: Normocephalic and atraumatic.   Eyes:      Extraocular Movements: Extraocular movements intact.   Cardiovascular:      Rate and Rhythm: Normal rate and regular rhythm.      Pulses: Normal pulses.   Pulmonary:      Effort:  Pulmonary effort is normal.      Breath sounds: Normal breath sounds.   Abdominal:      General: Abdomen is flat.      Palpations: Abdomen is soft.   Musculoskeletal:         General: Tenderness and signs of injury present.      Comments: Dressed left lower extremity, painful to touch   Skin:     General: Skin is warm and dry.      Capillary Refill: Capillary refill takes less than 2 seconds.   Neurological:      General: No focal deficit present.      Mental Status: She is alert and oriented to person, place, and time.   Psychiatric:         Mood and Affect: Mood normal.         Behavior: Behavior normal.       Significant Labs: All pertinent labs within the past 24 hours have been reviewed.    Significant Imaging: I have reviewed all pertinent imaging results/findings within the past 24 hours.

## 2023-01-26 PROCEDURE — 25000003 PHARM REV CODE 250: Performed by: STUDENT IN AN ORGANIZED HEALTH CARE EDUCATION/TRAINING PROGRAM

## 2023-01-26 PROCEDURE — 11000001 HC ACUTE MED/SURG PRIVATE ROOM

## 2023-01-26 PROCEDURE — 97110 THERAPEUTIC EXERCISES: CPT

## 2023-01-26 PROCEDURE — 99231 SBSQ HOSP IP/OBS SF/LOW 25: CPT | Mod: ,,, | Performed by: STUDENT IN AN ORGANIZED HEALTH CARE EDUCATION/TRAINING PROGRAM

## 2023-01-26 PROCEDURE — 97116 GAIT TRAINING THERAPY: CPT | Mod: CQ

## 2023-01-26 PROCEDURE — 63600175 PHARM REV CODE 636 W HCPCS: Performed by: STUDENT IN AN ORGANIZED HEALTH CARE EDUCATION/TRAINING PROGRAM

## 2023-01-26 PROCEDURE — 97110 THERAPEUTIC EXERCISES: CPT | Mod: CQ

## 2023-01-26 PROCEDURE — A6212 FOAM DRG <=16 SQ IN W/BORDER: HCPCS

## 2023-01-26 PROCEDURE — 99231 PR SUBSEQUENT HOSPITAL CARE,LEVL I: ICD-10-PCS | Mod: ,,, | Performed by: STUDENT IN AN ORGANIZED HEALTH CARE EDUCATION/TRAINING PROGRAM

## 2023-01-26 RX ADMIN — FAMOTIDINE 20 MG: 20 TABLET ORAL at 09:01

## 2023-01-26 RX ADMIN — DILTIAZEM HYDROCHLORIDE 120 MG: 120 CAPSULE, COATED, EXTENDED RELEASE ORAL at 09:01

## 2023-01-26 RX ADMIN — DOCUSATE SODIUM 100 MG: 100 CAPSULE, LIQUID FILLED ORAL at 09:01

## 2023-01-26 RX ADMIN — APIXABAN 2.5 MG: 2.5 TABLET, FILM COATED ORAL at 09:01

## 2023-01-26 RX ADMIN — MEGESTROL ACETATE 40 MG: 40 TABLET ORAL at 09:01

## 2023-01-26 RX ADMIN — SENNOSIDES 1 TABLET: 8.6 TABLET, FILM COATED ORAL at 09:01

## 2023-01-26 RX ADMIN — HYDRALAZINE HYDROCHLORIDE 25 MG: 25 TABLET ORAL at 09:01

## 2023-01-26 RX ADMIN — HYDROCODONE BITARTRATE AND ACETAMINOPHEN 2 TABLET: 10; 325 TABLET ORAL at 05:01

## 2023-01-26 RX ADMIN — GABAPENTIN 100 MG: 100 CAPSULE ORAL at 09:01

## 2023-01-26 RX ADMIN — POLYETHYLENE GLYCOL 3350 17 G: 17 POWDER, FOR SOLUTION ORAL at 09:01

## 2023-01-26 RX ADMIN — PANTOPRAZOLE SODIUM 40 MG: 40 TABLET, DELAYED RELEASE ORAL at 09:01

## 2023-01-26 RX ADMIN — TAMSULOSIN HYDROCHLORIDE 0.4 MG: 0.4 CAPSULE ORAL at 09:01

## 2023-01-26 RX ADMIN — GABAPENTIN 100 MG: 100 CAPSULE ORAL at 03:01

## 2023-01-26 RX ADMIN — ALPRAZOLAM 1 MG: 0.25 TABLET ORAL at 09:01

## 2023-01-26 RX ADMIN — PREDNISONE 5 MG: 5 TABLET ORAL at 09:01

## 2023-01-26 RX ADMIN — LEVOTHYROXINE SODIUM 125 MCG: 0.12 TABLET ORAL at 06:01

## 2023-01-26 RX ADMIN — HYDROCODONE BITARTRATE AND ACETAMINOPHEN 2 TABLET: 10; 325 TABLET ORAL at 10:01

## 2023-01-26 NOTE — PT/OT/SLP PROGRESS
Occupational Therapy   Treatment    Name: Zandra Veloz  MRN: [id number]  Admitting Diagnosis:  Urinary tract infection       Recommendations:     Discharge Recommendations: nursing facility, skilled  Discharge Equipment Recommendations:   (to be determined)  Barriers to discharge:  None    Assessment:     Zandra Veloz is a 88 y.o. female with a medical diagnosis of Urinary tract infection.  She presents with alert and agreeable to light UE exercises. Pt c/o being fatigued from a very busy day.. Performance deficits affecting function are weakness, impaired functional mobility, impaired endurance, impaired self care skills, impaired skin, decreased ROM.     Rehab Prognosis:  Good; patient would benefit from acute skilled OT services to address these deficits and reach maximum level of function.       Plan:     Patient to be seen 5 x/week to address the above listed problems via self-care/home management, therapeutic activities, therapeutic exercises  Plan of Care Expires: 02/06/23  Plan of Care Reviewed with: patient, caregiver    Subjective     Pain/Comfort:  Pain Rating 1: 0/10  Pain Rating Post-Intervention 1: 0/10    Objective:     Communicated with: CLYDE Del Cid prior to session.  Patient found HOB elevated with peripheral IV, telemetry upon OT entry to room.    General Precautions: Standard, fall    Orthopedic Precautions:LLE partial weight bearing, LLE posterior precautions  Braces: N/A  Respiratory Status: Room air     Occupational Performance:     Bed Mobility:    NT    Functional Mobility/Transfers:  NT    Activities of Daily Living:  NT      [facility] 6 Click ADL:      Treatment & Education:  Pt performed (B) UE ex for 15 to 20 reps of each:  Elbow flexion with 1# db  ER/IR with 1# db  UE diagonals with 1# db through each plane  Elbow extension with yellow t-band    Patient left HOB elevated with all lines intact, call button in reach, bed alarm on, and daughter and sitter  present    GOALS:   Multidisciplinary Problems       Occupational Therapy Goals          Problem: Occupational Therapy    Goal Priority Disciplines Outcome Interventions   Occupational Therapy Goal     OT, PT/OT Ongoing, Progressing    Description: STG: (In 2 weeks)  Pt will bathe with setup and mod (A)  Pt will perform UE dressing with setup and min(A)  Pt will perform (L) UE AROM to 3/4 full range  Pt will sit EOB x 5 min with (I)  Pt will transfer bed/chair/bsc with min a  Pt will tolerate 20 minutes of tx without fatigue      LT.Restore to max I with self care and mobility.                          Time Tracking:     OT Date of Treatment: 23  OT Start Time: 1505  OT Stop Time: 1521  OT Total Time (min): 16 min    Billable Minutes:Therapeutic Exercise 16 min    OT/BRIGHT: OT          2023

## 2023-01-26 NOTE — PT/OT/SLP PROGRESS
Physical Therapy Treatment    Patient Name:  Zandra Veloz   MRN:  [id number]    Recommendations:     Discharge Recommendations: nursing facility, skilled  Discharge Equipment Recommendations: other (see comments) (to be determined)  Barriers to discharge:  ongoing medical treatment    Assessment:     Zandra Veloz is a 88 y.o. female admitted with a medical diagnosis of Urinary tract infection.  She presents with the following impairments/functional limitations: weakness, impaired functional mobility, orthopedic precautions, impaired self care skills, impaired skin, impaired endurance, gait instability, decreased lower extremity function.    Pt able to ambulate farther this  AM with decreased  assistance required, however, pt is more physically capable than efforts exerted    Rehab Prognosis: Fair; patient would benefit from acute skilled PT services to address these deficits and reach maximum level of function.    Recent Surgery: * No surgery found *      Plan:     During this hospitalization, patient to be seen 5 x/week to address the identified rehab impairments via gait training, therapeutic activities, therapeutic exercises and progress toward the following goals:    Plan of Care Expires:  01/30/23    Subjective     Chief Complaint: left hip revision  Patient/Family Comments/goals: pt agreeable to PT  Pain/Comfort:         Objective:     Communicated with Delia Del Cid RN prior to session.  Patient found HOB elevated with peripheral IV, telemetry upon PT entry to room.     General Precautions: Standard, fall  Orthopedic Precautions: LLE partial weight bearing, LLE posterior precautions  Braces: N/A  Respiratory Status: Room air     Functional Mobility:  Bed Mobility:     Rolling Left:  contact guard assistance  Rolling Right: contact guard assistance  Supine to Sit: minimum assistance and increased time  Transfers:     Sit to Stand:  moderate assistance and of 1-2 persons with rolling  walker  Gait: 9' minimum assist with  RW; slow sadaf, short step length, increased  fatigue      AM-PAC 6 CLICK MOBILITY  Turning over in bed (including adjusting bedclothes, sheets and blankets)?: 4  Sitting down on and standing up from a chair with arms (e.g., wheelchair, bedside commode, etc.): 2  Moving from lying on back to sitting on the side of the bed?: 2  Moving to and from a bed to a chair (including a wheelchair)?: 3  Need to walk in hospital room?: 3  Climbing 3-5 steps with a railing?: 1  Basic Mobility Total Score: 15       Treatment & Education:  Pt performed 2 x 15 reps (B) LE exercises: ap, quad set, glut set, straight leg raise, hip ab/adduction, long arc quad, heel slide with active assist range of motion     Sitting EOB x 5 minutes focusing on maintaining balance/midline positioning    Patient left up in chair with all lines intact, call button in reach, and sitter and caregiver present..    GOALS:   Multidisciplinary Problems       Physical Therapy Goals          Problem: Physical Therapy    Goal Priority Disciplines Outcome Goal Variances Interventions   Physical Therapy Goal     PT, PT/OT Ongoing, Progressing     Description: Short Term Goals to be met by 2/25/2023    Patient will increase functional independence and safety with mobility by performing    1.   Rolling side to side with standby assist  2.   Supine to sit standby Assist  3.   Sit to stand Minimal Assist using manual assistance with gait belt and PWB L LE  4.   Ambulate 20ft with RW, PWB LLE. Min A.   5.   Lower extremity exercises x 30 reps with assist as necessary and no rest breaks      Long Term Goals to be met by 3/10/2023    Patient to require less than or equal to Stand by assist for functional mobility to ease caregiver burden                        Time Tracking:     PT Received On: 01/26/23  PT Start Time: 1012     PT Stop Time: 1045  PT Total Time (min): 33 min     Billable Minutes: Gait Training 8 and Therapeutic  Exercise 15    Treatment Type: Treatment  PT/PTA: PTA     PTA Visit Number: 1     01/26/2023

## 2023-01-26 NOTE — PLAN OF CARE
Chart reviewed. Per IDT meeting, regular diet, telemetry. Wound care and PT/OT following. SS following.

## 2023-01-26 NOTE — SUBJECTIVE & OBJECTIVE
Interval History: naeo    Review of Systems   Constitutional:  Negative for chills, fatigue, fever and unexpected weight change.   HENT:  Negative for congestion, mouth sores and sore throat.    Eyes:  Negative for photophobia and visual disturbance.   Respiratory:  Negative for cough, chest tightness, shortness of breath and wheezing.    Cardiovascular:  Negative for chest pain, palpitations and leg swelling.   Gastrointestinal:  Negative for abdominal pain, diarrhea, nausea and vomiting.   Endocrine: Negative for cold intolerance and heat intolerance.   Genitourinary:  Negative for difficulty urinating, dysuria, frequency and urgency.   Musculoskeletal:  Positive for arthralgias. Negative for back pain and myalgias.   Skin:  Negative for pallor and rash.   Neurological:  Negative for tremors, seizures, syncope, weakness, numbness and headaches.   Hematological:  Does not bruise/bleed easily.   Psychiatric/Behavioral:  Negative for agitation, confusion, hallucinations and suicidal ideas.    Objective:     Vital Signs (Most Recent):  Temp: 98.6 °F (37 °C) (01/26/23 1125)  Pulse: (!) 54 (01/26/23 1125)  Resp: 17 (01/26/23 1125)  BP: 124/77 (01/26/23 1125)  SpO2: 98 % (01/26/23 1125)   Vital Signs (24h Range):  Temp:  [97.8 °F (36.6 °C)-98.6 °F (37 °C)] 98.6 °F (37 °C)  Pulse:  [50-57] 54  Resp:  [17-18] 17  SpO2:  [96 %-98 %] 98 %  BP: (108-131)/(45-77) 124/77     Weight: 57.6 kg (126 lb 15.8 oz)  Body mass index is 20.5 kg/m².  No intake or output data in the 24 hours ending 01/26/23 1355     Physical Exam  Vitals reviewed.   Constitutional:       Appearance: Normal appearance.   HENT:      Head: Normocephalic and atraumatic.   Eyes:      Extraocular Movements: Extraocular movements intact.   Cardiovascular:      Rate and Rhythm: Normal rate and regular rhythm.      Pulses: Normal pulses.   Pulmonary:      Effort: Pulmonary effort is normal.      Breath sounds: Normal breath sounds.   Abdominal:      General:  Abdomen is flat.      Palpations: Abdomen is soft.   Musculoskeletal:         General: Tenderness and signs of injury present.      Comments: Dressed left lower extremity, painful to touch   Skin:     General: Skin is warm and dry.      Capillary Refill: Capillary refill takes less than 2 seconds.   Neurological:      General: No focal deficit present.      Mental Status: She is alert and oriented to person, place, and time.   Psychiatric:         Mood and Affect: Mood normal.         Behavior: Behavior normal.       Significant Labs: All pertinent labs within the past 24 hours have been reviewed.    Significant Imaging: I have reviewed all pertinent imaging results/findings within the past 24 hours.

## 2023-01-26 NOTE — PROGRESS NOTES
[facility] Hospital Medicine  Progress Note    Patient Name: Zandra Veloz  MRN: [id number]  Patient Class: IP- Inpatient   Admission Date: 12/29/2022  Length of Stay: 28 days  Attending Physician: Shai Guerra DO  Primary Care Provider: Brandon Thornton MD        Subjective:     Principal Problem:Urinary tract infection        HPI:  HPI:   88-year-old lady seen emergency room department.  Patient presents after having a fall when she was trying to go to the restroom at her home.  Patient sustained a left femur fracture.  Patient complains of moderate to severe pain in the left hip area.  Patient also complains of chest tightness, she rates it a 5/10 in the chest tightness has been intubate.  Patient also was seen in emergency room department earlier this week per her daughter.  Patient is found to have dehydration and a urinary tract infection.  Current she denies any shortness of breath.  She does not give a history of coronary artery disease.        Procedure(s) (LRB):  REVISION, TOTAL ARTHROPLASTY, HIP, VICTORIANO PROSTHETIC FX (Left)       Hospital Course:   12/19 - records reviewed. Fall without LOC and has left hip fx. No other complaints currently. Seen by cardiology with some CP felt musculoskeletal.  Echo mild AS and mod MR with nl EF. Age increase cardiac risk for surgery but discussed with daughter surgery is urgent and see no benefit in delay medically. Feel low to moderate risk for surgery. Question about UTI. No symptoms. Had UTI in 10.22 not surprising. Lab to do culture on urine in lab. Cover with ATN for prior culture with ATB started. Discussed with Dr Singer and cardiology.  12/20 Tachycardia and elevated BP. Cardiology adjusted meds. Plan hold off surgery until 12/22 to adjust meds and treat UTI. Family in room. Pt not sleeping well.   12/21 Didn't sleep well prior night. Apparently been on xanax for a time. Also recently started on Neurontin. Family with  question. No recent BM. Some increase stool on KUB but not severe. Surgery for am. BP some up but better. HR good.   12/22 Seen prior to surgery and apparently add better night. Sore mouth / throat better with mycostatin. For surgery. Family in room.  12/23 patient with pain but otherwise seemed to be doing relatively well.  Daughter in room.  Apparently been taking prednisone for some time and this was restarted per family request.  Look into swing bed placement  12/24 patient had better night rested and everyone's in better spirits today.  Tolerating some diet.   Therapy worked with patient.  Look into swing bed placement next week  12/25 remained in good spirits.  Less pain.  Had good bowel movement and ordered Dulcolax not given.  Because of dressing to leg, Burk was not removed to keep it from getting soiled.  Wound care to check 12/27.  Discuss this with patient and daughter.  On antibiotics for UTI  12/26-will dc to swingbed once accepted. Needs continued wound care.  12/27-DC when bed available  12/28- issues with wound care yesterday.  Dr. Singer had to come remove dressings himself due to adherence to subcutaneous tissue.  Family refusing transfer to [facility] due to lack of specially trained wound care team availability.  Planning to transfer to specialty but resistant to that as well and requesting to speak to administration.    12/29- agreeable to transfer to specialty.  This will be the best place for her to receive wound care.  DC after family tours     12/29-needs continued wound care and therapy[facility]    12/30-doing well today, still with pain         Overview/Hospital Course:  12/31-having some pain this am  1/1- no complaints  1/2- doing well  1/3- continue wound care, last day cefepime tomorrow  1/4- some pain this AM.  Delay in mediations dues to staffing issues. Dsicussed with RN  1/5- no issues overnight  1/6- Still with pain, hip incision looks good  1/7-diclofenac for  shoulder pain  1/8-decrease laxatives, change benzos and carlo to PRN per patient request  1/9 some confusion overnight  1/10- no more confusion, doing well  1/11- no acute issues overnight  1/12- doing well, nausea today  1/13 -Dr. Lebron Cutler IV, DO taking over for Dr. Guerra for weekend.  Patient's pain appears to be well controlled this morning no complaints of nausea.  Continue wound care and PT/OT  1/14-patient appears well labs.  Continue wound care and PT/OT  1/15-patient still appears well.  No acute concerns or complaints.  No events overnight.  Continue wound care working with a PTOT.  Expiration is services estimated by February 6 1/16- no complaints, doing well  1/17-some nausea today  1/18-better today.  Still requiring twice weekly dressing changes.  Appetite stimulant  1/19- PWB with PT today.  Still with weeping wounds.  Will need wound care twice weekly-as these improve will be able to transfer to other facility  1/20-no complaints  1/21-no acute events overnight, some anxiety today  1/22-episode of SVT yesterday. Spontaneous resolution  1/23-no complaints  1/24- no complaints  1/25-doing well  1/26-wound care, pt      Interval History: naeo    Review of Systems   Constitutional:  Negative for chills, fatigue, fever and unexpected weight change.   HENT:  Negative for congestion, mouth sores and sore throat.    Eyes:  Negative for photophobia and visual disturbance.   Respiratory:  Negative for cough, chest tightness, shortness of breath and wheezing.    Cardiovascular:  Negative for chest pain, palpitations and leg swelling.   Gastrointestinal:  Negative for abdominal pain, diarrhea, nausea and vomiting.   Endocrine: Negative for cold intolerance and heat intolerance.   Genitourinary:  Negative for difficulty urinating, dysuria, frequency and urgency.   Musculoskeletal:  Positive for arthralgias. Negative for back pain and myalgias.   Skin:  Negative for pallor and rash.   Neurological:   Negative for tremors, seizures, syncope, weakness, numbness and headaches.   Hematological:  Does not bruise/bleed easily.   Psychiatric/Behavioral:  Negative for agitation, confusion, hallucinations and suicidal ideas.    Objective:     Vital Signs (Most Recent):  Temp: 98.6 °F (37 °C) (01/26/23 1125)  Pulse: (!) 54 (01/26/23 1125)  Resp: 17 (01/26/23 1125)  BP: 124/77 (01/26/23 1125)  SpO2: 98 % (01/26/23 1125)   Vital Signs (24h Range):  Temp:  [97.8 °F (36.6 °C)-98.6 °F (37 °C)] 98.6 °F (37 °C)  Pulse:  [50-57] 54  Resp:  [17-18] 17  SpO2:  [96 %-98 %] 98 %  BP: (108-131)/(45-77) 124/77     Weight: 57.6 kg (126 lb 15.8 oz)  Body mass index is 20.5 kg/m².  No intake or output data in the 24 hours ending 01/26/23 1355     Physical Exam  Vitals reviewed.   Constitutional:       Appearance: Normal appearance.   HENT:      Head: Normocephalic and atraumatic.   Eyes:      Extraocular Movements: Extraocular movements intact.   Cardiovascular:      Rate and Rhythm: Normal rate and regular rhythm.      Pulses: Normal pulses.   Pulmonary:      Effort: Pulmonary effort is normal.      Breath sounds: Normal breath sounds.   Abdominal:      General: Abdomen is flat.      Palpations: Abdomen is soft.   Musculoskeletal:         General: Tenderness and signs of injury present.      Comments: Dressed left lower extremity, painful to touch   Skin:     General: Skin is warm and dry.      Capillary Refill: Capillary refill takes less than 2 seconds.   Neurological:      General: No focal deficit present.      Mental Status: She is alert and oriented to person, place, and time.   Psychiatric:         Mood and Affect: Mood normal.         Behavior: Behavior normal.       Significant Labs: All pertinent labs within the past 24 hours have been reviewed.    Significant Imaging: I have reviewed all pertinent imaging results/findings within the past 24 hours.      Assessment/Plan:      Disruption of external surgical wound        Wounds  and injuries  Wound care consulted and following    Open wound of left lower leg  Wound care      Multiple skin tears  Wound care      Delmy-prosthetic fracture around prosthetic hip  S/P fixation with Pomierski      Lumbar radiculopathy  Pain management  PT      Hypertension  Adjust medications as needed    Arthritis  Pain management        VTE Risk Mitigation (From admission, onward)         Ordered     apixaban tablet 2.5 mg  2 times daily         12/29/22 1630                Discharge Planning   YANIV:      Code Status: Full Code   Is the patient medically ready for discharge?:     Reason for patient still in hospital (select all that apply): Treatment  Discharge Plan A: Skilled Nursing Facility                  Shai Guerra DO  Department of Hospital Medicine   [facility]

## 2023-01-27 LAB
ANION GAP SERPL CALCULATED.3IONS-SCNC: 12 MMOL/L (ref 7–16)
BASOPHILS # BLD AUTO: 0.05 K/UL (ref 0–0.2)
BASOPHILS NFR BLD AUTO: 0.7 % (ref 0–1)
BUN SERPL-MCNC: 15 MG/DL (ref 7–18)
BUN/CREAT SERPL: 19 (ref 6–20)
CALCIUM SERPL-MCNC: 8.5 MG/DL (ref 8.5–10.1)
CHLORIDE SERPL-SCNC: 104 MMOL/L (ref 98–107)
CO2 SERPL-SCNC: 25 MMOL/L (ref 21–32)
CREAT SERPL-MCNC: 0.79 MG/DL (ref 0.55–1.02)
DIFFERENTIAL METHOD BLD: ABNORMAL
EGFR (NO RACE VARIABLE) (RUSH/TITUS): 72 ML/MIN/1.73M²
EOSINOPHIL # BLD AUTO: 0.17 K/UL (ref 0–0.5)
EOSINOPHIL NFR BLD AUTO: 2.3 % (ref 1–4)
ERYTHROCYTE [DISTWIDTH] IN BLOOD BY AUTOMATED COUNT: 13.6 % (ref 11.5–14.5)
GLUCOSE SERPL-MCNC: 83 MG/DL (ref 74–106)
HCT VFR BLD AUTO: 31.5 % (ref 38–47)
HGB BLD-MCNC: 9.7 G/DL (ref 12–16)
IMM GRANULOCYTES # BLD AUTO: 0.12 K/UL (ref 0–0.04)
IMM GRANULOCYTES NFR BLD: 1.6 % (ref 0–0.4)
LYMPHOCYTES # BLD AUTO: 1.78 K/UL (ref 1–4.8)
LYMPHOCYTES NFR BLD AUTO: 24.3 % (ref 27–41)
MCH RBC QN AUTO: 30.1 PG (ref 27–31)
MCHC RBC AUTO-ENTMCNC: 30.8 G/DL (ref 32–36)
MCV RBC AUTO: 97.8 FL (ref 80–96)
MONOCYTES # BLD AUTO: 0.6 K/UL (ref 0–0.8)
MONOCYTES NFR BLD AUTO: 8.2 % (ref 2–6)
MPC BLD CALC-MCNC: 9.6 FL (ref 9.4–12.4)
NEUTROPHILS # BLD AUTO: 4.62 K/UL (ref 1.8–7.7)
NEUTROPHILS NFR BLD AUTO: 62.9 % (ref 53–65)
NRBC # BLD AUTO: 0 X10E3/UL
NRBC, AUTO (.00): 0 %
PLATELET # BLD AUTO: 228 K/UL (ref 150–400)
POTASSIUM SERPL-SCNC: 4.3 MMOL/L (ref 3.5–5.1)
RBC # BLD AUTO: 3.22 M/UL (ref 4.2–5.4)
SODIUM SERPL-SCNC: 137 MMOL/L (ref 136–145)
WBC # BLD AUTO: 7.34 K/UL (ref 4.5–11)

## 2023-01-27 PROCEDURE — 97116 GAIT TRAINING THERAPY: CPT | Mod: CQ

## 2023-01-27 PROCEDURE — 99231 PR SUBSEQUENT HOSPITAL CARE,LEVL I: ICD-10-PCS | Mod: ,,, | Performed by: STUDENT IN AN ORGANIZED HEALTH CARE EDUCATION/TRAINING PROGRAM

## 2023-01-27 PROCEDURE — 25000003 PHARM REV CODE 250: Performed by: STUDENT IN AN ORGANIZED HEALTH CARE EDUCATION/TRAINING PROGRAM

## 2023-01-27 PROCEDURE — 99231 SBSQ HOSP IP/OBS SF/LOW 25: CPT | Mod: ,,, | Performed by: STUDENT IN AN ORGANIZED HEALTH CARE EDUCATION/TRAINING PROGRAM

## 2023-01-27 PROCEDURE — 80048 BASIC METABOLIC PNL TOTAL CA: CPT | Performed by: STUDENT IN AN ORGANIZED HEALTH CARE EDUCATION/TRAINING PROGRAM

## 2023-01-27 PROCEDURE — 11000001 HC ACUTE MED/SURG PRIVATE ROOM

## 2023-01-27 PROCEDURE — 85025 COMPLETE CBC W/AUTO DIFF WBC: CPT | Performed by: STUDENT IN AN ORGANIZED HEALTH CARE EDUCATION/TRAINING PROGRAM

## 2023-01-27 PROCEDURE — 97535 SELF CARE MNGMENT TRAINING: CPT

## 2023-01-27 PROCEDURE — 97110 THERAPEUTIC EXERCISES: CPT | Mod: CQ

## 2023-01-27 PROCEDURE — 63600175 PHARM REV CODE 636 W HCPCS: Performed by: STUDENT IN AN ORGANIZED HEALTH CARE EDUCATION/TRAINING PROGRAM

## 2023-01-27 RX ADMIN — GABAPENTIN 100 MG: 100 CAPSULE ORAL at 09:01

## 2023-01-27 RX ADMIN — LEVOTHYROXINE SODIUM 125 MCG: 0.12 TABLET ORAL at 06:01

## 2023-01-27 RX ADMIN — HYDRALAZINE HYDROCHLORIDE 25 MG: 25 TABLET ORAL at 06:01

## 2023-01-27 RX ADMIN — DOCUSATE SODIUM 100 MG: 100 CAPSULE, LIQUID FILLED ORAL at 09:01

## 2023-01-27 RX ADMIN — TAMSULOSIN HYDROCHLORIDE 0.4 MG: 0.4 CAPSULE ORAL at 09:01

## 2023-01-27 RX ADMIN — DILTIAZEM HYDROCHLORIDE 120 MG: 120 CAPSULE, COATED, EXTENDED RELEASE ORAL at 09:01

## 2023-01-27 RX ADMIN — GABAPENTIN 100 MG: 100 CAPSULE ORAL at 02:01

## 2023-01-27 RX ADMIN — FAMOTIDINE 20 MG: 20 TABLET ORAL at 09:01

## 2023-01-27 RX ADMIN — PREDNISONE 5 MG: 5 TABLET ORAL at 09:01

## 2023-01-27 RX ADMIN — HYDRALAZINE HYDROCHLORIDE 25 MG: 25 TABLET ORAL at 09:01

## 2023-01-27 RX ADMIN — HYDROCODONE BITARTRATE AND ACETAMINOPHEN 2 TABLET: 10; 325 TABLET ORAL at 09:01

## 2023-01-27 RX ADMIN — MEGESTROL ACETATE 40 MG: 40 TABLET ORAL at 09:01

## 2023-01-27 RX ADMIN — ALPRAZOLAM 1 MG: 0.25 TABLET ORAL at 09:01

## 2023-01-27 RX ADMIN — HYDROCODONE BITARTRATE AND ACETAMINOPHEN 2 TABLET: 10; 325 TABLET ORAL at 06:01

## 2023-01-27 RX ADMIN — POLYETHYLENE GLYCOL 3350 17 G: 17 POWDER, FOR SOLUTION ORAL at 09:01

## 2023-01-27 RX ADMIN — SENNOSIDES 1 TABLET: 8.6 TABLET, FILM COATED ORAL at 09:01

## 2023-01-27 RX ADMIN — HYDROCODONE BITARTRATE AND ACETAMINOPHEN 2 TABLET: 10; 325 TABLET ORAL at 11:01

## 2023-01-27 RX ADMIN — PANTOPRAZOLE SODIUM 40 MG: 40 TABLET, DELAYED RELEASE ORAL at 09:01

## 2023-01-27 RX ADMIN — METOPROLOL TARTRATE 50 MG: 50 TABLET, FILM COATED ORAL at 09:01

## 2023-01-27 NOTE — PROGRESS NOTES
[facility] Hospital Medicine  Progress Note    Patient Name: Zandra Veloz  MRN: [id number]  Patient Class: IP- Inpatient   Admission Date: 12/29/2022  Length of Stay: 29 days  Attending Physician: Shai Guerra DO  Primary Care Provider: Brandon Thornton MD        Subjective:     Principal Problem:Urinary tract infection        HPI:  HPI:   88-year-old lady seen emergency room department.  Patient presents after having a fall when she was trying to go to the restroom at her home.  Patient sustained a left femur fracture.  Patient complains of moderate to severe pain in the left hip area.  Patient also complains of chest tightness, she rates it a 5/10 in the chest tightness has been intubate.  Patient also was seen in emergency room department earlier this week per her daughter.  Patient is found to have dehydration and a urinary tract infection.  Current she denies any shortness of breath.  She does not give a history of coronary artery disease.        Procedure(s) (LRB):  REVISION, TOTAL ARTHROPLASTY, HIP, VICTORIANO PROSTHETIC FX (Left)       Hospital Course:   12/19 - records reviewed. Fall without LOC and has left hip fx. No other complaints currently. Seen by cardiology with some CP felt musculoskeletal.  Echo mild AS and mod MR with nl EF. Age increase cardiac risk for surgery but discussed with daughter surgery is urgent and see no benefit in delay medically. Feel low to moderate risk for surgery. Question about UTI. No symptoms. Had UTI in 10.22 not surprising. Lab to do culture on urine in lab. Cover with ATN for prior culture with ATB started. Discussed with Dr Singer and cardiology.  12/20 Tachycardia and elevated BP. Cardiology adjusted meds. Plan hold off surgery until 12/22 to adjust meds and treat UTI. Family in room. Pt not sleeping well.   12/21 Didn't sleep well prior night. Apparently been on xanax for a time. Also recently started on Neurontin. Family with  question. No recent BM. Some increase stool on KUB but not severe. Surgery for am. BP some up but better. HR good.   12/22 Seen prior to surgery and apparently add better night. Sore mouth / throat better with mycostatin. For surgery. Family in room.  12/23 patient with pain but otherwise seemed to be doing relatively well.  Daughter in room.  Apparently been taking prednisone for some time and this was restarted per family request.  Look into swing bed placement  12/24 patient had better night rested and everyone's in better spirits today.  Tolerating some diet.   Therapy worked with patient.  Look into swing bed placement next week  12/25 remained in good spirits.  Less pain.  Had good bowel movement and ordered Dulcolax not given.  Because of dressing to leg, Burk was not removed to keep it from getting soiled.  Wound care to check 12/27.  Discuss this with patient and daughter.  On antibiotics for UTI  12/26-will dc to swingbed once accepted. Needs continued wound care.  12/27-DC when bed available  12/28- issues with wound care yesterday.  Dr. Singer had to come remove dressings himself due to adherence to subcutaneous tissue.  Family refusing transfer to [facility] due to lack of specially trained wound care team availability.  Planning to transfer to specialty but resistant to that as well and requesting to speak to administration.    12/29- agreeable to transfer to specialty.  This will be the best place for her to receive wound care.  DC after family tours     12/29-needs continued wound care and therapy. [facility]    12/30-doing well today, still with pain         Overview/Hospital Course:  12/31-having some pain this am  1/1- no complaints  1/2- doing well  1/3- continue wound care, last day cefepime tomorrow  1/4- some pain this AM.  Delay in mediations dues to staffing issues. Dsicussed with RN  1/5- no issues overnight  1/6- Still with pain, hip incision looks good  1/7-diclofenac for  shoulder pain  1/8-decrease laxatives, change benzos and carlo to PRN per patient request  1/9 some confusion overnight  1/10- no more confusion, doing well  1/11- no acute issues overnight  1/12- doing well, nausea today  1/13 -Dr. Lebron Cutler IV, DO taking over for Dr. Guerra for weekend.  Patient's pain appears to be well controlled this morning no complaints of nausea.  Continue wound care and PT/OT  1/14-patient appears well labs.  Continue wound care and PT/OT  1/15-patient still appears well.  No acute concerns or complaints.  No events overnight.  Continue wound care working with a PTOT.  Expiration is services estimated by February 6 1/16- no complaints, doing well  1/17-some nausea today  1/18-better today.  Still requiring twice weekly dressing changes.  Appetite stimulant  1/19- PWB with PT today.  Still with weeping wounds.  Will need wound care twice weekly-as these improve will be able to transfer to other facility  1/20-no complaints  1/21-no acute events overnight, some anxiety today  1/22-episode of SVT yesterday. Spontaneous resolution  1/23-no complaints  1/24- no complaints  1/25-doing well  1/26-wound care, pt  1/27- no complaints      Interval History: naeo    Review of Systems   Constitutional:  Negative for chills, fatigue, fever and unexpected weight change.   HENT:  Negative for congestion, mouth sores and sore throat.    Eyes:  Negative for photophobia and visual disturbance.   Respiratory:  Negative for cough, chest tightness, shortness of breath and wheezing.    Cardiovascular:  Negative for chest pain, palpitations and leg swelling.   Gastrointestinal:  Negative for abdominal pain, diarrhea, nausea and vomiting.   Endocrine: Negative for cold intolerance and heat intolerance.   Genitourinary:  Negative for difficulty urinating, dysuria, frequency and urgency.   Musculoskeletal:  Positive for arthralgias. Negative for back pain and myalgias.   Skin:  Negative for pallor and rash.    Neurological:  Negative for tremors, seizures, syncope, weakness, numbness and headaches.   Hematological:  Does not bruise/bleed easily.   Psychiatric/Behavioral:  Negative for agitation, confusion, hallucinations and suicidal ideas.    Objective:     Vital Signs (Most Recent):  Temp: 98 °F (36.7 °C) (01/27/23 0400)  Pulse: 63 (01/27/23 0400)  Resp: 18 (01/27/23 0614)  BP: (!) 144/73 (01/27/23 0400)  SpO2: 96 % (01/27/23 0400)   Vital Signs (24h Range):  Temp:  [97.5 °F (36.4 °C)-98.7 °F (37.1 °C)] 98 °F (36.7 °C)  Pulse:  [51-63] 63  Resp:  [17-18] 18  SpO2:  [96 %-98 %] 96 %  BP: (123-154)/(46-77) 144/73     Weight: 57.6 kg (126 lb 15.8 oz)  Body mass index is 20.5 kg/m².  No intake or output data in the 24 hours ending 01/27/23 0814     Physical Exam  Vitals reviewed.   Constitutional:       Appearance: Normal appearance.   HENT:      Head: Normocephalic and atraumatic.   Eyes:      Extraocular Movements: Extraocular movements intact.   Cardiovascular:      Rate and Rhythm: Normal rate and regular rhythm.      Pulses: Normal pulses.   Pulmonary:      Effort: Pulmonary effort is normal.      Breath sounds: Normal breath sounds.   Abdominal:      General: Abdomen is flat.      Palpations: Abdomen is soft.   Musculoskeletal:         General: Tenderness and signs of injury present.      Comments: Dressed left lower extremity, painful to touch   Skin:     General: Skin is warm and dry.      Capillary Refill: Capillary refill takes less than 2 seconds.   Neurological:      General: No focal deficit present.      Mental Status: She is alert and oriented to person, place, and time.   Psychiatric:         Mood and Affect: Mood normal.         Behavior: Behavior normal.       Significant Labs: All pertinent labs within the past 24 hours have been reviewed.    Significant Imaging: I have reviewed all pertinent imaging results/findings within the past 24 hours.      Assessment/Plan:      Disruption of external surgical  wound        Wounds and injuries  Wound care consulted and following    Open wound of left lower leg  Wound care      Multiple skin tears  Wound care      Delmy-prosthetic fracture around prosthetic hip  S/P fixation with Pomierski      Lumbar radiculopathy  Pain management  PT      Hypertension  Adjust medications as needed    Arthritis  Pain management        VTE Risk Mitigation (From admission, onward)    None          Discharge Planning   YANIV:      Code Status: Full Code   Is the patient medically ready for discharge?:     Reason for patient still in hospital (select all that apply): Treatment  Discharge Plan A: Skilled Nursing Facility                  Shai Guerra DO  Department of Hospital Medicine   [facility]

## 2023-01-27 NOTE — SUBJECTIVE & OBJECTIVE
Interval History: naeo    Review of Systems   Constitutional:  Negative for chills, fatigue, fever and unexpected weight change.   HENT:  Negative for congestion, mouth sores and sore throat.    Eyes:  Negative for photophobia and visual disturbance.   Respiratory:  Negative for cough, chest tightness, shortness of breath and wheezing.    Cardiovascular:  Negative for chest pain, palpitations and leg swelling.   Gastrointestinal:  Negative for abdominal pain, diarrhea, nausea and vomiting.   Endocrine: Negative for cold intolerance and heat intolerance.   Genitourinary:  Negative for difficulty urinating, dysuria, frequency and urgency.   Musculoskeletal:  Positive for arthralgias. Negative for back pain and myalgias.   Skin:  Negative for pallor and rash.   Neurological:  Negative for tremors, seizures, syncope, weakness, numbness and headaches.   Hematological:  Does not bruise/bleed easily.   Psychiatric/Behavioral:  Negative for agitation, confusion, hallucinations and suicidal ideas.    Objective:     Vital Signs (Most Recent):  Temp: 98 °F (36.7 °C) (01/27/23 0400)  Pulse: 63 (01/27/23 0400)  Resp: 18 (01/27/23 0614)  BP: (!) 144/73 (01/27/23 0400)  SpO2: 96 % (01/27/23 0400)   Vital Signs (24h Range):  Temp:  [97.5 °F (36.4 °C)-98.7 °F (37.1 °C)] 98 °F (36.7 °C)  Pulse:  [51-63] 63  Resp:  [17-18] 18  SpO2:  [96 %-98 %] 96 %  BP: (123-154)/(46-77) 144/73     Weight: 57.6 kg (126 lb 15.8 oz)  Body mass index is 20.5 kg/m².  No intake or output data in the 24 hours ending 01/27/23 0814     Physical Exam  Vitals reviewed.   Constitutional:       Appearance: Normal appearance.   HENT:      Head: Normocephalic and atraumatic.   Eyes:      Extraocular Movements: Extraocular movements intact.   Cardiovascular:      Rate and Rhythm: Normal rate and regular rhythm.      Pulses: Normal pulses.   Pulmonary:      Effort: Pulmonary effort is normal.      Breath sounds: Normal breath sounds.   Abdominal:      General:  Abdomen is flat.      Palpations: Abdomen is soft.   Musculoskeletal:         General: Tenderness and signs of injury present.      Comments: Dressed left lower extremity, painful to touch   Skin:     General: Skin is warm and dry.      Capillary Refill: Capillary refill takes less than 2 seconds.   Neurological:      General: No focal deficit present.      Mental Status: She is alert and oriented to person, place, and time.   Psychiatric:         Mood and Affect: Mood normal.         Behavior: Behavior normal.       Significant Labs: All pertinent labs within the past 24 hours have been reviewed.    Significant Imaging: I have reviewed all pertinent imaging results/findings within the past 24 hours.

## 2023-01-27 NOTE — PT/OT/SLP PROGRESS
Physical Therapy Treatment    Patient Name:  Zandra Veloz   MRN:  [id number]    Recommendations:     Discharge Recommendations: nursing facility, skilled  Discharge Equipment Recommendations: other (see comments) (to be determined)  Barriers to discharge:  ongoing medical treatment    Assessment:     Zandra Veloz is a 88 y.o. female admitted with a medical diagnosis of Urinary tract infection.  She presents with the following impairments/functional limitations: weakness, impaired functional mobility, orthopedic precautions, impaired self care skills, impaired skin, impaired endurance, gait instability, decreased lower extremity function.    Pt demo improved gait distance/quality and overall tolerance to all activites    Rehab Prognosis: Good and Fair; patient would benefit from acute skilled PT services to address these deficits and reach maximum level of function.    Recent Surgery: * No surgery found *      Plan:     During this hospitalization, patient to be seen 5 x/week to address the identified rehab impairments via gait training, therapeutic activities, therapeutic exercises and progress toward the following goals:    Plan of Care Expires:  01/30/23    Subjective     Chief Complaint: post left hip revision; weakness  Patient/Family Comments/goals: pt agreeable to PT  Pain/Comfort:         Objective:     Communicated with Madie Wood RN prior to session.  Patient found HOB elevated with peripheral IV, telemetry upon PT entry to room.     General Precautions: Standard, fall  Orthopedic Precautions: LLE partial weight bearing, LLE posterior precautions  Braces: N/A  Respiratory Status: Room air     Functional Mobility:  Bed Mobility:     Rolling Left:  contact guard assistance and minimum assistance  Rolling Right: contact guard assistance and minimum assistance  Supine to Sit: minimum assistance and increased time  Transfers:     Sit to Stand:  minimum assistance and of 2 persons with  rolling walker  Gait: 15' minimum assist with RW, partial WB LLE; slow sadaf, mild posterior lean, short step length, increased fatigeu      AM-PAC 6 CLICK MOBILITY  Turning over in bed (including adjusting bedclothes, sheets and blankets)?: 3  Sitting down on and standing up from a chair with arms (e.g., wheelchair, bedside commode, etc.): 3  Moving from lying on back to sitting on the side of the bed?: 3  Moving to and from a bed to a chair (including a wheelchair)?: 3  Need to walk in hospital room?: 3  Climbing 3-5 steps with a railing?: 1  Basic Mobility Total Score: 16       Treatment & Education:  Pt performed 2 x 15 reps (B) LE exercises: ap, quad set, glut set, straight leg raise, hip ab/adduction, long arc quad, heel slide with active assist range of motion     Patient left up in chair with all lines intact, call button in reach, and daughter and caregive present..    GOALS:   Multidisciplinary Problems       Physical Therapy Goals          Problem: Physical Therapy    Goal Priority Disciplines Outcome Goal Variances Interventions   Physical Therapy Goal     PT, PT/OT Ongoing, Progressing     Description: Short Term Goals to be met by 2/25/2023    Patient will increase functional independence and safety with mobility by performing    1.   Rolling side to side with standby assist  2.   Supine to sit standby Assist  3.   Sit to stand Minimal Assist using manual assistance with gait belt and PWB L LE  4.   Ambulate 20ft with RW, PWB LLE. Min A.   5.   Lower extremity exercises x 30 reps with assist as necessary and no rest breaks      Long Term Goals to be met by 3/10/2023    Patient to require less than or equal to Stand by assist for functional mobility to ease caregiver burden                        Time Tracking:     PT Received On: 01/27/23  PT Start Time: 1050     PT Stop Time: 1118  PT Total Time (min): 28 min     Billable Minutes: Gait Training 8 and Therapeutic Exercise 15    Treatment Type:  Treatment  PT/PTA: PTA     PTA Visit Number: 2     01/27/2023

## 2023-01-27 NOTE — PT/OT/SLP PROGRESS
Occupational Therapy   Treatment    Name: Zandra Veloz  MRN: [id number]  Admitting Diagnosis:  Urinary tract infection       Recommendations:     Discharge Recommendations: nursing facility, skilled  Discharge Equipment Recommendations:   (to be determined)  Barriers to discharge:  None    Assessment:     Zandra Veloz is a 88 y.o. female with a medical diagnosis of Urinary tract infection.  She presents with alert and agreeable to treatment. Performance deficits affecting function are weakness, impaired functional mobility, impaired endurance, impaired self care skills, impaired skin, decreased ROM.     Rehab Prognosis:  Good; patient would benefit from acute skilled OT services to address these deficits and reach maximum level of function.       Plan:     Patient to be seen 5 x/week to address the above listed problems via self-care/home management, therapeutic activities, therapeutic exercises  Plan of Care Expires: 02/06/23  Plan of Care Reviewed with: patient, caregiver    Subjective     Pain/Comfort:  Pain Rating 1: 0/10  Pain Rating Post-Intervention 1: 0/10    Objective:     Communicated with: CLYDE Wood prior to session.  Patient found HOB elevated with peripheral IV, telemetry upon OT entry to room.    General Precautions: Standard, fall    Orthopedic Precautions:LLE partial weight bearing, LLE posterior precautions  Braces: N/A  Respiratory Status: Room air     Occupational Performance:     Bed Mobility:    Patient completed Rolling/Turning to Left with  minimum assistance  Patient completed Rolling/Turning to Right with minimum assistance  Patient completed Scooting/Bridging with moderate assistance  Patient completed Supine to Sit with minimum assistance, moderate assistance, and increased time and effort  Patient completed Sit to Supine with moderate assistance     Functional Mobility/Transfers:  NT    Activities of Daily Living:  Bathing: Pt washed her upper body anterior  with setup and CGA while sitting up EOB.  Pt required total (A) with her back. Pt washed upper thighs with setup. Total (A) with distal Legs and feet.  Upper Body Dressing: moderate assistance for hospital gown while sitting up EOB  Lower Body Dressing: dependence with socks      [facility] 6 Click ADL: 13    Treatment & Education:  OT reviewed posterior THR precautions with pt. OT showed pt a hip kit and described the use of each with pt. OT elected not to have pt attempt to remove socks with reacher and don with sockaid due to fatigue and decreasing sitting balance from EOB and due to skin being so delicate and easy to tear.    Patient left HOB elevated with call button in reach, bed alarm on, RN Madie notified, and daughter and sitter present    GOALS:   Multidisciplinary Problems       Occupational Therapy Goals          Problem: Occupational Therapy    Goal Priority Disciplines Outcome Interventions   Occupational Therapy Goal     OT, PT/OT Ongoing, Progressing    Description: STG: (In 2 weeks)  Pt will bathe with setup and mod (A)  Pt will perform UE dressing with setup and min(A)  Pt will perform (L) UE AROM to 3/4 full range  Pt will sit EOB x 5 min with (I)  Pt will transfer bed/chair/bsc with min a  Pt will tolerate 20 minutes of tx without fatigue      LT.Restore to max I with self care and mobility.                          Time Tracking:     OT Date of Treatment: 23  OT Start Time: 910  OT Stop Time: 952  OT Total Time (min): 42 min    Billable Minutes:Self Care/Home Management 42 min    OT/BRIGHT: OT          2023

## 2023-01-28 PROCEDURE — 25000003 PHARM REV CODE 250: Performed by: STUDENT IN AN ORGANIZED HEALTH CARE EDUCATION/TRAINING PROGRAM

## 2023-01-28 PROCEDURE — 99232 SBSQ HOSP IP/OBS MODERATE 35: CPT | Mod: ,,, | Performed by: FAMILY MEDICINE

## 2023-01-28 PROCEDURE — 63600175 PHARM REV CODE 636 W HCPCS: Performed by: STUDENT IN AN ORGANIZED HEALTH CARE EDUCATION/TRAINING PROGRAM

## 2023-01-28 PROCEDURE — 11000001 HC ACUTE MED/SURG PRIVATE ROOM

## 2023-01-28 PROCEDURE — 99232 PR SUBSEQUENT HOSPITAL CARE,LEVL II: ICD-10-PCS | Mod: ,,, | Performed by: FAMILY MEDICINE

## 2023-01-28 RX ADMIN — DOCUSATE SODIUM 100 MG: 100 CAPSULE, LIQUID FILLED ORAL at 08:01

## 2023-01-28 RX ADMIN — DOCUSATE SODIUM 100 MG: 100 CAPSULE, LIQUID FILLED ORAL at 09:01

## 2023-01-28 RX ADMIN — LEVOTHYROXINE SODIUM 125 MCG: 0.12 TABLET ORAL at 06:01

## 2023-01-28 RX ADMIN — POLYETHYLENE GLYCOL 3350 17 G: 17 POWDER, FOR SOLUTION ORAL at 08:01

## 2023-01-28 RX ADMIN — SENNOSIDES 1 TABLET: 8.6 TABLET, FILM COATED ORAL at 08:01

## 2023-01-28 RX ADMIN — HYDROCODONE BITARTRATE AND ACETAMINOPHEN 2 TABLET: 10; 325 TABLET ORAL at 10:01

## 2023-01-28 RX ADMIN — DILTIAZEM HYDROCHLORIDE 120 MG: 120 CAPSULE, COATED, EXTENDED RELEASE ORAL at 08:01

## 2023-01-28 RX ADMIN — HYDROCODONE BITARTRATE AND ACETAMINOPHEN 2 TABLET: 10; 325 TABLET ORAL at 05:01

## 2023-01-28 RX ADMIN — GABAPENTIN 100 MG: 100 CAPSULE ORAL at 08:01

## 2023-01-28 RX ADMIN — PANTOPRAZOLE SODIUM 40 MG: 40 TABLET, DELAYED RELEASE ORAL at 08:01

## 2023-01-28 RX ADMIN — TAMSULOSIN HYDROCHLORIDE 0.4 MG: 0.4 CAPSULE ORAL at 08:01

## 2023-01-28 RX ADMIN — MEGESTROL ACETATE 40 MG: 40 TABLET ORAL at 08:01

## 2023-01-28 RX ADMIN — ALPRAZOLAM 1 MG: 0.25 TABLET ORAL at 09:01

## 2023-01-28 RX ADMIN — GABAPENTIN 100 MG: 100 CAPSULE ORAL at 04:01

## 2023-01-28 RX ADMIN — HYDRALAZINE HYDROCHLORIDE 25 MG: 25 TABLET ORAL at 09:01

## 2023-01-28 RX ADMIN — METOPROLOL TARTRATE 50 MG: 50 TABLET, FILM COATED ORAL at 08:01

## 2023-01-28 RX ADMIN — FAMOTIDINE 20 MG: 20 TABLET ORAL at 08:01

## 2023-01-28 RX ADMIN — GABAPENTIN 100 MG: 100 CAPSULE ORAL at 09:01

## 2023-01-28 RX ADMIN — PREDNISONE 5 MG: 5 TABLET ORAL at 08:01

## 2023-01-28 NOTE — PROGRESS NOTES
[facility] Hospital Medicine  Progress Note    Patient Name: Zandra Veloz  MRN: [id number]  Patient Class: IP- Inpatient   Admission Date: 12/29/2022  Length of Stay: 30 days  Attending Physician: Shai Guerra DO  Primary Care Provider: Brandon Thornton MD        Subjective:     Principal Problem:Urinary tract infection        HPI:  HPI:   88-year-old lady seen emergency room department.  Patient presents after having a fall when she was trying to go to the restroom at her home.  Patient sustained a left femur fracture.  Patient complains of moderate to severe pain in the left hip area.  Patient also complains of chest tightness, she rates it a 5/10 in the chest tightness has been intubate.  Patient also was seen in emergency room department earlier this week per her daughter.  Patient is found to have dehydration and a urinary tract infection.  Current she denies any shortness of breath.  She does not give a history of coronary artery disease.        Procedure(s) (LRB):  REVISION, TOTAL ARTHROPLASTY, HIP, VICTORIANO PROSTHETIC FX (Left)       Hospital Course:   12/19 - records reviewed. Fall without LOC and has left hip fx. No other complaints currently. Seen by cardiology with some CP felt musculoskeletal.  Echo mild AS and mod MR with nl EF. Age increase cardiac risk for surgery but discussed with daughter surgery is urgent and see no benefit in delay medically. Feel low to moderate risk for surgery. Question about UTI. No symptoms. Had UTI in 10.22 not surprising. Lab to do culture on urine in lab. Cover with ATN for prior culture with ATB started. Discussed with Dr Singer and cardiology.  12/20 Tachycardia and elevated BP. Cardiology adjusted meds. Plan hold off surgery until 12/22 to adjust meds and treat UTI. Family in room. Pt not sleeping well.   12/21 Didn't sleep well prior night. Apparently been on xanax for a time. Also recently started on Neurontin. Family with  question. No recent BM. Some increase stool on KUB but not severe. Surgery for am. BP some up but better. HR good.   12/22 Seen prior to surgery and apparently add better night. Sore mouth / throat better with mycostatin. For surgery. Family in room.  12/23 patient with pain but otherwise seemed to be doing relatively well.  Daughter in room.  Apparently been taking prednisone for some time and this was restarted per family request.  Look into swing bed placement  12/24 patient had better night rested and everyone's in better spirits today.  Tolerating some diet.   Therapy worked with patient.  Look into swing bed placement next week  12/25 remained in good spirits.  Less pain.  Had good bowel movement and ordered Dulcolax not given.  Because of dressing to leg, Burk was not removed to keep it from getting soiled.  Wound care to check 12/27.  Discuss this with patient and daughter.  On antibiotics for UTI  12/26-will dc to swingbed once accepted. Needs continued wound care.  12/27-DC when bed available  12/28- issues with wound care yesterday.  Dr. Singer had to come remove dressings himself due to adherence to subcutaneous tissue.  Family refusing transfer to [facility] due to lack of specially trained wound care team availability.  Planning to transfer to specialty but resistant to that as well and requesting to speak to administration.    12/29- agreeable to transfer to specialty.  This will be the best place for her to receive wound care.  DC after family tours     12/29-needs continued wound care and therapy[facility]    12/30-doing well today, still with pain         Overview/Hospital Course:  12/31-having some pain this am  1/1- no complaints  1/2- doing well  1/3- continue wound care, last day cefepime tomorrow  1/4- some pain this AM.  Delay in mediations dues to staffing issues. Dsicussed with RN  1/5- no issues overnight  1/6- Still with pain, hip incision looks good  1/7-diclofenac for  shoulder pain  1/8-decrease laxatives, change benzos and carlo to PRN per patient request  1/9 some confusion overnight  1/10- no more confusion, doing well  1/11- no acute issues overnight  1/12- doing well, nausea today  1/13 -Dr. Lebron Cutler IV, DO taking over for Dr. Guerra for weekend.  Patient's pain appears to be well controlled this morning no complaints of nausea.  Continue wound care and PT/OT  1/14-patient appears well labs.  Continue wound care and PT/OT  1/15-patient still appears well.  No acute concerns or complaints.  No events overnight.  Continue wound care working with a PTOT.  Expiration is services estimated by February 6 1/16- no complaints, doing well  1/17-some nausea today  1/18-better today.  Still requiring twice weekly dressing changes.  Appetite stimulant  1/19- PWB with PT today.  Still with weeping wounds.  Will need wound care twice weekly-as these improve will be able to transfer to other facility  1/20-no complaints  1/21-no acute events overnight, some anxiety today  1/22-episode of SVT yesterday. Spontaneous resolution  1/23-no complaints  1/24- no complaints  1/25-doing well  1/26-wound care, pt  1/27- no complaints  1/28- patient seen examined today resting comfortably in bed, in no acute distress and no acute events overnight.  Patient states that she is doing well and denies pain.  States that she is been working well with PT/OT but not this weekend.  Patient otherwise doing well without complaints.      Interval History: naeo    Review of Systems   Constitutional:  Negative for chills, fatigue, fever and unexpected weight change.   HENT:  Negative for congestion, mouth sores and sore throat.    Eyes:  Negative for photophobia and visual disturbance.   Respiratory:  Negative for cough, chest tightness, shortness of breath and wheezing.    Cardiovascular:  Negative for chest pain, palpitations and leg swelling.   Gastrointestinal:  Negative for abdominal pain, diarrhea,  nausea and vomiting.   Endocrine: Negative for cold intolerance and heat intolerance.   Genitourinary:  Negative for difficulty urinating, dysuria, frequency and urgency.   Musculoskeletal:  Positive for arthralgias. Negative for back pain and myalgias.   Skin:  Negative for pallor and rash.   Neurological:  Negative for tremors, seizures, syncope, weakness, numbness and headaches.   Hematological:  Does not bruise/bleed easily.   Psychiatric/Behavioral:  Negative for agitation, confusion, hallucinations and suicidal ideas.    Objective:     Vital Signs (Most Recent):  Temp: 98.5 °F (36.9 °C) (01/28/23 1600)  Pulse: (!) 55 (01/28/23 1600)  Resp: 18 (01/28/23 1600)  BP: (!) 121/53 (01/28/23 1600)  SpO2: 95 % (01/28/23 1600)   Vital Signs (24h Range):  Temp:  [98.4 °F (36.9 °C)-99.2 °F (37.3 °C)] 98.5 °F (36.9 °C)  Pulse:  [13-61] 55  Resp:  [16-20] 18  SpO2:  [92 %-100 %] 95 %  BP: (102-138)/(42-61) 121/53     Weight: 57.6 kg (126 lb 15.8 oz)  Body mass index is 20.5 kg/m².    Intake/Output Summary (Last 24 hours) at 1/28/2023 1625  Last data filed at 1/28/2023 1300  Gross per 24 hour   Intake 0 ml   Output --   Net 0 ml        Physical Exam  Vitals reviewed.   Constitutional:       Appearance: Normal appearance.   HENT:      Head: Normocephalic and atraumatic.   Eyes:      Extraocular Movements: Extraocular movements intact.   Cardiovascular:      Rate and Rhythm: Normal rate and regular rhythm.      Pulses: Normal pulses.   Pulmonary:      Effort: Pulmonary effort is normal.      Breath sounds: Normal breath sounds.   Abdominal:      General: Abdomen is flat.      Palpations: Abdomen is soft.   Musculoskeletal:         General: Tenderness and signs of injury present.      Comments: Dressed left lower extremity, painful to touch   Skin:     General: Skin is warm and dry.      Capillary Refill: Capillary refill takes less than 2 seconds.   Neurological:      General: No focal deficit present.      Mental Status: She  is alert and oriented to person, place, and time.   Psychiatric:         Mood and Affect: Mood normal.         Behavior: Behavior normal.       Significant Labs: All pertinent labs within the past 24 hours have been reviewed.    Significant Imaging: I have reviewed all pertinent imaging results/findings within the past 24 hours.      Assessment/Plan:      Disruption of external surgical wound        Wounds and injuries  Wound care consulted and following    Open wound of left lower leg  Wound care      Multiple skin tears  Wound care      Delmy-prosthetic fracture around prosthetic hip  S/P fixation with Pomierski  Continue PT/OT.      Lumbar radiculopathy  Pain management  PT      Hypertension  Adjust medications as needed    Arthritis  Pain management        VTE Risk Mitigation (From admission, onward)    None          Discharge Planning   YANIV:      Code Status: Full Code   Is the patient medically ready for discharge?:     Reason for patient still in hospital (select all that apply): Treatment  Discharge Plan A: Skilled Nursing Facility                  Jesus Mcgowan DO  Department of Hospital Medicine   [facility]

## 2023-01-28 NOTE — SUBJECTIVE & OBJECTIVE
Interval History: naeo    Review of Systems   Constitutional:  Negative for chills, fatigue, fever and unexpected weight change.   HENT:  Negative for congestion, mouth sores and sore throat.    Eyes:  Negative for photophobia and visual disturbance.   Respiratory:  Negative for cough, chest tightness, shortness of breath and wheezing.    Cardiovascular:  Negative for chest pain, palpitations and leg swelling.   Gastrointestinal:  Negative for abdominal pain, diarrhea, nausea and vomiting.   Endocrine: Negative for cold intolerance and heat intolerance.   Genitourinary:  Negative for difficulty urinating, dysuria, frequency and urgency.   Musculoskeletal:  Positive for arthralgias. Negative for back pain and myalgias.   Skin:  Negative for pallor and rash.   Neurological:  Negative for tremors, seizures, syncope, weakness, numbness and headaches.   Hematological:  Does not bruise/bleed easily.   Psychiatric/Behavioral:  Negative for agitation, confusion, hallucinations and suicidal ideas.    Objective:     Vital Signs (Most Recent):  Temp: 98.5 °F (36.9 °C) (01/28/23 1600)  Pulse: (!) 55 (01/28/23 1600)  Resp: 18 (01/28/23 1600)  BP: (!) 121/53 (01/28/23 1600)  SpO2: 95 % (01/28/23 1600)   Vital Signs (24h Range):  Temp:  [98.4 °F (36.9 °C)-99.2 °F (37.3 °C)] 98.5 °F (36.9 °C)  Pulse:  [13-61] 55  Resp:  [16-20] 18  SpO2:  [92 %-100 %] 95 %  BP: (102-138)/(42-61) 121/53     Weight: 57.6 kg (126 lb 15.8 oz)  Body mass index is 20.5 kg/m².    Intake/Output Summary (Last 24 hours) at 1/28/2023 1625  Last data filed at 1/28/2023 1300  Gross per 24 hour   Intake 0 ml   Output --   Net 0 ml        Physical Exam  Vitals reviewed.   Constitutional:       Appearance: Normal appearance.   HENT:      Head: Normocephalic and atraumatic.   Eyes:      Extraocular Movements: Extraocular movements intact.   Cardiovascular:      Rate and Rhythm: Normal rate and regular rhythm.      Pulses: Normal pulses.   Pulmonary:      Effort:  Pulmonary effort is normal.      Breath sounds: Normal breath sounds.   Abdominal:      General: Abdomen is flat.      Palpations: Abdomen is soft.   Musculoskeletal:         General: Tenderness and signs of injury present.      Comments: Dressed left lower extremity, painful to touch   Skin:     General: Skin is warm and dry.      Capillary Refill: Capillary refill takes less than 2 seconds.   Neurological:      General: No focal deficit present.      Mental Status: She is alert and oriented to person, place, and time.   Psychiatric:         Mood and Affect: Mood normal.         Behavior: Behavior normal.       Significant Labs: All pertinent labs within the past 24 hours have been reviewed.    Significant Imaging: I have reviewed all pertinent imaging results/findings within the past 24 hours.

## 2023-01-29 PROCEDURE — 63600175 PHARM REV CODE 636 W HCPCS: Performed by: STUDENT IN AN ORGANIZED HEALTH CARE EDUCATION/TRAINING PROGRAM

## 2023-01-29 PROCEDURE — 99232 SBSQ HOSP IP/OBS MODERATE 35: CPT | Mod: ,,, | Performed by: FAMILY MEDICINE

## 2023-01-29 PROCEDURE — 11000001 HC ACUTE MED/SURG PRIVATE ROOM

## 2023-01-29 PROCEDURE — 25000003 PHARM REV CODE 250: Performed by: STUDENT IN AN ORGANIZED HEALTH CARE EDUCATION/TRAINING PROGRAM

## 2023-01-29 PROCEDURE — 99232 PR SUBSEQUENT HOSPITAL CARE,LEVL II: ICD-10-PCS | Mod: ,,, | Performed by: FAMILY MEDICINE

## 2023-01-29 RX ADMIN — ALPRAZOLAM 1 MG: 0.25 TABLET ORAL at 09:01

## 2023-01-29 RX ADMIN — GABAPENTIN 100 MG: 100 CAPSULE ORAL at 02:01

## 2023-01-29 RX ADMIN — LISINOPRIL 20 MG: 20 TABLET ORAL at 09:01

## 2023-01-29 RX ADMIN — FAMOTIDINE 20 MG: 20 TABLET ORAL at 09:01

## 2023-01-29 RX ADMIN — METOPROLOL TARTRATE 50 MG: 50 TABLET, FILM COATED ORAL at 09:01

## 2023-01-29 RX ADMIN — GABAPENTIN 100 MG: 100 CAPSULE ORAL at 10:01

## 2023-01-29 RX ADMIN — DILTIAZEM HYDROCHLORIDE 120 MG: 120 CAPSULE, COATED, EXTENDED RELEASE ORAL at 09:01

## 2023-01-29 RX ADMIN — GABAPENTIN 100 MG: 100 CAPSULE ORAL at 09:01

## 2023-01-29 RX ADMIN — POLYETHYLENE GLYCOL 3350 17 G: 17 POWDER, FOR SOLUTION ORAL at 09:01

## 2023-01-29 RX ADMIN — MEGESTROL ACETATE 40 MG: 40 TABLET ORAL at 09:01

## 2023-01-29 RX ADMIN — HYDROCODONE BITARTRATE AND ACETAMINOPHEN 2 TABLET: 10; 325 TABLET ORAL at 02:01

## 2023-01-29 RX ADMIN — PREDNISONE 5 MG: 5 TABLET ORAL at 09:01

## 2023-01-29 RX ADMIN — DOCUSATE SODIUM 100 MG: 100 CAPSULE, LIQUID FILLED ORAL at 09:01

## 2023-01-29 RX ADMIN — LEVOTHYROXINE SODIUM 125 MCG: 0.12 TABLET ORAL at 05:01

## 2023-01-29 RX ADMIN — TAMSULOSIN HYDROCHLORIDE 0.4 MG: 0.4 CAPSULE ORAL at 09:01

## 2023-01-29 RX ADMIN — HYDRALAZINE HYDROCHLORIDE 25 MG: 25 TABLET ORAL at 05:01

## 2023-01-29 RX ADMIN — PANTOPRAZOLE SODIUM 40 MG: 40 TABLET, DELAYED RELEASE ORAL at 09:01

## 2023-01-29 RX ADMIN — SENNOSIDES 1 TABLET: 8.6 TABLET, FILM COATED ORAL at 09:01

## 2023-01-29 RX ADMIN — HYDRALAZINE HYDROCHLORIDE 25 MG: 25 TABLET ORAL at 10:01

## 2023-01-29 NOTE — PROGRESS NOTES
[facility] Hospital Medicine  Progress Note    Patient Name: Zandra Veloz  MRN: [id number]  Patient Class: IP- Inpatient   Admission Date: 12/29/2022  Length of Stay: 31 days  Attending Physician: Shai Guerra DO  Primary Care Provider: Brandon Thornton MD        Subjective:     Principal Problem:Urinary tract infection        HPI:  HPI:   88-year-old lady seen emergency room department.  Patient presents after having a fall when she was trying to go to the restroom at her home.  Patient sustained a left femur fracture.  Patient complains of moderate to severe pain in the left hip area.  Patient also complains of chest tightness, she rates it a 5/10 in the chest tightness has been intubate.  Patient also was seen in emergency room department earlier this week per her daughter.  Patient is found to have dehydration and a urinary tract infection.  Current she denies any shortness of breath.  She does not give a history of coronary artery disease.        Procedure(s) (LRB):  REVISION, TOTAL ARTHROPLASTY, HIP, VICTORIANO PROSTHETIC FX (Left)       Hospital Course:   12/19 - records reviewed. Fall without LOC and has left hip fx. No other complaints currently. Seen by cardiology with some CP felt musculoskeletal.  Echo mild AS and mod MR with nl EF. Age increase cardiac risk for surgery but discussed with daughter surgery is urgent and see no benefit in delay medically. Feel low to moderate risk for surgery. Question about UTI. No symptoms. Had UTI in 10.22 not surprising. Lab to do culture on urine in lab. Cover with ATN for prior culture with ATB started. Discussed with Dr Singer and cardiology.  12/20 Tachycardia and elevated BP. Cardiology adjusted meds. Plan hold off surgery until 12/22 to adjust meds and treat UTI. Family in room. Pt not sleeping well.   12/21 Didn't sleep well prior night. Apparently been on xanax for a time. Also recently started on Neurontin. Family with  question. No recent BM. Some increase stool on KUB but not severe. Surgery for am. BP some up but better. HR good.   12/22 Seen prior to surgery and apparently add better night. Sore mouth / throat better with mycostatin. For surgery. Family in room.  12/23 patient with pain but otherwise seemed to be doing relatively well.  Daughter in room.  Apparently been taking prednisone for some time and this was restarted per family request.  Look into swing bed placement  12/24 patient had better night rested and everyone's in better spirits today.  Tolerating some diet.   Therapy worked with patient.  Look into swing bed placement next week  12/25 remained in good spirits.  Less pain.  Had good bowel movement and ordered Dulcolax not given.  Because of dressing to leg, Burk was not removed to keep it from getting soiled.  Wound care to check 12/27.  Discuss this with patient and daughter.  On antibiotics for UTI  12/26-will dc to swingbed once accepted. Needs continued wound care.  12/27-DC when bed available  12/28- issues with wound care yesterday.  Dr. Singer had to come remove dressings himself due to adherence to subcutaneous tissue.  Family refusing transfer to [facility] due to lack of specially trained wound care team availability.  Planning to transfer to specialty but resistant to that as well and requesting to speak to administration.    12/29- agreeable to transfer to specialty.  This will be the best place for her to receive wound care.  DC after family tours     12/29-needs continued wound care and therapy[facility]    12/30-doing well today, still with pain         Overview/Hospital Course:  12/31-having some pain this am  1/1- no complaints  1/2- doing well  1/3- continue wound care, last day cefepime tomorrow  1/4- some pain this AM.  Delay in mediations dues to staffing issues. Dsicussed with RN  1/5- no issues overnight  1/6- Still with pain, hip incision looks good  1/7-diclofenac for  shoulder pain  1/8-decrease laxatives, change benzos and carlo to PRN per patient request  1/9 some confusion overnight  1/10- no more confusion, doing well  1/11- no acute issues overnight  1/12- doing well, nausea today  1/13 -Dr. Lebron Cutler IV, DO taking over for Dr. Guerra for weekend.  Patient's pain appears to be well controlled this morning no complaints of nausea.  Continue wound care and PT/OT  1/14-patient appears well labs.  Continue wound care and PT/OT  1/15-patient still appears well.  No acute concerns or complaints.  No events overnight.  Continue wound care working with a PTOT.  Expiration is services estimated by February 6 1/16- no complaints, doing well  1/17-some nausea today  1/18-better today.  Still requiring twice weekly dressing changes.  Appetite stimulant  1/19- PWB with PT today.  Still with weeping wounds.  Will need wound care twice weekly-as these improve will be able to transfer to other facility  1/20-no complaints  1/21-no acute events overnight, some anxiety today  1/22-episode of SVT yesterday. Spontaneous resolution  1/23-no complaints  1/24- no complaints  1/25-doing well  1/26-wound care, pt  1/27- no complaints  1/28- patient seen examined today resting comfortably in bed, in no acute distress and no acute events overnight.  Patient states that she is doing well and denies pain.  States that she is been working well with PT/OT but not this weekend.  Patient otherwise doing well without complaints.  1/29-patient seen examined today resting comfortably in bed, in no acute distress with no acute events overnight.  Patient denies pain or other complaints at this time.  She continues with wound care PT/OT.      Interval History: naeo    Review of Systems   Constitutional:  Negative for chills, fatigue, fever and unexpected weight change.   HENT:  Negative for congestion, mouth sores and sore throat.    Eyes:  Negative for photophobia and visual disturbance.   Respiratory:   Negative for cough, chest tightness, shortness of breath and wheezing.    Cardiovascular:  Negative for chest pain, palpitations and leg swelling.   Gastrointestinal:  Negative for abdominal pain, diarrhea, nausea and vomiting.   Endocrine: Negative for cold intolerance and heat intolerance.   Genitourinary:  Negative for difficulty urinating, dysuria, frequency and urgency.   Musculoskeletal:  Positive for arthralgias. Negative for back pain and myalgias.   Skin:  Negative for pallor and rash.   Neurological:  Negative for tremors, seizures, syncope, weakness, numbness and headaches.   Hematological:  Does not bruise/bleed easily.   Psychiatric/Behavioral:  Negative for agitation, confusion, hallucinations and suicidal ideas.    Objective:     Vital Signs (Most Recent):  Temp: 98.8 °F (37.1 °C) (01/29/23 1200)  Pulse: 65 (01/29/23 1200)  Resp: 16 (01/29/23 1435)  BP: (!) 135/50 (01/29/23 1400)  SpO2: 99 % (01/29/23 1200)   Vital Signs (24h Range):  Temp:  [98.1 °F (36.7 °C)-99.4 °F (37.4 °C)] 98.8 °F (37.1 °C)  Pulse:  [59-67] 65  Resp:  [16-18] 16  SpO2:  [97 %-100 %] 99 %  BP: (113-152)/(50-65) 135/50     Weight: 57.6 kg (126 lb 15.8 oz)  Body mass index is 20.5 kg/m².    Intake/Output Summary (Last 24 hours) at 1/29/2023 1614  Last data filed at 1/29/2023 0800  Gross per 24 hour   Intake 0 ml   Output --   Net 0 ml        Physical Exam  Vitals reviewed.   Constitutional:       Appearance: Normal appearance.   HENT:      Head: Normocephalic and atraumatic.   Eyes:      Extraocular Movements: Extraocular movements intact.   Cardiovascular:      Rate and Rhythm: Normal rate and regular rhythm.      Pulses: Normal pulses.   Pulmonary:      Effort: Pulmonary effort is normal.      Breath sounds: Normal breath sounds.   Abdominal:      General: Abdomen is flat.      Palpations: Abdomen is soft.   Musculoskeletal:         General: Tenderness and signs of injury present.      Comments: Dressed left lower extremity,  painful to touch   Skin:     General: Skin is warm and dry.      Capillary Refill: Capillary refill takes less than 2 seconds.   Neurological:      General: No focal deficit present.      Mental Status: She is alert and oriented to person, place, and time.   Psychiatric:         Mood and Affect: Mood normal.         Behavior: Behavior normal.       Significant Labs: All pertinent labs within the past 24 hours have been reviewed.    Significant Imaging: I have reviewed all pertinent imaging results/findings within the past 24 hours.      Assessment/Plan:      Disruption of external surgical wound        Wounds and injuries  Wound care consulted and following    Open wound of left lower leg  Wound care      Multiple skin tears  Wound care      Delmy-prosthetic fracture around prosthetic hip  S/P fixation with Pomierski  Continue PT/OT.      Lumbar radiculopathy  Pain management  PT      Hypertension  Adjust medications as needed    Arthritis  Pain management        VTE Risk Mitigation (From admission, onward)    None          Discharge Planning   YANVI:      Code Status: Full Code   Is the patient medically ready for discharge?:     Reason for patient still in hospital (select all that apply): Treatment  Discharge Plan A: Skilled Nursing Facility                  Jesus Mcgowan DO  Department of Hospital Medicine   [facility]

## 2023-01-29 NOTE — SUBJECTIVE & OBJECTIVE
Interval History: naeo    Review of Systems   Constitutional:  Negative for chills, fatigue, fever and unexpected weight change.   HENT:  Negative for congestion, mouth sores and sore throat.    Eyes:  Negative for photophobia and visual disturbance.   Respiratory:  Negative for cough, chest tightness, shortness of breath and wheezing.    Cardiovascular:  Negative for chest pain, palpitations and leg swelling.   Gastrointestinal:  Negative for abdominal pain, diarrhea, nausea and vomiting.   Endocrine: Negative for cold intolerance and heat intolerance.   Genitourinary:  Negative for difficulty urinating, dysuria, frequency and urgency.   Musculoskeletal:  Positive for arthralgias. Negative for back pain and myalgias.   Skin:  Negative for pallor and rash.   Neurological:  Negative for tremors, seizures, syncope, weakness, numbness and headaches.   Hematological:  Does not bruise/bleed easily.   Psychiatric/Behavioral:  Negative for agitation, confusion, hallucinations and suicidal ideas.    Objective:     Vital Signs (Most Recent):  Temp: 98.8 °F (37.1 °C) (01/29/23 1200)  Pulse: 65 (01/29/23 1200)  Resp: 16 (01/29/23 1435)  BP: (!) 135/50 (01/29/23 1400)  SpO2: 99 % (01/29/23 1200)   Vital Signs (24h Range):  Temp:  [98.1 °F (36.7 °C)-99.4 °F (37.4 °C)] 98.8 °F (37.1 °C)  Pulse:  [59-67] 65  Resp:  [16-18] 16  SpO2:  [97 %-100 %] 99 %  BP: (113-152)/(50-65) 135/50     Weight: 57.6 kg (126 lb 15.8 oz)  Body mass index is 20.5 kg/m².    Intake/Output Summary (Last 24 hours) at 1/29/2023 1614  Last data filed at 1/29/2023 0800  Gross per 24 hour   Intake 0 ml   Output --   Net 0 ml        Physical Exam  Vitals reviewed.   Constitutional:       Appearance: Normal appearance.   HENT:      Head: Normocephalic and atraumatic.   Eyes:      Extraocular Movements: Extraocular movements intact.   Cardiovascular:      Rate and Rhythm: Normal rate and regular rhythm.      Pulses: Normal pulses.   Pulmonary:      Effort:  Pulmonary effort is normal.      Breath sounds: Normal breath sounds.   Abdominal:      General: Abdomen is flat.      Palpations: Abdomen is soft.   Musculoskeletal:         General: Tenderness and signs of injury present.      Comments: Dressed left lower extremity, painful to touch   Skin:     General: Skin is warm and dry.      Capillary Refill: Capillary refill takes less than 2 seconds.   Neurological:      General: No focal deficit present.      Mental Status: She is alert and oriented to person, place, and time.   Psychiatric:         Mood and Affect: Mood normal.         Behavior: Behavior normal.       Significant Labs: All pertinent labs within the past 24 hours have been reviewed.    Significant Imaging: I have reviewed all pertinent imaging results/findings within the past 24 hours.

## 2023-01-30 LAB
ANION GAP SERPL CALCULATED.3IONS-SCNC: 13 MMOL/L (ref 7–16)
BASOPHILS # BLD AUTO: 0.08 K/UL (ref 0–0.2)
BASOPHILS NFR BLD AUTO: 0.9 % (ref 0–1)
BUN SERPL-MCNC: 13 MG/DL (ref 7–18)
BUN/CREAT SERPL: 16 (ref 6–20)
CALCIUM SERPL-MCNC: 8.6 MG/DL (ref 8.5–10.1)
CHLORIDE SERPL-SCNC: 103 MMOL/L (ref 98–107)
CO2 SERPL-SCNC: 23 MMOL/L (ref 21–32)
CREAT SERPL-MCNC: 0.83 MG/DL (ref 0.55–1.02)
DIFFERENTIAL METHOD BLD: ABNORMAL
EGFR (NO RACE VARIABLE) (RUSH/TITUS): 68 ML/MIN/1.73M²
EOSINOPHIL # BLD AUTO: 0.2 K/UL (ref 0–0.5)
EOSINOPHIL NFR BLD AUTO: 2.4 % (ref 1–4)
ERYTHROCYTE [DISTWIDTH] IN BLOOD BY AUTOMATED COUNT: 13.3 % (ref 11.5–14.5)
GLUCOSE SERPL-MCNC: 83 MG/DL (ref 74–106)
HCT VFR BLD AUTO: 31.2 % (ref 38–47)
HGB BLD-MCNC: 9.7 G/DL (ref 12–16)
IMM GRANULOCYTES # BLD AUTO: 0.13 K/UL (ref 0–0.04)
IMM GRANULOCYTES NFR BLD: 1.5 % (ref 0–0.4)
LYMPHOCYTES # BLD AUTO: 1.96 K/UL (ref 1–4.8)
LYMPHOCYTES NFR BLD AUTO: 23.1 % (ref 27–41)
MCH RBC QN AUTO: 29.7 PG (ref 27–31)
MCHC RBC AUTO-ENTMCNC: 31.1 G/DL (ref 32–36)
MCV RBC AUTO: 95.4 FL (ref 80–96)
MONOCYTES # BLD AUTO: 0.6 K/UL (ref 0–0.8)
MONOCYTES NFR BLD AUTO: 7.1 % (ref 2–6)
MPC BLD CALC-MCNC: 9.7 FL (ref 9.4–12.4)
NEUTROPHILS # BLD AUTO: 5.52 K/UL (ref 1.8–7.7)
NEUTROPHILS NFR BLD AUTO: 65 % (ref 53–65)
NRBC # BLD AUTO: 0 X10E3/UL
NRBC, AUTO (.00): 0 %
PLATELET # BLD AUTO: 246 K/UL (ref 150–400)
POTASSIUM SERPL-SCNC: 4 MMOL/L (ref 3.5–5.1)
RBC # BLD AUTO: 3.27 M/UL (ref 4.2–5.4)
SODIUM SERPL-SCNC: 135 MMOL/L (ref 136–145)
WBC # BLD AUTO: 8.49 K/UL (ref 4.5–11)

## 2023-01-30 PROCEDURE — 99232 PR SUBSEQUENT HOSPITAL CARE,LEVL II: ICD-10-PCS | Mod: ,,, | Performed by: NURSE PRACTITIONER

## 2023-01-30 PROCEDURE — 99232 SBSQ HOSP IP/OBS MODERATE 35: CPT | Mod: ,,, | Performed by: STUDENT IN AN ORGANIZED HEALTH CARE EDUCATION/TRAINING PROGRAM

## 2023-01-30 PROCEDURE — 99232 SBSQ HOSP IP/OBS MODERATE 35: CPT | Mod: ,,, | Performed by: NURSE PRACTITIONER

## 2023-01-30 PROCEDURE — 11000001 HC ACUTE MED/SURG PRIVATE ROOM

## 2023-01-30 PROCEDURE — 85025 COMPLETE CBC W/AUTO DIFF WBC: CPT | Performed by: STUDENT IN AN ORGANIZED HEALTH CARE EDUCATION/TRAINING PROGRAM

## 2023-01-30 PROCEDURE — 96372 THER/PROPH/DIAG INJ SC/IM: CPT

## 2023-01-30 PROCEDURE — 80048 BASIC METABOLIC PNL TOTAL CA: CPT | Performed by: STUDENT IN AN ORGANIZED HEALTH CARE EDUCATION/TRAINING PROGRAM

## 2023-01-30 PROCEDURE — 25000003 PHARM REV CODE 250: Performed by: STUDENT IN AN ORGANIZED HEALTH CARE EDUCATION/TRAINING PROGRAM

## 2023-01-30 PROCEDURE — 25000003 PHARM REV CODE 250: Performed by: HOSPITALIST

## 2023-01-30 PROCEDURE — 63600175 PHARM REV CODE 636 W HCPCS: Performed by: STUDENT IN AN ORGANIZED HEALTH CARE EDUCATION/TRAINING PROGRAM

## 2023-01-30 PROCEDURE — 99232 PR SUBSEQUENT HOSPITAL CARE,LEVL II: ICD-10-PCS | Mod: ,,, | Performed by: STUDENT IN AN ORGANIZED HEALTH CARE EDUCATION/TRAINING PROGRAM

## 2023-01-30 RX ADMIN — GABAPENTIN 100 MG: 100 CAPSULE ORAL at 09:01

## 2023-01-30 RX ADMIN — GABAPENTIN 100 MG: 100 CAPSULE ORAL at 03:01

## 2023-01-30 RX ADMIN — HYDROCODONE BITARTRATE AND ACETAMINOPHEN 2 TABLET: 10; 325 TABLET ORAL at 12:01

## 2023-01-30 RX ADMIN — LISINOPRIL 20 MG: 20 TABLET ORAL at 09:01

## 2023-01-30 RX ADMIN — HYDROCODONE BITARTRATE AND ACETAMINOPHEN 2 TABLET: 10; 325 TABLET ORAL at 10:01

## 2023-01-30 RX ADMIN — DILTIAZEM HYDROCHLORIDE 120 MG: 120 CAPSULE, COATED, EXTENDED RELEASE ORAL at 09:01

## 2023-01-30 RX ADMIN — METOPROLOL TARTRATE 50 MG: 50 TABLET, FILM COATED ORAL at 09:01

## 2023-01-30 RX ADMIN — FAMOTIDINE 20 MG: 20 TABLET ORAL at 09:01

## 2023-01-30 RX ADMIN — HYDRALAZINE HYDROCHLORIDE 25 MG: 25 TABLET ORAL at 10:01

## 2023-01-30 RX ADMIN — PREDNISONE 5 MG: 5 TABLET ORAL at 09:01

## 2023-01-30 RX ADMIN — PANTOPRAZOLE SODIUM 40 MG: 40 TABLET, DELAYED RELEASE ORAL at 09:01

## 2023-01-30 RX ADMIN — HYDRALAZINE HYDROCHLORIDE 25 MG: 25 TABLET ORAL at 06:01

## 2023-01-30 RX ADMIN — LEVOTHYROXINE SODIUM 125 MCG: 0.12 TABLET ORAL at 06:01

## 2023-01-30 RX ADMIN — DOCUSATE SODIUM 100 MG: 100 CAPSULE, LIQUID FILLED ORAL at 09:01

## 2023-01-30 RX ADMIN — ALPRAZOLAM 1 MG: 0.25 TABLET ORAL at 09:01

## 2023-01-30 RX ADMIN — MEGESTROL ACETATE 40 MG: 40 TABLET ORAL at 09:01

## 2023-01-30 RX ADMIN — BISACODYL 10 MG: 5 TABLET, COATED ORAL at 06:01

## 2023-01-30 RX ADMIN — HYDROCODONE BITARTRATE AND ACETAMINOPHEN 2 TABLET: 10; 325 TABLET ORAL at 04:01

## 2023-01-30 RX ADMIN — TAMSULOSIN HYDROCHLORIDE 0.4 MG: 0.4 CAPSULE ORAL at 09:01

## 2023-01-30 NOTE — PT/OT/SLP PROGRESS
"Physical Therapy      Patient Name:  Zandra Veloz   MRN:  [id number]    Patient not seen today secondary to Patient unwilling to participate. "no, not today, they just finished changing all these bandages".  pt's sitting reporting pt in "increased pain and having to having to have pain meds during bandage change", "she's been exhausted since they changed her bandages". Will follow-up next treatment date.    "

## 2023-01-30 NOTE — SUBJECTIVE & OBJECTIVE
Interval History: naeo    Review of Systems   Constitutional:  Negative for chills, fatigue, fever and unexpected weight change.   HENT:  Negative for congestion, mouth sores and sore throat.    Eyes:  Negative for photophobia and visual disturbance.   Respiratory:  Negative for cough, chest tightness, shortness of breath and wheezing.    Cardiovascular:  Negative for chest pain, palpitations and leg swelling.   Gastrointestinal:  Negative for abdominal pain, diarrhea, nausea and vomiting.   Endocrine: Negative for cold intolerance and heat intolerance.   Genitourinary:  Negative for difficulty urinating, dysuria, frequency and urgency.   Musculoskeletal:  Positive for arthralgias. Negative for back pain and myalgias.   Skin:  Negative for pallor and rash.   Neurological:  Negative for tremors, seizures, syncope, weakness, numbness and headaches.   Hematological:  Does not bruise/bleed easily.   Psychiatric/Behavioral:  Negative for agitation, confusion, hallucinations and suicidal ideas.    Objective:     Vital Signs (Most Recent):  Temp: 97.9 °F (36.6 °C) (01/30/23 0742)  Pulse: 60 (01/30/23 0742)  Resp: 18 (01/30/23 0742)  BP: (!) 140/46 (01/30/23 0742)  SpO2: 96 % (01/30/23 0742)   Vital Signs (24h Range):  Temp:  [97.9 °F (36.6 °C)-98.8 °F (37.1 °C)] 97.9 °F (36.6 °C)  Pulse:  [55-67] 60  Resp:  [16-20] 18  SpO2:  [96 %-99 %] 96 %  BP: (108-154)/(46-81) 140/46     Weight: 57.6 kg (126 lb 15.8 oz)  Body mass index is 20.5 kg/m².    Intake/Output Summary (Last 24 hours) at 1/30/2023 1119  Last data filed at 1/29/2023 1300  Gross per 24 hour   Intake 0 ml   Output --   Net 0 ml        Physical Exam  Vitals reviewed.   Constitutional:       Appearance: Normal appearance.   HENT:      Head: Normocephalic and atraumatic.   Eyes:      Extraocular Movements: Extraocular movements intact.   Cardiovascular:      Rate and Rhythm: Normal rate and regular rhythm.      Pulses: Normal pulses.   Pulmonary:      Effort:  Pulmonary effort is normal.      Breath sounds: Normal breath sounds.   Abdominal:      General: Abdomen is flat.      Palpations: Abdomen is soft.   Musculoskeletal:         General: Tenderness and signs of injury present.      Comments: Dressed left lower extremity, painful to touch   Skin:     General: Skin is warm and dry.      Capillary Refill: Capillary refill takes less than 2 seconds.   Neurological:      General: No focal deficit present.      Mental Status: She is alert and oriented to person, place, and time.   Psychiatric:         Mood and Affect: Mood normal.         Behavior: Behavior normal.       Significant Labs: All pertinent labs within the past 24 hours have been reviewed.    Significant Imaging: I have reviewed all pertinent imaging results/findings within the past 24 hours.

## 2023-01-30 NOTE — PROGRESS NOTES
[facility] Hospital Medicine  Progress Note    Patient Name: Zandra Veloz  MRN: [id number]  Patient Class: IP- Inpatient   Admission Date: 12/29/2022  Length of Stay: 32 days  Attending Physician: Shai Guerra DO  Primary Care Provider: Brandon Thornton MD        Subjective:     Principal Problem:Urinary tract infection        HPI:  HPI:   88-year-old lady seen emergency room department.  Patient presents after having a fall when she was trying to go to the restroom at her home.  Patient sustained a left femur fracture.  Patient complains of moderate to severe pain in the left hip area.  Patient also complains of chest tightness, she rates it a 5/10 in the chest tightness has been intubate.  Patient also was seen in emergency room department earlier this week per her daughter.  Patient is found to have dehydration and a urinary tract infection.  Current she denies any shortness of breath.  She does not give a history of coronary artery disease.        Procedure(s) (LRB):  REVISION, TOTAL ARTHROPLASTY, HIP, VICTORIANO PROSTHETIC FX (Left)       Hospital Course:   12/19 - records reviewed. Fall without LOC and has left hip fx. No other complaints currently. Seen by cardiology with some CP felt musculoskeletal.  Echo mild AS and mod MR with nl EF. Age increase cardiac risk for surgery but discussed with daughter surgery is urgent and see no benefit in delay medically. Feel low to moderate risk for surgery. Question about UTI. No symptoms. Had UTI in 10.22 not surprising. Lab to do culture on urine in lab. Cover with ATN for prior culture with ATB started. Discussed with Dr Singer and cardiology.  12/20 Tachycardia and elevated BP. Cardiology adjusted meds. Plan hold off surgery until 12/22 to adjust meds and treat UTI. Family in room. Pt not sleeping well.   12/21 Didn't sleep well prior night. Apparently been on xanax for a time. Also recently started on Neurontin. Family with  question. No recent BM. Some increase stool on KUB but not severe. Surgery for am. BP some up but better. HR good.   12/22 Seen prior to surgery and apparently add better night. Sore mouth / throat better with mycostatin. For surgery. Family in room.  12/23 patient with pain but otherwise seemed to be doing relatively well.  Daughter in room.  Apparently been taking prednisone for some time and this was restarted per family request.  Look into swing bed placement  12/24 patient had better night rested and everyone's in better spirits today.  Tolerating some diet.   Therapy worked with patient.  Look into swing bed placement next week  12/25 remained in good spirits.  Less pain.  Had good bowel movement and ordered Dulcolax not given.  Because of dressing to leg, Burk was not removed to keep it from getting soiled.  Wound care to check 12/27.  Discuss this with patient and daughter.  On antibiotics for UTI  12/26-will dc to swingbed once accepted. Needs continued wound care.  12/27-DC when bed available  12/28- issues with wound care yesterday.  Dr. Singer had to come remove dressings himself due to adherence to subcutaneous tissue.  Family refusing transfer to [facility] due to lack of specially trained wound care team availability.  Planning to transfer to specialty but resistant to that as well and requesting to speak to administration.    12/29- agreeable to transfer to specialty.  This will be the best place for her to receive wound care.  DC after family tours     12/29-needs continued wound care and therapy[facility]    12/30-doing well today, still with pain         Overview/Hospital Course:  12/31-having some pain this am  1/1- no complaints  1/2- doing well  1/3- continue wound care, last day cefepime tomorrow  1/4- some pain this AM.  Delay in mediations dues to staffing issues. Dsicussed with RN  1/5- no issues overnight  1/6- Still with pain, hip incision looks good  1/7-diclofenac for  shoulder pain  1/8-decrease laxatives, change benzos and carlo to PRN per patient request  1/9 some confusion overnight  1/10- no more confusion, doing well  1/11- no acute issues overnight  1/12- doing well, nausea today  1/13 -Dr. Lebron Cutler IV, DO taking over for Dr. Guerra for weekend.  Patient's pain appears to be well controlled this morning no complaints of nausea.  Continue wound care and PT/OT  1/14-patient appears well labs.  Continue wound care and PT/OT  1/15-patient still appears well.  No acute concerns or complaints.  No events overnight.  Continue wound care working with a PTOT.  Expiration is services estimated by February 6 1/16- no complaints, doing well  1/17-some nausea today  1/18-better today.  Still requiring twice weekly dressing changes.  Appetite stimulant  1/19- PWB with PT today.  Still with weeping wounds.  Will need wound care twice weekly-as these improve will be able to transfer to other facility  1/20-no complaints  1/21-no acute events overnight, some anxiety today  1/22-episode of SVT yesterday. Spontaneous resolution  1/23-no complaints  1/24- no complaints  1/25-doing well  1/26-wound care, pt  1/27- no complaints  1/28- patient seen examined today resting comfortably in bed, in no acute distress and no acute events overnight.  Patient states that she is doing well and denies pain.  States that she is been working well with PT/OT but not this weekend.  Patient otherwise doing well without complaints.  1/29-patient seen examined today resting comfortably in bed, in no acute distress with no acute events overnight.  Patient denies pain or other complaints at this time.  She continues with wound care PT/OT.  1/30-doing well this am      Interval History: naeo    Review of Systems   Constitutional:  Negative for chills, fatigue, fever and unexpected weight change.   HENT:  Negative for congestion, mouth sores and sore throat.    Eyes:  Negative for photophobia and visual  disturbance.   Respiratory:  Negative for cough, chest tightness, shortness of breath and wheezing.    Cardiovascular:  Negative for chest pain, palpitations and leg swelling.   Gastrointestinal:  Negative for abdominal pain, diarrhea, nausea and vomiting.   Endocrine: Negative for cold intolerance and heat intolerance.   Genitourinary:  Negative for difficulty urinating, dysuria, frequency and urgency.   Musculoskeletal:  Positive for arthralgias. Negative for back pain and myalgias.   Skin:  Negative for pallor and rash.   Neurological:  Negative for tremors, seizures, syncope, weakness, numbness and headaches.   Hematological:  Does not bruise/bleed easily.   Psychiatric/Behavioral:  Negative for agitation, confusion, hallucinations and suicidal ideas.    Objective:     Vital Signs (Most Recent):  Temp: 97.9 °F (36.6 °C) (01/30/23 0742)  Pulse: 60 (01/30/23 0742)  Resp: 18 (01/30/23 0742)  BP: (!) 140/46 (01/30/23 0742)  SpO2: 96 % (01/30/23 0742)   Vital Signs (24h Range):  Temp:  [97.9 °F (36.6 °C)-98.8 °F (37.1 °C)] 97.9 °F (36.6 °C)  Pulse:  [55-67] 60  Resp:  [16-20] 18  SpO2:  [96 %-99 %] 96 %  BP: (108-154)/(46-81) 140/46     Weight: 57.6 kg (126 lb 15.8 oz)  Body mass index is 20.5 kg/m².    Intake/Output Summary (Last 24 hours) at 1/30/2023 1119  Last data filed at 1/29/2023 1300  Gross per 24 hour   Intake 0 ml   Output --   Net 0 ml        Physical Exam  Vitals reviewed.   Constitutional:       Appearance: Normal appearance.   HENT:      Head: Normocephalic and atraumatic.   Eyes:      Extraocular Movements: Extraocular movements intact.   Cardiovascular:      Rate and Rhythm: Normal rate and regular rhythm.      Pulses: Normal pulses.   Pulmonary:      Effort: Pulmonary effort is normal.      Breath sounds: Normal breath sounds.   Abdominal:      General: Abdomen is flat.      Palpations: Abdomen is soft.   Musculoskeletal:         General: Tenderness and signs of injury present.      Comments:  Dressed left lower extremity, painful to touch   Skin:     General: Skin is warm and dry.      Capillary Refill: Capillary refill takes less than 2 seconds.   Neurological:      General: No focal deficit present.      Mental Status: She is alert and oriented to person, place, and time.   Psychiatric:         Mood and Affect: Mood normal.         Behavior: Behavior normal.       Significant Labs: All pertinent labs within the past 24 hours have been reviewed.    Significant Imaging: I have reviewed all pertinent imaging results/findings within the past 24 hours.      Assessment/Plan:      Disruption of external surgical wound        Wounds and injuries  Wound care consulted and following    Open wound of left lower leg  Wound care      Multiple skin tears  Wound care      Delmy-prosthetic fracture around prosthetic hip  S/P fixation with Pomierski      Lumbar radiculopathy  Pain management  PT      Hypertension  Adjust medications as needed    Arthritis  Pain management        VTE Risk Mitigation (From admission, onward)    None          Discharge Planning   YANIV:      Code Status: Full Code   Is the patient medically ready for discharge?:     Reason for patient still in hospital (select all that apply): Treatment  Discharge Plan A: Skilled Nursing Facility                  Shai Guerra DO  Department of Hospital Medicine   [facility]

## 2023-01-30 NOTE — PROGRESS NOTES
[facility]  Wound care and Hyperbarics JOSE  Progress Note    Patient Name: Zandra Veloz  MRN: [id number]  Admission Date: 12/29/2022  Hospital Length of Stay: 32 days  Attending Physician: Shai Guerra DO  Primary Care Provider: Brandon Thornton MD         Subjective:     HPI:  89 yo female with multiple skin tears, traumatic wounds, and surgical incision to left thigh. She was admitted following a fall on 12/17 when she was trying to go to the restroom at her home.  Patient sustained a left femur fracture. She is status post ORIF on 12/20. Staples intact to left hip. Moderate amount of green drainage noted, cultures obtained today. Left lower leg sutures removed today. Slough noted in wound bed today, adjustment to local wound care. Significant PMH includes hypertension, bradycardia, osteoarthritis, hypothyroidism, osteoporosis, and multiple falls. Wound healing is complicated by bradycardia, hx of a-fib, chronic pain, weakness, fragile skin, limited mobility, multiple falls, infection, and pain.     Hospital Course:   1/5/23 - Complains of pain to right posterior knee, venous doppler ordered. Sutures and every other staple removed today. New local wound care orders. Cultures pending.   1/9/23 - Wounds on lower extremities healing well. Continue current POC. Bruising noted to left posterior knee, protect with mepitel and optifoam. Dressing to incision site saturated with green drainage. Cultures negative. Continue with drawtex and notify ortho of drainage.   1/11/2023 - Wounds are healing well. Continue current POC. Ultrasound pending, preliminary results concerning for fluid pocket, Dr. Singer going to evaluate today.   1/17/23 - Wounds continue to show improvement. Dr. Singer evaluated left hip today during wound vac change. Staples and wound vac d/c'd today. Dressing change twice weekly.   1/19/23 - Wounds continue to heal. Incision with minimal drainage. Puracol to  left thigh today. Continue with current POC. Twice weekly dressing changes (M, Th)  1/23/23 - Wounds are improving. Continue twice weekly dressing changes.   1/30/23 - New skin tear to right thigh, used tweezers and q-tip to place skin over wound and applied transparent dressing. Wounds are smaller today with granulation tissue. Continue drawtex and bordered foam dressing to wounds.           Scheduled Meds:   adenosine  12 mg Intravenous Once    adenosine  6 mg Intravenous Once    ALPRAZolam  1 mg Oral QHS    diltiaZEM  120 mg Oral Daily    docusate sodium  100 mg Oral BID    famotidine  20 mg Oral Daily    gabapentin  100 mg Oral TID    hydrALAZINE  25 mg Oral Q8H    levothyroxine  125 mcg Oral Before breakfast    lisinopriL  20 mg Oral Daily    megestroL  40 mg Oral Daily    metoprolol tartrate  50 mg Oral BID    pantoprazole  40 mg Oral Daily    polyethylene glycol  17 g Oral Daily    predniSONE  5 mg Oral Daily    senna  1 tablet Oral Daily    tamsulosin  0.4 mg Oral Daily     Continuous Infusions:  PRN Meds:acetaminophen, bisacodyL, dextromethorphan-guaiFENesin  mg/5 ml, dextrose 50%, dextrose 50%, diclofenac sodium, glucagon (human recombinant), glucose, glucose, HYDROcodone-acetaminophen, magnesium oxide, melatonin, naloxone, ondansetron, potassium bicarbonate, potassium bicarbonate, potassium bicarbonate, potassium, sodium phosphates, potassium, sodium phosphates, potassium, sodium phosphates, prochlorperazine, simethicone, sodium chloride 0.9%, traZODone, white petrolatum    Review of Systems   Constitutional:  Positive for activity change. Negative for chills and fever.   Respiratory:  Negative for chest tightness and shortness of breath.    Cardiovascular:  Positive for leg swelling. Negative for chest pain and palpitations.   Musculoskeletal:  Positive for arthralgias, back pain, gait problem and joint swelling.   Skin:  Positive for color change and wound.        wound    Neurological:  Positive for weakness.   Psychiatric/Behavioral:  Negative for agitation, behavioral problems, confusion and self-injury.    Objective:     Vital Signs (Most Recent):  Temp: 97.9 °F (36.6 °C) (01/30/23 0742)  Pulse: 60 (01/30/23 0742)  Resp: 18 (01/30/23 1258)  BP: (!) 140/46 (01/30/23 0742)  SpO2: 96 % (01/30/23 0742)   Vital Signs (24h Range):  Temp:  [97.9 °F (36.6 °C)-98.6 °F (37 °C)] 97.9 °F (36.6 °C)  Pulse:  [55-67] 60  Resp:  [16-20] 18  SpO2:  [96 %-98 %] 96 %  BP: (108-154)/(46-81) 140/46     Weight: 57.6 kg (126 lb 15.8 oz)  Body mass index is 20.5 kg/m².    Physical Exam  Vitals reviewed.   Constitutional:       Appearance: Normal appearance.   HENT:      Head: Normocephalic.   Cardiovascular:      Rate and Rhythm: Regular rhythm. Bradycardia present.      Pulses: Normal pulses.      Heart sounds: Murmur heard.   Pulmonary:      Effort: Pulmonary effort is normal.      Breath sounds: Normal breath sounds.   Musculoskeletal:         General: Swelling, tenderness, deformity and signs of injury present.      Right lower leg: Edema present.      Left lower leg: Edema present.   Skin:     Findings: Bruising and erythema present.      Comments: Wound, see LDA for photo/measurements   Neurological:      Mental Status: She is alert. Mental status is at baseline.      Motor: Weakness present.      Gait: Gait abnormal.         Assessment/Plan:     Open wound of left lower leg  Clean with vashe  Apply vashe moistened drawtex to wound bed  Cover and secure with bordered foam  Change twice weekly and PRN for soilage (M, Th)  Keep leg elevated and avoid pressure on wound.          Multiple skin tears  Clean with vashe  Apply mepitel and drawtex to wound bed  Cover and secure with bordered foam  Change twice weekly and PRN for soilage (M, Th)  Keep leg elevated and avoid pressure on wound.        ZURDO San  Wound Care and Hyperbarics JOSE  [facility]

## 2023-01-30 NOTE — ASSESSMENT & PLAN NOTE
Clean with vashe  Apply mepitel and drawtex to wound bed  Cover and secure with bordered foam  Change twice weekly and PRN for soilage (M, Th)  Keep leg elevated and avoid pressure on wound.

## 2023-01-30 NOTE — SUBJECTIVE & OBJECTIVE
Subjective:     HPI:  89 yo female with multiple skin tears, traumatic wounds, and surgical incision to left thigh. She was admitted following a fall on 12/17 when she was trying to go to the restroom at her home.  Patient sustained a left femur fracture. She is status post ORIF on 12/20. Staples intact to left hip. Moderate amount of green drainage noted, cultures obtained today. Left lower leg sutures removed today. Slough noted in wound bed today, adjustment to local wound care. Significant PMH includes hypertension, bradycardia, osteoarthritis, hypothyroidism, osteoporosis, and multiple falls. Wound healing is complicated by bradycardia, hx of a-fib, chronic pain, weakness, fragile skin, limited mobility, multiple falls, infection, and pain.     Hospital Course:   1/5/23 - Complains of pain to right posterior knee, venous doppler ordered. Sutures and every other staple removed today. New local wound care orders. Cultures pending.   1/9/23 - Wounds on lower extremities healing well. Continue current POC. Bruising noted to left posterior knee, protect with mepitel and optifoam. Dressing to incision site saturated with green drainage. Cultures negative. Continue with drawtex and notify ortho of drainage.   1/11/2023 - Wounds are healing well. Continue current POC. Ultrasound pending, preliminary results concerning for fluid pocket, Dr. Singer going to evaluate today.   1/17/23 - Wounds continue to show improvement. Dr. Singer evaluated left hip today during wound vac change. Staples and wound vac d/c'd today. Dressing change twice weekly.   1/19/23 - Wounds continue to heal. Incision with minimal drainage. Puracol to left thigh today. Continue with current POC. Twice weekly dressing changes (M, Th)  1/23/23 - Wounds are improving. Continue twice weekly dressing changes.   1/30/23 - New skin tear to right thigh, used tweezers and q-tip to place skin over wound and applied transparent dressing. Wounds are  smaller today with granulation tissue. Continue drawtex and bordered foam dressing to wounds.           Scheduled Meds:   adenosine  12 mg Intravenous Once    adenosine  6 mg Intravenous Once    ALPRAZolam  1 mg Oral QHS    diltiaZEM  120 mg Oral Daily    docusate sodium  100 mg Oral BID    famotidine  20 mg Oral Daily    gabapentin  100 mg Oral TID    hydrALAZINE  25 mg Oral Q8H    levothyroxine  125 mcg Oral Before breakfast    lisinopriL  20 mg Oral Daily    megestroL  40 mg Oral Daily    metoprolol tartrate  50 mg Oral BID    pantoprazole  40 mg Oral Daily    polyethylene glycol  17 g Oral Daily    predniSONE  5 mg Oral Daily    senna  1 tablet Oral Daily    tamsulosin  0.4 mg Oral Daily     Continuous Infusions:  PRN Meds:acetaminophen, bisacodyL, dextromethorphan-guaiFENesin  mg/5 ml, dextrose 50%, dextrose 50%, diclofenac sodium, glucagon (human recombinant), glucose, glucose, HYDROcodone-acetaminophen, magnesium oxide, melatonin, naloxone, ondansetron, potassium bicarbonate, potassium bicarbonate, potassium bicarbonate, potassium, sodium phosphates, potassium, sodium phosphates, potassium, sodium phosphates, prochlorperazine, simethicone, sodium chloride 0.9%, traZODone, white petrolatum    Review of Systems   Constitutional:  Positive for activity change. Negative for chills and fever.   Respiratory:  Negative for chest tightness and shortness of breath.    Cardiovascular:  Positive for leg swelling. Negative for chest pain and palpitations.   Musculoskeletal:  Positive for arthralgias, back pain, gait problem and joint swelling.   Skin:  Positive for color change and wound.        wound   Neurological:  Positive for weakness.   Psychiatric/Behavioral:  Negative for agitation, behavioral problems, confusion and self-injury.    Objective:     Vital Signs (Most Recent):  Temp: 97.9 °F (36.6 °C) (01/30/23 0742)  Pulse: 60 (01/30/23 0742)  Resp: 18 (01/30/23 1258)  BP: (!) 140/46 (01/30/23 0742)  SpO2: 96  % (01/30/23 0742)   Vital Signs (24h Range):  Temp:  [97.9 °F (36.6 °C)-98.6 °F (37 °C)] 97.9 °F (36.6 °C)  Pulse:  [55-67] 60  Resp:  [16-20] 18  SpO2:  [96 %-98 %] 96 %  BP: (108-154)/(46-81) 140/46     Weight: 57.6 kg (126 lb 15.8 oz)  Body mass index is 20.5 kg/m².    Physical Exam  Vitals reviewed.   Constitutional:       Appearance: Normal appearance.   HENT:      Head: Normocephalic.   Cardiovascular:      Rate and Rhythm: Regular rhythm. Bradycardia present.      Pulses: Normal pulses.      Heart sounds: Murmur heard.   Pulmonary:      Effort: Pulmonary effort is normal.      Breath sounds: Normal breath sounds.   Musculoskeletal:         General: Swelling, tenderness, deformity and signs of injury present.      Right lower leg: Edema present.      Left lower leg: Edema present.   Skin:     Findings: Bruising and erythema present.      Comments: Wound, see LDA for photo/measurements   Neurological:      Mental Status: She is alert. Mental status is at baseline.      Motor: Weakness present.      Gait: Gait abnormal.

## 2023-01-30 NOTE — PT/OT/SLP PROGRESS
Occupational Therapy      Patient Name:  Zandra Veloz   MRN:  [id number]    Patient not seen today secondary to Pain. Pt was not able to get her pain meds prior to her wound care and became very painful during wound care. Pt had pain medication , but was still very painful.OT made 2 attempts. Will follow-up 1/31/2023.    1/30/2023

## 2023-01-31 PROCEDURE — 97110 THERAPEUTIC EXERCISES: CPT | Mod: CQ

## 2023-01-31 PROCEDURE — 99231 SBSQ HOSP IP/OBS SF/LOW 25: CPT | Mod: ,,, | Performed by: STUDENT IN AN ORGANIZED HEALTH CARE EDUCATION/TRAINING PROGRAM

## 2023-01-31 PROCEDURE — 63600175 PHARM REV CODE 636 W HCPCS: Performed by: STUDENT IN AN ORGANIZED HEALTH CARE EDUCATION/TRAINING PROGRAM

## 2023-01-31 PROCEDURE — 97110 THERAPEUTIC EXERCISES: CPT

## 2023-01-31 PROCEDURE — 11000001 HC ACUTE MED/SURG PRIVATE ROOM

## 2023-01-31 PROCEDURE — 25000003 PHARM REV CODE 250: Performed by: STUDENT IN AN ORGANIZED HEALTH CARE EDUCATION/TRAINING PROGRAM

## 2023-01-31 PROCEDURE — 99231 PR SUBSEQUENT HOSPITAL CARE,LEVL I: ICD-10-PCS | Mod: ,,, | Performed by: STUDENT IN AN ORGANIZED HEALTH CARE EDUCATION/TRAINING PROGRAM

## 2023-01-31 PROCEDURE — 97116 GAIT TRAINING THERAPY: CPT | Mod: CQ

## 2023-01-31 RX ADMIN — GABAPENTIN 100 MG: 100 CAPSULE ORAL at 03:01

## 2023-01-31 RX ADMIN — DILTIAZEM HYDROCHLORIDE 120 MG: 120 CAPSULE, COATED, EXTENDED RELEASE ORAL at 10:01

## 2023-01-31 RX ADMIN — LEVOTHYROXINE SODIUM 125 MCG: 0.12 TABLET ORAL at 05:01

## 2023-01-31 RX ADMIN — HYDRALAZINE HYDROCHLORIDE 25 MG: 25 TABLET ORAL at 03:01

## 2023-01-31 RX ADMIN — HYDROCODONE BITARTRATE AND ACETAMINOPHEN 2 TABLET: 10; 325 TABLET ORAL at 10:01

## 2023-01-31 RX ADMIN — MEGESTROL ACETATE 40 MG: 40 TABLET ORAL at 10:01

## 2023-01-31 RX ADMIN — ALPRAZOLAM 1 MG: 0.25 TABLET ORAL at 09:01

## 2023-01-31 RX ADMIN — PREDNISONE 5 MG: 5 TABLET ORAL at 10:01

## 2023-01-31 RX ADMIN — LISINOPRIL 20 MG: 20 TABLET ORAL at 10:01

## 2023-01-31 RX ADMIN — TAMSULOSIN HYDROCHLORIDE 0.4 MG: 0.4 CAPSULE ORAL at 10:01

## 2023-01-31 RX ADMIN — HYDRALAZINE HYDROCHLORIDE 25 MG: 25 TABLET ORAL at 09:01

## 2023-01-31 RX ADMIN — HYDRALAZINE HYDROCHLORIDE 25 MG: 25 TABLET ORAL at 05:01

## 2023-01-31 RX ADMIN — DOCUSATE SODIUM 100 MG: 100 CAPSULE, LIQUID FILLED ORAL at 10:01

## 2023-01-31 RX ADMIN — PANTOPRAZOLE SODIUM 40 MG: 40 TABLET, DELAYED RELEASE ORAL at 10:01

## 2023-01-31 RX ADMIN — GABAPENTIN 100 MG: 100 CAPSULE ORAL at 10:01

## 2023-01-31 RX ADMIN — METOPROLOL TARTRATE 50 MG: 50 TABLET, FILM COATED ORAL at 10:01

## 2023-01-31 RX ADMIN — DOCUSATE SODIUM 100 MG: 100 CAPSULE, LIQUID FILLED ORAL at 09:01

## 2023-01-31 RX ADMIN — METOPROLOL TARTRATE 50 MG: 50 TABLET, FILM COATED ORAL at 09:01

## 2023-01-31 RX ADMIN — FAMOTIDINE 20 MG: 20 TABLET ORAL at 10:01

## 2023-01-31 RX ADMIN — POLYETHYLENE GLYCOL 3350 17 G: 17 POWDER, FOR SOLUTION ORAL at 10:01

## 2023-01-31 RX ADMIN — GABAPENTIN 100 MG: 100 CAPSULE ORAL at 09:01

## 2023-01-31 RX ADMIN — SENNOSIDES 1 TABLET: 8.6 TABLET, FILM COATED ORAL at 10:01

## 2023-01-31 RX ADMIN — HYDROCODONE BITARTRATE AND ACETAMINOPHEN 2 TABLET: 10; 325 TABLET ORAL at 06:01

## 2023-01-31 NOTE — PLAN OF CARE
Problem: Adult Inpatient Plan of Care  Goal: Plan of Care Review  1/30/2023 1828 by Delia Del Cid RN  Outcome: Ongoing, Progressing  1/30/2023 1826 by Delia Del Cid RN  Outcome: Ongoing, Progressing     Problem: Skin Injury Risk Increased  Goal: Skin Health and Integrity  1/30/2023 1828 by Delia Del Cid RN  Outcome: Ongoing, Progressing  1/30/2023 1826 by Delia Del Cid RN  Outcome: Ongoing, Progressing     Problem: Fall Injury Risk  Goal: Absence of Fall and Fall-Related Injury  1/30/2023 1828 by Delia Del Cid RN  Outcome: Ongoing, Progressing  1/30/2023 1826 by Delia Del Cid RN  Outcome: Ongoing, Progressing

## 2023-01-31 NOTE — SUBJECTIVE & OBJECTIVE
Interval History: naeo    Review of Systems   Constitutional:  Negative for chills, fatigue, fever and unexpected weight change.   HENT:  Negative for congestion, mouth sores and sore throat.    Eyes:  Negative for photophobia and visual disturbance.   Respiratory:  Negative for cough, chest tightness, shortness of breath and wheezing.    Cardiovascular:  Negative for chest pain, palpitations and leg swelling.   Gastrointestinal:  Negative for abdominal pain, diarrhea, nausea and vomiting.   Endocrine: Negative for cold intolerance and heat intolerance.   Genitourinary:  Negative for difficulty urinating, dysuria, frequency and urgency.   Musculoskeletal:  Positive for arthralgias. Negative for back pain and myalgias.   Skin:  Negative for pallor and rash.   Neurological:  Negative for tremors, seizures, syncope, weakness, numbness and headaches.   Hematological:  Does not bruise/bleed easily.   Psychiatric/Behavioral:  Negative for agitation, confusion, hallucinations and suicidal ideas.    Objective:     Vital Signs (Most Recent):  Temp: 99.2 °F (37.3 °C) (01/31/23 0730)  Pulse: 69 (01/31/23 0730)  Resp: 18 (01/31/23 1003)  BP: 139/81 (01/31/23 0730)  SpO2: 97 % (01/31/23 0730)   Vital Signs (24h Range):  Temp:  [97.6 °F (36.4 °C)-99.2 °F (37.3 °C)] 99.2 °F (37.3 °C)  Pulse:  [55-73] 69  Resp:  [16-18] 18  SpO2:  [95 %-99 %] 97 %  BP: (117-144)/(55-81) 139/81     Weight: 57.6 kg (126 lb 15.8 oz)  Body mass index is 20.5 kg/m².  No intake or output data in the 24 hours ending 01/31/23 1131     Physical Exam  Vitals reviewed.   Constitutional:       Appearance: Normal appearance.   HENT:      Head: Normocephalic and atraumatic.   Eyes:      Extraocular Movements: Extraocular movements intact.   Cardiovascular:      Rate and Rhythm: Normal rate and regular rhythm.      Pulses: Normal pulses.   Pulmonary:      Effort: Pulmonary effort is normal.      Breath sounds: Normal breath sounds.   Abdominal:      General:  Abdomen is flat.      Palpations: Abdomen is soft.   Musculoskeletal:         General: Tenderness and signs of injury present.      Comments: Dressed left lower extremity, painful to touch   Skin:     General: Skin is warm and dry.      Capillary Refill: Capillary refill takes less than 2 seconds.   Neurological:      General: No focal deficit present.      Mental Status: She is alert and oriented to person, place, and time.   Psychiatric:         Mood and Affect: Mood normal.         Behavior: Behavior normal.       Significant Labs: All pertinent labs within the past 24 hours have been reviewed.    Significant Imaging: I have reviewed all pertinent imaging results/findings within the past 24 hours.

## 2023-01-31 NOTE — PLAN OF CARE
Problem: Physical Therapy  Goal: Physical Therapy Goal  Description: Short Term Goals to be met by 2/25/2023    Patient will increase functional independence and safety with mobility by performing    1.   Rolling side to side with standby assist  2.   Supine to sit standby Assist  3.   Sit to stand Minimal Assist using manual assistance with gait belt and PWB L LE  4.   Ambulate 20ft with RW, PWB LLE. Min A.   5.   Lower extremity exercises x 30 reps with assist as necessary and no rest breaks      Long Term Goals to be met by 3/10/2023    Patient to require less than or equal to Stand by assist for functional mobility to ease caregiver burden   Outcome: Ongoing, Progressing       Rolling Left:  contact guard assistance to minimum assistance  Rolling Right: contact guard assistance to minimum assistance  Supine to Sit: minimum assistance and heavy  cueing  Sitting EOB: contact guard assistance to standby assistance     Sit to Stand:  minimum assistance and of 2 persons with rolling walker  Gait: up to 15' minimum assist to moderate assist with RW, PWB LLE    Pt slowly improving with all functional mobility, feel pt will benefit from swing bed to cont rehab at discharge

## 2023-01-31 NOTE — PT/OT/SLP PROGRESS
"Physical Therapy Treatment    Patient Name:  Zandra Veloz   MRN:  [id number]    Recommendations:     Discharge Recommendations: nursing facility, skilled  Discharge Equipment Recommendations: other (see comments) (to be determined)  Barriers to discharge:  ongoing medical treatment    Assessment:     Zandra Veloz is a 88 y.o. female admitted with a medical diagnosis of Urinary tract infection.  She presents with the following impairments/functional limitations: weakness, impaired functional mobility, pain, impaired self care skills, impaired skin.    Pt reporting increased pain LLE and unable to duplicate gait distance performed when last worked with this PTA    Rehab Prognosis: Fair; patient would benefit from acute skilled PT services to address these deficits and reach maximum level of function.    Recent Surgery: * No surgery found *      Plan:     During this hospitalization, patient to be seen 5 x/week to address the identified rehab impairments via gait training, therapeutic activities, therapeutic exercises and progress toward the following goals:    Plan of Care Expires:  01/30/23    Subjective     Chief Complaint: pain  Patient/Family Comments/goals: "behind this left knee is hurting "  Pain/Comfort:  Pain Rating 1: 9/10  Location - Side 1: Left  Location - Orientation 1: lower  Location 1: leg      Objective:     Communicated with Lefty Khanna RN prior to session.  Patient found HOB elevated with peripheral IV, telemetry upon PT entry to room.     General Precautions: Standard, fall  Orthopedic Precautions: LLE partial weight bearing, LLE posterior precautions  Braces: N/A  Respiratory Status: Room air     Functional Mobility:  Bed Mobility:     Rolling Left:  contact guard assistance and minimum assistance  Rolling Right: contact guard assistance and minimum assistance  Supine to Sit: minimum assistance and heavy  cueing  Transfers:     Sit to Stand:  minimum assistance and of 2 " persons with rolling walker  Gait: 8' minimum assist to moderate assist with RW, PWB LLE; posterior lean, slow sadaf, short step length, report of pain      AM-PAC 6 CLICK MOBILITY  Turning over in bed (including adjusting bedclothes, sheets and blankets)?: 3  Sitting down on and standing up from a chair with arms (e.g., wheelchair, bedside commode, etc.): 3  Moving from lying on back to sitting on the side of the bed?: 3  Moving to and from a bed to a chair (including a wheelchair)?: 3  Need to walk in hospital room?: 3  Climbing 3-5 steps with a railing?: 1  Basic Mobility Total Score: 16       Treatment & Education:  Pt performed 2 x 15 reps (B) LE exercises: ap, quad set, glut set, straight leg raise, hip ab/adduction, long arc quad, heel slide with active assist range of motion     Patient left up in chair with all lines intact, call button in reach, and caregiver present..    GOALS:   Multidisciplinary Problems       Physical Therapy Goals          Problem: Physical Therapy    Goal Priority Disciplines Outcome Goal Variances Interventions   Physical Therapy Goal     PT, PT/OT Ongoing, Progressing     Description: Short Term Goals to be met by 2/25/2023    Patient will increase functional independence and safety with mobility by performing    1.   Rolling side to side with standby assist  2.   Supine to sit standby Assist  3.   Sit to stand Minimal Assist using manual assistance with gait belt and PWB L LE  4.   Ambulate 20ft with RW, PWB LLE. Min A.   5.   Lower extremity exercises x 30 reps with assist as necessary and no rest breaks      Long Term Goals to be met by 3/10/2023    Patient to require less than or equal to Stand by assist for functional mobility to ease caregiver burden                        Time Tracking:     PT Received On: 01/31/23  PT Start Time: 1019     PT Stop Time: 1047  PT Total Time (min): 28 min     Billable Minutes: Gait Training 8 and Therapeutic Exercise 15    Treatment Type:  Treatment  PT/PTA: PTA     PTA Visit Number: 3     01/31/2023

## 2023-01-31 NOTE — PT/OT/SLP PROGRESS
Occupational Therapy   Treatment    Name: Zandra Veloz  MRN: [id number]  Admitting Diagnosis:  Urinary tract infection       Recommendations:     Discharge Recommendations: nursing facility, skilled  Discharge Equipment Recommendations:   (to be determined)  Barriers to discharge:  None    Assessment:     Zandra Veloz is a 88 y.o. female with a medical diagnosis of Urinary tract infection.  She presents with alert, up in chair. Pt tired and wanting to return home. Performance deficits affecting function are weakness, impaired cognition, impaired endurance, impaired functional mobility, impaired self care skills, impaired skin.     Rehab Prognosis:  Good; patient would benefit from acute skilled OT services to address these deficits and reach maximum level of function.       Plan:     Patient to be seen 5 x/week to address the above listed problems via self-care/home management, therapeutic activities, therapeutic exercises  Plan of Care Expires: 02/06/23  Plan of Care Reviewed with: patient, caregiver    Subjective     Pain/Comfort:  Pain Rating 1: 6/10  Location - Side 1: Bilateral  Location 1: leg  Pain Addressed 1: Pre-medicate for activity, Cessation of Activity, Reposition  Pain Rating Post-Intervention 1: 4/10    Objective:     Communicated with: CLYDE Khanna prior to session.  Patient found up in chair with peripheral IV, telemetry upon OT entry to room.    General Precautions: Standard, fall    Orthopedic Precautions:LLE partial weight bearing, LLE posterior precautions  Braces: N/A  Respiratory Status: Room air     Occupational Performance:     Bed Mobility:    Patient completed Rolling/Turning to Left with  minimum assistance  Patient completed Rolling/Turning to Right with minimum assistance     Functional Mobility/Transfers:  Patient completed Bed <> Chair Transfer using Drawsheet technique with total assistance and of 2 persons with draw sheet      Activities of Daily  Living:  NT      [facility] 6 Click ADL: 13    Treatment & Education:  (B) UE strengthening exercises   Elbow flexion with 1# db 2x15 reps  Supination/pronation with 1# db 2x15 reps  (R) shoulder IR 2x15 reps  Elbow extension with red t-band 2x15 reps      Patient left HOB elevated with call button in reach, bed alarm on, and family members  present    GOALS:   Multidisciplinary Problems       Occupational Therapy Goals          Problem: Occupational Therapy    Goal Priority Disciplines Outcome Interventions   Occupational Therapy Goal     OT, PT/OT Ongoing, Progressing    Description: STG: (In 2 weeks)  Pt will bathe with setup and mod (A)  Pt will perform UE dressing with setup and min(A)  Pt will perform (L) UE AROM to 3/4 full range  Pt will sit EOB x 5 min with (I)  Pt will transfer bed/chair/bsc with min a  Pt will tolerate 20 minutes of tx without fatigue      LT.Restore to max I with self care and mobility.                          Time Tracking:     OT Date of Treatment: 23  OT Start Time: 1317  OT Stop Time: 1339  OT Total Time (min): 22 min    Billable Minutes:Therapeutic Exercise 22 min    OT/BRIGHT: OT          2023

## 2023-01-31 NOTE — PROGRESS NOTES
[facility] Hospital Medicine  Progress Note    Patient Name: Zandra Veloz  MRN: [id number]  Patient Class: IP- Inpatient   Admission Date: 12/29/2022  Length of Stay: 33 days  Attending Physician: Shai Guerra DO  Primary Care Provider: Brandon Thornton MD        Subjective:     Principal Problem:Urinary tract infection        HPI:  HPI:   88-year-old lady seen emergency room department.  Patient presents after having a fall when she was trying to go to the restroom at her home.  Patient sustained a left femur fracture.  Patient complains of moderate to severe pain in the left hip area.  Patient also complains of chest tightness, she rates it a 5/10 in the chest tightness has been intubate.  Patient also was seen in emergency room department earlier this week per her daughter.  Patient is found to have dehydration and a urinary tract infection.  Current she denies any shortness of breath.  She does not give a history of coronary artery disease.        Procedure(s) (LRB):  REVISION, TOTAL ARTHROPLASTY, HIP, VICTORIANO PROSTHETIC FX (Left)       Hospital Course:   12/19 - records reviewed. Fall without LOC and has left hip fx. No other complaints currently. Seen by cardiology with some CP felt musculoskeletal.  Echo mild AS and mod MR with nl EF. Age increase cardiac risk for surgery but discussed with daughter surgery is urgent and see no benefit in delay medically. Feel low to moderate risk for surgery. Question about UTI. No symptoms. Had UTI in 10.22 not surprising. Lab to do culture on urine in lab. Cover with ATN for prior culture with ATB started. Discussed with Dr Singer and cardiology.  12/20 Tachycardia and elevated BP. Cardiology adjusted meds. Plan hold off surgery until 12/22 to adjust meds and treat UTI. Family in room. Pt not sleeping well.   12/21 Didn't sleep well prior night. Apparently been on xanax for a time. Also recently started on Neurontin. Family with  question. No recent BM. Some increase stool on KUB but not severe. Surgery for am. BP some up but better. HR good.   12/22 Seen prior to surgery and apparently add better night. Sore mouth / throat better with mycostatin. For surgery. Family in room.  12/23 patient with pain but otherwise seemed to be doing relatively well.  Daughter in room.  Apparently been taking prednisone for some time and this was restarted per family request.  Look into swing bed placement  12/24 patient had better night rested and everyone's in better spirits today.  Tolerating some diet.   Therapy worked with patient.  Look into swing bed placement next week  12/25 remained in good spirits.  Less pain.  Had good bowel movement and ordered Dulcolax not given.  Because of dressing to leg, Burk was not removed to keep it from getting soiled.  Wound care to check 12/27.  Discuss this with patient and daughter.  On antibiotics for UTI  12/26-will dc to swingbed once accepted. Needs continued wound care.  12/27-DC when bed available  12/28- issues with wound care yesterday.  Dr. Singer had to come remove dressings himself due to adherence to subcutaneous tissue.  Family refusing transfer to [facility] due to lack of specially trained wound care team availability.  Planning to transfer to specialty but resistant to that as well and requesting to speak to administration.    12/29- agreeable to transfer to specialty.  This will be the best place for her to receive wound care.  DC after family tours     12/29-needs continued wound care and therapy[facility]    12/30-doing well today, still with pain         Overview/Hospital Course:  12/31-having some pain this am  1/1- no complaints  1/2- doing well  1/3- continue wound care, last day cefepime tomorrow  1/4- some pain this AM.  Delay in mediations dues to staffing issues. Dsicussed with RN  1/5- no issues overnight  1/6- Still with pain, hip incision looks good  1/7-diclofenac for  shoulder pain  1/8-decrease laxatives, change benzos and carlo to PRN per patient request  1/9 some confusion overnight  1/10- no more confusion, doing well  1/11- no acute issues overnight  1/12- doing well, nausea today  1/13 -Dr. Lebron Cutler IV, DO taking over for Dr. Guerra for weekend.  Patient's pain appears to be well controlled this morning no complaints of nausea.  Continue wound care and PT/OT  1/14-patient appears well labs.  Continue wound care and PT/OT  1/15-patient still appears well.  No acute concerns or complaints.  No events overnight.  Continue wound care working with a PTOT.  Expiration is services estimated by February 6 1/16- no complaints, doing well  1/17-some nausea today  1/18-better today.  Still requiring twice weekly dressing changes.  Appetite stimulant  1/19- PWB with PT today.  Still with weeping wounds.  Will need wound care twice weekly-as these improve will be able to transfer to other facility  1/20-no complaints  1/21-no acute events overnight, some anxiety today  1/22-episode of SVT yesterday. Spontaneous resolution  1/23-no complaints  1/24- no complaints  1/25-doing well  1/26-wound care, pt  1/27- no complaints  1/28- patient seen examined today resting comfortably in bed, in no acute distress and no acute events overnight.  Patient states that she is doing well and denies pain.  States that she is been working well with PT/OT but not this weekend.  Patient otherwise doing well without complaints.  1/29-patient seen examined today resting comfortably in bed, in no acute distress with no acute events overnight.  Patient denies pain or other complaints at this time.  She continues with wound care PT/OT.  1/30-doing well this am  1/31-no complaints      Interval History: naeo    Review of Systems   Constitutional:  Negative for chills, fatigue, fever and unexpected weight change.   HENT:  Negative for congestion, mouth sores and sore throat.    Eyes:  Negative for  photophobia and visual disturbance.   Respiratory:  Negative for cough, chest tightness, shortness of breath and wheezing.    Cardiovascular:  Negative for chest pain, palpitations and leg swelling.   Gastrointestinal:  Negative for abdominal pain, diarrhea, nausea and vomiting.   Endocrine: Negative for cold intolerance and heat intolerance.   Genitourinary:  Negative for difficulty urinating, dysuria, frequency and urgency.   Musculoskeletal:  Positive for arthralgias. Negative for back pain and myalgias.   Skin:  Negative for pallor and rash.   Neurological:  Negative for tremors, seizures, syncope, weakness, numbness and headaches.   Hematological:  Does not bruise/bleed easily.   Psychiatric/Behavioral:  Negative for agitation, confusion, hallucinations and suicidal ideas.    Objective:     Vital Signs (Most Recent):  Temp: 99.2 °F (37.3 °C) (01/31/23 0730)  Pulse: 69 (01/31/23 0730)  Resp: 18 (01/31/23 1003)  BP: 139/81 (01/31/23 0730)  SpO2: 97 % (01/31/23 0730)   Vital Signs (24h Range):  Temp:  [97.6 °F (36.4 °C)-99.2 °F (37.3 °C)] 99.2 °F (37.3 °C)  Pulse:  [55-73] 69  Resp:  [16-18] 18  SpO2:  [95 %-99 %] 97 %  BP: (117-144)/(55-81) 139/81     Weight: 57.6 kg (126 lb 15.8 oz)  Body mass index is 20.5 kg/m².  No intake or output data in the 24 hours ending 01/31/23 1131     Physical Exam  Vitals reviewed.   Constitutional:       Appearance: Normal appearance.   HENT:      Head: Normocephalic and atraumatic.   Eyes:      Extraocular Movements: Extraocular movements intact.   Cardiovascular:      Rate and Rhythm: Normal rate and regular rhythm.      Pulses: Normal pulses.   Pulmonary:      Effort: Pulmonary effort is normal.      Breath sounds: Normal breath sounds.   Abdominal:      General: Abdomen is flat.      Palpations: Abdomen is soft.   Musculoskeletal:         General: Tenderness and signs of injury present.      Comments: Dressed left lower extremity, painful to touch   Skin:     General: Skin is  warm and dry.      Capillary Refill: Capillary refill takes less than 2 seconds.   Neurological:      General: No focal deficit present.      Mental Status: She is alert and oriented to person, place, and time.   Psychiatric:         Mood and Affect: Mood normal.         Behavior: Behavior normal.       Significant Labs: All pertinent labs within the past 24 hours have been reviewed.    Significant Imaging: I have reviewed all pertinent imaging results/findings within the past 24 hours.      Assessment/Plan:      Disruption of external surgical wound        Wounds and injuries  Wound care consulted and following    Open wound of left lower leg  Wound care      Multiple skin tears                                  Wound care      Delmy-prosthetic fracture around prosthetic hip  S/P fixation with Pomierski      Lumbar radiculopathy  Pain management  PT      Hypertension  Adjust medications as needed    Arthritis  Pain management        VTE Risk Mitigation (From admission, onward)    None          Discharge Planning   YANIV:      Code Status: Full Code   Is the patient medically ready for discharge?:     Reason for patient still in hospital (select all that apply): Treatment  Discharge Plan A: Skilled Nursing Facility                  Shai Guerra DO  Department of Hospital Medicine   [facility]

## 2023-02-01 PROCEDURE — 97116 GAIT TRAINING THERAPY: CPT

## 2023-02-01 PROCEDURE — 99231 SBSQ HOSP IP/OBS SF/LOW 25: CPT | Mod: ,,, | Performed by: STUDENT IN AN ORGANIZED HEALTH CARE EDUCATION/TRAINING PROGRAM

## 2023-02-01 PROCEDURE — 63600175 PHARM REV CODE 636 W HCPCS: Performed by: STUDENT IN AN ORGANIZED HEALTH CARE EDUCATION/TRAINING PROGRAM

## 2023-02-01 PROCEDURE — 97110 THERAPEUTIC EXERCISES: CPT

## 2023-02-01 PROCEDURE — 25000003 PHARM REV CODE 250: Performed by: STUDENT IN AN ORGANIZED HEALTH CARE EDUCATION/TRAINING PROGRAM

## 2023-02-01 PROCEDURE — 99231 PR SUBSEQUENT HOSPITAL CARE,LEVL I: ICD-10-PCS | Mod: ,,, | Performed by: STUDENT IN AN ORGANIZED HEALTH CARE EDUCATION/TRAINING PROGRAM

## 2023-02-01 PROCEDURE — 11000001 HC ACUTE MED/SURG PRIVATE ROOM

## 2023-02-01 RX ADMIN — HYDRALAZINE HYDROCHLORIDE 25 MG: 25 TABLET ORAL at 09:02

## 2023-02-01 RX ADMIN — GABAPENTIN 100 MG: 100 CAPSULE ORAL at 09:02

## 2023-02-01 RX ADMIN — FAMOTIDINE 20 MG: 20 TABLET ORAL at 09:02

## 2023-02-01 RX ADMIN — LISINOPRIL 20 MG: 20 TABLET ORAL at 09:02

## 2023-02-01 RX ADMIN — HYDROCODONE BITARTRATE AND ACETAMINOPHEN 2 TABLET: 10; 325 TABLET ORAL at 06:02

## 2023-02-01 RX ADMIN — DOCUSATE SODIUM 100 MG: 100 CAPSULE, LIQUID FILLED ORAL at 09:02

## 2023-02-01 RX ADMIN — HYDRALAZINE HYDROCHLORIDE 25 MG: 25 TABLET ORAL at 03:02

## 2023-02-01 RX ADMIN — GABAPENTIN 100 MG: 100 CAPSULE ORAL at 03:02

## 2023-02-01 RX ADMIN — TAMSULOSIN HYDROCHLORIDE 0.4 MG: 0.4 CAPSULE ORAL at 09:02

## 2023-02-01 RX ADMIN — SENNOSIDES 1 TABLET: 8.6 TABLET, FILM COATED ORAL at 09:02

## 2023-02-01 RX ADMIN — LEVOTHYROXINE SODIUM 125 MCG: 0.12 TABLET ORAL at 05:02

## 2023-02-01 RX ADMIN — PANTOPRAZOLE SODIUM 40 MG: 40 TABLET, DELAYED RELEASE ORAL at 09:02

## 2023-02-01 RX ADMIN — ALPRAZOLAM 1 MG: 0.25 TABLET ORAL at 09:02

## 2023-02-01 RX ADMIN — HYDRALAZINE HYDROCHLORIDE 25 MG: 25 TABLET ORAL at 05:02

## 2023-02-01 RX ADMIN — MEGESTROL ACETATE 40 MG: 40 TABLET ORAL at 09:02

## 2023-02-01 RX ADMIN — POLYETHYLENE GLYCOL 3350 17 G: 17 POWDER, FOR SOLUTION ORAL at 09:02

## 2023-02-01 RX ADMIN — HYDROCODONE BITARTRATE AND ACETAMINOPHEN 2 TABLET: 10; 325 TABLET ORAL at 10:02

## 2023-02-01 RX ADMIN — PREDNISONE 5 MG: 5 TABLET ORAL at 09:02

## 2023-02-01 NOTE — SUBJECTIVE & OBJECTIVE
Interval History: naeo    Review of Systems   Constitutional:  Negative for chills, fatigue, fever and unexpected weight change.   HENT:  Negative for congestion, mouth sores and sore throat.    Eyes:  Negative for photophobia and visual disturbance.   Respiratory:  Negative for cough, chest tightness, shortness of breath and wheezing.    Cardiovascular:  Negative for chest pain, palpitations and leg swelling.   Gastrointestinal:  Negative for abdominal pain, diarrhea, nausea and vomiting.   Endocrine: Negative for cold intolerance and heat intolerance.   Genitourinary:  Negative for difficulty urinating, dysuria, frequency and urgency.   Musculoskeletal:  Positive for arthralgias. Negative for back pain and myalgias.   Skin:  Negative for pallor and rash.   Neurological:  Negative for tremors, seizures, syncope, weakness, numbness and headaches.   Hematological:  Does not bruise/bleed easily.   Psychiatric/Behavioral:  Negative for agitation, confusion, hallucinations and suicidal ideas.    Objective:     Vital Signs (Most Recent):  Temp: 97.9 °F (36.6 °C) (02/01/23 1200)  Pulse: (!) 55 (02/01/23 1200)  Resp: 18 (02/01/23 1200)  BP: (!) 132/53 (02/01/23 1200)  SpO2: 97 % (02/01/23 1200)   Vital Signs (24h Range):  Temp:  [97.8 °F (36.6 °C)-98.7 °F (37.1 °C)] 97.9 °F (36.6 °C)  Pulse:  [53-80] 55  Resp:  [16-18] 18  SpO2:  [95 %-100 %] 97 %  BP: (100-140)/(39-82) 132/53     Weight: 58.4 kg (128 lb 12 oz)  Body mass index is 20.78 kg/m².    Intake/Output Summary (Last 24 hours) at 2/1/2023 1401  Last data filed at 2/1/2023 0537  Gross per 24 hour   Intake 240 ml   Output --   Net 240 ml        Physical Exam  Vitals reviewed.   Constitutional:       Appearance: Normal appearance.   HENT:      Head: Normocephalic and atraumatic.   Eyes:      Extraocular Movements: Extraocular movements intact.   Cardiovascular:      Rate and Rhythm: Normal rate and regular rhythm.      Pulses: Normal pulses.   Pulmonary:      Effort:  Pulmonary effort is normal.      Breath sounds: Normal breath sounds.   Abdominal:      General: Abdomen is flat.      Palpations: Abdomen is soft.   Musculoskeletal:         General: Tenderness and signs of injury present.      Comments: Dressed left lower extremity, painful to touch   Skin:     General: Skin is warm and dry.      Capillary Refill: Capillary refill takes less than 2 seconds.   Neurological:      General: No focal deficit present.      Mental Status: She is alert and oriented to person, place, and time.   Psychiatric:         Mood and Affect: Mood normal.         Behavior: Behavior normal.       Significant Labs: All pertinent labs within the past 24 hours have been reviewed.    Significant Imaging: I have reviewed all pertinent imaging results/findings within the past 24 hours.

## 2023-02-01 NOTE — PROGRESS NOTES
[facility] Hospital Medicine  Progress Note    Patient Name: Zandra Veloz  MRN: [id number]  Patient Class: IP- Inpatient   Admission Date: 12/29/2022  Length of Stay: 34 days  Attending Physician: Shai Guerra DO  Primary Care Provider: Brandon Thornton MD        Subjective:     Principal Problem:Urinary tract infection        HPI:  HPI:   88-year-old lady seen emergency room department.  Patient presents after having a fall when she was trying to go to the restroom at her home.  Patient sustained a left femur fracture.  Patient complains of moderate to severe pain in the left hip area.  Patient also complains of chest tightness, she rates it a 5/10 in the chest tightness has been intubate.  Patient also was seen in emergency room department earlier this week per her daughter.  Patient is found to have dehydration and a urinary tract infection.  Current she denies any shortness of breath.  She does not give a history of coronary artery disease.        Procedure(s) (LRB):  REVISION, TOTAL ARTHROPLASTY, HIP, VICTORIANO PROSTHETIC FX (Left)       Hospital Course:   12/19 - records reviewed. Fall without LOC and has left hip fx. No other complaints currently. Seen by cardiology with some CP felt musculoskeletal.  Echo mild AS and mod MR with nl EF. Age increase cardiac risk for surgery but discussed with daughter surgery is urgent and see no benefit in delay medically. Feel low to moderate risk for surgery. Question about UTI. No symptoms. Had UTI in 10.22 not surprising. Lab to do culture on urine in lab. Cover with ATN for prior culture with ATB started. Discussed with Dr Singer and cardiology.  12/20 Tachycardia and elevated BP. Cardiology adjusted meds. Plan hold off surgery until 12/22 to adjust meds and treat UTI. Family in room. Pt not sleeping well.   12/21 Didn't sleep well prior night. Apparently been on xanax for a time. Also recently started on Neurontin. Family with  question. No recent BM. Some increase stool on KUB but not severe. Surgery for am. BP some up but better. HR good.   12/22 Seen prior to surgery and apparently add better night. Sore mouth / throat better with mycostatin. For surgery. Family in room.  12/23 patient with pain but otherwise seemed to be doing relatively well.  Daughter in room.  Apparently been taking prednisone for some time and this was restarted per family request.  Look into swing bed placement  12/24 patient had better night rested and everyone's in better spirits today.  Tolerating some diet.   Therapy worked with patient.  Look into swing bed placement next week  12/25 remained in good spirits.  Less pain.  Had good bowel movement and ordered Dulcolax not given.  Because of dressing to leg, Burk was not removed to keep it from getting soiled.  Wound care to check 12/27.  Discuss this with patient and daughter.  On antibiotics for UTI  12/26-will dc to swingbed once accepted. Needs continued wound care.  12/27-DC when bed available  12/28- issues with wound care yesterday.  Dr. Singer had to come remove dressings himself due to adherence to subcutaneous tissue.  Family refusing transfer to [facility] due to lack of specially trained wound care team availability.  Planning to transfer to specialty but resistant to that as well and requesting to speak to administration.    12/29- agreeable to transfer to specialty.  This will be the best place for her to receive wound care.  DC after family tours     12/29-needs continued wound care and [facility]    12/30-doing well today, still with pain         Overview/Hospital Course:  12/31-having some pain this am  1/1- no complaints  1/2- doing well  1/3- continue wound care, last day cefepime tomorrow  1/4- some pain this AM.  Delay in mediations dues to staffing issues. Dsicussed with RN  1/5- no issues overnight  1/6- Still with pain, hip incision looks good  1/7-diclofenac for  shoulder pain  1/8-decrease laxatives, change benzos and carlo to PRN per patient request  1/9 some confusion overnight  1/10- no more confusion, doing well  1/11- no acute issues overnight  1/12- doing well, nausea today  1/13 -Dr. Lebron Cutler IV, DO taking over for Dr. Guerra for weekend.  Patient's pain appears to be well controlled this morning no complaints of nausea.  Continue wound care and PT/OT  1/14-patient appears well labs.  Continue wound care and PT/OT  1/15-patient still appears well.  No acute concerns or complaints.  No events overnight.  Continue wound care working with a PTOT.  Expiration is services estimated by February 6 1/16- no complaints, doing well  1/17-some nausea today  1/18-better today.  Still requiring twice weekly dressing changes.  Appetite stimulant  1/19- PWB with PT today.  Still with weeping wounds.  Will need wound care twice weekly-as these improve will be able to transfer to other facility  1/20-no complaints  1/21-no acute events overnight, some anxiety today  1/22-episode of SVT yesterday. Spontaneous resolution  1/23-no complaints  1/24- no complaints  1/25-doing well  1/26-wound care, pt  1/27- no complaints  1/28- patient seen examined today resting comfortably in bed, in no acute distress and no acute events overnight.  Patient states that she is doing well and denies pain.  States that she is been working well with PT/OT but not this weekend.  Patient otherwise doing well without complaints.  1/29-patient seen examined today resting comfortably in bed, in no acute distress with no acute events overnight.  Patient denies pain or other complaints at this time.  She continues with wound care PT/OT.  1/30-doing well this am  1/31-no complaints  2/1-no changes      Interval History: naeo    Review of Systems   Constitutional:  Negative for chills, fatigue, fever and unexpected weight change.   HENT:  Negative for congestion, mouth sores and sore throat.    Eyes:   Negative for photophobia and visual disturbance.   Respiratory:  Negative for cough, chest tightness, shortness of breath and wheezing.    Cardiovascular:  Negative for chest pain, palpitations and leg swelling.   Gastrointestinal:  Negative for abdominal pain, diarrhea, nausea and vomiting.   Endocrine: Negative for cold intolerance and heat intolerance.   Genitourinary:  Negative for difficulty urinating, dysuria, frequency and urgency.   Musculoskeletal:  Positive for arthralgias. Negative for back pain and myalgias.   Skin:  Negative for pallor and rash.   Neurological:  Negative for tremors, seizures, syncope, weakness, numbness and headaches.   Hematological:  Does not bruise/bleed easily.   Psychiatric/Behavioral:  Negative for agitation, confusion, hallucinations and suicidal ideas.    Objective:     Vital Signs (Most Recent):  Temp: 97.9 °F (36.6 °C) (02/01/23 1200)  Pulse: (!) 55 (02/01/23 1200)  Resp: 18 (02/01/23 1200)  BP: (!) 132/53 (02/01/23 1200)  SpO2: 97 % (02/01/23 1200)   Vital Signs (24h Range):  Temp:  [97.8 °F (36.6 °C)-98.7 °F (37.1 °C)] 97.9 °F (36.6 °C)  Pulse:  [53-80] 55  Resp:  [16-18] 18  SpO2:  [95 %-100 %] 97 %  BP: (100-140)/(39-82) 132/53     Weight: 58.4 kg (128 lb 12 oz)  Body mass index is 20.78 kg/m².    Intake/Output Summary (Last 24 hours) at 2/1/2023 1401  Last data filed at 2/1/2023 0537  Gross per 24 hour   Intake 240 ml   Output --   Net 240 ml        Physical Exam  Vitals reviewed.   Constitutional:       Appearance: Normal appearance.   HENT:      Head: Normocephalic and atraumatic.   Eyes:      Extraocular Movements: Extraocular movements intact.   Cardiovascular:      Rate and Rhythm: Normal rate and regular rhythm.      Pulses: Normal pulses.   Pulmonary:      Effort: Pulmonary effort is normal.      Breath sounds: Normal breath sounds.   Abdominal:      General: Abdomen is flat.      Palpations: Abdomen is soft.   Musculoskeletal:         General: Tenderness and  signs of injury present.      Comments: Dressed left lower extremity, painful to touch   Skin:     General: Skin is warm and dry.      Capillary Refill: Capillary refill takes less than 2 seconds.   Neurological:      General: No focal deficit present.      Mental Status: She is alert and oriented to person, place, and time.   Psychiatric:         Mood and Affect: Mood normal.         Behavior: Behavior normal.       Significant Labs: All pertinent labs within the past 24 hours have been reviewed.    Significant Imaging: I have reviewed all pertinent imaging results/findings within the past 24 hours.      Assessment/Plan:      Disruption of external surgical wound        Wounds and injuries  Wound care consulted and following    Open wound of left lower leg  Wound care      Multiple skin tears  Wound care      Delmy-prosthetic fracture around prosthetic hip  S/P fixation with Pomierski      Lumbar radiculopathy  Pain management  PT      Hypertension  Adjust medications as needed    Arthritis  Pain management        VTE Risk Mitigation (From admission, onward)    None          Discharge Planning   YANIV:      Code Status: Full Code   Is the patient medically ready for discharge?:     Reason for patient still in hospital (select all that apply): Treatment  Discharge Plan A: Skilled Nursing Facility                  Shai Guerra DO  Department of Hospital Medicine   [facility]

## 2023-02-01 NOTE — PROGRESS NOTES
"[facility]  Adult Nutrition  First Assessment Note         Reason for Assessment  Reason For Assessment: RD follow-up/ follow up note  Nutrition Risk Screen: large or nonhealing wound, burn or pressure injury    RD follow up assessment.    Recommend continue current diet order as medically appropriate and tolerated. Recommend continue Ensure Enlive oral nutrition supplement TID to promote adequate oral intake.     Recommend continue Arginaid (modular equivalent to Vamshi, can be used as Vamshi out of stock d/t supply issue) BID to aid in wound healing. Recommend adding 500mg Vit C BID + 220mg ZnSO4 BID + MVI daily to aid in wound healing.    Per MD notes,  "12/29-needs continued wound care and therapy. DC to [facility]  12/30-doing well today, still with pain  12/31-having some pain this am  1/1- no complaints  1/2- doing well  1/3- continue wound care, last day cefepime tomorrow  1/4- some pain this AM.  Delay in mediations dues to staffing issues. Dsicussed with RN  1/5- no issues overnight"  1/6- Still with pain, hip incision looks good  1/7-diclofenac for shoulder pain  1/8-decrease laxatives, change benzos and carlo to PRN per patient request  1/9 some confusion overnight  1/10- no more confusion, doing well  1/11- no acute issues overnight  1/12- doing well, nausea today  1/13 -Dr. Lebron Cutler IV, DO taking over for Dr. Guerra for weekend.  Patient's pain appears to be well controlled this morning no complaints of nausea.  Continue wound care and PT/OT  1/14-patient appears well labs.  Continue wound care and PT/OT  1/15-patient still appears well.  No acute concerns or complaints.  No events overnight.  Continue wound care working with a PTOT.  Expiration is services estimated by February 6 1/16- no complaints, doing well  1/17-some nausea today  1/18-better today.  Still requiring twice weekly dressing changes.  Appetite stimulant  1/19- PWB with PT today.  Still with " weeping wounds.  Will need wound care twice weekly-as these improve will be able to transfer to other facility  1/20-no complaints  1/21-no acute events overnight, some anxiety today  1/22-episode of SVT yesterday. Spontaneous resolution  1/23-no complaints  1/24- no complaints  1/25-doing well  1/26-wound care, pt  1/27- no complaints  1/28- patient seen examined today resting comfortably in bed, in no acute distress and no acute events overnight.  Patient states that she is doing well and denies pain.  States that she is been working well with PT/OT but not this weekend.  Patient otherwise doing well without complaints.  1/29-patient seen examined today resting comfortably in bed, in no acute distress with no acute events overnight.  Patient denies pain or other complaints at this time.  She continues with wound care PT/OT.  1/30-doing well this am  1/31-no complaints    Patient currently receiving Regular Diet.  Ensure Enlive provides 350kcal and 20g protein each.     Patient with multiple wounds. Recommend continue Arginaid (modular equivalent to Vamshi, can be used as Vamshi out of stock d/t supply issue) BID to aid in wound healing. Arginaid provides 30kcal each. Recommend 500mg Vit C BID + 220mg ZnSO4 BID + MVI daily to aid in wound healing.     Patient CBW and BMI considered WNL.     Last BM 1/30 per flowsheets -  pt receiving docusate sodium, senna and polyethylene glycol. Will continue to monitor PO intake, weight trend, meds, labs, updates in patient condition. RD following.    Megace added 1/18 for appetite.  Wt Readings from Last 3 Encounters:   02/01/23 0637 58.4 kg (128 lb 12 oz)   01/18/23 0400 57.6 kg (126 lb 15.8 oz)   01/11/23 1300 54.6 kg (120 lb 4.8 oz)   12/29/22 1600 59.1 kg (130 lb 3.2 oz)   12/28/22 0455 63.6 kg (140 lb 3.4 oz)   12/26/22 2000 63.6 kg (140 lb 3.4 oz)   12/25/22 0400 59.2 kg (130 lb 8.2 oz)   12/22/22 0455 59.5 kg (131 lb 2.8 oz)   12/21/22 0454 59.5 kg (131 lb 2.8 oz)    12/19/22 0000 63.3 kg (139 lb 8.8 oz)   12/17/22 1542 59 kg (130 lb)   12/13/22 1055 59 kg (130 lb)         Malnutrition  Is Patient Malnourished: No    Nutrition Diagnosis  Increased protein Needs   related to Wound healing as evidenced by pt with multiple wounds    Nutrition Diagnosis Status: Chronic/ continues    Nutrition Risk  Level of Risk/Frequency of Follow-up: moderate   Chewing or Swallowing Difficulty?: No Chewing or swallowing difficulty    Estimated/Assessed Needs    Temp: 98.3 °F (36.8 °C)Oral  Weight Used For Calorie Calculations: 59.1 kg (130 lb 4.7 oz)   Energy Need Method: Kcal/kg (35-40 kcal/kg) Energy Calorie Requirements (kcal): 9729-5029 kcal  Weight Used For Protein Calculations: 59.1 kg (130 lb 4.7 oz)  Protein Requirements: 59-118g pro (1.0-2.0g pro/kg d/t geriatric, wounds)  Estimated Fluid Requirement Method: RDA Method    RDA Method (mL): 2069     Nutrition Prescription / Recommendations  Recommendation/Intervention: Recommend continue current diet order as medically appropriate and tolerated. Recommend adding Ensure Enlive oral nutrition supplement TID to promote adequate oral intake. Recommend adding Arginaid (modular equivalent to Vamshi, can be used as Vamshi out of stock d/t supply issue) BID to aid in wound healing. Recommend adding 500mg Vit C BID + 220mg ZnSO4 BID + MVI daily to aid in wound healing.  Goals: PO intake %, weight maintenance  Nutrition Goal Status: progressing towards goal  Current Diet Order: Regular Diet  Nutrition Order Comments: Appropriate  Recommended Diet: Regular  Recommended Oral Supplement: Vamshi [90 kcals, 2.5g Protein, 10g Carbs(3g Sugar), 7g L-Arginine, 7g L-Glutamine, Vitamin C 300mg, 9.5mg Zinc] twice daily and Ensure Enlive [360 kcals, 20g Protein, 44g Carbs(3g Fiber, 20g Sugar), 11g Fat three times daily  Is Nutrition Support Recommended: No  Is Education Recommended: No    Monitor and Evaluation  % current Intake: P.O. intake of 25 - 50 %  %  "intake to meet estimated needs: 75 - 100 %  Food and Nutrient Intake: energy intake, food and beverage intake  Food and Nutrient Adminstration: diet order  Knowledge/Beliefs/Attitudes: food and nutrition knowledge/skill, beliefs and attitudes  Physical Activity and Function: nutrition-related ADLs and IADLs, factors affecting access to physical activity  Anthropometric Measurements: weight, weight change, body mass index  Biochemical Data, Medical Tests and Procedures: electrolyte and renal panel, gastrointestinal profile, glucose/endocrine profile, inflammatory profile, lipid profile  Nutrition-Focused Physical Findings: overall appearance, extremities, muscles and bones, head and eyes, skin     Current Medical Diagnosis and Past Medical History  Diagnosis: other (see comments) (urinary tract infection)  Past Medical History:   Diagnosis Date    Hypertension     Osteoporosis     Thyroid disorder      Nutrition/Diet History  Spiritual, Cultural Beliefs, Adventist Practices, Values that Affect Care: no  Food Allergies: NKFA    Lab/Procedures/Meds  Recent Labs   Lab 01/30/23  0526   *   K 4.0   BUN 13   CREATININE 0.83   GLU 83   CALCIUM 8.6        Labs WNL  Last A1c: No results found for: HGBA1C  Lab Results   Component Value Date    RBC 3.27 (L) 01/30/2023    HGB 9.7 (L) 01/30/2023    HCT 31.2 (L) 01/30/2023    MCV 95.4 01/30/2023    MCH 29.7 01/30/2023    MCHC 31.1 (L) 01/30/2023     Pertinent Labs Reviewed: NA low- replete as needed  Pertinent Medications Reviewed: reviewed  Alprazolam, apixaban, bisacodyl, diltiazem, docusate sodium, famotidine, gabapentin, hydralazine, levothyroxine, lisinopril, nystatin, pantoprazole, polyethylene glycol, prednisone, senna, hydrocodone-acetaminophen PRN    Anthropometrics  Temp: 98.3 °F (36.8 °C)  Height: 5' 6" (167.6 cm)  Height (inches): 66 in  Weight Method: Bed Scale  Weight: 58.4 kg (128 lb 12 oz)  Weight (lb): 128.75 lb  Ideal Body Weight (IBW), Female: 130 " lb  % Ideal Body Weight, Female (lb): 100.15 %  BMI (Calculated): 20.5     Nutrition by Nursing  Diet/Nutrition Received: regular  Intake (%): 50%  Diet/Feeding Assistance: tray set-up  Diet/Feeding Tolerance: good  Last Bowel Movement: 01/30/23    Nutrition Follow-Up  RD Follow-up?: Yes

## 2023-02-01 NOTE — PT/OT/SLP PROGRESS
"Physical Therapy Treatment    Patient Name:  Zandra Veloz   MRN:  [id number]    Recommendations:     Discharge Recommendations: nursing facility, skilled  Discharge Equipment Recommendations: other (see comments) (to be determined)  Barriers to discharge:  awaiting swing bed placement    Assessment:     Zandra Veloz is a 88 y.o. female admitted with a medical diagnosis of Urinary tract infection; left THR revision; wounds  She presents with the following impairments/functional limitations: weakness, impaired endurance, impaired functional mobility, impaired self care skills, gait instability, impaired balance, decreased lower extremity function, pain, impaired skin, orthopedic precautions .    Patient participated with short distance gait but fatigues easily and c/o pain with same. Small amount of blood noted from under left calf bandage, Yolanda Sinha RN advised and in to inspect dressing/wound. Patient will benefit from swing bed at d/c. Patient encouraged to stand using RW and nursing assist to return to bed. 1E gait bel left in room so nursing will not pull on pt arms during the transfer. Yolanda Sinha RN and Divina Varela, Patient Care Assistant advised.    Rehab Prognosis: Good; patient would benefit from acute skilled PT services to address these deficits and reach maximum level of function.    Recent Surgery: * No surgery found *      Plan:     During this hospitalization, patient to be seen 5 x/week to address the identified rehab impairments via gait training, therapeutic activities, therapeutic exercises and progress toward the following goals:    Plan of Care Expires:  03/30/23    Subjective     Chief Complaint: left THR revision, wounds  Patient/Family Comments/goals: "My knee was hurting earlier but I am not too bad now."  Pain/Comfort:  Pain Rating 1: 4/10  Location - Side 1: Left  Location 1: hip  Pain Addressed 1: Pre-medicate for activity, Reposition, Distraction, " Cessation of Activity, Nurse notified  Pain Rating Post-Intervention 1: 8/10      Objective:     Communicated with Yolanda Sinha RN prior to session.  Patient found supine with peripheral IV, telemetry upon PT entry to room.     General Precautions: Standard, fall  Orthopedic Precautions: LLE posterior precautions, LLE partial weight bearing  Braces: N/A  Respiratory Status: Room air     Functional Mobility:  Bed Mobility:     Supine to Sit: minimum assistance and increased time and effort  Transfers:     Sit to Stand:  minimum assistance and of 2 persons with rolling walker  Bed to Chair: minimum assistance and of 2 persons with  rolling walker  using  Step Transfer  Gait: 10' using RW, step to pattern, slow sadaf, foot drop left with mild steppage gait to clear; easy fatigue, c/o pain with left LE      AM-PAC 6 CLICK MOBILITY  Turning over in bed (including adjusting bedclothes, sheets and blankets)?: 3  Sitting down on and standing up from a chair with arms (e.g., wheelchair, bedside commode, etc.): 3  Moving from lying on back to sitting on the side of the bed?: 3  Moving to and from a bed to a chair (including a wheelchair)?: 3  Need to walk in hospital room?: 3  Climbing 3-5 steps with a railing?: 1  Basic Mobility Total Score: 16       Treatment & Education:  Gait and t/f as above  Bilateral lower extremity exercise x 30 reps: ankle pumps, Quad sets, glut sets, short arc quads, heel slides, hip abduction/adduction, and straight leg raises with active assist ROM, verbal cues, and tactile cues     Patient left up in chair with all lines intact, call button in reach, Yolanda Sinha RN notified, and daughter present..    GOALS:   Multidisciplinary Problems       Physical Therapy Goals          Problem: Physical Therapy    Goal Priority Disciplines Outcome Goal Variances Interventions   Physical Therapy Goal     PT, PT/OT Ongoing, Progressing     Description: Short Term Goals to be met by  2/25/2023    Patient will increase functional independence and safety with mobility by performing    1.   Rolling side to side with standby assist  2.   Supine to sit standby Assist  3.   Sit to stand Minimal Assist using manual assistance with gait belt and PWB L LE  4.   Ambulate 20ft with RW, PWB LLE. Min A.   5.   Lower extremity exercises x 30 reps with assist as necessary and no rest breaks      Long Term Goals to be met by 3/10/2023    Patient to require less than or equal to Stand by assist for functional mobility to ease caregiver burden                        Time Tracking:     PT Received On: 02/01/23  PT Start Time: 1110     PT Stop Time: 1146  PT Total Time (min): 36 min     Billable Minutes: Gait Training 15 minutes and Therapeutic Exercise 15 minutes    Treatment Type: Treatment  PT/PTA: PT     PTA Visit Number: 0     02/01/2023

## 2023-02-01 NOTE — PT/OT/SLP PROGRESS
Occupational Therapy   Treatment    Name: Zandra Veloz  MRN: [id number]  Admitting Diagnosis:  Urinary tract infection       Recommendations:     Discharge Recommendations: nursing facility, skilled  Discharge Equipment Recommendations:   (to be determined)  Barriers to discharge:  None    Assessment:     Zandra Veloz is a 88 y.o. female with a medical diagnosis of Urinary tract infection.  She presents with complaint of (L) hip pain. Pt agreed to UE exercises.. Performance deficits affecting function are weakness, impaired endurance.     Pt unable to progress with weights. 2 lb wt is too heavy with elbow flexion/extension. Pt tolerated exercises well.    Rehab Prognosis:  Good; patient would benefit from acute skilled OT services to address these deficits and reach maximum level of function.       Plan:     Patient to be seen 5 x/week to address the above listed problems via self-care/home management, therapeutic activities, therapeutic exercises  Plan of Care Expires: 02/06/23  Plan of Care Reviewed with: patient, caregiver    Subjective     Pain/Comfort:  Pain Rating 1: 8/10  Location - Side 1: Left  Location 1: hip  Pain Addressed 1: Distraction  Pain Rating Post-Intervention 1: 8/10    Objective:     Communicated with: CLYDE Sinha prior to session.  Patient found HOB elevated with peripheral IV, telemetry upon OT entry to room.    General Precautions: Standard, fall    Orthopedic Precautions:LLE posterior precautions, RLE partial weight bearing  Braces: N/A  Respiratory Status: Room air     Occupational Performance:     Bed Mobility:         Functional Mobility/Transfers:    Functional Mobility:     Activities of Daily Living:        [facility] 6 Click ADL:      Treatment & Education:  Pt performed UE strengthening exercises. 1 lb hand wt x 2 sets of 15 reps (B) shoulder flexion/extension, adduction/abduction and (B) elbow and wrist flexion/extension. Yellow theraband x 2 sets of 15 reps  (B) elbow flexion and extension and internal/external rotation.    Patient left HOB elevated with all lines intact, call button in reach, and sitter present    GOALS:   Multidisciplinary Problems       Occupational Therapy Goals          Problem: Occupational Therapy    Goal Priority Disciplines Outcome Interventions   Occupational Therapy Goal     OT, PT/OT Ongoing, Progressing    Description: STG: (In 2 weeks)  Pt will bathe with setup and mod (A)  Pt will perform UE dressing with setup and min(A)  Pt will perform (L) UE AROM to 3/4 full range  Pt will sit EOB x 5 min with (I)  Pt will transfer bed/chair/bsc with min a  Pt will tolerate 20 minutes of tx without fatigue      LT.Restore to max I with self care and mobility.                          Time Tracking:     OT Date of Treatment: 23  OT Start Time: 1432  OT Stop Time: 1456  OT Total Time (min): 24 min    Billable Minutes:Therapeutic Exercise 20    OT/BRIGHT: OT          2023

## 2023-02-02 PROCEDURE — 25000003 PHARM REV CODE 250: Performed by: NURSE PRACTITIONER

## 2023-02-02 PROCEDURE — 99232 PR SUBSEQUENT HOSPITAL CARE,LEVL II: ICD-10-PCS | Mod: ,,, | Performed by: NURSE PRACTITIONER

## 2023-02-02 PROCEDURE — 97116 GAIT TRAINING THERAPY: CPT | Mod: CQ

## 2023-02-02 PROCEDURE — 99232 SBSQ HOSP IP/OBS MODERATE 35: CPT | Mod: ,,, | Performed by: INTERNAL MEDICINE

## 2023-02-02 PROCEDURE — 97110 THERAPEUTIC EXERCISES: CPT

## 2023-02-02 PROCEDURE — 25000003 PHARM REV CODE 250: Performed by: STUDENT IN AN ORGANIZED HEALTH CARE EDUCATION/TRAINING PROGRAM

## 2023-02-02 PROCEDURE — 63600175 PHARM REV CODE 636 W HCPCS: Performed by: INTERNAL MEDICINE

## 2023-02-02 PROCEDURE — 11000001 HC ACUTE MED/SURG PRIVATE ROOM

## 2023-02-02 PROCEDURE — 63600175 PHARM REV CODE 636 W HCPCS: Performed by: STUDENT IN AN ORGANIZED HEALTH CARE EDUCATION/TRAINING PROGRAM

## 2023-02-02 PROCEDURE — 99232 PR SUBSEQUENT HOSPITAL CARE,LEVL II: ICD-10-PCS | Mod: ,,, | Performed by: INTERNAL MEDICINE

## 2023-02-02 PROCEDURE — 97110 THERAPEUTIC EXERCISES: CPT | Mod: CQ

## 2023-02-02 PROCEDURE — 99232 SBSQ HOSP IP/OBS MODERATE 35: CPT | Mod: ,,, | Performed by: NURSE PRACTITIONER

## 2023-02-02 PROCEDURE — 97530 THERAPEUTIC ACTIVITIES: CPT

## 2023-02-02 RX ORDER — ASCORBIC ACID 500 MG
500 TABLET ORAL 2 TIMES DAILY
Status: DISCONTINUED | OUTPATIENT
Start: 2023-02-02 | End: 2023-02-20 | Stop reason: HOSPADM

## 2023-02-02 RX ORDER — ENOXAPARIN SODIUM 100 MG/ML
30 INJECTION SUBCUTANEOUS EVERY 24 HOURS
Status: DISCONTINUED | OUTPATIENT
Start: 2023-02-02 | End: 2023-02-02

## 2023-02-02 RX ORDER — LISINOPRIL 5 MG/1
5 TABLET ORAL DAILY
Status: DISCONTINUED | OUTPATIENT
Start: 2023-02-03 | End: 2023-02-03

## 2023-02-02 RX ORDER — ZINC SULFATE 50(220)MG
220 CAPSULE ORAL DAILY
Status: DISCONTINUED | OUTPATIENT
Start: 2023-02-03 | End: 2023-02-20 | Stop reason: HOSPADM

## 2023-02-02 RX ORDER — HYDRALAZINE HYDROCHLORIDE 10 MG/1
10 TABLET, FILM COATED ORAL EVERY 8 HOURS
Status: DISCONTINUED | OUTPATIENT
Start: 2023-02-02 | End: 2023-02-02

## 2023-02-02 RX ORDER — ENOXAPARIN SODIUM 100 MG/ML
40 INJECTION SUBCUTANEOUS EVERY 24 HOURS
Status: DISCONTINUED | OUTPATIENT
Start: 2023-02-02 | End: 2023-02-20 | Stop reason: HOSPADM

## 2023-02-02 RX ADMIN — LEVOTHYROXINE SODIUM 125 MCG: 0.12 TABLET ORAL at 06:02

## 2023-02-02 RX ADMIN — MEGESTROL ACETATE 40 MG: 40 TABLET ORAL at 10:02

## 2023-02-02 RX ADMIN — GABAPENTIN 100 MG: 100 CAPSULE ORAL at 10:02

## 2023-02-02 RX ADMIN — SENNOSIDES 1 TABLET: 8.6 TABLET, FILM COATED ORAL at 10:02

## 2023-02-02 RX ADMIN — POLYETHYLENE GLYCOL 3350 17 G: 17 POWDER, FOR SOLUTION ORAL at 10:02

## 2023-02-02 RX ADMIN — HYDROCODONE BITARTRATE AND ACETAMINOPHEN 2 TABLET: 10; 325 TABLET ORAL at 10:02

## 2023-02-02 RX ADMIN — DOCUSATE SODIUM 100 MG: 100 CAPSULE, LIQUID FILLED ORAL at 10:02

## 2023-02-02 RX ADMIN — OXYCODONE HYDROCHLORIDE AND ACETAMINOPHEN 500 MG: 500 TABLET ORAL at 08:02

## 2023-02-02 RX ADMIN — GABAPENTIN 100 MG: 100 CAPSULE ORAL at 08:02

## 2023-02-02 RX ADMIN — DOCUSATE SODIUM 100 MG: 100 CAPSULE, LIQUID FILLED ORAL at 08:02

## 2023-02-02 RX ADMIN — PANTOPRAZOLE SODIUM 40 MG: 40 TABLET, DELAYED RELEASE ORAL at 10:02

## 2023-02-02 RX ADMIN — HYDRALAZINE HYDROCHLORIDE 25 MG: 25 TABLET ORAL at 06:02

## 2023-02-02 RX ADMIN — ENOXAPARIN SODIUM 40 MG: 40 INJECTION SUBCUTANEOUS at 04:02

## 2023-02-02 RX ADMIN — ALPRAZOLAM 1 MG: 0.25 TABLET ORAL at 08:02

## 2023-02-02 RX ADMIN — METOPROLOL TARTRATE 50 MG: 50 TABLET, FILM COATED ORAL at 10:02

## 2023-02-02 RX ADMIN — TAMSULOSIN HYDROCHLORIDE 0.4 MG: 0.4 CAPSULE ORAL at 10:02

## 2023-02-02 RX ADMIN — PREDNISONE 5 MG: 5 TABLET ORAL at 10:02

## 2023-02-02 RX ADMIN — HYDROCODONE BITARTRATE AND ACETAMINOPHEN 2 TABLET: 10; 325 TABLET ORAL at 04:02

## 2023-02-02 RX ADMIN — GABAPENTIN 100 MG: 100 CAPSULE ORAL at 03:02

## 2023-02-02 NOTE — PLAN OF CARE
Problem: Occupational Therapy  Goal: Occupational Therapy Goal  Description: STG: (In 2 weeks)  Pt will bathe with setup and mod (A)---achieved  Pt will perform UE dressing with setup and min(A)--progressing  Pt will perform (L) UE AROM to 3/4 full range--progressing  Pt will sit EOB x 5 min with (I)  Pt will transfer bed/chair/bsc with min a---  Pt will tolerate 20 minutes of tx without fatigue----progressing      LT.Restore to max I with self care and mobility.     Outcome: Ongoing, Progressing    Pt is making progress toward goals; Plan is to continue tx.       Bed Mobility:    Patient completed Rolling/Turning to Left with  minimum assistance  Patient completed Rolling/Turning to Right with minimum assistance  Patient completed Scooting/Bridging with moderate assistance  Patient completed Supine to Sit with minimum assistance, moderate assistance, and increased time and effort  Patient completed Sit to Supine with moderate assistance      Activities of Daily Living:  Bathing: Pt washed her upper body anterior with setup and CGA while sitting up EOB.  Pt required total (A) with her back. Pt washed upper thighs with setup. Total (A) with distal Legs and feet.  Upper Body Dressing: moderate assistance for hospital gown while sitting up EOB  Lower Body Dressing: dependence with socks

## 2023-02-02 NOTE — PT/OT/SLP PROGRESS
Physical Therapy Treatment    Patient Name:  Zandra Veloz   MRN:  [id number]    Recommendations:     Discharge Recommendations: nursing facility, skilled  Discharge Equipment Recommendations: other (see comments) (to be determined)  Barriers to discharge:  ongoing medical treatment    Assessment:     Zandra Veloz is a 88 y.o. female admitted with a medical diagnosis of Urinary tract infection.  She presents with the following impairments/functional limitations: weakness, impaired functional mobility, impaired self care skills, impaired endurance, gait instability, impaired skin.    Pt able to ambulate short distance this PM, however, cont to report increased left hip/lower extremity pain    Rehab Prognosis: Fair; patient would benefit from acute skilled PT services to address these deficits and reach maximum level of function.    Recent Surgery: * No surgery found *      Plan:     During this hospitalization, patient to be seen 5 x/week to address the identified rehab impairments via gait training, therapeutic activities, therapeutic exercises and progress toward the following goals:    Plan of Care Expires:  03/30/23    Subjective     Chief Complaint:   Patient/Family Comments/goals: pt agreeable  Pain/Comfort:         Objective:     Communicated with Yolanda Sinha RN prior to session.  Patient found HOB elevated with peripheral IV upon PT entry to room.     General Precautions: Standard, fall  Orthopedic Precautions: LLE partial weight bearing, LLE posterior precautions  Braces: N/A  Respiratory Status: Room air     Functional Mobility:  Bed Mobility:     Supine to Sit: minimum assistance  Transfers:     Sit to Stand:  minimum assistance and of 2 persons with rolling walker  Gait: 15' minimum assist with RW; slow sadaf, left foot drop , report of left hip/lower extremity pain      AM-PAC 6 CLICK MOBILITY  Turning over in bed (including adjusting bedclothes, sheets and blankets)?:  3  Sitting down on and standing up from a chair with arms (e.g., wheelchair, bedside commode, etc.): 3  Moving from lying on back to sitting on the side of the bed?: 3  Moving to and from a bed to a chair (including a wheelchair)?: 3  Need to walk in hospital room?: 3  Climbing 3-5 steps with a railing?: 1  Basic Mobility Total Score: 16       Treatment & Education:  Pt performed 30 reps (B) LE exercises: ap, quad set, glut set, straight leg raise, hip ab/adduction, long arc quad, heel slide with active assist    Patient left up in chair with all lines intact, call button in reach, and care giver present..    GOALS:   Multidisciplinary Problems       Physical Therapy Goals          Problem: Physical Therapy    Goal Priority Disciplines Outcome Goal Variances Interventions   Physical Therapy Goal     PT, PT/OT Ongoing, Progressing     Description: Short Term Goals to be met by 2/25/2023    Patient will increase functional independence and safety with mobility by performing    1.   Rolling side to side with standby assist  2.   Supine to sit standby Assist  3.   Sit to stand Minimal Assist using manual assistance with gait belt and PWB L LE  4.   Ambulate 20ft with RW, PWB LLE. Min A.   5.   Lower extremity exercises x 30 reps with assist as necessary and no rest breaks      Long Term Goals to be met by 3/10/2023    Patient to require less than or equal to Stand by assist for functional mobility to ease caregiver burden                        Time Tracking:     PT Received On: 02/02/23  PT Start Time: 1326     PT Stop Time: 1352  PT Total Time (min): 26 min     Billable Minutes: Gait Training 8 and Therapeutic Exercise 15    Treatment Type: Treatment  PT/PTA: PTA     PTA Visit Number: 1     02/02/2023

## 2023-02-02 NOTE — PROGRESS NOTES
Pharmacist Renal Dose Adjustment Note    Zandra Veloz is a 88 y.o. female being treated with the medication enoxaparin    Patient Data:    Vital Signs (Most Recent):  Temp: 98.4 °F (36.9 °C) (02/02/23 0850)  Pulse: 78 (02/02/23 0850)  Resp: 16 (02/02/23 1036)  BP: (!) 108/51 (02/02/23 0850)  SpO2: 98 % (02/02/23 0850)   Vital Signs (72h Range):  Temp:  [97.4 °F (36.3 °C)-99.5 °F (37.5 °C)]   Pulse:  [53-80]   Resp:  [16-18]   BP: (100-144)/(39-82)   SpO2:  [91 %-100 %]      Recent Labs   Lab 01/27/23  0701 01/30/23  0526   CREATININE 0.79 0.83     Serum creatinine: 0.83 mg/dL 01/30/23 0526  Estimated creatinine clearance: 43.2 mL/min    Medication:enoxaparin dose: 30 mg frequency daily will be changed to medication:enoxaparin dose:40 mg frequency:daily    Pharmacist's Name: Emerald Adler  Pharmacist's Extension: [phone]

## 2023-02-02 NOTE — ASSESSMENT & PLAN NOTE
Clean with vashe  Apply mepitel and aquacel advantage to wound bed  Cover and secure with bordered foam  Change twice weekly and PRN for soilage (M, Th)  Keep leg elevated and avoid pressure on wound.

## 2023-02-02 NOTE — PLAN OF CARE
Problem: Adult Inpatient Plan of Care  Goal: Absence of Hospital-Acquired Illness or Injury  Intervention: Identify and Manage Fall Risk  Flowsheets (Taken 2/1/2023 1809)  Safety Promotion/Fall Prevention:   assistive device/personal item within reach   medications reviewed   family to remain at bedside   nonskid shoes/socks when out of bed   room near unit station     Problem: Skin Injury Risk Increased  Goal: Skin Health and Integrity  Intervention: Optimize Skin Protection  Flowsheets (Taken 2/1/2023 1809)  Pressure Reduction Techniques: pressure points protected  Pressure Reduction Devices: positioning supports utilized  Skin Protection: incontinence pads utilized  Head of Bed (HOB) Positioning: HOB at 30-45 degrees     Problem: Fall Injury Risk  Goal: Absence of Fall and Fall-Related Injury  Intervention: Identify and Manage Contributors  Flowsheets (Taken 2/1/2023 1809)  Self-Care Promotion: BADL personal objects within reach  Medication Review/Management: medications reviewed

## 2023-02-02 NOTE — PROGRESS NOTES
[facility] Hospital Medicine  Progress Note    Patient Name: Zandra Veloz  MRN: [id number]  Patient Class: IP- Inpatient   Admission Date: 12/29/2022  Length of Stay: 35 days  Attending Physician: Davey Bell MD  Primary Care Provider: Brandon Thornton MD        Subjective:     Principal Problem:Urinary tract infection        HPI:  HPI:   88-year-old lady seen emergency room department.  Patient presents after having a fall when she was trying to go to the restroom at her home.  Patient sustained a left femur fracture.  Patient complains of moderate to severe pain in the left hip area.  Patient also complains of chest tightness, she rates it a 5/10 in the chest tightness has been intubate.  Patient also was seen in emergency room department earlier this week per her daughter.  Patient is found to have dehydration and a urinary tract infection.  Current she denies any shortness of breath.  She does not give a history of coronary artery disease.        Procedure(s) (LRB):  REVISION, TOTAL ARTHROPLASTY, HIP, VICTORIANO PROSTHETIC FX (Left)       Hospital Course:   12/19 - records reviewed. Fall without LOC and has left hip fx. No other complaints currently. Seen by cardiology with some CP felt musculoskeletal.  Echo mild AS and mod MR with nl EF. Age increase cardiac risk for surgery but discussed with daughter surgery is urgent and see no benefit in delay medically. Feel low to moderate risk for surgery. Question about UTI. No symptoms. Had UTI in 10.22 not surprising. Lab to do culture on urine in lab. Cover with ATN for prior culture with ATB started. Discussed with Dr Singer and cardiology.  12/20 Tachycardia and elevated BP. Cardiology adjusted meds. Plan hold off surgery until 12/22 to adjust meds and treat UTI. Family in room. Pt not sleeping well.   12/21 Didn't sleep well prior night. Apparently been on xanax for a time. Also recently started on Neurontin. Family with  question. No recent BM. Some increase stool on KUB but not severe. Surgery for am. BP some up but better. HR good.   12/22 Seen prior to surgery and apparently add better night. Sore mouth / throat better with mycostatin. For surgery. Family in room.  12/23 patient with pain but otherwise seemed to be doing relatively well.  Daughter in room.  Apparently been taking prednisone for some time and this was restarted per family request.  Look into swing bed placement  12/24 patient had better night rested and everyone's in better spirits today.  Tolerating some diet.   Therapy worked with patient.  Look into swing bed placement next week  12/25 remained in good spirits.  Less pain.  Had good bowel movement and ordered Dulcolax not given.  Because of dressing to leg, Burk was not removed to keep it from getting soiled.  Wound care to check 12/27.  Discuss this with patient and daughter.  On antibiotics for UTI  12/26-will dc to swingbed once accepted. Needs continued wound care.  12/27-DC when bed available  12/28- issues with wound care yesterday.  Dr. Singer had to come remove dressings himself due to adherence to subcutaneous tissue.  Family refusing transfer to [facility] due to lack of specially trained wound care team availability.  Planning to transfer to specialty but resistant to that as well and requesting to speak to administration.    12/29- agreeable to transfer to specialty.  This will be the best place for her to receive wound care.  DC after family tours     12/29-needs continued wound care and therapy[facility]    12/30-doing well today, still with pain         Overview/Hospital Course:  12/31-having some pain this am  1/1- no complaints  1/2- doing well  1/3- continue wound care, last day cefepime tomorrow  1/4- some pain this AM.  Delay in mediations dues to staffing issues. Dsicussed with RN  1/5- no issues overnight  1/6- Still with pain, hip incision looks good  1/7-diclofenac for  shoulder pain  1/8-decrease laxatives, change benzos and carlo to PRN per patient request  1/9 some confusion overnight  1/10- no more confusion, doing well  1/11- no acute issues overnight  1/12- doing well, nausea today  1/13 -Dr. Lebron Cutler IV, DO taking over for Dr. Guerra for weekend.  Patient's pain appears to be well controlled this morning no complaints of nausea.  Continue wound care and PT/OT  1/14-patient appears well labs.  Continue wound care and PT/OT  1/15-patient still appears well.  No acute concerns or complaints.  No events overnight.  Continue wound care working with a PTOT.  Expiration is services estimated by February 6 1/16- no complaints, doing well  1/17-some nausea today  1/18-better today.  Still requiring twice weekly dressing changes.  Appetite stimulant  1/19- PWB with PT today.  Still with weeping wounds.  Will need wound care twice weekly-as these improve will be able to transfer to other facility  1/20-no complaints  1/21-no acute events overnight, some anxiety today  1/22-episode of SVT yesterday. Spontaneous resolution  1/23-no complaints  1/24- no complaints  1/25-doing well  1/26-wound care, pt  1/27- no complaints  1/28- patient seen examined today resting comfortably in bed, in no acute distress and no acute events overnight.  Patient states that she is doing well and denies pain.  States that she is been working well with PT/OT but not this weekend.  Patient otherwise doing well without complaints.  1/29-patient seen examined today resting comfortably in bed, in no acute distress with no acute events overnight.  Patient denies pain or other complaints at this time.  She continues with wound care PT/OT.  1/30-doing well this am  1/31-no complaints  2/1-no changes      Interval History:     Pt comfortable  No new complains  Long d/w family, pt has poor oral intake and almost apicture of ftt, recommended increased oral intake brent protein, since poor protein intake and  protein calorie malnutirion is associated w/ poor wound healing. Family verbalised understanding and promised to work on it.  Pt bp running low brent in setting of pain medicatiosn, will decrease bp regimen, of note she does have sever MR, will thereofre keep lisinopril on for afterload reduction. Overall w/ poor nutrition and poor funcitonal status along w/ severe MR prognosis is guarded, however with family encouragement and pusshing pt to participate in her care im hopefull she will get better.     Review of Systems   Constitutional:  Negative for chills, fatigue, fever and unexpected weight change.   HENT:  Negative for congestion, mouth sores and sore throat.    Eyes:  Negative for photophobia and visual disturbance.   Respiratory:  Negative for cough, chest tightness, shortness of breath and wheezing.    Cardiovascular:  Negative for chest pain, palpitations and leg swelling.   Gastrointestinal:  Negative for abdominal pain, diarrhea, nausea and vomiting.   Endocrine: Negative for cold intolerance and heat intolerance.   Genitourinary:  Negative for difficulty urinating, dysuria, frequency and urgency.   Musculoskeletal:  Positive for arthralgias. Negative for back pain and myalgias.   Skin:  Negative for pallor and rash.   Neurological:  Negative for tremors, seizures, syncope, weakness, numbness and headaches.   Hematological:  Does not bruise/bleed easily.   Psychiatric/Behavioral:  Negative for agitation, confusion, hallucinations and suicidal ideas.    Objective:     Vital Signs (Most Recent):  Temp: 98.4 °F (36.9 °C) (02/02/23 0850)  Pulse: 78 (02/02/23 0850)  Resp: 16 (02/02/23 0850)  BP: (!) 108/51 (02/02/23 0850)  SpO2: 98 % (02/02/23 0850)   Vital Signs (24h Range):  Temp:  [97.4 °F (36.3 °C)-98.7 °F (37.1 °C)] 98.4 °F (36.9 °C)  Pulse:  [55-78] 78  Resp:  [16-18] 16  SpO2:  [91 %-98 %] 98 %  BP: (106-136)/(45-65) 108/51     Weight: 58.4 kg (128 lb 12 oz)  Body mass index is 20.78 kg/m².  No intake or  output data in the 24 hours ending 02/02/23 1035   Physical Exam  Vitals reviewed.   Constitutional:       Appearance: Normal appearance.   HENT:      Head: Normocephalic and atraumatic.   Eyes:      Extraocular Movements: Extraocular movements intact.   Cardiovascular:      Rate and Rhythm: Normal rate and regular rhythm.      Pulses: Normal pulses.   Pulmonary:      Effort: Pulmonary effort is normal.      Breath sounds: Normal breath sounds.   Abdominal:      General: Abdomen is flat.      Palpations: Abdomen is soft.   Musculoskeletal:         General: Tenderness and signs of injury present.      Comments: Dressed left lower extremity, painful to touch   Skin:     General: Skin is warm and dry.      Capillary Refill: Capillary refill takes less than 2 seconds.      Comments: Multiple wounds, see LDAs.    Neurological:      General: No focal deficit present.      Mental Status: She is alert and oriented to person, place, and time.   Psychiatric:         Mood and Affect: Mood normal.         Behavior: Behavior normal.       Significant Labs: All pertinent labs within the past 24 hours have been reviewed.    Significant Imaging: I have reviewed all pertinent imaging results/findings within the past 24 hours.      Assessment/Plan:      Disruption of external surgical wound        Wounds and injuries  Wound care consulted and following    Open wound of left lower leg  Wound care      Multiple skin tears  Wound care  Optimize nutrition       Delmy-prosthetic fracture around prosthetic hip  S/P fixation with Dr Singer      Lumbar radiculopathy  Pain management  PT      Hypertension  Adjust medications as needed    2/2-d/c dilt, hydral since bp running low, keep metoprolol and lisinopril on    Arthritis  Pain management  Per family has been taking oral steorids for a long time, cont that        VTE Risk Mitigation (From admission, onward)         Ordered     enoxaparin injection 30 mg  Daily         02/02/23 1018                 Discharge Planning   YANIV:      Code Status: Full Code   Is the patient medically ready for discharge?:     Reason for patient still in hospital (select all that apply): Treatment  Discharge Plan A: Skilled Nursing Facility                  Rehmat HAYDER Bell MD  Department of Hospital Medicine   [facility]

## 2023-02-02 NOTE — SUBJECTIVE & OBJECTIVE
Subjective:     HPI:  87 yo female with multiple skin tears, traumatic wounds, and surgical incision to left thigh. She was admitted following a fall on 12/17 when she was trying to go to the restroom at her home.  Patient sustained a left femur fracture. She is status post ORIF on 12/20. Staples intact to left hip. Moderate amount of green drainage noted, cultures obtained today. Left lower leg sutures removed today. Slough noted in wound bed today, adjustment to local wound care. Significant PMH includes hypertension, bradycardia, osteoarthritis, hypothyroidism, osteoporosis, and multiple falls. Wound healing is complicated by bradycardia, hx of a-fib, chronic pain, weakness, fragile skin, limited mobility, multiple falls, infection, and pain.     Hospital Course:   1/5/23 - Complains of pain to right posterior knee, venous doppler ordered. Sutures and every other staple removed today. New local wound care orders. Cultures pending.   1/9/23 - Wounds on lower extremities healing well. Continue current POC. Bruising noted to left posterior knee, protect with mepitel and optifoam. Dressing to incision site saturated with green drainage. Cultures negative. Continue with drawtex and notify ortho of drainage.   1/11/2023 - Wounds are healing well. Continue current POC. Ultrasound pending, preliminary results concerning for fluid pocket, Dr. Singer going to evaluate today.   1/17/23 - Wounds continue to show improvement. Dr. Singer evaluated left hip today during wound vac change. Staples and wound vac d/c'd today. Dressing change twice weekly.   1/19/23 - Wounds continue to heal. Incision with minimal drainage. Puracol to left thigh today. Continue with current POC. Twice weekly dressing changes (M, Th)  1/23/23 - Wounds are improving. Continue twice weekly dressing changes.   1/30/23 - New skin tear to right thigh, used tweezers and q-tip to place skin over wound and applied transparent dressing. Wounds are  smaller today with granulation tissue. Continue drawtex and bordered foam dressing to wounds.   2/2/23 - Discussed using silvadene with patient and family, patient is allergic to sulfa. Consider urea for dry skin on left lower leg, aquacel ag advantage with bordered foams to wounds.           Scheduled Meds:   ALPRAZolam  1 mg Oral QHS    docusate sodium  100 mg Oral BID    enoxaparin  40 mg Subcutaneous Daily    gabapentin  100 mg Oral TID    levothyroxine  125 mcg Oral Before breakfast    [START ON 2/3/2023] lisinopriL  5 mg Oral Daily    megestroL  40 mg Oral Daily    metoprolol tartrate  50 mg Oral BID    pantoprazole  40 mg Oral Daily    polyethylene glycol  17 g Oral Daily    predniSONE  5 mg Oral Daily    senna  1 tablet Oral Daily    tamsulosin  0.4 mg Oral Daily     Continuous Infusions:  PRN Meds:acetaminophen, bisacodyL, dextromethorphan-guaiFENesin  mg/5 ml, dextrose 50%, dextrose 50%, diclofenac sodium, glucagon (human recombinant), glucose, glucose, HYDROcodone-acetaminophen, magnesium oxide, melatonin, naloxone, ondansetron, potassium bicarbonate, potassium bicarbonate, potassium bicarbonate, potassium, sodium phosphates, potassium, sodium phosphates, potassium, sodium phosphates, prochlorperazine, simethicone, sodium chloride 0.9%, traZODone, white petrolatum    Review of Systems   Constitutional:  Positive for activity change. Negative for chills and fever.   Respiratory:  Negative for chest tightness and shortness of breath.    Cardiovascular:  Positive for leg swelling. Negative for chest pain and palpitations.   Musculoskeletal:  Positive for arthralgias, back pain, gait problem and joint swelling.   Skin:  Positive for color change and wound.        wound   Neurological:  Positive for weakness.   Psychiatric/Behavioral:  Negative for agitation, behavioral problems, confusion and self-injury.    Objective:     Vital Signs (Most Recent):  Temp: 98.4 °F (36.9 °C) (02/02/23 0850)  Pulse: 78  (02/02/23 0850)  Resp: 16 (02/02/23 1036)  BP: (!) 108/51 (02/02/23 0850)  SpO2: 98 % (02/02/23 0850)   Vital Signs (24h Range):  Temp:  [97.4 °F (36.3 °C)-98.7 °F (37.1 °C)] 98.4 °F (36.9 °C)  Pulse:  [56-78] 78  Resp:  [16-18] 16  SpO2:  [91 %-98 %] 98 %  BP: (106-136)/(45-65) 108/51     Weight: 58.4 kg (128 lb 12 oz)  Body mass index is 20.78 kg/m².    Physical Exam  Vitals reviewed.   Constitutional:       Appearance: Normal appearance.   HENT:      Head: Normocephalic.   Cardiovascular:      Rate and Rhythm: Regular rhythm. Bradycardia present.      Pulses: Normal pulses.      Heart sounds: Murmur heard.   Pulmonary:      Effort: Pulmonary effort is normal.      Breath sounds: Normal breath sounds.   Musculoskeletal:         General: Swelling, tenderness, deformity and signs of injury present.      Right lower leg: Edema present.      Left lower leg: Edema present.   Skin:     Findings: Bruising and erythema present.      Comments: Wound, see LDA for photo/measurements   Neurological:      Mental Status: She is alert. Mental status is at baseline.      Motor: Weakness present.      Gait: Gait abnormal.

## 2023-02-02 NOTE — ASSESSMENT & PLAN NOTE
Clean with vashe  Apply mepitel and aquacel ag advantage to wound bed  Cover and secure with bordered foam  Change twice weekly and PRN for soilage (M, Th)  Keep leg elevated and avoid pressure on wound.

## 2023-02-02 NOTE — SUBJECTIVE & OBJECTIVE
Interval History:     Pt comfortable  No new complains  Long d/w family, pt has poor oral intake and almost apicture of ftt, recommended increased oral intake brent protein, since poor protein intake and protein calorie malnutirion is associated w/ poor wound healing. Family verbalised understanding and promised to work on it.  Pt bp running low brent in setting of pain medicatiosn, will decrease bp regimen, of note she does have sever MR, will thereofre keep lisinopril on for afterload reduction. Overall w/ poor nutrition and poor funcitonal status along w/ severe MR prognosis is guarded, however with family encouragement and pusshing pt to participate in her care im hopefull she will get better.     Review of Systems   Constitutional:  Negative for chills, fatigue, fever and unexpected weight change.   HENT:  Negative for congestion, mouth sores and sore throat.    Eyes:  Negative for photophobia and visual disturbance.   Respiratory:  Negative for cough, chest tightness, shortness of breath and wheezing.    Cardiovascular:  Negative for chest pain, palpitations and leg swelling.   Gastrointestinal:  Negative for abdominal pain, diarrhea, nausea and vomiting.   Endocrine: Negative for cold intolerance and heat intolerance.   Genitourinary:  Negative for difficulty urinating, dysuria, frequency and urgency.   Musculoskeletal:  Positive for arthralgias. Negative for back pain and myalgias.   Skin:  Negative for pallor and rash.   Neurological:  Negative for tremors, seizures, syncope, weakness, numbness and headaches.   Hematological:  Does not bruise/bleed easily.   Psychiatric/Behavioral:  Negative for agitation, confusion, hallucinations and suicidal ideas.    Objective:     Vital Signs (Most Recent):  Temp: 98.4 °F (36.9 °C) (02/02/23 0850)  Pulse: 78 (02/02/23 0850)  Resp: 16 (02/02/23 0850)  BP: (!) 108/51 (02/02/23 0850)  SpO2: 98 % (02/02/23 0850)   Vital Signs (24h Range):  Temp:  [97.4 °F (36.3 °C)-98.7 °F  (37.1 °C)] 98.4 °F (36.9 °C)  Pulse:  [55-78] 78  Resp:  [16-18] 16  SpO2:  [91 %-98 %] 98 %  BP: (106-136)/(45-65) 108/51     Weight: 58.4 kg (128 lb 12 oz)  Body mass index is 20.78 kg/m².  No intake or output data in the 24 hours ending 02/02/23 1035   Physical Exam  Vitals reviewed.   Constitutional:       Appearance: Normal appearance.   HENT:      Head: Normocephalic and atraumatic.   Eyes:      Extraocular Movements: Extraocular movements intact.   Cardiovascular:      Rate and Rhythm: Normal rate and regular rhythm.      Pulses: Normal pulses.   Pulmonary:      Effort: Pulmonary effort is normal.      Breath sounds: Normal breath sounds.   Abdominal:      General: Abdomen is flat.      Palpations: Abdomen is soft.   Musculoskeletal:         General: Tenderness and signs of injury present.      Comments: Dressed left lower extremity, painful to touch   Skin:     General: Skin is warm and dry.      Capillary Refill: Capillary refill takes less than 2 seconds.      Comments: Multiple wounds, see LDAs.    Neurological:      General: No focal deficit present.      Mental Status: She is alert and oriented to person, place, and time.   Psychiatric:         Mood and Affect: Mood normal.         Behavior: Behavior normal.       Significant Labs: All pertinent labs within the past 24 hours have been reviewed.    Significant Imaging: I have reviewed all pertinent imaging results/findings within the past 24 hours.

## 2023-02-02 NOTE — PLAN OF CARE
Chart reviewed. Continue telemetry, PT/OT, and wound care. Continue POC. Patient's daughter present for IDT meeting and agrees with POC. SS following.

## 2023-02-02 NOTE — ASSESSMENT & PLAN NOTE
Adjust medications as needed    2/2-d/c dilt, hydral since bp running low, keep metoprolol and lisinopril on

## 2023-02-02 NOTE — PROGRESS NOTES
[facility]  Wound Care and Hyperbarics [facility]  Progress Note    Patient Name: Zandra Veloz  MRN: [id number]  Admission Date: 12/29/2022  Hospital Length of Stay: 35 days  Attending Physician: Davey Bell MD  Primary Care Provider: Brandon Thornton MD         Subjective:     HPI:  87 yo female with multiple skin tears, traumatic wounds, and surgical incision to left thigh. She was admitted following a fall on 12/17 when she was trying to go to the restroom at her home.  Patient sustained a left femur fracture. She is status post ORIF on 12/20. Staples intact to left hip. Moderate amount of green drainage noted, cultures obtained today. Left lower leg sutures removed today. Slough noted in wound bed today, adjustment to local wound care. Significant PMH includes hypertension, bradycardia, osteoarthritis, hypothyroidism, osteoporosis, and multiple falls. Wound healing is complicated by bradycardia, hx of a-fib, chronic pain, weakness, fragile skin, limited mobility, multiple falls, infection, and pain.     Hospital Course:   1/5/23 - Complains of pain to right posterior knee, venous doppler ordered. Sutures and every other staple removed today. New local wound care orders. Cultures pending.   1/9/23 - Wounds on lower extremities healing well. Continue current POC. Bruising noted to left posterior knee, protect with mepitel and optifoam. Dressing to incision site saturated with green drainage. Cultures negative. Continue with drawtex and notify ortho of drainage.   1/11/2023 - Wounds are healing well. Continue current POC. Ultrasound pending, preliminary results concerning for fluid pocket, Dr. Singer going to evaluate today.   1/17/23 - Wounds continue to show improvement. Dr. Singer evaluated left hip today during wound vac change. Staples and wound vac d/c'd today. Dressing change twice weekly.   1/19/23 - Wounds continue to heal. Incision with minimal drainage. Puracol to  left thigh today. Continue with current POC. Twice weekly dressing changes (M, Th)  1/23/23 - Wounds are improving. Continue twice weekly dressing changes.   1/30/23 - New skin tear to right thigh, used tweezers and q-tip to place skin over wound and applied transparent dressing. Wounds are smaller today with granulation tissue. Continue drawtex and bordered foam dressing to wounds.   2/2/23 - Discussed using silvadene with patient and family, patient is allergic to sulfa. Consider urea for dry skin on left lower leg, aquacel ag advantage with bordered foams to wounds.           Scheduled Meds:   ALPRAZolam  1 mg Oral QHS    docusate sodium  100 mg Oral BID    enoxaparin  40 mg Subcutaneous Daily    gabapentin  100 mg Oral TID    levothyroxine  125 mcg Oral Before breakfast    [START ON 2/3/2023] lisinopriL  5 mg Oral Daily    megestroL  40 mg Oral Daily    metoprolol tartrate  50 mg Oral BID    pantoprazole  40 mg Oral Daily    polyethylene glycol  17 g Oral Daily    predniSONE  5 mg Oral Daily    senna  1 tablet Oral Daily    tamsulosin  0.4 mg Oral Daily     Continuous Infusions:  PRN Meds:acetaminophen, bisacodyL, dextromethorphan-guaiFENesin  mg/5 ml, dextrose 50%, dextrose 50%, diclofenac sodium, glucagon (human recombinant), glucose, glucose, HYDROcodone-acetaminophen, magnesium oxide, melatonin, naloxone, ondansetron, potassium bicarbonate, potassium bicarbonate, potassium bicarbonate, potassium, sodium phosphates, potassium, sodium phosphates, potassium, sodium phosphates, prochlorperazine, simethicone, sodium chloride 0.9%, traZODone, white petrolatum    Review of Systems   Constitutional:  Positive for activity change. Negative for chills and fever.   Respiratory:  Negative for chest tightness and shortness of breath.    Cardiovascular:  Positive for leg swelling. Negative for chest pain and palpitations.   Musculoskeletal:  Positive for arthralgias, back pain, gait problem and joint  swelling.   Skin:  Positive for color change and wound.        wound   Neurological:  Positive for weakness.   Psychiatric/Behavioral:  Negative for agitation, behavioral problems, confusion and self-injury.    Objective:     Vital Signs (Most Recent):  Temp: 98.4 °F (36.9 °C) (02/02/23 0850)  Pulse: 78 (02/02/23 0850)  Resp: 16 (02/02/23 1036)  BP: (!) 108/51 (02/02/23 0850)  SpO2: 98 % (02/02/23 0850)   Vital Signs (24h Range):  Temp:  [97.4 °F (36.3 °C)-98.7 °F (37.1 °C)] 98.4 °F (36.9 °C)  Pulse:  [56-78] 78  Resp:  [16-18] 16  SpO2:  [91 %-98 %] 98 %  BP: (106-136)/(45-65) 108/51     Weight: 58.4 kg (128 lb 12 oz)  Body mass index is 20.78 kg/m².    Physical Exam  Vitals reviewed.   Constitutional:       Appearance: Normal appearance.   HENT:      Head: Normocephalic.   Cardiovascular:      Rate and Rhythm: Regular rhythm. Bradycardia present.      Pulses: Normal pulses.      Heart sounds: Murmur heard.   Pulmonary:      Effort: Pulmonary effort is normal.      Breath sounds: Normal breath sounds.   Musculoskeletal:         General: Swelling, tenderness, deformity and signs of injury present.      Right lower leg: Edema present.      Left lower leg: Edema present.   Skin:     Findings: Bruising and erythema present.      Comments: Wound, see LDA for photo/measurements   Neurological:      Mental Status: She is alert. Mental status is at baseline.      Motor: Weakness present.      Gait: Gait abnormal.       Assessment/Plan:     Open wound of left lower leg  Clean with vashe  Apply mepitel and aquacel ag advantage to wound bed  Cover and secure with bordered foam  Change twice weekly and PRN for soilage (M, Th)  Keep leg elevated and avoid pressure on wound.          Multiple skin tears                                          Clean with vashe  Apply mepitel and aquacel advantage to wound bed  Cover and secure with bordered foam  Change twice weekly and PRN for soilage (M, Th)  Keep leg elevated and avoid  pressure on wound.        ZURDO San  Wound Care and Hyperbarics [facility]  [facility]

## 2023-02-03 LAB
ALBUMIN SERPL BCP-MCNC: 3.1 G/DL (ref 3.5–5)
ALBUMIN/GLOB SERPL: 1 {RATIO}
ALP SERPL-CCNC: 55 U/L (ref 55–142)
ALT SERPL W P-5'-P-CCNC: 14 U/L (ref 13–56)
ANION GAP SERPL CALCULATED.3IONS-SCNC: 13 MMOL/L (ref 7–16)
AST SERPL W P-5'-P-CCNC: 15 U/L (ref 15–37)
BASOPHILS # BLD AUTO: 0.08 K/UL (ref 0–0.2)
BASOPHILS NFR BLD AUTO: 0.8 % (ref 0–1)
BILIRUB SERPL-MCNC: 0.4 MG/DL (ref ?–1.2)
BUN SERPL-MCNC: 15 MG/DL (ref 7–18)
BUN/CREAT SERPL: 19 (ref 6–20)
CALCIUM SERPL-MCNC: 8.8 MG/DL (ref 8.5–10.1)
CHLORIDE SERPL-SCNC: 103 MMOL/L (ref 98–107)
CO2 SERPL-SCNC: 23 MMOL/L (ref 21–32)
CREAT SERPL-MCNC: 0.78 MG/DL (ref 0.55–1.02)
DIFFERENTIAL METHOD BLD: ABNORMAL
EGFR (NO RACE VARIABLE) (RUSH/TITUS): 73 ML/MIN/1.73M²
EOSINOPHIL # BLD AUTO: 0.17 K/UL (ref 0–0.5)
EOSINOPHIL NFR BLD AUTO: 1.8 % (ref 1–4)
ERYTHROCYTE [DISTWIDTH] IN BLOOD BY AUTOMATED COUNT: 13.3 % (ref 11.5–14.5)
GLOBULIN SER-MCNC: 3.2 G/DL (ref 2–4)
GLUCOSE SERPL-MCNC: 90 MG/DL (ref 74–106)
HCT VFR BLD AUTO: 33.1 % (ref 38–47)
HGB BLD-MCNC: 10.2 G/DL (ref 12–16)
IMM GRANULOCYTES # BLD AUTO: 0.13 K/UL (ref 0–0.04)
IMM GRANULOCYTES NFR BLD: 1.4 % (ref 0–0.4)
LYMPHOCYTES # BLD AUTO: 2.55 K/UL (ref 1–4.8)
LYMPHOCYTES NFR BLD AUTO: 26.9 % (ref 27–41)
MCH RBC QN AUTO: 29.6 PG (ref 27–31)
MCHC RBC AUTO-ENTMCNC: 30.8 G/DL (ref 32–36)
MCV RBC AUTO: 95.9 FL (ref 80–96)
MONOCYTES # BLD AUTO: 0.77 K/UL (ref 0–0.8)
MONOCYTES NFR BLD AUTO: 8.1 % (ref 2–6)
MPC BLD CALC-MCNC: 9.4 FL (ref 9.4–12.4)
NEUTROPHILS # BLD AUTO: 5.79 K/UL (ref 1.8–7.7)
NEUTROPHILS NFR BLD AUTO: 61 % (ref 53–65)
NRBC # BLD AUTO: 0 X10E3/UL
NRBC, AUTO (.00): 0 %
PLATELET # BLD AUTO: 261 K/UL (ref 150–400)
POTASSIUM SERPL-SCNC: 4.1 MMOL/L (ref 3.5–5.1)
PROT SERPL-MCNC: 6.3 G/DL (ref 6.4–8.2)
RBC # BLD AUTO: 3.45 M/UL (ref 4.2–5.4)
SODIUM SERPL-SCNC: 135 MMOL/L (ref 136–145)
WBC # BLD AUTO: 9.49 K/UL (ref 4.5–11)

## 2023-02-03 PROCEDURE — 80053 COMPREHEN METABOLIC PANEL: CPT | Performed by: INTERNAL MEDICINE

## 2023-02-03 PROCEDURE — 99232 SBSQ HOSP IP/OBS MODERATE 35: CPT | Mod: ,,, | Performed by: INTERNAL MEDICINE

## 2023-02-03 PROCEDURE — 97116 GAIT TRAINING THERAPY: CPT | Mod: CQ

## 2023-02-03 PROCEDURE — 97530 THERAPEUTIC ACTIVITIES: CPT

## 2023-02-03 PROCEDURE — 63600175 PHARM REV CODE 636 W HCPCS: Performed by: STUDENT IN AN ORGANIZED HEALTH CARE EDUCATION/TRAINING PROGRAM

## 2023-02-03 PROCEDURE — 25000003 PHARM REV CODE 250: Performed by: NURSE PRACTITIONER

## 2023-02-03 PROCEDURE — 25000003 PHARM REV CODE 250: Performed by: INTERNAL MEDICINE

## 2023-02-03 PROCEDURE — 97110 THERAPEUTIC EXERCISES: CPT | Mod: CQ

## 2023-02-03 PROCEDURE — 11000001 HC ACUTE MED/SURG PRIVATE ROOM

## 2023-02-03 PROCEDURE — 99232 PR SUBSEQUENT HOSPITAL CARE,LEVL II: ICD-10-PCS | Mod: ,,, | Performed by: INTERNAL MEDICINE

## 2023-02-03 PROCEDURE — 25000003 PHARM REV CODE 250: Performed by: STUDENT IN AN ORGANIZED HEALTH CARE EDUCATION/TRAINING PROGRAM

## 2023-02-03 PROCEDURE — 97110 THERAPEUTIC EXERCISES: CPT

## 2023-02-03 PROCEDURE — 63600175 PHARM REV CODE 636 W HCPCS: Performed by: INTERNAL MEDICINE

## 2023-02-03 PROCEDURE — 85025 COMPLETE CBC W/AUTO DIFF WBC: CPT | Performed by: STUDENT IN AN ORGANIZED HEALTH CARE EDUCATION/TRAINING PROGRAM

## 2023-02-03 RX ORDER — LISINOPRIL 10 MG/1
10 TABLET ORAL DAILY
Status: COMPLETED | OUTPATIENT
Start: 2023-02-03 | End: 2023-02-03

## 2023-02-03 RX ORDER — LISINOPRIL 20 MG/1
40 TABLET ORAL DAILY
Status: DISCONTINUED | OUTPATIENT
Start: 2023-02-04 | End: 2023-02-20 | Stop reason: HOSPADM

## 2023-02-03 RX ORDER — UREA 200 MG/G
CREAM TOPICAL DAILY PRN
Status: DISCONTINUED | OUTPATIENT
Start: 2023-02-03 | End: 2023-02-20 | Stop reason: HOSPADM

## 2023-02-03 RX ORDER — HYDRALAZINE HYDROCHLORIDE 10 MG/1
10 TABLET, FILM COATED ORAL EVERY 8 HOURS
Status: DISCONTINUED | OUTPATIENT
Start: 2023-02-03 | End: 2023-02-03

## 2023-02-03 RX ORDER — LISINOPRIL 20 MG/1
20 TABLET ORAL DAILY
Status: DISCONTINUED | OUTPATIENT
Start: 2023-02-04 | End: 2023-02-03

## 2023-02-03 RX ORDER — LISINOPRIL 10 MG/1
10 TABLET ORAL DAILY
Status: DISCONTINUED | OUTPATIENT
Start: 2023-02-04 | End: 2023-02-03

## 2023-02-03 RX ORDER — HYDRALAZINE HYDROCHLORIDE 20 MG/ML
5 INJECTION INTRAMUSCULAR; INTRAVENOUS EVERY 6 HOURS PRN
Status: DISCONTINUED | OUTPATIENT
Start: 2023-02-03 | End: 2023-02-20 | Stop reason: HOSPADM

## 2023-02-03 RX ORDER — UREA 200 MG/G
CREAM TOPICAL DAILY
Status: DISCONTINUED | OUTPATIENT
Start: 2023-02-03 | End: 2023-02-03

## 2023-02-03 RX ORDER — HYDRALAZINE HYDROCHLORIDE 25 MG/1
25 TABLET, FILM COATED ORAL EVERY 8 HOURS
Status: DISCONTINUED | OUTPATIENT
Start: 2023-02-03 | End: 2023-02-04

## 2023-02-03 RX ADMIN — DOCUSATE SODIUM 100 MG: 100 CAPSULE, LIQUID FILLED ORAL at 09:02

## 2023-02-03 RX ADMIN — LISINOPRIL 10 MG: 10 TABLET ORAL at 12:02

## 2023-02-03 RX ADMIN — OXYCODONE HYDROCHLORIDE AND ACETAMINOPHEN 500 MG: 500 TABLET ORAL at 09:02

## 2023-02-03 RX ADMIN — OXYCODONE HYDROCHLORIDE AND ACETAMINOPHEN 500 MG: 500 TABLET ORAL at 10:02

## 2023-02-03 RX ADMIN — ALPRAZOLAM 1 MG: 0.25 TABLET ORAL at 09:02

## 2023-02-03 RX ADMIN — LEVOTHYROXINE SODIUM 125 MCG: 0.12 TABLET ORAL at 06:02

## 2023-02-03 RX ADMIN — HYDROCODONE BITARTRATE AND ACETAMINOPHEN 2 TABLET: 10; 325 TABLET ORAL at 05:02

## 2023-02-03 RX ADMIN — TAMSULOSIN HYDROCHLORIDE 0.4 MG: 0.4 CAPSULE ORAL at 10:02

## 2023-02-03 RX ADMIN — GABAPENTIN 100 MG: 100 CAPSULE ORAL at 10:02

## 2023-02-03 RX ADMIN — PANTOPRAZOLE SODIUM 40 MG: 40 TABLET, DELAYED RELEASE ORAL at 10:02

## 2023-02-03 RX ADMIN — HYDRALAZINE HYDROCHLORIDE 25 MG: 25 TABLET ORAL at 09:02

## 2023-02-03 RX ADMIN — MEGESTROL ACETATE 40 MG: 40 TABLET ORAL at 10:02

## 2023-02-03 RX ADMIN — METOPROLOL TARTRATE 50 MG: 50 TABLET, FILM COATED ORAL at 10:02

## 2023-02-03 RX ADMIN — HYDROCODONE BITARTRATE AND ACETAMINOPHEN 2 TABLET: 10; 325 TABLET ORAL at 10:02

## 2023-02-03 RX ADMIN — DOCUSATE SODIUM 100 MG: 100 CAPSULE, LIQUID FILLED ORAL at 10:02

## 2023-02-03 RX ADMIN — METOPROLOL TARTRATE 50 MG: 50 TABLET, FILM COATED ORAL at 09:02

## 2023-02-03 RX ADMIN — ZINC SULFATE 220 MG (50 MG) CAPSULE 220 MG: CAPSULE at 10:02

## 2023-02-03 RX ADMIN — PREDNISONE 5 MG: 5 TABLET ORAL at 10:02

## 2023-02-03 RX ADMIN — Medication: at 12:02

## 2023-02-03 RX ADMIN — ENOXAPARIN SODIUM 40 MG: 40 INJECTION SUBCUTANEOUS at 05:02

## 2023-02-03 RX ADMIN — SENNOSIDES 1 TABLET: 8.6 TABLET, FILM COATED ORAL at 10:02

## 2023-02-03 RX ADMIN — HYDRALAZINE HYDROCHLORIDE 10 MG: 10 TABLET ORAL at 05:02

## 2023-02-03 RX ADMIN — POLYETHYLENE GLYCOL 3350 17 G: 17 POWDER, FOR SOLUTION ORAL at 10:02

## 2023-02-03 RX ADMIN — GABAPENTIN 100 MG: 100 CAPSULE ORAL at 09:02

## 2023-02-03 RX ADMIN — HYDROCODONE BITARTRATE AND ACETAMINOPHEN 2 TABLET: 10; 325 TABLET ORAL at 04:02

## 2023-02-03 RX ADMIN — GABAPENTIN 100 MG: 100 CAPSULE ORAL at 03:02

## 2023-02-03 NOTE — SUBJECTIVE & OBJECTIVE
Interval History:     Pt comfortable  No new complains  She had no new questions  BP improved, will cont present mx.      Review of Systems   Constitutional:  Negative for chills, fatigue, fever and unexpected weight change.   HENT:  Negative for congestion, mouth sores and sore throat.    Eyes:  Negative for photophobia and visual disturbance.   Respiratory:  Negative for cough, chest tightness, shortness of breath and wheezing.    Cardiovascular:  Negative for chest pain, palpitations and leg swelling.   Gastrointestinal:  Negative for abdominal pain, diarrhea, nausea and vomiting.   Endocrine: Negative for cold intolerance and heat intolerance.   Genitourinary:  Negative for difficulty urinating, dysuria, frequency and urgency.   Musculoskeletal:  Positive for arthralgias. Negative for back pain and myalgias.   Skin:  Positive for wound. Negative for pallor and rash.   Neurological:  Negative for tremors, seizures, syncope, weakness, numbness and headaches.   Hematological:  Does not bruise/bleed easily.   Psychiatric/Behavioral:  Negative for agitation, confusion, hallucinations and suicidal ideas.    Objective:     Vital Signs (Most Recent):  Temp: 98 °F (36.7 °C) (02/03/23 0910)  Pulse: 66 (02/03/23 0910)  Resp: 17 (02/03/23 0910)  BP: (!) 138/52 (02/03/23 0910)  SpO2: 98 % (02/03/23 0910)   Vital Signs (24h Range):  Temp:  [97.7 °F (36.5 °C)-98.5 °F (36.9 °C)] 98 °F (36.7 °C)  Pulse:  [55-68] 66  Resp:  [16-20] 17  SpO2:  [96 %-98 %] 98 %  BP: (109-172)/(52-90) 138/52     Weight: 58.4 kg (128 lb 12 oz)  Body mass index is 20.78 kg/m².  No intake or output data in the 24 hours ending 02/03/23 0945   Physical Exam  Vitals reviewed.   Constitutional:       Appearance: Normal appearance.   HENT:      Head: Normocephalic and atraumatic.   Eyes:      Extraocular Movements: Extraocular movements intact.   Cardiovascular:      Rate and Rhythm: Normal rate and regular rhythm.      Pulses: Normal pulses.   Pulmonary:       Effort: Pulmonary effort is normal.      Breath sounds: Normal breath sounds.   Abdominal:      General: Abdomen is flat.      Palpations: Abdomen is soft.   Musculoskeletal:         General: Tenderness and signs of injury present.      Comments: Dressed left lower extremity, painful to touch   Skin:     General: Skin is warm and dry.      Capillary Refill: Capillary refill takes less than 2 seconds.      Comments: Multiple wounds, see LDAs.    Neurological:      General: No focal deficit present.      Mental Status: She is alert and oriented to person, place, and time.   Psychiatric:         Mood and Affect: Mood normal.         Behavior: Behavior normal.       Significant Labs: All pertinent labs within the past 24 hours have been reviewed.    Significant Imaging: I have reviewed all pertinent imaging results/findings within the past 24 hours.

## 2023-02-03 NOTE — PT/OT/SLP PROGRESS
Occupational Therapy   Treatment    Name: Zandra Veloz  MRN: [id number]  Admitting Diagnosis:  Urinary tract infection       Recommendations:     Discharge Recommendations: nursing facility, skilled  Discharge Equipment Recommendations:   (to be determined)  Barriers to discharge:  None    Assessment:     Zandra Veloz is a 88 y.o. female with a medical diagnosis of Urinary tract infection.  She presents with complaint of (L) hip pain.Pt agreed to OT treatment. Performance deficits affecting function are weakness, impaired endurance, impaired self care skills, impaired functional mobility, gait instability, impaired skin.     Rehab Prognosis:  Good; patient would benefit from acute skilled OT services to address these deficits and reach maximum level of function.       Plan:     Patient to be seen 5 x/week to address the above listed problems via self-care/home management, therapeutic activities, therapeutic exercises  Plan of Care Expires: 02/06/23  Plan of Care Reviewed with: patient, caregiver    Subjective     Pain/Comfort:  Pain Rating 1: 7/10  Location - Side 1: Left  Location 1: hip  Pain Addressed 1: Reposition, Distraction  Pain Rating Post-Intervention 1: 7/10    Objective:     Communicated with: CLYDE Sinha prior to session.  Patient found HOB elevated with peripheral IV upon OT entry to room.    General Precautions: Standard, fall    Orthopedic Precautions:LLE posterior precautions, LLE partial weight bearing  Braces: N/A  Respiratory Status: Room air     Occupational Performance:     Bed Mobility:    Patient completed Rolling/Turning to Left with  contact guard assistance  Patient completed Rolling/Turning to Right with contact guard assistance  Patient completed Supine to Sit with minimum assistance     Functional Mobility/Transfers:  Patient completed Sit <> Stand Transfer with minimum assistance and of x 2 persons  with  Gait belt to stand to RW   Patient completed Bed <>  Chair Transfer using Step Transfer technique with minimum assistance with rolling walker  Functional Mobility: Min a with RW    Activities of Daily Living:  Upper Body Dressing: moderate assistance and maximal assistance isac villatoro as a isae      [facility] 6 Click ADL:      Treatment & Education:  Pt performed UE strengthening exercises. 1 lb hand wt x 2 sets of 15 reps (B)  Shoulder flexion/extension,adduction/abduction and (B) elbow and wrist flexion/extension. Hand exerciser x 2 bands x 2 sets of 20 reps (B). Red theraband x 2 sets of 15 reps (B) elbow extension.    Pt participated well with mobility and UE exercises. Plan is to continue tx addressing goals.    Patient left up in chair with all lines intact, call button in reach, and sitter present    GOALS:   Multidisciplinary Problems       Occupational Therapy Goals          Problem: Occupational Therapy    Goal Priority Disciplines Outcome Interventions   Occupational Therapy Goal     OT, PT/OT Ongoing, Progressing    Description: STG: (In 2 weeks)  Pt will bathe with setup and mod (A)  Pt will perform UE dressing with setup and min(A)  Pt will perform (L) UE AROM to 3/4 full range  Pt will sit EOB x 5 min with (I)  Pt will transfer bed/chair/bsc with min a  Pt will tolerate 20 minutes of tx without fatigue      LT.Restore to max I with self care and mobility.                          Time Tracking:     OT Date of Treatment: 23  OT Start Time: 1324 (14:12)  OT Stop Time: 1337 (14:32)  OT Total Time (min): 13 min    Billable Minutes:Therapeutic Activity 10  Therapeutic Exercise 15    OT/BRIGHT: OT          2023

## 2023-02-03 NOTE — PT/OT/SLP PROGRESS
Occupational Therapy   Treatment    Name: Zandra Veloz  MRN: [id number]  Admitting Diagnosis:  Urinary tract infection       Recommendations:     Discharge Recommendations: nursing facility, skilled  Discharge Equipment Recommendations:   (to be determined)  Barriers to discharge:  None    Assessment:     Zandra Veloz is a 88 y.o. female with a medical diagnosis of Urinary tract infection.  She presents with complaint of back and hip pain.Pt agreed to OT treatment consisting of UE strengthening after transferring back to bed. Performance deficits affecting function are weakness, impaired endurance, impaired functional mobility, gait instability.     Rehab Prognosis:  Good; patient would benefit from acute skilled OT services to address these deficits and reach maximum level of function.       Plan:     Patient to be seen 5 x/week to address the above listed problems via self-care/home management, therapeutic activities, therapeutic exercises  Plan of Care Expires: 02/06/23  Plan of Care Reviewed with: patient    Subjective     Pain/Comfort:  Pain Rating 1: 7/10  Location 1: other (see comments) (back and hip)  Pain Addressed 1: Reposition, Distraction  Pain Rating Post-Intervention 1: 7/10    Objective:     Communicated with: Calderon Sinha prior to session.  Patient found up in chair with peripheral IV upon OT entry to room.    General Precautions: Standard, fall    Orthopedic Precautions:LLE posterior precautions, LLE weight bearing as tolerated  Braces: N/A  Respiratory Status: Room air     Occupational Performance:     Bed Mobility:    Patient completed Sit to Supine with minimum assistance     Functional Mobility/Transfers:  Patient completed Sit <> Stand Transfer with minimum assistance and of 2 persons  with  gait belt to stand to RW. Pt with difficulty standing initially due to feet sliding  Patient completed Chair <> Bed Transfer using Step Transfer technique with minimum assistance  and of x 2 persons with rolling walker and gait belt  Functional Mobility: Min a x 2 to transfer to bed.    Activities of Daily Living:      AMPAC 6 Click ADL:      Treatment & Education:  Pt performed UE strengthening exercises. 1 lb hand wt x 2 sets of 15 reps (B) shoulder flexion/extension, adduction/abduction and (B) elbow and wrist flexion/extension. Red theraband x 2 sets of 15 reps (B) elbow flexion and extension.  Pt participated well with tx.        Patient left HOB elevated with all lines intact, call button in reach, bed alarm on, and sitter present    GOALS:   Multidisciplinary Problems       Occupational Therapy Goals          Problem: Occupational Therapy    Goal Priority Disciplines Outcome Interventions   Occupational Therapy Goal     OT, PT/OT Ongoing, Progressing    Description: STG: (In 2 weeks)  Pt will bathe with setup and mod (A)  Pt will perform UE dressing with setup and min(A)  Pt will perform (L) UE AROM to 3/4 full range  Pt will sit EOB x 5 min with (I)  Pt will transfer bed/chair/bsc with min a  Pt will tolerate 20 minutes of tx without fatigue      LT.Restore to max I with self care and mobility.                          Time Tracking:     OT Date of Treatment: 23  OT Start Time: 1502  OT Stop Time: 1530  OT Total Time (min): 28 min    Billable Minutes:Therapeutic Activity 8  Therapeutic Exercise 18    OT/BRIGHT: OT          2/3/2023

## 2023-02-03 NOTE — PROGRESS NOTES
[facility] Hospital Medicine  Progress Note    Patient Name: Zandra Veloz  MRN: [id number]  Patient Class: IP- Inpatient   Admission Date: 12/29/2022  Length of Stay: 36 days  Attending Physician: Davey Bell MD  Primary Care Provider: Brandon Thornton MD        Subjective:     Principal Problem:Urinary tract infection        HPI:  HPI:   88-year-old lady seen emergency room department.  Patient presents after having a fall when she was trying to go to the restroom at her home.  Patient sustained a left femur fracture.  Patient complains of moderate to severe pain in the left hip area.  Patient also complains of chest tightness, she rates it a 5/10 in the chest tightness has been intubate.  Patient also was seen in emergency room department earlier this week per her daughter.  Patient is found to have dehydration and a urinary tract infection.  Current she denies any shortness of breath.  She does not give a history of coronary artery disease.        Procedure(s) (LRB):  REVISION, TOTAL ARTHROPLASTY, HIP, VICTORIANO PROSTHETIC FX (Left)       Hospital Course:   12/19 - records reviewed. Fall without LOC and has left hip fx. No other complaints currently. Seen by cardiology with some CP felt musculoskeletal.  Echo mild AS and mod MR with nl EF. Age increase cardiac risk for surgery but discussed with daughter surgery is urgent and see no benefit in delay medically. Feel low to moderate risk for surgery. Question about UTI. No symptoms. Had UTI in 10.22 not surprising. Lab to do culture on urine in lab. Cover with ATN for prior culture with ATB started. Discussed with Dr Singer and cardiology.  12/20 Tachycardia and elevated BP. Cardiology adjusted meds. Plan hold off surgery until 12/22 to adjust meds and treat UTI. Family in room. Pt not sleeping well.   12/21 Didn't sleep well prior night. Apparently been on xanax for a time. Also recently started on Neurontin. Family with  question. No recent BM. Some increase stool on KUB but not severe. Surgery for am. BP some up but better. HR good.   12/22 Seen prior to surgery and apparently add better night. Sore mouth / throat better with mycostatin. For surgery. Family in room.  12/23 patient with pain but otherwise seemed to be doing relatively well.  Daughter in room.  Apparently been taking prednisone for some time and this was restarted per family request.  Look into swing bed placement  12/24 patient had better night rested and everyone's in better spirits today.  Tolerating some diet.   Therapy worked with patient.  Look into swing bed placement next week  12/25 remained in good spirits.  Less pain.  Had good bowel movement and ordered Dulcolax not given.  Because of dressing to leg, Burk was not removed to keep it from getting soiled.  Wound care to check 12/27.  Discuss this with patient and daughter.  On antibiotics for UTI  12/26-will dc to swingbed once accepted. Needs continued wound care.  12/27-DC when bed available  12/28- issues with wound care yesterday.  Dr. Singer had to come remove dressings himself due to adherence to subcutaneous tissue.  Family refusing transfer to [facility] due to lack of specially trained wound care team availability.  Planning to transfer to specialty but resistant to that as well and requesting to speak to administration.    12/29- agreeable to transfer to specialty.  This will be the best place for her to receive wound care.  DC after family tours     12/29-needs continued wound care and therapy[facility]    12/30-doing well today, still with pain         Overview/Hospital Course:  12/31-having some pain this am  1/1- no complaints  1/2- doing well  1/3- continue wound care, last day cefepime tomorrow  1/4- some pain this AM.  Delay in mediations dues to staffing issues. Dsicussed with RN  1/5- no issues overnight  1/6- Still with pain, hip incision looks good  1/7-diclofenac for  shoulder pain  1/8-decrease laxatives, change benzos and carlo to PRN per patient request  1/9 some confusion overnight  1/10- no more confusion, doing well  1/11- no acute issues overnight  1/12- doing well, nausea today  1/13 -Dr. Lebron Cutler IV, DO taking over for Dr. Guerra for weekend.  Patient's pain appears to be well controlled this morning no complaints of nausea.  Continue wound care and PT/OT  1/14-patient appears well labs.  Continue wound care and PT/OT  1/15-patient still appears well.  No acute concerns or complaints.  No events overnight.  Continue wound care working with a PTOT.  Expiration is services estimated by February 6 1/16- no complaints, doing well  1/17-some nausea today  1/18-better today.  Still requiring twice weekly dressing changes.  Appetite stimulant  1/19- PWB with PT today.  Still with weeping wounds.  Will need wound care twice weekly-as these improve will be able to transfer to other facility  1/20-no complaints  1/21-no acute events overnight, some anxiety today  1/22-episode of SVT yesterday. Spontaneous resolution  1/23-no complaints  1/24- no complaints  1/25-doing well  1/26-wound care, pt  1/27- no complaints  1/28- patient seen examined today resting comfortably in bed, in no acute distress and no acute events overnight.  Patient states that she is doing well and denies pain.  States that she is been working well with PT/OT but not this weekend.  Patient otherwise doing well without complaints.  1/29-patient seen examined today resting comfortably in bed, in no acute distress with no acute events overnight.  Patient denies pain or other complaints at this time.  She continues with wound care PT/OT.  1/30-doing well this am  1/31-no complaints  2/1-no changes      Interval History:     Pt comfortable  No new complains  She had no new questions  BP improved, will cont present mx.      Review of Systems   Constitutional:  Negative for chills, fatigue, fever and  unexpected weight change.   HENT:  Negative for congestion, mouth sores and sore throat.    Eyes:  Negative for photophobia and visual disturbance.   Respiratory:  Negative for cough, chest tightness, shortness of breath and wheezing.    Cardiovascular:  Negative for chest pain, palpitations and leg swelling.   Gastrointestinal:  Negative for abdominal pain, diarrhea, nausea and vomiting.   Endocrine: Negative for cold intolerance and heat intolerance.   Genitourinary:  Negative for difficulty urinating, dysuria, frequency and urgency.   Musculoskeletal:  Positive for arthralgias. Negative for back pain and myalgias.   Skin:  Positive for wound. Negative for pallor and rash.   Neurological:  Negative for tremors, seizures, syncope, weakness, numbness and headaches.   Hematological:  Does not bruise/bleed easily.   Psychiatric/Behavioral:  Negative for agitation, confusion, hallucinations and suicidal ideas.    Objective:     Vital Signs (Most Recent):  Temp: 98 °F (36.7 °C) (02/03/23 0910)  Pulse: 66 (02/03/23 0910)  Resp: 17 (02/03/23 0910)  BP: (!) 138/52 (02/03/23 0910)  SpO2: 98 % (02/03/23 0910)   Vital Signs (24h Range):  Temp:  [97.7 °F (36.5 °C)-98.5 °F (36.9 °C)] 98 °F (36.7 °C)  Pulse:  [55-68] 66  Resp:  [16-20] 17  SpO2:  [96 %-98 %] 98 %  BP: (109-172)/(52-90) 138/52     Weight: 58.4 kg (128 lb 12 oz)  Body mass index is 20.78 kg/m².  No intake or output data in the 24 hours ending 02/03/23 0945   Physical Exam  Vitals reviewed.   Constitutional:       Appearance: Normal appearance.   HENT:      Head: Normocephalic and atraumatic.   Eyes:      Extraocular Movements: Extraocular movements intact.   Cardiovascular:      Rate and Rhythm: Normal rate and regular rhythm.      Pulses: Normal pulses.   Pulmonary:      Effort: Pulmonary effort is normal.      Breath sounds: Normal breath sounds.   Abdominal:      General: Abdomen is flat.      Palpations: Abdomen is soft.   Musculoskeletal:         General:  Tenderness and signs of injury present.      Comments: Dressed left lower extremity, painful to touch   Skin:     General: Skin is warm and dry.      Capillary Refill: Capillary refill takes less than 2 seconds.      Comments: Multiple wounds, see LDAs.    Neurological:      General: No focal deficit present.      Mental Status: She is alert and oriented to person, place, and time.   Psychiatric:         Mood and Affect: Mood normal.         Behavior: Behavior normal.       Significant Labs: All pertinent labs within the past 24 hours have been reviewed.    Significant Imaging: I have reviewed all pertinent imaging results/findings within the past 24 hours.      Assessment/Plan:      Disruption of external surgical wound        Wounds and injuries  Wound care consulted and following    Open wound of left lower leg  Wound care      Multiple skin tears  Wound care  Optimize nutrition       Delmy-prosthetic fracture around prosthetic hip  S/P fixation with Dr Singer      Lumbar radiculopathy  Pain management  PT      Hypertension  Adjust medications as needed    2/2-d/c dilt, hydral since bp running low, keep metoprolol and lisinopril on    2/3-will increae lisinopril for afterload reduction.     Arthritis  Pain management  Per family has been taking oral steorids for a long time, cont that        VTE Risk Mitigation (From admission, onward)         Ordered     enoxaparin injection 40 mg  Daily         02/02/23 1051                Discharge Planning   YANIV:      Code Status: Full Code   Is the patient medically ready for discharge?:     Reason for patient still in hospital (select all that apply): Treatment  Discharge Plan A: Skilled Nursing Facility                  Rehmat HAYDER Bell MD  Department of Hospital Medicine   [facility]

## 2023-02-03 NOTE — PLAN OF CARE
Problem: Adult Inpatient Plan of Care  Goal: Absence of Hospital-Acquired Illness or Injury  Intervention: Identify and Manage Fall Risk  Flowsheets (Taken 2/2/2023 1818)  Safety Promotion/Fall Prevention:   assistive device/personal item within reach   medications reviewed   nonskid shoes/socks when out of bed     Problem: Skin Injury Risk Increased  Goal: Skin Health and Integrity  Intervention: Optimize Skin Protection  Flowsheets (Taken 2/2/2023 1818)  Pressure Reduction Techniques: pressure points protected  Pressure Reduction Devices:   pressure-redistributing mattress utilized   specialty bed utilized  Skin Protection: incontinence pads utilized  Head of Bed (HOB) Positioning: HOB at 30-45 degrees     Problem: Fall Injury Risk  Goal: Absence of Fall and Fall-Related Injury  Intervention: Identify and Manage Contributors  Flowsheets (Taken 2/2/2023 1818)  Self-Care Promotion: BADL personal objects within reach  Medication Review/Management: medications reviewed

## 2023-02-03 NOTE — PT/OT/SLP PROGRESS
"Physical Therapy Treatment    Patient Name:  Zandra Veloz   MRN:  [id number]    Recommendations:     Discharge Recommendations: nursing facility, skilled  Discharge Equipment Recommendations: other (see comments) (to be determined)  Barriers to discharge:  ongoing medical treatment    Assessment:     Zandra Veloz is a 88 y.o. female admitted with a medical diagnosis of Urinary tract infection.  She presents with the following impairments/functional limitations: weakness, impaired functional mobility, impaired self care skills, impaired endurance, gait instability, impaired skin.    Pt declined to ambulate >5' due to report of increased pain    Rehab Prognosis: Fair; patient would benefit from acute skilled PT services to address these deficits and reach maximum level of function.    Recent Surgery: * No surgery found *      Plan:     During this hospitalization, patient to be seen 5 x/week to address the identified rehab impairments via gait training, therapeutic activities, therapeutic exercises and progress toward the following goals:    Plan of Care Expires:  03/30/23    Subjective     Chief Complaint:   Patient/Family Comments/goals: "more the chair closer, I can't walk"  Pain/Comfort:         Objective:     Communicated with Yolanda Sinha RN prior to session.  Patient found HOB elevated with peripheral IV upon PT entry to room.     General Precautions: Standard, fall  Orthopedic Precautions: LLE partial weight bearing, LLE posterior precautions  Braces: N/A  Respiratory Status: Room air     Functional Mobility:  Bed Mobility:     Supine to Sit: contact guard assistance and increased effort  Transfers:     Sit to Stand:  minimum assistance and moderate assistance with rolling walker  Gait: 5' minimum assist to moderate assist initially upon standing and on turn      AM-PAC 6 CLICK MOBILITY  Turning over in bed (including adjusting bedclothes, sheets and blankets)?: 3  Sitting down on and " standing up from a chair with arms (e.g., wheelchair, bedside commode, etc.): 3  Moving from lying on back to sitting on the side of the bed?: 3  Moving to and from a bed to a chair (including a wheelchair)?: 3  Need to walk in hospital room?: 3  Climbing 3-5 steps with a railing?: 1  Basic Mobility Total Score: 16       Treatment & Education:  Pt performed 30 reps (B) LE exercises: ap, quad set, glut set, straight leg raise, hip ab/adduction, long arc quad, heel slide with active assist range of motion     Standby assist sitting EOB x 5 minutes    Patient left up in chair with all lines intact, call button in reach, chair alarm on, and care giver present..    GOALS:   Multidisciplinary Problems       Physical Therapy Goals          Problem: Physical Therapy    Goal Priority Disciplines Outcome Goal Variances Interventions   Physical Therapy Goal     PT, PT/OT Ongoing, Progressing     Description: Short Term Goals to be met by 2/25/2023    Patient will increase functional independence and safety with mobility by performing    1.   Rolling side to side with standby assist  2.   Supine to sit standby Assist  3.   Sit to stand Minimal Assist using manual assistance with gait belt and PWB L LE  4.   Ambulate 20ft with RW, PWB LLE. Min A.   5.   Lower extremity exercises x 30 reps with assist as necessary and no rest breaks      Long Term Goals to be met by 3/10/2023    Patient to require less than or equal to Stand by assist for functional mobility to ease caregiver burden                        Time Tracking:     PT Received On: 02/03/23  PT Start Time: 1319     PT Stop Time: 1348  PT Total Time (min): 29 min     Billable Minutes: Gait Training 8 and Therapeutic Exercise 15    Treatment Type: Treatment  PT/PTA: PTA     PTA Visit Number: 2     02/03/2023

## 2023-02-03 NOTE — ASSESSMENT & PLAN NOTE
Adjust medications as needed    2/2-d/c dilt, hydral since bp running low, keep metoprolol and lisinopril on    2/3-will increae lisinopril for afterload reduction.

## 2023-02-04 ENCOUNTER — APPOINTMENT (OUTPATIENT)
Dept: RADIOLOGY | Facility: HOSPITAL | Age: 88
End: 2023-02-04
Attending: INTERNAL MEDICINE
Payer: MEDICARE

## 2023-02-04 PROCEDURE — 63600175 PHARM REV CODE 636 W HCPCS: Performed by: INTERNAL MEDICINE

## 2023-02-04 PROCEDURE — 25000003 PHARM REV CODE 250: Performed by: INTERNAL MEDICINE

## 2023-02-04 PROCEDURE — 99232 PR SUBSEQUENT HOSPITAL CARE,LEVL II: ICD-10-PCS | Mod: ,,, | Performed by: INTERNAL MEDICINE

## 2023-02-04 PROCEDURE — 25000003 PHARM REV CODE 250: Performed by: STUDENT IN AN ORGANIZED HEALTH CARE EDUCATION/TRAINING PROGRAM

## 2023-02-04 PROCEDURE — 25000003 PHARM REV CODE 250: Performed by: HOSPITALIST

## 2023-02-04 PROCEDURE — 25000003 PHARM REV CODE 250: Performed by: NURSE PRACTITIONER

## 2023-02-04 PROCEDURE — 63600175 PHARM REV CODE 636 W HCPCS: Performed by: STUDENT IN AN ORGANIZED HEALTH CARE EDUCATION/TRAINING PROGRAM

## 2023-02-04 PROCEDURE — 74018 RADEX ABDOMEN 1 VIEW: CPT | Mod: TC

## 2023-02-04 PROCEDURE — 11000001 HC ACUTE MED/SURG PRIVATE ROOM

## 2023-02-04 PROCEDURE — 74018 RADEX ABDOMEN 1 VIEW: CPT

## 2023-02-04 PROCEDURE — 99232 SBSQ HOSP IP/OBS MODERATE 35: CPT | Mod: ,,, | Performed by: INTERNAL MEDICINE

## 2023-02-04 RX ORDER — ONDANSETRON 4 MG/1
4 TABLET, ORALLY DISINTEGRATING ORAL EVERY 6 HOURS PRN
Status: DISCONTINUED | OUTPATIENT
Start: 2023-02-04 | End: 2023-02-20 | Stop reason: HOSPADM

## 2023-02-04 RX ORDER — HYDRALAZINE HYDROCHLORIDE 20 MG/ML
5 INJECTION INTRAMUSCULAR; INTRAVENOUS ONCE
Status: DISCONTINUED | OUTPATIENT
Start: 2023-02-04 | End: 2023-02-04

## 2023-02-04 RX ORDER — HYDRALAZINE HYDROCHLORIDE 50 MG/1
50 TABLET, FILM COATED ORAL EVERY 8 HOURS
Status: DISCONTINUED | OUTPATIENT
Start: 2023-02-04 | End: 2023-02-05

## 2023-02-04 RX ORDER — LACTULOSE 10 G/15ML
20 SOLUTION ORAL EVERY 6 HOURS PRN
Status: DISCONTINUED | OUTPATIENT
Start: 2023-02-04 | End: 2023-02-20 | Stop reason: HOSPADM

## 2023-02-04 RX ADMIN — ENOXAPARIN SODIUM 40 MG: 40 INJECTION SUBCUTANEOUS at 06:02

## 2023-02-04 RX ADMIN — TAMSULOSIN HYDROCHLORIDE 0.4 MG: 0.4 CAPSULE ORAL at 08:02

## 2023-02-04 RX ADMIN — ONDANSETRON 4 MG: 4 TABLET, ORALLY DISINTEGRATING ORAL at 10:02

## 2023-02-04 RX ADMIN — GABAPENTIN 100 MG: 100 CAPSULE ORAL at 08:02

## 2023-02-04 RX ADMIN — PREDNISONE 5 MG: 5 TABLET ORAL at 08:02

## 2023-02-04 RX ADMIN — HYDROCODONE BITARTRATE AND ACETAMINOPHEN 2 TABLET: 10; 325 TABLET ORAL at 02:02

## 2023-02-04 RX ADMIN — DOCUSATE SODIUM 100 MG: 100 CAPSULE, LIQUID FILLED ORAL at 08:02

## 2023-02-04 RX ADMIN — ALPRAZOLAM 1 MG: 0.25 TABLET ORAL at 08:02

## 2023-02-04 RX ADMIN — METOPROLOL TARTRATE 50 MG: 50 TABLET, FILM COATED ORAL at 08:02

## 2023-02-04 RX ADMIN — LEVOTHYROXINE SODIUM 125 MCG: 0.12 TABLET ORAL at 05:02

## 2023-02-04 RX ADMIN — HYDRALAZINE HYDROCHLORIDE 25 MG: 25 TABLET ORAL at 05:02

## 2023-02-04 RX ADMIN — GABAPENTIN 100 MG: 100 CAPSULE ORAL at 02:02

## 2023-02-04 RX ADMIN — HYDRALAZINE HYDROCHLORIDE 50 MG: 50 TABLET ORAL at 09:02

## 2023-02-04 RX ADMIN — OXYCODONE HYDROCHLORIDE AND ACETAMINOPHEN 500 MG: 500 TABLET ORAL at 08:02

## 2023-02-04 RX ADMIN — ACETAMINOPHEN 1000 MG: 500 TABLET, FILM COATED ORAL at 06:02

## 2023-02-04 RX ADMIN — PANTOPRAZOLE SODIUM 40 MG: 40 TABLET, DELAYED RELEASE ORAL at 08:02

## 2023-02-04 RX ADMIN — LISINOPRIL 40 MG: 20 TABLET ORAL at 08:02

## 2023-02-04 RX ADMIN — HYDROCODONE BITARTRATE AND ACETAMINOPHEN 2 TABLET: 10; 325 TABLET ORAL at 09:02

## 2023-02-04 RX ADMIN — ZINC SULFATE 220 MG (50 MG) CAPSULE 220 MG: CAPSULE at 08:02

## 2023-02-04 RX ADMIN — HYDROCODONE BITARTRATE AND ACETAMINOPHEN 2 TABLET: 10; 325 TABLET ORAL at 08:02

## 2023-02-04 RX ADMIN — HYDRALAZINE HYDROCHLORIDE 50 MG: 50 TABLET ORAL at 02:02

## 2023-02-04 RX ADMIN — MEGESTROL ACETATE 40 MG: 40 TABLET ORAL at 08:02

## 2023-02-04 NOTE — SUBJECTIVE & OBJECTIVE
Interval History:     Pt c/o diarreha since last night, will dc scheudled laxatives  Pt also having nausea and vomiting this am will get kub as well, per nursing pt requesting oral zofran so will order that as well as prn  Pts systolic runing higjh, will increase lisinopril and cont hydral for afterload reduction. Medication regimen d/w cardiology who agrees.      Review of Systems   Constitutional:  Negative for chills, fatigue, fever and unexpected weight change.   HENT:  Negative for congestion, mouth sores and sore throat.    Eyes:  Negative for photophobia and visual disturbance.   Respiratory:  Negative for cough, chest tightness, shortness of breath and wheezing.    Cardiovascular:  Negative for chest pain, palpitations and leg swelling.   Gastrointestinal:  Negative for abdominal pain, diarrhea, nausea and vomiting.   Endocrine: Negative for cold intolerance and heat intolerance.   Genitourinary:  Negative for difficulty urinating, dysuria, frequency and urgency.   Musculoskeletal:  Positive for arthralgias. Negative for back pain and myalgias.   Skin:  Positive for wound. Negative for pallor and rash.   Neurological:  Negative for tremors, seizures, syncope, weakness, numbness and headaches.   Hematological:  Does not bruise/bleed easily.   Psychiatric/Behavioral:  Negative for agitation, confusion, hallucinations and suicidal ideas.    Objective:     Vital Signs (Most Recent):  Temp: 99.5 °F (37.5 °C) (02/04/23 0800)  Pulse: 68 (02/04/23 0800)  Resp: 18 (02/04/23 0840)  BP: (!) 157/58 (02/04/23 0800)  SpO2: 96 % (02/04/23 0400)   Vital Signs (24h Range):  Temp:  [98.6 °F (37 °C)-99.5 °F (37.5 °C)] 99.5 °F (37.5 °C)  Pulse:  [57-68] 68  Resp:  [16-20] 18  SpO2:  [96 %-98 %] 96 %  BP: (157-180)/(58-70) 157/58     Weight: 58.4 kg (128 lb 12 oz)  Body mass index is 20.78 kg/m².  No intake or output data in the 24 hours ending 02/04/23 1009   Physical Exam  Vitals reviewed.   Constitutional:       Appearance:  Normal appearance.   HENT:      Head: Normocephalic and atraumatic.   Eyes:      Extraocular Movements: Extraocular movements intact.   Cardiovascular:      Rate and Rhythm: Normal rate and regular rhythm.      Pulses: Normal pulses.   Pulmonary:      Effort: Pulmonary effort is normal.      Breath sounds: Normal breath sounds.   Abdominal:      General: Abdomen is flat.      Palpations: Abdomen is soft.   Musculoskeletal:         General: Tenderness and signs of injury present.      Comments: Dressed left lower extremity, painful to touch   Skin:     General: Skin is warm and dry.      Capillary Refill: Capillary refill takes less than 2 seconds.      Comments: Multiple wounds, see LDAs.    Neurological:      General: No focal deficit present.      Mental Status: She is alert and oriented to person, place, and time.   Psychiatric:         Mood and Affect: Mood normal.         Behavior: Behavior normal.       Significant Labs: All pertinent labs within the past 24 hours have been reviewed.    Significant Imaging: I have reviewed all pertinent imaging results/findings within the past 24 hours.

## 2023-02-04 NOTE — PROGRESS NOTES
[facility] Hospital Medicine  Progress Note    Patient Name: Zandra Veloz  MRN: [id number]  Patient Class: IP- Inpatient   Admission Date: 12/29/2022  Length of Stay: 37 days  Attending Physician: Davey Bell MD  Primary Care Provider: Brandon Thornton MD        Subjective:     Principal Problem:Urinary tract infection        HPI:  HPI:   88-year-old lady seen emergency room department.  Patient presents after having a fall when she was trying to go to the restroom at her home.  Patient sustained a left femur fracture.  Patient complains of moderate to severe pain in the left hip area.  Patient also complains of chest tightness, she rates it a 5/10 in the chest tightness has been intubate.  Patient also was seen in emergency room department earlier this week per her daughter.  Patient is found to have dehydration and a urinary tract infection.  Current she denies any shortness of breath.  She does not give a history of coronary artery disease.        Procedure(s) (LRB):  REVISION, TOTAL ARTHROPLASTY, HIP, VICTORIANO PROSTHETIC FX (Left)       Hospital Course:   12/19 - records reviewed. Fall without LOC and has left hip fx. No other complaints currently. Seen by cardiology with some CP felt musculoskeletal.  Echo mild AS and mod MR with nl EF. Age increase cardiac risk for surgery but discussed with daughter surgery is urgent and see no benefit in delay medically. Feel low to moderate risk for surgery. Question about UTI. No symptoms. Had UTI in 10.22 not surprising. Lab to do culture on urine in lab. Cover with ATN for prior culture with ATB started. Discussed with Dr Singer and cardiology.  12/20 Tachycardia and elevated BP. Cardiology adjusted meds. Plan hold off surgery until 12/22 to adjust meds and treat UTI. Family in room. Pt not sleeping well.   12/21 Didn't sleep well prior night. Apparently been on xanax for a time. Also recently started on Neurontin. Family with  question. No recent BM. Some increase stool on KUB but not severe. Surgery for am. BP some up but better. HR good.   12/22 Seen prior to surgery and apparently add better night. Sore mouth / throat better with mycostatin. For surgery. Family in room.  12/23 patient with pain but otherwise seemed to be doing relatively well.  Daughter in room.  Apparently been taking prednisone for some time and this was restarted per family request.  Look into swing bed placement  12/24 patient had better night rested and everyone's in better spirits today.  Tolerating some diet.   Therapy worked with patient.  Look into swing bed placement next week  12/25 remained in good spirits.  Less pain.  Had good bowel movement and ordered Dulcolax not given.  Because of dressing to leg, Burk was not removed to keep it from getting soiled.  Wound care to check 12/27.  Discuss this with patient and daughter.  On antibiotics for UTI  12/26-will dc to swingbed once accepted. Needs continued wound care.  12/27-DC when bed available  12/28- issues with wound care yesterday.  Dr. Singer had to come remove dressings himself due to adherence to subcutaneous tissue.  Family refusing transfer to [facility] due to lack of specially trained wound care team availability.  Planning to transfer to specialty but resistant to that as well and requesting to speak to administration.    12/29- agreeable to transfer to specialty.  This will be the best place for her to receive wound care.  DC after family tours     12/29-needs continued wound care and therapy[facility]    12/30-doing well today, still with pain         Overview/Hospital Course:  12/31-having some pain this am  1/1- no complaints  1/2- doing well  1/3- continue wound care, last day cefepime tomorrow  1/4- some pain this AM.  Delay in mediations dues to staffing issues. Dsicussed with RN  1/5- no issues overnight  1/6- Still with pain, hip incision looks good  1/7-diclofenac for  shoulder pain  1/8-decrease laxatives, change benzos and carlo to PRN per patient request  1/9 some confusion overnight  1/10- no more confusion, doing well  1/11- no acute issues overnight  1/12- doing well, nausea today  1/13 -Dr. Lebron Cutler IV, DO taking over for Dr. Guerra for weekend.  Patient's pain appears to be well controlled this morning no complaints of nausea.  Continue wound care and PT/OT  1/14-patient appears well labs.  Continue wound care and PT/OT  1/15-patient still appears well.  No acute concerns or complaints.  No events overnight.  Continue wound care working with a PTOT.  Expiration is services estimated by February 6 1/16- no complaints, doing well  1/17-some nausea today  1/18-better today.  Still requiring twice weekly dressing changes.  Appetite stimulant  1/19- PWB with PT today.  Still with weeping wounds.  Will need wound care twice weekly-as these improve will be able to transfer to other facility  1/20-no complaints  1/21-no acute events overnight, some anxiety today  1/22-episode of SVT yesterday. Spontaneous resolution  1/23-no complaints  1/24- no complaints  1/25-doing well  1/26-wound care, pt  1/27- no complaints  1/28- patient seen examined today resting comfortably in bed, in no acute distress and no acute events overnight.  Patient states that she is doing well and denies pain.  States that she is been working well with PT/OT but not this weekend.  Patient otherwise doing well without complaints.  1/29-patient seen examined today resting comfortably in bed, in no acute distress with no acute events overnight.  Patient denies pain or other complaints at this time.  She continues with wound care PT/OT.  1/30-doing well this am  1/31-no complaints  2/1-no changes      Interval History:     Pt c/o diarreha since last night, will dc scheudled laxatives  Pt also having nausea and vomiting this am will get kub as well, per nursing pt requesting oral zofran so will order that  as well as prn  Pts systolic runing [employer], will increase lisinopril and cont hydral for afterload reduction, d/w cardiology, pt has moderate MR, so will target afterload reduction with increasing lisinopril and keeping hdyral     Review of Systems   Constitutional:  Negative for chills, fatigue, fever and unexpected weight change.   HENT:  Negative for congestion, mouth sores and sore throat.    Eyes:  Negative for photophobia and visual disturbance.   Respiratory:  Negative for cough, chest tightness, shortness of breath and wheezing.    Cardiovascular:  Negative for chest pain, palpitations and leg swelling.   Gastrointestinal:  Negative for abdominal pain, diarrhea, nausea and vomiting.   Endocrine: Negative for cold intolerance and heat intolerance.   Genitourinary:  Negative for difficulty urinating, dysuria, frequency and urgency.   Musculoskeletal:  Positive for arthralgias. Negative for back pain and myalgias.   Skin:  Positive for wound. Negative for pallor and rash.   Neurological:  Negative for tremors, seizures, syncope, weakness, numbness and headaches.   Hematological:  Does not bruise/bleed easily.   Psychiatric/Behavioral:  Negative for agitation, confusion, hallucinations and suicidal ideas.    Objective:     Vital Signs (Most Recent):  Temp: 99.5 °F (37.5 °C) (02/04/23 0800)  Pulse: 68 (02/04/23 0800)  Resp: 18 (02/04/23 0840)  BP: (!) 157/58 (02/04/23 0800)  SpO2: 96 % (02/04/23 0400)   Vital Signs (24h Range):  Temp:  [98.6 °F (37 °C)-99.5 °F (37.5 °C)] 99.5 °F (37.5 °C)  Pulse:  [57-68] 68  Resp:  [16-20] 18  SpO2:  [96 %-98 %] 96 %  BP: (157-180)/(58-70) 157/58     Weight: 58.4 kg (128 lb 12 oz)  Body mass index is 20.78 kg/m².  No intake or output data in the 24 hours ending 02/04/23 1009   Physical Exam  Vitals reviewed.   Constitutional:       Appearance: Normal appearance.   HENT:      Head: Normocephalic and atraumatic.   Eyes:      Extraocular Movements: Extraocular movements intact.    Cardiovascular:      Rate and Rhythm: Normal rate and regular rhythm.      Pulses: Normal pulses.   Pulmonary:      Effort: Pulmonary effort is normal.      Breath sounds: Normal breath sounds.   Abdominal:      General: Abdomen is flat.      Palpations: Abdomen is soft.   Musculoskeletal:         General: Tenderness and signs of injury present.      Comments: Dressed left lower extremity, painful to touch   Skin:     General: Skin is warm and dry.      Capillary Refill: Capillary refill takes less than 2 seconds.      Comments: Multiple wounds, see LDAs.    Neurological:      General: No focal deficit present.      Mental Status: She is alert and oriented to person, place, and time.   Psychiatric:         Mood and Affect: Mood normal.         Behavior: Behavior normal.       Significant Labs: All pertinent labs within the past 24 hours have been reviewed.    Significant Imaging: I have reviewed all pertinent imaging results/findings within the past 24 hours.      Assessment/Plan:      Disruption of external surgical wound        Wounds and injuries  Wound care consulted and following    Open wound of left lower leg  Wound care      Multiple skin tears  Wound care  Optimize nutrition       Delmy-prosthetic fracture around prosthetic hip  S/P fixation with Dr Singer      Lumbar radiculopathy  Pain management  PT      Hypertension  Adjust medications as needed    2/2-d/c dilt, hydral since bp running low, keep metoprolol and lisinopril on    2/3-will increae lisinopril for afterload reduction.     Arthritis  Pain management  Per family has been taking oral steorids for a long time, cont that        VTE Risk Mitigation (From admission, onward)           Ordered     enoxaparin injection 40 mg  Daily         02/02/23 1051                    Discharge Planning   YANIV:      Code Status: Full Code   Is the patient medically ready for discharge?:     Reason for patient still in hospital (select all that apply):  Treatment  Discharge Plan A: Skilled Nursing Facility                  Rehmat U MD Ray  Department of Hospital Medicine   [facility]

## 2023-02-05 PROCEDURE — 25000003 PHARM REV CODE 250: Performed by: NURSE PRACTITIONER

## 2023-02-05 PROCEDURE — 25000003 PHARM REV CODE 250: Performed by: INTERNAL MEDICINE

## 2023-02-05 PROCEDURE — 99232 PR SUBSEQUENT HOSPITAL CARE,LEVL II: ICD-10-PCS | Mod: ,,, | Performed by: INTERNAL MEDICINE

## 2023-02-05 PROCEDURE — 11000001 HC ACUTE MED/SURG PRIVATE ROOM

## 2023-02-05 PROCEDURE — 25000003 PHARM REV CODE 250: Performed by: STUDENT IN AN ORGANIZED HEALTH CARE EDUCATION/TRAINING PROGRAM

## 2023-02-05 PROCEDURE — 99232 SBSQ HOSP IP/OBS MODERATE 35: CPT | Mod: ,,, | Performed by: INTERNAL MEDICINE

## 2023-02-05 PROCEDURE — 63600175 PHARM REV CODE 636 W HCPCS: Performed by: INTERNAL MEDICINE

## 2023-02-05 PROCEDURE — 63600175 PHARM REV CODE 636 W HCPCS: Performed by: STUDENT IN AN ORGANIZED HEALTH CARE EDUCATION/TRAINING PROGRAM

## 2023-02-05 RX ORDER — ACETAMINOPHEN 500 MG
1000 TABLET ORAL EVERY 8 HOURS PRN
Status: DISCONTINUED | OUTPATIENT
Start: 2023-02-05 | End: 2023-02-05

## 2023-02-05 RX ORDER — ACETAMINOPHEN 325 MG/1
650 TABLET ORAL EVERY 6 HOURS PRN
Status: DISCONTINUED | OUTPATIENT
Start: 2023-02-05 | End: 2023-02-20 | Stop reason: HOSPADM

## 2023-02-05 RX ORDER — OXYCODONE HYDROCHLORIDE 5 MG/1
10 TABLET ORAL EVERY 6 HOURS PRN
Status: DISCONTINUED | OUTPATIENT
Start: 2023-02-05 | End: 2023-02-20 | Stop reason: HOSPADM

## 2023-02-05 RX ORDER — CARVEDILOL 12.5 MG/1
12.5 TABLET ORAL 2 TIMES DAILY
Status: DISCONTINUED | OUTPATIENT
Start: 2023-02-05 | End: 2023-02-07

## 2023-02-05 RX ADMIN — ALPRAZOLAM 1 MG: 0.25 TABLET ORAL at 08:02

## 2023-02-05 RX ADMIN — DOCUSATE SODIUM 100 MG: 100 CAPSULE, LIQUID FILLED ORAL at 08:02

## 2023-02-05 RX ADMIN — OXYCODONE HYDROCHLORIDE 10 MG: 5 TABLET ORAL at 08:02

## 2023-02-05 RX ADMIN — ENOXAPARIN SODIUM 40 MG: 40 INJECTION SUBCUTANEOUS at 04:02

## 2023-02-05 RX ADMIN — ONDANSETRON 4 MG: 4 TABLET, ORALLY DISINTEGRATING ORAL at 04:02

## 2023-02-05 RX ADMIN — OXYCODONE HYDROCHLORIDE AND ACETAMINOPHEN 500 MG: 500 TABLET ORAL at 08:02

## 2023-02-05 RX ADMIN — OXYCODONE HYDROCHLORIDE 10 MG: 5 TABLET ORAL at 02:02

## 2023-02-05 RX ADMIN — MEGESTROL ACETATE 40 MG: 40 TABLET ORAL at 08:02

## 2023-02-05 RX ADMIN — TAMSULOSIN HYDROCHLORIDE 0.4 MG: 0.4 CAPSULE ORAL at 08:02

## 2023-02-05 RX ADMIN — CARVEDILOL 12.5 MG: 12.5 TABLET, FILM COATED ORAL at 12:02

## 2023-02-05 RX ADMIN — PANTOPRAZOLE SODIUM 40 MG: 40 TABLET, DELAYED RELEASE ORAL at 08:02

## 2023-02-05 RX ADMIN — LISINOPRIL 40 MG: 20 TABLET ORAL at 08:02

## 2023-02-05 RX ADMIN — METOPROLOL TARTRATE 50 MG: 50 TABLET, FILM COATED ORAL at 08:02

## 2023-02-05 RX ADMIN — HYDRALAZINE HYDROCHLORIDE 50 MG: 50 TABLET ORAL at 09:02

## 2023-02-05 RX ADMIN — GABAPENTIN 100 MG: 100 CAPSULE ORAL at 08:02

## 2023-02-05 RX ADMIN — HYDRALAZINE HYDROCHLORIDE 50 MG: 50 TABLET ORAL at 05:02

## 2023-02-05 RX ADMIN — GABAPENTIN 100 MG: 100 CAPSULE ORAL at 02:02

## 2023-02-05 RX ADMIN — HYDRALAZINE HYDROCHLORIDE 50 MG: 50 TABLET ORAL at 02:02

## 2023-02-05 RX ADMIN — CARVEDILOL 12.5 MG: 12.5 TABLET, FILM COATED ORAL at 08:02

## 2023-02-05 RX ADMIN — LEVOTHYROXINE SODIUM 125 MCG: 0.12 TABLET ORAL at 05:02

## 2023-02-05 RX ADMIN — PREDNISONE 5 MG: 5 TABLET ORAL at 08:02

## 2023-02-05 NOTE — PROGRESS NOTES
[facility] Hospital Medicine  Progress Note    Patient Name: Zandra Veloz  MRN: [id number]  Patient Class: IP- Inpatient   Admission Date: 12/29/2022  Length of Stay: 38 days  Attending Physician: Davey Bell MD  Primary Care Provider: Brandon Thornton MD        Subjective:     Principal Problem:Urinary tract infection        HPI:  HPI:   88-year-old lady seen emergency room department.  Patient presents after having a fall when she was trying to go to the restroom at her home.  Patient sustained a left femur fracture.  Patient complains of moderate to severe pain in the left hip area.  Patient also complains of chest tightness, she rates it a 5/10 in the chest tightness has been intubate.  Patient also was seen in emergency room department earlier this week per her daughter.  Patient is found to have dehydration and a urinary tract infection.  Current she denies any shortness of breath.  She does not give a history of coronary artery disease.        Procedure(s) (LRB):  REVISION, TOTAL ARTHROPLASTY, HIP, VICTORIANO PROSTHETIC FX (Left)       Hospital Course:   12/19 - records reviewed. Fall without LOC and has left hip fx. No other complaints currently. Seen by cardiology with some CP felt musculoskeletal.  Echo mild AS and mod MR with nl EF. Age increase cardiac risk for surgery but discussed with daughter surgery is urgent and see no benefit in delay medically. Feel low to moderate risk for surgery. Question about UTI. No symptoms. Had UTI in 10.22 not surprising. Lab to do culture on urine in lab. Cover with ATN for prior culture with ATB started. Discussed with Dr Singer and cardiology.  12/20 Tachycardia and elevated BP. Cardiology adjusted meds. Plan hold off surgery until 12/22 to adjust meds and treat UTI. Family in room. Pt not sleeping well.   12/21 Didn't sleep well prior night. Apparently been on xanax for a time. Also recently started on Neurontin. Family with  question. No recent BM. Some increase stool on KUB but not severe. Surgery for am. BP some up but better. HR good.   12/22 Seen prior to surgery and apparently add better night. Sore mouth / throat better with mycostatin. For surgery. Family in room.  12/23 patient with pain but otherwise seemed to be doing relatively well.  Daughter in room.  Apparently been taking prednisone for some time and this was restarted per family request.  Look into swing bed placement  12/24 patient had better night rested and everyone's in better spirits today.  Tolerating some diet.   Therapy worked with patient.  Look into swing bed placement next week  12/25 remained in good spirits.  Less pain.  Had good bowel movement and ordered Dulcolax not given.  Because of dressing to leg, Burk was not removed to keep it from getting soiled.  Wound care to check 12/27.  Discuss this with patient and daughter.  On antibiotics for UTI  12/26-will dc to swingbed once accepted. Needs continued wound care.  12/27-DC when bed available  12/28- issues with wound care yesterday.  Dr. Singer had to come remove dressings himself due to adherence to subcutaneous tissue.  Family refusing transfer to [facility] due to lack of specially trained wound care team availability.  Planning to transfer to specialty but resistant to that as well and requesting to speak to administration.    12/29- agreeable to transfer to specialty.  This will be the best place for her to receive wound care.  DC after family tours     12/29-needs continued wound care and therapy. [facility]    12/30-doing well today, still with pain         Overview/Hospital Course:  12/31-having some pain this am  1/1- no complaints  1/2- doing well  1/3- continue wound care, last day cefepime tomorrow  1/4- some pain this AM.  Delay in mediations dues to staffing issues. Dsicussed with RN  1/5- no issues overnight  1/6- Still with pain, hip incision looks good  1/7-diclofenac for  shoulder pain  1/8-decrease laxatives, change benzos and carlo to PRN per patient request  1/9 some confusion overnight  1/10- no more confusion, doing well  1/11- no acute issues overnight  1/12- doing well, nausea today  1/13 -Dr. Lebron Cutler IV, DO taking over for Dr. Guerra for weekend.  Patient's pain appears to be well controlled this morning no complaints of nausea.  Continue wound care and PT/OT  1/14-patient appears well labs.  Continue wound care and PT/OT  1/15-patient still appears well.  No acute concerns or complaints.  No events overnight.  Continue wound care working with a PTOT.  Expiration is services estimated by February 6 1/16- no complaints, doing well  1/17-some nausea today  1/18-better today.  Still requiring twice weekly dressing changes.  Appetite stimulant  1/19- PWB with PT today.  Still with weeping wounds.  Will need wound care twice weekly-as these improve will be able to transfer to other facility  1/20-no complaints  1/21-no acute events overnight, some anxiety today  1/22-episode of SVT yesterday. Spontaneous resolution  1/23-no complaints  1/24- no complaints  1/25-doing well  1/26-wound care, pt  1/27- no complaints  1/28- patient seen examined today resting comfortably in bed, in no acute distress and no acute events overnight.  Patient states that she is doing well and denies pain.  States that she is been working well with PT/OT but not this weekend.  Patient otherwise doing well without complaints.  1/29-patient seen examined today resting comfortably in bed, in no acute distress with no acute events overnight.  Patient denies pain or other complaints at this time.  She continues with wound care PT/OT.  1/30-doing well this am  1/31-no complaints  2/1-no changes      Interval History:     Pt still having frequesnt Bms however stool is now formed, therefore can not be sent for c diff testing, suspect 2/2 laxatives which now have been stopped  Pt c/o pain her soles/feet,  will change norco to oxycodone since she is at risk of exceeding her daily tylenol limite  Bp running high, will change metoprolol to coreg for better BP and HR control (bp meds were adjusted by curbsiding cardiology yesterday, per cardiologist echo showed moderate MR AND note severe.    Review of Systems   Constitutional:  Negative for chills, fatigue, fever and unexpected weight change.   HENT:  Negative for congestion, mouth sores and sore throat.    Eyes:  Negative for photophobia and visual disturbance.   Respiratory:  Negative for cough, chest tightness, shortness of breath and wheezing.    Cardiovascular:  Negative for chest pain, palpitations and leg swelling.   Gastrointestinal:  Negative for abdominal pain, diarrhea, nausea and vomiting.   Endocrine: Negative for cold intolerance and heat intolerance.   Genitourinary:  Negative for difficulty urinating, dysuria, frequency and urgency.   Musculoskeletal:  Positive for arthralgias. Negative for back pain and myalgias.   Skin:  Positive for wound. Negative for pallor and rash.   Neurological:  Negative for tremors, seizures, syncope, weakness, numbness and headaches.   Hematological:  Does not bruise/bleed easily.   Psychiatric/Behavioral:  Negative for agitation, confusion, hallucinations and suicidal ideas.    Objective:     Vital Signs (Most Recent):  Temp: 99.1 °F (37.3 °C) (02/05/23 0755)  Pulse: 79 (02/05/23 0755)  Resp: 17 (02/05/23 0842)  BP: (!) 160/70 (02/05/23 0755)  SpO2: 95 % (02/05/23 0755)   Vital Signs (24h Range):  Temp:  [98 °F (36.7 °C)-99.4 °F (37.4 °C)] 99.1 °F (37.3 °C)  Pulse:  [55-79] 79  Resp:  [17-19] 17  SpO2:  [95 %-98 %] 95 %  BP: (111-184)/(54-78) 160/70     Weight: 58.4 kg (128 lb 12 oz)  Body mass index is 20.78 kg/m².    Intake/Output Summary (Last 24 hours) at 2/5/2023 0958  Last data filed at 2/5/2023 0600  Gross per 24 hour   Intake 240 ml   Output 10 ml   Net 230 ml        Physical Exam  Vitals reviewed.   Constitutional:        Appearance: Normal appearance.   HENT:      Head: Normocephalic and atraumatic.   Eyes:      Extraocular Movements: Extraocular movements intact.   Cardiovascular:      Rate and Rhythm: Normal rate and regular rhythm.      Pulses: Normal pulses.   Pulmonary:      Effort: Pulmonary effort is normal.      Breath sounds: Normal breath sounds.   Abdominal:      General: Abdomen is flat.      Palpations: Abdomen is soft.   Musculoskeletal:         General: Tenderness and signs of injury present.      Comments: Dressed left lower extremity, painful to touch   Skin:     General: Skin is warm and dry.      Capillary Refill: Capillary refill takes less than 2 seconds.      Comments: Multiple wounds, see LDAs.    Neurological:      General: No focal deficit present.      Mental Status: She is alert and oriented to person, place, and time.   Psychiatric:         Mood and Affect: Mood normal.         Behavior: Behavior normal.       Significant Labs: All pertinent labs within the past 24 hours have been reviewed.    Significant Imaging: I have reviewed all pertinent imaging results/findings within the past 24 hours.      Assessment/Plan:      Disruption of external surgical wound        Wounds and injuries  Wound care consulted and following    Open wound of left lower leg  Wound care      Multiple skin tears  Wound care  Optimize nutrition       Delmy-prosthetic fracture around prosthetic hip  S/P fixation with Dr Singer      Lumbar radiculopathy  Pain management  PT      Hypertension  Adjust medications as needed    2/2-d/c dilt, hydral since bp running low, keep metoprolol and lisinopril on    2/3-will increae lisinopril for afterload reduction.     Arthritis  Pain management  Per family has been taking oral steorids for a long time, cont that        VTE Risk Mitigation (From admission, onward)         Ordered     enoxaparin injection 40 mg  Daily         02/02/23 1051                Discharge Planning   YANIV:       Code Status: Full Code   Is the patient medically ready for discharge?:     Reason for patient still in hospital (select all that apply): Treatment  Discharge Plan A: Skilled Nursing Facility                  Rehmat HAYDER Bell MD  Department of Hospital Medicine   [facility]

## 2023-02-05 NOTE — SUBJECTIVE & OBJECTIVE
Interval History:     Pt still having frequesnt Bms however stool is now formed, therefore can not be sent for c diff testing, suspect 2/2 laxatives which now have been stopped  Pt c/o pain her soles/feet, will change norco to oxycodone since she is at risk of exceeding her daily tylenol limite  Bp running high, will change metoprolol to coreg for better BP and HR control (bp meds were adjusted by [facility] cardiology yesterday, per cardiologist echo showed moderate MR AND note severe.    Review of Systems   Constitutional:  Negative for chills, fatigue, fever and unexpected weight change.   HENT:  Negative for congestion, mouth sores and sore throat.    Eyes:  Negative for photophobia and visual disturbance.   Respiratory:  Negative for cough, chest tightness, shortness of breath and wheezing.    Cardiovascular:  Negative for chest pain, palpitations and leg swelling.   Gastrointestinal:  Negative for abdominal pain, diarrhea, nausea and vomiting.   Endocrine: Negative for cold intolerance and heat intolerance.   Genitourinary:  Negative for difficulty urinating, dysuria, frequency and urgency.   Musculoskeletal:  Positive for arthralgias. Negative for back pain and myalgias.   Skin:  Positive for wound. Negative for pallor and rash.   Neurological:  Negative for tremors, seizures, syncope, weakness, numbness and headaches.   Hematological:  Does not bruise/bleed easily.   Psychiatric/Behavioral:  Negative for agitation, confusion, hallucinations and suicidal ideas.    Objective:     Vital Signs (Most Recent):  Temp: 99.1 °F (37.3 °C) (02/05/23 0755)  Pulse: 79 (02/05/23 0755)  Resp: 17 (02/05/23 0842)  BP: (!) 160/70 (02/05/23 0755)  SpO2: 95 % (02/05/23 0755)   Vital Signs (24h Range):  Temp:  [98 °F (36.7 °C)-99.4 °F (37.4 °C)] 99.1 °F (37.3 °C)  Pulse:  [55-79] 79  Resp:  [17-19] 17  SpO2:  [95 %-98 %] 95 %  BP: (111-184)/(54-78) 160/70     Weight: 58.4 kg (128 lb 12 oz)  Body mass index is 20.78  kg/m².    Intake/Output Summary (Last 24 hours) at 2/5/2023 0958  Last data filed at 2/5/2023 0600  Gross per 24 hour   Intake 240 ml   Output 10 ml   Net 230 ml        Physical Exam  Vitals reviewed.   Constitutional:       Appearance: Normal appearance.   HENT:      Head: Normocephalic and atraumatic.   Eyes:      Extraocular Movements: Extraocular movements intact.   Cardiovascular:      Rate and Rhythm: Normal rate and regular rhythm.      Pulses: Normal pulses.   Pulmonary:      Effort: Pulmonary effort is normal.      Breath sounds: Normal breath sounds.   Abdominal:      General: Abdomen is flat.      Palpations: Abdomen is soft.   Musculoskeletal:         General: Tenderness and signs of injury present.      Comments: Dressed left lower extremity, painful to touch   Skin:     General: Skin is warm and dry.      Capillary Refill: Capillary refill takes less than 2 seconds.      Comments: Multiple wounds, see LDAs.    Neurological:      General: No focal deficit present.      Mental Status: She is alert and oriented to person, place, and time.   Psychiatric:         Mood and Affect: Mood normal.         Behavior: Behavior normal.       Significant Labs: All pertinent labs within the past 24 hours have been reviewed.    Significant Imaging: I have reviewed all pertinent imaging results/findings within the past 24 hours.

## 2023-02-06 LAB
ALBUMIN SERPL BCP-MCNC: 3.2 G/DL (ref 3.5–5)
ALBUMIN/GLOB SERPL: 1.1 {RATIO}
ALP SERPL-CCNC: 58 U/L (ref 55–142)
ALT SERPL W P-5'-P-CCNC: 15 U/L (ref 13–56)
ANION GAP SERPL CALCULATED.3IONS-SCNC: 17 MMOL/L (ref 7–16)
AST SERPL W P-5'-P-CCNC: 15 U/L (ref 15–37)
BASOPHILS # BLD AUTO: 0.06 K/UL (ref 0–0.2)
BASOPHILS NFR BLD AUTO: 0.5 % (ref 0–1)
BILIRUB SERPL-MCNC: 0.6 MG/DL (ref ?–1.2)
BUN SERPL-MCNC: 9 MG/DL (ref 7–18)
BUN/CREAT SERPL: 13 (ref 6–20)
CALCIUM SERPL-MCNC: 8.7 MG/DL (ref 8.5–10.1)
CHLORIDE SERPL-SCNC: 94 MMOL/L (ref 98–107)
CO2 SERPL-SCNC: 22 MMOL/L (ref 21–32)
CREAT SERPL-MCNC: 0.69 MG/DL (ref 0.55–1.02)
DIFFERENTIAL METHOD BLD: ABNORMAL
EGFR (NO RACE VARIABLE) (RUSH/TITUS): 84 ML/MIN/1.73M²
EOSINOPHIL # BLD AUTO: 0.16 K/UL (ref 0–0.5)
EOSINOPHIL NFR BLD AUTO: 1.4 % (ref 1–4)
ERYTHROCYTE [DISTWIDTH] IN BLOOD BY AUTOMATED COUNT: 13 % (ref 11.5–14.5)
GLOBULIN SER-MCNC: 2.9 G/DL (ref 2–4)
GLUCOSE SERPL-MCNC: 84 MG/DL (ref 74–106)
HCT VFR BLD AUTO: 32.7 % (ref 38–47)
HGB BLD-MCNC: 10.9 G/DL (ref 12–16)
IMM GRANULOCYTES # BLD AUTO: 0.12 K/UL (ref 0–0.04)
IMM GRANULOCYTES NFR BLD: 1.1 % (ref 0–0.4)
LYMPHOCYTES # BLD AUTO: 2.1 K/UL (ref 1–4.8)
LYMPHOCYTES NFR BLD AUTO: 18.7 % (ref 27–41)
MCH RBC QN AUTO: 29.7 PG (ref 27–31)
MCHC RBC AUTO-ENTMCNC: 33.3 G/DL (ref 32–36)
MCV RBC AUTO: 89.1 FL (ref 80–96)
MONOCYTES # BLD AUTO: 0.81 K/UL (ref 0–0.8)
MONOCYTES NFR BLD AUTO: 7.2 % (ref 2–6)
MPC BLD CALC-MCNC: 9.7 FL (ref 9.4–12.4)
NEUTROPHILS # BLD AUTO: 8.01 K/UL (ref 1.8–7.7)
NEUTROPHILS NFR BLD AUTO: 71.1 % (ref 53–65)
NRBC # BLD AUTO: 0 X10E3/UL
NRBC, AUTO (.00): 0 %
PLATELET # BLD AUTO: 303 K/UL (ref 150–400)
POTASSIUM SERPL-SCNC: 4.1 MMOL/L (ref 3.5–5.1)
PROT SERPL-MCNC: 6.1 G/DL (ref 6.4–8.2)
RBC # BLD AUTO: 3.67 M/UL (ref 4.2–5.4)
SODIUM SERPL-SCNC: 129 MMOL/L (ref 136–145)
WBC # BLD AUTO: 11.26 K/UL (ref 4.5–11)

## 2023-02-06 PROCEDURE — 63600175 PHARM REV CODE 636 W HCPCS: Performed by: STUDENT IN AN ORGANIZED HEALTH CARE EDUCATION/TRAINING PROGRAM

## 2023-02-06 PROCEDURE — 87493 C DIFF AMPLIFIED PROBE: CPT | Performed by: INTERNAL MEDICINE

## 2023-02-06 PROCEDURE — 25000003 PHARM REV CODE 250: Performed by: NURSE PRACTITIONER

## 2023-02-06 PROCEDURE — 99232 PR SUBSEQUENT HOSPITAL CARE,LEVL II: ICD-10-PCS | Mod: ,,, | Performed by: NURSE PRACTITIONER

## 2023-02-06 PROCEDURE — 99232 SBSQ HOSP IP/OBS MODERATE 35: CPT | Mod: ,,, | Performed by: NURSE PRACTITIONER

## 2023-02-06 PROCEDURE — 99232 PR SUBSEQUENT HOSPITAL CARE,LEVL II: ICD-10-PCS | Mod: ,,, | Performed by: INTERNAL MEDICINE

## 2023-02-06 PROCEDURE — 63600175 PHARM REV CODE 636 W HCPCS: Performed by: INTERNAL MEDICINE

## 2023-02-06 PROCEDURE — 99232 SBSQ HOSP IP/OBS MODERATE 35: CPT | Mod: ,,, | Performed by: INTERNAL MEDICINE

## 2023-02-06 PROCEDURE — 80053 COMPREHEN METABOLIC PANEL: CPT | Performed by: INTERNAL MEDICINE

## 2023-02-06 PROCEDURE — 11000001 HC ACUTE MED/SURG PRIVATE ROOM

## 2023-02-06 PROCEDURE — 25000003 PHARM REV CODE 250: Performed by: STUDENT IN AN ORGANIZED HEALTH CARE EDUCATION/TRAINING PROGRAM

## 2023-02-06 PROCEDURE — 25000003 PHARM REV CODE 250: Performed by: INTERNAL MEDICINE

## 2023-02-06 PROCEDURE — 85025 COMPLETE CBC W/AUTO DIFF WBC: CPT | Performed by: STUDENT IN AN ORGANIZED HEALTH CARE EDUCATION/TRAINING PROGRAM

## 2023-02-06 PROCEDURE — A6212 FOAM DRG <=16 SQ IN W/BORDER: HCPCS

## 2023-02-06 RX ADMIN — PREDNISONE 5 MG: 5 TABLET ORAL at 09:02

## 2023-02-06 RX ADMIN — GABAPENTIN 100 MG: 100 CAPSULE ORAL at 09:02

## 2023-02-06 RX ADMIN — HYDRALAZINE HYDROCHLORIDE 75 MG: 25 TABLET ORAL at 03:02

## 2023-02-06 RX ADMIN — PANTOPRAZOLE SODIUM 40 MG: 40 TABLET, DELAYED RELEASE ORAL at 09:02

## 2023-02-06 RX ADMIN — LEVOTHYROXINE SODIUM 125 MCG: 0.12 TABLET ORAL at 05:02

## 2023-02-06 RX ADMIN — TAMSULOSIN HYDROCHLORIDE 0.4 MG: 0.4 CAPSULE ORAL at 09:02

## 2023-02-06 RX ADMIN — OXYCODONE HYDROCHLORIDE 10 MG: 5 TABLET ORAL at 11:02

## 2023-02-06 RX ADMIN — OXYCODONE HYDROCHLORIDE AND ACETAMINOPHEN 500 MG: 500 TABLET ORAL at 09:02

## 2023-02-06 RX ADMIN — ONDANSETRON 4 MG: 4 TABLET, ORALLY DISINTEGRATING ORAL at 09:02

## 2023-02-06 RX ADMIN — MEGESTROL ACETATE 40 MG: 40 TABLET ORAL at 09:02

## 2023-02-06 RX ADMIN — GABAPENTIN 100 MG: 100 CAPSULE ORAL at 03:02

## 2023-02-06 RX ADMIN — OXYCODONE HYDROCHLORIDE 10 MG: 5 TABLET ORAL at 09:02

## 2023-02-06 RX ADMIN — CARVEDILOL 12.5 MG: 12.5 TABLET, FILM COATED ORAL at 09:02

## 2023-02-06 RX ADMIN — ENOXAPARIN SODIUM 40 MG: 40 INJECTION SUBCUTANEOUS at 05:02

## 2023-02-06 RX ADMIN — LISINOPRIL 40 MG: 20 TABLET ORAL at 09:02

## 2023-02-06 RX ADMIN — ACETAMINOPHEN 650 MG: 325 TABLET ORAL at 03:02

## 2023-02-06 RX ADMIN — ZINC SULFATE 220 MG (50 MG) CAPSULE 220 MG: CAPSULE at 09:02

## 2023-02-06 RX ADMIN — HYDRALAZINE HYDROCHLORIDE 75 MG: 25 TABLET ORAL at 05:02

## 2023-02-06 NOTE — ASSESSMENT & PLAN NOTE
Clean with vashe  Apply medagel silver seal   Cover and secure with bordered foam  Change twice weekly and PRN for soilage (M, Th)  Keep leg elevated and avoid pressure on wound.

## 2023-02-06 NOTE — PROGRESS NOTES
[facility] Hospital Medicine  Progress Note    Patient Name: Zandra Veloz  MRN: [id number]  Patient Class: IP- Inpatient   Admission Date: 12/29/2022  Length of Stay: 39 days  Attending Physician: Davey Bell MD  Primary Care Provider: Brandon Thornton MD        Subjective:     Principal Problem:Urinary tract infection        HPI:  HPI:   88-year-old lady seen emergency room department.  Patient presents after having a fall when she was trying to go to the restroom at her home.  Patient sustained a left femur fracture.  Patient complains of moderate to severe pain in the left hip area.  Patient also complains of chest tightness, she rates it a 5/10 in the chest tightness has been intubate.  Patient also was seen in emergency room department earlier this week per her daughter.  Patient is found to have dehydration and a urinary tract infection.  Current she denies any shortness of breath.  She does not give a history of coronary artery disease.        Procedure(s) (LRB):  REVISION, TOTAL ARTHROPLASTY, HIP, VICTORIANO PROSTHETIC FX (Left)       Hospital Course:   12/19 - records reviewed. Fall without LOC and has left hip fx. No other complaints currently. Seen by cardiology with some CP felt musculoskeletal.  Echo mild AS and mod MR with nl EF. Age increase cardiac risk for surgery but discussed with daughter surgery is urgent and see no benefit in delay medically. Feel low to moderate risk for surgery. Question about UTI. No symptoms. Had UTI in 10.22 not surprising. Lab to do culture on urine in lab. Cover with ATN for prior culture with ATB started. Discussed with Dr Singer and cardiology.  12/20 Tachycardia and elevated BP. Cardiology adjusted meds. Plan hold off surgery until 12/22 to adjust meds and treat UTI. Family in room. Pt not sleeping well.   12/21 Didn't sleep well prior night. Apparently been on xanax for a time. Also recently started on Neurontin. Family with  question. No recent BM. Some increase stool on KUB but not severe. Surgery for am. BP some up but better. HR good.   12/22 Seen prior to surgery and apparently add better night. Sore mouth / throat better with mycostatin. For surgery. Family in room.  12/23 patient with pain but otherwise seemed to be doing relatively well.  Daughter in room.  Apparently been taking prednisone for some time and this was restarted per family request.  Look into swing bed placement  12/24 patient had better night rested and everyone's in better spirits today.  Tolerating some diet.   Therapy worked with patient.  Look into swing bed placement next week  12/25 remained in good spirits.  Less pain.  Had good bowel movement and ordered Dulcolax not given.  Because of dressing to leg, Burk was not removed to keep it from getting soiled.  Wound care to check 12/27.  Discuss this with patient and daughter.  On antibiotics for UTI  12/26-will dc to swingbed once accepted. Needs continued wound care.  12/27-DC when bed available  12/28- issues with wound care yesterday.  Dr. Singer had to come remove dressings himself due to adherence to subcutaneous tissue.  Family refusing transfer to [facility] due to lack of specially trained wound care team availability.  Planning to transfer to specialty but resistant to that as well and requesting to speak to administration.    12/29- agreeable to transfer to specialty.  This will be the best place for her to receive wound care.  DC after family tours     12/29-needs continued wound care and therapy[facility]    12/30-doing well today, still with pain         Overview/Hospital Course:  12/31-having some pain this am  1/1- no complaints  1/2- doing well  1/3- continue wound care, last day cefepime tomorrow  1/4- some pain this AM.  Delay in mediations dues to staffing issues. Dsicussed with RN  1/5- no issues overnight  1/6- Still with pain, hip incision looks good  1/7-diclofenac for  shoulder pain  1/8-decrease laxatives, change benzos and carlo to PRN per patient request  1/9 some confusion overnight  1/10- no more confusion, doing well  1/11- no acute issues overnight  1/12- doing well, nausea today  1/13 -Dr. Lebron Cutler IV, DO taking over for Dr. Guerra for weekend.  Patient's pain appears to be well controlled this morning no complaints of nausea.  Continue wound care and PT/OT  1/14-patient appears well labs.  Continue wound care and PT/OT  1/15-patient still appears well.  No acute concerns or complaints.  No events overnight.  Continue wound care working with a PTOT.  Expiration is services estimated by February 6 1/16- no complaints, doing well  1/17-some nausea today  1/18-better today.  Still requiring twice weekly dressing changes.  Appetite stimulant  1/19- PWB with PT today.  Still with weeping wounds.  Will need wound care twice weekly-as these improve will be able to transfer to other facility  1/20-no complaints  1/21-no acute events overnight, some anxiety today  1/22-episode of SVT yesterday. Spontaneous resolution  1/23-no complaints  1/24- no complaints  1/25-doing well  1/26-wound care, pt  1/27- no complaints  1/28- patient seen examined today resting comfortably in bed, in no acute distress and no acute events overnight.  Patient states that she is doing well and denies pain.  States that she is been working well with PT/OT but not this weekend.  Patient otherwise doing well without complaints.  1/29-patient seen examined today resting comfortably in bed, in no acute distress with no acute events overnight.  Patient denies pain or other complaints at this time.  She continues with wound care PT/OT.  1/30-doing well this am  1/31-no complaints  2/1-no changes      Interval History:     Pt still having frequesnt Bms however has been off stool softeners and laxatives for more than 48 hours, will get c diff test  Bp better controlled now    Review of Systems    Constitutional:  Negative for chills, fatigue, fever and unexpected weight change.   HENT:  Negative for congestion, mouth sores and sore throat.    Eyes:  Negative for photophobia and visual disturbance.   Respiratory:  Negative for cough, chest tightness, shortness of breath and wheezing.    Cardiovascular:  Negative for chest pain, palpitations and leg swelling.   Gastrointestinal:  Negative for abdominal pain, diarrhea, nausea and vomiting.   Endocrine: Negative for cold intolerance and heat intolerance.   Genitourinary:  Negative for difficulty urinating, dysuria, frequency and urgency.   Musculoskeletal:  Positive for arthralgias. Negative for back pain and myalgias.   Skin:  Positive for wound. Negative for pallor and rash.   Neurological:  Negative for tremors, seizures, syncope, weakness, numbness and headaches.   Hematological:  Does not bruise/bleed easily.   Psychiatric/Behavioral:  Negative for agitation, confusion, hallucinations and suicidal ideas.    Objective:     Vital Signs (Most Recent):  Temp: 98.8 °F (37.1 °C) (02/06/23 0800)  Pulse: 85 (02/06/23 0800)  Resp: 18 (02/06/23 0800)  BP: (!) 145/73 (02/06/23 0800)  SpO2: 96 % (02/06/23 0800)   Vital Signs (24h Range):  Temp:  [98.3 °F (36.8 °C)-99.3 °F (37.4 °C)] 98.8 °F (37.1 °C)  Pulse:  [62-85] 85  Resp:  [17-18] 18  SpO2:  [1 %-97 %] 96 %  BP: (106-195)/(61-80) 145/73     Weight: 58.4 kg (128 lb 12 oz)  Body mass index is 20.78 kg/m².    Intake/Output Summary (Last 24 hours) at 2/6/2023 1021  Last data filed at 2/6/2023 0430  Gross per 24 hour   Intake 120 ml   Output 4 ml   Net 116 ml        Physical Exam  Vitals reviewed.   Constitutional:       Appearance: Normal appearance.   HENT:      Head: Normocephalic and atraumatic.   Eyes:      Extraocular Movements: Extraocular movements intact.   Cardiovascular:      Rate and Rhythm: Normal rate and regular rhythm.      Pulses: Normal pulses.   Pulmonary:      Effort: Pulmonary effort is normal.       Breath sounds: Normal breath sounds.   Abdominal:      General: Abdomen is flat.      Palpations: Abdomen is soft.   Musculoskeletal:         General: Tenderness and signs of injury present.      Comments: Dressed left lower extremity, painful to touch   Skin:     General: Skin is warm and dry.      Capillary Refill: Capillary refill takes less than 2 seconds.      Comments: Multiple wounds, see LDAs.    Neurological:      General: No focal deficit present.      Mental Status: She is alert and oriented to person, place, and time.   Psychiatric:         Mood and Affect: Mood normal.         Behavior: Behavior normal.       Significant Labs: All pertinent labs within the past 24 hours have been reviewed.    Significant Imaging: I have reviewed all pertinent imaging results/findings within the past 24 hours.      Assessment/Plan:      Disruption of external surgical wound        Wounds and injuries  Wound care consulted and following    Open wound of left lower leg  Wound care      Multiple skin tears  Wound care  Optimize nutrition       Delmy-prosthetic fracture around prosthetic hip  S/P fixation with Dr Singer      Lumbar radiculopathy  Pain management  PT      Hypertension  Adjust medications as needed    2/2-d/c dilt, hydral since bp running low, keep metoprolol and lisinopril on    2/3-will increae lisinopril for afterload reduction.     Arthritis  Pain management  Per family has been taking oral steorids for a long time, cont that        VTE Risk Mitigation (From admission, onward)         Ordered     enoxaparin injection 40 mg  Daily         02/02/23 1051                Discharge Planning   YANIV:      Code Status: Full Code   Is the patient medically ready for discharge?:     Reason for patient still in hospital (select all that apply): Treatment  Discharge Plan A: Skilled Nursing Facility                  Rehmat HAYDER Bell MD  Department of Hospital Medicine   [facility]

## 2023-02-06 NOTE — PT/OT/SLP PROGRESS
"Physical Therapy      Patient Name:  Zandra Veloz   MRN:  [id number]    Patient not seen today secondary to Patient ill, pt stating, "I don't feel well" and declining all offers of PT including exercise at bedside and/or OOB activities despite encouragment. Will follow-up next treatment date.    "

## 2023-02-06 NOTE — ASSESSMENT & PLAN NOTE
Clean with vashe  Apply medagel silver seal to wound bed  Cover and secure with bordered foam  Change twice weekly and PRN for soilage (M, Th)  Keep leg elevated and avoid pressure on wound.

## 2023-02-06 NOTE — PROGRESS NOTES
[facility]  Wound Care and Hyperbarics [facility]  Progress Note    Patient Name: Zandra Veloz  MRN: [id number]  Admission Date: 12/29/2022  Hospital Length of Stay: 39 days  Attending Physician: Davey Bell MD  Primary Care Provider: Brandon Thornton MD         Subjective:     HPI:  89 yo female with multiple skin tears, traumatic wounds, and surgical incision to left thigh. She was admitted following a fall on 12/17 when she was trying to go to the restroom at her home.  Patient sustained a left femur fracture. She is status post ORIF on 12/20. Staples intact to left hip. Moderate amount of green drainage noted, cultures obtained today. Left lower leg sutures removed today. Slough noted in wound bed today, adjustment to local wound care. Significant PMH includes hypertension, bradycardia, osteoarthritis, hypothyroidism, osteoporosis, and multiple falls. Wound healing is complicated by bradycardia, hx of a-fib, chronic pain, weakness, fragile skin, limited mobility, multiple falls, infection, and pain.     Hospital Course:   1/5/23 - Complains of pain to right posterior knee, venous doppler ordered. Sutures and every other staple removed today. New local wound care orders. Cultures pending.   1/9/23 - Wounds on lower extremities healing well. Continue current POC. Bruising noted to left posterior knee, protect with mepitel and optifoam. Dressing to incision site saturated with green drainage. Cultures negative. Continue with drawtex and notify ortho of drainage.   1/11/2023 - Wounds are healing well. Continue current POC. Ultrasound pending, preliminary results concerning for fluid pocket, Dr. Singer going to evaluate today.   1/17/23 - Wounds continue to show improvement. Dr. Singer evaluated left hip today during wound vac change. Staples and wound vac d/c'd today. Dressing change twice weekly.   1/19/23 - Wounds continue to heal. Incision with minimal drainage. Puracol to  left thigh today. Continue with current POC. Twice weekly dressing changes (M, Th)  1/23/23 - Wounds are improving. Continue twice weekly dressing changes.   1/30/23 - New skin tear to right thigh, used tweezers and q-tip to place skin over wound and applied transparent dressing. Wounds are smaller today with granulation tissue. Continue drawtex and bordered foam dressing to wounds.   2/2/23 - Discussed using silvadene with patient and family, patient is allergic to sulfa. Consider urea for dry skin on left lower leg, aquacel ag advantage with bordered foams to wounds.   2/6/23 - Removed dry skin and clots from left lower leg today. Applied silver seal wound dressing today to assist with wound healing and pain.           Scheduled Meds:   ALPRAZolam  1 mg Oral QHS    ascorbic acid (vitamin C)  500 mg Oral BID    carvediloL  12.5 mg Oral BID    docusate sodium  100 mg Oral BID    enoxaparin  40 mg Subcutaneous Daily    gabapentin  100 mg Oral TID    hydrALAZINE  75 mg Oral Q8H    levothyroxine  125 mcg Oral Before breakfast    lisinopriL  40 mg Oral Daily    megestroL  40 mg Oral Daily    pantoprazole  40 mg Oral Daily    predniSONE  5 mg Oral Daily    tamsulosin  0.4 mg Oral Daily    zinc sulfate  220 mg Oral Daily     Continuous Infusions:  PRN Meds:acetaminophen, bisacodyL, dextromethorphan-guaiFENesin  mg/5 ml, dextrose 50%, dextrose 50%, diclofenac sodium, glucagon (human recombinant), glucose, glucose, hydrALAZINE, lactulose, magnesium oxide, melatonin, naloxone, ondansetron, ondansetron, oxyCODONE, potassium bicarbonate, potassium bicarbonate, potassium bicarbonate, potassium, sodium phosphates, potassium, sodium phosphates, potassium, sodium phosphates, prochlorperazine, simethicone, sodium chloride 0.9%, traZODone, urea, white petrolatum    Review of Systems   Constitutional:  Positive for activity change. Negative for chills and fever.   Respiratory:  Negative for chest tightness and  shortness of breath.    Cardiovascular:  Positive for leg swelling. Negative for chest pain and palpitations.   Musculoskeletal:  Positive for arthralgias, back pain, gait problem and joint swelling.   Skin:  Positive for color change and wound.        wound   Neurological:  Positive for weakness.   Psychiatric/Behavioral:  Negative for agitation, behavioral problems, confusion and self-injury.    Objective:     Vital Signs (Most Recent):  Temp: 97.6 °F (36.4 °C) (02/06/23 1200)  Pulse: 89 (02/06/23 1200)  Resp: 18 (02/06/23 1200)  BP: (!) 143/63 (02/06/23 1200)  SpO2: 97 % (02/06/23 1200)   Vital Signs (24h Range):  Temp:  [97.6 °F (36.4 °C)-99.3 °F (37.4 °C)] 97.6 °F (36.4 °C)  Pulse:  [62-89] 89  Resp:  [17-18] 18  SpO2:  [1 %-97 %] 97 %  BP: (106-195)/(61-80) 143/63     Weight: 58.4 kg (128 lb 12 oz)  Body mass index is 20.78 kg/m².    Physical Exam  Vitals reviewed.   Constitutional:       Appearance: Normal appearance.   HENT:      Head: Normocephalic.   Cardiovascular:      Rate and Rhythm: Regular rhythm. Bradycardia present.      Pulses: Normal pulses.      Heart sounds: Murmur heard.   Pulmonary:      Effort: Pulmonary effort is normal.      Breath sounds: Normal breath sounds.   Musculoskeletal:         General: Swelling, tenderness, deformity and signs of injury present.      Right lower leg: Edema present.      Left lower leg: Edema present.   Skin:     Findings: Bruising and erythema present.      Comments: Wound, see LDA for photo/measurements   Neurological:      Mental Status: She is alert. Mental status is at baseline.      Motor: Weakness present.      Gait: Gait abnormal.       Assessment/Plan:     Open wound of left lower leg                                  Clean with vashe  Apply medagel silver seal to wound bed  Cover and secure with bordered foam  Change twice weekly and PRN for soilage (M, Th)  Keep leg elevated and avoid pressure on wound.          Multiple skin  tears                                              Clean with vashe  Apply medagel silver seal   Cover and secure with bordered foam  Change twice weekly and PRN for soilage (M, Th)  Keep leg elevated and avoid pressure on wound.        ZURDO San  Wound Care and Hyperbarics [facility]  [facility]

## 2023-02-06 NOTE — SUBJECTIVE & OBJECTIVE
Interval History:     Pt still having frequesnt Bms however has been off stool softeners and laxatives for more than 48 hours, will get c diff test  Bp better controlled now    Review of Systems   Constitutional:  Negative for chills, fatigue, fever and unexpected weight change.   HENT:  Negative for congestion, mouth sores and sore throat.    Eyes:  Negative for photophobia and visual disturbance.   Respiratory:  Negative for cough, chest tightness, shortness of breath and wheezing.    Cardiovascular:  Negative for chest pain, palpitations and leg swelling.   Gastrointestinal:  Negative for abdominal pain, diarrhea, nausea and vomiting.   Endocrine: Negative for cold intolerance and heat intolerance.   Genitourinary:  Negative for difficulty urinating, dysuria, frequency and urgency.   Musculoskeletal:  Positive for arthralgias. Negative for back pain and myalgias.   Skin:  Positive for wound. Negative for pallor and rash.   Neurological:  Negative for tremors, seizures, syncope, weakness, numbness and headaches.   Hematological:  Does not bruise/bleed easily.   Psychiatric/Behavioral:  Negative for agitation, confusion, hallucinations and suicidal ideas.    Objective:     Vital Signs (Most Recent):  Temp: 98.8 °F (37.1 °C) (02/06/23 0800)  Pulse: 85 (02/06/23 0800)  Resp: 18 (02/06/23 0800)  BP: (!) 145/73 (02/06/23 0800)  SpO2: 96 % (02/06/23 0800)   Vital Signs (24h Range):  Temp:  [98.3 °F (36.8 °C)-99.3 °F (37.4 °C)] 98.8 °F (37.1 °C)  Pulse:  [62-85] 85  Resp:  [17-18] 18  SpO2:  [1 %-97 %] 96 %  BP: (106-195)/(61-80) 145/73     Weight: 58.4 kg (128 lb 12 oz)  Body mass index is 20.78 kg/m².    Intake/Output Summary (Last 24 hours) at 2/6/2023 1021  Last data filed at 2/6/2023 0430  Gross per 24 hour   Intake 120 ml   Output 4 ml   Net 116 ml        Physical Exam  Vitals reviewed.   Constitutional:       Appearance: Normal appearance.   HENT:      Head: Normocephalic and atraumatic.   Eyes:      Extraocular  Movements: Extraocular movements intact.   Cardiovascular:      Rate and Rhythm: Normal rate and regular rhythm.      Pulses: Normal pulses.   Pulmonary:      Effort: Pulmonary effort is normal.      Breath sounds: Normal breath sounds.   Abdominal:      General: Abdomen is flat.      Palpations: Abdomen is soft.   Musculoskeletal:         General: Tenderness and signs of injury present.      Comments: Dressed left lower extremity, painful to touch   Skin:     General: Skin is warm and dry.      Capillary Refill: Capillary refill takes less than 2 seconds.      Comments: Multiple wounds, see LDAs.    Neurological:      General: No focal deficit present.      Mental Status: She is alert and oriented to person, place, and time.   Psychiatric:         Mood and Affect: Mood normal.         Behavior: Behavior normal.       Significant Labs: All pertinent labs within the past 24 hours have been reviewed.    Significant Imaging: I have reviewed all pertinent imaging results/findings within the past 24 hours.

## 2023-02-06 NOTE — PT/OT/SLP PROGRESS
Occupational Therapy      Patient Name:  Zandra Veloz   MRN:  [id number]    Patient not seen today secondary to pt not feeling well enough to participate ( 2 attempts made). Will follow-up on next treatment day.    2/6/2023

## 2023-02-06 NOTE — SUBJECTIVE & OBJECTIVE
Subjective:     HPI:  89 yo female with multiple skin tears, traumatic wounds, and surgical incision to left thigh. She was admitted following a fall on 12/17 when she was trying to go to the restroom at her home.  Patient sustained a left femur fracture. She is status post ORIF on 12/20. Staples intact to left hip. Moderate amount of green drainage noted, cultures obtained today. Left lower leg sutures removed today. Slough noted in wound bed today, adjustment to local wound care. Significant PMH includes hypertension, bradycardia, osteoarthritis, hypothyroidism, osteoporosis, and multiple falls. Wound healing is complicated by bradycardia, hx of a-fib, chronic pain, weakness, fragile skin, limited mobility, multiple falls, infection, and pain.     Hospital Course:   1/5/23 - Complains of pain to right posterior knee, venous doppler ordered. Sutures and every other staple removed today. New local wound care orders. Cultures pending.   1/9/23 - Wounds on lower extremities healing well. Continue current POC. Bruising noted to left posterior knee, protect with mepitel and optifoam. Dressing to incision site saturated with green drainage. Cultures negative. Continue with drawtex and notify ortho of drainage.   1/11/2023 - Wounds are healing well. Continue current POC. Ultrasound pending, preliminary results concerning for fluid pocket, Dr. Singer going to evaluate today.   1/17/23 - Wounds continue to show improvement. Dr. Singer evaluated left hip today during wound vac change. Staples and wound vac d/c'd today. Dressing change twice weekly.   1/19/23 - Wounds continue to heal. Incision with minimal drainage. Puracol to left thigh today. Continue with current POC. Twice weekly dressing changes (M, Th)  1/23/23 - Wounds are improving. Continue twice weekly dressing changes.   1/30/23 - New skin tear to right thigh, used tweezers and q-tip to place skin over wound and applied transparent dressing. Wounds are  smaller today with granulation tissue. Continue drawtex and bordered foam dressing to wounds.   2/2/23 - Discussed using silvadene with patient and family, patient is allergic to sulfa. Consider urea for dry skin on left lower leg, aquacel ag advantage with bordered foams to wounds.   2/6/23 - Removed dry skin and clots from left lower leg today. Applied silver seal wound dressing today to assist with wound healing and pain.           Scheduled Meds:   ALPRAZolam  1 mg Oral QHS    ascorbic acid (vitamin C)  500 mg Oral BID    carvediloL  12.5 mg Oral BID    docusate sodium  100 mg Oral BID    enoxaparin  40 mg Subcutaneous Daily    gabapentin  100 mg Oral TID    hydrALAZINE  75 mg Oral Q8H    levothyroxine  125 mcg Oral Before breakfast    lisinopriL  40 mg Oral Daily    megestroL  40 mg Oral Daily    pantoprazole  40 mg Oral Daily    predniSONE  5 mg Oral Daily    tamsulosin  0.4 mg Oral Daily    zinc sulfate  220 mg Oral Daily     Continuous Infusions:  PRN Meds:acetaminophen, bisacodyL, dextromethorphan-guaiFENesin  mg/5 ml, dextrose 50%, dextrose 50%, diclofenac sodium, glucagon (human recombinant), glucose, glucose, hydrALAZINE, lactulose, magnesium oxide, melatonin, naloxone, ondansetron, ondansetron, oxyCODONE, potassium bicarbonate, potassium bicarbonate, potassium bicarbonate, potassium, sodium phosphates, potassium, sodium phosphates, potassium, sodium phosphates, prochlorperazine, simethicone, sodium chloride 0.9%, traZODone, urea, white petrolatum    Review of Systems   Constitutional:  Positive for activity change. Negative for chills and fever.   Respiratory:  Negative for chest tightness and shortness of breath.    Cardiovascular:  Positive for leg swelling. Negative for chest pain and palpitations.   Musculoskeletal:  Positive for arthralgias, back pain, gait problem and joint swelling.   Skin:  Positive for color change and wound.        wound   Neurological:  Positive for weakness.    Psychiatric/Behavioral:  Negative for agitation, behavioral problems, confusion and self-injury.    Objective:     Vital Signs (Most Recent):  Temp: 97.6 °F (36.4 °C) (02/06/23 1200)  Pulse: 89 (02/06/23 1200)  Resp: 18 (02/06/23 1200)  BP: (!) 143/63 (02/06/23 1200)  SpO2: 97 % (02/06/23 1200)   Vital Signs (24h Range):  Temp:  [97.6 °F (36.4 °C)-99.3 °F (37.4 °C)] 97.6 °F (36.4 °C)  Pulse:  [62-89] 89  Resp:  [17-18] 18  SpO2:  [1 %-97 %] 97 %  BP: (106-195)/(61-80) 143/63     Weight: 58.4 kg (128 lb 12 oz)  Body mass index is 20.78 kg/m².    Physical Exam  Vitals reviewed.   Constitutional:       Appearance: Normal appearance.   HENT:      Head: Normocephalic.   Cardiovascular:      Rate and Rhythm: Regular rhythm. Bradycardia present.      Pulses: Normal pulses.      Heart sounds: Murmur heard.   Pulmonary:      Effort: Pulmonary effort is normal.      Breath sounds: Normal breath sounds.   Musculoskeletal:         General: Swelling, tenderness, deformity and signs of injury present.      Right lower leg: Edema present.      Left lower leg: Edema present.   Skin:     Findings: Bruising and erythema present.      Comments: Wound, see LDA for photo/measurements   Neurological:      Mental Status: She is alert. Mental status is at baseline.      Motor: Weakness present.      Gait: Gait abnormal.

## 2023-02-06 NOTE — CARE UPDATE
Appetite remains poor despite megace and muiltple encouragements. Will get RD consult for malnutrition.

## 2023-02-07 LAB
BACTERIA #/AREA URNS HPF: ABNORMAL /HPF
BILIRUB UR QL STRIP: NEGATIVE
CLARITY UR: ABNORMAL
COLOR UR: YELLOW
FLUAV AG UPPER RESP QL IA.RAPID: NEGATIVE
FLUBV AG UPPER RESP QL IA.RAPID: NEGATIVE
GLUCOSE UR STRIP-MCNC: NORMAL MG/DL
KETONES UR STRIP-SCNC: 10 MG/DL
LEUKOCYTE ESTERASE UR QL STRIP: NEGATIVE
MUCOUS THREADS #/AREA URNS HPF: ABNORMAL /HPF
NITRITE UR QL STRIP: POSITIVE
PH UR STRIP: 6 PH UNITS
PROT UR QL STRIP: NEGATIVE
RBC # UR STRIP: NEGATIVE /UL
RBC #/AREA URNS HPF: ABNORMAL /HPF
SARS-COV+SARS-COV-2 AG RESP QL IA.RAPID: NEGATIVE
SP GR UR STRIP: 1.02
SQUAMOUS #/AREA URNS LPF: ABNORMAL /LPF
TRICHOMONAS #/AREA URNS HPF: ABNORMAL /HPF
UROBILINOGEN UR STRIP-ACNC: NORMAL MG/DL
WBC #/AREA URNS HPF: ABNORMAL /HPF
YEAST #/AREA URNS HPF: ABNORMAL /HPF

## 2023-02-07 PROCEDURE — 63600175 PHARM REV CODE 636 W HCPCS: Performed by: STUDENT IN AN ORGANIZED HEALTH CARE EDUCATION/TRAINING PROGRAM

## 2023-02-07 PROCEDURE — 25000003 PHARM REV CODE 250: Performed by: STUDENT IN AN ORGANIZED HEALTH CARE EDUCATION/TRAINING PROGRAM

## 2023-02-07 PROCEDURE — 25000003 PHARM REV CODE 250: Performed by: INTERNAL MEDICINE

## 2023-02-07 PROCEDURE — 25000003 PHARM REV CODE 250: Performed by: HOSPITALIST

## 2023-02-07 PROCEDURE — 63600175 PHARM REV CODE 636 W HCPCS: Performed by: INTERNAL MEDICINE

## 2023-02-07 PROCEDURE — 99232 PR SUBSEQUENT HOSPITAL CARE,LEVL II: ICD-10-PCS | Mod: ,,, | Performed by: INTERNAL MEDICINE

## 2023-02-07 PROCEDURE — 81001 URINALYSIS AUTO W/SCOPE: CPT | Performed by: INTERNAL MEDICINE

## 2023-02-07 PROCEDURE — 87040 BLOOD CULTURE FOR BACTERIA: CPT | Performed by: INTERNAL MEDICINE

## 2023-02-07 PROCEDURE — 99232 SBSQ HOSP IP/OBS MODERATE 35: CPT | Mod: ,,, | Performed by: INTERNAL MEDICINE

## 2023-02-07 PROCEDURE — 11000001 HC ACUTE MED/SURG PRIVATE ROOM

## 2023-02-07 PROCEDURE — 25000003 PHARM REV CODE 250: Performed by: NURSE PRACTITIONER

## 2023-02-07 PROCEDURE — 87086 URINE CULTURE/COLONY COUNT: CPT | Performed by: INTERNAL MEDICINE

## 2023-02-07 PROCEDURE — 87428 SARSCOV & INF VIR A&B AG IA: CPT | Performed by: INTERNAL MEDICINE

## 2023-02-07 RX ORDER — CARVEDILOL 25 MG/1
25 TABLET ORAL 2 TIMES DAILY
Status: DISCONTINUED | OUTPATIENT
Start: 2023-02-07 | End: 2023-02-20 | Stop reason: HOSPADM

## 2023-02-07 RX ORDER — HYDRALAZINE HYDROCHLORIDE 50 MG/1
50 TABLET, FILM COATED ORAL EVERY 8 HOURS
Status: DISCONTINUED | OUTPATIENT
Start: 2023-02-07 | End: 2023-02-20 | Stop reason: HOSPADM

## 2023-02-07 RX ADMIN — PREDNISONE 5 MG: 5 TABLET ORAL at 09:02

## 2023-02-07 RX ADMIN — ZINC SULFATE 220 MG (50 MG) CAPSULE 220 MG: CAPSULE at 09:02

## 2023-02-07 RX ADMIN — LISINOPRIL 40 MG: 20 TABLET ORAL at 09:02

## 2023-02-07 RX ADMIN — OXYCODONE HYDROCHLORIDE 10 MG: 5 TABLET ORAL at 06:02

## 2023-02-07 RX ADMIN — DOCUSATE SODIUM 100 MG: 100 CAPSULE, LIQUID FILLED ORAL at 09:02

## 2023-02-07 RX ADMIN — ACETAMINOPHEN 650 MG: 325 TABLET ORAL at 08:02

## 2023-02-07 RX ADMIN — HYDRALAZINE HYDROCHLORIDE 50 MG: 50 TABLET, FILM COATED ORAL at 02:02

## 2023-02-07 RX ADMIN — GABAPENTIN 100 MG: 100 CAPSULE ORAL at 09:02

## 2023-02-07 RX ADMIN — CEFTRIAXONE SODIUM 1 G: 1 INJECTION, POWDER, FOR SOLUTION INTRAMUSCULAR; INTRAVENOUS at 06:02

## 2023-02-07 RX ADMIN — GABAPENTIN 100 MG: 100 CAPSULE ORAL at 03:02

## 2023-02-07 RX ADMIN — OXYCODONE HYDROCHLORIDE AND ACETAMINOPHEN 500 MG: 500 TABLET ORAL at 09:02

## 2023-02-07 RX ADMIN — ACETAMINOPHEN 650 MG: 325 TABLET ORAL at 02:02

## 2023-02-07 RX ADMIN — OXYCODONE HYDROCHLORIDE 10 MG: 5 TABLET ORAL at 11:02

## 2023-02-07 RX ADMIN — MEGESTROL ACETATE 40 MG: 40 TABLET ORAL at 09:02

## 2023-02-07 RX ADMIN — ONDANSETRON 4 MG: 4 TABLET, ORALLY DISINTEGRATING ORAL at 09:02

## 2023-02-07 RX ADMIN — ENOXAPARIN SODIUM 40 MG: 40 INJECTION SUBCUTANEOUS at 05:02

## 2023-02-07 RX ADMIN — CARVEDILOL 25 MG: 25 TABLET, FILM COATED ORAL at 09:02

## 2023-02-07 RX ADMIN — HYDRALAZINE HYDROCHLORIDE 50 MG: 50 TABLET, FILM COATED ORAL at 09:02

## 2023-02-07 RX ADMIN — LEVOTHYROXINE SODIUM 125 MCG: 0.12 TABLET ORAL at 05:02

## 2023-02-07 RX ADMIN — TAMSULOSIN HYDROCHLORIDE 0.4 MG: 0.4 CAPSULE ORAL at 09:02

## 2023-02-07 RX ADMIN — PANTOPRAZOLE SODIUM 40 MG: 40 TABLET, DELAYED RELEASE ORAL at 09:02

## 2023-02-07 RX ADMIN — CARVEDILOL 12.5 MG: 12.5 TABLET, FILM COATED ORAL at 09:02

## 2023-02-07 RX ADMIN — ALPRAZOLAM 1 MG: 0.25 TABLET ORAL at 09:02

## 2023-02-07 RX ADMIN — GUAIFENESIN AND DEXTROMETHORPHAN 10 ML: 100; 10 SYRUP ORAL at 12:02

## 2023-02-07 NOTE — PLAN OF CARE
Problem: Occupational Therapy  Goal: Occupational Therapy Goal  Description: STG: (In 2 weeks)  Pt will bathe with setup and mod (A) nt  Pt will perform UE dressing with setup and min(A) progressing   Pt will perform (L) UE AROM to 3/4 full range progressing   Pt will sit EOB x 5 min with (I) nt   Pt will transfer bed/chair/bsc with min a progressing   Pt will tolerate 20 minutes of tx without fatigue progressing       LT.Restore to max I with self care and mobility.     Outcome: Ongoing, Progressing   Upper Body Dressing: moderate assistance and maximal assistance isac villatoro as a robe    Pt making progress with OT . Plan is to cont with OT poc

## 2023-02-07 NOTE — CONSULTS
[facility]  Adult Nutrition  Consult Note    SUMMARY     Recommendations    Recommendation/Intervention: Recommend continue current diet order + nutritional supplement to meet nutritional needs. Continue Vamshi BID + Vit C+ ZnSO4 to aid in wound healing.  Goals: consume % of meals, tolerate PO intake, weight maintenance, wound healing  Nutrition Goal Status: progressing towards goal    Assessment and Plan  Consult received and appreciated. RD following pt.     Current weight is 128 lbs. Weight trend since admission includes: 120 lbs on 1/11, 126 lbs on 1/18. ~8 lb weight gain since admission. Weight seems to be trending towards IBW range which is desired at this time.     Pt is currently receiving a Regular diet + Ensure Max PRO TID + Ensure Enlive TID. Per flowsheets, pt consuming 25-50% of meals. Intake not adequate to meet nutritional needs. MD ordered appetite stimulant Megace. Reports intakes have not improved much.     Pt family reports pt appetite and PO intake had been good last week but had since declined over the last 2 days. Reports pt is not eating at all. Family also notes that nutritional shakes upset pt stomach, she does not like to drink them. RD suggested pt try pudding or Gelatein supplement. Pt will try Gelatein. RD to order. Family also provided some food preferences to add to diet order ([place], fruit salad). Nausea reported. Recommend continue appetite stimulant as able and appropriate. Encourage good PO intakes.     Wounds continue being treated, Wound Care following. Recommend continue Vamshi BID (Arginaid may be used in place of Vamshi due to supple issues) to aid in wound healing. Continue Vit C and ZnSO4 as needed to aid in wound healing as well. Consider addition of mvi if able/appropriate.     Last BM 2/5 per flowsheets. Labs/meds reviewed. RD following.     Nutrition Diagnosis  Increased protein Needs   related to Wound healing as evidenced by pt with  "multiple wounds     Nutrition Diagnosis Status: Chronic/ continues    Reason for Assessment    Reason For Assessment: consult, RD follow-up  Diagnosis: other (see comments) (urinary tract infection)  Relevant Medical History: arthritis, HTN, lumbar radiculopathy, scott-prosthetic fracture around prosthetic hip, skin tear of upper arm without complication, right, initial encounter, unspecified open wound, left lower leg, initial encounter, wounds and injuries    Nutrition Risk Screen    Nutrition Risk Screen: large or nonhealing wound, burn or pressure injury    Nutrition/Diet History    Spiritual, Cultural Beliefs, Yazdanism Practices, Values that Affect Care: no  Food Allergies: NKFA    Anthropometrics    Temp: 98.4 °F (36.9 °C)  Height: 5' 6" (167.6 cm)  Height (inches): 66 in  Weight Method: Bed Scale  Weight: 58.4 kg (128 lb 12 oz)  Weight (lb): 128.75 lb  Ideal Body Weight (IBW), Female: 130 lb  % Ideal Body Weight, Female (lb): 100.15 %  BMI (Calculated): 20.5       Lab/Procedures/Meds   Latest Reference Range & Units 02/06/23 07:11   Sodium 136 - 145 mmol/L 129 (L)   Potassium 3.5 - 5.1 mmol/L 4.1   Chloride 98 - 107 mmol/L 94 (L)   CO2 21 - 32 mmol/L 22   Anion Gap 7 - 16 mmol/L 17 (H)   BUN 7 - 18 mg/dL 9   Creatinine 0.55 - 1.02 mg/dL 0.69   BUN/CREAT RATIO 6 - 20  13   eGFR >=60 mL/min/1.73m² 84   Glucose 74 - 106 mg/dL 84   Calcium 8.5 - 10.1 mg/dL 8.7   Alkaline Phosphatase 55 - 142 U/L 58   PROTEIN TOTAL 6.4 - 8.2 g/dL 6.1 (L)   Albumin 3.5 - 5.0 g/dL 3.2 (L)   Albumin/Globulin Ratio  1.1   BILIRUBIN TOTAL >0.0 - 1.2 mg/dL 0.6   AST 15 - 37 U/L 15   ALT 13 - 56 U/L 15   Globulin, Total 2.0 - 4.0 g/dL 2.9   (L): Data is abnormally low  (H): Data is abnormally high    Note: Low Na, total protein, albumin - possible related to wounds. Replete Na to WNL as needed.     Pertinent Labs Reviewed: reviewed  Pertinent Medications Reviewed: reviewed  Pertinent Medications Comments: alprazolam, vit C, carvedilol, " docusate sodium, enoxaparin, gabapentin, hydralazine, levothyroxine, lisinopril, megestrol, pantoprazole, prednisone, tamsulosin, zinc sulfate      Nutrition Prescription Ordered    Current Diet Order: Regular diet  Nutrition Order Comments: Appropriate  Oral Nutrition Supplement: Vamshi BID + Ensure Max PRO TID + Ensure Enlive TID    Evaluation of Received Nutrient/Fluid Intake       % Intake of Estimated Energy Needs: 25 - 50 %  % Meal Intake: 25 - 50 %    Nutrition Risk    Level of Risk/Frequency of Follow-up: high       Monitor and Evaluation    Food and Nutrient Intake: energy intake, food and beverage intake  Food and Nutrient Adminstration: diet order  Knowledge/Beliefs/Attitudes: food and nutrition knowledge/skill, beliefs and attitudes  Physical Activity and Function: nutrition-related ADLs and IADLs, factors affecting access to physical activity  Anthropometric Measurements: weight, weight change, body mass index  Biochemical Data, Medical Tests and Procedures: electrolyte and renal panel, gastrointestinal profile, glucose/endocrine profile, inflammatory profile, lipid profile  Nutrition-Focused Physical Findings: overall appearance, extremities, muscles and bones, head and eyes, skin       Nutrition Follow-Up    RD Follow-up?: Yes

## 2023-02-07 NOTE — PROGRESS NOTES
[facility] Hospital Medicine  Progress Note    Patient Name: Zandra Veloz  MRN: [id number]  Patient Class: IP- Inpatient   Admission Date: 12/29/2022  Length of Stay: 40 days  Attending Physician: Davey Bell MD  Primary Care Provider: Brandon Thornton MD        Subjective:     Principal Problem:Urinary tract infection        HPI:  HPI:   88-year-old lady seen emergency room department.  Patient presents after having a fall when she was trying to go to the restroom at her home.  Patient sustained a left femur fracture.  Patient complains of moderate to severe pain in the left hip area.  Patient also complains of chest tightness, she rates it a 5/10 in the chest tightness has been intubate.  Patient also was seen in emergency room department earlier this week per her daughter.  Patient is found to have dehydration and a urinary tract infection.  Current she denies any shortness of breath.  She does not give a history of coronary artery disease.        Procedure(s) (LRB):  REVISION, TOTAL ARTHROPLASTY, HIP, VICTORIANO PROSTHETIC FX (Left)       Hospital Course:   12/19 - records reviewed. Fall without LOC and has left hip fx. No other complaints currently. Seen by cardiology with some CP felt musculoskeletal.  Echo mild AS and mod MR with nl EF. Age increase cardiac risk for surgery but discussed with daughter surgery is urgent and see no benefit in delay medically. Feel low to moderate risk for surgery. Question about UTI. No symptoms. Had UTI in 10.22 not surprising. Lab to do culture on urine in lab. Cover with ATN for prior culture with ATB started. Discussed with Dr Singer and cardiology.  12/20 Tachycardia and elevated BP. Cardiology adjusted meds. Plan hold off surgery until 12/22 to adjust meds and treat UTI. Family in room. Pt not sleeping well.   12/21 Didn't sleep well prior night. Apparently been on xanax for a time. Also recently started on Neurontin. Family with  question. No recent BM. Some increase stool on KUB but not severe. Surgery for am. BP some up but better. HR good.   12/22 Seen prior to surgery and apparently add better night. Sore mouth / throat better with mycostatin. For surgery. Family in room.  12/23 patient with pain but otherwise seemed to be doing relatively well.  Daughter in room.  Apparently been taking prednisone for some time and this was restarted per family request.  Look into swing bed placement  12/24 patient had better night rested and everyone's in better spirits today.  Tolerating some diet.   Therapy worked with patient.  Look into swing bed placement next week  12/25 remained in good spirits.  Less pain.  Had good bowel movement and ordered Dulcolax not given.  Because of dressing to leg, Burk was not removed to keep it from getting soiled.  Wound care to check 12/27.  Discuss this with patient and daughter.  On antibiotics for UTI  12/26-will dc to swingbed once accepted. Needs continued wound care.  12/27-DC when bed available  12/28- issues with wound care yesterday.  Dr. Singer had to come remove dressings himself due to adherence to subcutaneous tissue.  Family refusing transfer to [facility] due to lack of specially trained wound care team availability.  Planning to transfer to specialty but resistant to that as well and requesting to speak to administration.    12/29- agreeable to transfer to specialty.  This will be the best place for her to receive wound care.  DC after family tours     12/29-needs continued wound care and therapy[facility]    12/30-doing well today, still with pain         Overview/Hospital Course:  12/31-having some pain this am  1/1- no complaints  1/2- doing well  1/3- continue wound care, last day cefepime tomorrow  1/4- some pain this AM.  Delay in mediations dues to staffing issues. Dsicussed with RN  1/5- no issues overnight  1/6- Still with pain, hip incision looks good  1/7-diclofenac for  shoulder pain  1/8-decrease laxatives, change benzos and carlo to PRN per patient request  1/9 some confusion overnight  1/10- no more confusion, doing well  1/11- no acute issues overnight  1/12- doing well, nausea today  1/13 -Dr. Lebron Cutler IV, DO taking over for Dr. Guerra for weekend.  Patient's pain appears to be well controlled this morning no complaints of nausea.  Continue wound care and PT/OT  1/14-patient appears well labs.  Continue wound care and PT/OT  1/15-patient still appears well.  No acute concerns or complaints.  No events overnight.  Continue wound care working with a PTOT.  Expiration is services estimated by February 6 1/16- no complaints, doing well  1/17-some nausea today  1/18-better today.  Still requiring twice weekly dressing changes.  Appetite stimulant  1/19- PWB with PT today.  Still with weeping wounds.  Will need wound care twice weekly-as these improve will be able to transfer to other facility  1/20-no complaints  1/21-no acute events overnight, some anxiety today  1/22-episode of SVT yesterday. Spontaneous resolution  1/23-no complaints  1/24- no complaints  1/25-doing well  1/26-wound care, pt  1/27- no complaints  1/28- patient seen examined today resting comfortably in bed, in no acute distress and no acute events overnight.  Patient states that she is doing well and denies pain.  States that she is been working well with PT/OT but not this weekend.  Patient otherwise doing well without complaints.  1/29-patient seen examined today resting comfortably in bed, in no acute distress with no acute events overnight.  Patient denies pain or other complaints at this time.  She continues with wound care PT/OT.  1/30-doing well this am  1/31-no complaints  2/1-no changes      Interval History:     No more diarreha or frequent BM  Pt having generalized body ache and one time episode of ferver, will get blood cx , ua and cxr  Also swab for covid and flu.   Bp better now.     Review  of Systems   Constitutional:  Negative for chills, fatigue, fever and unexpected weight change.   HENT:  Negative for congestion, mouth sores and sore throat.    Eyes:  Negative for photophobia and visual disturbance.   Respiratory:  Negative for cough, chest tightness, shortness of breath and wheezing.    Cardiovascular:  Negative for chest pain, palpitations and leg swelling.   Gastrointestinal:  Negative for abdominal pain, diarrhea, nausea and vomiting.   Endocrine: Negative for cold intolerance and heat intolerance.   Genitourinary:  Negative for difficulty urinating, dysuria, frequency and urgency.   Musculoskeletal:  Positive for arthralgias. Negative for back pain and myalgias.   Skin:  Positive for wound. Negative for pallor and rash.   Neurological:  Negative for tremors, seizures, syncope, weakness, numbness and headaches.   Hematological:  Does not bruise/bleed easily.   Psychiatric/Behavioral:  Negative for agitation, confusion, hallucinations and suicidal ideas.    Objective:     Vital Signs (Most Recent):  Temp: 98.1 °F (36.7 °C) (02/07/23 0937)  Pulse: 77 (02/07/23 0800)  Resp: 18 (02/07/23 1137)  BP: 128/70 (02/07/23 0800)  SpO2: 95 % (02/07/23 0800)   Vital Signs (24h Range):  Temp:  [97.6 °F (36.4 °C)-101 °F (38.3 °C)] 98.1 °F (36.7 °C)  Pulse:  [74-89] 77  Resp:  [18-20] 18  SpO2:  [95 %-97 %] 95 %  BP: (118-161)/(50-70) 128/70     Weight: 58.4 kg (128 lb 12 oz)  Body mass index is 20.78 kg/m².    Intake/Output Summary (Last 24 hours) at 2/7/2023 1143  Last data filed at 2/6/2023 2130  Gross per 24 hour   Intake 0 ml   Output 1 ml   Net -1 ml        Physical Exam  Vitals reviewed.   Constitutional:       Appearance: Normal appearance.   HENT:      Head: Normocephalic and atraumatic.   Eyes:      Extraocular Movements: Extraocular movements intact.   Cardiovascular:      Rate and Rhythm: Normal rate and regular rhythm.      Pulses: Normal pulses.   Pulmonary:      Effort: Pulmonary effort is  normal.      Breath sounds: Normal breath sounds.   Abdominal:      General: Abdomen is flat.      Palpations: Abdomen is soft.   Musculoskeletal:         General: Tenderness and signs of injury present.      Comments: Dressed left lower extremity, painful to touch   Skin:     General: Skin is warm and dry.      Capillary Refill: Capillary refill takes less than 2 seconds.      Comments: Multiple wounds, see LDAs.    Neurological:      General: No focal deficit present.      Mental Status: She is alert and oriented to person, place, and time.   Psychiatric:         Mood and Affect: Mood normal.         Behavior: Behavior normal.       Significant Labs: All pertinent labs within the past 24 hours have been reviewed.    Significant Imaging: I have reviewed all pertinent imaging results/findings within the past 24 hours.      Assessment/Plan:      Disruption of external surgical wound        Wounds and injuries  Wound care consulted and following    Open wound of left lower leg  Wound care      Multiple skin tears  Wound care  Optimize nutrition       Delmy-prosthetic fracture around prosthetic hip  S/P fixation with Dr Singer      Lumbar radiculopathy  Pain management  PT      Hypertension  Adjust medications as needed    2/2-d/c dilt, hydral since bp running low, keep metoprolol and lisinopril on    2/3-will increae lisinopril for afterload reduction.     Arthritis  Pain management  Per family has been taking oral steorids for a long time, cont that        VTE Risk Mitigation (From admission, onward)         Ordered     enoxaparin injection 40 mg  Daily         02/02/23 1051                Discharge Planning   YANIV:      Code Status: Full Code   Is the patient medically ready for discharge?:     Reason for patient still in hospital (select all that apply): Treatment  Discharge Plan A: Skilled Nursing Facility                  Rehmat HAYDER Bell MD  Department of Hospital Medicine   [facility]

## 2023-02-07 NOTE — PT/OT/SLP PROGRESS
"Physical Therapy      Patient Name:  Zandra Veloz   MRN:  [id number]    Patient not seen today secondary to Patient ill. "I can't, I'm sick, I'm just sick". "I feel so bad". RN reporting pt with increased temp. Will follow-up next treatment session .    "

## 2023-02-07 NOTE — SUBJECTIVE & OBJECTIVE
Interval History:     No more diarreha or frequent BM  Pt having generalized body ache and one time episode of ferver, will get blood cx , ua and cxr  Also swab for covid and flu.   Bp better now.     Review of Systems   Constitutional:  Negative for chills, fatigue, fever and unexpected weight change.   HENT:  Negative for congestion, mouth sores and sore throat.    Eyes:  Negative for photophobia and visual disturbance.   Respiratory:  Negative for cough, chest tightness, shortness of breath and wheezing.    Cardiovascular:  Negative for chest pain, palpitations and leg swelling.   Gastrointestinal:  Negative for abdominal pain, diarrhea, nausea and vomiting.   Endocrine: Negative for cold intolerance and heat intolerance.   Genitourinary:  Negative for difficulty urinating, dysuria, frequency and urgency.   Musculoskeletal:  Positive for arthralgias. Negative for back pain and myalgias.   Skin:  Positive for wound. Negative for pallor and rash.   Neurological:  Negative for tremors, seizures, syncope, weakness, numbness and headaches.   Hematological:  Does not bruise/bleed easily.   Psychiatric/Behavioral:  Negative for agitation, confusion, hallucinations and suicidal ideas.    Objective:     Vital Signs (Most Recent):  Temp: 98.1 °F (36.7 °C) (02/07/23 0937)  Pulse: 77 (02/07/23 0800)  Resp: 18 (02/07/23 1137)  BP: 128/70 (02/07/23 0800)  SpO2: 95 % (02/07/23 0800)   Vital Signs (24h Range):  Temp:  [97.6 °F (36.4 °C)-101 °F (38.3 °C)] 98.1 °F (36.7 °C)  Pulse:  [74-89] 77  Resp:  [18-20] 18  SpO2:  [95 %-97 %] 95 %  BP: (118-161)/(50-70) 128/70     Weight: 58.4 kg (128 lb 12 oz)  Body mass index is 20.78 kg/m².    Intake/Output Summary (Last 24 hours) at 2/7/2023 1143  Last data filed at 2/6/2023 2130  Gross per 24 hour   Intake 0 ml   Output 1 ml   Net -1 ml        Physical Exam  Vitals reviewed.   Constitutional:       Appearance: Normal appearance.   HENT:      Head: Normocephalic and atraumatic.   Eyes:       Extraocular Movements: Extraocular movements intact.   Cardiovascular:      Rate and Rhythm: Normal rate and regular rhythm.      Pulses: Normal pulses.   Pulmonary:      Effort: Pulmonary effort is normal.      Breath sounds: Normal breath sounds.   Abdominal:      General: Abdomen is flat.      Palpations: Abdomen is soft.   Musculoskeletal:         General: Tenderness and signs of injury present.      Comments: Dressed left lower extremity, painful to touch   Skin:     General: Skin is warm and dry.      Capillary Refill: Capillary refill takes less than 2 seconds.      Comments: Multiple wounds, see LDAs.    Neurological:      General: No focal deficit present.      Mental Status: She is alert and oriented to person, place, and time.   Psychiatric:         Mood and Affect: Mood normal.         Behavior: Behavior normal.       Significant Labs: All pertinent labs within the past 24 hours have been reviewed.    Significant Imaging: I have reviewed all pertinent imaging results/findings within the past 24 hours.

## 2023-02-07 NOTE — PLAN OF CARE
MARK spoke with pt's daughter, Ana about discharge planning. Checking to see if [facility] would provide wound care and PT/OT services. Informed that they would. SS will make a referral to [facility] closer to when pt is ready for discharge.

## 2023-02-07 NOTE — PT/OT/SLP PROGRESS
Occupational Therapy      Patient Name:  Zandra Veloz   MRN:  [id number]    Patient not seen today secondary to Patient ill (Comment). Will follow-up on next treatment day.    2/7/2023

## 2023-02-08 LAB
ALBUMIN SERPL BCP-MCNC: 3 G/DL (ref 3.5–5)
ALBUMIN/GLOB SERPL: 1.1 {RATIO}
ALP SERPL-CCNC: 50 U/L (ref 55–142)
ALT SERPL W P-5'-P-CCNC: 15 U/L (ref 13–56)
ANION GAP SERPL CALCULATED.3IONS-SCNC: 15 MMOL/L (ref 7–16)
AST SERPL W P-5'-P-CCNC: 14 U/L (ref 15–37)
BILIRUB SERPL-MCNC: 0.4 MG/DL (ref ?–1.2)
BUN SERPL-MCNC: 13 MG/DL (ref 7–18)
BUN/CREAT SERPL: 15 (ref 6–20)
CALCIUM SERPL-MCNC: 8.7 MG/DL (ref 8.5–10.1)
CHLORIDE SERPL-SCNC: 95 MMOL/L (ref 98–107)
CO2 SERPL-SCNC: 24 MMOL/L (ref 21–32)
CREAT SERPL-MCNC: 0.86 MG/DL (ref 0.55–1.02)
EGFR (NO RACE VARIABLE) (RUSH/TITUS): 65 ML/MIN/1.73M²
GLOBULIN SER-MCNC: 2.7 G/DL (ref 2–4)
GLUCOSE SERPL-MCNC: 79 MG/DL (ref 74–106)
POTASSIUM SERPL-SCNC: 4.7 MMOL/L (ref 3.5–5.1)
PROT SERPL-MCNC: 5.7 G/DL (ref 6.4–8.2)
SODIUM SERPL-SCNC: 129 MMOL/L (ref 136–145)

## 2023-02-08 PROCEDURE — 63600175 PHARM REV CODE 636 W HCPCS: Performed by: STUDENT IN AN ORGANIZED HEALTH CARE EDUCATION/TRAINING PROGRAM

## 2023-02-08 PROCEDURE — 97110 THERAPEUTIC EXERCISES: CPT

## 2023-02-08 PROCEDURE — 99232 PR SUBSEQUENT HOSPITAL CARE,LEVL II: ICD-10-PCS | Mod: ,,, | Performed by: NURSE PRACTITIONER

## 2023-02-08 PROCEDURE — 25000003 PHARM REV CODE 250: Performed by: STUDENT IN AN ORGANIZED HEALTH CARE EDUCATION/TRAINING PROGRAM

## 2023-02-08 PROCEDURE — 97116 GAIT TRAINING THERAPY: CPT

## 2023-02-08 PROCEDURE — 80053 COMPREHEN METABOLIC PANEL: CPT | Performed by: INTERNAL MEDICINE

## 2023-02-08 PROCEDURE — 25000003 PHARM REV CODE 250: Performed by: INTERNAL MEDICINE

## 2023-02-08 PROCEDURE — A6212 FOAM DRG <=16 SQ IN W/BORDER: HCPCS

## 2023-02-08 PROCEDURE — 99900035 HC TECH TIME PER 15 MIN (STAT)

## 2023-02-08 PROCEDURE — 99232 SBSQ HOSP IP/OBS MODERATE 35: CPT | Mod: ,,, | Performed by: INTERNAL MEDICINE

## 2023-02-08 PROCEDURE — 51798 US URINE CAPACITY MEASURE: CPT

## 2023-02-08 PROCEDURE — 11000001 HC ACUTE MED/SURG PRIVATE ROOM

## 2023-02-08 PROCEDURE — 94761 N-INVAS EAR/PLS OXIMETRY MLT: CPT

## 2023-02-08 PROCEDURE — 63600175 PHARM REV CODE 636 W HCPCS: Performed by: INTERNAL MEDICINE

## 2023-02-08 PROCEDURE — 25000003 PHARM REV CODE 250: Performed by: NURSE PRACTITIONER

## 2023-02-08 PROCEDURE — 99232 SBSQ HOSP IP/OBS MODERATE 35: CPT | Mod: ,,, | Performed by: NURSE PRACTITIONER

## 2023-02-08 PROCEDURE — 99232 PR SUBSEQUENT HOSPITAL CARE,LEVL II: ICD-10-PCS | Mod: ,,, | Performed by: INTERNAL MEDICINE

## 2023-02-08 RX ADMIN — OXYCODONE HYDROCHLORIDE AND ACETAMINOPHEN 500 MG: 500 TABLET ORAL at 09:02

## 2023-02-08 RX ADMIN — OXYCODONE HYDROCHLORIDE 10 MG: 5 TABLET ORAL at 12:02

## 2023-02-08 RX ADMIN — LACTULOSE 20 G: 20 SOLUTION ORAL at 10:02

## 2023-02-08 RX ADMIN — CEFTRIAXONE SODIUM 1 G: 1 INJECTION, POWDER, FOR SOLUTION INTRAMUSCULAR; INTRAVENOUS at 06:02

## 2023-02-08 RX ADMIN — OXYCODONE HYDROCHLORIDE 10 MG: 5 TABLET ORAL at 10:02

## 2023-02-08 RX ADMIN — DOCUSATE SODIUM 100 MG: 100 CAPSULE, LIQUID FILLED ORAL at 09:02

## 2023-02-08 RX ADMIN — CARVEDILOL 25 MG: 25 TABLET, FILM COATED ORAL at 09:02

## 2023-02-08 RX ADMIN — TAMSULOSIN HYDROCHLORIDE 0.4 MG: 0.4 CAPSULE ORAL at 09:02

## 2023-02-08 RX ADMIN — ENOXAPARIN SODIUM 40 MG: 40 INJECTION SUBCUTANEOUS at 06:02

## 2023-02-08 RX ADMIN — GABAPENTIN 100 MG: 100 CAPSULE ORAL at 02:02

## 2023-02-08 RX ADMIN — ALPRAZOLAM 1 MG: 0.25 TABLET ORAL at 09:02

## 2023-02-08 RX ADMIN — LEVOTHYROXINE SODIUM 125 MCG: 0.12 TABLET ORAL at 05:02

## 2023-02-08 RX ADMIN — OXYCODONE HYDROCHLORIDE 10 MG: 5 TABLET ORAL at 06:02

## 2023-02-08 RX ADMIN — GABAPENTIN 100 MG: 100 CAPSULE ORAL at 09:02

## 2023-02-08 RX ADMIN — ONDANSETRON 4 MG: 4 TABLET, ORALLY DISINTEGRATING ORAL at 10:02

## 2023-02-08 RX ADMIN — HYDRALAZINE HYDROCHLORIDE 50 MG: 50 TABLET, FILM COATED ORAL at 02:02

## 2023-02-08 RX ADMIN — PREDNISONE 5 MG: 5 TABLET ORAL at 09:02

## 2023-02-08 RX ADMIN — ZINC SULFATE 220 MG (50 MG) CAPSULE 220 MG: CAPSULE at 09:02

## 2023-02-08 RX ADMIN — MEGESTROL ACETATE 40 MG: 40 TABLET ORAL at 09:02

## 2023-02-08 RX ADMIN — PANTOPRAZOLE SODIUM 40 MG: 40 TABLET, DELAYED RELEASE ORAL at 09:02

## 2023-02-08 RX ADMIN — LISINOPRIL 40 MG: 20 TABLET ORAL at 09:02

## 2023-02-08 NOTE — PT/OT/SLP PROGRESS
"Physical Therapy Treatment    Patient Name:  Zandra Veloz   MRN:  [id number]    Recommendations:     Discharge Recommendations: nursing facility, skilled, rehabilitation facility  Discharge Equipment Recommendations: other (see comments) (to be determined)  Barriers to discharge:  Ongoing medical treatment    Assessment:     Zandra Veloz is a 88 y.o. female admitted with a medical diagnosis of UTI (urinary tract infection).  She presents with the following impairments/functional limitations: weakness, impaired functional mobility, impaired self care skills, impaired endurance, gait instability, impaired skin.    Pt willing to participate with PT treatment despite feeling unwell for the past few days. Pt demonstrated improvements in gait pattern and ability to ambulate but was limited by fatigue and pain. Pt will benefit from swing bed placement once medically stable to promote increased functional mobility.      Rehab Prognosis: Good; patient would benefit from acute skilled PT services to address these deficits and reach maximum level of function.    Recent Surgery: * No surgery found *      Plan:     During this hospitalization, patient to be seen 5 x/week to address the identified rehab impairments via gait training, therapeutic activities, therapeutic exercises and progress toward the following goals:    Plan of Care Expires:  03/30/23    Subjective     Chief Complaint: UTI  Patient/Family Comments/goals: "I've been sick for a few days."  Pain/Comfort:  Pain Rating 1: 0/10 (5/10 during ambulation)  Pain Addressed 1: Pre-medicate for activity  Pain Rating Post-Intervention 1: 0/10      Objective:     Communicated with Ariana Veloz RN prior to session.  Patient found supine with peripheral IV, chao catheter upon PT entry to room.     General Precautions: Standard, fall  Orthopedic Precautions: LLE posterior precautions, LLE weight bearing as tolerated  Braces: N/A  Respiratory Status: Room " air     Functional Mobility:  Bed Mobility:     Supine to Sit: minimum assistance  Sit to Supine: minimum assistance  Transfers:     Sit to Stand:  minimum assistance with rolling walker  Gait: Pt ambulated 16' using RW with CGA to min A x 2. As trial progressed pt required increasing assist due to fatigue and required heavy verbal and tactile cueing for sequencing and reassurance. Pt able to maintain body over base of support better than previous session. Pt demonstrated decreased step lengths, R foot drop, decreased sadaf, a step to gait pattern, but no LOB or SOB.       AM-PAC 6 CLICK MOBILITY          Treatment & Education:  Gait and transfers as above.  Bilateral lower extremity exercise x 30 reps: ankle pumps, Quad sets, glut sets, heel slides, hip abduction/adduction, and straight leg raises with active assist ROM, verbal cues, and tactile cues     Patient left supine with all lines intact, call button in reach, Ariana Veloz RN notified, and daughter present..    GOALS:   Multidisciplinary Problems       Physical Therapy Goals          Problem: Physical Therapy    Goal Priority Disciplines Outcome Goal Variances Interventions   Physical Therapy Goal     PT, PT/OT Ongoing, Progressing     Description: Short Term Goals to be met by 2/25/2023    Patient will increase functional independence and safety with mobility by performing    1.   Rolling side to side with standby assist  2.   Supine to sit standby Assist  3.   Sit to stand Minimal Assist using manual assistance with gait belt and PWB L LE  4.   Ambulate 20ft with RW, PWB LLE. Min A.   5.   Lower extremity exercises x 30 reps with assist as necessary and no rest breaks      Long Term Goals to be met by 3/10/2023    Patient to require less than or equal to Stand by assist for functional mobility to ease caregiver burden                        Time Tracking:     PT Received On: 02/08/23  PT Start Time: 1056     PT Stop Time: 1119  PT Total Time (min): 23  min     Billable Minutes: Gait Training 15 min and Therapeutic Exercise 8 min    Treatment Type: Treatment  PT/PTA: PT     PTA Visit Number: 0     02/08/2023

## 2023-02-08 NOTE — ASSESSMENT & PLAN NOTE
Clean with vashe  Apply polymem silver to open wounds and medagel silver seal to scabbed area  Cover and secure with bordered foam  Change M, W, F  Keep leg elevated and avoid pressure on wound.

## 2023-02-08 NOTE — PROGRESS NOTES
[facility]  Wound Care and Hyperbarics [facility]  Progress Note    Patient Name: Zandra Veloz  MRN: [id number]  Admission Date: 12/29/2022  Hospital Length of Stay: 41 days  Attending Physician: Davey Bell MD  Primary Care Provider: Brandon Thornton MD         Subjective:     HPI:  87 yo female with multiple skin tears, traumatic wounds, and surgical incision to left thigh. She was admitted following a fall on 12/17 when she was trying to go to the restroom at her home.  Patient sustained a left femur fracture. She is status post ORIF on 12/20. Staples intact to left hip. Moderate amount of green drainage noted, cultures obtained today. Left lower leg sutures removed today. Slough noted in wound bed today, adjustment to local wound care. Significant PMH includes hypertension, bradycardia, osteoarthritis, hypothyroidism, osteoporosis, and multiple falls. Wound healing is complicated by bradycardia, hx of a-fib, chronic pain, weakness, fragile skin, limited mobility, multiple falls, infection, and pain.     Hospital Course:   1/5/23 - Complains of pain to right posterior knee, venous doppler ordered. Sutures and every other staple removed today. New local wound care orders. Cultures pending.   1/9/23 - Wounds on lower extremities healing well. Continue current POC. Bruising noted to left posterior knee, protect with mepitel and optifoam. Dressing to incision site saturated with green drainage. Cultures negative. Continue with drawtex and notify ortho of drainage.   1/11/2023 - Wounds are healing well. Continue current POC. Ultrasound pending, preliminary results concerning for fluid pocket, Dr. Singer going to evaluate today.   1/17/23 - Wounds continue to show improvement. Dr. Singer evaluated left hip today during wound vac change. Staples and wound vac d/c'd today. Dressing change twice weekly.   1/19/23 - Wounds continue to heal. Incision with minimal drainage. Puracol to  left thigh today. Continue with current POC. Twice weekly dressing changes (M, Th)  1/23/23 - Wounds are improving. Continue twice weekly dressing changes.   1/30/23 - New skin tear to right thigh, used tweezers and q-tip to place skin over wound and applied transparent dressing. Wounds are smaller today with granulation tissue. Continue drawtex and bordered foam dressing to wounds.   2/2/23 - Discussed using silvadene with patient and family, patient is allergic to sulfa. Consider urea for dry skin on left lower leg, aquacel ag advantage with bordered foams to wounds.   2/6/23 - Removed dry skin and clots from left lower leg today. Applied silver seal wound dressing today to assist with wound healing and pain.   2/8/23 - Wounds are improving with new plan of care. Polymem silver to open wounds. Silvercel to scabbed areas. Change M, W, F          Scheduled Meds:   ALPRAZolam  1 mg Oral QHS    ascorbic acid (vitamin C)  500 mg Oral BID    carvediloL  25 mg Oral BID    cefTRIAXone (ROCEPHIN) IVPB  1 g Intravenous Q24H    docusate sodium  100 mg Oral BID    enoxaparin  40 mg Subcutaneous Daily    gabapentin  100 mg Oral TID    hydrALAZINE  50 mg Oral Q8H    levothyroxine  125 mcg Oral Before breakfast    lisinopriL  40 mg Oral Daily    megestroL  40 mg Oral Daily    pantoprazole  40 mg Oral Daily    predniSONE  5 mg Oral Daily    tamsulosin  0.4 mg Oral Daily    zinc sulfate  220 mg Oral Daily     Continuous Infusions:  PRN Meds:acetaminophen, bisacodyL, dextromethorphan-guaiFENesin  mg/5 ml, dextrose 50%, dextrose 50%, diclofenac sodium, glucagon (human recombinant), glucose, glucose, hydrALAZINE, lactulose, magnesium oxide, melatonin, naloxone, ondansetron, ondansetron, oxyCODONE, potassium bicarbonate, potassium bicarbonate, potassium bicarbonate, potassium, sodium phosphates, potassium, sodium phosphates, potassium, sodium phosphates, prochlorperazine, simethicone, sodium chloride 0.9%,  traZODone, urea, white petrolatum    Review of Systems   Constitutional:  Positive for activity change. Negative for chills and fever.   Respiratory:  Negative for chest tightness and shortness of breath.    Cardiovascular:  Positive for leg swelling. Negative for chest pain and palpitations.   Musculoskeletal:  Positive for arthralgias, back pain, gait problem and joint swelling.   Skin:  Positive for color change and wound.        wound   Neurological:  Positive for weakness.   Psychiatric/Behavioral:  Negative for agitation, behavioral problems, confusion and self-injury.    Objective:     Vital Signs (Most Recent):  Temp: 99.7 °F (37.6 °C) (02/08/23 1200)  Pulse: 91 (02/08/23 1200)  Resp: 18 (02/08/23 1200)  BP: 124/73 (02/08/23 1200)  SpO2: 95 % (02/08/23 1200) Vital Signs (24h Range):  Temp:  [97.6 °F (36.4 °C)-99.7 °F (37.6 °C)] 99.7 °F (37.6 °C)  Pulse:  [68-91] 91  Resp:  [18-20] 18  SpO2:  [95 %-97 %] 95 %  BP: ()/(49-73) 124/73     Weight: 58.4 kg (128 lb 12 oz)  Body mass index is 20.78 kg/m².    Physical Exam  Vitals reviewed.   Constitutional:       Appearance: Normal appearance.   HENT:      Head: Normocephalic.   Cardiovascular:      Rate and Rhythm: Regular rhythm. Bradycardia present.      Pulses: Normal pulses.      Heart sounds: Murmur heard.   Pulmonary:      Effort: Pulmonary effort is normal.      Breath sounds: Normal breath sounds.   Musculoskeletal:         General: Swelling, tenderness, deformity and signs of injury present.      Right lower leg: Edema present.      Left lower leg: Edema present.   Skin:     Findings: Bruising and erythema present.      Comments: Wound, see LDA for photo/measurements   Neurological:      Mental Status: She is alert. Mental status is at baseline.      Motor: Weakness present.      Gait: Gait abnormal.         Assessment/Plan:     Open wound of left lower leg  Clean with vashe  Apply polymem silver to open wounds and medagel silver seal to scabbed  area  Cover and secure with bordered foam  Change M, W, F  Keep leg elevated and avoid pressure on wound.          Multiple skin tears  Clean with vashe  Apply polymem silver to wounds  Cover and secure with bordered foam  Change M, W, F  Keep leg elevated and avoid pressure on wound.        ZURDO San  Wound Care and Hyperbarics [facility]  [facility]

## 2023-02-08 NOTE — ASSESSMENT & PLAN NOTE
Clean with vashe  Apply polymem silver to wounds  Cover and secure with bordered foam  Change M, W, F  Keep leg elevated and avoid pressure on wound.

## 2023-02-08 NOTE — SUBJECTIVE & OBJECTIVE
Subjective:     HPI:  87 yo female with multiple skin tears, traumatic wounds, and surgical incision to left thigh. She was admitted following a fall on 12/17 when she was trying to go to the restroom at her home.  Patient sustained a left femur fracture. She is status post ORIF on 12/20. Staples intact to left hip. Moderate amount of green drainage noted, cultures obtained today. Left lower leg sutures removed today. Slough noted in wound bed today, adjustment to local wound care. Significant PMH includes hypertension, bradycardia, osteoarthritis, hypothyroidism, osteoporosis, and multiple falls. Wound healing is complicated by bradycardia, hx of a-fib, chronic pain, weakness, fragile skin, limited mobility, multiple falls, infection, and pain.     Hospital Course:   1/5/23 - Complains of pain to right posterior knee, venous doppler ordered. Sutures and every other staple removed today. New local wound care orders. Cultures pending.   1/9/23 - Wounds on lower extremities healing well. Continue current POC. Bruising noted to left posterior knee, protect with mepitel and optifoam. Dressing to incision site saturated with green drainage. Cultures negative. Continue with drawtex and notify ortho of drainage.   1/11/2023 - Wounds are healing well. Continue current POC. Ultrasound pending, preliminary results concerning for fluid pocket, Dr. Singer going to evaluate today.   1/17/23 - Wounds continue to show improvement. Dr. Singer evaluated left hip today during wound vac change. Staples and wound vac d/c'd today. Dressing change twice weekly.   1/19/23 - Wounds continue to heal. Incision with minimal drainage. Puracol to left thigh today. Continue with current POC. Twice weekly dressing changes (M, Th)  1/23/23 - Wounds are improving. Continue twice weekly dressing changes.   1/30/23 - New skin tear to right thigh, used tweezers and q-tip to place skin over wound and applied transparent dressing. Wounds are  smaller today with granulation tissue. Continue drawtex and bordered foam dressing to wounds.   2/2/23 - Discussed using silvadene with patient and family, patient is allergic to sulfa. Consider urea for dry skin on left lower leg, aquacel ag advantage with bordered foams to wounds.   2/6/23 - Removed dry skin and clots from left lower leg today. Applied silver seal wound dressing today to assist with wound healing and pain.   2/8/23 - Wounds are improving with new plan of care. Polymem silver to open wounds. Silvercel to scabbed areas. Change M, W, F          Scheduled Meds:   ALPRAZolam  1 mg Oral QHS    ascorbic acid (vitamin C)  500 mg Oral BID    carvediloL  25 mg Oral BID    cefTRIAXone (ROCEPHIN) IVPB  1 g Intravenous Q24H    docusate sodium  100 mg Oral BID    enoxaparin  40 mg Subcutaneous Daily    gabapentin  100 mg Oral TID    hydrALAZINE  50 mg Oral Q8H    levothyroxine  125 mcg Oral Before breakfast    lisinopriL  40 mg Oral Daily    megestroL  40 mg Oral Daily    pantoprazole  40 mg Oral Daily    predniSONE  5 mg Oral Daily    tamsulosin  0.4 mg Oral Daily    zinc sulfate  220 mg Oral Daily     Continuous Infusions:  PRN Meds:acetaminophen, bisacodyL, dextromethorphan-guaiFENesin  mg/5 ml, dextrose 50%, dextrose 50%, diclofenac sodium, glucagon (human recombinant), glucose, glucose, hydrALAZINE, lactulose, magnesium oxide, melatonin, naloxone, ondansetron, ondansetron, oxyCODONE, potassium bicarbonate, potassium bicarbonate, potassium bicarbonate, potassium, sodium phosphates, potassium, sodium phosphates, potassium, sodium phosphates, prochlorperazine, simethicone, sodium chloride 0.9%, traZODone, urea, white petrolatum    Review of Systems   Constitutional:  Positive for activity change. Negative for chills and fever.   Respiratory:  Negative for chest tightness and shortness of breath.    Cardiovascular:  Positive for leg swelling. Negative for chest pain and palpitations.   Musculoskeletal:   Positive for arthralgias, back pain, gait problem and joint swelling.   Skin:  Positive for color change and wound.        wound   Neurological:  Positive for weakness.   Psychiatric/Behavioral:  Negative for agitation, behavioral problems, confusion and self-injury.    Objective:     Vital Signs (Most Recent):  Temp: 99.7 °F (37.6 °C) (02/08/23 1200)  Pulse: 91 (02/08/23 1200)  Resp: 18 (02/08/23 1200)  BP: 124/73 (02/08/23 1200)  SpO2: 95 % (02/08/23 1200) Vital Signs (24h Range):  Temp:  [97.6 °F (36.4 °C)-99.7 °F (37.6 °C)] 99.7 °F (37.6 °C)  Pulse:  [68-91] 91  Resp:  [18-20] 18  SpO2:  [95 %-97 %] 95 %  BP: ()/(49-73) 124/73     Weight: 58.4 kg (128 lb 12 oz)  Body mass index is 20.78 kg/m².    Physical Exam  Vitals reviewed.   Constitutional:       Appearance: Normal appearance.   HENT:      Head: Normocephalic.   Cardiovascular:      Rate and Rhythm: Regular rhythm. Bradycardia present.      Pulses: Normal pulses.      Heart sounds: Murmur heard.   Pulmonary:      Effort: Pulmonary effort is normal.      Breath sounds: Normal breath sounds.   Musculoskeletal:         General: Swelling, tenderness, deformity and signs of injury present.      Right lower leg: Edema present.      Left lower leg: Edema present.   Skin:     Findings: Bruising and erythema present.      Comments: Wound, see LDA for photo/measurements   Neurological:      Mental Status: She is alert. Mental status is at baseline.      Motor: Weakness present.      Gait: Gait abnormal.

## 2023-02-08 NOTE — SUBJECTIVE & OBJECTIVE
Interval History:     Pt working w/ PT  Says she feels a lot better today  Ua c/f uti, cont rocephen for uti, remove chao and see if we can be successful with the voiding trail      Review of Systems   Constitutional:  Negative for chills, fatigue, fever and unexpected weight change.   HENT:  Negative for congestion, mouth sores and sore throat.    Eyes:  Negative for photophobia and visual disturbance.   Respiratory:  Negative for cough, chest tightness, shortness of breath and wheezing.    Cardiovascular:  Negative for chest pain, palpitations and leg swelling.   Gastrointestinal:  Negative for abdominal pain, diarrhea, nausea and vomiting.   Endocrine: Negative for cold intolerance and heat intolerance.   Genitourinary:  Negative for difficulty urinating, dysuria, frequency and urgency.   Musculoskeletal:  Positive for arthralgias. Negative for back pain and myalgias.   Skin:  Positive for wound. Negative for pallor and rash.   Neurological:  Negative for tremors, seizures, syncope, weakness, numbness and headaches.   Hematological:  Does not bruise/bleed easily.   Psychiatric/Behavioral:  Negative for agitation, confusion, hallucinations and suicidal ideas.    Objective:     Vital Signs (Most Recent):  Temp: 98.9 °F (37.2 °C) (02/08/23 0800)  Pulse: 70 (02/08/23 0800)  Resp: 20 (02/08/23 1048)  BP: (!) 150/58 (02/08/23 0800)  SpO2: 96 % (02/08/23 0800)   Vital Signs (24h Range):  Temp:  [97.6 °F (36.4 °C)-98.9 °F (37.2 °C)] 98.9 °F (37.2 °C)  Pulse:  [68-85] 70  Resp:  [18-20] 20  SpO2:  [95 %-97 %] 96 %  BP: ()/(49-71) 150/58     Weight: 58.4 kg (128 lb 12 oz)  Body mass index is 20.78 kg/m².    Intake/Output Summary (Last 24 hours) at 2/8/2023 1152  Last data filed at 2/8/2023 0435  Gross per 24 hour   Intake --   Output 350 ml   Net -350 ml        Physical Exam  Vitals reviewed.   Constitutional:       Appearance: Normal appearance.   HENT:      Head: Normocephalic and atraumatic.   Eyes:       Extraocular Movements: Extraocular movements intact.   Cardiovascular:      Rate and Rhythm: Normal rate and regular rhythm.      Pulses: Normal pulses.   Pulmonary:      Effort: Pulmonary effort is normal.      Breath sounds: Normal breath sounds.   Abdominal:      General: Abdomen is flat.      Palpations: Abdomen is soft.   Musculoskeletal:         General: Tenderness and signs of injury present.      Comments: Dressed left lower extremity, painful to touch   Skin:     General: Skin is warm and dry.      Capillary Refill: Capillary refill takes less than 2 seconds.      Comments: Multiple wounds, see LDAs.    Neurological:      General: No focal deficit present.      Mental Status: She is alert and oriented to person, place, and time.   Psychiatric:         Mood and Affect: Mood normal.         Behavior: Behavior normal.       Significant Labs: All pertinent labs within the past 24 hours have been reviewed.    Significant Imaging: I have reviewed all pertinent imaging results/findings within the past 24 hours.

## 2023-02-08 NOTE — ASSESSMENT & PLAN NOTE
Urinary retention 2/7, almost 800 ml  UA c/f uti   Start on rocephen, shape abx therapy accordingly.

## 2023-02-08 NOTE — PROGRESS NOTES
[facility] Hospital Medicine  Progress Note    Patient Name: Zandra Veloz  MRN: [id number]  Patient Class: IP- Inpatient   Admission Date: 12/29/2022  Length of Stay: 41 days  Attending Physician: Davey Bell MD  Primary Care Provider: Brandon Thornton MD        Subjective:     Principal Problem:UTI (urinary tract infection)        HPI:  HPI:   88-year-old lady seen emergency room department.  Patient presents after having a fall when she was trying to go to the restroom at her home.  Patient sustained a left femur fracture.  Patient complains of moderate to severe pain in the left hip area.  Patient also complains of chest tightness, she rates it a 5/10 in the chest tightness has been intubate.  Patient also was seen in emergency room department earlier this week per her daughter.  Patient is found to have dehydration and a urinary tract infection.  Current she denies any shortness of breath.  She does not give a history of coronary artery disease.        Procedure(s) (LRB):  REVISION, TOTAL ARTHROPLASTY, HIP, VICTORIANO PROSTHETIC FX (Left)       Hospital Course:   12/19 - records reviewed. Fall without LOC and has left hip fx. No other complaints currently. Seen by cardiology with some CP felt musculoskeletal.  Echo mild AS and mod MR with nl EF. Age increase cardiac risk for surgery but discussed with daughter surgery is urgent and see no benefit in delay medically. Feel low to moderate risk for surgery. Question about UTI. No symptoms. Had UTI in 10.22 not surprising. Lab to do culture on urine in lab. Cover with ATN for prior culture with ATB started. Discussed with Dr Singer and cardiology.  12/20 Tachycardia and elevated BP. Cardiology adjusted meds. Plan hold off surgery until 12/22 to adjust meds and treat UTI. Family in room. Pt not sleeping well.   12/21 Didn't sleep well prior night. Apparently been on xanax for a time. Also recently started on Neurontin. Family  with question. No recent BM. Some increase stool on KUB but not severe. Surgery for am. BP some up but better. HR good.   12/22 Seen prior to surgery and apparently add better night. Sore mouth / throat better with mycostatin. For surgery. Family in room.  12/23 patient with pain but otherwise seemed to be doing relatively well.  Daughter in room.  Apparently been taking prednisone for some time and this was restarted per family request.  Look into swing bed placement  12/24 patient had better night rested and everyone's in better spirits today.  Tolerating some diet.   Therapy worked with patient.  Look into swing bed placement next week  12/25 remained in good spirits.  Less pain.  Had good bowel movement and ordered Dulcolax not given.  Because of dressing to leg, Burk was not removed to keep it from getting soiled.  Wound care to check 12/27.  Discuss this with patient and daughter.  On antibiotics for UTI  12/26-will dc to swingbed once accepted. Needs continued wound care.  12/27-DC when bed available  12/28- issues with wound care yesterday.  Dr. Singer had to come remove dressings himself due to adherence to subcutaneous tissue.  Family refusing transfer to [facility] due to lack of specially trained wound care team availability.  Planning to transfer to specialty but resistant to that as well and requesting to speak to administration.    12/29- agreeable to transfer to specialty.  This will be the best place for her to receive wound care.  DC after family tours     12/29-needs continued wound care and therapy[facility]    12/30-doing well today, still with pain         Overview/Hospital Course:  12/31-having some pain this am  1/1- no complaints  1/2- doing well  1/3- continue wound care, last day cefepime tomorrow  1/4- some pain this AM.  Delay in mediations dues to staffing issues. Dsicussed with RN  1/5- no issues overnight  1/6- Still with pain, hip incision looks good  1/7-diclofenac  for shoulder pain  1/8-decrease laxatives, change benzos and carlo to PRN per patient request  1/9 some confusion overnight  1/10- no more confusion, doing well  1/11- no acute issues overnight  1/12- doing well, nausea today  1/13 -Dr. Lebron Cutler IV, DO taking over for Dr. Guerra for weekend.  Patient's pain appears to be well controlled this morning no complaints of nausea.  Continue wound care and PT/OT  1/14-patient appears well labs.  Continue wound care and PT/OT  1/15-patient still appears well.  No acute concerns or complaints.  No events overnight.  Continue wound care working with a PTOT.  Expiration is services estimated by February 6 1/16- no complaints, doing well  1/17-some nausea today  1/18-better today.  Still requiring twice weekly dressing changes.  Appetite stimulant  1/19- PWB with PT today.  Still with weeping wounds.  Will need wound care twice weekly-as these improve will be able to transfer to other facility  1/20-no complaints  1/21-no acute events overnight, some anxiety today  1/22-episode of SVT yesterday. Spontaneous resolution  1/23-no complaints  1/24- no complaints  1/25-doing well  1/26-wound care, pt  1/27- no complaints  1/28- patient seen examined today resting comfortably in bed, in no acute distress and no acute events overnight.  Patient states that she is doing well and denies pain.  States that she is been working well with PT/OT but not this weekend.  Patient otherwise doing well without complaints.  1/29-patient seen examined today resting comfortably in bed, in no acute distress with no acute events overnight.  Patient denies pain or other complaints at this time.  She continues with wound care PT/OT.  1/30-doing well this am  1/31-no complaints  2/1-no changes      Interval History:     Pt working w/ PT  Says she feels a lot better today  Ua c/f uti, cont rocephen for uti, remove chao and see if we can be successful with the voiding trail      Review of Systems    Constitutional:  Negative for chills, fatigue, fever and unexpected weight change.   HENT:  Negative for congestion, mouth sores and sore throat.    Eyes:  Negative for photophobia and visual disturbance.   Respiratory:  Negative for cough, chest tightness, shortness of breath and wheezing.    Cardiovascular:  Negative for chest pain, palpitations and leg swelling.   Gastrointestinal:  Negative for abdominal pain, diarrhea, nausea and vomiting.   Endocrine: Negative for cold intolerance and heat intolerance.   Genitourinary:  Negative for difficulty urinating, dysuria, frequency and urgency.   Musculoskeletal:  Positive for arthralgias. Negative for back pain and myalgias.   Skin:  Positive for wound. Negative for pallor and rash.   Neurological:  Negative for tremors, seizures, syncope, weakness, numbness and headaches.   Hematological:  Does not bruise/bleed easily.   Psychiatric/Behavioral:  Negative for agitation, confusion, hallucinations and suicidal ideas.    Objective:     Vital Signs (Most Recent):  Temp: 98.9 °F (37.2 °C) (02/08/23 0800)  Pulse: 70 (02/08/23 0800)  Resp: 20 (02/08/23 1048)  BP: (!) 150/58 (02/08/23 0800)  SpO2: 96 % (02/08/23 0800)   Vital Signs (24h Range):  Temp:  [97.6 °F (36.4 °C)-98.9 °F (37.2 °C)] 98.9 °F (37.2 °C)  Pulse:  [68-85] 70  Resp:  [18-20] 20  SpO2:  [95 %-97 %] 96 %  BP: ()/(49-71) 150/58     Weight: 58.4 kg (128 lb 12 oz)  Body mass index is 20.78 kg/m².    Intake/Output Summary (Last 24 hours) at 2/8/2023 1152  Last data filed at 2/8/2023 0435  Gross per 24 hour   Intake --   Output 350 ml   Net -350 ml        Physical Exam  Vitals reviewed.   Constitutional:       Appearance: Normal appearance.   HENT:      Head: Normocephalic and atraumatic.   Eyes:      Extraocular Movements: Extraocular movements intact.   Cardiovascular:      Rate and Rhythm: Normal rate and regular rhythm.      Pulses: Normal pulses.   Pulmonary:      Effort: Pulmonary effort is normal.       Breath sounds: Normal breath sounds.   Abdominal:      General: Abdomen is flat.      Palpations: Abdomen is soft.   Musculoskeletal:         General: Tenderness and signs of injury present.      Comments: Dressed left lower extremity, painful to touch   Skin:     General: Skin is warm and dry.      Capillary Refill: Capillary refill takes less than 2 seconds.      Comments: Multiple wounds, see LDAs.    Neurological:      General: No focal deficit present.      Mental Status: She is alert and oriented to person, place, and time.   Psychiatric:         Mood and Affect: Mood normal.         Behavior: Behavior normal.       Significant Labs: All pertinent labs within the past 24 hours have been reviewed.    Significant Imaging: I have reviewed all pertinent imaging results/findings within the past 24 hours.      Assessment/Plan:      * UTI (urinary tract infection)  Urinary retention 2/7, almost 800 ml  UA c/f uti   Start on rocephen, shape abx therapy accordingly.       Disruption of external surgical wound        Wounds and injuries  Wound care consulted and following    Open wound of left lower leg  Wound care      Multiple skin tears  Wound care  Optimize nutrition       Delmy-prosthetic fracture around prosthetic hip  S/P fixation with Dr Singer      Lumbar radiculopathy  Pain management  PT      Hypertension  Adjust medications as needed    2/2-d/c dilt, hydral since bp running low, keep metoprolol and lisinopril on    2/3-will increae lisinopril for afterload reduction.     Arthritis  Pain management  Per family has been taking oral steorids for a long time, cont that        VTE Risk Mitigation (From admission, onward)         Ordered     enoxaparin injection 40 mg  Daily         02/02/23 1051                Discharge Planning   YANIV:      Code Status: Full Code   Is the patient medically ready for discharge?:     Reason for patient still in hospital (select all that apply): Treatment  Discharge Plan A:  Skilled Nursing Facility                  Rehmat HAYDER Bell MD  Department of Hospital Medicine   [facility]

## 2023-02-09 PROBLEM — R33.9 URINARY RETENTION: Status: ACTIVE | Noted: 2023-02-09

## 2023-02-09 PROBLEM — N39.0 UTI (URINARY TRACT INFECTION): Status: RESOLVED | Noted: 2021-09-21 | Resolved: 2023-02-09

## 2023-02-09 PROBLEM — R62.7 FAILURE TO THRIVE IN ADULT: Chronic | Status: ACTIVE | Noted: 2023-02-09

## 2023-02-09 LAB — UA COMPLETE W REFLEX CULTURE PNL UR: NORMAL

## 2023-02-09 PROCEDURE — 11000001 HC ACUTE MED/SURG PRIVATE ROOM

## 2023-02-09 PROCEDURE — 25000003 PHARM REV CODE 250: Performed by: INTERNAL MEDICINE

## 2023-02-09 PROCEDURE — 99232 SBSQ HOSP IP/OBS MODERATE 35: CPT | Mod: ,,, | Performed by: INTERNAL MEDICINE

## 2023-02-09 PROCEDURE — A6212 FOAM DRG <=16 SQ IN W/BORDER: HCPCS

## 2023-02-09 PROCEDURE — 25000003 PHARM REV CODE 250: Performed by: NURSE PRACTITIONER

## 2023-02-09 PROCEDURE — 63600175 PHARM REV CODE 636 W HCPCS: Performed by: STUDENT IN AN ORGANIZED HEALTH CARE EDUCATION/TRAINING PROGRAM

## 2023-02-09 PROCEDURE — 63600175 PHARM REV CODE 636 W HCPCS: Performed by: INTERNAL MEDICINE

## 2023-02-09 PROCEDURE — 99232 PR SUBSEQUENT HOSPITAL CARE,LEVL II: ICD-10-PCS | Mod: ,,, | Performed by: INTERNAL MEDICINE

## 2023-02-09 PROCEDURE — 25000003 PHARM REV CODE 250: Performed by: STUDENT IN AN ORGANIZED HEALTH CARE EDUCATION/TRAINING PROGRAM

## 2023-02-09 PROCEDURE — 63600175 PHARM REV CODE 636 W HCPCS: Performed by: HOSPITALIST

## 2023-02-09 RX ORDER — POLYETHYLENE GLYCOL 3350 17 G/17G
17 POWDER, FOR SOLUTION ORAL DAILY
Status: DISCONTINUED | OUTPATIENT
Start: 2023-02-09 | End: 2023-02-20 | Stop reason: HOSPADM

## 2023-02-09 RX ADMIN — CARVEDILOL 25 MG: 25 TABLET, FILM COATED ORAL at 08:02

## 2023-02-09 RX ADMIN — ALPRAZOLAM 1 MG: 0.25 TABLET ORAL at 08:02

## 2023-02-09 RX ADMIN — LACTULOSE 20 G: 20 SOLUTION ORAL at 08:02

## 2023-02-09 RX ADMIN — GABAPENTIN 100 MG: 100 CAPSULE ORAL at 08:02

## 2023-02-09 RX ADMIN — OXYCODONE HYDROCHLORIDE 10 MG: 5 TABLET ORAL at 08:02

## 2023-02-09 RX ADMIN — HYDRALAZINE HYDROCHLORIDE 50 MG: 50 TABLET, FILM COATED ORAL at 02:02

## 2023-02-09 RX ADMIN — ENOXAPARIN SODIUM 40 MG: 40 INJECTION SUBCUTANEOUS at 05:02

## 2023-02-09 RX ADMIN — DOCUSATE SODIUM 100 MG: 100 CAPSULE, LIQUID FILLED ORAL at 08:02

## 2023-02-09 RX ADMIN — GABAPENTIN 100 MG: 100 CAPSULE ORAL at 02:02

## 2023-02-09 RX ADMIN — PREDNISONE 5 MG: 5 TABLET ORAL at 08:02

## 2023-02-09 RX ADMIN — MEGESTROL ACETATE 40 MG: 40 TABLET ORAL at 08:02

## 2023-02-09 RX ADMIN — ONDANSETRON 8 MG: 2 INJECTION INTRAMUSCULAR; INTRAVENOUS at 08:02

## 2023-02-09 RX ADMIN — OXYCODONE HYDROCHLORIDE AND ACETAMINOPHEN 500 MG: 500 TABLET ORAL at 08:02

## 2023-02-09 RX ADMIN — HYDRALAZINE HYDROCHLORIDE 50 MG: 50 TABLET, FILM COATED ORAL at 06:02

## 2023-02-09 RX ADMIN — TAMSULOSIN HYDROCHLORIDE 0.4 MG: 0.4 CAPSULE ORAL at 08:02

## 2023-02-09 RX ADMIN — LISINOPRIL 40 MG: 20 TABLET ORAL at 08:02

## 2023-02-09 RX ADMIN — LEVOTHYROXINE SODIUM 125 MCG: 0.12 TABLET ORAL at 06:02

## 2023-02-09 RX ADMIN — PANTOPRAZOLE SODIUM 40 MG: 40 TABLET, DELAYED RELEASE ORAL at 08:02

## 2023-02-09 RX ADMIN — POLYETHYLENE GLYCOL 3350 17 G: 17 POWDER, FOR SOLUTION ORAL at 11:02

## 2023-02-09 RX ADMIN — ZINC SULFATE 220 MG (50 MG) CAPSULE 220 MG: CAPSULE at 08:02

## 2023-02-09 NOTE — PROGRESS NOTES
[facility] Hospital Medicine  Progress Note    Patient Name: Zandra Veloz  MRN: [id number]  Patient Class: IP- Inpatient   Admission Date: 12/29/2022  Length of Stay: 42 days  Attending Physician: Davey Bell MD  Primary Care Provider: Brandon Thornton MD        Subjective:     Principal Problem:Multiple skin tears        HPI:  HPI:   88-year-old lady seen emergency room department.  Patient presents after having a fall when she was trying to go to the restroom at her home.  Patient sustained a left femur fracture.  Patient complains of moderate to severe pain in the left hip area.  Patient also complains of chest tightness, she rates it a 5/10 in the chest tightness has been intubate.  Patient also was seen in emergency room department earlier this week per her daughter.  Patient is found to have dehydration and a urinary tract infection.  Current she denies any shortness of breath.  She does not give a history of coronary artery disease.        Procedure(s) (LRB):  REVISION, TOTAL ARTHROPLASTY, HIP, VICTORIANO PROSTHETIC FX (Left)       Hospital Course:   12/19 - records reviewed. Fall without LOC and has left hip fx. No other complaints currently. Seen by cardiology with some CP felt musculoskeletal.  Echo mild AS and mod MR with nl EF. Age increase cardiac risk for surgery but discussed with daughter surgery is urgent and see no benefit in delay medically. Feel low to moderate risk for surgery. Question about UTI. No symptoms. Had UTI in 10.22 not surprising. Lab to do culture on urine in lab. Cover with ATN for prior culture with ATB started. Discussed with Dr Singer and cardiology.  12/20 Tachycardia and elevated BP. Cardiology adjusted meds. Plan hold off surgery until 12/22 to adjust meds and treat UTI. Family in room. Pt not sleeping well.   12/21 Didn't sleep well prior night. Apparently been on xanax for a time. Also recently started on Neurontin. Family with  question. No recent BM. Some increase stool on KUB but not severe. Surgery for am. BP some up but better. HR good.   12/22 Seen prior to surgery and apparently add better night. Sore mouth / throat better with mycostatin. For surgery. Family in room.  12/23 patient with pain but otherwise seemed to be doing relatively well.  Daughter in room.  Apparently been taking prednisone for some time and this was restarted per family request.  Look into swing bed placement  12/24 patient had better night rested and everyone's in better spirits today.  Tolerating some diet.   Therapy worked with patient.  Look into swing bed placement next week  12/25 remained in good spirits.  Less pain.  Had good bowel movement and ordered Dulcolax not given.  Because of dressing to leg, Burk was not removed to keep it from getting soiled.  Wound care to check 12/27.  Discuss this with patient and daughter.  On antibiotics for UTI  12/26-will dc to swingbed once accepted. Needs continued wound care.  12/27-DC when bed available  12/28- issues with wound care yesterday.  Dr. Singer had to come remove dressings himself due to adherence to subcutaneous tissue.  Family refusing transfer to [facility] due to lack of specially trained wound care team availability.  Planning to transfer to specialty but resistant to that as well and requesting to speak to administration.    12/29- agreeable to transfer to specialty.  This will be the best place for her to receive wound care.  DC after family tours     12/29-needs continued wound care and therapy. [facility]    12/30-doing well today, still with pain         Overview/Hospital Course:  12/31-having some pain this am  1/1- no complaints  1/2- doing well  1/3- continue wound care, last day cefepime tomorrow  1/4- some pain this AM.  Delay in mediations dues to staffing issues. Dsicussed with RN  1/5- no issues overnight  1/6- Still with pain, hip incision looks good  1/7-diclofenac for  shoulder pain  1/8-decrease laxatives, change benzos and carlo to PRN per patient request  1/9 some confusion overnight  1/10- no more confusion, doing well  1/11- no acute issues overnight  1/12- doing well, nausea today  1/13 -Dr. Lebron Cutler IV, DO taking over for Dr. Guerra for weekend.  Patient's pain appears to be well controlled this morning no complaints of nausea.  Continue wound care and PT/OT  1/14-patient appears well labs.  Continue wound care and PT/OT  1/15-patient still appears well.  No acute concerns or complaints.  No events overnight.  Continue wound care working with a PTOT.  Expiration is services estimated by February 6 1/16- no complaints, doing well  1/17-some nausea today  1/18-better today.  Still requiring twice weekly dressing changes.  Appetite stimulant  1/19- PWB with PT today.  Still with weeping wounds.  Will need wound care twice weekly-as these improve will be able to transfer to other facility  1/20-no complaints  1/21-no acute events overnight, some anxiety today  1/22-episode of SVT yesterday. Spontaneous resolution  1/23-no complaints  1/24- no complaints  1/25-doing well  1/26-wound care, pt  1/27- no complaints  1/28- patient seen examined today resting comfortably in bed, in no acute distress and no acute events overnight.  Patient states that she is doing well and denies pain.  States that she is been working well with PT/OT but not this weekend.  Patient otherwise doing well without complaints.  1/29-patient seen examined today resting comfortably in bed, in no acute distress with no acute events overnight.  Patient denies pain or other complaints at this time.  She continues with wound care PT/OT.  1/30-doing well this am  1/31-no complaints  2/1-no changes      Interval History:     Pt working w/ PT  Pt very tired and fatigued today, not eating much, urine cultures showed normal skin rachel, therefore nhan/ cristy sicne she doesn't have a true uti  Bladder scan to  see if she has urinary retention  Spoke to family and caregiver that given her current conditin where her intake is quite poor , she fits the picture of failure to thrive and lissa malnutrition, the prognosis is guarded, she is already on appetite stimulant, other that not much can be done besides peg tube to keep up with caloric reqtms.     Review of Systems   Constitutional:  Negative for chills, fatigue, fever and unexpected weight change.   HENT:  Negative for congestion, mouth sores and sore throat.    Eyes:  Negative for photophobia and visual disturbance.   Respiratory:  Negative for cough, chest tightness, shortness of breath and wheezing.    Cardiovascular:  Negative for chest pain, palpitations and leg swelling.   Gastrointestinal:  Negative for abdominal pain, diarrhea, nausea and vomiting.   Endocrine: Negative for cold intolerance and heat intolerance.   Genitourinary:  Negative for difficulty urinating, dysuria, frequency and urgency.   Musculoskeletal:  Positive for arthralgias. Negative for back pain and myalgias.   Skin:  Positive for wound. Negative for pallor and rash.   Neurological:  Negative for tremors, seizures, syncope, weakness, numbness and headaches.   Hematological:  Does not bruise/bleed easily.   Psychiatric/Behavioral:  Negative for agitation, confusion, hallucinations and suicidal ideas.    Objective:     Vital Signs (Most Recent):  Temp: 99.4 °F (37.4 °C) (02/09/23 0727)  Pulse: 66 (02/09/23 0727)  Resp: 17 (02/09/23 0727)  BP: (!) 130/55 (02/09/23 0727)  SpO2: 96 % (02/09/23 0727)   Vital Signs (24h Range):  Temp:  [97.6 °F (36.4 °C)-99.7 °F (37.6 °C)] 99.4 °F (37.4 °C)  Pulse:  [63-91] 66  Resp:  [16-20] 17  SpO2:  [95 %-97 %] 96 %  BP: (105-144)/(46-73) 130/55     Weight: 58.4 kg (128 lb 12 oz)  Body mass index is 20.78 kg/m².    Intake/Output Summary (Last 24 hours) at 2/9/2023 1031  Last data filed at 2/8/2023 2110  Gross per 24 hour   Intake 250 ml   Output --   Net 250 ml         Physical Exam  Vitals reviewed.   Constitutional:       Appearance: Normal appearance.   HENT:      Head: Normocephalic and atraumatic.   Eyes:      Extraocular Movements: Extraocular movements intact.   Cardiovascular:      Rate and Rhythm: Normal rate and regular rhythm.      Pulses: Normal pulses.   Pulmonary:      Effort: Pulmonary effort is normal.      Breath sounds: Normal breath sounds.   Abdominal:      General: Abdomen is flat.      Palpations: Abdomen is soft.   Musculoskeletal:         General: Tenderness and signs of injury present.      Comments: Dressed left lower extremity, painful to touch   Skin:     General: Skin is warm and dry.      Capillary Refill: Capillary refill takes less than 2 seconds.      Comments: Multiple wounds, see LDAs.    Neurological:      General: No focal deficit present.      Mental Status: She is alert and oriented to person, place, and time.   Psychiatric:         Mood and Affect: Mood normal.         Behavior: Behavior normal.       Significant Labs: All pertinent labs within the past 24 hours have been reviewed.    Significant Imaging: I have reviewed all pertinent imaging results/findings within the past 24 hours.      Assessment/Plan:      * Multiple skin tears  Wound care  Optimize nutrition       Failure to thrive in adult  Cont appetite stimulant  Cont nutiritional supplement  Cont to encourage oral intake  If continues to worsen consider PEG tube       Disruption of external surgical wound        Wounds and injuries  Wound care consulted and following    Open wound of left lower leg  Wound care      Delmy-prosthetic fracture around prosthetic hip  S/P fixation with Dr Singer      Lumbar radiculopathy  Pain management  PT      Hypertension  Adjust medications as needed    2/2-d/c dilt, hydral since bp running low, keep metoprolol and lisinopril on    2/3-will increae lisinopril for afterload reduction.     Arthritis  Pain management  Per family has been taking  oral steorids for a long time, cont that        VTE Risk Mitigation (From admission, onward)         Ordered     enoxaparin injection 40 mg  Daily         02/02/23 1051                Discharge Planning   YANIV:      Code Status: Full Code   Is the patient medically ready for discharge?:     Reason for patient still in hospital (select all that apply): Treatment  Discharge Plan A: Skilled Nursing Facility                  Rehmat U MD Ray  Department of Hospital Medicine   [facility]

## 2023-02-09 NOTE — PT/OT/SLP PROGRESS
Occupational Therapy   Treatment    Name: Zandra Veloz  MRN: [id number]  Admitting Diagnosis:  UTI (urinary tract infection)       Recommendations:     Discharge Recommendations: nursing facility, skilled, rehabilitation facility  Discharge Equipment Recommendations:   (to be determined)  Barriers to discharge:  None    Assessment:     Zandra Veloz is a 88 y.o. female with a medical diagnosis of UTI (urinary tract infection).  She presents with alert and agreeable to treatment. Performance deficits affecting function are weakness, impaired endurance, impaired self care skills, impaired functional mobility, impaired skin.     Rehab Prognosis:  Good; patient would benefit from acute skilled OT services to address these deficits and reach maximum level of function.       Plan:     Patient to be seen 5 x/week to address the above listed problems via self-care/home management, therapeutic activities, therapeutic exercises  Plan of Care Expires: 02/21/23  Plan of Care Reviewed with: patient    Subjective     Pain/Comfort:  Pain Rating 1: 0/10  Pain Rating Post-Intervention 1: 0/10    Objective:     Communicated with: CLYDE Veloz prior to session.  Patient found supine with peripheral IV, chao catheter upon OT entry to room.    General Precautions: Standard, fall    Orthopedic Precautions:LLE posterior precautions, LLE weight bearing as tolerated  Braces: N/A  Respiratory Status: Room air     Occupational Performance:     Bed Mobility:    Patient completed Supine to Sit with contact guard assistance and minimum assistance  Patient completed Sit to Supine with minimum assistance     Functional Mobility/Transfers:  NT  Activities of Daily Living:  NT      [facility] 6 Click ADL: 13    Treatment & Education:  Pt performed (B) UE ex from EOB for 2x 15 reps of each:  Shoulder ER/IR with 1# db  Elbow flexion with 1# db  Supination/pronation with 1# db  (B) handhelper with 2 bands of resistance    Patient  left HOB elevated with call button in reach and sitter present    GOALS:   Multidisciplinary Problems       Occupational Therapy Goals          Problem: Occupational Therapy    Goal Priority Disciplines Outcome Interventions   Occupational Therapy Goal     OT, PT/OT Ongoing, Progressing    Description: STG: (In 2 weeks)  Pt will bathe with setup and mod (A)  Pt will perform UE dressing with setup and min(A)  Pt will perform (L) UE AROM to 3/4 full range  Pt will sit EOB x 5 min with (I)  Pt will transfer bed/chair/bsc with min a  Pt will tolerate 20 minutes of tx without fatigue      LT.Restore to max I with self care and mobility.                          Time Tracking:     OT Date of Treatment: 23  OT Start Time: 1520  OT Stop Time: 1541  OT Total Time (min): 21 min    Billable Minutes:Therapeutic Exercise 21 min    OT/BRIGHT: OT          2023

## 2023-02-09 NOTE — PT/OT/SLP PROGRESS
"Physical Therapy      Patient Name:  Zandra Veloz   MRN:  [id number]    Patient not seen today secondary to Patient unwilling to participate. "I can't do no therapy".  pt requesting BSC, however, was unable to locate one. pt's CLYDE Hanley and ISABELLE Kamara informed of same. Will follow-up next treatment date.    "

## 2023-02-09 NOTE — ASSESSMENT & PLAN NOTE
Cont appetite stimulant  Cont nutiritional supplement  Cont to encourage oral intake  If continues to worsen consider PEG tube

## 2023-02-09 NOTE — PT/OT/SLP PROGRESS
Occupational Therapy      Patient Name:  Zandra Veloz   MRN:  [id number]    Patient not seen today secondary to  (pt not feeling well enough to participate with OT. 2 attempts made for treatment). Will follow-up on next treatment day.    2/9/2023

## 2023-02-09 NOTE — SUBJECTIVE & OBJECTIVE
Interval History:     Pt working w/ PT  Pt very tired and fatigued today, not eating much, urine cultures showed normal skin rachel, therefore dc/ cristy haley she doesn't have a true uti  Bladder scan to see if she has urinary retention  Spoke to family and caregiver that given her current conditin where her intake is quite poor , she fits the picture of failure to thrive and lissa malnutrition, the prognosis is guarded, she is already on appetite stimulant, other that not much can be done besides peg tube to keep up with caloric reqtms.     Review of Systems   Constitutional:  Negative for chills, fatigue, fever and unexpected weight change.   HENT:  Negative for congestion, mouth sores and sore throat.    Eyes:  Negative for photophobia and visual disturbance.   Respiratory:  Negative for cough, chest tightness, shortness of breath and wheezing.    Cardiovascular:  Negative for chest pain, palpitations and leg swelling.   Gastrointestinal:  Negative for abdominal pain, diarrhea, nausea and vomiting.   Endocrine: Negative for cold intolerance and heat intolerance.   Genitourinary:  Negative for difficulty urinating, dysuria, frequency and urgency.   Musculoskeletal:  Positive for arthralgias. Negative for back pain and myalgias.   Skin:  Positive for wound. Negative for pallor and rash.   Neurological:  Negative for tremors, seizures, syncope, weakness, numbness and headaches.   Hematological:  Does not bruise/bleed easily.   Psychiatric/Behavioral:  Negative for agitation, confusion, hallucinations and suicidal ideas.    Objective:     Vital Signs (Most Recent):  Temp: 99.4 °F (37.4 °C) (02/09/23 0727)  Pulse: 66 (02/09/23 0727)  Resp: 17 (02/09/23 0727)  BP: (!) 130/55 (02/09/23 0727)  SpO2: 96 % (02/09/23 0727)   Vital Signs (24h Range):  Temp:  [97.6 °F (36.4 °C)-99.7 °F (37.6 °C)] 99.4 °F (37.4 °C)  Pulse:  [63-91] 66  Resp:  [16-20] 17  SpO2:  [95 %-97 %] 96 %  BP: (105-144)/(46-73) 130/55     Weight: 58.4  kg (128 lb 12 oz)  Body mass index is 20.78 kg/m².    Intake/Output Summary (Last 24 hours) at 2/9/2023 1031  Last data filed at 2/8/2023 2110  Gross per 24 hour   Intake 250 ml   Output --   Net 250 ml        Physical Exam  Vitals reviewed.   Constitutional:       Appearance: Normal appearance.   HENT:      Head: Normocephalic and atraumatic.   Eyes:      Extraocular Movements: Extraocular movements intact.   Cardiovascular:      Rate and Rhythm: Normal rate and regular rhythm.      Pulses: Normal pulses.   Pulmonary:      Effort: Pulmonary effort is normal.      Breath sounds: Normal breath sounds.   Abdominal:      General: Abdomen is flat.      Palpations: Abdomen is soft.   Musculoskeletal:         General: Tenderness and signs of injury present.      Comments: Dressed left lower extremity, painful to touch   Skin:     General: Skin is warm and dry.      Capillary Refill: Capillary refill takes less than 2 seconds.      Comments: Multiple wounds, see LDAs.    Neurological:      General: No focal deficit present.      Mental Status: She is alert and oriented to person, place, and time.   Psychiatric:         Mood and Affect: Mood normal.         Behavior: Behavior normal.       Significant Labs: All pertinent labs within the past 24 hours have been reviewed.    Significant Imaging: I have reviewed all pertinent imaging results/findings within the past 24 hours.

## 2023-02-10 LAB
ALBUMIN SERPL BCP-MCNC: 2.8 G/DL (ref 3.5–5)
ALBUMIN/GLOB SERPL: 1.1 {RATIO}
ALP SERPL-CCNC: 49 U/L (ref 55–142)
ALT SERPL W P-5'-P-CCNC: 14 U/L (ref 13–56)
ANION GAP SERPL CALCULATED.3IONS-SCNC: 14 MMOL/L (ref 7–16)
AST SERPL W P-5'-P-CCNC: 13 U/L (ref 15–37)
BASOPHILS # BLD AUTO: 0.07 K/UL (ref 0–0.2)
BASOPHILS NFR BLD AUTO: 0.8 % (ref 0–1)
BILIRUB SERPL-MCNC: 0.3 MG/DL (ref ?–1.2)
BUN SERPL-MCNC: 10 MG/DL (ref 7–18)
BUN/CREAT SERPL: 12 (ref 6–20)
CALCIUM SERPL-MCNC: 8.2 MG/DL (ref 8.5–10.1)
CHLORIDE SERPL-SCNC: 100 MMOL/L (ref 98–107)
CO2 SERPL-SCNC: 24 MMOL/L (ref 21–32)
CREAT SERPL-MCNC: 0.81 MG/DL (ref 0.55–1.02)
DIFFERENTIAL METHOD BLD: ABNORMAL
EGFR (NO RACE VARIABLE) (RUSH/TITUS): 70 ML/MIN/1.73M²
EOSINOPHIL # BLD AUTO: 0.13 K/UL (ref 0–0.5)
EOSINOPHIL NFR BLD AUTO: 1.4 % (ref 1–4)
ERYTHROCYTE [DISTWIDTH] IN BLOOD BY AUTOMATED COUNT: 13.3 % (ref 11.5–14.5)
GLOBULIN SER-MCNC: 2.6 G/DL (ref 2–4)
GLUCOSE SERPL-MCNC: 81 MG/DL (ref 74–106)
HCT VFR BLD AUTO: 32.8 % (ref 38–47)
HGB BLD-MCNC: 10.2 G/DL (ref 12–16)
IMM GRANULOCYTES # BLD AUTO: 0.15 K/UL (ref 0–0.04)
IMM GRANULOCYTES NFR BLD: 1.6 % (ref 0–0.4)
LYMPHOCYTES # BLD AUTO: 2.22 K/UL (ref 1–4.8)
LYMPHOCYTES NFR BLD AUTO: 24.2 % (ref 27–41)
MCH RBC QN AUTO: 29.5 PG (ref 27–31)
MCHC RBC AUTO-ENTMCNC: 31.1 G/DL (ref 32–36)
MCV RBC AUTO: 94.8 FL (ref 80–96)
MONOCYTES # BLD AUTO: 0.8 K/UL (ref 0–0.8)
MONOCYTES NFR BLD AUTO: 8.7 % (ref 2–6)
MPC BLD CALC-MCNC: 10.2 FL (ref 9.4–12.4)
NEUTROPHILS # BLD AUTO: 5.81 K/UL (ref 1.8–7.7)
NEUTROPHILS NFR BLD AUTO: 63.3 % (ref 53–65)
NRBC # BLD AUTO: 0 X10E3/UL
NRBC, AUTO (.00): 0 %
PLATELET # BLD AUTO: 265 K/UL (ref 150–400)
POTASSIUM SERPL-SCNC: 4.5 MMOL/L (ref 3.5–5.1)
PROT SERPL-MCNC: 5.4 G/DL (ref 6.4–8.2)
RBC # BLD AUTO: 3.46 M/UL (ref 4.2–5.4)
SODIUM SERPL-SCNC: 133 MMOL/L (ref 136–145)
WBC # BLD AUTO: 9.18 K/UL (ref 4.5–11)

## 2023-02-10 PROCEDURE — 25000003 PHARM REV CODE 250: Performed by: STUDENT IN AN ORGANIZED HEALTH CARE EDUCATION/TRAINING PROGRAM

## 2023-02-10 PROCEDURE — A6212 FOAM DRG <=16 SQ IN W/BORDER: HCPCS

## 2023-02-10 PROCEDURE — 99232 SBSQ HOSP IP/OBS MODERATE 35: CPT | Mod: ,,, | Performed by: INTERNAL MEDICINE

## 2023-02-10 PROCEDURE — 25000003 PHARM REV CODE 250: Performed by: INTERNAL MEDICINE

## 2023-02-10 PROCEDURE — 99900035 HC TECH TIME PER 15 MIN (STAT)

## 2023-02-10 PROCEDURE — 85025 COMPLETE CBC W/AUTO DIFF WBC: CPT | Performed by: STUDENT IN AN ORGANIZED HEALTH CARE EDUCATION/TRAINING PROGRAM

## 2023-02-10 PROCEDURE — 99232 SBSQ HOSP IP/OBS MODERATE 35: CPT | Mod: ,,, | Performed by: NURSE PRACTITIONER

## 2023-02-10 PROCEDURE — 99232 PR SUBSEQUENT HOSPITAL CARE,LEVL II: ICD-10-PCS | Mod: ,,, | Performed by: NURSE PRACTITIONER

## 2023-02-10 PROCEDURE — 97110 THERAPEUTIC EXERCISES: CPT | Mod: CQ

## 2023-02-10 PROCEDURE — 25000003 PHARM REV CODE 250: Performed by: NURSE PRACTITIONER

## 2023-02-10 PROCEDURE — 11000001 HC ACUTE MED/SURG PRIVATE ROOM

## 2023-02-10 PROCEDURE — 80053 COMPREHEN METABOLIC PANEL: CPT | Performed by: INTERNAL MEDICINE

## 2023-02-10 PROCEDURE — 99232 PR SUBSEQUENT HOSPITAL CARE,LEVL II: ICD-10-PCS | Mod: ,,, | Performed by: INTERNAL MEDICINE

## 2023-02-10 PROCEDURE — 97530 THERAPEUTIC ACTIVITIES: CPT | Mod: CQ

## 2023-02-10 PROCEDURE — 63600175 PHARM REV CODE 636 W HCPCS: Performed by: INTERNAL MEDICINE

## 2023-02-10 RX ADMIN — ENOXAPARIN SODIUM 40 MG: 40 INJECTION SUBCUTANEOUS at 04:02

## 2023-02-10 RX ADMIN — GABAPENTIN 100 MG: 100 CAPSULE ORAL at 02:02

## 2023-02-10 RX ADMIN — OXYCODONE HYDROCHLORIDE 10 MG: 5 TABLET ORAL at 04:02

## 2023-02-10 RX ADMIN — OXYCODONE HYDROCHLORIDE AND ACETAMINOPHEN 500 MG: 500 TABLET ORAL at 09:02

## 2023-02-10 RX ADMIN — OXYCODONE HYDROCHLORIDE 10 MG: 5 TABLET ORAL at 09:02

## 2023-02-10 RX ADMIN — OXYCODONE HYDROCHLORIDE 10 MG: 5 TABLET ORAL at 10:02

## 2023-02-10 RX ADMIN — DOCUSATE SODIUM 100 MG: 100 CAPSULE, LIQUID FILLED ORAL at 09:02

## 2023-02-10 RX ADMIN — POLYETHYLENE GLYCOL 3350 17 G: 17 POWDER, FOR SOLUTION ORAL at 09:02

## 2023-02-10 RX ADMIN — CARVEDILOL 25 MG: 25 TABLET, FILM COATED ORAL at 09:02

## 2023-02-10 RX ADMIN — HYDRALAZINE HYDROCHLORIDE 50 MG: 50 TABLET, FILM COATED ORAL at 02:02

## 2023-02-10 RX ADMIN — GABAPENTIN 100 MG: 100 CAPSULE ORAL at 09:02

## 2023-02-10 RX ADMIN — ALPRAZOLAM 1 MG: 0.25 TABLET ORAL at 09:02

## 2023-02-10 RX ADMIN — LEVOTHYROXINE SODIUM 125 MCG: 0.12 TABLET ORAL at 05:02

## 2023-02-10 NOTE — ASSESSMENT & PLAN NOTE
Clean with vashe  Apply polymem silver to wounds  Cover and secure with bordered foam  Change M, Th  Keep leg elevated and avoid pressure on wound.

## 2023-02-10 NOTE — SUBJECTIVE & OBJECTIVE
Subjective:     HPI:  89 yo female with multiple skin tears, traumatic wounds, and surgical incision to left thigh. She was admitted following a fall on 12/17 when she was trying to go to the restroom at her home.  Patient sustained a left femur fracture. She is status post ORIF on 12/20. Staples intact to left hip. Moderate amount of green drainage noted, cultures obtained today. Left lower leg sutures removed today. Slough noted in wound bed today, adjustment to local wound care. Significant PMH includes hypertension, bradycardia, osteoarthritis, hypothyroidism, osteoporosis, and multiple falls. Wound healing is complicated by bradycardia, hx of a-fib, chronic pain, weakness, fragile skin, limited mobility, multiple falls, infection, and pain.     Hospital Course:   1/5/23 - Complains of pain to right posterior knee, venous doppler ordered. Sutures and every other staple removed today. New local wound care orders. Cultures pending.   1/9/23 - Wounds on lower extremities healing well. Continue current POC. Bruising noted to left posterior knee, protect with mepitel and optifoam. Dressing to incision site saturated with green drainage. Cultures negative. Continue with drawtex and notify ortho of drainage.   1/11/2023 - Wounds are healing well. Continue current POC. Ultrasound pending, preliminary results concerning for fluid pocket, Dr. Singer going to evaluate today.   1/17/23 - Wounds continue to show improvement. Dr. Singer evaluated left hip today during wound vac change. Staples and wound vac d/c'd today. Dressing change twice weekly.   1/19/23 - Wounds continue to heal. Incision with minimal drainage. Puracol to left thigh today. Continue with current POC. Twice weekly dressing changes (M, Th)  1/23/23 - Wounds are improving. Continue twice weekly dressing changes.   1/30/23 - New skin tear to right thigh, used tweezers and q-tip to place skin over wound and applied transparent dressing. Wounds are  smaller today with granulation tissue. Continue drawtex and bordered foam dressing to wounds.   2/2/23 - Discussed using silvadene with patient and family, patient is allergic to sulfa. Consider urea for dry skin on left lower leg, aquacel ag advantage with bordered foams to wounds.   2/6/23 - Removed dry skin and clots from left lower leg today. Applied silver seal wound dressing today to assist with wound healing and pain.   2/8/23 - Wounds are improving with new plan of care. Polymem silver to open wounds. Silvercel to scabbed areas. Change M, W, F  2/10/23 - Wounds have shown significant improvement with polymem. Continue current POC          Scheduled Meds:   ALPRAZolam  1 mg Oral QHS    ascorbic acid (vitamin C)  500 mg Oral BID    carvediloL  25 mg Oral BID    docusate sodium  100 mg Oral BID    enoxaparin  40 mg Subcutaneous Daily    gabapentin  100 mg Oral TID    hydrALAZINE  50 mg Oral Q8H    levothyroxine  125 mcg Oral Before breakfast    lisinopriL  40 mg Oral Daily    megestroL  40 mg Oral Daily    pantoprazole  40 mg Oral Daily    polyethylene glycol  17 g Oral Daily    predniSONE  5 mg Oral Daily    tamsulosin  0.4 mg Oral Daily    zinc sulfate  220 mg Oral Daily     Continuous Infusions:  PRN Meds:acetaminophen, bisacodyL, dextromethorphan-guaiFENesin  mg/5 ml, dextrose 50%, dextrose 50%, diclofenac sodium, glucagon (human recombinant), glucose, glucose, hydrALAZINE, lactulose, magnesium oxide, melatonin, naloxone, ondansetron, ondansetron, oxyCODONE, potassium bicarbonate, potassium bicarbonate, potassium bicarbonate, potassium, sodium phosphates, potassium, sodium phosphates, potassium, sodium phosphates, prochlorperazine, simethicone, sodium chloride 0.9%, traZODone, urea, white petrolatum    Review of Systems   Constitutional:  Positive for activity change. Negative for chills and fever.   Respiratory:  Negative for chest tightness and shortness of breath.    Cardiovascular:  Positive for  leg swelling. Negative for chest pain and palpitations.   Musculoskeletal:  Positive for arthralgias, back pain, gait problem and joint swelling.   Skin:  Positive for color change and wound.        wound   Neurological:  Positive for weakness.   Psychiatric/Behavioral:  Negative for agitation, behavioral problems, confusion and self-injury.    Objective:     Vital Signs (Most Recent):  Temp: 99.4 °F (37.4 °C) (02/10/23 0800)  Pulse: 74 (02/10/23 0800)  Resp: 16 (02/10/23 0927)  BP: (!) 142/62 (02/10/23 0927)  SpO2: 96 % (02/10/23 0800)   Vital Signs (24h Range):  Temp:  [97.9 °F (36.6 °C)-99.4 °F (37.4 °C)] 99.4 °F (37.4 °C)  Pulse:  [61-78] 74  Resp:  [16-18] 16  SpO2:  [93 %-97 %] 96 %  BP: ()/(46-63) 142/62     Weight: 58.4 kg (128 lb 12 oz)  Body mass index is 20.78 kg/m².    Physical Exam  Vitals reviewed.   Constitutional:       Appearance: Normal appearance.   HENT:      Head: Normocephalic.   Cardiovascular:      Rate and Rhythm: Regular rhythm. Bradycardia present.      Pulses: Normal pulses.      Heart sounds: Murmur heard.   Pulmonary:      Effort: Pulmonary effort is normal.      Breath sounds: Normal breath sounds.   Musculoskeletal:         General: Swelling, tenderness, deformity and signs of injury present.      Right lower leg: Edema present.      Left lower leg: Edema present.   Skin:     Findings: Bruising and erythema present.      Comments: Wound, see LDA for photo/measurements   Neurological:      Mental Status: She is alert. Mental status is at baseline.      Motor: Weakness present.      Gait: Gait abnormal.

## 2023-02-10 NOTE — PROGRESS NOTES
"  [facility]  Adult Nutrition  Follow Up Note    SUMMARY     Recommendations    Recommendation/Intervention: Continue current diet order + nutritional supplements as able/appropriate and tolerated. Encourage good PO intakes. Continue Vamshi + Vit C + ZnSO4 to aid in wound healing. If more aggressive nutrition intervention desired, please consutl for recommendations.  Goals: consume % of meals, tolerate PO intake, weight maintenance, wound healing  Nutrition Goal Status: progressing towards goal    Assessment and Plan  RD follow up.     Per MD:   "Pt working w/ PT  Pt very tired and fatigued today, not eating much, urine cultures showed normal skin rachel, therefore nhan/ cristy haley she doesn't have a true uti  Bladder scan to see if she has urinary retention  Spoke to family and caregiver that given her current conditin where her intake is quite poor , she fits the picture of failure to thrive and lissa malnutrition, the prognosis is guarded, she is already on appetite stimulant, other that not much can be done besides peg tube to keep up with caloric reqtms."     Current weight is 128 lbs. Weight trend since admission includes: 120 lbs on 1/11, 126 lbs on 1/18. ~8 lb weight gain since admission. Weight seems to be trending towards IBW range which is desired at this time.     Pt is continues on a Regular diet + Gelatein + Vamshi BID. Per flowsheets, pt consuming 25-50% of meals. Intake not adequate to meet nutritional needs. Continues on appetite stimulant.      If PO intakes remains suboptimal, consider initiate of alternate route for nutrition as supplemental nutrition to better meet needs. Please consult for recommendations if alternate route for nutrition is desired.     Wounds continue being treated, Wound Care following. Recommend continue Vamshi BID (Arginaid may be used in place of Vamshi due to supple issues) to aid in wound healing. Continue Vit C and ZnSO4 as needed to aid " "in wound healing as well. Consider addition of mvi if able/appropriate.     Last BM 2/5 per flowsheets. Labs/meds reviewed. RD following.     Nutrition Diagnosis  Increased protein Needs   related to Wound healing as evidenced by pt with multiple wounds     Nutrition Diagnosis Status: Chronic/ continues    Reason for Assessment    Reason For Assessment: RD follow-up  Diagnosis: other (see comments) (urinary tract infection)  Relevant Medical History: arthritis, HTN, lumbar radiculopathy, scott-prosthetic fracture around prosthetic hip, skin tear of upper arm without complication, right, initial encounter, unspecified open wound, left lower leg, initial encounter, wounds and injuries    Nutrition Risk Screen    Nutrition Risk Screen: large or nonhealing wound, burn or pressure injury    Nutrition/Diet History    Spiritual, Cultural Beliefs, Methodist Practices, Values that Affect Care: no  Food Allergies: NKFA    Anthropometrics    Temp: 97.9 °F (36.6 °C)  Height: 5' 6" (167.6 cm)  Height (inches): 66 in  Weight Method: Bed Scale  Weight: 58.4 kg (128 lb 12 oz)  Weight (lb): 128.75 lb  Ideal Body Weight (IBW), Female: 130 lb  % Ideal Body Weight, Female (lb): 100.15 %  BMI (Calculated): 20.5       Lab/Procedures/Meds   Latest Reference Range & Units 02/10/23 04:14   Sodium 136 - 145 mmol/L 133 (L)   Potassium 3.5 - 5.1 mmol/L 4.5   Chloride 98 - 107 mmol/L 100   CO2 21 - 32 mmol/L 24   Anion Gap 7 - 16 mmol/L 14   BUN 7 - 18 mg/dL 10   Creatinine 0.55 - 1.02 mg/dL 0.81   BUN/CREAT RATIO 6 - 20  12   eGFR >=60 mL/min/1.73m² 70   Glucose 74 - 106 mg/dL 81   Calcium 8.5 - 10.1 mg/dL 8.2 (L)   Alkaline Phosphatase 55 - 142 U/L 49 (L)   PROTEIN TOTAL 6.4 - 8.2 g/dL 5.4 (L)   Albumin 3.5 - 5.0 g/dL 2.8 (L)   Albumin/Globulin Ratio  1.1   BILIRUBIN TOTAL >0.0 - 1.2 mg/dL 0.3   AST 15 - 37 U/L 13 (L)   ALT 13 - 56 U/L 14   Globulin, Total 2.0 - 4.0 g/dL 2.6   (L): Data is abnormally low    Note: Low Na - replete to Wnl as " needed. Federico total protein, albumin. Low AST.     Pertinent Labs Reviewed: reviewed  Pertinent Medications Reviewed: reviewed  Pertinent Medications Comments: alprazolam, vit C, carvedilol, docusate sodium, enoxaparin, gabapentin, hydralazine, levothyroxine, lisinopril, megestrol, pantoprazole, prednisone, tamsulosin, zinc sulfate      Nutrition Prescription Ordered    Current Diet Order: regular  Nutrition Order Comments: Appropriate  Oral Nutrition Supplement: Vamshi + Gelatein    Evaluation of Received Nutrient/Fluid Intake       % Intake of Estimated Energy Needs: 25 - 50 %  % Meal Intake: 25 - 50 %    Nutrition Risk    Level of Risk/Frequency of Follow-up: high       Monitor and Evaluation    Food and Nutrient Intake: energy intake, food and beverage intake  Food and Nutrient Adminstration: diet order  Knowledge/Beliefs/Attitudes: food and nutrition knowledge/skill, beliefs and attitudes  Physical Activity and Function: nutrition-related ADLs and IADLs, factors affecting access to physical activity  Anthropometric Measurements: weight, weight change, body mass index  Biochemical Data, Medical Tests and Procedures: electrolyte and renal panel, gastrointestinal profile, glucose/endocrine profile, inflammatory profile, lipid profile  Nutrition-Focused Physical Findings: overall appearance, extremities, muscles and bones, head and eyes, skin       Nutrition Follow-Up    RD Follow-up?: Yes

## 2023-02-10 NOTE — PROGRESS NOTES
[facility]  Wound Care and Hyperbarics [facility]  Progress Note    Patient Name: Zandra Veloz  MRN: [id number]  Admission Date: 12/29/2022  Hospital Length of Stay: 43 days  Attending Physician: Tal Albert MD  Primary Care Provider: Brandon Thornton MD         Subjective:     HPI:  87 yo female with multiple skin tears, traumatic wounds, and surgical incision to left thigh. She was admitted following a fall on 12/17 when she was trying to go to the restroom at her home.  Patient sustained a left femur fracture. She is status post ORIF on 12/20. Staples intact to left hip. Moderate amount of green drainage noted, cultures obtained today. Left lower leg sutures removed today. Slough noted in wound bed today, adjustment to local wound care. Significant PMH includes hypertension, bradycardia, osteoarthritis, hypothyroidism, osteoporosis, and multiple falls. Wound healing is complicated by bradycardia, hx of a-fib, chronic pain, weakness, fragile skin, limited mobility, multiple falls, infection, and pain.     Hospital Course:   1/5/23 - Complains of pain to right posterior knee, venous doppler ordered. Sutures and every other staple removed today. New local wound care orders. Cultures pending.   1/9/23 - Wounds on lower extremities healing well. Continue current POC. Bruising noted to left posterior knee, protect with mepitel and optifoam. Dressing to incision site saturated with green drainage. Cultures negative. Continue with drawtex and notify ortho of drainage.   1/11/2023 - Wounds are healing well. Continue current POC. Ultrasound pending, preliminary results concerning for fluid pocket, Dr. Singer going to evaluate today.   1/17/23 - Wounds continue to show improvement. Dr. Singer evaluated left hip today during wound vac change. Staples and wound vac d/c'd today. Dressing change twice weekly.   1/19/23 - Wounds continue to heal. Incision with minimal drainage. Puracol  to left thigh today. Continue with current POC. Twice weekly dressing changes (M, Th)  1/23/23 - Wounds are improving. Continue twice weekly dressing changes.   1/30/23 - New skin tear to right thigh, used tweezers and q-tip to place skin over wound and applied transparent dressing. Wounds are smaller today with granulation tissue. Continue drawtex and bordered foam dressing to wounds.   2/2/23 - Discussed using silvadene with patient and family, patient is allergic to sulfa. Consider urea for dry skin on left lower leg, aquacel ag advantage with bordered foams to wounds.   2/6/23 - Removed dry skin and clots from left lower leg today. Applied silver seal wound dressing today to assist with wound healing and pain.   2/8/23 - Wounds are improving with new plan of care. Polymem silver to open wounds. Silvercel to scabbed areas. Change M, W, F  2/10/23 - Wounds have shown significant improvement with polymem. Continue current POC          Scheduled Meds:   ALPRAZolam  1 mg Oral QHS    ascorbic acid (vitamin C)  500 mg Oral BID    carvediloL  25 mg Oral BID    docusate sodium  100 mg Oral BID    enoxaparin  40 mg Subcutaneous Daily    gabapentin  100 mg Oral TID    hydrALAZINE  50 mg Oral Q8H    levothyroxine  125 mcg Oral Before breakfast    lisinopriL  40 mg Oral Daily    megestroL  40 mg Oral Daily    pantoprazole  40 mg Oral Daily    polyethylene glycol  17 g Oral Daily    predniSONE  5 mg Oral Daily    tamsulosin  0.4 mg Oral Daily    zinc sulfate  220 mg Oral Daily     Continuous Infusions:  PRN Meds:acetaminophen, bisacodyL, dextromethorphan-guaiFENesin  mg/5 ml, dextrose 50%, dextrose 50%, diclofenac sodium, glucagon (human recombinant), glucose, glucose, hydrALAZINE, lactulose, magnesium oxide, melatonin, naloxone, ondansetron, ondansetron, oxyCODONE, potassium bicarbonate, potassium bicarbonate, potassium bicarbonate, potassium, sodium phosphates, potassium, sodium phosphates, potassium,  sodium phosphates, prochlorperazine, simethicone, sodium chloride 0.9%, traZODone, urea, white petrolatum    Review of Systems   Constitutional:  Positive for activity change. Negative for chills and fever.   Respiratory:  Negative for chest tightness and shortness of breath.    Cardiovascular:  Positive for leg swelling. Negative for chest pain and palpitations.   Musculoskeletal:  Positive for arthralgias, back pain, gait problem and joint swelling.   Skin:  Positive for color change and wound.        wound   Neurological:  Positive for weakness.   Psychiatric/Behavioral:  Negative for agitation, behavioral problems, confusion and self-injury.    Objective:     Vital Signs (Most Recent):  Temp: 99.4 °F (37.4 °C) (02/10/23 0800)  Pulse: 74 (02/10/23 0800)  Resp: 16 (02/10/23 0927)  BP: (!) 142/62 (02/10/23 0927)  SpO2: 96 % (02/10/23 0800)   Vital Signs (24h Range):  Temp:  [97.9 °F (36.6 °C)-99.4 °F (37.4 °C)] 99.4 °F (37.4 °C)  Pulse:  [61-78] 74  Resp:  [16-18] 16  SpO2:  [93 %-97 %] 96 %  BP: ()/(46-63) 142/62     Weight: 58.4 kg (128 lb 12 oz)  Body mass index is 20.78 kg/m².    Physical Exam  Vitals reviewed.   Constitutional:       Appearance: Normal appearance.   HENT:      Head: Normocephalic.   Cardiovascular:      Rate and Rhythm: Regular rhythm. Bradycardia present.      Pulses: Normal pulses.      Heart sounds: Murmur heard.   Pulmonary:      Effort: Pulmonary effort is normal.      Breath sounds: Normal breath sounds.   Musculoskeletal:         General: Swelling, tenderness, deformity and signs of injury present.      Right lower leg: Edema present.      Left lower leg: Edema present.   Skin:     Findings: Bruising and erythema present.      Comments: Wound, see LDA for photo/measurements   Neurological:      Mental Status: She is alert. Mental status is at baseline.      Motor: Weakness present.      Gait: Gait abnormal.       Assessment/Plan:     * Multiple skin tears  Clean with vashe  Apply  polymem silver to wounds  Cover and secure with bordered foam  Change M, Th  Keep leg elevated and avoid pressure on wound.    Open wound of left lower leg  Clean with vashe  Apply polymem silver to open wounds   Cover and secure with bordered foam  Change M, TH  Keep leg elevated and avoid pressure on wound.              ZURDO San  Wound Care and Hyperbarics [facility]  [facility]

## 2023-02-10 NOTE — ASSESSMENT & PLAN NOTE
Clean with vashe  Apply polymem silver to open wounds   Cover and secure with bordered foam  Change M, TH  Keep leg elevated and avoid pressure on wound.

## 2023-02-10 NOTE — CONSULTS
HPI:  HPI:   88-year-old lady seen emergency room department.  Patient presents after having a fall when she was trying to go to the restroom at her home.  Patient sustained a left femur fracture.  Patient complains of moderate to severe pain in the left hip area.  Patient also complains of chest tightness, she rates it a 5/10 in the chest tightness has been intubate.  Patient also was seen in emergency room department earlier this week per her daughter.  Patient is found to have dehydration and a urinary tract infection.  Current she denies any shortness of breath.  She does not give a history of coronary artery disease.  ----------------------------------------------------------------------------------------------------------------------------------------------------  Ms. Veloz was admitted on December 29, 2022.  Urology was consulted because of problems with urinary retention.  Apparently has failed a few attempts to void when catheter has been removed.  History obtained from patient and she says her problem is that there is not a bedside commode available for her to use so that she can not get up to try to void on her own when catheter is removed.  I am not sure if that is whole story or not but she says she had no problems at all with urination prior to her fractured femur.  She has not had urinary frequency or nocturia prior to admission.  Has no history of urinary tract infections that she  remembers prior to that time.  According to her history she has had absolutely no trouble with urination prior to the injury and subsequent hospitalization.  If that is the case I would favor another voiding trial and see if using bedside commode is the answer to her voiding problems.  I would check postvoiding bladder scan to make sure she is able to empty bladder completely.  Otherwise her options are either indwelling Burk or intermittent catheterizations.  If family support adequate it may be that she could be  switched to intermittent catheterization but I doubt she will be able to do that herself.  If it is plan for her to go to nursing home probably just need to leave Burk indwelling if she fails voiding trial.

## 2023-02-10 NOTE — PROGRESS NOTES
[facility] Hospital Medicine  Progress Note    Patient Name: Zandra Veloz  MRN: [id number]  Patient Class: IP- Inpatient   Admission Date: 12/29/2022  Length of Stay: 43 days  Attending Physician: Tal Albert MD  Primary Care Provider: Brandon Thornton MD        Subjective:     Principal Problem:Multiple skin tears        HPI:  HPI:   88-year-old lady seen emergency room department.  Patient presents after having a fall when she was trying to go to the restroom at her home.  Patient sustained a left femur fracture.  Patient complains of moderate to severe pain in the left hip area.  Patient also complains of chest tightness, she rates it a 5/10 in the chest tightness has been intubate.  Patient also was seen in emergency room department earlier this week per her daughter.  Patient is found to have dehydration and a urinary tract infection.  Current she denies any shortness of breath.  She does not give a history of coronary artery disease.        Procedure(s) (LRB):  REVISION, TOTAL ARTHROPLASTY, HIP, VICTORIANO PROSTHETIC FX (Left)       Hospital Course:   12/19 - records reviewed. Fall without LOC and has left hip fx. No other complaints currently. Seen by cardiology with some CP felt musculoskeletal.  Echo mild AS and mod MR with nl EF. Age increase cardiac risk for surgery but discussed with daughter surgery is urgent and see no benefit in delay medically. Feel low to moderate risk for surgery. Question about UTI. No symptoms. Had UTI in 10.22 not surprising. Lab to do culture on urine in lab. Cover with ATN for prior culture with ATB started. Discussed with Dr Singer and cardiology.  12/20 Tachycardia and elevated BP. Cardiology adjusted meds. Plan hold off surgery until 12/22 to adjust meds and treat UTI. Family in room. Pt not sleeping well.   12/21 Didn't sleep well prior night. Apparently been on xanax for a time. Also recently started on Neurontin. Family with  question. No recent BM. Some increase stool on KUB but not severe. Surgery for am. BP some up but better. HR good.   12/22 Seen prior to surgery and apparently add better night. Sore mouth / throat better with mycostatin. For surgery. Family in room.  12/23 patient with pain but otherwise seemed to be doing relatively well.  Daughter in room.  Apparently been taking prednisone for some time and this was restarted per family request.  Look into swing bed placement  12/24 patient had better night rested and everyone's in better spirits today.  Tolerating some diet.   Therapy worked with patient.  Look into swing bed placement next week  12/25 remained in good spirits.  Less pain.  Had good bowel movement and ordered Dulcolax not given.  Because of dressing to leg, Burk was not removed to keep it from getting soiled.  Wound care to check 12/27.  Discuss this with patient and daughter.  On antibiotics for UTI  12/26-will dc to swingbed once accepted. Needs continued wound care.  12/27-DC when bed available  12/28- issues with wound care yesterday.  Dr. Singer had to come remove dressings himself due to adherence to subcutaneous tissue.  Family refusing transfer to [facility] due to lack of specially trained wound care team availability.  Planning to transfer to specialty but resistant to that as well and requesting to speak to administration.    12/29- agreeable to transfer to specialty.  This will be the best place for her to receive wound care.  DC after family tours     12/29-needs continued wound care and therapy[facility]    12/30-doing well today, still with pain         Overview/Hospital Course:  12/31-having some pain this am  1/1- no complaints  1/2- doing well  1/3- continue wound care, last day cefepime tomorrow  1/4- some pain this AM.  Delay in mediations dues to staffing issues. Dsicussed with RN  1/5- no issues overnight  1/6- Still with pain, hip incision looks good  1/7-diclofenac for  shoulder pain  1/8-decrease laxatives, change benzos and carlo to PRN per patient request  1/9 some confusion overnight  1/10- no more confusion, doing well  1/11- no acute issues overnight  1/12- doing well, nausea today  1/13 -Dr. Lebron Cutler IV, DO taking over for Dr. Guerra for weekend.  Patient's pain appears to be well controlled this morning no complaints of nausea.  Continue wound care and PT/OT  1/14-patient appears well labs.  Continue wound care and PT/OT  1/15-patient still appears well.  No acute concerns or complaints.  No events overnight.  Continue wound care working with a PTOT.  Expiration is services estimated by February 6 1/16- no complaints, doing well  1/17-some nausea today  1/18-better today.  Still requiring twice weekly dressing changes.  Appetite stimulant  1/19- PWB with PT today.  Still with weeping wounds.  Will need wound care twice weekly-as these improve will be able to transfer to other facility  1/20-no complaints  1/21-no acute events overnight, some anxiety today  1/22-episode of SVT yesterday. Spontaneous resolution  1/23-no complaints  1/24- no complaints  1/25-doing well  1/26-wound care, pt  1/27- no complaints  1/28- patient seen examined today resting comfortably in bed, in no acute distress and no acute events overnight.  Patient states that she is doing well and denies pain.  States that she is been working well with PT/OT but not this weekend.  Patient otherwise doing well without complaints.  1/29-patient seen examined today resting comfortably in bed, in no acute distress with no acute events overnight.  Patient denies pain or other complaints at this time.  She continues with wound care PT/OT.  1/30-doing well this am  1/31-no complaints  2/1-no changes      No new subjective & objective note has been filed under this hospital service since the last note was generated.    02/10/2023   Patient seen evaluated she has multiple skin tears, failure to thrive an  infected wound to left lower extremity she voiced no complaints she is afebrile hemodynamically stable lungs are clear no crackles cardiovascular S1-S2 regular rate rhythm abdomen is soft positive bowel sounds nontender will continue current medical management.  Assessment/Plan:      * Multiple skin tears  Wound care  Optimize nutrition       Failure to thrive in adult  Cont appetite stimulant  Cont nutiritional supplement  Cont to encourage oral intake  If continues to worsen consider PEG tube       Disruption of external surgical wound        Wounds and injuries  Wound care consulted and following    Open wound of left lower leg  Wound care      Delmy-prosthetic fracture around prosthetic hip  S/P fixation with Dr Singer      Lumbar radiculopathy  Pain management  PT      Hypertension  Adjust medications as needed    2/2-d/c dilt, hydral since bp running low, keep metoprolol and lisinopril on    2/3-will increae lisinopril for afterload reduction.     Arthritis  Pain management  Per family has been taking oral steorids for a long time, cont that        VTE Risk Mitigation (From admission, onward)           Ordered     enoxaparin injection 40 mg  Daily         02/02/23 1051                    Discharge Planning   YANIV:      Code Status: Full Code   Is the patient medically ready for discharge?:     Reason for patient still in hospital (select all that apply): Treatment  Discharge Plan A: Skilled Nursing Facility                  Tal Albert MD  Department of Hospital Medicine   [facility]

## 2023-02-10 NOTE — PT/OT/SLP PROGRESS
"Physical Therapy Treatment    Patient Name:  Zandra Veloz   MRN:  [id number]    Recommendations:     Discharge Recommendations: nursing facility, skilled, rehabilitation facility  Discharge Equipment Recommendations: other (see comments) (to be determined)  Barriers to discharge:  ongoing medical treatment    Assessment:     Zandra Veloz is a 88 y.o. female admitted with a medical diagnosis of Multiple skin tears.  She presents with the following impairments/functional limitations: weakness, impaired endurance, impaired self care skills, impaired functional mobility, gait instability, impaired cognition, impaired skin.    Pt required increased time to complete activities, and only willing to ambulate as far as recliner chair. Pt stating that she has not been out of bed in over a week, however, pt ambulated short distance on Wednesday     Rehab Prognosis: Fair; patient would benefit from acute skilled PT services to address these deficits and reach maximum level of function.    Recent Surgery: * No surgery found *      Plan:     During this hospitalization, patient to be seen 5 x/week to address the identified rehab impairments via gait training, therapeutic activities, therapeutic exercises and progress toward the following goals:    Plan of Care Expires:  03/30/23    Subjective     Chief Complaint:   Patient/Family Comments/goals: pt wanting to get up to recliner chair, "you're gonna have to take it slow, I haven't been up in a week"  Pain/Comfort:         Objective:     Communicated with Madie Wood RN prior to session.  Patient found HOB elevated with peripheral IV, chao catheter, telemetry upon PT entry to room.     General Precautions: Standard, fall  Orthopedic Precautions: LLE posterior precautions, LLE partial weight bearing  Braces: N/A  Respiratory Status: Room air     Functional Mobility:  Bed Mobility:     Rolling Left:  contact guard assistance  Rolling Right: contact guard " assistance  Supine to Sit: minimum assistance and increased effort  Transfers:     Sit to Stand:  minimum assistance and moderate assistance with rolling walker  Gait: 4' minimum assist with RW around to chair      AM-PAC 6 CLICK MOBILITY  Turning over in bed (including adjusting bedclothes, sheets and blankets)?: 3  Sitting down on and standing up from a chair with arms (e.g., wheelchair, bedside commode, etc.): 2  Moving from lying on back to sitting on the side of the bed?: 3  Moving to and from a bed to a chair (including a wheelchair)?: 3  Need to walk in hospital room?: 3  Climbing 3-5 steps with a railing?: 1  Basic Mobility Total Score: 15       Treatment & Education:  Pt performed 2 x 15 reps (B) LE exercises: ap, quad set, glut set, straight leg raise, hip ab/adduction, long arc quad, heel slide with active assist range of motion     Patient left up in chair with all lines intact, call button in reach, and caregiver present..    GOALS:   Multidisciplinary Problems       Physical Therapy Goals          Problem: Physical Therapy    Goal Priority Disciplines Outcome Goal Variances Interventions   Physical Therapy Goal     PT, PT/OT Ongoing, Progressing     Description: Short Term Goals to be met by 2/25/2023    Patient will increase functional independence and safety with mobility by performing    1.   Rolling side to side with standby assist  2.   Supine to sit standby Assist  3.   Sit to stand Minimal Assist using manual assistance with gait belt and PWB L LE  4.   Ambulate 20ft with RW, PWB LLE. Min A.   5.   Lower extremity exercises x 30 reps with assist as necessary and no rest breaks      Long Term Goals to be met by 3/10/2023    Patient to require less than or equal to Stand by assist for functional mobility to ease caregiver burden                        Time Tracking:     PT Received On: 02/10/23  PT Start Time: 1357     PT Stop Time: 1425  PT Total Time (min): 28 min     Billable Minutes:  Therapeutic Activity 8 and Therapeutic Exercise 15    Treatment Type: Treatment  PT/PTA: PTA     PTA Visit Number: 1     02/10/2023

## 2023-02-10 NOTE — PT/OT/SLP PROGRESS
Occupational Therapy      Patient Name:  Zandra Veloz   MRN:  [id number]    Patient not seen today secondary to Patient unwilling to participate( I want to wait til Monday).  Will follow-up on next treatment day.    2/10/2023

## 2023-02-11 PROCEDURE — 25000003 PHARM REV CODE 250: Performed by: INTERNAL MEDICINE

## 2023-02-11 PROCEDURE — 99232 SBSQ HOSP IP/OBS MODERATE 35: CPT | Mod: ,,, | Performed by: INTERNAL MEDICINE

## 2023-02-11 PROCEDURE — 63600175 PHARM REV CODE 636 W HCPCS: Performed by: INTERNAL MEDICINE

## 2023-02-11 PROCEDURE — 99900035 HC TECH TIME PER 15 MIN (STAT)

## 2023-02-11 PROCEDURE — 11000001 HC ACUTE MED/SURG PRIVATE ROOM

## 2023-02-11 PROCEDURE — 25000003 PHARM REV CODE 250: Performed by: STUDENT IN AN ORGANIZED HEALTH CARE EDUCATION/TRAINING PROGRAM

## 2023-02-11 PROCEDURE — 99232 PR SUBSEQUENT HOSPITAL CARE,LEVL II: ICD-10-PCS | Mod: ,,, | Performed by: INTERNAL MEDICINE

## 2023-02-11 RX ADMIN — POLYETHYLENE GLYCOL 3350 17 G: 17 POWDER, FOR SOLUTION ORAL at 08:02

## 2023-02-11 RX ADMIN — OXYCODONE HYDROCHLORIDE 10 MG: 5 TABLET ORAL at 08:02

## 2023-02-11 RX ADMIN — DOCUSATE SODIUM 100 MG: 100 CAPSULE, LIQUID FILLED ORAL at 08:02

## 2023-02-11 RX ADMIN — CARVEDILOL 25 MG: 25 TABLET, FILM COATED ORAL at 09:02

## 2023-02-11 RX ADMIN — LEVOTHYROXINE SODIUM 125 MCG: 0.12 TABLET ORAL at 06:02

## 2023-02-11 RX ADMIN — GABAPENTIN 100 MG: 100 CAPSULE ORAL at 02:02

## 2023-02-11 RX ADMIN — LACTULOSE 20 G: 20 SOLUTION ORAL at 08:02

## 2023-02-11 RX ADMIN — GABAPENTIN 100 MG: 100 CAPSULE ORAL at 09:02

## 2023-02-11 RX ADMIN — OXYCODONE HYDROCHLORIDE 10 MG: 5 TABLET ORAL at 02:02

## 2023-02-11 RX ADMIN — LACTULOSE 20 G: 20 SOLUTION ORAL at 05:02

## 2023-02-11 RX ADMIN — HYDRALAZINE HYDROCHLORIDE 50 MG: 50 TABLET, FILM COATED ORAL at 02:02

## 2023-02-11 RX ADMIN — ENOXAPARIN SODIUM 40 MG: 40 INJECTION SUBCUTANEOUS at 05:02

## 2023-02-11 RX ADMIN — CARVEDILOL 25 MG: 25 TABLET, FILM COATED ORAL at 08:02

## 2023-02-11 RX ADMIN — ALPRAZOLAM 1 MG: 0.25 TABLET ORAL at 09:02

## 2023-02-11 NOTE — PROGRESS NOTES
[facility] Hospital Medicine  Progress Note    Patient Name: Zandra Veloz  MRN: [id number]  Patient Class: IP- Inpatient   Admission Date: 12/29/2022  Length of Stay: 44 days  Attending Physician: Tal Albert MD  Primary Care Provider: Brandon Thornton MD        Subjective:     Principal Problem:Multiple skin tears        HPI:  HPI:   88-year-old lady seen emergency room department.  Patient presents after having a fall when she was trying to go to the restroom at her home.  Patient sustained a left femur fracture.  Patient complains of moderate to severe pain in the left hip area.  Patient also complains of chest tightness, she rates it a 5/10 in the chest tightness has been intubate.  Patient also was seen in emergency room department earlier this week per her daughter.  Patient is found to have dehydration and a urinary tract infection.  Current she denies any shortness of breath.  She does not give a history of coronary artery disease.        Procedure(s) (LRB):  REVISION, TOTAL ARTHROPLASTY, HIP, VICTORIANO PROSTHETIC FX (Left)       Hospital Course:   12/19 - records reviewed. Fall without LOC and has left hip fx. No other complaints currently. Seen by cardiology with some CP felt musculoskeletal.  Echo mild AS and mod MR with nl EF. Age increase cardiac risk for surgery but discussed with daughter surgery is urgent and see no benefit in delay medically. Feel low to moderate risk for surgery. Question about UTI. No symptoms. Had UTI in 10.22 not surprising. Lab to do culture on urine in lab. Cover with ATN for prior culture with ATB started. Discussed with Dr Singer and cardiology.  12/20 Tachycardia and elevated BP. Cardiology adjusted meds. Plan hold off surgery until 12/22 to adjust meds and treat UTI. Family in room. Pt not sleeping well.   12/21 Didn't sleep well prior night. Apparently been on xanax for a time. Also recently started on Neurontin. Family with  question. No recent BM. Some increase stool on KUB but not severe. Surgery for am. BP some up but better. HR good.   12/22 Seen prior to surgery and apparently add better night. Sore mouth / throat better with mycostatin. For surgery. Family in room.  12/23 patient with pain but otherwise seemed to be doing relatively well.  Daughter in room.  Apparently been taking prednisone for some time and this was restarted per family request.  Look into swing bed placement  12/24 patient had better night rested and everyone's in better spirits today.  Tolerating some diet.   Therapy worked with patient.  Look into swing bed placement next week  12/25 remained in good spirits.  Less pain.  Had good bowel movement and ordered Dulcolax not given.  Because of dressing to leg, Burk was not removed to keep it from getting soiled.  Wound care to check 12/27.  Discuss this with patient and daughter.  On antibiotics for UTI  12/26-will dc to swingbed once accepted. Needs continued wound care.  12/27-DC when bed available  12/28- issues with wound care yesterday.  Dr. Singer had to come remove dressings himself due to adherence to subcutaneous tissue.  Family refusing transfer to [facility] due to lack of specially trained wound care team availability.  Planning to transfer to specialty but resistant to that as well and requesting to speak to administration.    12/29- agreeable to transfer to specialty.  This will be the best place for her to receive wound care.  DC after family tours     12/29-needs continued wound care and therapy. [facility]    12/30-doing well today, still with pain         Overview/Hospital Course:  12/31-having some pain this am  1/1- no complaints  1/2- doing well  1/3- continue wound care, last day cefepime tomorrow  1/4- some pain this AM.  Delay in mediations dues to staffing issues. Dsicussed with RN  1/5- no issues overnight  1/6- Still with pain, hip incision looks good  1/7-diclofenac for  shoulder pain  1/8-decrease laxatives, change benzos and carlo to PRN per patient request  1/9 some confusion overnight  1/10- no more confusion, doing well  1/11- no acute issues overnight  1/12- doing well, nausea today  1/13 -Dr. Lebron Cutler IV, DO taking over for Dr. Guerra for weekend.  Patient's pain appears to be well controlled this morning no complaints of nausea.  Continue wound care and PT/OT  1/14-patient appears well labs.  Continue wound care and PT/OT  1/15-patient still appears well.  No acute concerns or complaints.  No events overnight.  Continue wound care working with a PTOT.  Expiration is services estimated by February 6 1/16- no complaints, doing well  1/17-some nausea today  1/18-better today.  Still requiring twice weekly dressing changes.  Appetite stimulant  1/19- PWB with PT today.  Still with weeping wounds.  Will need wound care twice weekly-as these improve will be able to transfer to other facility  1/20-no complaints  1/21-no acute events overnight, some anxiety today  1/22-episode of SVT yesterday. Spontaneous resolution  1/23-no complaints  1/24- no complaints  1/25-doing well  1/26-wound care, pt  1/27- no complaints  1/28- patient seen examined today resting comfortably in bed, in no acute distress and no acute events overnight.  Patient states that she is doing well and denies pain.  States that she is been working well with PT/OT but not this weekend.  Patient otherwise doing well without complaints.  1/29-patient seen examined today resting comfortably in bed, in no acute distress with no acute events overnight.  Patient denies pain or other complaints at this time.  She continues with wound care PT/OT.  1/30-doing well this am  1/31-no complaints  2/1-no changes      No new subjective & objective note has been filed under this hospital service since the last note was generated.    02/11/2023   Patient complains of a stuffy nose, and a sore throat and occasional abdominal  discomfort/constipation  Patient seen evaluated she has multiple skin tears, failure to thrive an infected wound to left lower extremity she voiced no complaints she is afebrile hemodynamically stable lungs are clear no crackles cardiovascular S1-S2 regular rate rhythm abdomen is soft positive bowel sounds nontender will continue current medical management.  Flonase, Chloraseptic spray, lactulose for constipation, and offload her heels.  Assessment/Plan:      * Multiple skin tears  Wound care  Optimize nutrition       Failure to thrive in adult  Cont appetite stimulant  Cont nutiritional supplement  Cont to encourage oral intake  If continues to worsen consider PEG tube       Disruption of external surgical wound        Wounds and injuries  Wound care consulted and following    Open wound of left lower leg  Wound care      Delmy-prosthetic fracture around prosthetic hip  S/P fixation with Dr Singer      Lumbar radiculopathy  Pain management  PT      Hypertension  Adjust medications as needed    2/2-d/c dilt, hydral since bp running low, keep metoprolol and lisinopril on    2/3-will increae lisinopril for afterload reduction.     Arthritis  Pain management  Per family has been taking oral steorids for a long time, cont that        VTE Risk Mitigation (From admission, onward)           Ordered     enoxaparin injection 40 mg  Daily         02/02/23 1051                    Discharge Planning   YANIV:      Code Status: Full Code   Is the patient medically ready for discharge?:     Reason for patient still in hospital (select all that apply): Treatment  Discharge Plan A: Skilled Nursing Facility                  Tal Albert MD  Department of Hospital Medicine   [facility]

## 2023-02-12 PROCEDURE — 99232 PR SUBSEQUENT HOSPITAL CARE,LEVL II: ICD-10-PCS | Mod: ,,, | Performed by: INTERNAL MEDICINE

## 2023-02-12 PROCEDURE — 25000003 PHARM REV CODE 250: Performed by: STUDENT IN AN ORGANIZED HEALTH CARE EDUCATION/TRAINING PROGRAM

## 2023-02-12 PROCEDURE — 63600175 PHARM REV CODE 636 W HCPCS: Performed by: INTERNAL MEDICINE

## 2023-02-12 PROCEDURE — 25000242 PHARM REV CODE 250 ALT 637 W/ HCPCS: Performed by: INTERNAL MEDICINE

## 2023-02-12 PROCEDURE — 63600175 PHARM REV CODE 636 W HCPCS: Performed by: STUDENT IN AN ORGANIZED HEALTH CARE EDUCATION/TRAINING PROGRAM

## 2023-02-12 PROCEDURE — 25000003 PHARM REV CODE 250: Performed by: NURSE PRACTITIONER

## 2023-02-12 PROCEDURE — 99232 SBSQ HOSP IP/OBS MODERATE 35: CPT | Mod: ,,, | Performed by: INTERNAL MEDICINE

## 2023-02-12 PROCEDURE — 25000003 PHARM REV CODE 250: Performed by: INTERNAL MEDICINE

## 2023-02-12 PROCEDURE — 11000001 HC ACUTE MED/SURG PRIVATE ROOM

## 2023-02-12 PROCEDURE — 97110 THERAPEUTIC EXERCISES: CPT

## 2023-02-12 PROCEDURE — 97116 GAIT TRAINING THERAPY: CPT

## 2023-02-12 RX ORDER — FLUTICASONE PROPIONATE 50 MCG
2 SPRAY, SUSPENSION (ML) NASAL DAILY
Status: DISCONTINUED | OUTPATIENT
Start: 2023-02-12 | End: 2023-02-20 | Stop reason: HOSPADM

## 2023-02-12 RX ADMIN — HYDRALAZINE HYDROCHLORIDE 50 MG: 50 TABLET, FILM COATED ORAL at 02:02

## 2023-02-12 RX ADMIN — GABAPENTIN 100 MG: 100 CAPSULE ORAL at 08:02

## 2023-02-12 RX ADMIN — ZINC SULFATE 220 MG (50 MG) CAPSULE 220 MG: CAPSULE at 08:02

## 2023-02-12 RX ADMIN — PANTOPRAZOLE SODIUM 40 MG: 40 TABLET, DELAYED RELEASE ORAL at 08:02

## 2023-02-12 RX ADMIN — DOCUSATE SODIUM 100 MG: 100 CAPSULE, LIQUID FILLED ORAL at 08:02

## 2023-02-12 RX ADMIN — OXYCODONE HYDROCHLORIDE AND ACETAMINOPHEN 500 MG: 500 TABLET ORAL at 08:02

## 2023-02-12 RX ADMIN — ENOXAPARIN SODIUM 40 MG: 40 INJECTION SUBCUTANEOUS at 05:02

## 2023-02-12 RX ADMIN — FLUTICASONE PROPIONATE 100 MCG: 50 SPRAY, METERED NASAL at 12:02

## 2023-02-12 RX ADMIN — GABAPENTIN 100 MG: 100 CAPSULE ORAL at 02:02

## 2023-02-12 RX ADMIN — HYDRALAZINE HYDROCHLORIDE 50 MG: 50 TABLET, FILM COATED ORAL at 06:02

## 2023-02-12 RX ADMIN — POLYETHYLENE GLYCOL 3350 17 G: 17 POWDER, FOR SOLUTION ORAL at 08:02

## 2023-02-12 RX ADMIN — TAMSULOSIN HYDROCHLORIDE 0.4 MG: 0.4 CAPSULE ORAL at 08:02

## 2023-02-12 RX ADMIN — ALPRAZOLAM 1 MG: 0.25 TABLET ORAL at 08:02

## 2023-02-12 RX ADMIN — MEGESTROL ACETATE 40 MG: 40 TABLET ORAL at 08:02

## 2023-02-12 RX ADMIN — OXYCODONE HYDROCHLORIDE 10 MG: 5 TABLET ORAL at 12:02

## 2023-02-12 RX ADMIN — LEVOTHYROXINE SODIUM 125 MCG: 0.12 TABLET ORAL at 06:02

## 2023-02-12 RX ADMIN — OXYCODONE HYDROCHLORIDE 10 MG: 5 TABLET ORAL at 08:02

## 2023-02-12 RX ADMIN — LISINOPRIL 40 MG: 20 TABLET ORAL at 08:02

## 2023-02-12 RX ADMIN — PREDNISONE 5 MG: 5 TABLET ORAL at 08:02

## 2023-02-12 RX ADMIN — CARVEDILOL 25 MG: 25 TABLET, FILM COATED ORAL at 08:02

## 2023-02-12 NOTE — PT/OT/SLP PROGRESS
Occupational Therapy      Patient Name:  Zandra Veloz   MRN:  [id number]    Patient not seen today secondary to Patient unwilling to participate. OT attempted AM and PM. Pt declined each time stating that she did not feel well and did not feel up to participating. Will follow-up 2/13/2023.    2/12/2023

## 2023-02-12 NOTE — PROGRESS NOTES
[facility] Hospital Medicine  Progress Note    Patient Name: Zandra Veloz  MRN: [id number]  Patient Class: IP- Inpatient   Admission Date: 12/29/2022  Length of Stay: 45 days  Attending Physician: Tal Albert MD  Primary Care Provider: Brandon Thornton MD        Subjective:     Principal Problem:Multiple skin tears        HPI:  HPI:   88-year-old lady seen emergency room department.  Patient presents after having a fall when she was trying to go to the restroom at her home.  Patient sustained a left femur fracture.  Patient complains of moderate to severe pain in the left hip area.  Patient also complains of chest tightness, she rates it a 5/10 in the chest tightness has been intubate.  Patient also was seen in emergency room department earlier this week per her daughter.  Patient is found to have dehydration and a urinary tract infection.  Current she denies any shortness of breath.  She does not give a history of coronary artery disease.        Procedure(s) (LRB):  REVISION, TOTAL ARTHROPLASTY, HIP, VICTORIANO PROSTHETIC FX (Left)       Hospital Course:   12/19 - records reviewed. Fall without LOC and has left hip fx. No other complaints currently. Seen by cardiology with some CP felt musculoskeletal.  Echo mild AS and mod MR with nl EF. Age increase cardiac risk for surgery but discussed with daughter surgery is urgent and see no benefit in delay medically. Feel low to moderate risk for surgery. Question about UTI. No symptoms. Had UTI in 10.22 not surprising. Lab to do culture on urine in lab. Cover with ATN for prior culture with ATB started. Discussed with Dr Singer and cardiology.  12/20 Tachycardia and elevated BP. Cardiology adjusted meds. Plan hold off surgery until 12/22 to adjust meds and treat UTI. Family in room. Pt not sleeping well.   12/21 Didn't sleep well prior night. Apparently been on xanax for a time. Also recently started on Neurontin. Family with  question. No recent BM. Some increase stool on KUB but not severe. Surgery for am. BP some up but better. HR good.   12/22 Seen prior to surgery and apparently add better night. Sore mouth / throat better with mycostatin. For surgery. Family in room.  12/23 patient with pain but otherwise seemed to be doing relatively well.  Daughter in room.  Apparently been taking prednisone for some time and this was restarted per family request.  Look into swing bed placement  12/24 patient had better night rested and everyone's in better spirits today.  Tolerating some diet.   Therapy worked with patient.  Look into swing bed placement next week  12/25 remained in good spirits.  Less pain.  Had good bowel movement and ordered Dulcolax not given.  Because of dressing to leg, Burk was not removed to keep it from getting soiled.  Wound care to check 12/27.  Discuss this with patient and daughter.  On antibiotics for UTI  12/26-will dc to swingbed once accepted. Needs continued wound care.  12/27-DC when bed available  12/28- issues with wound care yesterday.  Dr. Singer had to come remove dressings himself due to adherence to subcutaneous tissue.  Family refusing transfer to [facility] due to lack of specially trained wound care team availability.  Planning to transfer to specialty but resistant to that as well and requesting to speak to administration.    12/29- agreeable to transfer to specialty.  This will be the best place for her to receive wound care.  DC after family tours     12/29-needs continued wound care and therapy[facility]    12/30-doing well today, still with pain         Overview/Hospital Course:  12/31-having some pain this am  1/1- no complaints  1/2- doing well  1/3- continue wound care, last day cefepime tomorrow  1/4- some pain this AM.  Delay in mediations dues to staffing issues. Dsicussed with RN  1/5- no issues overnight  1/6- Still with pain, hip incision looks good  1/7-diclofenac for  shoulder pain  1/8-decrease laxatives, change benzos and carlo to PRN per patient request  1/9 some confusion overnight  1/10- no more confusion, doing well  1/11- no acute issues overnight  1/12- doing well, nausea today  1/13 -Dr. Lebron Cutler IV, DO taking over for Dr. Guerra for weekend.  Patient's pain appears to be well controlled this morning no complaints of nausea.  Continue wound care and PT/OT  1/14-patient appears well labs.  Continue wound care and PT/OT  1/15-patient still appears well.  No acute concerns or complaints.  No events overnight.  Continue wound care working with a PTOT.  Expiration is services estimated by February 6 1/16- no complaints, doing well  1/17-some nausea today  1/18-better today.  Still requiring twice weekly dressing changes.  Appetite stimulant  1/19- PWB with PT today.  Still with weeping wounds.  Will need wound care twice weekly-as these improve will be able to transfer to other facility  1/20-no complaints  1/21-no acute events overnight, some anxiety today  1/22-episode of SVT yesterday. Spontaneous resolution  1/23-no complaints  1/24- no complaints  1/25-doing well  1/26-wound care, pt  1/27- no complaints  1/28- patient seen examined today resting comfortably in bed, in no acute distress and no acute events overnight.  Patient states that she is doing well and denies pain.  States that she is been working well with PT/OT but not this weekend.  Patient otherwise doing well without complaints.  1/29-patient seen examined today resting comfortably in bed, in no acute distress with no acute events overnight.  Patient denies pain or other complaints at this time.  She continues with wound care PT/OT.  1/30-doing well this am  1/31-no complaints  2/1-no changes      No new subjective & objective note has been filed under this hospital service since the last note was generated.    02/12/2023   No new complaints today.  Patient complains of a stuffy nose, and a sore throat  and occasional abdominal discomfort/constipation  Patient seen evaluated she has multiple skin tears, failure to thrive an infected wound to left lower extremity she voiced no complaints she is afebrile hemodynamically stable lungs are clear no crackles cardiovascular S1-S2 regular rate rhythm abdomen is soft positive bowel sounds nontender will continue current medical management.    Assessment/Plan:      * Multiple skin tears  Wound care  Optimize nutrition       Failure to thrive in adult  Cont appetite stimulant  Cont nutiritional supplement  Cont to encourage oral intake  If continues to worsen consider PEG tube       Disruption of external surgical wound        Wounds and injuries  Wound care consulted and following    Open wound of left lower leg  Wound care      Delmy-prosthetic fracture around prosthetic hip  S/P fixation with Dr Singer      Lumbar radiculopathy  Pain management  PT      Hypertension  Adjust medications as needed    2/2-d/c dilt, hydral since bp running low, keep metoprolol and lisinopril on    2/3-will increae lisinopril for afterload reduction.     Arthritis  Pain management  Per family has been taking oral steorids for a long time, cont that        VTE Risk Mitigation (From admission, onward)           Ordered     enoxaparin injection 40 mg  Daily         02/02/23 1051                    Discharge Planning   YANIV:      Code Status: Full Code   Is the patient medically ready for discharge?:     Reason for patient still in hospital (select all that apply): Treatment  Discharge Plan A: Skilled Nursing Facility                  Tal Albert MD  Department of Hospital Medicine   [facility]

## 2023-02-12 NOTE — PT/OT/SLP PROGRESS
"Physical Therapy Treatment    Patient Name:  Zandra Veloz   MRN:  [id number]    Recommendations:     Discharge Recommendations: nursing facility, skilled, rehabilitation facility  Discharge Equipment Recommendations: other (see comments) (to be determined)  Barriers to discharge:  awaiting transition to swing bed    Assessment:     Zandra Veloz is a 88 y.o. female admitted with a medical diagnosis of Multiple skin tears. Left THR revision She presents with the following impairments/functional limitations: weakness, impaired endurance, impaired self care skills, impaired functional mobility, gait instability, impaired balance, decreased lower extremity function, decreased safety awareness, pain, decreased ROM, impaired skin, orthopedic precautions .    Patient initially declined PT interventions but changed her mind shortly thereafter stating "I just have to try". Patient able to ambulate short distances with RW and min A but demonstrates worsening left foot drop with fatigue and c/o back and left hip pain during trial. Patient wounds/fragile skin make an AFO not possible at this time. Patient requires strong encouragement to work to her max potential during gait trial. Patient will benefit from swing bed at d/c to continue with rehab process.     Rehab Prognosis: Good; patient would benefit from acute skilled PT services to address these deficits and reach maximum level of function.    Recent Surgery: * No surgery found *      Plan:     During this hospitalization, patient to be seen 5 x/week to address the identified rehab impairments via gait training, therapeutic activities, therapeutic exercises and progress toward the following goals:    Plan of Care Expires:  03/30/23    Subjective     Chief Complaint: multiple skin tears, L THR revision  Patient/Family Comments/goals: "I just have to try."  Pain/Comfort:  Pain Rating 1: 4/10  Location - Side 1: Left  Location 1: leg (and back during " gait)  Pain Addressed 1: Pre-medicate for activity, Reposition, Distraction, Cessation of Activity  Pain Rating Post-Intervention 1: 4/10      Objective:     Communicated with Madie Wood RN prior to session.  Patient found supine with peripheral IV, chao catheter, telemetry upon PT entry to room.     General Precautions: Standard, fall  Orthopedic Precautions: LLE posterior precautions, LLE partial weight bearing  Braces: N/A  Respiratory Status: Room air     Functional Mobility:  Bed Mobility:     Supine to Sit: minimum assistance  Transfers:     Sit to Stand:  minimum assistance and of 2 persons with rolling walker and external support of pt feet to stop feet from sliding out from under patient. Bed surface slightly elevated.  Bed to Chair: minimum assistance with  rolling walker  using  Step Transfer  Gait: 15' using RW min A x 2; step to pattern, slow sadaf, assist for walker management; encouragement to work to max potential; short step lengths; worsening left foot drop with fatigue       AM-PAC 6 CLICK MOBILITY  Turning over in bed (including adjusting bedclothes, sheets and blankets)?: 3  Sitting down on and standing up from a chair with arms (e.g., wheelchair, bedside commode, etc.): 3  Moving from lying on back to sitting on the side of the bed?: 3  Moving to and from a bed to a chair (including a wheelchair)?: 3  Need to walk in hospital room?: 3  Climbing 3-5 steps with a railing?: 1  Basic Mobility Total Score: 16       Treatment & Education:  Gait and transfers as above  Bilateral lower extremity exercise x 30 reps: ankle pumps, Quad sets, glut sets, heel slides, hip abduction/adduction, straight leg raises, and Long arc quads with active assist ROM, verbal cues, and tactile cues     Patient left up in chair with all lines intact, call button in reach, Madie Wood RN notified, and sitter (Mad[place]) present..    GOALS:   Multidisciplinary Problems       Physical Therapy Goals          Problem:  Physical Therapy    Goal Priority Disciplines Outcome Goal Variances Interventions   Physical Therapy Goal     PT, PT/OT Ongoing, Progressing     Description: Short Term Goals to be met by 2/25/2023    Patient will increase functional independence and safety with mobility by performing    1.   Rolling side to side with standby assist  2.   Supine to sit standby Assist  3.   Sit to stand Minimal Assist using manual assistance with gait belt and PWB L LE  4.   Ambulate 20ft with RW, PWB LLE. Min A.   5.   Lower extremity exercises x 30 reps with assist as necessary and no rest breaks      Long Term Goals to be met by 3/10/2023    Patient to require less than or equal to Stand by assist for functional mobility to ease caregiver burden                        Time Tracking:     PT Received On: 02/12/23  PT Start Time: 1130     PT Stop Time: 1155  PT Total Time (min): 25 min     Billable Minutes: Gait Training 10 minutes and Therapeutic Exercise 15 minutes    Treatment Type: Treatment  PT/PTA: PT     PTA Visit Number: 0     02/12/2023

## 2023-02-13 PROBLEM — R62.7 FAILURE TO THRIVE IN ADULT: Status: ACTIVE | Noted: 2023-02-09

## 2023-02-13 LAB
ALBUMIN SERPL BCP-MCNC: 2.6 G/DL (ref 3.5–5)
ALBUMIN/GLOB SERPL: 0.9 {RATIO}
ALP SERPL-CCNC: 45 U/L (ref 55–142)
ALT SERPL W P-5'-P-CCNC: 15 U/L (ref 13–56)
ANION GAP SERPL CALCULATED.3IONS-SCNC: 13 MMOL/L (ref 7–16)
AST SERPL W P-5'-P-CCNC: 20 U/L (ref 15–37)
BACTERIA BLD CULT: NORMAL
BACTERIA BLD CULT: NORMAL
BASOPHILS # BLD AUTO: 0.07 K/UL (ref 0–0.2)
BASOPHILS NFR BLD AUTO: 0.8 % (ref 0–1)
BILIRUB SERPL-MCNC: 0.4 MG/DL (ref ?–1.2)
BUN SERPL-MCNC: 12 MG/DL (ref 7–18)
BUN/CREAT SERPL: 17 (ref 6–20)
CALCIUM SERPL-MCNC: 8.3 MG/DL (ref 8.5–10.1)
CHLORIDE SERPL-SCNC: 99 MMOL/L (ref 98–107)
CO2 SERPL-SCNC: 24 MMOL/L (ref 21–32)
CREAT SERPL-MCNC: 0.71 MG/DL (ref 0.55–1.02)
DIFFERENTIAL METHOD BLD: ABNORMAL
EGFR (NO RACE VARIABLE) (RUSH/TITUS): 82 ML/MIN/1.73M²
EOSINOPHIL # BLD AUTO: 0.14 K/UL (ref 0–0.5)
EOSINOPHIL NFR BLD AUTO: 1.6 % (ref 1–4)
ERYTHROCYTE [DISTWIDTH] IN BLOOD BY AUTOMATED COUNT: 13.2 % (ref 11.5–14.5)
GLOBULIN SER-MCNC: 2.9 G/DL (ref 2–4)
GLUCOSE SERPL-MCNC: 87 MG/DL (ref 74–106)
HCT VFR BLD AUTO: 29.4 % (ref 38–47)
HGB BLD-MCNC: 9.5 G/DL (ref 12–16)
IMM GRANULOCYTES # BLD AUTO: 0.12 K/UL (ref 0–0.04)
IMM GRANULOCYTES NFR BLD: 1.4 % (ref 0–0.4)
LYMPHOCYTES # BLD AUTO: 2.38 K/UL (ref 1–4.8)
LYMPHOCYTES NFR BLD AUTO: 28 % (ref 27–41)
MCH RBC QN AUTO: 29.4 PG (ref 27–31)
MCHC RBC AUTO-ENTMCNC: 32.3 G/DL (ref 32–36)
MCV RBC AUTO: 91 FL (ref 80–96)
MONOCYTES # BLD AUTO: 0.67 K/UL (ref 0–0.8)
MONOCYTES NFR BLD AUTO: 7.9 % (ref 2–6)
MPC BLD CALC-MCNC: 10 FL (ref 9.4–12.4)
NEUTROPHILS # BLD AUTO: 5.13 K/UL (ref 1.8–7.7)
NEUTROPHILS NFR BLD AUTO: 60.3 % (ref 53–65)
NRBC # BLD AUTO: 0 X10E3/UL
NRBC, AUTO (.00): 0 %
PLATELET # BLD AUTO: 252 K/UL (ref 150–400)
POTASSIUM SERPL-SCNC: 4.6 MMOL/L (ref 3.5–5.1)
PROT SERPL-MCNC: 5.5 G/DL (ref 6.4–8.2)
RBC # BLD AUTO: 3.23 M/UL (ref 4.2–5.4)
SODIUM SERPL-SCNC: 131 MMOL/L (ref 136–145)
WBC # BLD AUTO: 8.51 K/UL (ref 4.5–11)

## 2023-02-13 PROCEDURE — 63600175 PHARM REV CODE 636 W HCPCS: Performed by: STUDENT IN AN ORGANIZED HEALTH CARE EDUCATION/TRAINING PROGRAM

## 2023-02-13 PROCEDURE — 99232 SBSQ HOSP IP/OBS MODERATE 35: CPT | Mod: ,,, | Performed by: FAMILY MEDICINE

## 2023-02-13 PROCEDURE — 63600175 PHARM REV CODE 636 W HCPCS: Performed by: INTERNAL MEDICINE

## 2023-02-13 PROCEDURE — 80053 COMPREHEN METABOLIC PANEL: CPT | Performed by: INTERNAL MEDICINE

## 2023-02-13 PROCEDURE — 25000003 PHARM REV CODE 250: Performed by: STUDENT IN AN ORGANIZED HEALTH CARE EDUCATION/TRAINING PROGRAM

## 2023-02-13 PROCEDURE — 99232 PR SUBSEQUENT HOSPITAL CARE,LEVL II: ICD-10-PCS | Mod: ,,, | Performed by: FAMILY MEDICINE

## 2023-02-13 PROCEDURE — 99232 PR SUBSEQUENT HOSPITAL CARE,LEVL II: ICD-10-PCS | Mod: ,,, | Performed by: NURSE PRACTITIONER

## 2023-02-13 PROCEDURE — 85025 COMPLETE CBC W/AUTO DIFF WBC: CPT | Performed by: STUDENT IN AN ORGANIZED HEALTH CARE EDUCATION/TRAINING PROGRAM

## 2023-02-13 PROCEDURE — 99232 SBSQ HOSP IP/OBS MODERATE 35: CPT | Mod: ,,, | Performed by: NURSE PRACTITIONER

## 2023-02-13 PROCEDURE — 25000003 PHARM REV CODE 250: Performed by: NURSE PRACTITIONER

## 2023-02-13 PROCEDURE — 99900035 HC TECH TIME PER 15 MIN (STAT)

## 2023-02-13 PROCEDURE — 11000001 HC ACUTE MED/SURG PRIVATE ROOM

## 2023-02-13 PROCEDURE — 25000003 PHARM REV CODE 250: Performed by: HOSPITALIST

## 2023-02-13 PROCEDURE — 25000003 PHARM REV CODE 250: Performed by: INTERNAL MEDICINE

## 2023-02-13 RX ADMIN — DOCUSATE SODIUM 100 MG: 100 CAPSULE, LIQUID FILLED ORAL at 09:02

## 2023-02-13 RX ADMIN — CARVEDILOL 25 MG: 25 TABLET, FILM COATED ORAL at 09:02

## 2023-02-13 RX ADMIN — OXYCODONE HYDROCHLORIDE 10 MG: 5 TABLET ORAL at 10:02

## 2023-02-13 RX ADMIN — MEGESTROL ACETATE 40 MG: 40 TABLET ORAL at 09:02

## 2023-02-13 RX ADMIN — POLYETHYLENE GLYCOL 3350 17 G: 17 POWDER, FOR SOLUTION ORAL at 09:02

## 2023-02-13 RX ADMIN — OXYCODONE HYDROCHLORIDE 10 MG: 5 TABLET ORAL at 04:02

## 2023-02-13 RX ADMIN — ENOXAPARIN SODIUM 40 MG: 40 INJECTION SUBCUTANEOUS at 04:02

## 2023-02-13 RX ADMIN — LEVOTHYROXINE SODIUM 125 MCG: 0.12 TABLET ORAL at 06:02

## 2023-02-13 RX ADMIN — OXYCODONE HYDROCHLORIDE AND ACETAMINOPHEN 500 MG: 500 TABLET ORAL at 09:02

## 2023-02-13 RX ADMIN — PANTOPRAZOLE SODIUM 40 MG: 40 TABLET, DELAYED RELEASE ORAL at 09:02

## 2023-02-13 RX ADMIN — ZINC SULFATE 220 MG (50 MG) CAPSULE 220 MG: CAPSULE at 09:02

## 2023-02-13 RX ADMIN — FLUTICASONE PROPIONATE 100 MCG: 50 SPRAY, METERED NASAL at 09:02

## 2023-02-13 RX ADMIN — TRAZODONE HYDROCHLORIDE 50 MG: 50 TABLET ORAL at 10:02

## 2023-02-13 RX ADMIN — ALPRAZOLAM 1 MG: 0.25 TABLET ORAL at 08:02

## 2023-02-13 RX ADMIN — HYDRALAZINE HYDROCHLORIDE 50 MG: 50 TABLET, FILM COATED ORAL at 02:02

## 2023-02-13 RX ADMIN — GABAPENTIN 100 MG: 100 CAPSULE ORAL at 09:02

## 2023-02-13 RX ADMIN — DOCUSATE SODIUM 100 MG: 100 CAPSULE, LIQUID FILLED ORAL at 08:02

## 2023-02-13 RX ADMIN — GABAPENTIN 100 MG: 100 CAPSULE ORAL at 08:02

## 2023-02-13 RX ADMIN — PREDNISONE 5 MG: 5 TABLET ORAL at 09:02

## 2023-02-13 RX ADMIN — TAMSULOSIN HYDROCHLORIDE 0.4 MG: 0.4 CAPSULE ORAL at 09:02

## 2023-02-13 RX ADMIN — GABAPENTIN 100 MG: 100 CAPSULE ORAL at 02:02

## 2023-02-13 RX ADMIN — LISINOPRIL 40 MG: 20 TABLET ORAL at 09:02

## 2023-02-13 NOTE — PT/OT/SLP PROGRESS
Occupational Therapy      Patient Name:  Zandra Veloz   MRN:  [id number]    Patient not seen today secondary to Patient unwilling to participate stating she did not feel well Will follow-up on next treatment day.    2/13/2023

## 2023-02-13 NOTE — PROGRESS NOTES
[facility]  Wound Care and Hyperbaric [facility]SE  Progress Note    Patient Name: Zandra Veloz  MRN: [id number]  Admission Date: 12/29/2022  Hospital Length of Stay: 46 days  Attending Physician: Tal Pereira Jr., MD  Primary Care Provider: Brandon Thornton MD         Subjective:     HPI:  89 yo female with multiple skin tears, traumatic wounds, and surgical incision to left thigh. She was admitted following a fall on 12/17 when she was trying to go to the restroom at her home.  Patient sustained a left femur fracture. She is status post ORIF on 12/20. Staples intact to left hip. Moderate amount of green drainage noted, cultures obtained today. Left lower leg sutures removed today. Slough noted in wound bed today, adjustment to local wound care. Significant PMH includes hypertension, bradycardia, osteoarthritis, hypothyroidism, osteoporosis, and multiple falls. Wound healing is complicated by bradycardia, hx of a-fib, chronic pain, weakness, fragile skin, limited mobility, multiple falls, infection, and pain.     Hospital Course:   1/5/23 - Complains of pain to right posterior knee, venous doppler ordered. Sutures and every other staple removed today. New local wound care orders. Cultures pending.   1/9/23 - Wounds on lower extremities healing well. Continue current POC. Bruising noted to left posterior knee, protect with mepitel and optifoam. Dressing to incision site saturated with green drainage. Cultures negative. Continue with drawtex and notify ortho of drainage.   1/11/2023 - Wounds are healing well. Continue current POC. Ultrasound pending, preliminary results concerning for fluid pocket, Dr. Singer going to evaluate today.   1/17/23 - Wounds continue to show improvement. Dr. Singer evaluated left hip today during wound vac change. Staples and wound vac d/c'd today. Dressing change twice weekly.   1/19/23 - Wounds continue to heal. Incision with minimal drainage.  Puracol to left thigh today. Continue with current POC. Twice weekly dressing changes (M, Th)  1/23/23 - Wounds are improving. Continue twice weekly dressing changes.   1/30/23 - New skin tear to right thigh, used tweezers and q-tip to place skin over wound and applied transparent dressing. Wounds are smaller today with granulation tissue. Continue drawtex and bordered foam dressing to wounds.   2/2/23 - Discussed using silvadene with patient and family, patient is allergic to sulfa. Consider urea for dry skin on left lower leg, aquacel ag advantage with bordered foams to wounds.   2/6/23 - Removed dry skin and clots from left lower leg today. Applied silver seal wound dressing today to assist with wound healing and pain.   2/8/23 - Wounds are improving with new plan of care. Polymem silver to open wounds. Silvercel to scabbed areas. Change M, W, F  2/10/23 - Wounds have shown significant improvement with polymem. Continue current POC  2/13/23 - Wounds continue to show improvement with polymem. Skin tears are smaller. She continue to have pain with dressing changes. Continue current POC.           Scheduled Meds:   ALPRAZolam  1 mg Oral QHS    ascorbic acid (vitamin C)  500 mg Oral BID    carvediloL  25 mg Oral BID    docusate sodium  100 mg Oral BID    enoxaparin  40 mg Subcutaneous Daily    fluticasone propionate  2 spray Each Nostril Daily    gabapentin  100 mg Oral TID    hydrALAZINE  50 mg Oral Q8H    levothyroxine  125 mcg Oral Before breakfast    lisinopriL  40 mg Oral Daily    megestroL  40 mg Oral Daily    pantoprazole  40 mg Oral Daily    polyethylene glycol  17 g Oral Daily    predniSONE  5 mg Oral Daily    tamsulosin  0.4 mg Oral Daily    zinc sulfate  220 mg Oral Daily     Continuous Infusions:  PRN Meds:acetaminophen, bisacodyL, dextromethorphan-guaiFENesin  mg/5 ml, dextrose 50%, dextrose 50%, diclofenac sodium, glucagon (human recombinant), glucose, glucose, hydrALAZINE,  lactulose, magnesium oxide, melatonin, naloxone, ondansetron, ondansetron, oxyCODONE, potassium bicarbonate, potassium bicarbonate, potassium bicarbonate, potassium, sodium phosphates, potassium, sodium phosphates, potassium, sodium phosphates, prochlorperazine, simethicone, sodium chloride 0.9%, traZODone, urea, white petrolatum    Review of Systems   Constitutional:  Positive for activity change. Negative for chills and fever.   Respiratory:  Negative for chest tightness and shortness of breath.    Cardiovascular:  Positive for leg swelling. Negative for chest pain and palpitations.   Musculoskeletal:  Positive for arthralgias, back pain, gait problem and joint swelling.   Skin:  Positive for color change and wound.        wound   Neurological:  Positive for weakness.   Psychiatric/Behavioral:  Negative for agitation, behavioral problems, confusion and self-injury.    Objective:     Vital Signs (Most Recent):  Temp: 98 °F (36.7 °C) (02/13/23 0805)  Pulse: 67 (02/13/23 0805)  Resp: 18 (02/13/23 1001)  BP: (!) 134/57 (02/13/23 0805)  SpO2: 97 % (02/13/23 0805)   Vital Signs (24h Range):  Temp:  [97.9 °F (36.6 °C)-99 °F (37.2 °C)] 98 °F (36.7 °C)  Pulse:  [60-81] 67  Resp:  [18] 18  SpO2:  [95 %-97 %] 97 %  BP: (109-134)/(52-71) 134/57     Weight: 58.4 kg (128 lb 12 oz)  Body mass index is 20.78 kg/m².    Physical Exam  Vitals reviewed.   Constitutional:       Appearance: Normal appearance.   HENT:      Head: Normocephalic.   Cardiovascular:      Rate and Rhythm: Normal rate and regular rhythm.      Pulses: Normal pulses.      Heart sounds: Murmur heard.   Pulmonary:      Effort: Pulmonary effort is normal.      Breath sounds: Normal breath sounds.   Musculoskeletal:         General: Swelling, tenderness, deformity and signs of injury present.      Right lower leg: Edema present.      Left lower leg: Edema present.   Skin:     Findings: Bruising and erythema present.      Comments: Wound, see LDA for  photo/measurements   Neurological:      Mental Status: She is alert. Mental status is at baseline.      Motor: Weakness present.      Gait: Gait abnormal.       Assessment/Plan:     * Multiple skin tears                                                            Clean with vashe  Apply polymem silver to wounds  Cover and secure with bordered foam  Change M, Th  Keep leg elevated and avoid pressure on wound.    Open wound of left lower leg                                  Clean with vashe  Apply polymem silver to open wounds   Cover and secure with bordered foam  Change M, TH  Keep leg elevated and avoid pressure on wound.              ZURDO San  Wound Care and Hyperbaric [facility]  [facility]

## 2023-02-13 NOTE — SUBJECTIVE & OBJECTIVE
Subjective:     HPI:  87 yo female with multiple skin tears, traumatic wounds, and surgical incision to left thigh. She was admitted following a fall on 12/17 when she was trying to go to the restroom at her home.  Patient sustained a left femur fracture. She is status post ORIF on 12/20. Staples intact to left hip. Moderate amount of green drainage noted, cultures obtained today. Left lower leg sutures removed today. Slough noted in wound bed today, adjustment to local wound care. Significant PMH includes hypertension, bradycardia, osteoarthritis, hypothyroidism, osteoporosis, and multiple falls. Wound healing is complicated by bradycardia, hx of a-fib, chronic pain, weakness, fragile skin, limited mobility, multiple falls, infection, and pain.     Hospital Course:   1/5/23 - Complains of pain to right posterior knee, venous doppler ordered. Sutures and every other staple removed today. New local wound care orders. Cultures pending.   1/9/23 - Wounds on lower extremities healing well. Continue current POC. Bruising noted to left posterior knee, protect with mepitel and optifoam. Dressing to incision site saturated with green drainage. Cultures negative. Continue with drawtex and notify ortho of drainage.   1/11/2023 - Wounds are healing well. Continue current POC. Ultrasound pending, preliminary results concerning for fluid pocket, Dr. Singer going to evaluate today.   1/17/23 - Wounds continue to show improvement. Dr. Singer evaluated left hip today during wound vac change. Staples and wound vac d/c'd today. Dressing change twice weekly.   1/19/23 - Wounds continue to heal. Incision with minimal drainage. Puracol to left thigh today. Continue with current POC. Twice weekly dressing changes (M, Th)  1/23/23 - Wounds are improving. Continue twice weekly dressing changes.   1/30/23 - New skin tear to right thigh, used tweezers and q-tip to place skin over wound and applied transparent dressing. Wounds are  smaller today with granulation tissue. Continue drawtex and bordered foam dressing to wounds.   2/2/23 - Discussed using silvadene with patient and family, patient is allergic to sulfa. Consider urea for dry skin on left lower leg, aquacel ag advantage with bordered foams to wounds.   2/6/23 - Removed dry skin and clots from left lower leg today. Applied silver seal wound dressing today to assist with wound healing and pain.   2/8/23 - Wounds are improving with new plan of care. Polymem silver to open wounds. Silvercel to scabbed areas. Change M, W, F  2/10/23 - Wounds have shown significant improvement with polymem. Continue current POC  2/13/23 - Wounds continue to show improvement with polymem. Skin tears are smaller. She continue to have pain with dressing changes. Continue current POC.           Scheduled Meds:   ALPRAZolam  1 mg Oral QHS    ascorbic acid (vitamin C)  500 mg Oral BID    carvediloL  25 mg Oral BID    docusate sodium  100 mg Oral BID    enoxaparin  40 mg Subcutaneous Daily    fluticasone propionate  2 spray Each Nostril Daily    gabapentin  100 mg Oral TID    hydrALAZINE  50 mg Oral Q8H    levothyroxine  125 mcg Oral Before breakfast    lisinopriL  40 mg Oral Daily    megestroL  40 mg Oral Daily    pantoprazole  40 mg Oral Daily    polyethylene glycol  17 g Oral Daily    predniSONE  5 mg Oral Daily    tamsulosin  0.4 mg Oral Daily    zinc sulfate  220 mg Oral Daily     Continuous Infusions:  PRN Meds:acetaminophen, bisacodyL, dextromethorphan-guaiFENesin  mg/5 ml, dextrose 50%, dextrose 50%, diclofenac sodium, glucagon (human recombinant), glucose, glucose, hydrALAZINE, lactulose, magnesium oxide, melatonin, naloxone, ondansetron, ondansetron, oxyCODONE, potassium bicarbonate, potassium bicarbonate, potassium bicarbonate, potassium, sodium phosphates, potassium, sodium phosphates, potassium, sodium phosphates, prochlorperazine, simethicone, sodium chloride 0.9%, traZODone, urea, white  petrolatum    Review of Systems   Constitutional:  Positive for activity change. Negative for chills and fever.   Respiratory:  Negative for chest tightness and shortness of breath.    Cardiovascular:  Positive for leg swelling. Negative for chest pain and palpitations.   Musculoskeletal:  Positive for arthralgias, back pain, gait problem and joint swelling.   Skin:  Positive for color change and wound.        wound   Neurological:  Positive for weakness.   Psychiatric/Behavioral:  Negative for agitation, behavioral problems, confusion and self-injury.    Objective:     Vital Signs (Most Recent):  Temp: 98 °F (36.7 °C) (02/13/23 0805)  Pulse: 67 (02/13/23 0805)  Resp: 18 (02/13/23 1001)  BP: (!) 134/57 (02/13/23 0805)  SpO2: 97 % (02/13/23 0805)   Vital Signs (24h Range):  Temp:  [97.9 °F (36.6 °C)-99 °F (37.2 °C)] 98 °F (36.7 °C)  Pulse:  [60-81] 67  Resp:  [18] 18  SpO2:  [95 %-97 %] 97 %  BP: (109-134)/(52-71) 134/57     Weight: 58.4 kg (128 lb 12 oz)  Body mass index is 20.78 kg/m².    Physical Exam  Vitals reviewed.   Constitutional:       Appearance: Normal appearance.   HENT:      Head: Normocephalic.   Cardiovascular:      Rate and Rhythm: Normal rate and regular rhythm.      Pulses: Normal pulses.      Heart sounds: Murmur heard.   Pulmonary:      Effort: Pulmonary effort is normal.      Breath sounds: Normal breath sounds.   Musculoskeletal:         General: Swelling, tenderness, deformity and signs of injury present.      Right lower leg: Edema present.      Left lower leg: Edema present.   Skin:     Findings: Bruising and erythema present.      Comments: Wound, see LDA for photo/measurements   Neurological:      Mental Status: She is alert. Mental status is at baseline.      Motor: Weakness present.      Gait: Gait abnormal.

## 2023-02-13 NOTE — PT/OT/SLP PROGRESS
"Physical Therapy      Patient Name:  Zandra Veloz   MRN:  [id number]    Patient not seen today secondary to Patient unwilling to participate. "I don't feel good", "they changed these bandages today and it really hurt. pt declined all offers of PT including exercise at bedside and/or OOB activities. Will follow-up next treatment date.    "

## 2023-02-13 NOTE — PROGRESS NOTES
"[facility]  Adult Nutrition  Follow-up Note         Reason for Assessment  Reason For Assessment: RD follow-up  Nutrition Risk Screen: large or nonhealing wound, burn or pressure injury    RD follow up.    Continue current diet order + nutritional supplements as able/appropriate and tolerated. Encourage good PO intakes. Continue Vamshi BID + Vit C + ZnSO4 to aid in wound healing. Continue Gelatein Plus BID to promote adequate protein intake. If more aggressive nutrition intervention desired, please consult for recommendations.    Per MD since last RD review:  "02/10/2023   Patient seen evaluated she has multiple skin tears, failure to thrive an infected wound to left lower extremity she voiced no complaints she is afebrile hemodynamically stable lungs are clear no crackles cardiovascular S1-S2 regular rate rhythm abdomen is soft positive bowel sounds nontender will continue current medical management.  02/11/2023   Patient complains of a stuffy nose, and a sore throat and occasional abdominal discomfort/constipation  Patient seen evaluated she has multiple skin tears, failure to thrive an infected wound to left lower extremity she voiced no complaints she is afebrile hemodynamically stable lungs are clear no crackles cardiovascular S1-S2 regular rate rhythm abdomen is soft positive bowel sounds nontender will continue current medical management.  Flonase, Chloraseptic spray, lactulose for constipation, and offload her heels.  02/12/2023   No new complaints today.  Patient complains of a stuffy nose, and a sore throat and occasional abdominal discomfort/constipation  Patient seen evaluated she has multiple skin tears, failure to thrive an infected wound to left lower extremity she voiced no complaints she is afebrile hemodynamically stable lungs are clear no crackles cardiovascular S1-S2 regular rate rhythm abdomen is soft positive bowel sounds nontender will continue current medical " management.    Pt is continues on a Regular diet + Gelatein BID + Vamshi BID. Per flowsheets, pt consuming % of meals. Continues on appetite stimulant. Encourage PO intakes.     If PO intakes decline, consider initiating alternate route for nutrition as supplemental nutrition to better meet needs. Please consult for recommendations if alternate route for nutrition is desired.      Wounds continue being treated, Wound Care following. Recommend continue Vamshi BID (Arginaid may be used in place of Vamshi due to supple issues) to aid in wound healing. Continue Vit C and ZnSO4 as needed to aid in wound healing as well. Consider addition of mvi if able/appropriate.     Last weight check 2/10 - recommend weight check. Weight trend since admission includes: 120 lbs on 1/11, 126 lbs on 1/18. ~8 lb weight gain since admission. Weight seems to be trending towards IBW range which is desired at this time.     Last BM 2/11 per flowsheets - pt with docusate sodium and polyethylene glycol on med list. Will continue to monitor PO intakes, weight trend, meds, labs, updates in patient condition. RD following.    Malnutrition  Is Patient Malnourished: No    Nutrition Diagnosis  Increased protein Needs   related to Wound healing as evidenced by pt with multiple wounds     Nutrition Diagnosis Status: Chronic/ continues    Nutrition Risk  Level of Risk/Frequency of Follow-up: moderate - high   Chewing or Swallowing Difficulty?: No Chewing or swallowing difficulty    Estimated/Assessed Needs    Temp: 98 °F (36.7 °C)Oral  Weight Used For Calorie Calculations: 59.1 kg (130 lb 4.7 oz)   Energy Need Method: Kcal/kg (35-40 kcal/kg) Energy Calorie Requirements (kcal): 5223-7909 kcal  Weight Used For Protein Calculations: 59.1 kg (130 lb 4.7 oz)  Protein Requirements: 59-118g pro (1.0-2.0g pro/kg d/t geriatric, wounds)  Estimated Fluid Requirement Method: RDA Method    RDA Method (mL): 2069     Nutrition Prescription /  Recommendations  Recommendation/Intervention: Continue current diet order + nutritional supplements as able/appropriate and tolerated. Encourage good PO intakes. Continue Vamshi BID + Vit C + ZnSO4 to aid in wound healing. Continue Gelatein Plus BID to promote adequate protein intake. If more aggressive nutrition intervention desired, please consult for recommendations.  Goals: consume % of meals, tolerate PO intake, weight maintenance, wound healing  Nutrition Goal Status: progressing towards goal  Current Diet Order: regular  Nutrition Order Comments: Appropriate  Oral Nutrition Supplement: Vamshi BID + Gelatein Plus BID  Recommended Diet: Regular  Recommended Oral Supplement: Vamshi [90 kcals, 2.5g Protein, 10g Carbs(3g Sugar), 7g L-Arginine, 7g L-Glutamine, Vitamin C 300mg, 9.5mg Zinc] twice daily and Gelatein Plus [160 kcals, 20g Protein, 20g Carbs(2g Fiber, 18g Sugar), 0g Fat twice daily  Is Nutrition Support Recommended: No  Is Education Recommended: No    Monitor and Evaluation  % current Intake: Other: PO intake %  % intake to meet estimated needs: 75 - 100 %  Food and Nutrient Intake: energy intake, food and beverage intake  Food and Nutrient Adminstration: diet order  Knowledge/Beliefs/Attitudes: food and nutrition knowledge/skill, beliefs and attitudes  Physical Activity and Function: nutrition-related ADLs and IADLs, factors affecting access to physical activity  Anthropometric Measurements: weight, weight change, body mass index  Biochemical Data, Medical Tests and Procedures: electrolyte and renal panel, gastrointestinal profile, glucose/endocrine profile, inflammatory profile, lipid profile  Nutrition-Focused Physical Findings: overall appearance, extremities, muscles and bones, head and eyes, skin     Current Medical Diagnosis and Past Medical History  Diagnosis: other (see comments) (multiple skin tears)  Past Medical History:   Diagnosis Date    Hypertension     Osteoporosis     Thyroid  "disorder      Nutrition/Diet History  Spiritual, Cultural Beliefs, Holiness Practices, Values that Affect Care: no  Food Allergies: NKFA    Lab/Procedures/Meds  Recent Labs   Lab 02/13/23  0437   *   K 4.6   BUN 12   CREATININE 0.71   GLU 87   CALCIUM 8.3*   ALBUMIN 2.6*   CL 99   ALT 15   AST 20     Last A1c: No results found for: HGBA1C  Lab Results   Component Value Date    RBC 3.23 (L) 02/13/2023    HGB 9.5 (L) 02/13/2023    HCT 29.4 (L) 02/13/2023    MCV 91.0 02/13/2023    MCH 29.4 02/13/2023    MCHC 32.3 02/13/2023       Pertinent Labs Reviewed: reviewed  Pertinent Labs Comments: Low Na, Ca - replete to Wnl as needed. Low total protein, albumin. Low alk phosphate - will continue to monitor altered labs.   Pertinent Medications Reviewed: reviewed  Pertinent Medications Comments: alprazolam, vit C, carvedilol, docusate sodium, enoxaparin, gabapentin, hydralazine, levothyroxine, lisinopril, megestrol, pantoprazole, polyethylene glycol, prednisone, tamsulosin, zinc sulfate    Anthropometrics  Temp: 98 °F (36.7 °C)  Height: 5' 6" (167.6 cm)  Height (inches): 66 in  Weight Method: Bed Scale  Weight: 58.4 kg (128 lb 12 oz)  Weight (lb): 128.75 lb  Ideal Body Weight (IBW), Female: 130 lb  % Ideal Body Weight, Female (lb): 100.15 %  BMI (Calculated): 20.5     Nutrition by Nursing  Diet/Nutrition Received: regular  Intake (%): 50%  Diet/Feeding Assistance: tray set-up  Diet/Feeding Tolerance: good  Last Bowel Movement: 02/11/23    Nutrition Follow-Up  RD Follow-up?: Yes  "

## 2023-02-14 PROCEDURE — 25000003 PHARM REV CODE 250: Performed by: STUDENT IN AN ORGANIZED HEALTH CARE EDUCATION/TRAINING PROGRAM

## 2023-02-14 PROCEDURE — 63600175 PHARM REV CODE 636 W HCPCS: Performed by: INTERNAL MEDICINE

## 2023-02-14 PROCEDURE — 97530 THERAPEUTIC ACTIVITIES: CPT | Mod: CO

## 2023-02-14 PROCEDURE — 97110 THERAPEUTIC EXERCISES: CPT | Mod: CO

## 2023-02-14 PROCEDURE — 25000003 PHARM REV CODE 250: Performed by: INTERNAL MEDICINE

## 2023-02-14 PROCEDURE — 11000001 HC ACUTE MED/SURG PRIVATE ROOM

## 2023-02-14 PROCEDURE — 63600175 PHARM REV CODE 636 W HCPCS: Performed by: STUDENT IN AN ORGANIZED HEALTH CARE EDUCATION/TRAINING PROGRAM

## 2023-02-14 PROCEDURE — 99232 PR SUBSEQUENT HOSPITAL CARE,LEVL II: ICD-10-PCS | Mod: ,,, | Performed by: FAMILY MEDICINE

## 2023-02-14 PROCEDURE — 97110 THERAPEUTIC EXERCISES: CPT | Mod: CQ

## 2023-02-14 PROCEDURE — 25000003 PHARM REV CODE 250: Performed by: NURSE PRACTITIONER

## 2023-02-14 PROCEDURE — 25000003 PHARM REV CODE 250: Performed by: HOSPITALIST

## 2023-02-14 PROCEDURE — 97116 GAIT TRAINING THERAPY: CPT | Mod: CQ

## 2023-02-14 PROCEDURE — 99232 SBSQ HOSP IP/OBS MODERATE 35: CPT | Mod: ,,, | Performed by: FAMILY MEDICINE

## 2023-02-14 RX ADMIN — CARVEDILOL 25 MG: 25 TABLET, FILM COATED ORAL at 08:02

## 2023-02-14 RX ADMIN — TRAZODONE HYDROCHLORIDE 50 MG: 50 TABLET ORAL at 08:02

## 2023-02-14 RX ADMIN — OXYCODONE HYDROCHLORIDE 10 MG: 5 TABLET ORAL at 11:02

## 2023-02-14 RX ADMIN — DOCUSATE SODIUM 100 MG: 100 CAPSULE, LIQUID FILLED ORAL at 08:02

## 2023-02-14 RX ADMIN — FLUTICASONE PROPIONATE 100 MCG: 50 SPRAY, METERED NASAL at 09:02

## 2023-02-14 RX ADMIN — MEGESTROL ACETATE 40 MG: 40 TABLET ORAL at 09:02

## 2023-02-14 RX ADMIN — PANTOPRAZOLE SODIUM 40 MG: 40 TABLET, DELAYED RELEASE ORAL at 09:02

## 2023-02-14 RX ADMIN — GABAPENTIN 100 MG: 100 CAPSULE ORAL at 08:02

## 2023-02-14 RX ADMIN — GABAPENTIN 100 MG: 100 CAPSULE ORAL at 02:02

## 2023-02-14 RX ADMIN — OXYCODONE HYDROCHLORIDE AND ACETAMINOPHEN 500 MG: 500 TABLET ORAL at 08:02

## 2023-02-14 RX ADMIN — ENOXAPARIN SODIUM 40 MG: 40 INJECTION SUBCUTANEOUS at 05:02

## 2023-02-14 RX ADMIN — OXYCODONE HYDROCHLORIDE 10 MG: 5 TABLET ORAL at 06:02

## 2023-02-14 RX ADMIN — CARVEDILOL 25 MG: 25 TABLET, FILM COATED ORAL at 09:02

## 2023-02-14 RX ADMIN — DICLOFENAC SODIUM 4 G: 10 GEL TOPICAL at 09:02

## 2023-02-14 RX ADMIN — LEVOTHYROXINE SODIUM 125 MCG: 0.12 TABLET ORAL at 05:02

## 2023-02-14 RX ADMIN — POLYETHYLENE GLYCOL 3350 17 G: 17 POWDER, FOR SOLUTION ORAL at 09:02

## 2023-02-14 RX ADMIN — ALPRAZOLAM 1 MG: 0.25 TABLET ORAL at 08:02

## 2023-02-14 RX ADMIN — GABAPENTIN 100 MG: 100 CAPSULE ORAL at 09:02

## 2023-02-14 RX ADMIN — DOCUSATE SODIUM 100 MG: 100 CAPSULE, LIQUID FILLED ORAL at 09:02

## 2023-02-14 RX ADMIN — TAMSULOSIN HYDROCHLORIDE 0.4 MG: 0.4 CAPSULE ORAL at 09:02

## 2023-02-14 RX ADMIN — PREDNISONE 5 MG: 5 TABLET ORAL at 09:02

## 2023-02-14 RX ADMIN — LISINOPRIL 40 MG: 20 TABLET ORAL at 09:02

## 2023-02-14 RX ADMIN — OXYCODONE HYDROCHLORIDE AND ACETAMINOPHEN 500 MG: 500 TABLET ORAL at 09:02

## 2023-02-14 RX ADMIN — ZINC SULFATE 220 MG (50 MG) CAPSULE 220 MG: CAPSULE at 09:02

## 2023-02-14 NOTE — SUBJECTIVE & OBJECTIVE
Interval History: Patient feeling pretty well.  Having intermittent pain from her wounds but generally ok.  Discussed the importance of strong effort with PT.     Review of Systems  Objective:     Vital Signs (Most Recent):  Temp: 98.1 °F (36.7 °C) (02/13/23 1950)  Pulse: (!) 59 (02/13/23 1950)  Resp: 18 (02/13/23 1950)  BP: (!) 85/46 (02/13/23 1950)  SpO2: 97 % (02/13/23 1950)   Vital Signs (24h Range):  Temp:  [97.9 °F (36.6 °C)-98.8 °F (37.1 °C)] 98.1 °F (36.7 °C)  Pulse:  [59-68] 59  Resp:  [18-19] 18  SpO2:  [95 %-97 %] 97 %  BP: ()/(46-73) 85/46     Weight: 58.4 kg (128 lb 12 oz)  Body mass index is 20.78 kg/m².    Intake/Output Summary (Last 24 hours) at 2/13/2023 2026  Last data filed at 2/13/2023 0425  Gross per 24 hour   Intake --   Output 625 ml   Net -625 ml      Physical Exam  Vitals reviewed.   Constitutional:       Appearance: Normal appearance.   HENT:      Head: Normocephalic and atraumatic.   Eyes:      Extraocular Movements: Extraocular movements intact.   Cardiovascular:      Rate and Rhythm: Normal rate and regular rhythm.      Pulses: Normal pulses.   Pulmonary:      Effort: Pulmonary effort is normal.      Breath sounds: Normal breath sounds.   Abdominal:      General: Abdomen is flat.      Palpations: Abdomen is soft.   Musculoskeletal:         General: Tenderness and signs of injury present.      Comments: Dressed left lower extremity, painful to touch   Skin:     General: Skin is warm and dry.      Capillary Refill: Capillary refill takes less than 2 seconds.      Comments: Multiple wounds, see LDAs.    Neurological:      General: No focal deficit present.      Mental Status: She is alert and oriented to person, place, and time.   Psychiatric:         Mood and Affect: Mood normal.         Behavior: Behavior normal.     Reviewed todays wound pictures.     Significant Labs: All pertinent labs within the past 24 hours have been reviewed.  BMP:   Recent Labs   Lab 02/13/23  0437   GLU 87    *   K 4.6   CL 99   CO2 24   BUN 12   CREATININE 0.71   CALCIUM 8.3*     CBC:   Recent Labs   Lab 02/13/23  0437   WBC 8.51   HGB 9.5*   HCT 29.4*          Significant Imaging: I have reviewed all pertinent imaging results/findings within the past 24 hours.

## 2023-02-14 NOTE — PT/OT/SLP PROGRESS
Occupational Therapy   Treatment    Name: Zandra Veloz  MRN: [id number]  Admitting Diagnosis:  Multiple skin tears       Recommendations:     Discharge Recommendations: nursing facility, skilled, rehabilitation facility  Discharge Equipment Recommendations:   (to be determined)  Barriers to discharge:       Assessment:     Zandra Veloz is a 88 y.o. female with a medical diagnosis of Multiple skin tears.. Performance deficits affecting function are weakness, impaired endurance, impaired self care skills, decreased ROM, orthopedic precautions.     Rehab Prognosis:  Good; patient would benefit from acute skilled OT services to address these deficits and reach maximum level of function.       Plan:     Patient to be seen 5 x/week to address the above listed problems via self-care/home management, therapeutic activities, therapeutic exercises  Plan of Care Expires: 02/21/23  Plan of Care Reviewed with: patient    Subjective     Pain/Comfort:       Objective:     Communicated with: CLYDE Crockett prior to session.  Patient found supine with peripheral IV, chao catheter, telemetry upon OT entry to room.    General Precautions: Standard, fall    Orthopedic Precautions:LLE posterior precautions, LLE partial weight bearing  Braces: N/A  Respiratory Status: Room air     Occupational Performance:     Bed Mobility:    Supine to sit min a     Functional Mobility/Transfers:  Sit to stand min a with rw  Bed to chair min a x 2 with rw  Functional Mobility:     Activities of Daily Living:  Mod a to aj gown as isae   Sat eob 3 mins sba       [facility] 6 Click ADL:      Treatment & Education:  Pt performed hand helper with 3 rubber band resistances 20 reps, red t ball ex's 20 reps, rom ex's with 1 lb wt 15 reps x 2B elbow flex/ext,L shld flex,abd/add , red t band 15 reps x 2 B elbow flex/ext  Patient left up in chair with all lines intact, call button in reach, and daughter ans sitter  present    GOALS:    Multidisciplinary Problems       Occupational Therapy Goals          Problem: Occupational Therapy    Goal Priority Disciplines Outcome Interventions   Occupational Therapy Goal     OT, PT/OT Ongoing, Progressing    Description: STG: (In 2 weeks)  Pt will bathe with setup and mod (A)  Pt will perform UE dressing with setup and min(A)  Pt will perform (L) UE AROM to 3/4 full range  Pt will sit EOB x 5 min with (I)  Pt will transfer bed/chair/bsc with min a  Pt will tolerate 20 minutes of tx without fatigue      LT.Restore to max I with self care and mobility.                          Time Tracking:     OT Date of Treatment: 23  OT Start Time: 1040  OT Stop Time: 1122  OT Total Time (min): 42 min    Billable Minutes:Therapeutic Activity 10  Therapeutic Exercise 15    OT/BRIGHT: BRIGHT          2023

## 2023-02-14 NOTE — PT/OT/SLP PROGRESS
"Physical Therapy Treatment    Patient Name:  Zandra Veloz   MRN:  [id number]    Recommendations:     Discharge Recommendations: nursing facility, skilled, rehabilitation facility  Discharge Equipment Recommendations: other (see comments) (to be determined)  Barriers to discharge:  ongoing medical treatment    Assessment:     Zandra Veloz is a 88 y.o. female admitted with a medical diagnosis of Multiple skin tears.  She presents with the following impairments/functional limitations: weakness, impaired endurance, orthopedic precautions, impaired functional mobility, gait instability, impaired self care skills.    Pt participated well with PT, able to ambulate short distance with no complaint of pain    Rehab Prognosis: Fair; patient would benefit from acute skilled PT services to address these deficits and reach maximum level of function.    Recent Surgery: * No surgery found *      Plan:     During this hospitalization, patient to be seen 5 x/week to address the identified rehab impairments via gait training, therapeutic activities, therapeutic exercises and progress toward the following goals:    Plan of Care Expires:  03/30/23    Subjective     Chief Complaint: weakness  Patient/Family Comments/goals: pt reports "feeling better"  Pain/Comfort:         Objective:     Communicated with Fay Crockett RN prior to session.  Patient found up in chair with chao catheter, peripheral IV upon PT entry to room.     General Precautions: Standard, fall  Orthopedic Precautions: LLE posterior precautions, LLE partial weight bearing  Braces: N/A  Respiratory Status: Room air     Functional Mobility:  Transfers:     Sit to Stand:  moderate assistance and heavy cueing  with rolling walker  Gait: 14' minimum assist to contact guard assist; slow sadaf, mild posterior lean initially, short step length, verbal cueing to not step past front of RW      AM-PAC 6 CLICK MOBILITY          Treatment & Education:  Pt " performed 2 x 15 reps (B) LE exercises: ap, quad set, glut set, straight leg raise, hip ab/adduction, long arc quad, heel slide with active assist range of motion     Patient left up in chair with chair alarm on and daughter and caregiver present..    GOALS:   Multidisciplinary Problems       Physical Therapy Goals          Problem: Physical Therapy    Goal Priority Disciplines Outcome Goal Variances Interventions   Physical Therapy Goal     PT, PT/OT Ongoing, Progressing     Description: Short Term Goals to be met by 2/25/2023    Patient will increase functional independence and safety with mobility by performing    1.   Rolling side to side with standby assist  2.   Supine to sit standby Assist  3.   Sit to stand Minimal Assist using manual assistance with gait belt and PWB L LE  4.   Ambulate 20ft with RW, PWB LLE. Min A.   5.   Lower extremity exercises x 30 reps with assist as necessary and no rest breaks      Long Term Goals to be met by 3/10/2023    Patient to require less than or equal to Stand by assist for functional mobility to ease caregiver burden                        Time Tracking:     PT Received On: 02/14/23  PT Start Time: 1340     PT Stop Time: 1405  PT Total Time (min): 25 min     Billable Minutes: Gait Training 8 and Therapeutic Exercise 15    Treatment Type: Treatment  PT/PTA: PTA     PTA Visit Number: 1     02/14/2023

## 2023-02-14 NOTE — PLAN OF CARE
Problem: Physical Therapy  Goal: Physical Therapy Goal  Description: Short Term Goals to be met by 2/25/2023    Patient will increase functional independence and safety with mobility by performing    1.   Rolling side to side with standby assist  2.   Supine to sit standby Assist  3.   Sit to stand Minimal Assist using manual assistance with gait belt and PWB L LE  4.   Ambulate 20ft with RW, PWB LLE. Min A.   5.   Lower extremity exercises x 30 reps with assist as necessary and no rest breaks      Long Term Goals to be met by 3/10/2023    Patient to require less than or equal to Stand by assist for functional mobility to ease caregiver burden   Outcome: Ongoing, Progressing       Rolling Left:  contact guard assistance to minimum assistance  Rolling Right: contact guard assistance to minimum assistance  Supine to Sit: minimum assistance and increased effort  Sitting EOB: standby assist to contact guard assist     Sit to Stand:  minimum assistance to moderate assistance with rolling walker  Gait: up to 15' minimum assist with RW around to chair    Pt cont to present with fluctuating levels of required assistance with mobility and gait distance remains limited

## 2023-02-14 NOTE — NURSING
Resting quietly in eyes closed, rise and fall of chest observed. NADN.  Safety ongoing, will continue to monitor.

## 2023-02-14 NOTE — PROGRESS NOTES
[facility] Hospital Medicine  Progress Note    Patient Name: Zandra Veloz  MRN: [id number]  Patient Class: IP- Inpatient   Admission Date: 12/29/2022  Length of Stay: 46 days  Attending Physician: Tal Pereira Jr., MD  Primary Care Provider: Brandon Thornton MD        Subjective:     Principal Problem:Multiple skin tears        HPI:  HPI:   88-year-old lady seen emergency room department.  Patient presents after having a fall when she was trying to go to the restroom at her home.  Patient sustained a left femur fracture.  Patient complains of moderate to severe pain in the left hip area.  Patient also complains of chest tightness, she rates it a 5/10 in the chest tightness has been intubate.  Patient also was seen in emergency room department earlier this week per her daughter.  Patient is found to have dehydration and a urinary tract infection.  Current she denies any shortness of breath.  She does not give a history of coronary artery disease.        Procedure(s) (LRB):  REVISION, TOTAL ARTHROPLASTY, HIP, VICTORIANO PROSTHETIC FX (Left)       Hospital Course:   12/19 - records reviewed. Fall without LOC and has left hip fx. No other complaints currently. Seen by cardiology with some CP felt musculoskeletal.  Echo mild AS and mod MR with nl EF. Age increase cardiac risk for surgery but discussed with daughter surgery is urgent and see no benefit in delay medically. Feel low to moderate risk for surgery. Question about UTI. No symptoms. Had UTI in 10.22 not surprising. Lab to do culture on urine in lab. Cover with ATN for prior culture with ATB started. Discussed with Dr Singer and cardiology.  12/20 Tachycardia and elevated BP. Cardiology adjusted meds. Plan hold off surgery until 12/22 to adjust meds and treat UTI. Family in room. Pt not sleeping well.   12/21 Didn't sleep well prior night. Apparently been on xanax for a time. Also recently started on Neurontin. Family with  question. No recent BM. Some increase stool on KUB but not severe. Surgery for am. BP some up but better. HR good.   12/22 Seen prior to surgery and apparently add better night. Sore mouth / throat better with mycostatin. For surgery. Family in room.  12/23 patient with pain but otherwise seemed to be doing relatively well.  Daughter in room.  Apparently been taking prednisone for some time and this was restarted per family request.  Look into swing bed placement  12/24 patient had better night rested and everyone's in better spirits today.  Tolerating some diet.   Therapy worked with patient.  Look into swing bed placement next week  12/25 remained in good spirits.  Less pain.  Had good bowel movement and ordered Dulcolax not given.  Because of dressing to leg, Burk was not removed to keep it from getting soiled.  Wound care to check 12/27.  Discuss this with patient and daughter.  On antibiotics for UTI  12/26-will dc to swingbed once accepted. Needs continued wound care.  12/27-DC when bed available  12/28- issues with wound care yesterday.  Dr. Singer had to come remove dressings himself due to adherence to subcutaneous tissue.  Family refusing transfer to [facility] due to lack of specially trained wound care team availability.  Planning to transfer to specialty but resistant to that as well and requesting to speak to administration.    12/29- agreeable to transfer to specialty.  This will be the best place for her to receive wound care.  DC after family tours     12/29-needs continued wound care and therapy[facility]    12/30-doing well today, still with pain         Overview/Hospital Course:  12/31-having some pain this am  1/1- no complaints  1/2- doing well  1/3- continue wound care, last day cefepime tomorrow  1/4- some pain this AM.  Delay in mediations dues to staffing issues. Dsicussed with RN  1/5- no issues overnight  1/6- Still with pain, hip incision looks good  1/7-diclofenac for  shoulder pain  1/8-decrease laxatives, change benzos and carlo to PRN per patient request  1/9 some confusion overnight  1/10- no more confusion, doing well  1/11- no acute issues overnight  1/12- doing well, nausea today  1/13 -Dr. Lebron Cutler IV, DO taking over for Dr. Guerra for weekend.  Patient's pain appears to be well controlled this morning no complaints of nausea.  Continue wound care and PT/OT  1/14-patient appears well labs.  Continue wound care and PT/OT  1/15-patient still appears well.  No acute concerns or complaints.  No events overnight.  Continue wound care working with a PTOT.  Expiration is services estimated by February 6 1/16- no complaints, doing well  1/17-some nausea today  1/18-better today.  Still requiring twice weekly dressing changes.  Appetite stimulant  1/19- PWB with PT today.  Still with weeping wounds.  Will need wound care twice weekly-as these improve will be able to transfer to other facility  1/20-no complaints  1/21-no acute events overnight, some anxiety today  1/22-episode of SVT yesterday. Spontaneous resolution  1/23-no complaints  1/24- no complaints  1/25-doing well  1/26-wound care, pt  1/27- no complaints  1/28- patient seen examined today resting comfortably in bed, in no acute distress and no acute events overnight.  Patient states that she is doing well and denies pain.  States that she is been working well with PT/OT but not this weekend.  Patient otherwise doing well without complaints.  1/29-patient seen examined today resting comfortably in bed, in no acute distress with no acute events overnight.  Patient denies pain or other complaints at this time.  She continues with wound care PT/OT.  1/30-doing well this am  1/31-no complaints  2/1-no changes      Interval History: Patient feeling pretty well.  Having intermittent pain from her wounds but generally ok.  Discussed the importance of strong effort with PT.     Review of Systems  Objective:     Vital Signs  (Most Recent):  Temp: 98.1 °F (36.7 °C) (02/13/23 1950)  Pulse: (!) 59 (02/13/23 1950)  Resp: 18 (02/13/23 1950)  BP: (!) 85/46 (02/13/23 1950)  SpO2: 97 % (02/13/23 1950)   Vital Signs (24h Range):  Temp:  [97.9 °F (36.6 °C)-98.8 °F (37.1 °C)] 98.1 °F (36.7 °C)  Pulse:  [59-68] 59  Resp:  [18-19] 18  SpO2:  [95 %-97 %] 97 %  BP: ()/(46-73) 85/46     Weight: 58.4 kg (128 lb 12 oz)  Body mass index is 20.78 kg/m².    Intake/Output Summary (Last 24 hours) at 2/13/2023 2026  Last data filed at 2/13/2023 0425  Gross per 24 hour   Intake --   Output 625 ml   Net -625 ml      Physical Exam  Vitals reviewed.   Constitutional:       Appearance: Normal appearance.   HENT:      Head: Normocephalic and atraumatic.   Eyes:      Extraocular Movements: Extraocular movements intact.   Cardiovascular:      Rate and Rhythm: Normal rate and regular rhythm.      Pulses: Normal pulses.   Pulmonary:      Effort: Pulmonary effort is normal.      Breath sounds: Normal breath sounds.   Abdominal:      General: Abdomen is flat.      Palpations: Abdomen is soft.   Musculoskeletal:         General: Tenderness and signs of injury present.      Comments: Dressed left lower extremity, painful to touch   Skin:     General: Skin is warm and dry.      Capillary Refill: Capillary refill takes less than 2 seconds.      Comments: Multiple wounds, see LDAs.    Neurological:      General: No focal deficit present.      Mental Status: She is alert and oriented to person, place, and time.   Psychiatric:         Mood and Affect: Mood normal.         Behavior: Behavior normal.     Reviewed todays wound pictures.     Significant Labs: All pertinent labs within the past 24 hours have been reviewed.  BMP:   Recent Labs   Lab 02/13/23 0437   GLU 87   *   K 4.6   CL 99   CO2 24   BUN 12   CREATININE 0.71   CALCIUM 8.3*     CBC:   Recent Labs   Lab 02/13/23 0437   WBC 8.51   HGB 9.5*   HCT 29.4*          Significant Imaging: I have reviewed  all pertinent imaging results/findings within the past 24 hours.      Assessment/Plan:      * Multiple skin tears  Wound care  Optimize nutrition       Open wound of left lower leg  Wound care continues.  Wounds are improving.       Failure to thrive in adult  Cont appetite stimulant  Cont nutiritional supplement  Cont to encourage oral intake  PT    Disruption of external surgical wound        Wounds and injuries  Wound care consulted and following    Delmy-prosthetic fracture around prosthetic hip  S/P fixation with Dr Singer      Lumbar radiculopathy  Pain management  PT      Hypertension  Adjust medications as needed    2/2-d/c dilt, hydral since bp running low, keep metoprolol and lisinopril on    2/3-will increae lisinopril for afterload reduction.     Arthritis  Pain management  Per family has been taking oral steorids for a long time, cont that        VTE Risk Mitigation (From admission, onward)         Ordered     enoxaparin injection 40 mg  Daily         02/02/23 1051                Discharge Planning   YANIV:      Code Status: Full Code   Is the patient medically ready for discharge?:     Reason for patient still in hospital (select all that apply): Treatment  Discharge Plan A: Skilled Nursing Facility                  Tal Pereira Jr, MD  Department of Hospital Medicine   [facility]

## 2023-02-14 NOTE — PLAN OF CARE
Problem: Occupational Therapy  Goal: Occupational Therapy Goal  Description: STG: (In 2 weeks)  Pt will bathe with setup and mod (A) nt   Pt will perform UE dressing with setup and min(A) progressing   Pt will perform (L) UE AROM to 3/4 full range progressing   Pt will sit EOB x 5 min with (I)  Pt will transfer bed/chair/bsc with min a progressing   Pt will tolerate 20 minutes of tx without fatigue progressing      LT.Restore to max I with self care and mobility.     Outcome: Ongoing, Progressing   Mod a to aj gown as melanie   Sat eob 3 mins sba   Sit to stand min a with rw  Bed to chair min a x 2 with rw    Pt has only tiffanie able to participate with OT 2 times since last weekly secondary to not feeling well but she is making some progress with OT.Plan is to cont with OT poc

## 2023-02-15 LAB
ALBUMIN SERPL BCP-MCNC: 2.6 G/DL (ref 3.5–5)
ALBUMIN/GLOB SERPL: 0.9 {RATIO}
ALP SERPL-CCNC: 42 U/L (ref 55–142)
ALT SERPL W P-5'-P-CCNC: 17 U/L (ref 13–56)
ANION GAP SERPL CALCULATED.3IONS-SCNC: 10 MMOL/L (ref 7–16)
AST SERPL W P-5'-P-CCNC: 17 U/L (ref 15–37)
BILIRUB SERPL-MCNC: 0.3 MG/DL (ref ?–1.2)
BUN SERPL-MCNC: 15 MG/DL (ref 7–18)
BUN/CREAT SERPL: 22 (ref 6–20)
CALCIUM SERPL-MCNC: 8.6 MG/DL (ref 8.5–10.1)
CHLORIDE SERPL-SCNC: 108 MMOL/L (ref 98–107)
CO2 SERPL-SCNC: 24 MMOL/L (ref 21–32)
CREAT SERPL-MCNC: 0.69 MG/DL (ref 0.55–1.02)
EGFR (NO RACE VARIABLE) (RUSH/TITUS): 84 ML/MIN/1.73M²
GLOBULIN SER-MCNC: 2.9 G/DL (ref 2–4)
GLUCOSE SERPL-MCNC: 78 MG/DL (ref 74–106)
POTASSIUM SERPL-SCNC: 4.7 MMOL/L (ref 3.5–5.1)
PROT SERPL-MCNC: 5.5 G/DL (ref 6.4–8.2)
SODIUM SERPL-SCNC: 137 MMOL/L (ref 136–145)

## 2023-02-15 PROCEDURE — 25000003 PHARM REV CODE 250: Performed by: STUDENT IN AN ORGANIZED HEALTH CARE EDUCATION/TRAINING PROGRAM

## 2023-02-15 PROCEDURE — 63600175 PHARM REV CODE 636 W HCPCS: Performed by: STUDENT IN AN ORGANIZED HEALTH CARE EDUCATION/TRAINING PROGRAM

## 2023-02-15 PROCEDURE — 99232 SBSQ HOSP IP/OBS MODERATE 35: CPT | Mod: ,,, | Performed by: FAMILY MEDICINE

## 2023-02-15 PROCEDURE — 25000003 PHARM REV CODE 250: Performed by: INTERNAL MEDICINE

## 2023-02-15 PROCEDURE — 97110 THERAPEUTIC EXERCISES: CPT

## 2023-02-15 PROCEDURE — 99900035 HC TECH TIME PER 15 MIN (STAT)

## 2023-02-15 PROCEDURE — 25000003 PHARM REV CODE 250: Performed by: NURSE PRACTITIONER

## 2023-02-15 PROCEDURE — 99232 PR SUBSEQUENT HOSPITAL CARE,LEVL II: ICD-10-PCS | Mod: ,,, | Performed by: FAMILY MEDICINE

## 2023-02-15 PROCEDURE — 80053 COMPREHEN METABOLIC PANEL: CPT | Performed by: INTERNAL MEDICINE

## 2023-02-15 PROCEDURE — 11000001 HC ACUTE MED/SURG PRIVATE ROOM

## 2023-02-15 PROCEDURE — 63600175 PHARM REV CODE 636 W HCPCS: Performed by: INTERNAL MEDICINE

## 2023-02-15 RX ADMIN — POLYETHYLENE GLYCOL 3350 17 G: 17 POWDER, FOR SOLUTION ORAL at 08:02

## 2023-02-15 RX ADMIN — GABAPENTIN 100 MG: 100 CAPSULE ORAL at 02:02

## 2023-02-15 RX ADMIN — DOCUSATE SODIUM 100 MG: 100 CAPSULE, LIQUID FILLED ORAL at 09:02

## 2023-02-15 RX ADMIN — CARVEDILOL 25 MG: 25 TABLET, FILM COATED ORAL at 08:02

## 2023-02-15 RX ADMIN — DOCUSATE SODIUM 100 MG: 100 CAPSULE, LIQUID FILLED ORAL at 08:02

## 2023-02-15 RX ADMIN — TAMSULOSIN HYDROCHLORIDE 0.4 MG: 0.4 CAPSULE ORAL at 08:02

## 2023-02-15 RX ADMIN — ZINC SULFATE 220 MG (50 MG) CAPSULE 220 MG: CAPSULE at 08:02

## 2023-02-15 RX ADMIN — HYDRALAZINE HYDROCHLORIDE 50 MG: 50 TABLET, FILM COATED ORAL at 09:02

## 2023-02-15 RX ADMIN — OXYCODONE HYDROCHLORIDE 10 MG: 5 TABLET ORAL at 08:02

## 2023-02-15 RX ADMIN — GABAPENTIN 100 MG: 100 CAPSULE ORAL at 09:02

## 2023-02-15 RX ADMIN — FLUTICASONE PROPIONATE 100 MCG: 50 SPRAY, METERED NASAL at 09:02

## 2023-02-15 RX ADMIN — ALPRAZOLAM 1 MG: 0.25 TABLET ORAL at 09:02

## 2023-02-15 RX ADMIN — PANTOPRAZOLE SODIUM 40 MG: 40 TABLET, DELAYED RELEASE ORAL at 08:02

## 2023-02-15 RX ADMIN — HYDRALAZINE HYDROCHLORIDE 50 MG: 50 TABLET, FILM COATED ORAL at 02:02

## 2023-02-15 RX ADMIN — MEGESTROL ACETATE 40 MG: 40 TABLET ORAL at 08:02

## 2023-02-15 RX ADMIN — LISINOPRIL 40 MG: 20 TABLET ORAL at 08:02

## 2023-02-15 RX ADMIN — GABAPENTIN 100 MG: 100 CAPSULE ORAL at 08:02

## 2023-02-15 RX ADMIN — OXYCODONE HYDROCHLORIDE AND ACETAMINOPHEN 500 MG: 500 TABLET ORAL at 09:02

## 2023-02-15 RX ADMIN — ENOXAPARIN SODIUM 40 MG: 40 INJECTION SUBCUTANEOUS at 04:02

## 2023-02-15 RX ADMIN — OXYCODONE HYDROCHLORIDE 10 MG: 5 TABLET ORAL at 09:02

## 2023-02-15 RX ADMIN — LEVOTHYROXINE SODIUM 125 MCG: 0.12 TABLET ORAL at 06:02

## 2023-02-15 RX ADMIN — OXYCODONE HYDROCHLORIDE AND ACETAMINOPHEN 500 MG: 500 TABLET ORAL at 08:02

## 2023-02-15 RX ADMIN — PREDNISONE 5 MG: 5 TABLET ORAL at 08:02

## 2023-02-15 RX ADMIN — OXYCODONE HYDROCHLORIDE 10 MG: 5 TABLET ORAL at 02:02

## 2023-02-15 RX ADMIN — LACTULOSE 20 G: 20 SOLUTION ORAL at 04:02

## 2023-02-15 RX ADMIN — CARVEDILOL 25 MG: 25 TABLET, FILM COATED ORAL at 09:02

## 2023-02-15 NOTE — PT/OT/SLP PROGRESS
Occupational Therapy   Treatment    Name: Zandra Veloz  MRN: [id number]  Admitting Diagnosis:  Multiple skin tears       Recommendations:     Discharge Recommendations: rehabilitation facility, nursing facility, skilled  Discharge Equipment Recommendations:   (to be determined)  Barriers to discharge:  None    Assessment:     Zandra Veloz is a 88 y.o. female with a medical diagnosis of Multiple skin tears.  She presents with complaint of (L) LE pain.Pt agreed to exercises in bed. Performance deficits affecting function are weakness, impaired endurance.     Rehab Prognosis:  Good; patient would benefit from acute skilled OT services to address these deficits and reach maximum level of function.       Plan:     Patient to be seen 5 x/week to address the above listed problems via self-care/home management, therapeutic activities, therapeutic exercises  Plan of Care Expires: 02/21/23  Plan of Care Reviewed with: patient, caregiver    Subjective     Pain/Comfort:  Pain Rating 1: 8/10  Location - Side 1: Left  Location 1: leg  Pain Addressed 1:  (medicated during activity)  Pain Rating Post-Intervention 1: 8/10    Objective:     Communicated with: CLYDE Wood prior to session.  Patient found HOB elevated with chao catheter upon OT entry to room.    General Precautions: Standard, fall    Orthopedic Precautions:LLE posterior precautions, LLE partial weight bearing  Braces: N/A  Respiratory Status: Room air     Occupational Performance:     Bed Mobility:         Functional Mobility/Transfers:    Functional Mobility:     Activities of Daily Living:  Pt declined to perform ADL with OT today      [facility] 6 Click ADL:      Treatment & Education:  Pt performed UE strengthening exercises. 1 lb hand wt x 2 sets of 15 reps (B) shoulder flexion/extension, adduction/abduction and (B) elbow and wrist flexion/extension. Red theraband x 2 sets of 15 reps (B) elbow extension and 1 set (B) elbow flexion.    Pt  participated well with exercises. Plan is to continue tx addressing goals.    Patient left HOB elevated with all lines intact, call button in reach, and sitter present    GOALS:   Multidisciplinary Problems       Occupational Therapy Goals          Problem: Occupational Therapy    Goal Priority Disciplines Outcome Interventions   Occupational Therapy Goal     OT, PT/OT Ongoing, Progressing    Description: STG: (In 2 weeks)  Pt will bathe with setup and mod (A)  Pt will perform UE dressing with setup and min(A)  Pt will perform (L) UE AROM to 3/4 full range  Pt will sit EOB x 5 min with (I)  Pt will transfer bed/chair/bsc with min a  Pt will tolerate 20 minutes of tx without fatigue      LT.Restore to max I with self care and mobility.                          Time Tracking:     OT Date of Treatment: 02/15/23  OT Start Time: 1435  OT Stop Time: 1450  OT Total Time (min): 15 min    Billable Minutes:Therapeutic Exercise 15    OT/BRIGTH: OT          2/15/2023

## 2023-02-15 NOTE — PT/OT/SLP PROGRESS
"Physical Therapy Treatment    Patient Name:  Zandra Veloz   MRN:  [id number]    Recommendations:     Discharge Recommendations: rehabilitation facility, nursing facility, skilled  Discharge Equipment Recommendations: other (see comments) (to be determined)  Barriers to discharge:  none    Assessment:     Zandra Veloz is a 88 y.o. female admitted with a medical diagnosis of Multiple skin tears.  She presents with the following impairments/functional limitations: weakness, impaired endurance, orthopedic precautions, impaired functional mobility, gait instability, impaired self care skills. Pt reported that she was unable to get up d/t not having bath yet, which CNA stated was inaccurate. Pt ed on importance of full participation in therapy to improve mobility.     Rehab Prognosis: Fair; patient would benefit from acute skilled PT services to address these deficits and reach maximum level of function.    Recent Surgery: * No surgery found *      Plan:     During this hospitalization, patient to be seen 5 x/week to address the identified rehab impairments via gait training, therapeutic activities, therapeutic exercises and progress toward the following goals:    Plan of Care Expires:  03/30/23    Subjective     Chief Complaint: multiple skin tears  Patient/Family Comments/goals: "that leg is hurting too bad to do anything"  Pain/Comfort:  Pain Rating 1: 8/10  Location - Side 1: Left  Location 1: leg  Pain Rating Post-Intervention 1: 8/10      Objective:     Communicated with OTR prior to session.  Patient found HOB elevated with chao catheter upon PT entry to room.     General Precautions: Standard, fall  Orthopedic Precautions: LLE posterior precautions, LLE partial weight bearing  Braces: N/A  Respiratory Status: Room air     Functional Mobility:  Not assessed      AM-PAC 6 CLICK MOBILITY          Treatment & Education:  Bilateral lower extremity exercise x 20 reps: ankle pumps, short arc quads, " heel slides, hip abduction/adduction, and Long arc quads with verbal cues for sequencing and safety     Patient left HOB elevated with all lines intact, call button in reach, and sitter present..    GOALS:   Multidisciplinary Problems       Physical Therapy Goals          Problem: Physical Therapy    Goal Priority Disciplines Outcome Goal Variances Interventions   Physical Therapy Goal     PT, PT/OT Ongoing, Progressing     Description: Short Term Goals to be met by 2/25/2023    Patient will increase functional independence and safety with mobility by performing    1.   Rolling side to side with standby assist  2.   Supine to sit standby Assist  3.   Sit to stand Minimal Assist using manual assistance with gait belt and PWB L LE  4.   Ambulate 20ft with RW, PWB LLE. Min A.   5.   Lower extremity exercises x 30 reps with assist as necessary and no rest breaks      Long Term Goals to be met by 3/10/2023    Patient to require less than or equal to Stand by assist for functional mobility to ease caregiver burden                        Time Tracking:     PT Received On: 02/15/23  PT Start Time: 1451     PT Stop Time: 1504  PT Total Time (min): 13 min     Billable Minutes: Therapeutic Exercise 11    Treatment Type: Treatment  PT/PTA: PT     PTA Visit Number: 0     02/15/2023

## 2023-02-16 PROCEDURE — 99900035 HC TECH TIME PER 15 MIN (STAT)

## 2023-02-16 PROCEDURE — 99232 PR SUBSEQUENT HOSPITAL CARE,LEVL II: ICD-10-PCS | Mod: ,,, | Performed by: FAMILY MEDICINE

## 2023-02-16 PROCEDURE — 99232 SBSQ HOSP IP/OBS MODERATE 35: CPT | Mod: ,,, | Performed by: NURSE PRACTITIONER

## 2023-02-16 PROCEDURE — 99232 PR SUBSEQUENT HOSPITAL CARE,LEVL II: ICD-10-PCS | Mod: ,,, | Performed by: NURSE PRACTITIONER

## 2023-02-16 PROCEDURE — 25000003 PHARM REV CODE 250: Performed by: STUDENT IN AN ORGANIZED HEALTH CARE EDUCATION/TRAINING PROGRAM

## 2023-02-16 PROCEDURE — 99232 SBSQ HOSP IP/OBS MODERATE 35: CPT | Mod: ,,, | Performed by: FAMILY MEDICINE

## 2023-02-16 PROCEDURE — 25000003 PHARM REV CODE 250: Performed by: INTERNAL MEDICINE

## 2023-02-16 PROCEDURE — 97116 GAIT TRAINING THERAPY: CPT | Mod: CQ

## 2023-02-16 PROCEDURE — 63600175 PHARM REV CODE 636 W HCPCS: Performed by: STUDENT IN AN ORGANIZED HEALTH CARE EDUCATION/TRAINING PROGRAM

## 2023-02-16 PROCEDURE — 97110 THERAPEUTIC EXERCISES: CPT

## 2023-02-16 PROCEDURE — 11000001 HC ACUTE MED/SURG PRIVATE ROOM

## 2023-02-16 PROCEDURE — 97110 THERAPEUTIC EXERCISES: CPT | Mod: CQ

## 2023-02-16 PROCEDURE — 25000003 PHARM REV CODE 250: Performed by: NURSE PRACTITIONER

## 2023-02-16 PROCEDURE — 63600175 PHARM REV CODE 636 W HCPCS: Performed by: INTERNAL MEDICINE

## 2023-02-16 PROCEDURE — A6212 FOAM DRG <=16 SQ IN W/BORDER: HCPCS

## 2023-02-16 RX ORDER — MIRTAZAPINE 15 MG/1
15 TABLET, FILM COATED ORAL NIGHTLY
Status: DISCONTINUED | OUTPATIENT
Start: 2023-02-16 | End: 2023-02-20 | Stop reason: HOSPADM

## 2023-02-16 RX ADMIN — OXYCODONE HYDROCHLORIDE 10 MG: 5 TABLET ORAL at 08:02

## 2023-02-16 RX ADMIN — DOCUSATE SODIUM 100 MG: 100 CAPSULE, LIQUID FILLED ORAL at 08:02

## 2023-02-16 RX ADMIN — PANTOPRAZOLE SODIUM 40 MG: 40 TABLET, DELAYED RELEASE ORAL at 08:02

## 2023-02-16 RX ADMIN — ENOXAPARIN SODIUM 40 MG: 40 INJECTION SUBCUTANEOUS at 05:02

## 2023-02-16 RX ADMIN — TAMSULOSIN HYDROCHLORIDE 0.4 MG: 0.4 CAPSULE ORAL at 08:02

## 2023-02-16 RX ADMIN — CARVEDILOL 25 MG: 25 TABLET, FILM COATED ORAL at 08:02

## 2023-02-16 RX ADMIN — LEVOTHYROXINE SODIUM 125 MCG: 0.12 TABLET ORAL at 05:02

## 2023-02-16 RX ADMIN — HYDRALAZINE HYDROCHLORIDE 50 MG: 50 TABLET, FILM COATED ORAL at 05:02

## 2023-02-16 RX ADMIN — FLUTICASONE PROPIONATE 100 MCG: 50 SPRAY, METERED NASAL at 08:02

## 2023-02-16 RX ADMIN — ALPRAZOLAM 1 MG: 0.25 TABLET ORAL at 08:02

## 2023-02-16 RX ADMIN — GABAPENTIN 100 MG: 100 CAPSULE ORAL at 08:02

## 2023-02-16 RX ADMIN — DICLOFENAC SODIUM 4 G: 10 GEL TOPICAL at 12:02

## 2023-02-16 RX ADMIN — ZINC SULFATE 220 MG (50 MG) CAPSULE 220 MG: CAPSULE at 08:02

## 2023-02-16 RX ADMIN — HYDRALAZINE HYDROCHLORIDE 50 MG: 50 TABLET, FILM COATED ORAL at 02:02

## 2023-02-16 RX ADMIN — PREDNISONE 5 MG: 5 TABLET ORAL at 08:02

## 2023-02-16 RX ADMIN — OXYCODONE HYDROCHLORIDE AND ACETAMINOPHEN 500 MG: 500 TABLET ORAL at 08:02

## 2023-02-16 RX ADMIN — DICLOFENAC SODIUM 4 G: 10 GEL TOPICAL at 03:02

## 2023-02-16 RX ADMIN — MEGESTROL ACETATE 40 MG: 40 TABLET ORAL at 08:02

## 2023-02-16 RX ADMIN — GABAPENTIN 100 MG: 100 CAPSULE ORAL at 02:02

## 2023-02-16 RX ADMIN — POLYETHYLENE GLYCOL 3350 17 G: 17 POWDER, FOR SOLUTION ORAL at 08:02

## 2023-02-16 RX ADMIN — OXYCODONE HYDROCHLORIDE 10 MG: 5 TABLET ORAL at 09:02

## 2023-02-16 RX ADMIN — LISINOPRIL 40 MG: 20 TABLET ORAL at 08:02

## 2023-02-16 RX ADMIN — OXYCODONE HYDROCHLORIDE 10 MG: 5 TABLET ORAL at 02:02

## 2023-02-16 NOTE — PROGRESS NOTES
[facility] Hospital Medicine  Progress Note    Patient Name: Zandra Veloz  MRN: [id number]  Patient Class: IP- Inpatient   Admission Date: 12/29/2022  Length of Stay: 48 days  Attending Physician: Tal Pereira Jr., MD  Primary Care Provider: Brandon Thornton MD        Subjective:     Principal Problem:Multiple skin tears        HPI:  HPI:   88-year-old lady seen emergency room department.  Patient presents after having a fall when she was trying to go to the restroom at her home.  Patient sustained a left femur fracture.  Patient complains of moderate to severe pain in the left hip area.  Patient also complains of chest tightness, she rates it a 5/10 in the chest tightness has been intubate.  Patient also was seen in emergency room department earlier this week per her daughter.  Patient is found to have dehydration and a urinary tract infection.  Current she denies any shortness of breath.  She does not give a history of coronary artery disease.        Procedure(s) (LRB):  REVISION, TOTAL ARTHROPLASTY, HIP, VICTORIANO PROSTHETIC FX (Left)       Hospital Course:   12/19 - records reviewed. Fall without LOC and has left hip fx. No other complaints currently. Seen by cardiology with some CP felt musculoskeletal.  Echo mild AS and mod MR with nl EF. Age increase cardiac risk for surgery but discussed with daughter surgery is urgent and see no benefit in delay medically. Feel low to moderate risk for surgery. Question about UTI. No symptoms. Had UTI in 10.22 not surprising. Lab to do culture on urine in lab. Cover with ATN for prior culture with ATB started. Discussed with Dr Singer and cardiology.  12/20 Tachycardia and elevated BP. Cardiology adjusted meds. Plan hold off surgery until 12/22 to adjust meds and treat UTI. Family in room. Pt not sleeping well.   12/21 Didn't sleep well prior night. Apparently been on xanax for a time. Also recently started on Neurontin. Family with  question. No recent BM. Some increase stool on KUB but not severe. Surgery for am. BP some up but better. HR good.   12/22 Seen prior to surgery and apparently add better night. Sore mouth / throat better with mycostatin. For surgery. Family in room.  12/23 patient with pain but otherwise seemed to be doing relatively well.  Daughter in room.  Apparently been taking prednisone for some time and this was restarted per family request.  Look into swing bed placement  12/24 patient had better night rested and everyone's in better spirits today.  Tolerating some diet.   Therapy worked with patient.  Look into swing bed placement next week  12/25 remained in good spirits.  Less pain.  Had good bowel movement and ordered Dulcolax not given.  Because of dressing to leg, Burk was not removed to keep it from getting soiled.  Wound care to check 12/27.  Discuss this with patient and daughter.  On antibiotics for UTI  12/26-will dc to swingbed once accepted. Needs continued wound care.  12/27-DC when bed available  12/28- issues with wound care yesterday.  Dr. Singer had to come remove dressings himself due to adherence to subcutaneous tissue.  Family refusing transfer to [facility] due to lack of specially trained wound care team availability.  Planning to transfer to specialty but resistant to that as well and requesting to speak to administration.    12/29- agreeable to transfer to specialty.  This will be the best place for her to receive wound care.  DC after family tours     12/29-needs continued wound care and [facility]    12/30-doing well today, still with pain         Overview/Hospital Course:  12/31-having some pain this am  1/1- no complaints  1/2- doing well  1/3- continue wound care, last day cefepime tomorrow  1/4- some pain this AM.  Delay in mediations dues to staffing issues. Dsicussed with RN  1/5- no issues overnight  1/6- Still with pain, hip incision looks good  1/7-diclofenac for  shoulder pain  1/8-decrease laxatives, change benzos and carlo to PRN per patient request  1/9 some confusion overnight  1/10- no more confusion, doing well  1/11- no acute issues overnight  1/12- doing well, nausea today  1/13 -Dr. Lebron Cutler IV, DO taking over for Dr. Guerra for weekend.  Patient's pain appears to be well controlled this morning no complaints of nausea.  Continue wound care and PT/OT  1/14-patient appears well labs.  Continue wound care and PT/OT  1/15-patient still appears well.  No acute concerns or complaints.  No events overnight.  Continue wound care working with a PTOT.  Expiration is services estimated by February 6 1/16- no complaints, doing well  1/17-some nausea today  1/18-better today.  Still requiring twice weekly dressing changes.  Appetite stimulant  1/19- PWB with PT today.  Still with weeping wounds.  Will need wound care twice weekly-as these improve will be able to transfer to other facility  1/20-no complaints  1/21-no acute events overnight, some anxiety today  1/22-episode of SVT yesterday. Spontaneous resolution  1/23-no complaints  1/24- no complaints  1/25-doing well  1/26-wound care, pt  1/27- no complaints  1/28- patient seen examined today resting comfortably in bed, in no acute distress and no acute events overnight.  Patient states that she is doing well and denies pain.  States that she is been working well with PT/OT but not this weekend.  Patient otherwise doing well without complaints.  1/29-patient seen examined today resting comfortably in bed, in no acute distress with no acute events overnight.  Patient denies pain or other complaints at this time.  She continues with wound care PT/OT.  1/30-doing well this am  1/31-no complaints  2/1-no changes      Interval History: Reports improved effort in PT. Met with one daughter. Plan to meet again Thursday morning. [facility]     Review of Systems  Objective:     Vital  Signs (Most Recent):  Temp: 97.6 °F (36.4 °C) (02/15/23 1600)  Pulse: 83 (02/15/23 1600)  Resp: 18 (02/15/23 1600)  BP: (!) 135/58 (02/15/23 1600)  SpO2: 98 % (02/15/23 1600)   Vital Signs (24h Range):  Temp:  [97.6 °F (36.4 °C)-98.9 °F (37.2 °C)] 97.6 °F (36.4 °C)  Pulse:  [57-93] 83  Resp:  [18] 18  SpO2:  [96 %-98 %] 98 %  BP: (119-160)/(58-76) 135/58     Weight: 58.7 kg (129 lb 6.6 oz)  Body mass index is 20.89 kg/m².    Intake/Output Summary (Last 24 hours) at 2/15/2023 2012  Last data filed at 2/15/2023 0631  Gross per 24 hour   Intake --   Output 450 ml   Net -450 ml      Physical Exam  Vitals reviewed.   Constitutional:       Appearance: Normal appearance.   HENT:      Head: Normocephalic and atraumatic.   Eyes:      Extraocular Movements: Extraocular movements intact.   Cardiovascular:      Rate and Rhythm: Normal rate and regular rhythm.      Pulses: Normal pulses.   Pulmonary:      Effort: Pulmonary effort is normal.      Breath sounds: Normal breath sounds.   Abdominal:      General: Abdomen is flat.      Palpations: Abdomen is soft.   Musculoskeletal:         General: Tenderness and signs of injury present.      Comments: Dressed left lower extremity, painful to touch   Skin:     General: Skin is warm and dry.      Capillary Refill: Capillary refill takes less than 2 seconds.      Comments: Multiple wounds, see LDAs.    Neurological:      General: No focal deficit present.      Mental Status: She is alert and oriented to person, place, and time.   Psychiatric:         Mood and Affect: Mood normal.         Behavior: Behavior normal.       Significant Labs: All pertinent labs within the past 24 hours have been reviewed.    Significant Imaging: I have reviewed all pertinent imaging results/findings within the past 24 hours.      Assessment/Plan:      * Multiple skin tears  Wound care  Optimize nutrition       Open wound of left lower leg  Wound care continues.  Wounds are improving.       Failure to thrive  in adult  Cont appetite stimulant  Cont nutiritional supplement  Cont to encourage oral intake  PT    Disruption of external surgical wound        Wounds and injuries  Wound care consulted and following    Delmy-prosthetic fracture around prosthetic hip  S/P fixation with Dr Singer      Lumbar radiculopathy  Pain management  PT      Hypertension  Adjust medications as needed    2/2-d/c dilt, hydral since bp running low, keep metoprolol and lisinopril on    2/3-will increae lisinopril for afterload reduction.     Arthritis  Pain management  Per family has been taking oral steorids for a long time, cont that        VTE Risk Mitigation (From admission, onward)         Ordered     enoxaparin injection 40 mg  Daily         02/02/23 1051                Discharge Planning   YANIV:      Code Status: Full Code   Is the patient medically ready for discharge?:     Reason for patient still in hospital (select all that apply): Treatment  Discharge Plan A: Skilled Nursing Facility                  Tal Pereira Jr, MD  Department of Hospital Medicine   [facility]

## 2023-02-16 NOTE — PROGRESS NOTES
"[facility]  Adult Nutrition  Follow-up Note         Reason for Assessment  Reason For Assessment: RD follow-up  Nutrition Risk Screen: large or nonhealing wound, burn or pressure injury    RD follow up.     Continue current diet order + nutritional supplements as able/appropriate and tolerated. Encourage good PO intakes. Continue Vamshi BID + Vit C + ZnSO4 to aid in wound healing. Continue Gelatein Plus BID to promote adequate protein intake. If more aggressive nutrition intervention desired, please consult for recommendations.     Per MD since last RD review:  "02/13/23: Patient feeling pretty well.  Having intermittent pain from her wounds but generally ok.  Discussed the importance of strong effort with PT.   02/15/23: Reports improved effort in PT. Met with one daughter. Plan to meet again Thursday morning. Thinking Monday may be ideal for transfer to [facility]."    Per wound care note,  "2/13/23 - Wounds continue to show improvement with polymem. Skin tears are smaller. She continue to have pain with dressing changes. Continue current POC."      Pt is continues on a Regular diet + Gelatein BID + Vamshi BID. Per flowsheets, pt consuming % of meals. Continues on appetite stimulant. Encourage PO intakes.     If PO intakes decline, consider initiating alternate route for nutrition as supplemental nutrition to better meet needs. Please consult for recommendations if alternate route for nutrition is desired.      Wounds continue being treated, Wound Care following. Recommend continue Vamshi BID (Arginaid may be used in place of Vamshi due to supple issues) to aid in wound healing. Continue Vit C and ZnSO4 as needed to aid in wound healing as well. Consider addition of mvi if able/appropriate.      CBW 129lb 6.6oz on 2/15, up 1# from 2/10. Weight trend since admission includes: 120 lbs on 1/11, 126 lbs on 1/18. ~9 lb weight gain since admission. Weight seems to be trending towards IBW range " which is desired at this time.      Last BM 2/11 per flowsheets - pt with docusate sodium and polyethylene glycol on med list; recommend continue bowel regimen. Will continue to monitor PO intakes, weight trend, meds, labs, updates in patient condition. RD following.     Malnutrition  Is Patient Malnourished: No     Nutrition Diagnosis  Increased protein Needs   related to Wound healing as evidenced by pt with multiple wounds     Nutrition Diagnosis Status: Chronic/ continues     Nutrition Risk  Level of Risk/Frequency of Follow-up: moderate - high   Chewing or Swallowing Difficulty?: No Chewing or swallowing difficulty    Estimated/Assessed Needs    Temp: 97.9 °F (36.6 °C)Oral  Weight Used For Calorie Calculations: 59.1 kg (130 lb 4.7 oz)   Energy Need Method: Kcal/kg (35-40 kcal/kg) Energy Calorie Requirements (kcal): 4515-6651 kcal  Weight Used For Protein Calculations: 59.1 kg (130 lb 4.7 oz)  Protein Requirements: 59-118g pro (1.0-2.0g pro/kg d/t geriatric, wounds)  Estimated Fluid Requirement Method: RDA Method    RDA Method (mL): 2069     Nutrition Prescription / Recommendations  Recommendation/Intervention: Continue current diet order + nutritional supplements as able/appropriate and tolerated. Encourage good PO intakes. Continue Vamshi BID + Vit C + ZnSO4 to aid in wound healing. Continue Gelatein Plus BID to promote adequate protein intake. If more aggressive nutrition intervention desired, please consult for recommendations.  Goals: consume % of meals, tolerate PO intake, weight maintenance, wound healing  Nutrition Goal Status: progressing towards goal  Current Diet Order: regular  Nutrition Order Comments: Appropriate  Oral Nutrition Supplement: Vamshi BID + Gelatein Plus BID  Recommended Diet: Regular  Recommended Oral Supplement: Vamshi [90 kcals, 2.5g Protein, 10g Carbs(3g Sugar), 7g L-Arginine, 7g L-Glutamine, Vitamin C 300mg, 9.5mg Zinc] twice daily and Gelatein Plus [160 kcals, 20g Protein, 20g  Carbs(2g Fiber, 18g Sugar), 0g Fat twice daily  Is Nutrition Support Recommended: No  Is Education Recommended: No    Monitor and Evaluation  % current Intake: Other: PO intake %  % intake to meet estimated needs: 75 - 100 %  Food and Nutrient Intake: energy intake, food and beverage intake  Food and Nutrient Adminstration: diet order  Knowledge/Beliefs/Attitudes: food and nutrition knowledge/skill, beliefs and attitudes  Physical Activity and Function: nutrition-related ADLs and IADLs, factors affecting access to physical activity  Anthropometric Measurements: weight, weight change, body mass index  Biochemical Data, Medical Tests and Procedures: electrolyte and renal panel, gastrointestinal profile, glucose/endocrine profile, inflammatory profile, lipid profile  Nutrition-Focused Physical Findings: overall appearance, extremities, muscles and bones, head and eyes, skin     Current Medical Diagnosis and Past Medical History  Diagnosis: other (see comments) (multiple skin tears)  Past Medical History:   Diagnosis Date    Hypertension     Osteoporosis     Thyroid disorder      Nutrition/Diet History  Spiritual, Cultural Beliefs, Religion Practices, Values that Affect Care: no  Food Allergies: NKFA    Lab/Procedures/Meds  Recent Labs   Lab 02/15/23  0507      K 4.7   BUN 15   CREATININE 0.69   GLU 78   CALCIUM 8.6   ALBUMIN 2.6*   *   ALT 17   AST 17     Last A1c: No results found for: HGBA1C  Lab Results   Component Value Date    RBC 3.23 (L) 02/13/2023    HGB 9.5 (L) 02/13/2023    HCT 29.4 (L) 02/13/2023    MCV 91.0 02/13/2023    MCH 29.4 02/13/2023    MCHC 32.3 02/13/2023     Pertinent Labs Reviewed: reviewed  Pertinent Labs Comments:   Cl 108 (H) BUN/CREAT RATIO 22 (H) - possibly r/t fluid status; alk phos 42 (L), total protein 5.5 (L), albumin 2.6 (L) - pt with wounds; will continue to monitor altered labs.   Pertinent Medications Reviewed: reviewed  Pertinent Medications Comments: alprazolam,  "vit C, carvedilol, docusate sodium, enoxaparin, gabapentin, hydralazine, levothyroxine, lisinopril, megestrol, pantoprazole, polyethylene glycol, prednisone, tamsulosin, zinc sulfate    Anthropometrics  Temp: 97.9 °F (36.6 °C)  Height: 5' 6" (167.6 cm)  Height (inches): 66 in  Weight Method: Bed Scale  Weight: 58.7 kg (129 lb 6.6 oz)  Weight (lb): 129.41 lb  Ideal Body Weight (IBW), Female: 130 lb  % Ideal Body Weight, Female (lb): 100.15 %  BMI (Calculated): 20.5     Nutrition by Nursing  Diet/Nutrition Received: regular  Intake (%): 50%  Diet/Feeding Assistance: tray set-up  Diet/Feeding Tolerance: good  Last Bowel Movement: 02/11/23    Nutrition Follow-Up  RD Follow-up?: Yes  "

## 2023-02-16 NOTE — PROGRESS NOTES
[facility]  Wound Care and Hyperbarics JOSE  Progress Note    Patient Name: Zandra Veloz  MRN: [id number]  Admission Date: 12/29/2022  Hospital Length of Stay: 49 days  Attending Physician: Tal Pereira Jr., MD  Primary Care Provider: Brandon Thornton MD         Subjective:     HPI:  87 yo female with multiple skin tears, traumatic wounds, and surgical incision to left thigh. She was admitted following a fall on 12/17 when she was trying to go to the restroom at her home.  Patient sustained a left femur fracture. She is status post ORIF on 12/20. Staples intact to left hip. Moderate amount of green drainage noted, cultures obtained today. Left lower leg sutures removed today. Slough noted in wound bed today, adjustment to local wound care. Significant PMH includes hypertension, bradycardia, osteoarthritis, hypothyroidism, osteoporosis, and multiple falls. Wound healing is complicated by bradycardia, hx of a-fib, chronic pain, weakness, fragile skin, limited mobility, multiple falls, infection, and pain.     Hospital Course:   1/5/23 - Complains of pain to right posterior knee, venous doppler ordered. Sutures and every other staple removed today. New local wound care orders. Cultures pending.   1/9/23 - Wounds on lower extremities healing well. Continue current POC. Bruising noted to left posterior knee, protect with mepitel and optifoam. Dressing to incision site saturated with green drainage. Cultures negative. Continue with drawtex and notify ortho of drainage.   1/11/2023 - Wounds are healing well. Continue current POC. Ultrasound pending, preliminary results concerning for fluid pocket, Dr. Singer going to evaluate today.   1/17/23 - Wounds continue to show improvement. Dr. Singer evaluated left hip today during wound vac change. Staples and wound vac d/c'd today. Dressing change twice weekly.   1/19/23 - Wounds continue to heal. Incision with minimal drainage.  Puracol to left thigh today. Continue with current POC. Twice weekly dressing changes (M, Th)  1/23/23 - Wounds are improving. Continue twice weekly dressing changes.   1/30/23 - New skin tear to right thigh, used tweezers and q-tip to place skin over wound and applied transparent dressing. Wounds are smaller today with granulation tissue. Continue drawtex and bordered foam dressing to wounds.   2/2/23 - Discussed using silvadene with patient and family, patient is allergic to sulfa. Consider urea for dry skin on left lower leg, aquacel ag advantage with bordered foams to wounds.   2/6/23 - Removed dry skin and clots from left lower leg today. Applied silver seal wound dressing today to assist with wound healing and pain.   2/8/23 - Wounds are improving with new plan of care. Polymem silver to open wounds. Silvercel to scabbed areas. Change M, W, F  2/10/23 - Wounds have shown significant improvement with polymem. Continue current POC  2/13/23 - Wounds continue to show improvement with polymem. Skin tears are smaller. She continue to have pain with dressing changes. Continue current POC.   2/16/23 - Wounds continue to improve. Continue current POC.           Scheduled Meds:   ALPRAZolam  1 mg Oral QHS    ascorbic acid (vitamin C)  500 mg Oral BID    carvediloL  25 mg Oral BID    docusate sodium  100 mg Oral BID    enoxaparin  40 mg Subcutaneous Daily    fluticasone propionate  2 spray Each Nostril Daily    gabapentin  100 mg Oral TID    hydrALAZINE  50 mg Oral Q8H    levothyroxine  125 mcg Oral Before breakfast    lisinopriL  40 mg Oral Daily    megestroL  40 mg Oral Daily    pantoprazole  40 mg Oral Daily    polyethylene glycol  17 g Oral Daily    predniSONE  5 mg Oral Daily    tamsulosin  0.4 mg Oral Daily    zinc sulfate  220 mg Oral Daily     Continuous Infusions:  PRN Meds:acetaminophen, bisacodyL, dextromethorphan-guaiFENesin  mg/5 ml, dextrose 50%, dextrose 50%, diclofenac sodium,  glucagon (human recombinant), glucose, glucose, hydrALAZINE, lactulose, magnesium oxide, melatonin, naloxone, ondansetron, ondansetron, oxyCODONE, potassium bicarbonate, potassium bicarbonate, potassium bicarbonate, potassium, sodium phosphates, potassium, sodium phosphates, potassium, sodium phosphates, prochlorperazine, simethicone, sodium chloride 0.9%, traZODone, urea, white petrolatum    Review of Systems   Constitutional:  Positive for activity change. Negative for chills and fever.   Respiratory:  Negative for chest tightness and shortness of breath.    Cardiovascular:  Positive for leg swelling. Negative for chest pain and palpitations.   Musculoskeletal:  Positive for arthralgias, back pain, gait problem and joint swelling.   Skin:  Positive for color change and wound.        wound   Neurological:  Positive for weakness.   Psychiatric/Behavioral:  Negative for agitation, behavioral problems, confusion and self-injury.    Objective:     Vital Signs (Most Recent):  Temp: 97.9 °F (36.6 °C) (02/16/23 0400)  Pulse: 65 (02/16/23 0400)  Resp: 16 (02/16/23 0833)  BP: (!) 148/52 (02/16/23 0833)  SpO2: 96 % (02/16/23 0400)   Vital Signs (24h Range):  Temp:  [97.6 °F (36.4 °C)-98 °F (36.7 °C)] 97.9 °F (36.6 °C)  Pulse:  [65-83] 65  Resp:  [16-19] 16  SpO2:  [96 %-98 %] 96 %  BP: (117-148)/(52-67) 148/52     Weight: 58.7 kg (129 lb 6.6 oz)  Body mass index is 20.89 kg/m².    Physical Exam  Vitals reviewed.   Constitutional:       Appearance: Normal appearance.   HENT:      Head: Normocephalic.   Cardiovascular:      Rate and Rhythm: Normal rate and regular rhythm.      Pulses: Normal pulses.      Heart sounds: Murmur heard.   Pulmonary:      Effort: Pulmonary effort is normal.      Breath sounds: Normal breath sounds.   Musculoskeletal:         General: Swelling, tenderness, deformity and signs of injury present.      Right lower leg: Edema present.      Left lower leg: Edema present.   Skin:     Findings: Bruising and  erythema present.      Comments: Wound, see LDA for photo/measurements   Neurological:      Mental Status: She is alert. Mental status is at baseline.      Motor: Weakness present.      Gait: Gait abnormal.       Assessment/Plan:     * Multiple skin tears  Clean with vashe  Apply polymem silver to wounds  Cover and secure with bordered foam  Change M, Th  Keep leg elevated and avoid pressure on wound.    Open wound of left lower leg  Clean with vashe  Apply polymem silver to open wounds   Cover and secure with bordered foam  Change M, TH  Keep leg elevated and avoid pressure on wound.              ZURDO San  Wound Care and Hyperbarics [facility]  [facility]

## 2023-02-16 NOTE — PT/OT/SLP PROGRESS
Occupational Therapy   Treatment    Name: Zandra Veloz  MRN: [id number]  Admitting Diagnosis:  Multiple skin tears       Recommendations:     Discharge Recommendations: nursing facility, skilled, rehabilitation facility  Discharge Equipment Recommendations:   (to be determined)  Barriers to discharge:  None    Assessment:     Zandra Veloz is a 88 y.o. female with a medical diagnosis of Multiple skin tears.  She presents with complaint of fatigue. Pt agreed to UE exercise. Performance deficits affecting function are weakness, impaired endurance.     Rehab Prognosis:  Good; patient would benefit from acute skilled OT services to address these deficits and reach maximum level of function.       Plan:     Patient to be seen 5 x/week to address the above listed problems via self-care/home management, therapeutic activities, therapeutic exercises  Plan of Care Expires: 02/21/23  Plan of Care Reviewed with: patient    Subjective     Pain/Comfort:       Objective:     Communicated with: CLYDE Wood prior to session.  Patient found HOB elevated with chao catheter upon OT entry to room.    General Precautions: Standard, fall    Orthopedic Precautions:LLE posterior precautions, LLE partial weight bearing  Braces: N/A  Respiratory Status: Room air     Occupational Performance:     Bed Mobility:         Functional Mobility/Transfers:    Functional Mobility:     Activities of Daily Living:        [facility] 6 Click ADL:      Treatment & Education:  Pt performed UE strengthening exercises. AAROM with 1 lb hand wt (B) shoulder flexion/extension and adduction/abduction. 1 lb hand wt (B) elbow and wrist flexion/extension. Red theraband x 10 reps (B) elbow flexion and extension and internal/external rotation.  Exercises performed to tolerance. Pt requested a decrease in repetitions today. Plan is to continue tx.    Patient left HOB elevated with all lines intact, call button in reach, and sitter  present    GOALS:   Multidisciplinary Problems       Occupational Therapy Goals          Problem: Occupational Therapy    Goal Priority Disciplines Outcome Interventions   Occupational Therapy Goal     OT, PT/OT Ongoing, Progressing    Description: STG: (In 2 weeks)  Pt will bathe with setup and mod (A)  Pt will perform UE dressing with setup and min(A)  Pt will perform (L) UE AROM to 3/4 full range  Pt will sit EOB x 5 min with (I)  Pt will transfer bed/chair/bsc with min a  Pt will tolerate 20 minutes of tx without fatigue      LT.Restore to max I with self care and mobility.                          Time Tracking:     OT Date of Treatment: 23  OT Start Time: 1513  OT Stop Time: 1527  OT Total Time (min): 14 min    Billable Minutes:Therapeutic Exercise 12    OT/BRIGHT: OT          2023

## 2023-02-16 NOTE — SUBJECTIVE & OBJECTIVE
Subjective:     HPI:  89 yo female with multiple skin tears, traumatic wounds, and surgical incision to left thigh. She was admitted following a fall on 12/17 when she was trying to go to the restroom at her home.  Patient sustained a left femur fracture. She is status post ORIF on 12/20. Staples intact to left hip. Moderate amount of green drainage noted, cultures obtained today. Left lower leg sutures removed today. Slough noted in wound bed today, adjustment to local wound care. Significant PMH includes hypertension, bradycardia, osteoarthritis, hypothyroidism, osteoporosis, and multiple falls. Wound healing is complicated by bradycardia, hx of a-fib, chronic pain, weakness, fragile skin, limited mobility, multiple falls, infection, and pain.     Hospital Course:   1/5/23 - Complains of pain to right posterior knee, venous doppler ordered. Sutures and every other staple removed today. New local wound care orders. Cultures pending.   1/9/23 - Wounds on lower extremities healing well. Continue current POC. Bruising noted to left posterior knee, protect with mepitel and optifoam. Dressing to incision site saturated with green drainage. Cultures negative. Continue with drawtex and notify ortho of drainage.   1/11/2023 - Wounds are healing well. Continue current POC. Ultrasound pending, preliminary results concerning for fluid pocket, Dr. Singer going to evaluate today.   1/17/23 - Wounds continue to show improvement. Dr. Singer evaluated left hip today during wound vac change. Staples and wound vac d/c'd today. Dressing change twice weekly.   1/19/23 - Wounds continue to heal. Incision with minimal drainage. Puracol to left thigh today. Continue with current POC. Twice weekly dressing changes (M, Th)  1/23/23 - Wounds are improving. Continue twice weekly dressing changes.   1/30/23 - New skin tear to right thigh, used tweezers and q-tip to place skin over wound and applied transparent dressing. Wounds are  smaller today with granulation tissue. Continue drawtex and bordered foam dressing to wounds.   2/2/23 - Discussed using silvadene with patient and family, patient is allergic to sulfa. Consider urea for dry skin on left lower leg, aquacel ag advantage with bordered foams to wounds.   2/6/23 - Removed dry skin and clots from left lower leg today. Applied silver seal wound dressing today to assist with wound healing and pain.   2/8/23 - Wounds are improving with new plan of care. Polymem silver to open wounds. Silvercel to scabbed areas. Change M, W, F  2/10/23 - Wounds have shown significant improvement with polymem. Continue current POC  2/13/23 - Wounds continue to show improvement with polymem. Skin tears are smaller. She continue to have pain with dressing changes. Continue current POC.   2/16/23 - Wounds continue to improve. Continue current POC.           Scheduled Meds:   ALPRAZolam  1 mg Oral QHS    ascorbic acid (vitamin C)  500 mg Oral BID    carvediloL  25 mg Oral BID    docusate sodium  100 mg Oral BID    enoxaparin  40 mg Subcutaneous Daily    fluticasone propionate  2 spray Each Nostril Daily    gabapentin  100 mg Oral TID    hydrALAZINE  50 mg Oral Q8H    levothyroxine  125 mcg Oral Before breakfast    lisinopriL  40 mg Oral Daily    megestroL  40 mg Oral Daily    pantoprazole  40 mg Oral Daily    polyethylene glycol  17 g Oral Daily    predniSONE  5 mg Oral Daily    tamsulosin  0.4 mg Oral Daily    zinc sulfate  220 mg Oral Daily     Continuous Infusions:  PRN Meds:acetaminophen, bisacodyL, dextromethorphan-guaiFENesin  mg/5 ml, dextrose 50%, dextrose 50%, diclofenac sodium, glucagon (human recombinant), glucose, glucose, hydrALAZINE, lactulose, magnesium oxide, melatonin, naloxone, ondansetron, ondansetron, oxyCODONE, potassium bicarbonate, potassium bicarbonate, potassium bicarbonate, potassium, sodium phosphates, potassium, sodium phosphates, potassium, sodium phosphates, prochlorperazine,  simethicone, sodium chloride 0.9%, traZODone, urea, white petrolatum    Review of Systems   Constitutional:  Positive for activity change. Negative for chills and fever.   Respiratory:  Negative for chest tightness and shortness of breath.    Cardiovascular:  Positive for leg swelling. Negative for chest pain and palpitations.   Musculoskeletal:  Positive for arthralgias, back pain, gait problem and joint swelling.   Skin:  Positive for color change and wound.        wound   Neurological:  Positive for weakness.   Psychiatric/Behavioral:  Negative for agitation, behavioral problems, confusion and self-injury.    Objective:     Vital Signs (Most Recent):  Temp: 97.9 °F (36.6 °C) (02/16/23 0400)  Pulse: 65 (02/16/23 0400)  Resp: 16 (02/16/23 0833)  BP: (!) 148/52 (02/16/23 0833)  SpO2: 96 % (02/16/23 0400)   Vital Signs (24h Range):  Temp:  [97.6 °F (36.4 °C)-98 °F (36.7 °C)] 97.9 °F (36.6 °C)  Pulse:  [65-83] 65  Resp:  [16-19] 16  SpO2:  [96 %-98 %] 96 %  BP: (117-148)/(52-67) 148/52     Weight: 58.7 kg (129 lb 6.6 oz)  Body mass index is 20.89 kg/m².    Physical Exam  Vitals reviewed.   Constitutional:       Appearance: Normal appearance.   HENT:      Head: Normocephalic.   Cardiovascular:      Rate and Rhythm: Normal rate and regular rhythm.      Pulses: Normal pulses.      Heart sounds: Murmur heard.   Pulmonary:      Effort: Pulmonary effort is normal.      Breath sounds: Normal breath sounds.   Musculoskeletal:         General: Swelling, tenderness, deformity and signs of injury present.      Right lower leg: Edema present.      Left lower leg: Edema present.   Skin:     Findings: Bruising and erythema present.      Comments: Wound, see LDA for photo/measurements   Neurological:      Mental Status: She is alert. Mental status is at baseline.      Motor: Weakness present.      Gait: Gait abnormal.

## 2023-02-16 NOTE — PT/OT/SLP PROGRESS
Physical Therapy Treatment    Patient Name:  Zandra Veloz   MRN:  [id number]    Recommendations:     Discharge Recommendations: rehabilitation facility, nursing facility, skilled  Discharge Equipment Recommendations: other (see comments) (to be determined)  Barriers to discharge:  ongoing medical treatment    Assessment:     Zandra Veloz is a 88 y.o. female admitted with a medical diagnosis of Multiple skin tears.  She presents with the following impairments/functional limitations: weakness, impaired endurance, impaired self care skills, impaired functional mobility, gait instability, orthopedic precautions, impaired cognition.    Pt able to ambulate short distance, however, cont to require encouragement to work to max potential    Rehab Prognosis: Fair; patient would benefit from acute skilled PT services to address these deficits and reach maximum level of function.    Recent Surgery: * No surgery found *      Plan:     During this hospitalization, patient to be seen 5 x/week to address the identified rehab impairments via gait training, therapeutic activities, therapeutic exercises and progress toward the following goals:    Plan of Care Expires:  03/30/23    Subjective     Chief Complaint: skin tears  Patient/Family Comments/goals: pt agreeable  Pain/Comfort:         Objective:     Communicated with Madie Wood RN prior to session.  Patient found HOB elevated with chao catheter upon PT entry to room.     General Precautions: Standard, fall  Orthopedic Precautions: LLE posterior precautions, LLE partial weight bearing  Braces: N/A  Respiratory Status: Room air     Functional Mobility:  Bed Mobility:     Rolling Left:  contact guard assistance and minimum assistance  Rolling Right: contact guard assistance and minimum assistance for hygiene  Supine to Sit: contact guard assistance and minimum assistance x 2 trials due to bowel incontinency   Sit to Supine: minimum assistance  Transfers:      Sit to Stand:  moderate assistance and heavy cueing with rolling walker  Gait: 12' minimum assist with RW; slow sadaf, mild posterior lean initially, encouragement to increase distance      AM-PAC 6 CLICK MOBILITY  Turning over in bed (including adjusting bedclothes, sheets and blankets)?: 3  Sitting down on and standing up from a chair with arms (e.g., wheelchair, bedside commode, etc.): 2  Moving from lying on back to sitting on the side of the bed?: 3  Moving to and from a bed to a chair (including a wheelchair)?: 3  Need to walk in hospital room?: 3  Climbing 3-5 steps with a railing?: 1  Basic Mobility Total Score: 15       Treatment & Education:  Pt performed 2 x 15 reps (B) LE exercises: ap, quad set, glut set, straight leg raise, hip ab/adduction, long arc quad, heel slide with active assist     Patient left up in chair with all lines intact, call button in reach, and care giver present..    GOALS:   Multidisciplinary Problems       Physical Therapy Goals          Problem: Physical Therapy    Goal Priority Disciplines Outcome Goal Variances Interventions   Physical Therapy Goal     PT, PT/OT Ongoing, Progressing     Description: Short Term Goals to be met by 2/25/2023    Patient will increase functional independence and safety with mobility by performing    1.   Rolling side to side with standby assist  2.   Supine to sit standby Assist  3.   Sit to stand Minimal Assist using manual assistance with gait belt and PWB L LE  4.   Ambulate 20ft with RW, PWB LLE. Min A.   5.   Lower extremity exercises x 30 reps with assist as necessary and no rest breaks      Long Term Goals to be met by 3/10/2023    Patient to require less than or equal to Stand by assist for functional mobility to ease caregiver burden                        Time Tracking:     PT Received On: 02/16/23  PT Start Time: 1122     PT Stop Time: 1156  PT Total Time (min): 34 min     Billable Minutes: Gait Training 8 and Therapeutic Exercise  15    Treatment Type: Treatment  PT/PTA: PTA     PTA Visit Number: 1     02/16/2023

## 2023-02-16 NOTE — SUBJECTIVE & OBJECTIVE
Interval History: Reports improved effort in PT. Met with one daughter. Plan to meet again Thursday morning. Thinking Monday may be ideal for transfer to [facility].     Review of Systems  Objective:     Vital Signs (Most Recent):  Temp: 97.6 °F (36.4 °C) (02/15/23 1600)  Pulse: 83 (02/15/23 1600)  Resp: 18 (02/15/23 1600)  BP: (!) 135/58 (02/15/23 1600)  SpO2: 98 % (02/15/23 1600)   Vital Signs (24h Range):  Temp:  [97.6 °F (36.4 °C)-98.9 °F (37.2 °C)] 97.6 °F (36.4 °C)  Pulse:  [57-93] 83  Resp:  [18] 18  SpO2:  [96 %-98 %] 98 %  BP: (119-160)/(58-76) 135/58     Weight: 58.7 kg (129 lb 6.6 oz)  Body mass index is 20.89 kg/m².    Intake/Output Summary (Last 24 hours) at 2/15/2023 2012  Last data filed at 2/15/2023 0631  Gross per 24 hour   Intake --   Output 450 ml   Net -450 ml      Physical Exam  Vitals reviewed.   Constitutional:       Appearance: Normal appearance.   HENT:      Head: Normocephalic and atraumatic.   Eyes:      Extraocular Movements: Extraocular movements intact.   Cardiovascular:      Rate and Rhythm: Normal rate and regular rhythm.      Pulses: Normal pulses.   Pulmonary:      Effort: Pulmonary effort is normal.      Breath sounds: Normal breath sounds.   Abdominal:      General: Abdomen is flat.      Palpations: Abdomen is soft.   Musculoskeletal:         General: Tenderness and signs of injury present.      Comments: Dressed left lower extremity, painful to touch   Skin:     General: Skin is warm and dry.      Capillary Refill: Capillary refill takes less than 2 seconds.      Comments: Multiple wounds, see LDAs.    Neurological:      General: No focal deficit present.      Mental Status: She is alert and oriented to person, place, and time.   Psychiatric:         Mood and Affect: Mood normal.         Behavior: Behavior normal.       Significant Labs: All pertinent labs within the past 24 hours have been reviewed.    Significant Imaging: I have reviewed all pertinent imaging results/findings  within the past 24 hours.

## 2023-02-17 LAB
ALBUMIN SERPL BCP-MCNC: 2.9 G/DL (ref 3.5–5)
ALBUMIN/GLOB SERPL: 1 {RATIO}
ALP SERPL-CCNC: 49 U/L (ref 55–142)
ALT SERPL W P-5'-P-CCNC: 17 U/L (ref 13–56)
ANION GAP SERPL CALCULATED.3IONS-SCNC: 11 MMOL/L (ref 7–16)
AST SERPL W P-5'-P-CCNC: 14 U/L (ref 15–37)
BASOPHILS # BLD AUTO: 0.08 K/UL (ref 0–0.2)
BASOPHILS NFR BLD AUTO: 0.9 % (ref 0–1)
BILIRUB SERPL-MCNC: 0.3 MG/DL (ref ?–1.2)
BUN SERPL-MCNC: 21 MG/DL (ref 7–18)
BUN/CREAT SERPL: 24 (ref 6–20)
CALCIUM SERPL-MCNC: 8.4 MG/DL (ref 8.5–10.1)
CHLORIDE SERPL-SCNC: 101 MMOL/L (ref 98–107)
CO2 SERPL-SCNC: 24 MMOL/L (ref 21–32)
CREAT SERPL-MCNC: 0.88 MG/DL (ref 0.55–1.02)
DIFFERENTIAL METHOD BLD: ABNORMAL
EGFR (NO RACE VARIABLE) (RUSH/TITUS): 63 ML/MIN/1.73M²
EOSINOPHIL # BLD AUTO: 0.17 K/UL (ref 0–0.5)
EOSINOPHIL NFR BLD AUTO: 1.8 % (ref 1–4)
ERYTHROCYTE [DISTWIDTH] IN BLOOD BY AUTOMATED COUNT: 13.4 % (ref 11.5–14.5)
GLOBULIN SER-MCNC: 2.9 G/DL (ref 2–4)
GLUCOSE SERPL-MCNC: 90 MG/DL (ref 74–106)
HCT VFR BLD AUTO: 30.8 % (ref 38–47)
HGB BLD-MCNC: 9.8 G/DL (ref 12–16)
IMM GRANULOCYTES # BLD AUTO: 0.14 K/UL (ref 0–0.04)
IMM GRANULOCYTES NFR BLD: 1.5 % (ref 0–0.4)
LYMPHOCYTES # BLD AUTO: 2.4 K/UL (ref 1–4.8)
LYMPHOCYTES NFR BLD AUTO: 26.1 % (ref 27–41)
MCH RBC QN AUTO: 30 PG (ref 27–31)
MCHC RBC AUTO-ENTMCNC: 31.8 G/DL (ref 32–36)
MCV RBC AUTO: 94.2 FL (ref 80–96)
MONOCYTES # BLD AUTO: 0.82 K/UL (ref 0–0.8)
MONOCYTES NFR BLD AUTO: 8.9 % (ref 2–6)
MPC BLD CALC-MCNC: 9.9 FL (ref 9.4–12.4)
NEUTROPHILS # BLD AUTO: 5.59 K/UL (ref 1.8–7.7)
NEUTROPHILS NFR BLD AUTO: 60.8 % (ref 53–65)
NRBC # BLD AUTO: 0.02 X10E3/UL
NRBC, AUTO (.00): 0.2 %
PLATELET # BLD AUTO: 279 K/UL (ref 150–400)
POTASSIUM SERPL-SCNC: 4.6 MMOL/L (ref 3.5–5.1)
PROT SERPL-MCNC: 5.8 G/DL (ref 6.4–8.2)
RBC # BLD AUTO: 3.27 M/UL (ref 4.2–5.4)
SODIUM SERPL-SCNC: 131 MMOL/L (ref 136–145)
WBC # BLD AUTO: 9.2 K/UL (ref 4.5–11)

## 2023-02-17 PROCEDURE — 63600175 PHARM REV CODE 636 W HCPCS: Performed by: INTERNAL MEDICINE

## 2023-02-17 PROCEDURE — 11000001 HC ACUTE MED/SURG PRIVATE ROOM

## 2023-02-17 PROCEDURE — 25000003 PHARM REV CODE 250: Performed by: INTERNAL MEDICINE

## 2023-02-17 PROCEDURE — 96372 THER/PROPH/DIAG INJ SC/IM: CPT

## 2023-02-17 PROCEDURE — 85025 COMPLETE CBC W/AUTO DIFF WBC: CPT | Performed by: STUDENT IN AN ORGANIZED HEALTH CARE EDUCATION/TRAINING PROGRAM

## 2023-02-17 PROCEDURE — 99232 PR SUBSEQUENT HOSPITAL CARE,LEVL II: ICD-10-PCS | Mod: ,,, | Performed by: FAMILY MEDICINE

## 2023-02-17 PROCEDURE — 99232 SBSQ HOSP IP/OBS MODERATE 35: CPT | Mod: ,,, | Performed by: FAMILY MEDICINE

## 2023-02-17 PROCEDURE — 63600175 PHARM REV CODE 636 W HCPCS: Performed by: STUDENT IN AN ORGANIZED HEALTH CARE EDUCATION/TRAINING PROGRAM

## 2023-02-17 PROCEDURE — 25000003 PHARM REV CODE 250: Performed by: NURSE PRACTITIONER

## 2023-02-17 PROCEDURE — 25000003 PHARM REV CODE 250: Performed by: HOSPITALIST

## 2023-02-17 PROCEDURE — 25000003 PHARM REV CODE 250: Performed by: STUDENT IN AN ORGANIZED HEALTH CARE EDUCATION/TRAINING PROGRAM

## 2023-02-17 PROCEDURE — 25000003 PHARM REV CODE 250: Performed by: FAMILY MEDICINE

## 2023-02-17 PROCEDURE — 80053 COMPREHEN METABOLIC PANEL: CPT | Performed by: INTERNAL MEDICINE

## 2023-02-17 RX ADMIN — ENOXAPARIN SODIUM 40 MG: 40 INJECTION SUBCUTANEOUS at 05:02

## 2023-02-17 RX ADMIN — HYDRALAZINE HYDROCHLORIDE 50 MG: 50 TABLET, FILM COATED ORAL at 06:02

## 2023-02-17 RX ADMIN — ONDANSETRON 4 MG: 4 TABLET, ORALLY DISINTEGRATING ORAL at 11:02

## 2023-02-17 RX ADMIN — TAMSULOSIN HYDROCHLORIDE 0.4 MG: 0.4 CAPSULE ORAL at 09:02

## 2023-02-17 RX ADMIN — MEGESTROL ACETATE 40 MG: 40 TABLET ORAL at 09:02

## 2023-02-17 RX ADMIN — HYDRALAZINE HYDROCHLORIDE 50 MG: 50 TABLET, FILM COATED ORAL at 01:02

## 2023-02-17 RX ADMIN — ZINC SULFATE 220 MG (50 MG) CAPSULE 220 MG: CAPSULE at 09:02

## 2023-02-17 RX ADMIN — MIRTAZAPINE 15 MG: 15 TABLET, FILM COATED ORAL at 08:02

## 2023-02-17 RX ADMIN — TRAZODONE HYDROCHLORIDE 50 MG: 50 TABLET ORAL at 08:02

## 2023-02-17 RX ADMIN — GABAPENTIN 100 MG: 100 CAPSULE ORAL at 08:02

## 2023-02-17 RX ADMIN — PREDNISONE 5 MG: 5 TABLET ORAL at 09:02

## 2023-02-17 RX ADMIN — DOCUSATE SODIUM 100 MG: 100 CAPSULE, LIQUID FILLED ORAL at 08:02

## 2023-02-17 RX ADMIN — FLUTICASONE PROPIONATE 100 MCG: 50 SPRAY, METERED NASAL at 10:02

## 2023-02-17 RX ADMIN — OXYCODONE HYDROCHLORIDE 10 MG: 5 TABLET ORAL at 08:02

## 2023-02-17 RX ADMIN — OXYCODONE HYDROCHLORIDE AND ACETAMINOPHEN 500 MG: 500 TABLET ORAL at 09:02

## 2023-02-17 RX ADMIN — GABAPENTIN 100 MG: 100 CAPSULE ORAL at 09:02

## 2023-02-17 RX ADMIN — OXYCODONE HYDROCHLORIDE 10 MG: 5 TABLET ORAL at 01:02

## 2023-02-17 RX ADMIN — PANTOPRAZOLE SODIUM 40 MG: 40 TABLET, DELAYED RELEASE ORAL at 09:02

## 2023-02-17 RX ADMIN — CARVEDILOL 25 MG: 25 TABLET, FILM COATED ORAL at 08:02

## 2023-02-17 RX ADMIN — ALPRAZOLAM 1 MG: 0.25 TABLET ORAL at 08:02

## 2023-02-17 RX ADMIN — GABAPENTIN 100 MG: 100 CAPSULE ORAL at 02:02

## 2023-02-17 RX ADMIN — OXYCODONE HYDROCHLORIDE 10 MG: 5 TABLET ORAL at 07:02

## 2023-02-17 RX ADMIN — LEVOTHYROXINE SODIUM 125 MCG: 0.12 TABLET ORAL at 06:02

## 2023-02-17 RX ADMIN — OXYCODONE HYDROCHLORIDE AND ACETAMINOPHEN 500 MG: 500 TABLET ORAL at 08:02

## 2023-02-17 NOTE — ASSESSMENT & PLAN NOTE
Cont appetite stimulant  Cont nutiritional supplement  Cont to encourage oral intake  PT.   Will add remeron.

## 2023-02-17 NOTE — PROGRESS NOTES
[facility] Hospital Medicine  Progress Note    Patient Name: Zandra Veloz  MRN: [id number]  Patient Class: IP- Inpatient   Admission Date: 12/29/2022  Length of Stay: 49 days  Attending Physician: Tal Pereira Jr., MD  Primary Care Provider: Brandon Thornton MD        Subjective:     Principal Problem:Multiple skin tears        HPI:  HPI:   88-year-old lady seen emergency room department.  Patient presents after having a fall when she was trying to go to the restroom at her home.  Patient sustained a left femur fracture.  Patient complains of moderate to severe pain in the left hip area.  Patient also complains of chest tightness, she rates it a 5/10 in the chest tightness has been intubate.  Patient also was seen in emergency room department earlier this week per her daughter.  Patient is found to have dehydration and a urinary tract infection.  Current she denies any shortness of breath.  She does not give a history of coronary artery disease.        Procedure(s) (LRB):  REVISION, TOTAL ARTHROPLASTY, HIP, VICTORIANO PROSTHETIC FX (Left)       Hospital Course:   12/19 - records reviewed. Fall without LOC and has left hip fx. No other complaints currently. Seen by cardiology with some CP felt musculoskeletal.  Echo mild AS and mod MR with nl EF. Age increase cardiac risk for surgery but discussed with daughter surgery is urgent and see no benefit in delay medically. Feel low to moderate risk for surgery. Question about UTI. No symptoms. Had UTI in 10.22 not surprising. Lab to do culture on urine in lab. Cover with ATN for prior culture with ATB started. Discussed with Dr Singer and cardiology.  12/20 Tachycardia and elevated BP. Cardiology adjusted meds. Plan hold off surgery until 12/22 to adjust meds and treat UTI. Family in room. Pt not sleeping well.   12/21 Didn't sleep well prior night. Apparently been on xanax for a time. Also recently started on Neurontin. Family with  question. No recent BM. Some increase stool on KUB but not severe. Surgery for am. BP some up but better. HR good.   12/22 Seen prior to surgery and apparently add better night. Sore mouth / throat better with mycostatin. For surgery. Family in room.  12/23 patient with pain but otherwise seemed to be doing relatively well.  Daughter in room.  Apparently been taking prednisone for some time and this was restarted per family request.  Look into swing bed placement  12/24 patient had better night rested and everyone's in better spirits today.  Tolerating some diet.   Therapy worked with patient.  Look into swing bed placement next week  12/25 remained in good spirits.  Less pain.  Had good bowel movement and ordered Dulcolax not given.  Because of dressing to leg, Burk was not removed to keep it from getting soiled.  Wound care to check 12/27.  Discuss this with patient and daughter.  On antibiotics for UTI  12/26-will dc to swingbed once accepted. Needs continued wound care.  12/27-DC when bed available  12/28- issues with wound care yesterday.  Dr. Singer had to come remove dressings himself due to adherence to subcutaneous tissue.  Family refusing transfer to [facility] due to lack of specially trained wound care team availability.  Planning to transfer to specialty but resistant to that as well and requesting to speak to administration.    12/29- agreeable to transfer to specialty.  This will be the best place for her to receive wound care.  DC after family tours     12/29-needs continued wound care and therapy[facility]    12/30-doing well today, still with pain         Overview/Hospital Course:  12/31-having some pain this am  1/1- no complaints  1/2- doing well  1/3- continue wound care, last day cefepime tomorrow  1/4- some pain this AM.  Delay in mediations dues to staffing issues. Dsicussed with RN  1/5- no issues overnight  1/6- Still with pain, hip incision looks good  1/7-diclofenac for  shoulder pain  1/8-decrease laxatives, change benzos and carlo to PRN per patient request  1/9 some confusion overnight  1/10- no more confusion, doing well  1/11- no acute issues overnight  1/12- doing well, nausea today  1/13 -Dr. Lebron Cutler IV, DO taking over for Dr. Guerra for weekend.  Patient's pain appears to be well controlled this morning no complaints of nausea.  Continue wound care and PT/OT  1/14-patient appears well labs.  Continue wound care and PT/OT  1/15-patient still appears well.  No acute concerns or complaints.  No events overnight.  Continue wound care working with a PTOT.  Expiration is services estimated by February 6 1/16- no complaints, doing well  1/17-some nausea today  1/18-better today.  Still requiring twice weekly dressing changes.  Appetite stimulant  1/19- PWB with PT today.  Still with weeping wounds.  Will need wound care twice weekly-as these improve will be able to transfer to other facility  1/20-no complaints  1/21-no acute events overnight, some anxiety today  1/22-episode of SVT yesterday. Spontaneous resolution  1/23-no complaints  1/24- no complaints  1/25-doing well  1/26-wound care, pt  1/27- no complaints  1/28- patient seen examined today resting comfortably in bed, in no acute distress and no acute events overnight.  Patient states that she is doing well and denies pain.  States that she is been working well with PT/OT but not this weekend.  Patient otherwise doing well without complaints.  1/29-patient seen examined today resting comfortably in bed, in no acute distress with no acute events overnight.  Patient denies pain or other complaints at this time.  She continues with wound care PT/OT.  1/30-doing well this am  1/31-no complaints  2/1-no changes      Interval History: Patient feels ok. Met with her and her daughters and reviewed plans.  Likely to [place] on Monday.  Further we evaluated wound healing with wound care.  Healing quite well.     Review of  Systems  Objective:     Vital Signs (Most Recent):  Temp: 98 °F (36.7 °C) (02/16/23 1600)  Pulse: 63 (02/16/23 1600)  Resp: 18 (02/16/23 1600)  BP: 127/62 (02/16/23 1600)  SpO2: 96 % (02/16/23 0800) Vital Signs (24h Range):  Temp:  [97.9 °F (36.6 °C)-98.6 °F (37 °C)] 98 °F (36.7 °C)  Pulse:  [63-74] 63  Resp:  [16-19] 18  SpO2:  [96 %] 96 %  BP: (117-148)/(52-67) 127/62     Weight: 58.7 kg (129 lb 6.6 oz)  Body mass index is 20.89 kg/m².    Intake/Output Summary (Last 24 hours) at 2/16/2023 1937  Last data filed at 2/16/2023 0510  Gross per 24 hour   Intake --   Output 275 ml   Net -275 ml      Physical Exam  Vitals reviewed.   Constitutional:       General: She is not in acute distress.     Appearance: She is not toxic-appearing.   Cardiovascular:      Rate and Rhythm: Normal rate and regular rhythm.      Heart sounds: Normal heart sounds.   Pulmonary:      Effort: Pulmonary effort is normal. No respiratory distress.      Breath sounds: Normal breath sounds.   Abdominal:      General: Abdomen is flat. There is no distension.      Palpations: Abdomen is soft.   Skin:     General: Skin is warm and dry.      Comments: L medial leg wound has made phenomenal progress.    Neurological:      Mental Status: She is alert.       Significant Labs: All pertinent labs within the past 24 hours have been reviewed.  BMP:   Recent Labs   Lab 02/15/23  0507   GLU 78      K 4.7   *   CO2 24   BUN 15   CREATININE 0.69   CALCIUM 8.6     CBC: No results for input(s): WBC, HGB, HCT, PLT in the last 48 hours.    Significant Imaging: I have reviewed all pertinent imaging results/findings within the past 24 hours.      Assessment/Plan:      Neuro  Lumbar radiculopathy  Pain management  PT      Cardiac/Vascular  Hypertension  Adjust medications as needed    2/2-d/c dilt, hydral since bp running low, keep metoprolol and lisinopril on    2/3-will increae lisinopril for afterload reduction.     Orthopedic  * Multiple skin  tears  Wound care  Optimize nutrition       Disruption of external surgical wound        Wounds and injuries  Wound care consulted and following    Delmy-prosthetic fracture around prosthetic hip  S/P fixation with Dr Singer      Arthritis  Pain management  Per family has been taking oral steorids for a long time, cont that      Open wound of left lower leg  Wound care continues.  Wounds are improving nicely.       Other  Failure to thrive in adult  Cont appetite stimulant  Cont nutiritional supplement  Cont to encourage oral intake  PT.   Will add remeron.       VTE Risk Mitigation (From admission, onward)         Ordered     enoxaparin injection 40 mg  Daily         02/02/23 1051                Discharge Planning   YANIV:      Code Status: Full Code   Is the patient medically ready for discharge?:     Reason for patient still in hospital (select all that apply): Treatment  Discharge Plan A: Skilled Nursing Facility                  Tal Pereira Jr, MD  Department of Hospital Medicine   [facility]

## 2023-02-17 NOTE — PT/OT/SLP PROGRESS
Occupational Therapy      Patient Name:  Zandra Veloz   MRN:  [id number]    Patient not seen today secondary to Patient unwilling to participate ( pt stated that she was not feeling well and could not do therapy.). Will follow-up on next treatment day .    2/17/2023

## 2023-02-17 NOTE — PLAN OF CARE
Problem: Adult Inpatient Plan of Care  Goal: Plan of Care Review  2/17/2023 0514 by Eron Laird RN  Outcome: Ongoing, Progressing  2/17/2023 0514 by Eron Laird RN  Outcome: Ongoing, Progressing  Goal: Patient-Specific Goal (Individualized)  2/17/2023 0514 by Eron Laird RN  Outcome: Ongoing, Progressing  2/17/2023 0514 by Eron Laird RN  Outcome: Ongoing, Progressing  Goal: Absence of Hospital-Acquired Illness or Injury  2/17/2023 0514 by Eron Laird RN  Outcome: Ongoing, Progressing  2/17/2023 0514 by Eron Laird RN  Outcome: Ongoing, Progressing  Goal: Optimal Comfort and Wellbeing  2/17/2023 0514 by Eron Laird RN  Outcome: Ongoing, Progressing  2/17/2023 0514 by Eron Laird RN  Outcome: Ongoing, Progressing  Goal: Readiness for Transition of Care  2/17/2023 0514 by Eron Laird RN  Outcome: Ongoing, Progressing  2/17/2023 0514 by Eron Laird RN  Outcome: Ongoing, Progressing     Problem: Impaired Wound Healing  Goal: Optimal Wound Healing  2/17/2023 0514 by Eron Laird RN  Outcome: Ongoing, Progressing  2/17/2023 0514 by Eron Laird RN  Outcome: Ongoing, Progressing     Problem: Skin Injury Risk Increased  Goal: Skin Health and Integrity  2/17/2023 0514 by Eron Laird RN  Outcome: Ongoing, Progressing  2/17/2023 0514 by Eron Laird RN  Outcome: Ongoing, Progressing     Problem: Fall Injury Risk  Goal: Absence of Fall and Fall-Related Injury  2/17/2023 0514 by Eron Laird RN  Outcome: Ongoing, Progressing  2/17/2023 0514 by Eron Laird RN  Outcome: Ongoing, Progressing     Problem: Infection  Goal: Absence of Infection Signs and Symptoms  Outcome: Ongoing, Progressing

## 2023-02-17 NOTE — PT/OT/SLP PROGRESS
"Physical Therapy      Patient Name:  Zandra Veloz   MRN:  [id number]    Patient not seen today secondary to Patient unwilling to participate. pt declined all offers of PT including exercise and/or out of bed activities. pt stating,"I don't feel good", "I can't make it". Will follow-up next treatment date.    " room air

## 2023-02-17 NOTE — SUBJECTIVE & OBJECTIVE
Interval History: Patient feels ok. Met with her and her daughters and reviewed plans.  Likely to [place] on Monday.  Further we evaluated wound healing with wound care.  Healing quite well.     Review of Systems  Objective:     Vital Signs (Most Recent):  Temp: 98 °F (36.7 °C) (02/16/23 1600)  Pulse: 63 (02/16/23 1600)  Resp: 18 (02/16/23 1600)  BP: 127/62 (02/16/23 1600)  SpO2: 96 % (02/16/23 0800) Vital Signs (24h Range):  Temp:  [97.9 °F (36.6 °C)-98.6 °F (37 °C)] 98 °F (36.7 °C)  Pulse:  [63-74] 63  Resp:  [16-19] 18  SpO2:  [96 %] 96 %  BP: (117-148)/(52-67) 127/62     Weight: 58.7 kg (129 lb 6.6 oz)  Body mass index is 20.89 kg/m².    Intake/Output Summary (Last 24 hours) at 2/16/2023 1937  Last data filed at 2/16/2023 0510  Gross per 24 hour   Intake --   Output 275 ml   Net -275 ml      Physical Exam  Vitals reviewed.   Constitutional:       General: She is not in acute distress.     Appearance: She is not toxic-appearing.   Cardiovascular:      Rate and Rhythm: Normal rate and regular rhythm.      Heart sounds: Normal heart sounds.   Pulmonary:      Effort: Pulmonary effort is normal. No respiratory distress.      Breath sounds: Normal breath sounds.   Abdominal:      General: Abdomen is flat. There is no distension.      Palpations: Abdomen is soft.   Skin:     General: Skin is warm and dry.      Comments: L medial leg wound has made phenomenal progress.    Neurological:      Mental Status: She is alert.       Significant Labs: All pertinent labs within the past 24 hours have been reviewed.  BMP:   Recent Labs   Lab 02/15/23  0507   GLU 78      K 4.7   *   CO2 24   BUN 15   CREATININE 0.69   CALCIUM 8.6     CBC: No results for input(s): WBC, HGB, HCT, PLT in the last 48 hours.    Significant Imaging: I have reviewed all pertinent imaging results/findings within the past 24 hours.

## 2023-02-18 PROCEDURE — 63600175 PHARM REV CODE 636 W HCPCS: Performed by: INTERNAL MEDICINE

## 2023-02-18 PROCEDURE — 99232 PR SUBSEQUENT HOSPITAL CARE,LEVL II: ICD-10-PCS | Mod: ,,, | Performed by: FAMILY MEDICINE

## 2023-02-18 PROCEDURE — 25000003 PHARM REV CODE 250: Performed by: FAMILY MEDICINE

## 2023-02-18 PROCEDURE — 25000003 PHARM REV CODE 250: Performed by: NURSE PRACTITIONER

## 2023-02-18 PROCEDURE — 25000003 PHARM REV CODE 250: Performed by: STUDENT IN AN ORGANIZED HEALTH CARE EDUCATION/TRAINING PROGRAM

## 2023-02-18 PROCEDURE — 25000003 PHARM REV CODE 250: Performed by: INTERNAL MEDICINE

## 2023-02-18 PROCEDURE — 11000001 HC ACUTE MED/SURG PRIVATE ROOM

## 2023-02-18 PROCEDURE — 99232 SBSQ HOSP IP/OBS MODERATE 35: CPT | Mod: ,,, | Performed by: FAMILY MEDICINE

## 2023-02-18 PROCEDURE — 63600175 PHARM REV CODE 636 W HCPCS: Performed by: STUDENT IN AN ORGANIZED HEALTH CARE EDUCATION/TRAINING PROGRAM

## 2023-02-18 RX ADMIN — MEGESTROL ACETATE 40 MG: 40 TABLET ORAL at 09:02

## 2023-02-18 RX ADMIN — MIRTAZAPINE 15 MG: 15 TABLET, FILM COATED ORAL at 09:02

## 2023-02-18 RX ADMIN — ALPRAZOLAM 1 MG: 0.25 TABLET ORAL at 09:02

## 2023-02-18 RX ADMIN — OXYCODONE HYDROCHLORIDE 10 MG: 5 TABLET ORAL at 08:02

## 2023-02-18 RX ADMIN — OXYCODONE HYDROCHLORIDE AND ACETAMINOPHEN 500 MG: 500 TABLET ORAL at 09:02

## 2023-02-18 RX ADMIN — LEVOTHYROXINE SODIUM 125 MCG: 0.12 TABLET ORAL at 06:02

## 2023-02-18 RX ADMIN — LISINOPRIL 40 MG: 20 TABLET ORAL at 09:02

## 2023-02-18 RX ADMIN — DOCUSATE SODIUM 100 MG: 100 CAPSULE, LIQUID FILLED ORAL at 09:02

## 2023-02-18 RX ADMIN — TAMSULOSIN HYDROCHLORIDE 0.4 MG: 0.4 CAPSULE ORAL at 09:02

## 2023-02-18 RX ADMIN — OXYCODONE HYDROCHLORIDE 10 MG: 5 TABLET ORAL at 02:02

## 2023-02-18 RX ADMIN — HYDRALAZINE HYDROCHLORIDE 50 MG: 50 TABLET, FILM COATED ORAL at 06:02

## 2023-02-18 RX ADMIN — GABAPENTIN 100 MG: 100 CAPSULE ORAL at 09:02

## 2023-02-18 RX ADMIN — ZINC SULFATE 220 MG (50 MG) CAPSULE 220 MG: CAPSULE at 09:02

## 2023-02-18 RX ADMIN — ENOXAPARIN SODIUM 40 MG: 40 INJECTION SUBCUTANEOUS at 04:02

## 2023-02-18 RX ADMIN — GABAPENTIN 100 MG: 100 CAPSULE ORAL at 02:02

## 2023-02-18 RX ADMIN — CARVEDILOL 25 MG: 25 TABLET, FILM COATED ORAL at 09:02

## 2023-02-18 RX ADMIN — FLUTICASONE PROPIONATE 100 MCG: 50 SPRAY, METERED NASAL at 09:02

## 2023-02-18 RX ADMIN — PANTOPRAZOLE SODIUM 40 MG: 40 TABLET, DELAYED RELEASE ORAL at 09:02

## 2023-02-18 RX ADMIN — PREDNISONE 5 MG: 5 TABLET ORAL at 09:02

## 2023-02-18 NOTE — SUBJECTIVE & OBJECTIVE
Interval History: In good spirits today. Agreeable to chao removal in am.     Review of Systems  Objective:     Vital Signs (Most Recent):  Temp: 98.8 °F (37.1 °C) (02/18/23 1600)  Pulse: 63 (02/18/23 1600)  Resp: 18 (02/18/23 1600)  BP: (!) 101/54 (02/18/23 1600)  SpO2: (!) 94 % (02/18/23 1600)   Vital Signs (24h Range):  Temp:  [98.4 °F (36.9 °C)-98.8 °F (37.1 °C)] 98.8 °F (37.1 °C)  Pulse:  [62-69] 63  Resp:  [18-20] 18  SpO2:  [94 %-98 %] 94 %  BP: (101-154)/(50-82) 101/54     Weight: 58.7 kg (129 lb 6.6 oz)  Body mass index is 20.89 kg/m².    Intake/Output Summary (Last 24 hours) at 2/18/2023 1738  Last data filed at 2/18/2023 0538  Gross per 24 hour   Intake 0 ml   Output 800 ml   Net -800 ml      Physical Exam  Vitals reviewed.   Constitutional:       General: She is not in acute distress.     Appearance: She is not toxic-appearing.   Cardiovascular:      Rate and Rhythm: Normal rate and regular rhythm.      Heart sounds: Normal heart sounds.   Pulmonary:      Effort: Pulmonary effort is normal. No respiratory distress.      Breath sounds: Normal breath sounds.   Abdominal:      General: Abdomen is flat. There is no distension.      Palpations: Abdomen is soft.   Skin:     General: Skin is warm and dry.   Neurological:      Mental Status: She is alert.       Significant Labs: All pertinent labs within the past 24 hours have been reviewed.  BMP:   Recent Labs   Lab 02/17/23  0437   GLU 90   *   K 4.6      CO2 24   BUN 21*   CREATININE 0.88   CALCIUM 8.4*     CBC:   Recent Labs   Lab 02/17/23  1608   WBC 9.20   HGB 9.8*   HCT 30.8*          Significant Imaging: I have reviewed all pertinent imaging results/findings within the past 24 hours.

## 2023-02-18 NOTE — SUBJECTIVE & OBJECTIVE
Interval History: No complaints today. Feeling alright.     Review of Systems   Respiratory:  Negative for shortness of breath.    Gastrointestinal:  Negative for constipation, nausea and vomiting.   Objective:     Vital Signs (Most Recent):  Temp: 98.5 °F (36.9 °C) (02/17/23 1330)  Pulse: 68 (02/17/23 1330)  Resp: 18 (02/17/23 1330)  BP: (!) 145/63 (02/17/23 1330)  SpO2: 99 % (02/17/23 1330)   Vital Signs (24h Range):  Temp:  [98 °F (36.7 °C)-98.6 °F (37 °C)] 98.5 °F (36.9 °C)  Pulse:  [62-68] 68  Resp:  [18-20] 18  SpO2:  [96 %-99 %] 99 %  BP: (110-145)/(45-67) 145/63     Weight: 58.7 kg (129 lb 6.6 oz)  Body mass index is 20.89 kg/m².    Intake/Output Summary (Last 24 hours) at 2/17/2023 2009  Last data filed at 2/17/2023 1800  Gross per 24 hour   Intake 0 ml   Output 1200 ml   Net -1200 ml      Physical Exam  Vitals reviewed.   Constitutional:       General: She is not in acute distress.     Appearance: She is not toxic-appearing.   Cardiovascular:      Rate and Rhythm: Normal rate and regular rhythm.      Heart sounds: Normal heart sounds.   Pulmonary:      Effort: Pulmonary effort is normal. No respiratory distress.      Breath sounds: Normal breath sounds.   Abdominal:      General: Abdomen is flat. There is no distension.      Palpations: Abdomen is soft.   Skin:     General: Skin is warm and dry.   Neurological:      Mental Status: She is alert.       Significant Labs: All pertinent labs within the past 24 hours have been reviewed.  BMP:   Recent Labs   Lab 02/17/23  0437   GLU 90   *   K 4.6      CO2 24   BUN 21*   CREATININE 0.88   CALCIUM 8.4*     CBC:   Recent Labs   Lab 02/17/23  1608   WBC 9.20   HGB 9.8*   HCT 30.8*          Significant Imaging: I have reviewed all pertinent imaging results/findings within the past 24 hours.

## 2023-02-18 NOTE — PROGRESS NOTES
[facility] Hospital Medicine  Progress Note    Patient Name: Zandra Veloz  MRN: [id number]  Patient Class: IP- Inpatient   Admission Date: 12/29/2022  Length of Stay: 51 days  Attending Physician: Tal Pereira Jr., MD  Primary Care Provider: Brandon Thornton MD        Subjective:     Principal Problem:Multiple skin tears        HPI:  HPI:   88-year-old lady seen emergency room department.  Patient presents after having a fall when she was trying to go to the restroom at her home.  Patient sustained a left femur fracture.  Patient complains of moderate to severe pain in the left hip area.  Patient also complains of chest tightness, she rates it a 5/10 in the chest tightness has been intubate.  Patient also was seen in emergency room department earlier this week per her daughter.  Patient is found to have dehydration and a urinary tract infection.  Current she denies any shortness of breath.  She does not give a history of coronary artery disease.        Procedure(s) (LRB):  REVISION, TOTAL ARTHROPLASTY, HIP, VICTORIANO PROSTHETIC FX (Left)       Hospital Course:   12/19 - records reviewed. Fall without LOC and has left hip fx. No other complaints currently. Seen by cardiology with some CP felt musculoskeletal.  Echo mild AS and mod MR with nl EF. Age increase cardiac risk for surgery but discussed with daughter surgery is urgent and see no benefit in delay medically. Feel low to moderate risk for surgery. Question about UTI. No symptoms. Had UTI in 10.22 not surprising. Lab to do culture on urine in lab. Cover with ATN for prior culture with ATB started. Discussed with Dr Singer and cardiology.  12/20 Tachycardia and elevated BP. Cardiology adjusted meds. Plan hold off surgery until 12/22 to adjust meds and treat UTI. Family in room. Pt not sleeping well.   12/21 Didn't sleep well prior night. Apparently been on xanax for a time. Also recently started on Neurontin. Family with  question. No recent BM. Some increase stool on KUB but not severe. Surgery for am. BP some up but better. HR good.   12/22 Seen prior to surgery and apparently add better night. Sore mouth / throat better with mycostatin. For surgery. Family in room.  12/23 patient with pain but otherwise seemed to be doing relatively well.  Daughter in room.  Apparently been taking prednisone for some time and this was restarted per family request.  Look into swing bed placement  12/24 patient had better night rested and everyone's in better spirits today.  Tolerating some diet.   Therapy worked with patient.  Look into swing bed placement next week  12/25 remained in good spirits.  Less pain.  Had good bowel movement and ordered Dulcolax not given.  Because of dressing to leg, Burk was not removed to keep it from getting soiled.  Wound care to check 12/27.  Discuss this with patient and daughter.  On antibiotics for UTI  12/26-will dc to swingbed once accepted. Needs continued wound care.  12/27-DC when bed available  12/28- issues with wound care yesterday.  Dr. Singer had to come remove dressings himself due to adherence to subcutaneous tissue.  Family refusing transfer to [facility] due to lack of specially trained wound care team availability.  Planning to transfer to specialty but resistant to that as well and requesting to speak to administration.    12/29- agreeable to transfer to specialty.  This will be the best place for her to receive wound care.  DC after family tours     12/29-needs continued wound care and therapy[facility]    12/30-doing well today, still with pain         Overview/Hospital Course:  12/31-having some pain this am  1/1- no complaints  1/2- doing well  1/3- continue wound care, last day cefepime tomorrow  1/4- some pain this AM.  Delay in mediations dues to staffing issues. Dsicussed with RN  1/5- no issues overnight  1/6- Still with pain, hip incision looks good  1/7-diclofenac for  shoulder pain  1/8-decrease laxatives, change benzos and carlo to PRN per patient request  1/9 some confusion overnight  1/10- no more confusion, doing well  1/11- no acute issues overnight  1/12- doing well, nausea today  1/13 -Dr. Lebron Cutler IV, DO taking over for Dr. Guerra for weekend.  Patient's pain appears to be well controlled this morning no complaints of nausea.  Continue wound care and PT/OT  1/14-patient appears well labs.  Continue wound care and PT/OT  1/15-patient still appears well.  No acute concerns or complaints.  No events overnight.  Continue wound care working with a PTOT.  Expiration is services estimated by February 6 1/16- no complaints, doing well  1/17-some nausea today  1/18-better today.  Still requiring twice weekly dressing changes.  Appetite stimulant  1/19- PWB with PT today.  Still with weeping wounds.  Will need wound care twice weekly-as these improve will be able to transfer to other facility  1/20-no complaints  1/21-no acute events overnight, some anxiety today  1/22-episode of SVT yesterday. Spontaneous resolution  1/23-no complaints  1/24- no complaints  1/25-doing well  1/26-wound care, pt  1/27- no complaints  1/28- patient seen examined today resting comfortably in bed, in no acute distress and no acute events overnight.  Patient states that she is doing well and denies pain.  States that she is been working well with PT/OT but not this weekend.  Patient otherwise doing well without complaints.  1/29-patient seen examined today resting comfortably in bed, in no acute distress with no acute events overnight.  Patient denies pain or other complaints at this time.  She continues with wound care PT/OT.  1/30-doing well this am  1/31-no complaints  2/1-no changes      Interval History: In good spirits today. Agreeable to chao removal in am.     Review of Systems  Objective:     Vital Signs (Most Recent):  Temp: 98.8 °F (37.1 °C) (02/18/23 1600)  Pulse: 63 (02/18/23  1600)  Resp: 18 (02/18/23 1600)  BP: (!) 101/54 (02/18/23 1600)  SpO2: (!) 94 % (02/18/23 1600)   Vital Signs (24h Range):  Temp:  [98.4 °F (36.9 °C)-98.8 °F (37.1 °C)] 98.8 °F (37.1 °C)  Pulse:  [62-69] 63  Resp:  [18-20] 18  SpO2:  [94 %-98 %] 94 %  BP: (101-154)/(50-82) 101/54     Weight: 58.7 kg (129 lb 6.6 oz)  Body mass index is 20.89 kg/m².    Intake/Output Summary (Last 24 hours) at 2/18/2023 173  Last data filed at 2/18/2023 0538  Gross per 24 hour   Intake 0 ml   Output 800 ml   Net -800 ml      Physical Exam  Vitals reviewed.   Constitutional:       General: She is not in acute distress.     Appearance: She is not toxic-appearing.   Cardiovascular:      Rate and Rhythm: Normal rate and regular rhythm.      Heart sounds: Normal heart sounds.   Pulmonary:      Effort: Pulmonary effort is normal. No respiratory distress.      Breath sounds: Normal breath sounds.   Abdominal:      General: Abdomen is flat. There is no distension.      Palpations: Abdomen is soft.   Skin:     General: Skin is warm and dry.   Neurological:      Mental Status: She is alert.       Significant Labs: All pertinent labs within the past 24 hours have been reviewed.  BMP:   Recent Labs   Lab 02/17/23  0437   GLU 90   *   K 4.6      CO2 24   BUN 21*   CREATININE 0.88   CALCIUM 8.4*     CBC:   Recent Labs   Lab 02/17/23  1608   WBC 9.20   HGB 9.8*   HCT 30.8*          Significant Imaging: I have reviewed all pertinent imaging results/findings within the past 24 hours.      Assessment/Plan:      * Multiple skin tears  Wound care  Optimize nutrition       Open wound of left lower leg  Wound care continues.  Wounds are improving nicely. Twice weekly dressing changes.       Failure to thrive in adult  Cont appetite stimulant  Cont nutiritional supplement  Cont to encourage oral intake  PT.   Added remeron. First dose received 2/17. Hopeful for appetite and affective improvement.     Disruption of external surgical  wound        Wounds and injuries  Wound care consulted and following    Delmy-prosthetic fracture around prosthetic hip  S/P fixation with Dr Singer      Lumbar radiculopathy  Pain management  PT      Hypertension  Adjust medications as needed    2/2-d/c dilt, hydral since bp running low, keep metoprolol and lisinopril on    2/3-will increae lisinopril for afterload reduction.     Arthritis  Pain management  Per family has been taking oral steorids for a long time, cont that        VTE Risk Mitigation (From admission, onward)         Ordered     enoxaparin injection 40 mg  Daily         02/02/23 1051                Discharge Planning   YAINV:      Code Status: Full Code   Is the patient medically ready for discharge?:     Reason for patient still in hospital (select all that apply): Treatment  Discharge Plan A: Skilled Nursing Facility                  Tal Pereira Jr, MD  Department of Hospital Medicine   [facility]

## 2023-02-18 NOTE — PROGRESS NOTES
[facility] Hospital Medicine  Progress Note    Patient Name: Zandra Veloz  MRN: [id number]  Patient Class: IP- Inpatient   Admission Date: 12/29/2022  Length of Stay: 50 days  Attending Physician: Tal Pereira Jr., MD  Primary Care Provider: Brandon Thornton MD        Subjective:     Principal Problem:Multiple skin tears        HPI:  HPI:   88-year-old lady seen emergency room department.  Patient presents after having a fall when she was trying to go to the restroom at her home.  Patient sustained a left femur fracture.  Patient complains of moderate to severe pain in the left hip area.  Patient also complains of chest tightness, she rates it a 5/10 in the chest tightness has been intubate.  Patient also was seen in emergency room department earlier this week per her daughter.  Patient is found to have dehydration and a urinary tract infection.  Current she denies any shortness of breath.  She does not give a history of coronary artery disease.        Procedure(s) (LRB):  REVISION, TOTAL ARTHROPLASTY, HIP, VICTORIANO PROSTHETIC FX (Left)       Hospital Course:   12/19 - records reviewed. Fall without LOC and has left hip fx. No other complaints currently. Seen by cardiology with some CP felt musculoskeletal.  Echo mild AS and mod MR with nl EF. Age increase cardiac risk for surgery but discussed with daughter surgery is urgent and see no benefit in delay medically. Feel low to moderate risk for surgery. Question about UTI. No symptoms. Had UTI in 10.22 not surprising. Lab to do culture on urine in lab. Cover with ATN for prior culture with ATB started. Discussed with Dr Singer and cardiology.  12/20 Tachycardia and elevated BP. Cardiology adjusted meds. Plan hold off surgery until 12/22 to adjust meds and treat UTI. Family in room. Pt not sleeping well.   12/21 Didn't sleep well prior night. Apparently been on xanax for a time. Also recently started on Neurontin. Family with  question. No recent BM. Some increase stool on KUB but not severe. Surgery for am. BP some up but better. HR good.   12/22 Seen prior to surgery and apparently add better night. Sore mouth / throat better with mycostatin. For surgery. Family in room.  12/23 patient with pain but otherwise seemed to be doing relatively well.  Daughter in room.  Apparently been taking prednisone for some time and this was restarted per family request.  Look into swing bed placement  12/24 patient had better night rested and everyone's in better spirits today.  Tolerating some diet.   Therapy worked with patient.  Look into swing bed placement next week  12/25 remained in good spirits.  Less pain.  Had good bowel movement and ordered Dulcolax not given.  Because of dressing to leg, Burk was not removed to keep it from getting soiled.  Wound care to check 12/27.  Discuss this with patient and daughter.  On antibiotics for UTI  12/26-will dc to swingbed once accepted. Needs continued wound care.  12/27-DC when bed available  12/28- issues with wound care yesterday.  Dr. Singer had to come remove dressings himself due to adherence to subcutaneous tissue.  Family refusing transfer to [facility] due to lack of specially trained wound care team availability.  Planning to transfer to specialty but resistant to that as well and requesting to speak to administration.    12/29- agreeable to transfer to specialty.  This will be the best place for her to receive wound care.  DC after family tours     12/29-needs continued wound care and therapy[facility]    12/30-doing well today, still with pain         Overview/Hospital Course:  12/31-having some pain this am  1/1- no complaints  1/2- doing well  1/3- continue wound care, last day cefepime tomorrow  1/4- some pain this AM.  Delay in mediations dues to staffing issues. Dsicussed with RN  1/5- no issues overnight  1/6- Still with pain, hip incision looks good  1/7-diclofenac for  shoulder pain  1/8-decrease laxatives, change benzos and carlo to PRN per patient request  1/9 some confusion overnight  1/10- no more confusion, doing well  1/11- no acute issues overnight  1/12- doing well, nausea today  1/13 -Dr. Lebron Cutler IV, DO taking over for Dr. Guerra for weekend.  Patient's pain appears to be well controlled this morning no complaints of nausea.  Continue wound care and PT/OT  1/14-patient appears well labs.  Continue wound care and PT/OT  1/15-patient still appears well.  No acute concerns or complaints.  No events overnight.  Continue wound care working with a PTOT.  Expiration is services estimated by February 6 1/16- no complaints, doing well  1/17-some nausea today  1/18-better today.  Still requiring twice weekly dressing changes.  Appetite stimulant  1/19- PWB with PT today.  Still with weeping wounds.  Will need wound care twice weekly-as these improve will be able to transfer to other facility  1/20-no complaints  1/21-no acute events overnight, some anxiety today  1/22-episode of SVT yesterday. Spontaneous resolution  1/23-no complaints  1/24- no complaints  1/25-doing well  1/26-wound care, pt  1/27- no complaints  1/28- patient seen examined today resting comfortably in bed, in no acute distress and no acute events overnight.  Patient states that she is doing well and denies pain.  States that she is been working well with PT/OT but not this weekend.  Patient otherwise doing well without complaints.  1/29-patient seen examined today resting comfortably in bed, in no acute distress with no acute events overnight.  Patient denies pain or other complaints at this time.  She continues with wound care PT/OT.  1/30-doing well this am  1/31-no complaints  2/1-no changes      Interval History: No complaints today. Feeling alright.     Review of Systems   Respiratory:  Negative for shortness of breath.    Gastrointestinal:  Negative for constipation, nausea and vomiting.    Objective:     Vital Signs (Most Recent):  Temp: 98.5 °F (36.9 °C) (02/17/23 1330)  Pulse: 68 (02/17/23 1330)  Resp: 18 (02/17/23 1330)  BP: (!) 145/63 (02/17/23 1330)  SpO2: 99 % (02/17/23 1330)   Vital Signs (24h Range):  Temp:  [98 °F (36.7 °C)-98.6 °F (37 °C)] 98.5 °F (36.9 °C)  Pulse:  [62-68] 68  Resp:  [18-20] 18  SpO2:  [96 %-99 %] 99 %  BP: (110-145)/(45-67) 145/63     Weight: 58.7 kg (129 lb 6.6 oz)  Body mass index is 20.89 kg/m².    Intake/Output Summary (Last 24 hours) at 2/17/2023 2009  Last data filed at 2/17/2023 1800  Gross per 24 hour   Intake 0 ml   Output 1200 ml   Net -1200 ml      Physical Exam  Vitals reviewed.   Constitutional:       General: She is not in acute distress.     Appearance: She is not toxic-appearing.   Cardiovascular:      Rate and Rhythm: Normal rate and regular rhythm.      Heart sounds: Normal heart sounds.   Pulmonary:      Effort: Pulmonary effort is normal. No respiratory distress.      Breath sounds: Normal breath sounds.   Abdominal:      General: Abdomen is flat. There is no distension.      Palpations: Abdomen is soft.   Skin:     General: Skin is warm and dry.   Neurological:      Mental Status: She is alert.       Significant Labs: All pertinent labs within the past 24 hours have been reviewed.  BMP:   Recent Labs   Lab 02/17/23  0437   GLU 90   *   K 4.6      CO2 24   BUN 21*   CREATININE 0.88   CALCIUM 8.4*     CBC:   Recent Labs   Lab 02/17/23  1608   WBC 9.20   HGB 9.8*   HCT 30.8*          Significant Imaging: I have reviewed all pertinent imaging results/findings within the past 24 hours.      Assessment/Plan:      * Multiple skin tears  Wound care  Optimize nutrition       Open wound of left lower leg  Wound care continues.  Wounds are improving nicely.       Failure to thrive in adult  Cont appetite stimulant  Cont nutiritional supplement  Cont to encourage oral intake  PT.   Will add remeron.     Disruption of external surgical  wound        Wounds and injuries  Wound care consulted and following    Delmy-prosthetic fracture around prosthetic hip  S/P fixation with Dr Singer      Lumbar radiculopathy  Pain management  PT      Hypertension  Adjust medications as needed    2/2-d/c dilt, hydral since bp running low, keep metoprolol and lisinopril on    2/3-will increae lisinopril for afterload reduction.     Arthritis  Pain management  Per family has been taking oral steorids for a long time, cont that        VTE Risk Mitigation (From admission, onward)         Ordered     enoxaparin injection 40 mg  Daily         02/02/23 1051                Discharge Planning   YANIV:      Code Status: Full Code   Is the patient medically ready for discharge?:     Reason for patient still in hospital (select all that apply): Treatment  Discharge Plan A: Skilled Nursing Facility                  Tal Pereira Jr, MD  Department of Hospital Medicine   [facility]

## 2023-02-18 NOTE — ASSESSMENT & PLAN NOTE
Cont appetite stimulant  Cont nutiritional supplement  Cont to encourage oral intake  PT.   Added remeron. First dose received 2/17. Hopeful for appetite and affective improvement.

## 2023-02-19 PROCEDURE — 99232 SBSQ HOSP IP/OBS MODERATE 35: CPT | Mod: ,,, | Performed by: FAMILY MEDICINE

## 2023-02-19 PROCEDURE — 25000003 PHARM REV CODE 250: Performed by: FAMILY MEDICINE

## 2023-02-19 PROCEDURE — 25000003 PHARM REV CODE 250: Performed by: NURSE PRACTITIONER

## 2023-02-19 PROCEDURE — 99232 PR SUBSEQUENT HOSPITAL CARE,LEVL II: ICD-10-PCS | Mod: ,,, | Performed by: FAMILY MEDICINE

## 2023-02-19 PROCEDURE — 25000003 PHARM REV CODE 250: Performed by: INTERNAL MEDICINE

## 2023-02-19 PROCEDURE — 63600175 PHARM REV CODE 636 W HCPCS: Performed by: INTERNAL MEDICINE

## 2023-02-19 PROCEDURE — 11000001 HC ACUTE MED/SURG PRIVATE ROOM

## 2023-02-19 PROCEDURE — 63600175 PHARM REV CODE 636 W HCPCS: Performed by: STUDENT IN AN ORGANIZED HEALTH CARE EDUCATION/TRAINING PROGRAM

## 2023-02-19 PROCEDURE — 25000003 PHARM REV CODE 250: Performed by: STUDENT IN AN ORGANIZED HEALTH CARE EDUCATION/TRAINING PROGRAM

## 2023-02-19 RX ADMIN — GABAPENTIN 100 MG: 100 CAPSULE ORAL at 03:02

## 2023-02-19 RX ADMIN — OXYCODONE HYDROCHLORIDE 10 MG: 5 TABLET ORAL at 11:02

## 2023-02-19 RX ADMIN — PREDNISONE 5 MG: 5 TABLET ORAL at 09:02

## 2023-02-19 RX ADMIN — LEVOTHYROXINE SODIUM 125 MCG: 0.12 TABLET ORAL at 06:02

## 2023-02-19 RX ADMIN — ZINC SULFATE 220 MG (50 MG) CAPSULE 220 MG: CAPSULE at 09:02

## 2023-02-19 RX ADMIN — FLUTICASONE PROPIONATE 100 MCG: 50 SPRAY, METERED NASAL at 09:02

## 2023-02-19 RX ADMIN — DOCUSATE SODIUM 100 MG: 100 CAPSULE, LIQUID FILLED ORAL at 10:02

## 2023-02-19 RX ADMIN — GABAPENTIN 100 MG: 100 CAPSULE ORAL at 10:02

## 2023-02-19 RX ADMIN — ENOXAPARIN SODIUM 40 MG: 40 INJECTION SUBCUTANEOUS at 04:02

## 2023-02-19 RX ADMIN — TAMSULOSIN HYDROCHLORIDE 0.4 MG: 0.4 CAPSULE ORAL at 09:02

## 2023-02-19 RX ADMIN — OXYCODONE HYDROCHLORIDE AND ACETAMINOPHEN 500 MG: 500 TABLET ORAL at 09:02

## 2023-02-19 RX ADMIN — LISINOPRIL 40 MG: 20 TABLET ORAL at 09:02

## 2023-02-19 RX ADMIN — MIRTAZAPINE 15 MG: 15 TABLET, FILM COATED ORAL at 10:02

## 2023-02-19 RX ADMIN — PANTOPRAZOLE SODIUM 40 MG: 40 TABLET, DELAYED RELEASE ORAL at 09:02

## 2023-02-19 RX ADMIN — DOCUSATE SODIUM 100 MG: 100 CAPSULE, LIQUID FILLED ORAL at 09:02

## 2023-02-19 RX ADMIN — OXYCODONE HYDROCHLORIDE 10 MG: 5 TABLET ORAL at 06:02

## 2023-02-19 RX ADMIN — GABAPENTIN 100 MG: 100 CAPSULE ORAL at 09:02

## 2023-02-19 RX ADMIN — CARVEDILOL 25 MG: 25 TABLET, FILM COATED ORAL at 09:02

## 2023-02-19 RX ADMIN — HYDRALAZINE HYDROCHLORIDE 50 MG: 50 TABLET, FILM COATED ORAL at 06:02

## 2023-02-19 RX ADMIN — MEGESTROL ACETATE 40 MG: 40 TABLET ORAL at 09:02

## 2023-02-20 ENCOUNTER — HOSPITAL ENCOUNTER (INPATIENT)
Facility: HOSPITAL | Age: 88
LOS: 10 days | Discharge: HOME HEALTH | DRG: 556 | End: 2023-03-02
Attending: FAMILY MEDICINE | Admitting: FAMILY MEDICINE
Payer: MEDICARE

## 2023-02-20 VITALS
SYSTOLIC BLOOD PRESSURE: 111 MMHG | WEIGHT: 129.44 LBS | HEART RATE: 64 BPM | TEMPERATURE: 98 F | OXYGEN SATURATION: 97 % | BODY MASS INDEX: 20.8 KG/M2 | HEIGHT: 66 IN | RESPIRATION RATE: 16 BRPM | DIASTOLIC BLOOD PRESSURE: 51 MMHG

## 2023-02-20 DIAGNOSIS — T14.90XA WOUNDS AND INJURIES: Primary | ICD-10-CM

## 2023-02-20 DIAGNOSIS — I10 PRIMARY HYPERTENSION: ICD-10-CM

## 2023-02-20 DIAGNOSIS — S81.802A OPEN WOUND OF LEFT LOWER LEG, INITIAL ENCOUNTER: ICD-10-CM

## 2023-02-20 DIAGNOSIS — S72.009A HIP FRACTURE: ICD-10-CM

## 2023-02-20 PROBLEM — Z96.649 PERI-PROSTHETIC FRACTURE AROUND PROSTHETIC HIP: Status: RESOLVED | Noted: 2022-12-17 | Resolved: 2023-02-20

## 2023-02-20 PROBLEM — M97.8XXA PERI-PROSTHETIC FRACTURE AROUND PROSTHETIC HIP: Status: RESOLVED | Noted: 2022-12-17 | Resolved: 2023-02-20

## 2023-02-20 LAB
ALBUMIN SERPL BCP-MCNC: 2.8 G/DL (ref 3.5–5)
ALBUMIN/GLOB SERPL: 1.1 {RATIO}
ALP SERPL-CCNC: 53 U/L (ref 55–142)
ALT SERPL W P-5'-P-CCNC: 20 U/L (ref 13–56)
ANION GAP SERPL CALCULATED.3IONS-SCNC: 13 MMOL/L (ref 7–16)
AST SERPL W P-5'-P-CCNC: 14 U/L (ref 15–37)
BASOPHILS # BLD AUTO: 0.07 K/UL (ref 0–0.2)
BASOPHILS NFR BLD AUTO: 0.5 % (ref 0–1)
BILIRUB SERPL-MCNC: 0.3 MG/DL (ref ?–1.2)
BUN SERPL-MCNC: 23 MG/DL (ref 7–18)
BUN/CREAT SERPL: 22 (ref 6–20)
CALCIUM SERPL-MCNC: 8.1 MG/DL (ref 8.5–10.1)
CHLORIDE SERPL-SCNC: 106 MMOL/L (ref 98–107)
CO2 SERPL-SCNC: 25 MMOL/L (ref 21–32)
CREAT SERPL-MCNC: 1.05 MG/DL (ref 0.55–1.02)
DIFFERENTIAL METHOD BLD: ABNORMAL
EGFR (NO RACE VARIABLE) (RUSH/TITUS): 51 ML/MIN/1.73M²
EOSINOPHIL # BLD AUTO: 0.13 K/UL (ref 0–0.5)
EOSINOPHIL NFR BLD AUTO: 0.9 % (ref 1–4)
ERYTHROCYTE [DISTWIDTH] IN BLOOD BY AUTOMATED COUNT: 13.1 % (ref 11.5–14.5)
GLOBULIN SER-MCNC: 2.5 G/DL (ref 2–4)
GLUCOSE SERPL-MCNC: 86 MG/DL (ref 74–106)
HCT VFR BLD AUTO: 30.7 % (ref 38–47)
HGB BLD-MCNC: 9.9 G/DL (ref 12–16)
IMM GRANULOCYTES # BLD AUTO: 0.18 K/UL (ref 0–0.04)
IMM GRANULOCYTES NFR BLD: 1.3 % (ref 0–0.4)
LYMPHOCYTES # BLD AUTO: 2.5 K/UL (ref 1–4.8)
LYMPHOCYTES NFR BLD AUTO: 18.2 % (ref 27–41)
MCH RBC QN AUTO: 29.4 PG (ref 27–31)
MCHC RBC AUTO-ENTMCNC: 32.2 G/DL (ref 32–36)
MCV RBC AUTO: 91.1 FL (ref 80–96)
MONOCYTES # BLD AUTO: 0.9 K/UL (ref 0–0.8)
MONOCYTES NFR BLD AUTO: 6.6 % (ref 2–6)
MPC BLD CALC-MCNC: 9.3 FL (ref 9.4–12.4)
NEUTROPHILS # BLD AUTO: 9.94 K/UL (ref 1.8–7.7)
NEUTROPHILS NFR BLD AUTO: 72.5 % (ref 53–65)
NRBC # BLD AUTO: 0 X10E3/UL
NRBC, AUTO (.00): 0 %
PLATELET # BLD AUTO: 266 K/UL (ref 150–400)
POTASSIUM SERPL-SCNC: 5 MMOL/L (ref 3.5–5.1)
PROT SERPL-MCNC: 5.3 G/DL (ref 6.4–8.2)
RBC # BLD AUTO: 3.37 M/UL (ref 4.2–5.4)
SODIUM SERPL-SCNC: 139 MMOL/L (ref 136–145)
WBC # BLD AUTO: 13.72 K/UL (ref 4.5–11)

## 2023-02-20 PROCEDURE — 80053 COMPREHEN METABOLIC PANEL: CPT | Performed by: INTERNAL MEDICINE

## 2023-02-20 PROCEDURE — 99232 PR SUBSEQUENT HOSPITAL CARE,LEVL II: ICD-10-PCS | Mod: ,,, | Performed by: NURSE PRACTITIONER

## 2023-02-20 PROCEDURE — 25000003 PHARM REV CODE 250: Performed by: FAMILY MEDICINE

## 2023-02-20 PROCEDURE — 25000003 PHARM REV CODE 250: Performed by: NURSE PRACTITIONER

## 2023-02-20 PROCEDURE — 85025 COMPLETE CBC W/AUTO DIFF WBC: CPT | Performed by: STUDENT IN AN ORGANIZED HEALTH CARE EDUCATION/TRAINING PROGRAM

## 2023-02-20 PROCEDURE — 63600175 PHARM REV CODE 636 W HCPCS: Performed by: STUDENT IN AN ORGANIZED HEALTH CARE EDUCATION/TRAINING PROGRAM

## 2023-02-20 PROCEDURE — 25000003 PHARM REV CODE 250: Performed by: PHYSICIAN ASSISTANT

## 2023-02-20 PROCEDURE — 99238 PR HOSPITAL DISCHARGE DAY,<30 MIN: ICD-10-PCS | Mod: ,,, | Performed by: FAMILY MEDICINE

## 2023-02-20 PROCEDURE — 99238 HOSP IP/OBS DSCHRG MGMT 30/<: CPT | Mod: ,,, | Performed by: FAMILY MEDICINE

## 2023-02-20 PROCEDURE — 25000003 PHARM REV CODE 250: Performed by: STUDENT IN AN ORGANIZED HEALTH CARE EDUCATION/TRAINING PROGRAM

## 2023-02-20 PROCEDURE — 99232 SBSQ HOSP IP/OBS MODERATE 35: CPT | Mod: ,,, | Performed by: NURSE PRACTITIONER

## 2023-02-20 PROCEDURE — 25000003 PHARM REV CODE 250: Performed by: INTERNAL MEDICINE

## 2023-02-20 PROCEDURE — 27000981 HC MATTRESS, ACCUCAIR DAILY RENTAL

## 2023-02-20 PROCEDURE — 11000004 HC SNF PRIVATE

## 2023-02-20 PROCEDURE — 27000944

## 2023-02-20 RX ORDER — TAMSULOSIN HYDROCHLORIDE 0.4 MG/1
0.4 CAPSULE ORAL DAILY
Status: DISCONTINUED | OUTPATIENT
Start: 2023-02-22 | End: 2023-03-02 | Stop reason: HOSPADM

## 2023-02-20 RX ORDER — UREA 200 MG/G
1 CREAM TOPICAL DAILY PRN
Status: DISCONTINUED | OUTPATIENT
Start: 2023-02-20 | End: 2023-02-21 | Stop reason: SDUPTHER

## 2023-02-20 RX ORDER — ALPRAZOLAM 1 MG/1
1 TABLET ORAL NIGHTLY
Status: DISCONTINUED | OUTPATIENT
Start: 2023-02-20 | End: 2023-03-02 | Stop reason: HOSPADM

## 2023-02-20 RX ORDER — DOCUSATE SODIUM 100 MG/1
100 CAPSULE, LIQUID FILLED ORAL DAILY PRN
Status: DISCONTINUED | OUTPATIENT
Start: 2023-02-20 | End: 2023-03-02 | Stop reason: HOSPADM

## 2023-02-20 RX ORDER — BISACODYL 5 MG
10 TABLET, DELAYED RELEASE (ENTERIC COATED) ORAL DAILY PRN
Status: DISCONTINUED | OUTPATIENT
Start: 2023-02-20 | End: 2023-03-02 | Stop reason: HOSPADM

## 2023-02-20 RX ORDER — LEVOTHYROXINE SODIUM 125 UG/1
125 TABLET ORAL
Qty: 30 TABLET | Refills: 11
Start: 2023-02-21 | End: 2024-02-21

## 2023-02-20 RX ORDER — HYDRALAZINE HYDROCHLORIDE 50 MG/1
50 TABLET, FILM COATED ORAL EVERY 8 HOURS
Qty: 90 TABLET | Refills: 11 | Status: ON HOLD | OUTPATIENT
Start: 2023-02-20 | End: 2023-03-02 | Stop reason: HOSPADM

## 2023-02-20 RX ORDER — OXYCODONE HYDROCHLORIDE 5 MG/1
10 TABLET ORAL EVERY 6 HOURS PRN
Status: DISCONTINUED | OUTPATIENT
Start: 2023-02-20 | End: 2023-03-02 | Stop reason: HOSPADM

## 2023-02-20 RX ORDER — LACTULOSE 10 G/15ML
20 SOLUTION ORAL EVERY 6 HOURS PRN
Status: ON HOLD
Start: 2023-02-20 | End: 2023-03-02 | Stop reason: SDUPTHER

## 2023-02-20 RX ORDER — PANTOPRAZOLE SODIUM 40 MG/1
40 TABLET, DELAYED RELEASE ORAL DAILY
Qty: 60 TABLET | Refills: 2
Start: 2023-02-20 | End: 2023-03-06 | Stop reason: SDUPTHER

## 2023-02-20 RX ORDER — FLUTICASONE PROPIONATE 50 MCG
2 SPRAY, SUSPENSION (ML) NASAL DAILY
Refills: 0
Start: 2023-02-21 | End: 2023-03-06 | Stop reason: SDUPTHER

## 2023-02-20 RX ORDER — LISINOPRIL 40 MG/1
40 TABLET ORAL DAILY
Qty: 30 TABLET | Refills: 0
Start: 2023-02-21 | End: 2023-03-06 | Stop reason: SDUPTHER

## 2023-02-20 RX ORDER — ALPRAZOLAM 1 MG/1
1 TABLET ORAL NIGHTLY
Qty: 30 TABLET | Refills: 0
Start: 2023-02-20 | End: 2023-03-06 | Stop reason: SDUPTHER

## 2023-02-20 RX ORDER — MEGESTROL ACETATE 40 MG/1
40 TABLET ORAL DAILY
Qty: 30 TABLET | Refills: 11 | Status: SHIPPED | OUTPATIENT
Start: 2023-02-21 | End: 2023-03-27

## 2023-02-20 RX ORDER — CARVEDILOL 25 MG/1
25 TABLET ORAL 2 TIMES DAILY
Qty: 60 TABLET | Refills: 11 | Status: SHIPPED | OUTPATIENT
Start: 2023-02-20 | End: 2023-03-06 | Stop reason: SDUPTHER

## 2023-02-20 RX ORDER — POLYETHYLENE GLYCOL 3350 17 G/17G
17 POWDER, FOR SOLUTION ORAL DAILY
Refills: 0
Start: 2023-02-21 | End: 2023-03-06 | Stop reason: SDUPTHER

## 2023-02-20 RX ORDER — ACETAMINOPHEN 325 MG/1
650 TABLET ORAL EVERY 6 HOURS PRN
Status: DISCONTINUED | OUTPATIENT
Start: 2023-02-20 | End: 2023-03-02 | Stop reason: HOSPADM

## 2023-02-20 RX ORDER — FAMOTIDINE 20 MG/1
20 TABLET, FILM COATED ORAL DAILY
Status: DISCONTINUED | OUTPATIENT
Start: 2023-02-21 | End: 2023-03-02 | Stop reason: HOSPADM

## 2023-02-20 RX ORDER — ACETAMINOPHEN 325 MG/1
650 TABLET ORAL EVERY 6 HOURS PRN
Refills: 0
Start: 2023-02-20

## 2023-02-20 RX ORDER — ZINC SULFATE 50(220)MG
220 CAPSULE ORAL DAILY
Status: DISCONTINUED | OUTPATIENT
Start: 2023-02-22 | End: 2023-03-02 | Stop reason: HOSPADM

## 2023-02-20 RX ORDER — CARVEDILOL 12.5 MG/1
25 TABLET ORAL 2 TIMES DAILY
Status: DISCONTINUED | OUTPATIENT
Start: 2023-02-20 | End: 2023-03-02 | Stop reason: HOSPADM

## 2023-02-20 RX ORDER — ASCORBIC ACID 500 MG
500 TABLET ORAL 2 TIMES DAILY
Status: ON HOLD
Start: 2023-02-20 | End: 2023-03-02 | Stop reason: SDUPTHER

## 2023-02-20 RX ORDER — MEGESTROL ACETATE 40 MG/1
40 TABLET ORAL DAILY
Status: DISCONTINUED | OUTPATIENT
Start: 2023-02-22 | End: 2023-02-21

## 2023-02-20 RX ORDER — MIRTAZAPINE 15 MG/1
15 TABLET, FILM COATED ORAL NIGHTLY
Qty: 30 TABLET | Refills: 11 | Status: SHIPPED | OUTPATIENT
Start: 2023-02-20 | End: 2023-03-06 | Stop reason: SDUPTHER

## 2023-02-20 RX ORDER — ASCORBIC ACID 500 MG
500 TABLET ORAL 2 TIMES DAILY
Status: DISCONTINUED | OUTPATIENT
Start: 2023-02-20 | End: 2023-03-02 | Stop reason: HOSPADM

## 2023-02-20 RX ORDER — ALPRAZOLAM 0.25 MG/1
1 TABLET ORAL ONCE
Status: COMPLETED | OUTPATIENT
Start: 2023-02-20 | End: 2023-02-20

## 2023-02-20 RX ORDER — POLYETHYLENE GLYCOL 3350 17 G/17G
17 POWDER, FOR SOLUTION ORAL DAILY
Status: DISCONTINUED | OUTPATIENT
Start: 2023-02-22 | End: 2023-03-02 | Stop reason: HOSPADM

## 2023-02-20 RX ORDER — HYDROCODONE BITARTRATE AND ACETAMINOPHEN 7.5; 325 MG/1; MG/1
1 TABLET ORAL EVERY 8 HOURS PRN
Status: DISCONTINUED | OUTPATIENT
Start: 2023-02-20 | End: 2023-02-21

## 2023-02-20 RX ORDER — UREA 200 MG/G
1 CREAM TOPICAL DAILY PRN
Refills: 0 | Status: ON HOLD
Start: 2023-02-20 | End: 2023-03-02 | Stop reason: HOSPADM

## 2023-02-20 RX ORDER — TAMSULOSIN HYDROCHLORIDE 0.4 MG/1
0.4 CAPSULE ORAL DAILY
Qty: 30 CAPSULE | Refills: 11 | Status: SHIPPED | OUTPATIENT
Start: 2023-02-21 | End: 2024-02-21

## 2023-02-20 RX ORDER — GABAPENTIN 100 MG/1
100 CAPSULE ORAL 3 TIMES DAILY
Status: DISCONTINUED | OUTPATIENT
Start: 2023-02-21 | End: 2023-03-02 | Stop reason: HOSPADM

## 2023-02-20 RX ORDER — PANTOPRAZOLE SODIUM 40 MG/1
40 TABLET, DELAYED RELEASE ORAL DAILY
Status: DISCONTINUED | OUTPATIENT
Start: 2023-02-21 | End: 2023-02-21

## 2023-02-20 RX ORDER — TRAZODONE HYDROCHLORIDE 50 MG/1
50 TABLET ORAL NIGHTLY PRN
Status: DISCONTINUED | OUTPATIENT
Start: 2023-02-20 | End: 2023-03-02 | Stop reason: HOSPADM

## 2023-02-20 RX ORDER — PREDNISONE 5 MG/1
5 TABLET ORAL DAILY
Status: DISCONTINUED | OUTPATIENT
Start: 2023-02-21 | End: 2023-03-02 | Stop reason: HOSPADM

## 2023-02-20 RX ORDER — TRAZODONE HYDROCHLORIDE 50 MG/1
50 TABLET ORAL NIGHTLY PRN
Status: ON HOLD
Start: 2023-02-20 | End: 2023-03-02 | Stop reason: SDUPTHER

## 2023-02-20 RX ORDER — DICLOFENAC SODIUM 10 MG/G
4 GEL TOPICAL 3 TIMES DAILY PRN
Status: DISCONTINUED | OUTPATIENT
Start: 2023-02-20 | End: 2023-03-02 | Stop reason: HOSPADM

## 2023-02-20 RX ORDER — MIRTAZAPINE 15 MG/1
15 TABLET, FILM COATED ORAL NIGHTLY
Status: DISCONTINUED | OUTPATIENT
Start: 2023-02-20 | End: 2023-03-02 | Stop reason: HOSPADM

## 2023-02-20 RX ORDER — FLUTICASONE PROPIONATE 50 MCG
2 SPRAY, SUSPENSION (ML) NASAL DAILY
Status: DISCONTINUED | OUTPATIENT
Start: 2023-02-22 | End: 2023-03-02 | Stop reason: HOSPADM

## 2023-02-20 RX ORDER — LACTULOSE 10 G/15ML
20 SOLUTION ORAL EVERY 6 HOURS PRN
Status: DISCONTINUED | OUTPATIENT
Start: 2023-02-20 | End: 2023-03-02 | Stop reason: HOSPADM

## 2023-02-20 RX ORDER — ZINC SULFATE 50(220)MG
220 CAPSULE ORAL DAILY
Status: ON HOLD
Start: 2023-02-21 | End: 2023-03-02 | Stop reason: SDUPTHER

## 2023-02-20 RX ORDER — ONDANSETRON 4 MG/1
4 TABLET, ORALLY DISINTEGRATING ORAL EVERY 6 HOURS PRN
Status: ON HOLD
Start: 2023-02-20 | End: 2023-03-02 | Stop reason: SDUPTHER

## 2023-02-20 RX ORDER — DICLOFENAC SODIUM 10 MG/G
4 GEL TOPICAL 3 TIMES DAILY PRN
Status: ON HOLD
Start: 2023-02-20 | End: 2023-03-02 | Stop reason: HOSPADM

## 2023-02-20 RX ORDER — ONDANSETRON 4 MG/1
4 TABLET, ORALLY DISINTEGRATING ORAL EVERY 6 HOURS PRN
Status: DISCONTINUED | OUTPATIENT
Start: 2023-02-20 | End: 2023-03-02 | Stop reason: HOSPADM

## 2023-02-20 RX ORDER — OXYCODONE HYDROCHLORIDE 10 MG/1
10 TABLET ORAL EVERY 6 HOURS PRN
Refills: 0 | Status: ON HOLD
Start: 2023-02-20 | End: 2023-03-02 | Stop reason: HOSPADM

## 2023-02-20 RX ORDER — HYDRALAZINE HYDROCHLORIDE 50 MG/1
50 TABLET, FILM COATED ORAL EVERY 8 HOURS
Status: DISCONTINUED | OUTPATIENT
Start: 2023-02-20 | End: 2023-02-26

## 2023-02-20 RX ORDER — LISINOPRIL 20 MG/1
40 TABLET ORAL DAILY
Status: DISCONTINUED | OUTPATIENT
Start: 2023-02-22 | End: 2023-03-02 | Stop reason: HOSPADM

## 2023-02-20 RX ADMIN — MIRTAZAPINE 15 MG: 15 TABLET, FILM COATED ORAL at 10:02

## 2023-02-20 RX ADMIN — ZINC SULFATE 220 MG (50 MG) CAPSULE 220 MG: CAPSULE at 10:02

## 2023-02-20 RX ADMIN — TAMSULOSIN HYDROCHLORIDE 0.4 MG: 0.4 CAPSULE ORAL at 10:02

## 2023-02-20 RX ADMIN — DOCUSATE SODIUM 100 MG: 100 CAPSULE, LIQUID FILLED ORAL at 10:02

## 2023-02-20 RX ADMIN — ALPRAZOLAM 1 MG: 1 TABLET ORAL at 10:02

## 2023-02-20 RX ADMIN — PANTOPRAZOLE SODIUM 40 MG: 40 TABLET, DELAYED RELEASE ORAL at 10:02

## 2023-02-20 RX ADMIN — HYDRALAZINE HYDROCHLORIDE 50 MG: 50 TABLET, FILM COATED ORAL at 10:02

## 2023-02-20 RX ADMIN — GABAPENTIN 100 MG: 100 CAPSULE ORAL at 10:02

## 2023-02-20 RX ADMIN — CARVEDILOL 25 MG: 25 TABLET, FILM COATED ORAL at 10:02

## 2023-02-20 RX ADMIN — OXYCODONE HYDROCHLORIDE 10 MG: 5 TABLET ORAL at 10:02

## 2023-02-20 RX ADMIN — CARVEDILOL 25 MG: 12.5 TABLET, FILM COATED ORAL at 10:02

## 2023-02-20 RX ADMIN — LEVOTHYROXINE SODIUM 125 MCG: 0.12 TABLET ORAL at 06:02

## 2023-02-20 RX ADMIN — PREDNISONE 5 MG: 5 TABLET ORAL at 10:02

## 2023-02-20 RX ADMIN — POLYETHYLENE GLYCOL 3350 17 G: 17 POWDER, FOR SOLUTION ORAL at 10:02

## 2023-02-20 RX ADMIN — GABAPENTIN 100 MG: 100 CAPSULE ORAL at 04:02

## 2023-02-20 RX ADMIN — MEGESTROL ACETATE 40 MG: 40 TABLET ORAL at 10:02

## 2023-02-20 RX ADMIN — LISINOPRIL 40 MG: 20 TABLET ORAL at 10:02

## 2023-02-20 RX ADMIN — OXYCODONE HYDROCHLORIDE AND ACETAMINOPHEN 500 MG: 500 TABLET ORAL at 10:02

## 2023-02-20 RX ADMIN — ALPRAZOLAM 1 MG: 0.25 TABLET ORAL at 10:02

## 2023-02-20 NOTE — DISCHARGE INSTR - SUPPLEMENTARY INSTRUCTIONS
Physical Therapy Summary:    Rolling Left:  contact guard assistance and minimum assistance  Rolling Right: contact guard assistance and minimum assistance for hygiene  Supine to Sit: contact guard assistance and minimum assistance Sit to Supine: minimum assistance  Transfers:     Sit to Stand:  moderate assistance and heavy cueing with rolling walker  Gait: up to 12' minimum assist with RW; slow sadaf, mild posterior lean initially    SHON SUMMERS, TAMELA 2/20/2023 2:21 PM

## 2023-02-20 NOTE — ASSESSMENT & PLAN NOTE
Wound care continues.  Wounds are improving nicely. Twice weekly dressing changes. Wounds can now be handled at [facility].

## 2023-02-20 NOTE — PLAN OF CARE
Problem: Adult Inpatient Plan of Care  Goal: Absence of Hospital-Acquired Illness or Injury  Intervention: Identify and Manage Fall Risk  Flowsheets (Taken 2/20/2023 1631)  Safety Promotion/Fall Prevention:   assistive device/personal item within reach   medications reviewed   nonskid shoes/socks when out of bed     Problem: Skin Injury Risk Increased  Goal: Skin Health and Integrity  Intervention: Optimize Skin Protection  Flowsheets (Taken 2/20/2023 1631)  Pressure Reduction Techniques: pressure points protected  Pressure Reduction Devices:   specialty bed utilized   positioning supports utilized   heel offloading device utilized  Skin Protection: incontinence pads utilized  Head of Bed (HOB) Positioning: HOB elevated

## 2023-02-20 NOTE — PROGRESS NOTES
Patient discharged to [facility]. Patient taken off unit via [facility] by [facility] staff. Patient's daughter and caregiver with patient. Patient is stable. NAD noted.

## 2023-02-20 NOTE — SUBJECTIVE & OBJECTIVE
Subjective:     HPI:  89 yo female with multiple skin tears, traumatic wounds, and surgical incision to left thigh. She was admitted following a fall on 12/17 when she was trying to go to the restroom at her home.  Patient sustained a left femur fracture. She is status post ORIF on 12/20. Staples intact to left hip. Moderate amount of green drainage noted, cultures obtained today. Left lower leg sutures removed today. Slough noted in wound bed today, adjustment to local wound care. Significant PMH includes hypertension, bradycardia, osteoarthritis, hypothyroidism, osteoporosis, and multiple falls. Wound healing is complicated by bradycardia, hx of a-fib, chronic pain, weakness, fragile skin, limited mobility, multiple falls, infection, and pain.     Hospital Course:   1/5/23 - Complains of pain to right posterior knee, venous doppler ordered. Sutures and every other staple removed today. New local wound care orders. Cultures pending.   1/9/23 - Wounds on lower extremities healing well. Continue current POC. Bruising noted to left posterior knee, protect with mepitel and optifoam. Dressing to incision site saturated with green drainage. Cultures negative. Continue with drawtex and notify ortho of drainage.   1/11/2023 - Wounds are healing well. Continue current POC. Ultrasound pending, preliminary results concerning for fluid pocket, Dr. Singer going to evaluate today.   1/17/23 - Wounds continue to show improvement. Dr. Singer evaluated left hip today during wound vac change. Staples and wound vac d/c'd today. Dressing change twice weekly.   1/19/23 - Wounds continue to heal. Incision with minimal drainage. Puracol to left thigh today. Continue with current POC. Twice weekly dressing changes (M, Th)  1/23/23 - Wounds are improving. Continue twice weekly dressing changes.   1/30/23 - New skin tear to right thigh, used tweezers and q-tip to place skin over wound and applied transparent dressing. Wounds are  smaller today with granulation tissue. Continue drawtex and bordered foam dressing to wounds.   2/2/23 - Discussed using silvadene with patient and family, patient is allergic to sulfa. Consider urea for dry skin on left lower leg, aquacel ag advantage with bordered foams to wounds.   2/6/23 - Removed dry skin and clots from left lower leg today. Applied silver seal wound dressing today to assist with wound healing and pain.   2/8/23 - Wounds are improving with new plan of care. Polymem silver to open wounds. Silvercel to scabbed areas. Change M, W, F  2/10/23 - Wounds have shown significant improvement with polymem. Continue current POC  2/13/23 - Wounds continue to show improvement with polymem. Skin tears are smaller. She continue to have pain with dressing changes. Continue current POC.   2/16/23 - Wounds continue to improve. Continue current POC.   2/20/23 - Wounds are smaller today. Slough is improving with polymem. Patient is discharging to [facility] today, discharge orders in system.           Scheduled Meds:   ALPRAZolam  1 mg Oral QHS    ascorbic acid (vitamin C)  500 mg Oral BID    carvediloL  25 mg Oral BID    docusate sodium  100 mg Oral BID    enoxaparin  40 mg Subcutaneous Daily    fluticasone propionate  2 spray Each Nostril Daily    gabapentin  100 mg Oral TID    hydrALAZINE  50 mg Oral Q8H    levothyroxine  125 mcg Oral Before breakfast    lisinopriL  40 mg Oral Daily    megestroL  40 mg Oral Daily    mirtazapine  15 mg Oral QHS    pantoprazole  40 mg Oral Daily    polyethylene glycol  17 g Oral Daily    predniSONE  5 mg Oral Daily    tamsulosin  0.4 mg Oral Daily    zinc sulfate  220 mg Oral Daily     Continuous Infusions:  PRN Meds:acetaminophen, bisacodyL, dextromethorphan-guaiFENesin  mg/5 ml, dextrose 50%, dextrose 50%, diclofenac sodium, glucagon (human recombinant), glucose, glucose, hydrALAZINE, lactulose, magnesium oxide, melatonin, naloxone, ondansetron, ondansetron, oxyCODONE,  potassium bicarbonate, potassium bicarbonate, potassium bicarbonate, potassium, sodium phosphates, potassium, sodium phosphates, potassium, sodium phosphates, prochlorperazine, simethicone, sodium chloride 0.9%, traZODone, urea, white petrolatum    Review of Systems   Constitutional:  Positive for activity change. Negative for chills and fever.   Respiratory:  Negative for chest tightness and shortness of breath.    Cardiovascular:  Positive for leg swelling. Negative for chest pain and palpitations.   Musculoskeletal:  Positive for arthralgias, back pain, gait problem and joint swelling.   Skin:  Positive for color change and wound.        wound   Neurological:  Positive for weakness.   Psychiatric/Behavioral:  Negative for agitation, behavioral problems, confusion and self-injury.    Objective:     Vital Signs (Most Recent):  Temp: 98.3 °F (36.8 °C) (02/20/23 0845)  Pulse: 69 (02/20/23 0845)  Resp: 20 (02/20/23 1052)  BP: (!) 111/54 (02/20/23 0845)  SpO2: 97 % (02/20/23 0845)   Vital Signs (24h Range):  Temp:  [97.7 °F (36.5 °C)-98.3 °F (36.8 °C)] 98.3 °F (36.8 °C)  Pulse:  [59-69] 69  Resp:  [16-20] 20  SpO2:  [96 %-97 %] 97 %  BP: ()/(36-63) 111/54     Weight: 58.7 kg (129 lb 6.6 oz)  Body mass index is 20.89 kg/m².    Physical Exam  Vitals reviewed.   Constitutional:       Appearance: Normal appearance.   HENT:      Head: Normocephalic.   Cardiovascular:      Rate and Rhythm: Normal rate and regular rhythm.      Pulses: Normal pulses.      Heart sounds: Murmur heard.   Pulmonary:      Effort: Pulmonary effort is normal.      Breath sounds: Normal breath sounds.   Musculoskeletal:         General: Swelling, tenderness, deformity and signs of injury present.      Right lower leg: Edema present.      Left lower leg: Edema present.   Skin:     Findings: Bruising and erythema present.      Comments: Wound, see LDA for photo/measurements   Neurological:      Mental Status: She is alert. Mental status is at  baseline.      Motor: Weakness present.      Gait: Gait abnormal.

## 2023-02-20 NOTE — PROGRESS NOTES
[facility]  Wound Care and Hyperbarics JOSE  Progress Note    Patient Name: Zandra Veloz  MRN: [id number]  Admission Date: 12/29/2022  Hospital Length of Stay: 53 days  Attending Physician: Leslie Cutler DO  Primary Care Provider: Brandon Thornton MD         Subjective:     HPI:  87 yo female with multiple skin tears, traumatic wounds, and surgical incision to left thigh. She was admitted following a fall on 12/17 when she was trying to go to the restroom at her home.  Patient sustained a left femur fracture. She is status post ORIF on 12/20. Staples intact to left hip. Moderate amount of green drainage noted, cultures obtained today. Left lower leg sutures removed today. Slough noted in wound bed today, adjustment to local wound care. Significant PMH includes hypertension, bradycardia, osteoarthritis, hypothyroidism, osteoporosis, and multiple falls. Wound healing is complicated by bradycardia, hx of a-fib, chronic pain, weakness, fragile skin, limited mobility, multiple falls, infection, and pain.     Hospital Course:   1/5/23 - Complains of pain to right posterior knee, venous doppler ordered. Sutures and every other staple removed today. New local wound care orders. Cultures pending.   1/9/23 - Wounds on lower extremities healing well. Continue current POC. Bruising noted to left posterior knee, protect with mepitel and optifoam. Dressing to incision site saturated with green drainage. Cultures negative. Continue with drawtex and notify ortho of drainage.   1/11/2023 - Wounds are healing well. Continue current POC. Ultrasound pending, preliminary results concerning for fluid pocket, Dr. Singer going to evaluate today.   1/17/23 - Wounds continue to show improvement. Dr. Singer evaluated left hip today during wound vac change. Staples and wound vac d/c'd today. Dressing change twice weekly.   1/19/23 - Wounds continue to heal. Incision with minimal drainage. Puracol  to left thigh today. Continue with current POC. Twice weekly dressing changes (M, Th)  1/23/23 - Wounds are improving. Continue twice weekly dressing changes.   1/30/23 - New skin tear to right thigh, used tweezers and q-tip to place skin over wound and applied transparent dressing. Wounds are smaller today with granulation tissue. Continue drawtex and bordered foam dressing to wounds.   2/2/23 - Discussed using silvadene with patient and family, patient is allergic to sulfa. Consider urea for dry skin on left lower leg, aquacel ag advantage with bordered foams to wounds.   2/6/23 - Removed dry skin and clots from left lower leg today. Applied silver seal wound dressing today to assist with wound healing and pain.   2/8/23 - Wounds are improving with new plan of care. Polymem silver to open wounds. Silvercel to scabbed areas. Change M, W, F  2/10/23 - Wounds have shown significant improvement with polymem. Continue current POC  2/13/23 - Wounds continue to show improvement with polymem. Skin tears are smaller. She continue to have pain with dressing changes. Continue current POC.   2/16/23 - Wounds continue to improve. Continue current POC.   2/20/23 - Wounds are smaller today. Slough is improving with polymem. Patient is discharging to [facility] today, discharge orders in system.           Scheduled Meds:   ALPRAZolam  1 mg Oral QHS    ascorbic acid (vitamin C)  500 mg Oral BID    carvediloL  25 mg Oral BID    docusate sodium  100 mg Oral BID    enoxaparin  40 mg Subcutaneous Daily    fluticasone propionate  2 spray Each Nostril Daily    gabapentin  100 mg Oral TID    hydrALAZINE  50 mg Oral Q8H    levothyroxine  125 mcg Oral Before breakfast    lisinopriL  40 mg Oral Daily    megestroL  40 mg Oral Daily    mirtazapine  15 mg Oral QHS    pantoprazole  40 mg Oral Daily    polyethylene glycol  17 g Oral Daily    predniSONE  5 mg Oral Daily    tamsulosin  0.4 mg Oral Daily    zinc sulfate  220 mg Oral  Daily     Continuous Infusions:  PRN Meds:acetaminophen, bisacodyL, dextromethorphan-guaiFENesin  mg/5 ml, dextrose 50%, dextrose 50%, diclofenac sodium, glucagon (human recombinant), glucose, glucose, hydrALAZINE, lactulose, magnesium oxide, melatonin, naloxone, ondansetron, ondansetron, oxyCODONE, potassium bicarbonate, potassium bicarbonate, potassium bicarbonate, potassium, sodium phosphates, potassium, sodium phosphates, potassium, sodium phosphates, prochlorperazine, simethicone, sodium chloride 0.9%, traZODone, urea, white petrolatum    Review of Systems   Constitutional:  Positive for activity change. Negative for chills and fever.   Respiratory:  Negative for chest tightness and shortness of breath.    Cardiovascular:  Positive for leg swelling. Negative for chest pain and palpitations.   Musculoskeletal:  Positive for arthralgias, back pain, gait problem and joint swelling.   Skin:  Positive for color change and wound.        wound   Neurological:  Positive for weakness.   Psychiatric/Behavioral:  Negative for agitation, behavioral problems, confusion and self-injury.    Objective:     Vital Signs (Most Recent):  Temp: 98.3 °F (36.8 °C) (02/20/23 0845)  Pulse: 69 (02/20/23 0845)  Resp: 20 (02/20/23 1052)  BP: (!) 111/54 (02/20/23 0845)  SpO2: 97 % (02/20/23 0845)   Vital Signs (24h Range):  Temp:  [97.7 °F (36.5 °C)-98.3 °F (36.8 °C)] 98.3 °F (36.8 °C)  Pulse:  [59-69] 69  Resp:  [16-20] 20  SpO2:  [96 %-97 %] 97 %  BP: ()/(36-63) 111/54     Weight: 58.7 kg (129 lb 6.6 oz)  Body mass index is 20.89 kg/m².    Physical Exam  Vitals reviewed.   Constitutional:       Appearance: Normal appearance.   HENT:      Head: Normocephalic.   Cardiovascular:      Rate and Rhythm: Normal rate and regular rhythm.      Pulses: Normal pulses.      Heart sounds: Murmur heard.   Pulmonary:      Effort: Pulmonary effort is normal.      Breath sounds: Normal breath sounds.   Musculoskeletal:         General:  Swelling, tenderness, deformity and signs of injury present.      Right lower leg: Edema present.      Left lower leg: Edema present.   Skin:     Findings: Bruising and erythema present.      Comments: Wound, see LDA for photo/measurements   Neurological:      Mental Status: She is alert. Mental status is at baseline.      Motor: Weakness present.      Gait: Gait abnormal.       Assessment/Plan:     Orthopedic  * Multiple skin tears                                            Clean with vashe  Apply polymem silver to wounds  Cover and secure with bordered foam  Change M, Th  Keep leg elevated and avoid pressure on wound.    Disruption of external surgical wound  Staples removed and wound vac d/c'd per Dr. Singer.   Clean with vashe  Apply dry dressing  Cover and secure with abd pad and paper tape      Open wound of left lower leg                                      Clean with vashe  Apply polymem silver to open wounds   Cover and secure with bordered foam  Change M, TH  Keep leg elevated and avoid pressure on wound.              ZURDO San  Wound Care and Hyperbarics JOSE  [facility]

## 2023-02-20 NOTE — SUBJECTIVE & OBJECTIVE
Interval History: Not eating as well today. Encouraged to order from the grill.. Overall patient doing ok.  Wounds markedly better. To [facility] tomorrow or soon thereafter. Burk removed today.     Review of Systems  Objective:     Vital Signs (Most Recent):  Temp: 98.2 °F (36.8 °C) (02/19/23 1600)  Pulse: (!) 59 (02/19/23 1600)  Resp: 18 (02/19/23 1805)  BP: (!) 102/41 (02/19/23 1600)  SpO2: 96 % (02/19/23 1600)   Vital Signs (24h Range):  Temp:  [97.9 °F (36.6 °C)-99 °F (37.2 °C)] 98.2 °F (36.8 °C)  Pulse:  [59-64] 59  Resp:  [18] 18  SpO2:  [96 %-98 %] 96 %  BP: ()/(41-65) 102/41     Weight: 58.7 kg (129 lb 6.6 oz)  Body mass index is 20.89 kg/m².    Intake/Output Summary (Last 24 hours) at 2/19/2023 1916  Last data filed at 2/19/2023 1407  Gross per 24 hour   Intake --   Output 600 ml   Net -600 ml      Physical Exam  Vitals reviewed.   Constitutional:       General: She is not in acute distress.     Appearance: She is not toxic-appearing.   Cardiovascular:      Rate and Rhythm: Normal rate and regular rhythm.      Heart sounds: Normal heart sounds.   Pulmonary:      Effort: Pulmonary effort is normal. No respiratory distress.      Breath sounds: Normal breath sounds.   Abdominal:      General: Abdomen is flat. There is no distension.      Palpations: Abdomen is soft.   Skin:     General: Skin is warm and dry.   Neurological:      Mental Status: She is alert.       Significant Labs: All pertinent labs within the past 24 hours have been reviewed.    Significant Imaging: I have reviewed all pertinent imaging results/findings within the past 24 hours.

## 2023-02-20 NOTE — NURSING
Offered incontinence care, pt refused. Pt refused assistance with turning and repositioning. Care Giver present.

## 2023-02-20 NOTE — DISCHARGE INSTRUCTIONS
Occupational Therapy Summary:  Mod a to aj gown as robe per last weekly on 2/14/2023  Sat eob 3 mins sba per last weekly on 2/14/2023  Sit to stand min a with rw per last weekly on 2/14/2023  Bed to chair min a x 2 with rw per last weekly on 2/14/2023

## 2023-02-20 NOTE — PROGRESS NOTES
[facility] Hospital Medicine  Progress Note    Patient Name: Zandra Veloz  MRN: [id number]  Patient Class: IP- Inpatient   Admission Date: 12/29/2022  Length of Stay: 52 days  Attending Physician: Tal Pereira Jr., MD  Primary Care Provider: Brandon Thornton MD        Subjective:     Principal Problem:Multiple skin tears        HPI:  HPI:   88-year-old lady seen emergency room department.  Patient presents after having a fall when she was trying to go to the restroom at her home.  Patient sustained a left femur fracture.  Patient complains of moderate to severe pain in the left hip area.  Patient also complains of chest tightness, she rates it a 5/10 in the chest tightness has been intubate.  Patient also was seen in emergency room department earlier this week per her daughter.  Patient is found to have dehydration and a urinary tract infection.  Current she denies any shortness of breath.  She does not give a history of coronary artery disease.        Procedure(s) (LRB):  REVISION, TOTAL ARTHROPLASTY, HIP, VICTORIANO PROSTHETIC FX (Left)       Hospital Course:   12/19 - records reviewed. Fall without LOC and has left hip fx. No other complaints currently. Seen by cardiology with some CP felt musculoskeletal.  Echo mild AS and mod MR with nl EF. Age increase cardiac risk for surgery but discussed with daughter surgery is urgent and see no benefit in delay medically. Feel low to moderate risk for surgery. Question about UTI. No symptoms. Had UTI in 10.22 not surprising. Lab to do culture on urine in lab. Cover with ATN for prior culture with ATB started. Discussed with Dr Singer and cardiology.  12/20 Tachycardia and elevated BP. Cardiology adjusted meds. Plan hold off surgery until 12/22 to adjust meds and treat UTI. Family in room. Pt not sleeping well.   12/21 Didn't sleep well prior night. Apparently been on xanax for a time. Also recently started on Neurontin. Family with  question. No recent BM. Some increase stool on KUB but not severe. Surgery for am. BP some up but better. HR good.   12/22 Seen prior to surgery and apparently add better night. Sore mouth / throat better with mycostatin. For surgery. Family in room.  12/23 patient with pain but otherwise seemed to be doing relatively well.  Daughter in room.  Apparently been taking prednisone for some time and this was restarted per family request.  Look into swing bed placement  12/24 patient had better night rested and everyone's in better spirits today.  Tolerating some diet.   Therapy worked with patient.  Look into swing bed placement next week  12/25 remained in good spirits.  Less pain.  Had good bowel movement and ordered Dulcolax not given.  Because of dressing to leg, Burk was not removed to keep it from getting soiled.  Wound care to check 12/27.  Discuss this with patient and daughter.  On antibiotics for UTI  12/26-will dc to swingbed once accepted. Needs continued wound care.  12/27-DC when bed available  12/28- issues with wound care yesterday.  Dr. Singer had to come remove dressings himself due to adherence to subcutaneous tissue.  Family refusing transfer to [facility] due to lack of specially trained wound care team availability.  Planning to transfer to specialty but resistant to that as well and requesting to speak to administration.    12/29- agreeable to transfer to specialty.  This will be the best place for her to receive wound care.  DC after family tours     12/29-needs continued wound care and therapy[facility]    12/30-doing well today, still with pain         Overview/Hospital Course:  12/31-having some pain this am  1/1- no complaints  1/2- doing well  1/3- continue wound care, last day cefepime tomorrow  1/4- some pain this AM.  Delay in mediations dues to staffing issues. Dsicussed with RN  1/5- no issues overnight  1/6- Still with pain, hip incision looks good  1/7-diclofenac for  shoulder pain  1/8-decrease laxatives, change benzos and carlo to PRN per patient request  1/9 some confusion overnight  1/10- no more confusion, doing well  1/11- no acute issues overnight  1/12- doing well, nausea today  1/13 -Dr. Lebron Cutler IV, DO taking over for Dr. Guerra for weekend.  Patient's pain appears to be well controlled this morning no complaints of nausea.  Continue wound care and PT/OT  1/14-patient appears well labs.  Continue wound care and PT/OT  1/15-patient still appears well.  No acute concerns or complaints.  No events overnight.  Continue wound care working with a PTOT.  Expiration is services estimated by February 6 1/16- no complaints, doing well  1/17-some nausea today  1/18-better today.  Still requiring twice weekly dressing changes.  Appetite stimulant  1/19- PWB with PT today.  Still with weeping wounds.  Will need wound care twice weekly-as these improve will be able to transfer to other facility  1/20-no complaints  1/21-no acute events overnight, some anxiety today  1/22-episode of SVT yesterday. Spontaneous resolution  1/23-no complaints  1/24- no complaints  1/25-doing well  1/26-wound care, pt  1/27- no complaints  1/28- patient seen examined today resting comfortably in bed, in no acute distress and no acute events overnight.  Patient states that she is doing well and denies pain.  States that she is been working well with PT/OT but not this weekend.  Patient otherwise doing well without complaints.  1/29-patient seen examined today resting comfortably in bed, in no acute distress with no acute events overnight.  Patient denies pain or other complaints at this time.  She continues with wound care PT/OT.  1/30-doing well this am  1/31-no complaints  2/1-no changes      Interval History: Not eating as well today. Encouraged to order from the grill.. Overall patient doing ok.  Wounds markedly better. To [facility] tomorrow or soon thereafter. Burk removed today.     Review of  Systems  Objective:     Vital Signs (Most Recent):  Temp: 98.2 °F (36.8 °C) (02/19/23 1600)  Pulse: (!) 59 (02/19/23 1600)  Resp: 18 (02/19/23 1805)  BP: (!) 102/41 (02/19/23 1600)  SpO2: 96 % (02/19/23 1600)   Vital Signs (24h Range):  Temp:  [97.9 °F (36.6 °C)-99 °F (37.2 °C)] 98.2 °F (36.8 °C)  Pulse:  [59-64] 59  Resp:  [18] 18  SpO2:  [96 %-98 %] 96 %  BP: ()/(41-65) 102/41     Weight: 58.7 kg (129 lb 6.6 oz)  Body mass index is 20.89 kg/m².    Intake/Output Summary (Last 24 hours) at 2/19/2023 1916  Last data filed at 2/19/2023 1407  Gross per 24 hour   Intake --   Output 600 ml   Net -600 ml      Physical Exam  Vitals reviewed.   Constitutional:       General: She is not in acute distress.     Appearance: She is not toxic-appearing.   Cardiovascular:      Rate and Rhythm: Normal rate and regular rhythm.      Heart sounds: Normal heart sounds.   Pulmonary:      Effort: Pulmonary effort is normal. No respiratory distress.      Breath sounds: Normal breath sounds.   Abdominal:      General: Abdomen is flat. There is no distension.      Palpations: Abdomen is soft.   Skin:     General: Skin is warm and dry.   Neurological:      Mental Status: She is alert.       Significant Labs: All pertinent labs within the past 24 hours have been reviewed.    Significant Imaging: I have reviewed all pertinent imaging results/findings within the past 24 hours.      Assessment/Plan:      * Multiple skin tears  Wound care  Optimize nutrition       Open wound of left lower leg  Wound care continues.  Wounds are improving nicely. Twice weekly dressing changes. Wounds can now be handled at [facility].       Failure to thrive in adult  Cont appetite stimulant  Cont nutiritional supplement  Cont to encourage oral intake  PT.   Added remeron. First dose received 2/17. Hopeful for appetite and affective improvement.     Disruption of external surgical wound        Wounds and injuries  Wound care consulted and  following    Delmy-prosthetic fracture around prosthetic hip  S/P fixation with Dr Singer      Lumbar radiculopathy  Pain management  PT      Hypertension  Adjust medications as needed    2/2-d/c dilt, hydral since bp running low, keep metoprolol and lisinopril on    2/3-will increae lisinopril for afterload reduction.     Arthritis  Pain management  Per family has been taking oral steorids for a long time, cont that        VTE Risk Mitigation (From admission, onward)         Ordered     enoxaparin injection 40 mg  Daily         02/02/23 1051                Discharge Planning   YANIV:      Code Status: Full Code   Is the patient medically ready for discharge?:     Reason for patient still in hospital (select all that apply): Treatment  Discharge Plan A: Skilled Nursing Facility                  Tal Pereira Jr, MD  Department of Hospital Medicine   [facility]

## 2023-02-20 NOTE — ASSESSMENT & PLAN NOTE
Wound care continues.  Wounds are improving nicely. Twice weekly dressing changes. Wounds can now be handled at [facility].      No

## 2023-02-20 NOTE — ASSESSMENT & PLAN NOTE
Healing well. Continue wound care per included discharge instructions. Encourage PO intake for optimal healing, consider supplements as needed.

## 2023-02-20 NOTE — DISCHARGE SUMMARY
[facility] Hospital Medicine  Discharge Summary      Patient Name: Zandra Veloz  MRN: [id number]  SUSAN: [id number]  Patient Class: IP- Inpatient  Admission Date: 12/29/2022  Hospital Length of Stay: 53 days  Discharge Date and Time:  02/20/2023 2:39 PM  Attending Physician: Leslie Cutler DO   Discharging Provider: Leslie Cutler DO  Primary Care Provider: Brandon Thornton MD    Primary Care Team: Networked reference to record PCT     HPI:   HPI:   88-year-old lady seen emergency room department.  Patient presents after having a fall when she was trying to go to the restroom at her home.  Patient sustained a left femur fracture.  Patient complains of moderate to severe pain in the left hip area.  Patient also complains of chest tightness, she rates it a 5/10 in the chest tightness has been intubate.  Patient also was seen in emergency room department earlier this week per her daughter.  Patient is found to have dehydration and a urinary tract infection.  Current she denies any shortness of breath.  She does not give a history of coronary artery disease.        Procedure(s) (LRB):  REVISION, TOTAL ARTHROPLASTY, HIP, VICTORIANO PROSTHETIC FX (Left)       Hospital Course:   12/19 - records reviewed. Fall without LOC and has left hip fx. No other complaints currently. Seen by cardiology with some CP felt musculoskeletal.  Echo mild AS and mod MR with nl EF. Age increase cardiac risk for surgery but discussed with daughter surgery is urgent and see no benefit in delay medically. Feel low to moderate risk for surgery. Question about UTI. No symptoms. Had UTI in 10.22 not surprising. Lab to do culture on urine in lab. Cover with ATN for prior culture with ATB started. Discussed with Dr Singer and cardiology.  12/20 Tachycardia and elevated BP. Cardiology adjusted meds. Plan hold off surgery until 12/22 to adjust meds and treat UTI. Family in room. Pt not sleeping well.   12/21  Didn't sleep well prior night. Apparently been on xanax for a time. Also recently started on Neurontin. Family with question. No recent BM. Some increase stool on KUB but not severe. Surgery for am. BP some up but better. HR good.   12/22 Seen prior to surgery and apparently add better night. Sore mouth / throat better with mycostatin. For surgery. Family in room.  12/23 patient with pain but otherwise seemed to be doing relatively well.  Daughter in room.  Apparently been taking prednisone for some time and this was restarted per family request.  Look into swing bed placement  12/24 patient had better night rested and everyone's in better spirits today.  Tolerating some diet.   Therapy worked with patient.  Look into swing bed placement next week  12/25 remained in good spirits.  Less pain.  Had good bowel movement and ordered Dulcolax not given.  Because of dressing to leg, Burk was not removed to keep it from getting soiled.  Wound care to check 12/27.  Discuss this with patient and daughter.  On antibiotics for UTI  12/26-will dc to swingbed once accepted. Needs continued wound care.  12/27-DC when bed available  12/28- issues with wound care yesterday.  Dr. Singer had to come remove dressings himself due to adherence to subcutaneous tissue.  Family refusing transfer to [facility] due to lack of specially trained wound care team availability.  Planning to transfer to specialty but resistant to that as well and requesting to speak to administration.    12/29- agreeable to transfer to specialty.  This will be the best place for her to receive wound care.  DC after family tours     12/29-needs continued wound care and therapy[facility]    12/30-doing well today, still with pain         * No surgery found *      Hospital Course:   In summary, patient admitted for a fall resulting in scott-prosthetic hip fracture now status-post surgical repair. Experienced soft tissue degloving-type injuries from  protective/fixation devices used during surgical procedure, has been receiving IV antibiotics and extensive wound care. Hospital course generally uncomplicated aside from injuries noted above. Has been experiencing some decreased appetite, improved somewhat with Megace. Main barrier to transfer has been frequency of wound care, patient has now reached a point where this can be adequately managed by a [facility] facility. Antibiotics have been completed. Pain is reasonably well controlled, patient working well with PT/OT.     At this time, Zandra Veloz has reached maximum benefit of inpatient treatment. She is to discharge to [facility] for continued wound care and PT/OT. At time of discharge patient was hemodynamically stable with no complaints. Wound care instructions provided, discharge/transfer medications reconciled. Follow up with PCP, wound care, and orthopedics following final discharge from [facility] facility. Patient and family in agreement with plan, all questions addressed.     12/19 - records reviewed. Fall without LOC and has left hip fx. No other complaints currently. Seen by cardiology with some CP felt musculoskeletal.  Echo mild AS and mod MR with nl EF. Age increase cardiac risk for surgery but discussed with daughter surgery is urgent and see no benefit in delay medically. Feel low to moderate risk for surgery. Question about UTI. No symptoms. Had UTI in 10.22 not surprising. Lab to do culture on urine in lab. Cover with ATN for prior culture with ATB started. Discussed with Dr Singer and cardiology.  12/20 Tachycardia and elevated BP. Cardiology adjusted meds. Plan hold off surgery until 12/22 to adjust meds and treat UTI. Family in room. Pt not sleeping well.   12/21 Didn't sleep well prior night. Apparently been on xanax for a time. Also recently started on Neurontin. Family with question. No recent BM. Some increase stool on KUB but not severe. Surgery for am. BP some up but better. HR good.   12/22  Seen prior to surgery and apparently add better night. Sore mouth / throat better with mycostatin. For surgery. Family in room.  12/23 patient with pain but otherwise seemed to be doing relatively well.  Daughter in room.  Apparently been taking prednisone for some time and this was restarted per family request.  Look into swing bed placement  12/24 patient had better night rested and everyone's in better spirits today.  Tolerating some diet.   Therapy worked with patient.  Look into swing bed placement next week  12/25 remained in good spirits.  Less pain.  Had good bowel movement and ordered Dulcolax not given.  Because of dressing to leg, Burk was not removed to keep it from getting soiled.  Wound care to check 12/27.  Discuss this with patient and daughter.  On antibiotics for UTI  12/26-will dc to swingbed once accepted. Needs continued wound care.  12/27-DC when bed available  12/28- issues with wound care yesterday.  Dr. Singer had to come remove dressings himself due to adherence to subcutaneous tissue.  Family refusing transfer to [facility] due to lack of specially trained wound care team availability.  Planning to transfer to specialty but resistant to that as well and requesting to speak to administration.    12/29- agreeable to transfer to specialty.  This will be the best place for her to receive wound care.  DC after family tours     12/29-needs continued wound care and therapy[facility]     12/30-doing well today, still with pain  12/31-having some pain this am  1/1- no complaints  1/2- doing well  1/3- continue wound care, last day cefepime tomorrow  1/4- some pain this AM.  Delay in mediations dues to staffing issues. Dsicussed with RN  1/5- no issues overnight  1/6- Still with pain, hip incision looks good  1/7-diclofenac for shoulder pain  1/8-decrease laxatives, change benzos and carlo to PRN per patient request  1/9 some confusion overnight  1/10- no more confusion, doing  well  1/11- no acute issues overnight  1/12- doing well, nausea today  1/13 -Dr. Lebron Cutler IV, DO taking over for Dr. Guerra for weekend.  Patient's pain appears to be well controlled this morning no complaints of nausea.  Continue wound care and PT/OT  1/14-patient appears well labs.  Continue wound care and PT/OT  1/15-patient still appears well.  No acute concerns or complaints.  No events overnight.  Continue wound care working with a PTOT.  Expiration is services estimated by February 6 1/16- no complaints, doing well  1/17-some nausea today  1/18-better today.  Still requiring twice weekly dressing changes.  Appetite stimulant  1/19- PWB with PT today.  Still with weeping wounds.  Will need wound care twice weekly-as these improve will be able to transfer to other facility  1/20-no complaints  1/21-no acute events overnight, some anxiety today  1/22-episode of SVT yesterday. Spontaneous resolution  1/23-no complaints  1/24- no complaints  1/25-doing well  1/26-wound care, pt  1/27- no complaints  1/28- patient seen examined today resting comfortably in bed, in no acute distress and no acute events overnight.  Patient states that she is doing well and denies pain.  States that she is been working well with PT/OT but not this weekend.  Patient otherwise doing well without complaints.  1/29-patient seen examined today resting comfortably in bed, in no acute distress with no acute events overnight.  Patient denies pain or other complaints at this time.  She continues with wound care PT/OT.  1/30-doing well this am  1/31-no complaints  2/1-no changes       Goals of Care Treatment Preferences:  Code Status: Full Code      Consults:   Consults (From admission, onward)        Status Ordering Provider     Inpatient consult to Urology  Once        Provider:  (Not yet assigned)    Completed JOSE, REHMAT U     Inpatient consult to Registered Dietitian/Nutritionist  Once        Provider:  (Not yet assigned)     Completed JOSE, REHMAT U          Neuro  Lumbar radiculopathy  Continue PT/OT, pain management PRN.      Cardiac/Vascular  Hypertension  Controlled. Continue current medications (carvedilol, lisinopril).     Renal/  Urinary retention  Continue Flomax.      UTI (urinary tract infection)-resolved as of 2/9/2023  Urinary retention 2/7, almost 800 ml  UA c/f uti   Start on rocephen, shape abx therapy accordingly.       Orthopedic  * Multiple skin tears  Healing well. Continue wound care per included discharge instructions. Encourage PO intake for optimal healing, consider supplements as needed.       Disruption of external surgical wound        Wounds and injuries  Wound care consulted and following    Open wound of left lower leg  Wound care continues.  Wounds are improving nicely. Twice weekly dressing changes. Wounds can now be handled at [facility].       Arthritis  Controlled. Continue home steroids, pain medications PRN.      Delmy-prosthetic fracture around prosthetic hip-resolved as of 2/20/2023  S/P fixation with Dr Singer      Primary osteoarthritis of both shoulders-resolved as of 12/30/2022        Other  Failure to thrive in adult  Cont appetite stimulant  Cont nutiritional supplement  Cont to encourage oral intake  PT.   Added remeron. First dose received 2/17. Hopeful for appetite and affective improvement.       Final Active Diagnoses:    Diagnosis Date Noted POA    PRINCIPAL PROBLEM:  Multiple skin tears [T14.8XXA] 12/28/2022 Yes    Failure to thrive in adult [R62.7] 02/09/2023 Yes    Urinary retention [R33.9] 02/09/2023 No    Disruption of external surgical wound [T81.31XA] 01/05/2023 No    Wounds and injuries [T14.90XA] 12/30/2022 No    Open wound of left lower leg [S81.802A] 12/28/2022 Yes    Lumbar radiculopathy [M54.16] 03/17/2022 Yes    Hypertension [I10]  Yes    Arthritis [M19.90] 04/13/2021 Yes      Problems Resolved During this Admission:    Diagnosis Date Noted Date Resolved POA     Scott-prosthetic fracture around prosthetic hip [M97.8XXA, Z96.649] 12/17/2022 02/20/2023 Not Applicable    Primary osteoarthritis of both shoulders [M19.011, M19.012] 08/11/2022 12/30/2022 Yes    UTI (urinary tract infection) [N39.0] 09/21/2021 02/09/2023 Yes       Discharged Condition: good    Disposition: Another Health Care Inst*    Follow Up:   Follow-up Information     [facility] Follow up in 2 week(s).    Specialty: Wound Care  Why: Appt: 03/06/2023 @ 0900  Contact information:  [address] [address] [phone]           Brandon Thornton MD. Schedule an appointment as soon as possible for a visit in 1 week(s).    Specialties: Family Medicine, Emergency Medicine  Why: One week after d/c from [facility]  Contact information:  [address] [facility] [address]  [phone]                       Patient Instructions:      Diet Adult Regular     Wound care discharge order   Order Comments: Clean with vashe  Pat dry  Apply skin protectant scott-wound  Apply polymem silver to wound bed  Cover and secure with aquacel foam border  Change M, Th     Order Specific Question Answer Comments   Wound Location,  Apply to: Open wounds    Wound Care Option: Other:  Specify      Notify your health care provider if you experience any of the following:  temperature >100.4     Notify your health care provider if you experience any of the following:  persistent nausea and vomiting or diarrhea     Notify your health care provider if you experience any of the following:  severe uncontrolled pain     Notify your health care provider if you experience any of the following:  redness, tenderness, or signs of infection (pain, swelling, redness, odor or green/yellow discharge around incision site)     Notify your health care provider if you experience any of the following:  difficulty breathing or increased cough     Notify your health care provider if you  experience any of the following:  severe persistent headache     Notify your health care provider if you experience any of the following:  worsening rash     Notify your health care provider if you experience any of the following:  persistent dizziness, light-headedness, or visual disturbances     Notify your health care provider if you experience any of the following:  increased confusion or weakness     Activity as tolerated       Significant Diagnostic Studies: Labs: All labs within the past 24 hours have been reviewed    Pending Diagnostic Studies:     None         Medications:  Reconciled Home Medications:      Medication List      START taking these medications    acetaminophen 325 MG tablet  Commonly known as: TYLENOL  Take 2 tablets (650 mg total) by mouth every 6 (six) hours as needed.     ascorbic acid (vitamin C) 500 MG tablet  Commonly known as: VITAMIN C  Take 1 tablet (500 mg total) by mouth 2 (two) times daily.     carvediloL 25 MG tablet  Commonly known as: COREG  Take 1 tablet (25 mg total) by mouth 2 (two) times daily.     diclofenac sodium 1 % Gel  Commonly known as: VOLTAREN  Apply 4 g topically 3 (three) times daily as needed (pain).     fluticasone propionate 50 mcg/actuation nasal spray  Commonly known as: FLONASE  2 sprays (100 mcg total) by Each Nostril route once daily.  Start taking on: February 21, 2023     hydrALAZINE 50 MG tablet  Commonly known as: APRESOLINE  Take 1 tablet (50 mg total) by mouth every 8 (eight) hours.     lactulose 20 gram/30 mL Soln  Commonly known as: CHRONULAC  Take 30 mLs (20 g total) by mouth every 6 (six) hours as needed.     megestroL 40 MG Tab  Commonly known as: MEGACE  Take 1 tablet (40 mg total) by mouth once daily.  Start taking on: February 21, 2023     mirtazapine 15 MG tablet  Commonly known as: REMERON  Take 1 tablet (15 mg total) by mouth every evening.     ondansetron 4 MG Tbdl  Commonly known as: ZOFRAN-ODT  Take 1 tablet (4 mg total) by mouth every  6 (six) hours as needed.     oxyCODONE 10 mg Tab immediate release tablet  Commonly known as: ROXICODONE  Take 1 tablet (10 mg total) by mouth every 6 (six) hours as needed for Pain.     tamsulosin 0.4 mg Cap  Commonly known as: FLOMAX  Take 1 capsule (0.4 mg total) by mouth once daily.  Start taking on: February 21, 2023     traZODone 50 MG tablet  Commonly known as: DESYREL  Take 1 tablet (50 mg total) by mouth nightly as needed for Insomnia.     urea 20 % Crea  Apply 1 application topically daily as needed.     white petrolatum 41 % Oint  Apply topically as needed (apply with dressing changes).     zinc sulfate 50 mg zinc (220 mg) capsule  Commonly known as: ZINCATE  Take 1 capsule (220 mg total) by mouth once daily.  Start taking on: February 21, 2023        CHANGE how you take these medications    ALPRAZolam 1 MG tablet  Commonly known as: XANAX  Take 1 tablet (1 mg total) by mouth every evening.  What changed: when to take this     lisinopriL 40 MG tablet  Commonly known as: PRINIVIL,ZESTRIL  Take 1 tablet (40 mg total) by mouth once daily.  Start taking on: February 21, 2023  What changed:   · medication strength  · how much to take     polyethylene glycol 17 gram Pwpk  Commonly known as: GLYCOLAX  Take 17 g by mouth once daily.  Start taking on: February 21, 2023  What changed:   · when to take this  · reasons to take this        CONTINUE taking these medications    bisacodyL 5 mg EC tablet  Commonly known as: DULCOLAX  Take 10 mg by mouth daily as needed for Constipation.     docusate sodium 100 MG capsule  Commonly known as: COLACE  Take 100 mg by mouth daily as needed for Constipation.     famotidine 20 MG tablet  Commonly known as: PEPCID  Take 1 tablet (20 mg total) by mouth once daily.     gabapentin 100 MG capsule  Commonly known as: NEURONTIN  Take 1 capsule (100 mg total) by mouth 3 (three) times daily.     HYDROcodone-acetaminophen 7.5-325 mg per tablet  Commonly known as: NORCO  Take 1 tablet by  mouth every 8 (eight) hours as needed for Pain.     levothyroxine 125 MCG tablet  Commonly known as: SYNTHROID  Take 1 tablet (125 mcg total) by mouth before breakfast.  Start taking on: February 21, 2023     pantoprazole 40 MG tablet  Commonly known as: PROTONIX  Take 1 tablet (40 mg total) by mouth once daily.     * potassium bicarbonate disintegrating tablet  Commonly known as: K-LYTE  Take 2 tablets (50 mEq total) by mouth as needed (if potassium level is 3.5-3.8 mmol/L give 50 mEq x 1 dose -given as 25 meq x2tabs or 10 meq x5 tabs).     * potassium bicarbonate disintegrating tablet  Commonly known as: K-LYTE  Take 1.5 tablets (37.5 mEq total) by mouth as needed (if potassium level is 3.1-3.4 mmol/L give 35 mEq every 2 hours x 2 doses - given as 25 meq tab + 10 meq tab every 2h X 2 doses).     * potassium bicarbonate disintegrating tablet  Commonly known as: K-LYTE  Take 3 tablets (60 mEq total) by mouth as needed (if potassium level is 3 mmol/L or less give 60 mEq every 2 hours x 2 doses - given as 25 meq x 2 tabs + 10meq tab or 10 meq x 6 tabs every 2h x 2).     predniSONE 5 MG tablet  Commonly known as: DELTASONE  Take 1 tablet (5 mg total) by mouth once daily.         * This list has 3 medication(s) that are the same as other medications prescribed for you. Read the directions carefully, and ask your doctor or other care provider to review them with you.            STOP taking these medications    diltiaZEM 120 MG Cp24  Commonly known as: CARDIZEM CD            Indwelling Lines/Drains at time of discharge:   Lines/Drains/Airways     None                 Time spent on the discharge of patient: 25 minutes         Leslie Cutler DO  Department of Hospital Medicine  [facility]

## 2023-02-20 NOTE — NURSING
Contacted Dr. Ornelas to inform of pt's low BP. BP measured x3. Results as follows.  BP-73/36 P-62, BP- 67/46 P-62, BP-65/37 P-64. Medications that could potentially cause pt's BP to continue to drop were help. Please MAR. MD aware. NAD noted.

## 2023-02-21 PROCEDURE — S0179 MEGESTROL 20 MG: HCPCS | Performed by: FAMILY MEDICINE

## 2023-02-21 PROCEDURE — 27000981 HC MATTRESS, ACCUCAIR DAILY RENTAL

## 2023-02-21 PROCEDURE — 25000003 PHARM REV CODE 250: Performed by: FAMILY MEDICINE

## 2023-02-21 PROCEDURE — 27000941

## 2023-02-21 PROCEDURE — 11000004 HC SNF PRIVATE

## 2023-02-21 PROCEDURE — 63600175 PHARM REV CODE 636 W HCPCS: Performed by: FAMILY MEDICINE

## 2023-02-21 PROCEDURE — 97161 PT EVAL LOW COMPLEX 20 MIN: CPT

## 2023-02-21 PROCEDURE — 63600175 PHARM REV CODE 636 W HCPCS: Performed by: PHYSICIAN ASSISTANT

## 2023-02-21 PROCEDURE — 25000003 PHARM REV CODE 250: Performed by: PHYSICIAN ASSISTANT

## 2023-02-21 PROCEDURE — 99305 1ST NF CARE MODERATE MDM 35: CPT | Mod: AI,,, | Performed by: FAMILY MEDICINE

## 2023-02-21 PROCEDURE — 94761 N-INVAS EAR/PLS OXIMETRY MLT: CPT

## 2023-02-21 PROCEDURE — 99305 PR NURSING FACILITY CARE, INIT, MOD SEVERITY: ICD-10-PCS | Mod: AI,,, | Performed by: FAMILY MEDICINE

## 2023-02-21 RX ORDER — MEGESTROL ACETATE 40 MG/ML
400 SUSPENSION ORAL DAILY
Status: DISCONTINUED | OUTPATIENT
Start: 2023-02-21 | End: 2023-03-02 | Stop reason: HOSPADM

## 2023-02-21 RX ORDER — TALC
6 POWDER (GRAM) TOPICAL NIGHTLY PRN
Status: DISCONTINUED | OUTPATIENT
Start: 2023-02-21 | End: 2023-03-02 | Stop reason: HOSPADM

## 2023-02-21 RX ORDER — MEGESTROL ACETATE 40 MG/1
400 TABLET ORAL DAILY
Status: DISCONTINUED | OUTPATIENT
Start: 2023-02-21 | End: 2023-02-21

## 2023-02-21 RX ORDER — AMOXICILLIN 250 MG
1 CAPSULE ORAL 2 TIMES DAILY
Status: DISCONTINUED | OUTPATIENT
Start: 2023-02-21 | End: 2023-03-02 | Stop reason: HOSPADM

## 2023-02-21 RX ORDER — MEGESTROL ACETATE 40 MG/1
40 TABLET ORAL DAILY
Status: DISCONTINUED | OUTPATIENT
Start: 2023-02-21 | End: 2023-02-21

## 2023-02-21 RX ORDER — ENOXAPARIN SODIUM 100 MG/ML
40 INJECTION SUBCUTANEOUS EVERY 24 HOURS
Status: DISCONTINUED | OUTPATIENT
Start: 2023-02-21 | End: 2023-02-21

## 2023-02-21 RX ORDER — ENOXAPARIN SODIUM 100 MG/ML
40 INJECTION SUBCUTANEOUS EVERY 24 HOURS
Status: DISCONTINUED | OUTPATIENT
Start: 2023-02-21 | End: 2023-03-02 | Stop reason: HOSPADM

## 2023-02-21 RX ORDER — CALCIUM CARBONATE 200(500)MG
500 TABLET,CHEWABLE ORAL 2 TIMES DAILY PRN
Status: DISCONTINUED | OUTPATIENT
Start: 2023-02-21 | End: 2023-03-02 | Stop reason: HOSPADM

## 2023-02-21 RX ADMIN — MIRTAZAPINE 15 MG: 15 TABLET, FILM COATED ORAL at 08:02

## 2023-02-21 RX ADMIN — OXYCODONE HYDROCHLORIDE AND ACETAMINOPHEN 500 MG: 500 TABLET ORAL at 09:02

## 2023-02-21 RX ADMIN — PREDNISONE 5 MG: 5 TABLET ORAL at 09:02

## 2023-02-21 RX ADMIN — CARVEDILOL 25 MG: 12.5 TABLET, FILM COATED ORAL at 10:02

## 2023-02-21 RX ADMIN — ALPRAZOLAM 1 MG: 1 TABLET ORAL at 08:02

## 2023-02-21 RX ADMIN — OXYCODONE HYDROCHLORIDE AND ACETAMINOPHEN 500 MG: 500 TABLET ORAL at 08:02

## 2023-02-21 RX ADMIN — OXYCODONE HYDROCHLORIDE 10 MG: 5 TABLET ORAL at 06:02

## 2023-02-21 RX ADMIN — MEGESTROL ACETATE 400 MG: 40 SUSPENSION ORAL at 04:02

## 2023-02-21 RX ADMIN — PANTOPRAZOLE SODIUM 40 MG: 40 TABLET, DELAYED RELEASE ORAL at 09:02

## 2023-02-21 RX ADMIN — SENNOSIDES AND DOCUSATE SODIUM 1 TABLET: 8.6; 5 TABLET ORAL at 10:02

## 2023-02-21 RX ADMIN — MULTIPLE VITAMINS W/ MINERALS TAB 1 TABLET: TAB at 09:02

## 2023-02-21 RX ADMIN — SENNOSIDES AND DOCUSATE SODIUM 1 TABLET: 8.6; 5 TABLET ORAL at 08:02

## 2023-02-21 RX ADMIN — GABAPENTIN 100 MG: 100 CAPSULE ORAL at 09:02

## 2023-02-21 RX ADMIN — GABAPENTIN 100 MG: 100 CAPSULE ORAL at 02:02

## 2023-02-21 RX ADMIN — FAMOTIDINE 20 MG: 20 TABLET, FILM COATED ORAL at 10:02

## 2023-02-21 RX ADMIN — CARVEDILOL 25 MG: 12.5 TABLET, FILM COATED ORAL at 08:02

## 2023-02-21 RX ADMIN — ENOXAPARIN SODIUM 40 MG: 40 INJECTION SUBCUTANEOUS at 10:02

## 2023-02-21 RX ADMIN — GABAPENTIN 100 MG: 100 CAPSULE ORAL at 08:02

## 2023-02-21 RX ADMIN — HYDRALAZINE HYDROCHLORIDE 50 MG: 50 TABLET, FILM COATED ORAL at 05:02

## 2023-02-21 NOTE — NURSING
Nurses Note -- 4 Eyes      2/20/2023   8:05 PM      Skin assessed during: Admit      [x] No Pressure Injuries Present    []Prevention Measures Documented      [x] Yes- Altered Skin Integrity Present or Discovered   [] LDA Added if Not in Epic (Describe Wound)   [x] New Altered Skin Integrity was Present on Admit and Documented in LDA   [] Wound Image Taken    Wound Care Consulted? No    Attending Nurse:  GERHARD JACOBO RN     Second RN/Staff Member:  Shirley Gutierrez LPN    No photos taken at this time due to patient refusal to have dressings removed. Bordered foam dressing noted to right inner thigh x1, left inner thigh x2, left posterior thigh x 1, and right great toe. Kerlix dressing/gauze clean, dry, and intact to LLE.

## 2023-02-21 NOTE — PLAN OF CARE
Problem: Physical Therapy  Goal: Physical Therapy Goal  Description: Goals to be met by: 2 weeks     Patient will increase functional independence with mobility by performin. Supine to sit with MInimal Assistance  2. Sit to supine with MInimal Assistance  3. Rolling to Left and Right with Stand-by Assistance.  4. Sit to stand transfer with Minimal Assistance  5. Bed to chair transfer with Minimal Assistance using Rolling Walker  6. Gait  x 75 feet with Minimal Assistance using Rolling Walker.     Goals to be met by: 4 weeks     Patient will increase functional independence with mobility by performin. Supine to sit with Stand-by Assistance  2. Sit to supine with Stand-by Assistance  3. Rolling to Left and Right with Modified [place].  4. Sit to stand transfer with Stand-by Assistance  5. Bed to chair transfer with Stand-by Assistance using Rolling Walker  6. Gait  x 150 feet with Minimal Assistance using Rolling Walker.     Outcome: Ongoing, Progressing

## 2023-02-21 NOTE — HPI
Patient comes in for muscle strengthening.  Patient is 88 years old who has been in specialty for some time in [facility] she is had a prosthetic fixed of the left hip.  And revision of the total hip replacement.  Done on 12/22/2022.  She has history of heart murmur, palpitations, lumbar radiculopathy, hypertension, arthritis, multiple falls at home and fragile skin with multiple tears.  With PT and OT she is able to take 3 shallow steps around a chair and sit and stand moderate assist.

## 2023-02-21 NOTE — PLAN OF CARE
Problem: Adult Inpatient Plan of Care  Goal: Plan of Care Review  Outcome: Ongoing, Progressing  Goal: Patient-Specific Goal (Individualized)  Outcome: Ongoing, Progressing  Goal: Absence of Hospital-Acquired Illness or Injury  Outcome: Ongoing, Progressing  Goal: Optimal Comfort and Wellbeing  Outcome: Ongoing, Progressing  Goal: Readiness for Transition of Care  Outcome: Ongoing, Progressing     Problem: Impaired Wound Healing  Goal: Optimal Wound Healing  Outcome: Ongoing, Progressing     Problem: Adult Inpatient Plan of Care  Goal: Plan of Care Review  Outcome: Ongoing, Progressing  Goal: Patient-Specific Goal (Individualized)  Outcome: Ongoing, Progressing  Goal: Absence of Hospital-Acquired Illness or Injury  Outcome: Ongoing, Progressing  Goal: Optimal Comfort and Wellbeing  Outcome: Ongoing, Progressing  Goal: Readiness for Transition of Care  Outcome: Ongoing, Progressing     Problem: Impaired Wound Healing  Goal: Optimal Wound Healing  Outcome: Ongoing, Progressing

## 2023-02-21 NOTE — PLAN OF CARE
Problem: Adult Inpatient Plan of Care  Goal: Absence of Hospital-Acquired Illness or Injury  Intervention: Prevent Skin Injury  Flowsheets (Taken 2/20/2023 2005)  Skin Protection: incontinence pads utilized  Intervention: Prevent Infection  Flowsheets (Taken 2/20/2023 2005)  Infection Prevention:   equipment surfaces disinfected   hand hygiene promoted   personal protective equipment utilized   rest/sleep promoted   single patient room provided  Goal: Optimal Comfort and Wellbeing  Intervention: Monitor Pain and Promote Comfort  Flowsheets (Taken 2/20/2023 2005)  Pain Management Interventions:   care clustered   pain management plan reviewed with patient/caregiver   pillow support provided   position adjusted   quiet environment facilitated   relaxation techniques promoted  Goal: Readiness for Transition of Care  Intervention: Mutually Develop Transition Plan  Flowsheets (Taken 2/20/2023 2005)  Communicated YANIV with patient/caregiver: Date not available/Unable to determine  Do you expect to return to your current living situation?: Yes  Do you have help at home or someone to help you manage your care at home?: Yes     Problem: Impaired Wound Healing  Goal: Optimal Wound Healing  Intervention: Promote Wound Healing  Flowsheets (Taken 2/20/2023 2005)  Oral Nutrition Promotion:   rest periods promoted   safe use of adaptive equipment encouraged  Sleep/Rest Enhancement:   awakenings minimized   regular sleep/rest pattern promoted   relaxation techniques promoted   room darkened  Pain Management Interventions:   care clustered   pain management plan reviewed with patient/caregiver   pillow support provided   position adjusted   quiet environment facilitated   relaxation techniques promoted     Problem: Fall Injury Risk  Goal: Absence of Fall and Fall-Related Injury  Intervention: Identify and Manage Contributors  Flowsheets (Taken 2/20/2023 2005)  Self-Care Promotion: independence encouraged     Problem: Skin Injury  Risk Increased  Goal: Skin Health and Integrity  Intervention: Optimize Skin Protection  Flowsheets (Taken 2/20/2023 2005)  Skin Protection: incontinence pads utilized  Intervention: Promote and Optimize Oral Intake  Flowsheets (Taken 2/20/2023 2005)  Oral Nutrition Promotion:   rest periods promoted   safe use of adaptive equipment encouraged

## 2023-02-21 NOTE — SUBJECTIVE & OBJECTIVE
Past Medical History:   Diagnosis Date    Hypertension     Osteoporosis     Thyroid disorder        Past Surgical History:   Procedure Laterality Date    BLADDER SURGERY      HEMIARTHROPLASTY OF HIP Left 9/21/2021    Procedure: HEMIARTHROPLASTY, HIP;  Surgeon: Dequan Singer MD;  Location: [facility] OR;  Service: Orthopedics;  Laterality: Left;    PARTIAL HIP ARTHROPLASTY Right     Dr Singer    REVISION TOTAL HIP ARTHROPLASTY Left 12/22/2022    Procedure: REVISION, TOTAL ARTHROPLASTY, HIP, VICTORIANO PROSTHETIC FX;  Surgeon: Dequan Singer MD;  Location: [facility] OR;  Service: Orthopedics;  Laterality: Left;       Review of patient's allergies indicates:   Allergen Reactions    Bactrim [sulfamethoxazole-trimethoprim]     Codeine     Levaquin [levofloxacin]        Current Facility-Administered Medications on File Prior to Encounter   Medication    [DISCONTINUED] acetaminophen tablet 650 mg    [DISCONTINUED] ALPRAZolam tablet 1 mg    [DISCONTINUED] ascorbic acid (vitamin C) tablet 500 mg    [DISCONTINUED] bisacodyL EC tablet 10 mg    [DISCONTINUED] carvediloL tablet 25 mg    [DISCONTINUED] dextromethorphan-guaiFENesin  mg/5 ml liquid 10 mL    [DISCONTINUED] dextrose 50% injection 12.5 g    [DISCONTINUED] dextrose 50% injection 25 g    [DISCONTINUED] diclofenac sodium 1 % gel 4 g    [DISCONTINUED] docusate sodium capsule 100 mg    [DISCONTINUED] enoxaparin injection 40 mg    [DISCONTINUED] fluticasone propionate 50 mcg/actuation nasal spray 100 mcg    [DISCONTINUED] gabapentin capsule 100 mg    [DISCONTINUED] glucagon (human recombinant) injection 1 mg    [DISCONTINUED] glucose chewable tablet 16 g    [DISCONTINUED] glucose chewable tablet 24 g    [DISCONTINUED] hydrALAZINE injection 5 mg    [DISCONTINUED] hydrALAZINE tablet 50 mg    [DISCONTINUED] lactulose 20 gram/30 mL solution Soln 20 g    [DISCONTINUED] levothyroxine tablet 125 mcg    [DISCONTINUED] lisinopriL tablet 40 mg     [DISCONTINUED] magnesium oxide tablet 800 mg    [DISCONTINUED] megestroL tablet 40 mg    [DISCONTINUED] melatonin tablet 6 mg    [DISCONTINUED] mirtazapine tablet 15 mg    [DISCONTINUED] naloxone 0.4 mg/mL injection 0.02 mg    [DISCONTINUED] ondansetron disintegrating tablet 4 mg    [DISCONTINUED] ondansetron injection 8 mg    [DISCONTINUED] oxyCODONE immediate release tablet 10 mg    [DISCONTINUED] pantoprazole EC tablet 40 mg    [DISCONTINUED] polyethylene glycol packet 17 g    [DISCONTINUED] potassium bicarbonate disintegrating tablet 35 mEq    [DISCONTINUED] potassium bicarbonate disintegrating tablet 50 mEq    [DISCONTINUED] potassium bicarbonate disintegrating tablet 60 mEq    [DISCONTINUED] potassium, sodium phosphates 280-160-250 mg packet 2 packet    [DISCONTINUED] potassium, sodium phosphates 280-160-250 mg packet 2 packet    [DISCONTINUED] potassium, sodium phosphates 280-160-250 mg packet 2 packet    [DISCONTINUED] predniSONE tablet 5 mg    [DISCONTINUED] prochlorperazine tablet 10 mg    [DISCONTINUED] simethicone chewable tablet 80 mg    [DISCONTINUED] sodium chloride 0.9% flush 10 mL    [DISCONTINUED] tamsulosin 24 hr capsule 0.4 mg    [DISCONTINUED] traZODone tablet 50 mg    [DISCONTINUED] urea 20 % Crea    [DISCONTINUED] white petrolatum 41 % ointment    [DISCONTINUED] zinc sulfate capsule 220 mg     Current Outpatient Medications on File Prior to Encounter   Medication Sig    ALPRAZolam (XANAX) 1 MG tablet Take 1 tablet (1 mg total) by mouth every evening.    ascorbic acid, vitamin C, (VITAMIN C) 500 MG tablet Take 1 tablet (500 mg total) by mouth 2 (two) times daily.    bisacodyL (DULCOLAX) 5 mg EC tablet Take 10 mg by mouth daily as needed for Constipation.    carvediloL (COREG) 25 MG tablet Take 1 tablet (25 mg total) by mouth 2 (two) times daily.    diclofenac sodium (VOLTAREN) 1 % Gel Apply 4 g topically 3 (three) times daily as needed (pain).    docusate sodium (COLACE) 100 MG capsule Take 100  mg by mouth daily as needed for Constipation.    fluticasone propionate (FLONASE) 50 mcg/actuation nasal spray 2 sprays (100 mcg total) by Each Nostril route once daily.    gabapentin (NEURONTIN) 100 MG capsule Take 1 capsule (100 mg total) by mouth 3 (three) times daily.    hydrALAZINE (APRESOLINE) 50 MG tablet Take 1 tablet (50 mg total) by mouth every 8 (eight) hours.    HYDROcodone-acetaminophen (NORCO) 7.5-325 mg per tablet Take 1 tablet by mouth every 8 (eight) hours as needed for Pain.    lactulose (CHRONULAC) 20 gram/30 mL Soln Take 30 mLs (20 g total) by mouth every 6 (six) hours as needed.    levothyroxine (SYNTHROID) 125 MCG tablet Take 1 tablet (125 mcg total) by mouth before breakfast.    lisinopriL (PRINIVIL,ZESTRIL) 40 MG tablet Take 1 tablet (40 mg total) by mouth once daily.    megestroL (MEGACE) 40 MG Tab Take 1 tablet (40 mg total) by mouth once daily.    mirtazapine (REMERON) 15 MG tablet Take 1 tablet (15 mg total) by mouth every evening.    oxyCODONE (ROXICODONE) 10 mg Tab immediate release tablet Take 1 tablet (10 mg total) by mouth every 6 (six) hours as needed for Pain.    pantoprazole (PROTONIX) 40 MG tablet Take 1 tablet (40 mg total) by mouth once daily.    polyethylene glycol (GLYCOLAX) 17 gram PwPk Take 17 g by mouth once daily.    predniSONE (DELTASONE) 5 MG tablet Take 1 tablet (5 mg total) by mouth once daily.    tamsulosin (FLOMAX) 0.4 mg Cap Take 1 capsule (0.4 mg total) by mouth once daily.    traZODone (DESYREL) 50 MG tablet Take 1 tablet (50 mg total) by mouth nightly as needed for Insomnia.    urea 20 % Crea Apply 1 application topically daily as needed.    white petrolatum 41 % Oint Apply topically as needed (apply with dressing changes).    zinc sulfate (ZINCATE) 50 mg zinc (220 mg) capsule Take 1 capsule (220 mg total) by mouth once daily.    acetaminophen (TYLENOL) 325 MG tablet Take 2 tablets (650 mg total) by mouth every 6 (six) hours as needed.    famotidine (PEPCID) 20  MG tablet Take 1 tablet (20 mg total) by mouth once daily.    ondansetron (ZOFRAN-ODT) 4 MG TbDL Take 1 tablet (4 mg total) by mouth every 6 (six) hours as needed.    potassium bicarbonate (K-LYTE) disintegrating tablet Take 2 tablets (50 mEq total) by mouth as needed (if potassium level is 3.5-3.8 mmol/L give 50 mEq x 1 dose -given as 25 meq x2tabs or 10 meq x5 tabs).    potassium bicarbonate (K-LYTE) disintegrating tablet Take 1.5 tablets (37.5 mEq total) by mouth as needed (if potassium level is 3.1-3.4 mmol/L give 35 mEq every 2 hours x 2 doses - given as 25 meq tab + 10 meq tab every 2h X 2 doses). (Patient taking differently: Take 37.5 mEq by mouth as needed (if potassium level is 3.1-3.4 mmol/L give 35 mEq every 2 hours x 2 doses - given as 25 meq tab + 10 meq tab every 2h X 2 doses).)    potassium bicarbonate (K-LYTE) disintegrating tablet Take 3 tablets (60 mEq total) by mouth as needed (if potassium level is 3 mmol/L or less give 60 mEq every 2 hours x 2 doses - given as 25 meq x 2 tabs + 10meq tab or 10 meq x 6 tabs every 2h x 2).     Family History       Problem Relation (Age of Onset)    Bladder Cancer Father    Heart attack Mother    Heart disease Mother    No Known Problems Sister, Brother, Maternal Grandmother, Maternal Grandfather, Paternal Grandmother, Paternal Grandfather          Tobacco Use    Smoking status: Former    Smokeless tobacco: Never   Substance and Sexual Activity    Alcohol use: Never    Drug use: Never    Sexual activity: Not Currently     Review of Systems   Constitutional: Negative.    HENT: Negative.     Eyes: Negative.    Respiratory: Negative.     Cardiovascular: Negative.    Gastrointestinal: Negative.    Endocrine: Negative.    Genitourinary: Negative.    Musculoskeletal: Negative.    Skin: Negative.         Patient with area of the right lower leg over the tibial shaft with skin tear that is open and healing this will be dressed with home Tuesdays and Thursdays.  She also  has 2 areas to the upper right leg that are skin tears 1 skin tear to the left upper leg.  One small skin tear around the right hip where she had her hip surgery.  And she will be followed by wound care.   Allergic/Immunologic: Negative.    Neurological: Negative.    Hematological: Negative.    Psychiatric/Behavioral: Negative.     All other systems reviewed and are negative.  Objective:     Vital Signs (Most Recent):  Temp: 98.6 °F (37 °C) (02/21/23 0750)  Pulse: 72 (02/21/23 0750)  Resp: 19 (02/21/23 0750)  BP: (!) 112/42 (02/21/23 1421)  SpO2: 95 % (02/21/23 0750)   Vital Signs (24h Range):  Temp:  [98.6 °F (37 °C)-98.9 °F (37.2 °C)] 98.6 °F (37 °C)  Pulse:  [65-72] 72  Resp:  [18-19] 19  SpO2:  [95 %-97 %] 95 %  BP: (105-164)/(42-60) 112/42     Weight: 58.2 kg (128 lb 3.2 oz)  Body mass index is 20.69 kg/m².    Physical Exam  Vitals and nursing note reviewed.   Constitutional:       Appearance: Normal appearance. She is normal weight.   HENT:      Head: Normocephalic and atraumatic.      Right Ear: Tympanic membrane, ear canal and external ear normal.      Left Ear: Tympanic membrane and external ear normal.      Nose: Nose normal.      Mouth/Throat:      Mouth: Mucous membranes are moist.   Eyes:      Extraocular Movements: Extraocular movements intact.      Conjunctiva/sclera: Conjunctivae normal.      Pupils: Pupils are equal, round, and reactive to light.   Cardiovascular:      Rate and Rhythm: Normal rate.      Pulses: Normal pulses.      Heart sounds: Normal heart sounds.   Pulmonary:      Effort: Pulmonary effort is normal.      Breath sounds: Normal breath sounds.   Abdominal:      General: Abdomen is flat. Bowel sounds are normal.      Palpations: Abdomen is soft.   Musculoskeletal:         General: Normal range of motion.      Cervical back: Normal range of motion and neck supple.      Comments: You are able to see the skin tears and descriptions on the review of systems.  The patient is here for wound  care and evaluation for wound dressing is in PT and OT to rebuild her strength for ambulation just to move from a bed to a bedside commode.  There are some bruises under her arms she states from PT and OT lifting her at [facility].   Skin:     General: Skin is warm and dry.      Capillary Refill: Capillary refill takes less than 2 seconds.   Neurological:      General: No focal deficit present.      Mental Status: She is alert and oriented to person, place, and time. Mental status is at baseline.   Psychiatric:         Mood and Affect: Mood normal.         Behavior: Behavior normal.         Thought Content: Thought content normal.         Judgment: Judgment normal.         CRANIAL NERVES     CN III, IV, VI   Pupils are equal, round, and reactive to light.     Significant Labs: All pertinent labs within the past 24 hours have been reviewed.    Significant Imaging: I have reviewed all pertinent imaging results/findings within the past 24 hours.

## 2023-02-21 NOTE — PT/OT/SLP EVAL
Physical Therapy Evaluation    Patient Name:  Zandra Veloz   MRN:  [id number]    Recommendations:     Discharge Recommendations: home with home health   Discharge Equipment Recommendations: walker, rolling   Barriers to discharge: None    Assessment:     Zandra Veloz is a 88 y.o. female admitted with a medical diagnosis of Multiple skin tears, Open wound of left lower leg and Periprosthetic fracture around internal prosthetic hip joint left LE..  She presents with the following impairments/functional limitations: weakness, impaired endurance, impaired self care skills, impaired functional mobility, gait instability, impaired balance, decreased lower extremity function, pain .    Rehab Prognosis: Fair; patient would benefit from acute skilled PT services to address these deficits and reach maximum level of function.    Recent Surgery: * No surgery found *      Plan:     During this hospitalization, patient to be seen 5 x/week to address the identified rehab impairments via gait training, therapeutic activities, therapeutic exercises, neuromuscular re-education and progress toward the following goals:    Plan of Care Expires:       Subjective     Chief Complaint: right hip pain  Patient/Family Comments/goals: to return home with and be able to walk.  Pain/Comfort:  Pain Rating 1: 8/10  Location - Side 1: Right  Location - Orientation 1: lower  Location 1: hip  Pain Addressed 1: Cessation of Activity  Pain Rating Post-Intervention 1: 8/10    Patients cultural, spiritual, Rastafarian conflicts given the current situation: no    Living Environment:  Lives alone with 24 hour sitters.  Prior to admission, patients level of function was household ambulator assist with ADL's from sitters.  Equipment used at home: bedside commode, walker, rolling.  DME owned (not currently used): none.  Upon discharge, patient will have assistance from sitters.    Objective:     Communicated with patient  prior to  session.  Patient found supine with    upon PT entry to room.    General Precautions: Standard, fall  Orthopedic Precautions:LLE posterior precautions, LLE partial weight bearing   Braces:    Respiratory Status: Room air    Exams:  Cognitive Exam:  Patient is oriented to Person, Place, and Situation  Gross Motor Coordination:  WFL  RUE ROM: Deficits: shoulder  RUE Strength: Deficits: 2+  LUE ROM: Deficits: shoulder  LUE Strength: 2+  RLE ROM: WFL  RLE Strength: 2+  LLE ROM: WFL except hip  LLE Strength: 2+    Functional Mobility:  Bed Mobility:     Rolling Right: minimum assistance  Transfers:     Sit to Stand:  moderate assistance with rolling walker  Gait: unable due to pain  Balance: poor      AM-PAC 6 CLICK MOBILITY  Total Score:11       Patient left supine with call button in reach.    GOALS:   Multidisciplinary Problems       Physical Therapy Goals          Problem: Physical Therapy    Goal Priority Disciplines Outcome Goal Variances Interventions   Physical Therapy Goal     PT, PT/OT Ongoing, Progressing     Description: Goals to be met by: 2 weeks     Patient will increase functional independence with mobility by performin. Supine to sit with MInimal Assistance  2. Sit to supine with MInimal Assistance  3. Rolling to Left and Right with Stand-by Assistance.  4. Sit to stand transfer with Minimal Assistance  5. Bed to chair transfer with Minimal Assistance using Rolling Walker  6. Gait  x 75 feet with Minimal Assistance using Rolling Walker.     Goals to be met by: 4 weeks     Patient will increase functional independence with mobility by performin. Supine to sit with Stand-by Assistance  2. Sit to supine with Stand-by Assistance  3. Rolling to Left and Right with Modified [place].  4. Sit to stand transfer with Stand-by Assistance  5. Bed to chair transfer with Stand-by Assistance using Rolling Walker  6. Gait  x 150 feet with Minimal Assistance using Rolling Walker.                           History:     Past Medical History:   Diagnosis Date    Hypertension     Osteoporosis     Thyroid disorder        Past Surgical History:   Procedure Laterality Date    BLADDER SURGERY      HEMIARTHROPLASTY OF HIP Left 9/21/2021    Procedure: HEMIARTHROPLASTY, HIP;  Surgeon: Dequan Singer MD;  Location: [facility] OR;  Service: Orthopedics;  Laterality: Left;    PARTIAL HIP ARTHROPLASTY Right     Dr Singer    REVISION TOTAL HIP ARTHROPLASTY Left 12/22/2022    Procedure: REVISION, TOTAL ARTHROPLASTY, HIP, VICTORIANO PROSTHETIC FX;  Surgeon: Dequan Singer MD;  Location: [facility];  Service: Orthopedics;  Laterality: Left;       Time Tracking:     PT Received On: 02/21/23  PT Start Time: 0950     PT Stop Time: 1010  PT Total Time (min): 20 min     Billable Minutes: Evaluation 20 minutes      02/21/2023

## 2023-02-21 NOTE — H&P
[facility]  Hospital Medicine  History & Physical    Patient Name: Zandra Veloz  MRN: [id number]  Patient Class: IP- Swing  Admission Date: 2/20/2023  Attending Physician: Lebron King DO   Primary Care Provider: Brandon Thornton MD         Patient information was obtained from ER records.     Subjective:     Principal Problem:<principal problem not specified>    Chief Complaint: No chief complaint on file.       HPI: Patient comes in for muscle strengthening.  Patient is 88 years old who has been in specialty for some time in [facility] she is had a prosthetic fixed of the left hip.  And revision of the total hip replacement.  Done on 12/22/2022.  She has history of heart murmur, palpitations, lumbar radiculopathy, hypertension, arthritis, multiple falls at home and fragile skin with multiple tears.  With PT and OT she is able to take 3 shallow steps around a chair and sit and stand moderate assist.      Past Medical History:   Diagnosis Date    Hypertension     Osteoporosis     Thyroid disorder        Past Surgical History:   Procedure Laterality Date    BLADDER SURGERY      HEMIARTHROPLASTY OF HIP Left 9/21/2021    Procedure: HEMIARTHROPLASTY, HIP;  Surgeon: Dequan Singer MD;  Location: [facility] OR;  Service: Orthopedics;  Laterality: Left;    PARTIAL HIP ARTHROPLASTY Right     Dr Singer    REVISION TOTAL HIP ARTHROPLASTY Left 12/22/2022    Procedure: REVISION, TOTAL ARTHROPLASTY, HIP, VICTORIANO PROSTHETIC FX;  Surgeon: Dequan Singer MD;  Location: [facility] OR;  Service: Orthopedics;  Laterality: Left;       Review of patient's allergies indicates:   Allergen Reactions    Bactrim [sulfamethoxazole-trimethoprim]     Codeine     Levaquin [levofloxacin]        Current Facility-Administered Medications on File Prior to Encounter   Medication    [DISCONTINUED] acetaminophen tablet 650 mg    [DISCONTINUED] ALPRAZolam  tablet 1 mg    [DISCONTINUED] ascorbic acid (vitamin C) tablet 500 mg    [DISCONTINUED] bisacodyL EC tablet 10 mg    [DISCONTINUED] carvediloL tablet 25 mg    [DISCONTINUED] dextromethorphan-guaiFENesin  mg/5 ml liquid 10 mL    [DISCONTINUED] dextrose 50% injection 12.5 g    [DISCONTINUED] dextrose 50% injection 25 g    [DISCONTINUED] diclofenac sodium 1 % gel 4 g    [DISCONTINUED] docusate sodium capsule 100 mg    [DISCONTINUED] enoxaparin injection 40 mg    [DISCONTINUED] fluticasone propionate 50 mcg/actuation nasal spray 100 mcg    [DISCONTINUED] gabapentin capsule 100 mg    [DISCONTINUED] glucagon (human recombinant) injection 1 mg    [DISCONTINUED] glucose chewable tablet 16 g    [DISCONTINUED] glucose chewable tablet 24 g    [DISCONTINUED] hydrALAZINE injection 5 mg    [DISCONTINUED] hydrALAZINE tablet 50 mg    [DISCONTINUED] lactulose 20 gram/30 mL solution Soln 20 g    [DISCONTINUED] levothyroxine tablet 125 mcg    [DISCONTINUED] lisinopriL tablet 40 mg    [DISCONTINUED] magnesium oxide tablet 800 mg    [DISCONTINUED] megestroL tablet 40 mg    [DISCONTINUED] melatonin tablet 6 mg    [DISCONTINUED] mirtazapine tablet 15 mg    [DISCONTINUED] naloxone 0.4 mg/mL injection 0.02 mg    [DISCONTINUED] ondansetron disintegrating tablet 4 mg    [DISCONTINUED] ondansetron injection 8 mg    [DISCONTINUED] oxyCODONE immediate release tablet 10 mg    [DISCONTINUED] pantoprazole EC tablet 40 mg    [DISCONTINUED] polyethylene glycol packet 17 g    [DISCONTINUED] potassium bicarbonate disintegrating tablet 35 mEq    [DISCONTINUED] potassium bicarbonate disintegrating tablet 50 mEq    [DISCONTINUED] potassium bicarbonate disintegrating tablet 60 mEq    [DISCONTINUED] potassium, sodium phosphates 280-160-250 mg packet 2 packet    [DISCONTINUED] potassium, sodium phosphates 280-160-250 mg packet 2 packet    [DISCONTINUED] potassium, sodium phosphates 280-160-250 mg packet 2 packet     [DISCONTINUED] predniSONE tablet 5 mg    [DISCONTINUED] prochlorperazine tablet 10 mg    [DISCONTINUED] simethicone chewable tablet 80 mg    [DISCONTINUED] sodium chloride 0.9% flush 10 mL    [DISCONTINUED] tamsulosin 24 hr capsule 0.4 mg    [DISCONTINUED] traZODone tablet 50 mg    [DISCONTINUED] urea 20 % Crea    [DISCONTINUED] white petrolatum 41 % ointment    [DISCONTINUED] zinc sulfate capsule 220 mg     Current Outpatient Medications on File Prior to Encounter   Medication Sig    ALPRAZolam (XANAX) 1 MG tablet Take 1 tablet (1 mg total) by mouth every evening.    ascorbic acid, vitamin C, (VITAMIN C) 500 MG tablet Take 1 tablet (500 mg total) by mouth 2 (two) times daily.    bisacodyL (DULCOLAX) 5 mg EC tablet Take 10 mg by mouth daily as needed for Constipation.    carvediloL (COREG) 25 MG tablet Take 1 tablet (25 mg total) by mouth 2 (two) times daily.    diclofenac sodium (VOLTAREN) 1 % Gel Apply 4 g topically 3 (three) times daily as needed (pain).    docusate sodium (COLACE) 100 MG capsule Take 100 mg by mouth daily as needed for Constipation.    fluticasone propionate (FLONASE) 50 mcg/actuation nasal spray 2 sprays (100 mcg total) by Each Nostril route once daily.    gabapentin (NEURONTIN) 100 MG capsule Take 1 capsule (100 mg total) by mouth 3 (three) times daily.    hydrALAZINE (APRESOLINE) 50 MG tablet Take 1 tablet (50 mg total) by mouth every 8 (eight) hours.    HYDROcodone-acetaminophen (NORCO) 7.5-325 mg per tablet Take 1 tablet by mouth every 8 (eight) hours as needed for Pain.    lactulose (CHRONULAC) 20 gram/30 mL Soln Take 30 mLs (20 g total) by mouth every 6 (six) hours as needed.    levothyroxine (SYNTHROID) 125 MCG tablet Take 1 tablet (125 mcg total) by mouth before breakfast.    lisinopriL (PRINIVIL,ZESTRIL) 40 MG tablet Take 1 tablet (40 mg total) by mouth once daily.    megestroL (MEGACE) 40 MG Tab Take 1 tablet (40 mg total) by mouth once daily.    mirtazapine  (REMERON) 15 MG tablet Take 1 tablet (15 mg total) by mouth every evening.    oxyCODONE (ROXICODONE) 10 mg Tab immediate release tablet Take 1 tablet (10 mg total) by mouth every 6 (six) hours as needed for Pain.    pantoprazole (PROTONIX) 40 MG tablet Take 1 tablet (40 mg total) by mouth once daily.    polyethylene glycol (GLYCOLAX) 17 gram PwPk Take 17 g by mouth once daily.    predniSONE (DELTASONE) 5 MG tablet Take 1 tablet (5 mg total) by mouth once daily.    tamsulosin (FLOMAX) 0.4 mg Cap Take 1 capsule (0.4 mg total) by mouth once daily.    traZODone (DESYREL) 50 MG tablet Take 1 tablet (50 mg total) by mouth nightly as needed for Insomnia.    urea 20 % Crea Apply 1 application topically daily as needed.    white petrolatum 41 % Oint Apply topically as needed (apply with dressing changes).    zinc sulfate (ZINCATE) 50 mg zinc (220 mg) capsule Take 1 capsule (220 mg total) by mouth once daily.    acetaminophen (TYLENOL) 325 MG tablet Take 2 tablets (650 mg total) by mouth every 6 (six) hours as needed.    famotidine (PEPCID) 20 MG tablet Take 1 tablet (20 mg total) by mouth once daily.    ondansetron (ZOFRAN-ODT) 4 MG TbDL Take 1 tablet (4 mg total) by mouth every 6 (six) hours as needed.    potassium bicarbonate (K-LYTE) disintegrating tablet Take 2 tablets (50 mEq total) by mouth as needed (if potassium level is 3.5-3.8 mmol/L give 50 mEq x 1 dose -given as 25 meq x2tabs or 10 meq x5 tabs).    potassium bicarbonate (K-LYTE) disintegrating tablet Take 1.5 tablets (37.5 mEq total) by mouth as needed (if potassium level is 3.1-3.4 mmol/L give 35 mEq every 2 hours x 2 doses - given as 25 meq tab + 10 meq tab every 2h X 2 doses). (Patient taking differently: Take 37.5 mEq by mouth as needed (if potassium level is 3.1-3.4 mmol/L give 35 mEq every 2 hours x 2 doses - given as 25 meq tab + 10 meq tab every 2h X 2 doses).)    potassium bicarbonate (K-LYTE) disintegrating tablet Take 3 tablets (60 mEq  total) by mouth as needed (if potassium level is 3 mmol/L or less give 60 mEq every 2 hours x 2 doses - given as 25 meq x 2 tabs + 10meq tab or 10 meq x 6 tabs every 2h x 2).     Family History       Problem Relation (Age of Onset)    Bladder Cancer Father    Heart attack Mother    Heart disease Mother    No Known Problems Sister, Brother, Maternal Grandmother, Maternal Grandfather, Paternal Grandmother, Paternal Grandfather          Tobacco Use    Smoking status: Former    Smokeless tobacco: Never   Substance and Sexual Activity    Alcohol use: Never    Drug use: Never    Sexual activity: Not Currently     Review of Systems   Constitutional: Negative.    HENT: Negative.     Eyes: Negative.    Respiratory: Negative.     Cardiovascular: Negative.    Gastrointestinal: Negative.    Endocrine: Negative.    Genitourinary: Negative.    Musculoskeletal: Negative.    Skin: Negative.         Patient with area of the right lower leg over the tibial shaft with skin tear that is open and healing this will be dressed with home Tuesdays and Thursdays.  She also has 2 areas to the upper right leg that are skin tears 1 skin tear to the left upper leg.  One small skin tear around the right hip where she had her hip surgery.  And she will be followed by wound care.   Allergic/Immunologic: Negative.    Neurological: Negative.    Hematological: Negative.    Psychiatric/Behavioral: Negative.     All other systems reviewed and are negative.  Objective:     Vital Signs (Most Recent):  Temp: 98.6 °F (37 °C) (02/21/23 0750)  Pulse: 72 (02/21/23 0750)  Resp: 19 (02/21/23 0750)  BP: (!) 112/42 (02/21/23 1421)  SpO2: 95 % (02/21/23 0750)   Vital Signs (24h Range):  Temp:  [98.6 °F (37 °C)-98.9 °F (37.2 °C)] 98.6 °F (37 °C)  Pulse:  [65-72] 72  Resp:  [18-19] 19  SpO2:  [95 %-97 %] 95 %  BP: (105-164)/(42-60) 112/42     Weight: 58.2 kg (128 lb 3.2 oz)  Body mass index is 20.69 kg/m².    Physical Exam  Vitals and nursing note reviewed.    Constitutional:       Appearance: Normal appearance. She is normal weight.   HENT:      Head: Normocephalic and atraumatic.      Right Ear: Tympanic membrane, ear canal and external ear normal.      Left Ear: Tympanic membrane and external ear normal.      Nose: Nose normal.      Mouth/Throat:      Mouth: Mucous membranes are moist.   Eyes:      Extraocular Movements: Extraocular movements intact.      Conjunctiva/sclera: Conjunctivae normal.      Pupils: Pupils are equal, round, and reactive to light.   Cardiovascular:      Rate and Rhythm: Normal rate.      Pulses: Normal pulses.      Heart sounds: Normal heart sounds.   Pulmonary:      Effort: Pulmonary effort is normal.      Breath sounds: Normal breath sounds.   Abdominal:      General: Abdomen is flat. Bowel sounds are normal.      Palpations: Abdomen is soft.   Musculoskeletal:         General: Normal range of motion.      Cervical back: Normal range of motion and neck supple.      Comments: You are able to see the skin tears and descriptions on the review of systems.  The patient is here for wound care and evaluation for wound dressing is in PT and OT to rebuild her strength for ambulation just to move from a bed to a bedside commode.  There are some bruises under her arms she states from PT and OT lifting her at [facility].   Skin:     General: Skin is warm and dry.      Capillary Refill: Capillary refill takes less than 2 seconds.   Neurological:      General: No focal deficit present.      Mental Status: She is alert and oriented to person, place, and time. Mental status is at baseline.   Psychiatric:         Mood and Affect: Mood normal.         Behavior: Behavior normal.         Thought Content: Thought content normal.         Judgment: Judgment normal.         CRANIAL NERVES     CN III, IV, VI   Pupils are equal, round, and reactive to light.     Significant Labs: All pertinent labs within the past 24 hours have been reviewed.    Significant  Imaging: I have reviewed all pertinent imaging results/findings within the past 24 hours.    Assessment/Plan:  Patient 1.  Prosthetic left knee placement with revision of the total hip.  Done on 12/22/2022  2.  Heart murmur 3. Palpitations 4. Lumbar radiculopathy 5. Hypertension 6. Arthritis 7. History of multiple falls 8. Fragile skin with multiple skin tears.  Will start PT and OT to build strength so the patient can least transferred to a bedside commode.  And be extra careful not to produce anymore skin tears.  Help prevent the patient from having falls at home.       No notes have been filed under this hospital service.  Service: Hospital Medicine    VTE Risk Mitigation (From admission, onward)         Ordered     enoxaparin injection 40 mg  Daily         02/21/23 0642     IP VTE HIGH RISK PATIENT  Once         02/21/23 0555     Place sequential compression device  Until discontinued         02/21/23 0555     Place KARINA hose  Until discontinued         02/21/23 0555                   Lebron King DO  Department of Hospital Medicine   [facility]

## 2023-02-21 NOTE — NURSING
1822 Patient arrived via stretcher by metro staff. Patient transferred to bed via metro staff, this nurse, Janene Delgado, RN and Dorothy Crabtree, RN-charge nurse. VSS stable on arrival. VS collected via Dorothy Crabtree, RN-charge nurse. No acute distress noted. Call bell teaching completed. PT verbalized understanding of call bell usage. Pt requested heat be turned on. Dressing to bilateral legs intact and dry upon arrival. No needs or concerns voiced at this time. Safety measures in place. Call bell in reach. Bed low. Side rails up x3. Bed alarm on. Will continue plan of care.     1831 Patients daughter and other person arrived with patient belongings

## 2023-02-21 NOTE — PLAN OF CARE
[facility]  Discharge Final Note    Primary Care Provider: Brandon Thornton MD    Expected Discharge Date: 2/20/2023    Final Discharge Note (most recent)       Final Note - 02/21/23 1103          Final Note    Assessment Type Final Discharge Note     Anticipated Discharge Disposition Swing Bed        Post-Acute Status    Post-Acute Authorization Placement     Post-Acute Placement Status Set-up Complete/Auth obtained     Patient choice form signed by patient/caregiver List with quality metrics by geographic area provided;List from CMS Compare;List from System Post-Acute Care     Discharge Delays None known at this time                     Important Message from Medicare  Important Message from Medicare regarding Discharge Appeal Rights: Given to patient/caregiver, Explained to patient/caregiver, Signed/date by patient/caregiver     Date IMM was signed: 02/20/23  Time IMM was signed: 1100    Contact Info       [facility]   Specialty: Wound Care    [address]  [facility] [address]   Phone: [phone]       Next Steps: Follow up in 2 week(s)    Instructions: Appt: 03/06/2023 @ 0900    Brandon Thornton MD   Specialty: Family Medicine, Emergency Medicine   Relationship: PCP - General    [address]  [facility]  [facility] [address]   Phone: [phone]       Next Steps: Schedule an appointment as soon as possible for a visit in 1 week(s)    Instructions: One week after d/c from B          Pt discharged to [facility] for b. IM obtained. Packet given to nurse

## 2023-02-21 NOTE — PLAN OF CARE
[facility]  Initial Discharge Assessment       Primary Care Provider: Brandon Thornton MD    Admission Diagnosis: Multiple skin tears [T14.8XXA]  Open wound of left lower leg [S81.802A]  Periprosthetic fracture around internal prosthetic hip joint [M97.8XXA, Z96.649]  Fragile skin [R23.8]  Lumbar radiculopathy [M54.16]  HTN (hypertension) [I10]  Multiple falls [R29.6]  Arthritis [M19.90]  Palpitations [R00.2]  Heart murmur [R01.1]  Debility [R53.81]    Admission Date: 2/20/2023  Expected Discharge Date:     Discharge Barriers Identified: None    Payor: MEDICARE / Plan: MEDICARE PART A & B / Product Type: Government /     Extended Emergency Contact Information  Primary Emergency Contact: JOSSY PRITCHETT  Home Phone: [phone]  Mobile Phone: [phone]  Relation: Daughter  Preferred language: English   needed? No  Secondary Emergency Contact: RUDI PERRIN  Mobile Phone: [phone]  Relation: Daughter  Preferred language: English    Discharge Plan A: Home, Home Health  Discharge Plan B: Skilled Nursing Facility      [facility] - [place], [place] - [address]  [address]  [facility] [address]  Phone: [phone] Fax: [phone]    [id number] [place], [place] - [address] AT [facility] [address] [facility]  [address]  [facility] [address]  Phone: [phone] Fax: [phone]    [facility], [place] - [address]  [address]  [facility] [address]  Phone: [phone] Fax: [phone]      Initial Assessment (most recent)       Adult Discharge Assessment - 02/20/23 1917          Discharge Assessment    Assessment Type Discharge Planning Assessment     Confirmed/corrected address, phone number and insurance Yes     Confirmed Demographics Correct on Facesheet     Source of Information patient;family;health record     If unable to respond/provide information was family/caregiver  contacted? Yes     Contact Name/Number chinedu Alicea [phone]     Communicated YANIV with patient/caregiver Date not available/Unable to determine     Reason For Admission Multiple skin tears/wounds, periprosthetic fx of Lt. hip with revision surgery, fragile skin, weakness/debility     People in Home alone     Facility [facility] From: [facility]     Do you expect to return to your current living situation? Yes     Do you have help at home or someone to help you manage your care at home? Yes     Who are your caregiver(s) and their phone number(s)? chinedu Dawson and 24 hour sitters     Prior to hospitilization cognitive status: Unable to Assess     Current cognitive status: Not Oriented to Time     Walking or Climbing Stairs ambulation difficulty, requires equipment;stair climbing difficulty, requires equipment;transferring difficulty, requires equipment     Mobility Management RW or w/c     Dressing/Bathing bathing difficulty, assistance 1 person;dressing difficulty, assistance 1 person     Equipment Currently Used at Home bedside commode;walker, rolling     Readmission within 30 days? No     Patient currently being followed by outpatient case management? No     Do you currently have service(s) that help you manage your care at home? No     Do you take prescription medications? Yes     Do you have prescription coverage? Yes     Coverage Medicare     Do you have any problems affording any of your prescribed medications? No     Is the patient taking medications as prescribed? yes     Who is going to help you get home at discharge? chinedu Dawson     How do you get to doctors appointments? family or friend will provide     Are you on dialysis? No     Do you take coumadin? No     Discharge Plan A Home;Home Health     Discharge Plan B Skilled Nursing Facility     Discharge Plan discussed with: Patient;Adult children     Discharge Barriers Identified None        Physical Activity    On average, how many  days per week do you engage in moderate to strenuous exercise (like a brisk walk)? 0 days     On average, how many minutes do you engage in exercise at this level? 0 min        Financial Resource Strain    How hard is it for you to pay for the very basics like food, housing, medical care, and heating? Not hard at all        Housing Stability    In the last 12 months, how many places have you lived? 1     In the last 12 months, was there a time when you did not have a steady place to sleep or slept in a shelter (including now)? No        Transportation Needs    In the past 12 months, has lack of transportation kept you from medical appointments or from getting medications? No     In the past 12 months, has lack of transportation kept you from meetings, work, or from getting things needed for daily living? No        Food Insecurity    Within the past 12 months, you worried that your food would run out before you got the money to buy more. Never true     Within the past 12 months, the food you bought just didn't last and you didn't have money to get more. Never true        Stress    Do you feel stress - tense, restless, nervous, or anxious, or unable to sleep at night because your mind is troubled all the time - these days? Only a little        Social Connections    In a typical week, how many times do you talk on the phone with family, friends, or neighbors? More than three times a week     How often do you get together with friends or relatives? More than three times a week     How often do you attend Mandaeism or Confucianist services? Never     Do you belong to any clubs or organizations such as Mandaeism groups, unions, fraternal or athletic groups, or school groups? No     How often do you attend meetings of the clubs or organizations you belong to? Never     Are you , , , , never , or living with a partner?         Alcohol Use    Q1: How often do you have a drink containing  alcohol? Never     Q2: How many drinks containing alcohol do you have on a typical day when you are drinking? Patient does not drink     Q3: How often do you have six or more drinks on one occasion? Never                   Pt. Admitted to swing bed services for continued wound care to multiple skin tears/wounds to lower extremities and other areas of body, recent fall with periprosthetic fx of left hip with revision of left hip replacement, generalized weakness/debility. Pt. Lives alone with 24 hour sitters. Pt. Has used [facility] in the past and would like to use [facility] if HH is needed at d/c from swing bed. Plan is to return home with HH services and sitters. Will cont. To follow pt. And assist as needed throughout stay.

## 2023-02-22 PROCEDURE — 25000003 PHARM REV CODE 250: Performed by: FAMILY MEDICINE

## 2023-02-22 PROCEDURE — 97116 GAIT TRAINING THERAPY: CPT

## 2023-02-22 PROCEDURE — 94761 N-INVAS EAR/PLS OXIMETRY MLT: CPT

## 2023-02-22 PROCEDURE — 11000004 HC SNF PRIVATE

## 2023-02-22 PROCEDURE — 63600175 PHARM REV CODE 636 W HCPCS: Performed by: FAMILY MEDICINE

## 2023-02-22 PROCEDURE — 25000242 PHARM REV CODE 250 ALT 637 W/ HCPCS: Performed by: PHYSICIAN ASSISTANT

## 2023-02-22 PROCEDURE — S0179 MEGESTROL 20 MG: HCPCS | Performed by: FAMILY MEDICINE

## 2023-02-22 PROCEDURE — 25000003 PHARM REV CODE 250: Performed by: PHYSICIAN ASSISTANT

## 2023-02-22 PROCEDURE — 63600175 PHARM REV CODE 636 W HCPCS: Performed by: PHYSICIAN ASSISTANT

## 2023-02-22 PROCEDURE — 97166 OT EVAL MOD COMPLEX 45 MIN: CPT

## 2023-02-22 PROCEDURE — 97530 THERAPEUTIC ACTIVITIES: CPT

## 2023-02-22 RX ADMIN — FLUTICASONE PROPIONATE 100 MCG: 50 SPRAY, METERED NASAL at 09:02

## 2023-02-22 RX ADMIN — POLYETHYLENE GLYCOL 3350 17 G: 17 POWDER, FOR SOLUTION ORAL at 09:02

## 2023-02-22 RX ADMIN — ZINC SULFATE 220 MG (50 MG) CAPSULE 220 MG: CAPSULE at 09:02

## 2023-02-22 RX ADMIN — SENNOSIDES AND DOCUSATE SODIUM 1 TABLET: 8.6; 5 TABLET ORAL at 09:02

## 2023-02-22 RX ADMIN — MIRTAZAPINE 15 MG: 15 TABLET, FILM COATED ORAL at 09:02

## 2023-02-22 RX ADMIN — LEVOTHYROXINE SODIUM 125 MCG: 0.03 TABLET ORAL at 05:02

## 2023-02-22 RX ADMIN — OXYCODONE HYDROCHLORIDE 10 MG: 5 TABLET ORAL at 04:02

## 2023-02-22 RX ADMIN — GABAPENTIN 100 MG: 100 CAPSULE ORAL at 09:02

## 2023-02-22 RX ADMIN — CARVEDILOL 25 MG: 12.5 TABLET, FILM COATED ORAL at 09:02

## 2023-02-22 RX ADMIN — TAMSULOSIN HYDROCHLORIDE 0.4 MG: 0.4 CAPSULE ORAL at 09:02

## 2023-02-22 RX ADMIN — GABAPENTIN 100 MG: 100 CAPSULE ORAL at 03:02

## 2023-02-22 RX ADMIN — LISINOPRIL 40 MG: 20 TABLET ORAL at 09:02

## 2023-02-22 RX ADMIN — HYDRALAZINE HYDROCHLORIDE 50 MG: 50 TABLET, FILM COATED ORAL at 09:02

## 2023-02-22 RX ADMIN — ALPRAZOLAM 1 MG: 1 TABLET ORAL at 09:02

## 2023-02-22 RX ADMIN — MULTIPLE VITAMINS W/ MINERALS TAB 1 TABLET: TAB at 09:02

## 2023-02-22 RX ADMIN — FAMOTIDINE 20 MG: 20 TABLET, FILM COATED ORAL at 09:02

## 2023-02-22 RX ADMIN — MEGESTROL ACETATE 400 MG: 40 SUSPENSION ORAL at 09:02

## 2023-02-22 RX ADMIN — HYDRALAZINE HYDROCHLORIDE 50 MG: 50 TABLET, FILM COATED ORAL at 05:02

## 2023-02-22 RX ADMIN — DICLOFENAC SODIUM 4 G: 10 GEL TOPICAL at 09:02

## 2023-02-22 RX ADMIN — PREDNISONE 5 MG: 5 TABLET ORAL at 09:02

## 2023-02-22 RX ADMIN — Medication 6 MG: at 09:02

## 2023-02-22 RX ADMIN — OXYCODONE HYDROCHLORIDE 10 MG: 5 TABLET ORAL at 09:02

## 2023-02-22 RX ADMIN — ENOXAPARIN SODIUM 40 MG: 40 INJECTION SUBCUTANEOUS at 04:02

## 2023-02-22 RX ADMIN — OXYCODONE HYDROCHLORIDE AND ACETAMINOPHEN 500 MG: 500 TABLET ORAL at 09:02

## 2023-02-22 NOTE — PLAN OF CARE
Problem: Adult Inpatient Plan of Care  Goal: Plan of Care Review  2/22/2023 0058 by Shirley Gutierrez LPN  Outcome: Ongoing, Progressing  2/22/2023 0054 by Shirley Gutierrez LPN  Outcome: Ongoing, Progressing  Goal: Patient-Specific Goal (Individualized)  2/22/2023 0058 by Shirley Gutierrez LPN  Outcome: Ongoing, Progressing  2/22/2023 0054 by Shirley Gutierrez LPN  Outcome: Ongoing, Progressing  Goal: Absence of Hospital-Acquired Illness or Injury  2/22/2023 0058 by Shirley Gutierrez LPN  Outcome: Ongoing, Progressing  2/22/2023 0054 by Shirley Gutierrez LPN  Outcome: Ongoing, Progressing  Goal: Optimal Comfort and Wellbeing  2/22/2023 0058 by Shirley Gutierrez LPN  Outcome: Ongoing, Progressing  2/22/2023 0054 by Shirley Gutierrez LPN  Outcome: Ongoing, Progressing  Goal: Readiness for Transition of Care  2/22/2023 0058 by Shirley Gutierrez LPN  Outcome: Ongoing, Progressing  2/22/2023 0054 by Shirley Gutierrez LPN  Outcome: Ongoing, Progressing     Problem: Impaired Wound Healing  Goal: Optimal Wound Healing  Outcome: Ongoing, Progressing

## 2023-02-22 NOTE — PT/OT/SLP PROGRESS
"Physical Therapy Treatment    Patient Name:  Zandra Veloz   MRN:  [id number]    Recommendations:     Discharge Recommendations: home with home health  Discharge Equipment Recommendations: walker, rolling  Barriers to discharge:  ongoing rehab    Assessment:     Zandra Veloz is a 88 y.o. female admitted with a medical diagnosis of <principal problem not specified>.  She presents with the following impairments/functional limitations: weakness, impaired endurance, impaired functional mobility, gait instability, impaired balance, decreased lower extremity function, pain, impaired skin, orthopedic precautions.    Pt and staff ed in safe techniques for transfers and ambulation with standard walker to improve pt's mobility and level of indep.     Rehab Prognosis: Good; patient would benefit from acute skilled PT services to address these deficits and reach maximum level of function.    Recent Surgery: * No surgery found *      Plan:     During this hospitalization, patient to be seen 5 x/week to address the identified rehab impairments via gait training, therapeutic activities, therapeutic exercises and progress toward the following goals:    Plan of Care Expires:  03/30/23    Subjective     Chief Complaint: weakness d/t periprosthetic fracture of L hip joint  Patient/Family Comments/goals: "I need to get on the commode"  Pain/Comfort:  Pain Rating 1: 0/10      Objective:     Communicated with nursing prior to session.  Patient found supine with   upon PT entry to room.     General Precautions: Standard, fall  Orthopedic Precautions: LLE partial weight bearing, LLE posterior precautions  Braces: N/A  Respiratory Status: Room air     Functional Mobility:  Bed Mobility:     Scooting: moderate assistance  Supine to Sit: moderate assistance  Transfers:     Sit to Stand:  minimum assistance and moderate assistance with standard walker  Toilet Transfer: minimum assistance and moderate assistance with  " standard walker  using  Step Transfer  Gait: 15ft with RW, Min A for SW negotiation, heavy cues for steppage and posture  Balance: standing Fair-      AM-PAC 6 CLICK MOBILITY  Turning over in bed (including adjusting bedclothes, sheets and blankets)?: 3  Sitting down on and standing up from a chair with arms (e.g., wheelchair, bedside commode, etc.): 3  Moving from lying on back to sitting on the side of the bed?: 2  Moving to and from a bed to a chair (including a wheelchair)?: 3  Need to walk in hospital room?: 3  Climbing 3-5 steps with a railing?: 1  Basic Mobility Total Score: 15       Treatment & Education:  Mobility as stated above. Pt demo higher level of indep than previously assessed by therapists and nursing staff.    Patient left up in chair with all lines intact, call button in reach, nursing notified, and daughters present..    GOALS:   Multidisciplinary Problems       Physical Therapy Goals          Problem: Physical Therapy    Goal Priority Disciplines Outcome Goal Variances Interventions   Physical Therapy Goal     PT, PT/OT Ongoing, Progressing     Description: Goals to be met by: 2 weeks     Patient will increase functional independence with mobility by performin. Supine to sit with MInimal Assistance  2. Sit to supine with MInimal Assistance  3. Rolling to Left and Right with Stand-by Assistance.  4. Sit to stand transfer with Minimal Assistance  5. Bed to chair transfer with Minimal Assistance using Rolling Walker  6. Gait  x 75 feet with Minimal Assistance using Rolling Walker.     Goals to be met by: 4 weeks     Patient will increase functional independence with mobility by performin. Supine to sit with Stand-by Assistance  2. Sit to supine with Stand-by Assistance  3. Rolling to Left and Right with Modified [place].  4. Sit to stand transfer with Stand-by Assistance  5. Bed to chair transfer with Stand-by Assistance using Rolling Walker  6. Gait  x 150 feet with Minimal  Assistance using Rolling Walker.                          Time Tracking:     PT Received On: 02/22/23  PT Start Time: 0946 (1011)     PT Stop Time: 1004 (1021)  PT Total Time (min): 18 min 10 mins    Billable Minutes: Gait Training 10 and Therapeutic Activity 18    Treatment Type: Treatment  PT/PTA: PT     PTA Visit Number: 0     02/22/2023

## 2023-02-22 NOTE — NURSING
Nurses Note -- 4 Eyes      2/22/2023   10:38 AM      Skin assessed during: Q Shift Change      [] No Pressure Injuries Present    []Prevention Measures Documented      [x] Yes- Altered Skin Integrity Present or Discovered   [] LDA Added if Not in Epic (Describe Wound)   [x] New Altered Skin Integrity was Present on Admit and Documented in LDA   [] Wound Image Taken    Wound Care Consulted? Yes    Attending Nurse:  RONALDO GREY RN     Second RN/Staff Member:  Shirley Gutierrez LPN

## 2023-02-22 NOTE — PT/OT/SLP EVAL
Occupational Therapy   Evaluation    Name: Zandra Veloz  MRN: [id number]  Admitting Diagnosis: multiple skin tearing, perioprostheitic fx of L hip of L hip replancement  Recent Surgery: * No surgery found *      Recommendations:     Discharge Recommendations: home with home health  Discharge Equipment Recommendations:  walker, rolling  Barriers to discharge:       Assessment:     Zandra Veloz is a 88 y.o. female with a medical diagnosis of multiple skin tearing, perioprostheitic fx of L hip of L hip replacement.  She presents with decreased balance, safety, strength and mobility. Performance deficits affecting function: weakness, impaired endurance, impaired self care skills, impaired functional mobility, impaired balance, decreased safety awareness, decreased ROM.      Rehab Prognosis: Fair; patient would benefit from acute skilled OT services to address these deficits and reach maximum level of function.       Plan:     Patient to be seen 5 x/week to address the above listed problems via self-care/home management, therapeutic exercises, therapeutic activities, neuromuscular re-education  Plan of Care Expires:    Plan of Care Reviewed with: patient    Subjective     Chief Complaint: decreased balance, thin skin,   Patient/Family Comments/goals: increase ADL performance and functional mobility    Occupational Profile:  Living Environment: Pt lives at home alone at this time  Previous level of function: SVN-Mod I  Equipment Used at Home: walker, rolling  Assistance upon Discharge: TBD    Pain/Comfort:  Pain Rating 1: 8/10  Location - Orientation 1: lower  Location - Orientation 2: lower    Patients cultural, spiritual, Mormonism conflicts given the current situation: no    Objective:     Communicated with: Pt prior to session.  Patient found supine   upon OT entry to room.    General Precautions: Standard, fall  Orthopedic Precautions:    Braces:    Respiratory Status: Room air    Occupational  Performance:    Bed Mobility:    Patient completed Rolling/Turning to Left with  minimum assistance  Patient completed Rolling/Turning to Right with contact guard assistance and minimum assistance  Patient completed Supine to Sit with contact guard assistance and minimum assistance    Functional Mobility/Transfers:  Patient completed Sit <> Stand Transfer with moderate assistance and maximal assistance  with  rolling walker   Patient completed Toilet Transfer Stand Pivot and Step Transfer technique with moderate assistance and maximal assistance with  rolling walker  Functional Mobility: Pt completed side stepping toward the head of the bed using the RW for balance and safety    Activities of Daily Living:  Lower Body Dressing: maximal assistance due to decreased LE strength  Toileting: maximal assistance due to decreased LE strength    Cognitive/Visual Perceptual:  Cognitive/Psychosocial Skills:     -       Oriented to: Person, Place, Time, and Situation   -       Follows Commands/attention:Follows two-step commands  -       Safety awareness/insight to disability: impaired     Physical Exam:  Balance:    -       Poor with posterior leaning    AMPAC 6 Click ADL:  AMPAC Total Score: 14    Treatment & Education:  OT eval    Patient left supine with all lines intact and call button in reach    GOALS:   Multidisciplinary Problems       Occupational Therapy Goals          Problem: Occupational Therapy    Goal Priority Disciplines Outcome Interventions   Occupational Therapy Goal     OT, PT/OT Ongoing, Progressing    Description: Goals to be met by: 03/22/23     Patient will increase functional independence with ADLs by performing:    UE Dressing with Modified [place] and Set-up Assistance.  LE Dressing with Modified [place] and Set-up Assistance.  Grooming while standing at sink with Modified [place] and Supervision.  Toileting from toilet with Modified [place] and Supervision for hygiene and clothing  management.   Sitting at edge of bed x5-10 minutes with Modified [place] and Supervision.  Supine to sit with Modified [place] and Set-up Assistance.  Stand pivot transfers with Modified [place] and Supervision.  Squat pivot transfers with Modified [place] and Supervision.  Step transfer with Modified [place] and Supervision  Toilet transfer to toilet with Modified [place] and Supervision.                         History:     Past Medical History:   Diagnosis Date    Hypertension     Osteoporosis     Thyroid disorder          Past Surgical History:   Procedure Laterality Date    BLADDER SURGERY      HEMIARTHROPLASTY OF HIP Left 9/21/2021    Procedure: HEMIARTHROPLASTY, HIP;  Surgeon: Dequan Singer MD;  Location: [facility] [place];  Service: Orthopedics;  Laterality: Left;    PARTIAL HIP ARTHROPLASTY Right     Dr Singer    REVISION TOTAL HIP ARTHROPLASTY Left 12/22/2022    Procedure: REVISION, TOTAL ARTHROPLASTY, HIP, VICTORIANO PROSTHETIC FX;  Surgeon: Dequan Singer MD;  Location: [facility] [place];  Service: Orthopedics;  Laterality: Left;       Time Tracking:     OT Date of Treatment:    OT Start Time:  2:45  OT Stop Time:  3:05  OT Total Time (min):      Billable Minutes:Evaluation 20 2/22/2023

## 2023-02-22 NOTE — PLAN OF CARE
Problem: Occupational Therapy  Goal: Occupational Therapy Goal  Description: Goals to be met by: 03/22/23     Patient will increase functional independence with ADLs by performing:    UE Dressing with Modified [place] and Set-up Assistance.  LE Dressing with Modified [place] and Set-up Assistance.  Grooming while standing at sink with Modified [place] and Supervision.  Toileting from toilet with Modified [place] and Supervision for hygiene and clothing management.   Sitting at edge of bed x5-10 minutes with Modified [place] and Supervision.  Supine to sit with Modified [place] and Set-up Assistance.  Stand pivot transfers with Modified [place] and Supervision.  Squat pivot transfers with Modified [place] and Supervision.  Step transfer with Modified [place] and Supervision  Toilet transfer to toilet with Modified [place] and Supervision.    Outcome: Ongoing, Progressing

## 2023-02-22 NOTE — NURSING
0746 Received pt in bed. No acute distress noted at present time.  Pt stated she did not have a good night due to her getting to facility late last night. Pt also stated she would have to get adjusted to the bed. Dressings to R thigh and L thigh intact and dry with Aquacel foam pads. Dressing to L anterior lower leg dry and intact. Dressing change completed on Monday 2/20 at previous facility. Pt refused SCDs and TEDs at this time. See mar for dvt prophylactic. Black coffee brought per pt request. No other needs expressed at this time. Safety measures in place. Call bell in reach. Bed low. Side rails up. Will continue plan of care.      0959 Reviewed medications with pt. Ismael (Apoorva) at bedside asked about megace. Told she would receive later today and starting at 0900 tomorrow 2/22. Apoorva (ismael) also stated she takes miralax at home in her coffee every morning to produce a BM. Miralax will resume at 0900 tomorrow 2/22 . Henri PT currently in room. Pt asked about using a bedside commode. Pt stated she was using beside commode previously at [facility]. Ismael stated she was not using bedside commode at other facility. Henri PT stated it would be okay to try bedside  commode, but pt would be max assist of 2 people.     1050 Spoke with daughter at nurses station she stated pt had not ambulated to bedside commode at previous facility. Stated she had used the bedpan.     1222 Pt disliked lunch. Other food options offered from cafe. Pt stated Apoorva(ismael) was getting her some egg rolls and she would eat that. Family requested meal menu for day to day. Told that we did not have that available. Everyday specials for cafe given to family. Told patient can always request whatever she is feels like eating. Told we also have snacks and drinks available.     1642 Pt stated she would like to be put on bedside commode. JAVIER Coburn OT and Jaime, student intern in room to assist with this nurse. Pt was coached to  "sitting on edge of bed by JAVIER Coburn OT. While trying to ambulate to bedside commode pt stated ROMA, OT could not grab under arms because of fragile skin. Teaching provided by ROMA about importance of ambulation safety and the correct techniques required of pt and staff. Pt pivoted to beside commode by previously stated staff. While on bedside commode, pt continued to lean back on bedside commode. ROMA provided teaching of proper technique for sitting on commode. Pt stated she could not sit up straight while on commode. Pt stated it was uncomfortable just like the bedpan and it hurt her arms and L leg. Pt stated "Might as well put me back in the bed". When back in bed, pt pulled up R arm to show posterior upper arm. Pt stated "See what ya'll did, it is already bruising." Apoorva (sitter) at beside stated the bruise was already there from previous facility. Pt chart shows old skin tear to area. Please see LDA for more. No other needs or concerns voiced from pt at this time. Safety measures in place. Call bell in reach. Bed low. Will continue plan of care.   "

## 2023-02-23 PROCEDURE — 97535 SELF CARE MNGMENT TRAINING: CPT

## 2023-02-23 PROCEDURE — 63600175 PHARM REV CODE 636 W HCPCS: Performed by: FAMILY MEDICINE

## 2023-02-23 PROCEDURE — 25000003 PHARM REV CODE 250: Performed by: FAMILY MEDICINE

## 2023-02-23 PROCEDURE — 25000003 PHARM REV CODE 250: Performed by: PHYSICIAN ASSISTANT

## 2023-02-23 PROCEDURE — 27000981 HC MATTRESS, ACCUCAIR DAILY RENTAL

## 2023-02-23 PROCEDURE — 11000004 HC SNF PRIVATE

## 2023-02-23 PROCEDURE — 27000952

## 2023-02-23 PROCEDURE — 63600175 PHARM REV CODE 636 W HCPCS: Performed by: PHYSICIAN ASSISTANT

## 2023-02-23 PROCEDURE — 27000941

## 2023-02-23 PROCEDURE — S0179 MEGESTROL 20 MG: HCPCS | Performed by: FAMILY MEDICINE

## 2023-02-23 PROCEDURE — 97110 THERAPEUTIC EXERCISES: CPT

## 2023-02-23 PROCEDURE — 27000944

## 2023-02-23 PROCEDURE — 94761 N-INVAS EAR/PLS OXIMETRY MLT: CPT

## 2023-02-23 RX ADMIN — LEVOTHYROXINE SODIUM 125 MCG: 0.03 TABLET ORAL at 06:02

## 2023-02-23 RX ADMIN — FLUTICASONE PROPIONATE 100 MCG: 50 SPRAY, METERED NASAL at 09:02

## 2023-02-23 RX ADMIN — SENNOSIDES AND DOCUSATE SODIUM 1 TABLET: 8.6; 5 TABLET ORAL at 09:02

## 2023-02-23 RX ADMIN — CARVEDILOL 25 MG: 12.5 TABLET, FILM COATED ORAL at 09:02

## 2023-02-23 RX ADMIN — MIRTAZAPINE 15 MG: 15 TABLET, FILM COATED ORAL at 09:02

## 2023-02-23 RX ADMIN — LISINOPRIL 40 MG: 20 TABLET ORAL at 09:02

## 2023-02-23 RX ADMIN — TAMSULOSIN HYDROCHLORIDE 0.4 MG: 0.4 CAPSULE ORAL at 09:02

## 2023-02-23 RX ADMIN — PREDNISONE 5 MG: 5 TABLET ORAL at 09:02

## 2023-02-23 RX ADMIN — ALPRAZOLAM 1 MG: 1 TABLET ORAL at 09:02

## 2023-02-23 RX ADMIN — GABAPENTIN 100 MG: 100 CAPSULE ORAL at 09:02

## 2023-02-23 RX ADMIN — MEGESTROL ACETATE 400 MG: 40 SUSPENSION ORAL at 09:02

## 2023-02-23 RX ADMIN — OXYCODONE HYDROCHLORIDE 10 MG: 5 TABLET ORAL at 07:02

## 2023-02-23 RX ADMIN — GABAPENTIN 100 MG: 100 CAPSULE ORAL at 03:02

## 2023-02-23 RX ADMIN — OXYCODONE HYDROCHLORIDE AND ACETAMINOPHEN 500 MG: 500 TABLET ORAL at 09:02

## 2023-02-23 RX ADMIN — ENOXAPARIN SODIUM 40 MG: 40 INJECTION SUBCUTANEOUS at 05:02

## 2023-02-23 RX ADMIN — Medication 6 MG: at 09:02

## 2023-02-23 RX ADMIN — POLYETHYLENE GLYCOL 3350 17 G: 17 POWDER, FOR SOLUTION ORAL at 09:02

## 2023-02-23 RX ADMIN — DICLOFENAC SODIUM 4 G: 10 GEL TOPICAL at 09:02

## 2023-02-23 RX ADMIN — HYDRALAZINE HYDROCHLORIDE 50 MG: 50 TABLET, FILM COATED ORAL at 09:02

## 2023-02-23 RX ADMIN — ZINC SULFATE 220 MG (50 MG) CAPSULE 220 MG: CAPSULE at 09:02

## 2023-02-23 RX ADMIN — MULTIPLE VITAMINS W/ MINERALS TAB 1 TABLET: TAB at 09:02

## 2023-02-23 RX ADMIN — OXYCODONE HYDROCHLORIDE 10 MG: 5 TABLET ORAL at 01:02

## 2023-02-23 RX ADMIN — FAMOTIDINE 20 MG: 20 TABLET, FILM COATED ORAL at 09:02

## 2023-02-23 NOTE — PT/OT/SLP PROGRESS
Occupational Therapy   Treatment    Name: Zandra Veloz  MRN: [id number]  Admitting Diagnosis:  Periprosthetic fx around internal prosthetic hip joint; fragile skin, lumbar radiculopathy       Recommendations:     Discharge Recommendations: home with home health  Discharge Equipment Recommendations:  walker, rolling  Barriers to discharge:       Assessment:     Zandra Veloz is a 88 y.o. 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  She presents with decreased balance, safety, strength and mobility. Performance deficits affecting function are weakness, impaired endurance, impaired self care skills, impaired functional mobility, impaired balance, decreased safety awareness. Pt noted to have a disgruntled attitude and was seemed very upset which influenced her participation and motivation. Pt and daugther educated on the importance of participation and mental buy in in order to receive maximum benefit from therapy. Pt overall functional level will be dependent on her participation and attitude.     Rehab Prognosis:  Fair; patient would benefit from acute skilled OT services to address these deficits and reach maximum level of function.       Plan:     Patient to be seen 5 x/week to address the above listed problems via self-care/home management, therapeutic activities, therapeutic exercises, neuromuscular re-education  Plan of Care Expires:    Plan of Care Reviewed with: patient    Subjective     Pain/Comfort:  Pain Rating 1: 0/10  Location - Side 1: Right  Location 1: hip    Objective:     Communicated with: Pt prior to session.  Patient found up in chair  upon OT entry to room.    General Precautions: Standard, fall    Orthopedic Precautions:LUE partial weight bearing  Braces:    Respiratory Status: Room air     Occupational Performance:     Bed Mobility:    Patient completed Rolling/Turning to Left with  minimum  assistance  Patient completed Rolling/Turning to Right with minimum assistance  Patient completed Supine to Sit with moderate assistance   Max A to scoot to HOB    Functional Mobility/Transfers:  Patient completed Sit <> Stand Transfer with moderate assistance  with  rolling walker   Functional Mobility: Pt was t/f to and from the therapy gym in the w/c by OT    Activities of Daily Living:  Sit to stand t/f with Mod A with cues to stand upright and to keep her LEs underneath her body  Sit to supine t/f with Mod A with assistance with LEs      AMPAC 6 Click ADL: 14    Treatment & Education:  Therapeutic exercise:  While sitting in the w/c the pt completed 10 mins on the [employer] bike to increase BLE ROM, strength and mobility with t/f    Patient left supine with all lines intact; Pt insisted on returning to bed after short session.    GOALS:   Multidisciplinary Problems       Occupational Therapy Goals          Problem: Occupational Therapy    Goal Priority Disciplines Outcome Interventions   Occupational Therapy Goal     OT, PT/OT Ongoing, Progressing    Description: Goals to be met by: 03/22/23     Patient will increase functional independence with ADLs by performing:    UE Dressing with Modified [place] and Set-up Assistance.  LE Dressing with Modified [place] and Set-up Assistance.  Grooming while standing at sink with Modified [place] and Supervision.  Toileting from toilet with Modified [place] and Supervision for hygiene and clothing management.   Sitting at edge of bed x5-10 minutes with Modified [place] and Supervision.  Supine to sit with Modified [place] and Set-up Assistance.  Stand pivot transfers with Modified [place] and Supervision.  Squat pivot transfers with Modified [place] and Supervision.  Step transfer with Modified [place] and Supervision  Toilet transfer to toilet with Modified [place] and Supervision.                         Time Tracking:      OT Date of Treatment:    OT Start Time:  1:15  OT Stop Time:  1:45  OT Total Time (min):      Billable Minutes:Self Care/Home Management 15  Therapeutic Exercise 15    OT/BRIGHT: OT          2/23/2023

## 2023-02-23 NOTE — NURSING
Nurses Note -- 4 Eyes      2/23/2023   3:54 AM      Skin assessed during: Q Shift Change      [] No Pressure Injuries Present    []Prevention Measures Documented      [x] Yes- Altered Skin Integrity Present or Discovered   [] LDA Added if Not in Epic (Describe Wound)   [] New Altered Skin Integrity was Present on Admit and Documented in LDA   [] Wound Image Taken    Wound Care Consulted? Yes    Attending Nurse:  Kelin Ferguson RN     Second RN/Staff Member:  Dorothy Crabtree   Pt arrived to [facility] with mulitple BLE skin tears and a LLE wound on arrival.

## 2023-02-23 NOTE — PT/OT/SLP PROGRESS
Physical Therapy      Patient Name:  Zandra Veloz   MRN:  [id number]    Patient not seen today secondary to Patient unwilling to participate. Will follow-up tomorrow.

## 2023-02-23 NOTE — PLAN OF CARE
Pt. Referred to [facility] to be evaluated tomorrow for multiple wounds/skin tears. Pt. Will cont. With Therapy and wound care as ordered. Will follow.

## 2023-02-23 NOTE — NURSING
Nurses Note -- 4 Eyes      2/23/2023   0740        Skin assessed during: Q Shift Change      [] No Pressure Injuries Present    []Prevention Measures Documented      [x] Yes- Altered Skin Integrity Present or Discovered   [] LDA Added if Not in Epic (Describe Wound)   [x] New Altered Skin Integrity was Present on Admit and Documented in LDA   [] Wound Image Taken    Wound Care Consulted? No    Attending Nurse:  RONALDO GREY RN     Second RN/Staff Member:  Kelin Ferguson RN

## 2023-02-24 ENCOUNTER — CLINICAL SUPPORT (OUTPATIENT)
Dept: WOUND CARE | Facility: HOSPITAL | Age: 88
DRG: 556 | End: 2023-02-24
Attending: FAMILY MEDICINE
Payer: MEDICARE

## 2023-02-24 DIAGNOSIS — S81.811A NONINFECTED SKIN TEAR OF RIGHT LOWER EXTREMITY, INITIAL ENCOUNTER: ICD-10-CM

## 2023-02-24 DIAGNOSIS — S81.812A TEAR OF SKIN OF MULTIPLE SITES OF LEFT LOWER EXTREMITY, INITIAL ENCOUNTER: ICD-10-CM

## 2023-02-24 DIAGNOSIS — S81.802D TRAUMATIC OPEN WOUND OF LOWER LEG WITH DELAYED HEALING, LEFT: Primary | ICD-10-CM

## 2023-02-24 DIAGNOSIS — L89.43 PRESSURE INJURY OF CONTIGUOUS REGION INVOLVING BACK AND RIGHT BUTTOCK, STAGE 3: ICD-10-CM

## 2023-02-24 PROCEDURE — 25000003 PHARM REV CODE 250: Performed by: FAMILY MEDICINE

## 2023-02-24 PROCEDURE — 63600175 PHARM REV CODE 636 W HCPCS: Performed by: PHYSICIAN ASSISTANT

## 2023-02-24 PROCEDURE — 27000944

## 2023-02-24 PROCEDURE — 99213 OFFICE O/P EST LOW 20 MIN: CPT

## 2023-02-24 PROCEDURE — 63600175 PHARM REV CODE 636 W HCPCS: Performed by: FAMILY MEDICINE

## 2023-02-24 PROCEDURE — 97110 THERAPEUTIC EXERCISES: CPT | Mod: CQ

## 2023-02-24 PROCEDURE — 97530 THERAPEUTIC ACTIVITIES: CPT

## 2023-02-24 PROCEDURE — 25000003 PHARM REV CODE 250: Performed by: PHYSICIAN ASSISTANT

## 2023-02-24 PROCEDURE — 97110 THERAPEUTIC EXERCISES: CPT

## 2023-02-24 PROCEDURE — S0179 MEGESTROL 20 MG: HCPCS | Performed by: FAMILY MEDICINE

## 2023-02-24 PROCEDURE — 11000004 HC SNF PRIVATE

## 2023-02-24 PROCEDURE — 27000981 HC MATTRESS, ACCUCAIR DAILY RENTAL

## 2023-02-24 PROCEDURE — 94761 N-INVAS EAR/PLS OXIMETRY MLT: CPT

## 2023-02-24 PROCEDURE — 97530 THERAPEUTIC ACTIVITIES: CPT | Mod: CQ

## 2023-02-24 RX ADMIN — HYDRALAZINE HYDROCHLORIDE 50 MG: 50 TABLET, FILM COATED ORAL at 06:02

## 2023-02-24 RX ADMIN — CARVEDILOL 25 MG: 12.5 TABLET, FILM COATED ORAL at 08:02

## 2023-02-24 RX ADMIN — MULTIPLE VITAMINS W/ MINERALS TAB 1 TABLET: TAB at 08:02

## 2023-02-24 RX ADMIN — OXYCODONE HYDROCHLORIDE 10 MG: 5 TABLET ORAL at 08:02

## 2023-02-24 RX ADMIN — ZINC SULFATE 220 MG (50 MG) CAPSULE 220 MG: CAPSULE at 08:02

## 2023-02-24 RX ADMIN — MIRTAZAPINE 15 MG: 15 TABLET, FILM COATED ORAL at 08:02

## 2023-02-24 RX ADMIN — GABAPENTIN 100 MG: 100 CAPSULE ORAL at 08:02

## 2023-02-24 RX ADMIN — HYDRALAZINE HYDROCHLORIDE 50 MG: 50 TABLET, FILM COATED ORAL at 09:02

## 2023-02-24 RX ADMIN — SENNOSIDES AND DOCUSATE SODIUM 1 TABLET: 8.6; 5 TABLET ORAL at 08:02

## 2023-02-24 RX ADMIN — ALPRAZOLAM 1 MG: 1 TABLET ORAL at 08:02

## 2023-02-24 RX ADMIN — MEGESTROL ACETATE 400 MG: 40 SUSPENSION ORAL at 08:02

## 2023-02-24 RX ADMIN — PREDNISONE 5 MG: 5 TABLET ORAL at 08:02

## 2023-02-24 RX ADMIN — OXYCODONE HYDROCHLORIDE AND ACETAMINOPHEN 500 MG: 500 TABLET ORAL at 08:02

## 2023-02-24 RX ADMIN — GABAPENTIN 100 MG: 100 CAPSULE ORAL at 04:02

## 2023-02-24 RX ADMIN — FAMOTIDINE 20 MG: 20 TABLET, FILM COATED ORAL at 08:02

## 2023-02-24 RX ADMIN — OXYCODONE HYDROCHLORIDE 10 MG: 5 TABLET ORAL at 10:02

## 2023-02-24 RX ADMIN — ENOXAPARIN SODIUM 40 MG: 40 INJECTION SUBCUTANEOUS at 05:02

## 2023-02-24 RX ADMIN — POLYETHYLENE GLYCOL 3350 17 G: 17 POWDER, FOR SOLUTION ORAL at 08:02

## 2023-02-24 RX ADMIN — LISINOPRIL 40 MG: 20 TABLET ORAL at 08:02

## 2023-02-24 RX ADMIN — LEVOTHYROXINE SODIUM 125 MCG: 0.03 TABLET ORAL at 06:02

## 2023-02-24 RX ADMIN — TAMSULOSIN HYDROCHLORIDE 0.4 MG: 0.4 CAPSULE ORAL at 08:02

## 2023-02-24 RX ADMIN — OXYCODONE HYDROCHLORIDE 10 MG: 5 TABLET ORAL at 04:02

## 2023-02-24 RX ADMIN — FLUTICASONE PROPIONATE 100 MCG: 50 SPRAY, METERED NASAL at 08:02

## 2023-02-24 NOTE — NURSING
Nurses Note -- 4 Eyes      2/24/2023   5:01 PM      Skin assessed during: Q Shift Change      [x] No Pressure Injuries Present    [x]Prevention Measures Documented      [x] Yes- Altered Skin Integrity Present or Discovered   [] LDA Added if Not in Epic (Describe Wound)   [] New Altered Skin Integrity was Present on Admit and Documented in LDA   [] Wound Image Taken    Wound Care Consulted? No    Attending Nurse:  RONALDO GREY RN     Second RN/Staff Member:  Kelin FergusonRN

## 2023-02-24 NOTE — NURSING
Nurses Note -- 4 Eyes      2/23/2023   7:15 PM      Skin assessed during: Q Shift Change      [] No Pressure Injuries Present    []Prevention Measures Documented      [x] Yes- Altered Skin Integrity Present or Discovered   [] LDA Added if Not in Epic (Describe Wound)   [] New Altered Skin Integrity was Present on Admit and Documented in LDA   [] Wound Image Taken    Wound Care Consulted? Yes    Attending Nurse:  Kelin Ferguson RN     Second RN/Staff Member:  Curt sargent RN   Pt has bilateral lower extremity wounds and skin tears that were present on admit. Wound care orders were sent with pt and the wound team will see her on Friday this week.

## 2023-02-24 NOTE — PT/OT/SLP PROGRESS
"Physical Therapy Treatment    Patient Name:  Zandra Veloz   MRN:  [id number]    Recommendations:     Discharge Recommendations: home with home health  Discharge Equipment Recommendations: walker, rolling  Barriers to discharge: Inaccessible home and Decreased caregiver support    Assessment:     Zandra Veloz is a 88 y.o. female admitted with a medical diagnosis of <principal problem not specified>.  She presents with the following impairments/functional limitations: weakness, impaired endurance, impaired self care skills, impaired functional mobility, impaired balance, decreased safety awareness.    Rehab Prognosis: Fair; patient would benefit from acute skilled PT services to address these deficits and reach maximum level of function.    Recent Surgery: * No surgery found *      Plan:     During this hospitalization, patient to be seen 5 x/week to address the identified rehab impairments via gait training, therapeutic activities, therapeutic exercises and progress toward the following goals:    Plan of Care Expires:  03/30/23    Subjective     Chief Complaint: Fatigue  Patient/Family Comments/goals: Pt. Agree to PT without question today with family present. Pt. Reports back pain but states it feels good to move during exercises.  Pain/Comfort:  Pain Rating 1:  (Pt. reports back pain at rest, states it's "not too bad")      Objective:     Communicated with Dorothy Crabtree RN prior to session.  Patient found HOB elevated with chao catheter upon PT entry to room.     General Precautions: Standard, fall  Orthopedic Precautions: LUE partial weight bearing  Braces: N/A  Respiratory Status: Room air     Functional Mobility:  Bed Mobility:     Rolling Right: modified independence  Scooting: stand by assistance and contact guard assistance  Supine to Sit: contact guard assistance and minimum assistance  Transfers:     Sit to Stand:  moderate assistance with rolling walker  Bed to Chair: minimum " assistance and moderate assistance with  rolling walker  using  Step Transfer  Balance: Pt. Participated in static to dynamic standing balance prior to sitting in recliner x 2 minutes with VC/ TC for upright posture, foot position (wide TAYLOR) and wt. Shifting lateral and ant./ post.       AM-PAC 6 CLICK MOBILITY  Turning over in bed (including adjusting bedclothes, sheets and blankets)?: 3  Sitting down on and standing up from a chair with arms (e.g., wheelchair, bedside commode, etc.): 3  Moving from lying on back to sitting on the side of the bed?: 3  Moving to and from a bed to a chair (including a wheelchair)?: 3  Need to walk in hospital room?: 3  Climbing 3-5 steps with a railing?: 1  Basic Mobility Total Score: 16       Treatment & Education:  Pt. Participated in mobility per above and Supine bilateral LE PREs 2 x 15 reps each of: AP with MRE, QS, Heel slides with MRE and Hip IR/ ER, Hip ABD/ ADD all with assist prior to mobility training.   Pt. Participated in Seated Trunk Rotations with arms crossed x 10 reps.     Patient left  up in chair with two pillows behind low back, one at head and feet on floor  with call button in reach and family present..    GOALS:   Multidisciplinary Problems       Physical Therapy Goals          Problem: Physical Therapy    Goal Priority Disciplines Outcome Goal Variances Interventions   Physical Therapy Goal     PT, PT/OT Ongoing, Progressing     Description: Goals to be met by: 2 weeks     Patient will increase functional independence with mobility by performin. Supine to sit with MInimal Assistance  2. Sit to supine with MInimal Assistance  3. Rolling to Left and Right with Stand-by Assistance.  4. Sit to stand transfer with Minimal Assistance  5. Bed to chair transfer with Minimal Assistance using Rolling Walker  6. Gait  x 75 feet with Minimal Assistance using Rolling Walker.     Goals to be met by: 4 weeks     Patient will increase functional independence with  mobility by performin. Supine to sit with Stand-by Assistance  2. Sit to supine with Stand-by Assistance  3. Rolling to Left and Right with Modified [place].  4. Sit to stand transfer with Stand-by Assistance  5. Bed to chair transfer with Stand-by Assistance using Rolling Walker  6. Gait  x 150 feet with Minimal Assistance using Rolling Walker.                          Time Tracking:     PT Received On: 23  PT Start Time: 1127     PT Stop Time: 1207  PT Total Time (min): 40 min     Billable Minutes: Therapeutic Activity 10 and Therapeutic Exercise 30    Treatment Type: Treatment  PT/PTA: PTA     PTA Visit Number: 1     2023

## 2023-02-24 NOTE — PT/OT/SLP PROGRESS
Occupational Therapy   Treatment    Name: Zandra Veloz  MRN: [id number]  Admitting Diagnosis:  <principal problem not specified>       Recommendations:     Discharge Recommendations: home with home health  Discharge Equipment Recommendations:  walker, rolling  Barriers to discharge:       Assessment:     Zandra Veloz is a 88 y.o. female with a medical diagnosis of <principal problem not specified>.  She presents with weakness, decreased functional mobility, and decline in ADLs. Performance deficits affecting function are weakness, impaired endurance, impaired self care skills, impaired functional mobility, gait instability, orthopedic precautions.     Rehab Prognosis:  Fair; patient would benefit from acute skilled OT services to address these deficits and reach maximum level of function.       Plan:     Patient to be seen 5 x/week to address the above listed problems via self-care/home management, therapeutic activities, therapeutic exercises, neuromuscular re-education  Plan of Care Expires:    Plan of Care Reviewed with: patient    Subjective     Pain/Comfort:  Pain Rating 1: 0/10    Objective:     Communicated with: RN prior to session.  Patient found up in chair with   upon OT entry to room.    General Precautions: Standard, fall    Orthopedic Precautions:LUE partial weight bearing  Braces:    Respiratory Status: Room air     Occupational Performance:     Bed Mobility:    Patient completed Sit to Supine with minimum assistance     Functional Mobility/Transfers:  Patient completed Sit <> Stand Transfer with minimum assistance  with  standard walker   Patient completed Bed <> Chair Transfer using Step Transfer technique with minimum assistance with standard walker  Functional Mobility: pt performed steps from bedside chair back to bed with SW with Liset    Activities of Daily Living:  Not performed      [facility] 6 Click ADL:      Treatment & Education:  Pt performed 2 sets x 15 reps each bicep  curls yellow dowel, rows yellow tband, tricep press yellow tband, and scapular retraction AROM in order to improve BUE strength and endurance to perform ADL and ADL t/f with less assist.     Patient left supine with all lines intact, call button in reach, and sitter present    GOALS:   Multidisciplinary Problems       Occupational Therapy Goals          Problem: Occupational Therapy    Goal Priority Disciplines Outcome Interventions   Occupational Therapy Goal     OT, PT/OT Ongoing, Progressing    Description: Goals to be met by: 03/22/23     Patient will increase functional independence with ADLs by performing:    UE Dressing with Modified [place] and Set-up Assistance.  LE Dressing with Modified [place] and Set-up Assistance.  Grooming while standing at sink with Modified [place] and Supervision.  Toileting from toilet with Modified [place] and Supervision for hygiene and clothing management.   Sitting at edge of bed x5-10 minutes with Modified [place] and Supervision.  Supine to sit with Modified [place] and Set-up Assistance.  Stand pivot transfers with Modified [place] and Supervision.  Squat pivot transfers with Modified [place] and Supervision.  Step transfer with Modified [place] and Supervision  Toilet transfer to toilet with Modified [place] and Supervision.                         Time Tracking:     OT Date of Treatment: 02/24/23  OT Start Time: 1300  OT Stop Time: 1330  OT Total Time (min): 30 min    Billable Minutes:Therapeutic Activity 10 minutes  Therapeutic Exercise 20 minutes    OT/BRIGHT: OT          2/24/2023

## 2023-02-25 PROCEDURE — S0179 MEGESTROL 20 MG: HCPCS | Performed by: FAMILY MEDICINE

## 2023-02-25 PROCEDURE — 94761 N-INVAS EAR/PLS OXIMETRY MLT: CPT

## 2023-02-25 PROCEDURE — 63600175 PHARM REV CODE 636 W HCPCS: Performed by: FAMILY MEDICINE

## 2023-02-25 PROCEDURE — 25000003 PHARM REV CODE 250: Performed by: PHYSICIAN ASSISTANT

## 2023-02-25 PROCEDURE — 27000981 HC MATTRESS, ACCUCAIR DAILY RENTAL

## 2023-02-25 PROCEDURE — 11000004 HC SNF PRIVATE

## 2023-02-25 PROCEDURE — 27000944

## 2023-02-25 PROCEDURE — 25000003 PHARM REV CODE 250: Performed by: FAMILY MEDICINE

## 2023-02-25 PROCEDURE — 25000242 PHARM REV CODE 250 ALT 637 W/ HCPCS: Performed by: PHYSICIAN ASSISTANT

## 2023-02-25 PROCEDURE — 63600175 PHARM REV CODE 636 W HCPCS: Performed by: PHYSICIAN ASSISTANT

## 2023-02-25 RX ADMIN — ALPRAZOLAM 1 MG: 1 TABLET ORAL at 09:02

## 2023-02-25 RX ADMIN — LEVOTHYROXINE SODIUM 125 MCG: 0.03 TABLET ORAL at 05:02

## 2023-02-25 RX ADMIN — GABAPENTIN 100 MG: 100 CAPSULE ORAL at 09:02

## 2023-02-25 RX ADMIN — SENNOSIDES AND DOCUSATE SODIUM 1 TABLET: 8.6; 5 TABLET ORAL at 09:02

## 2023-02-25 RX ADMIN — ZINC SULFATE 220 MG (50 MG) CAPSULE 220 MG: CAPSULE at 09:02

## 2023-02-25 RX ADMIN — GABAPENTIN 100 MG: 100 CAPSULE ORAL at 03:02

## 2023-02-25 RX ADMIN — MIRTAZAPINE 15 MG: 15 TABLET, FILM COATED ORAL at 09:02

## 2023-02-25 RX ADMIN — HYDRALAZINE HYDROCHLORIDE 50 MG: 50 TABLET, FILM COATED ORAL at 05:02

## 2023-02-25 RX ADMIN — POLYETHYLENE GLYCOL 3350 17 G: 17 POWDER, FOR SOLUTION ORAL at 09:02

## 2023-02-25 RX ADMIN — HYDRALAZINE HYDROCHLORIDE 50 MG: 50 TABLET, FILM COATED ORAL at 09:02

## 2023-02-25 RX ADMIN — TAMSULOSIN HYDROCHLORIDE 0.4 MG: 0.4 CAPSULE ORAL at 09:02

## 2023-02-25 RX ADMIN — ENOXAPARIN SODIUM 40 MG: 40 INJECTION SUBCUTANEOUS at 04:02

## 2023-02-25 RX ADMIN — OXYCODONE HYDROCHLORIDE AND ACETAMINOPHEN 500 MG: 500 TABLET ORAL at 09:02

## 2023-02-25 RX ADMIN — FLUTICASONE PROPIONATE 100 MCG: 50 SPRAY, METERED NASAL at 09:02

## 2023-02-25 RX ADMIN — CARVEDILOL 25 MG: 12.5 TABLET, FILM COATED ORAL at 09:02

## 2023-02-25 RX ADMIN — LISINOPRIL 40 MG: 20 TABLET ORAL at 09:02

## 2023-02-25 RX ADMIN — MULTIPLE VITAMINS W/ MINERALS TAB 1 TABLET: TAB at 09:02

## 2023-02-25 RX ADMIN — OXYCODONE HYDROCHLORIDE 10 MG: 5 TABLET ORAL at 12:02

## 2023-02-25 RX ADMIN — FAMOTIDINE 20 MG: 20 TABLET, FILM COATED ORAL at 09:02

## 2023-02-25 RX ADMIN — MEGESTROL ACETATE 400 MG: 40 SUSPENSION ORAL at 09:02

## 2023-02-25 RX ADMIN — PREDNISONE 5 MG: 5 TABLET ORAL at 09:02

## 2023-02-25 NOTE — PROGRESS NOTES
Nurses Note -- 4 Eyes      2/25/2023   1:36 AM      Skin assessed during: Q Shift Change      [] No Pressure Injuries Present    [x]Prevention Measures Documented      [] Yes- Altered Skin Integrity Present or Discovered   [] LDA Added if Not in Epic (Describe Wound)   [] New Altered Skin Integrity was Present on Admit and Documented in LDA   [x] Wound Image Taken    Wound Care Consulted? No    Attending Nurse:  Shirley Gutierrez LPN     Second RN/Staff Member:  JAVIER SHEPPARD RN

## 2023-02-26 PROCEDURE — 25000003 PHARM REV CODE 250: Performed by: NURSE PRACTITIONER

## 2023-02-26 PROCEDURE — S0179 MEGESTROL 20 MG: HCPCS | Performed by: FAMILY MEDICINE

## 2023-02-26 PROCEDURE — 11000004 HC SNF PRIVATE

## 2023-02-26 PROCEDURE — 94761 N-INVAS EAR/PLS OXIMETRY MLT: CPT

## 2023-02-26 PROCEDURE — 25000003 PHARM REV CODE 250: Performed by: PHYSICIAN ASSISTANT

## 2023-02-26 PROCEDURE — 25000003 PHARM REV CODE 250: Performed by: FAMILY MEDICINE

## 2023-02-26 PROCEDURE — 27000944

## 2023-02-26 PROCEDURE — 63600175 PHARM REV CODE 636 W HCPCS: Performed by: PHYSICIAN ASSISTANT

## 2023-02-26 PROCEDURE — 63600175 PHARM REV CODE 636 W HCPCS: Performed by: FAMILY MEDICINE

## 2023-02-26 PROCEDURE — 27000981 HC MATTRESS, ACCUCAIR DAILY RENTAL

## 2023-02-26 RX ORDER — HYDRALAZINE HYDROCHLORIDE 25 MG/1
25 TABLET, FILM COATED ORAL EVERY 8 HOURS
Status: DISCONTINUED | OUTPATIENT
Start: 2023-02-26 | End: 2023-03-02 | Stop reason: HOSPADM

## 2023-02-26 RX ORDER — MUPIROCIN 20 MG/G
OINTMENT TOPICAL 2 TIMES DAILY
Status: DISCONTINUED | OUTPATIENT
Start: 2023-02-26 | End: 2023-03-02 | Stop reason: HOSPADM

## 2023-02-26 RX ADMIN — OXYCODONE HYDROCHLORIDE AND ACETAMINOPHEN 500 MG: 500 TABLET ORAL at 08:02

## 2023-02-26 RX ADMIN — FAMOTIDINE 20 MG: 20 TABLET, FILM COATED ORAL at 09:02

## 2023-02-26 RX ADMIN — MUPIROCIN: 20 OINTMENT TOPICAL at 08:02

## 2023-02-26 RX ADMIN — GABAPENTIN 100 MG: 100 CAPSULE ORAL at 09:02

## 2023-02-26 RX ADMIN — PREDNISONE 5 MG: 5 TABLET ORAL at 09:02

## 2023-02-26 RX ADMIN — CARVEDILOL 25 MG: 12.5 TABLET, FILM COATED ORAL at 09:02

## 2023-02-26 RX ADMIN — MUPIROCIN: 20 OINTMENT TOPICAL at 09:02

## 2023-02-26 RX ADMIN — MEGESTROL ACETATE 400 MG: 40 SUSPENSION ORAL at 09:02

## 2023-02-26 RX ADMIN — MULTIPLE VITAMINS W/ MINERALS TAB 1 TABLET: TAB at 09:02

## 2023-02-26 RX ADMIN — OXYCODONE HYDROCHLORIDE 10 MG: 5 TABLET ORAL at 02:02

## 2023-02-26 RX ADMIN — ZINC SULFATE 220 MG (50 MG) CAPSULE 220 MG: CAPSULE at 09:02

## 2023-02-26 RX ADMIN — GABAPENTIN 100 MG: 100 CAPSULE ORAL at 08:02

## 2023-02-26 RX ADMIN — HYDRALAZINE HYDROCHLORIDE 25 MG: 25 TABLET ORAL at 10:02

## 2023-02-26 RX ADMIN — LISINOPRIL 40 MG: 20 TABLET ORAL at 09:02

## 2023-02-26 RX ADMIN — LEVOTHYROXINE SODIUM 125 MCG: 0.03 TABLET ORAL at 06:02

## 2023-02-26 RX ADMIN — OXYCODONE HYDROCHLORIDE AND ACETAMINOPHEN 500 MG: 500 TABLET ORAL at 09:02

## 2023-02-26 RX ADMIN — SENNOSIDES AND DOCUSATE SODIUM 1 TABLET: 8.6; 5 TABLET ORAL at 09:02

## 2023-02-26 RX ADMIN — OXYCODONE HYDROCHLORIDE 10 MG: 5 TABLET ORAL at 10:02

## 2023-02-26 RX ADMIN — ENOXAPARIN SODIUM 40 MG: 40 INJECTION SUBCUTANEOUS at 04:02

## 2023-02-26 RX ADMIN — CARVEDILOL 25 MG: 12.5 TABLET, FILM COATED ORAL at 08:02

## 2023-02-26 RX ADMIN — MIRTAZAPINE 15 MG: 15 TABLET, FILM COATED ORAL at 08:02

## 2023-02-26 RX ADMIN — SENNOSIDES AND DOCUSATE SODIUM 1 TABLET: 8.6; 5 TABLET ORAL at 08:02

## 2023-02-26 RX ADMIN — POLYETHYLENE GLYCOL 3350 17 G: 17 POWDER, FOR SOLUTION ORAL at 09:02

## 2023-02-26 RX ADMIN — ALPRAZOLAM 1 MG: 1 TABLET ORAL at 08:02

## 2023-02-26 RX ADMIN — TAMSULOSIN HYDROCHLORIDE 0.4 MG: 0.4 CAPSULE ORAL at 09:02

## 2023-02-26 RX ADMIN — GABAPENTIN 100 MG: 100 CAPSULE ORAL at 02:02

## 2023-02-26 RX ADMIN — FLUTICASONE PROPIONATE 100 MCG: 50 SPRAY, METERED NASAL at 09:02

## 2023-02-27 LAB
ALBUMIN SERPL BCP-MCNC: 2.7 G/DL (ref 3.5–5)
ALBUMIN/GLOB SERPL: 0.9 {RATIO}
ALP SERPL-CCNC: 47 U/L (ref 55–142)
ALT SERPL W P-5'-P-CCNC: 18 U/L (ref 13–56)
ANION GAP SERPL CALCULATED.3IONS-SCNC: 12 MMOL/L (ref 7–16)
AST SERPL W P-5'-P-CCNC: 12 U/L (ref 15–37)
BASOPHILS # BLD AUTO: 0.06 K/UL (ref 0–0.2)
BASOPHILS NFR BLD AUTO: 0.4 % (ref 0–1)
BILIRUB SERPL-MCNC: 0.2 MG/DL (ref ?–1.2)
BUN SERPL-MCNC: 20 MG/DL (ref 7–18)
BUN/CREAT SERPL: 24 (ref 6–20)
CALCIUM SERPL-MCNC: 8.5 MG/DL (ref 8.5–10.1)
CHLORIDE SERPL-SCNC: 108 MMOL/L (ref 98–107)
CO2 SERPL-SCNC: 26 MMOL/L (ref 21–32)
CREAT SERPL-MCNC: 0.82 MG/DL (ref 0.55–1.02)
DIFFERENTIAL METHOD BLD: ABNORMAL
EGFR (NO RACE VARIABLE) (RUSH/TITUS): 69 ML/MIN/1.73M²
EOSINOPHIL # BLD AUTO: 0.1 K/UL (ref 0–0.5)
EOSINOPHIL NFR BLD AUTO: 0.7 % (ref 1–4)
EOSINOPHIL NFR BLD MANUAL: 1 % (ref 1–4)
ERYTHROCYTE [DISTWIDTH] IN BLOOD BY AUTOMATED COUNT: 13.7 % (ref 11.5–14.5)
GLOBULIN SER-MCNC: 2.9 G/DL (ref 2–4)
GLUCOSE SERPL-MCNC: 94 MG/DL (ref 74–106)
HCT VFR BLD AUTO: 33.8 % (ref 38–47)
HGB BLD-MCNC: 10.5 G/DL (ref 12–16)
LYMPHOCYTES # BLD AUTO: 1.8 K/UL (ref 1–4.8)
LYMPHOCYTES NFR BLD AUTO: 13.2 % (ref 27–41)
LYMPHOCYTES NFR BLD MANUAL: 20 % (ref 27–41)
MCH RBC QN AUTO: 29.3 PG (ref 27–31)
MCHC RBC AUTO-ENTMCNC: 31.1 G/DL (ref 32–36)
MCV RBC AUTO: 94.4 FL (ref 80–96)
MONOCYTES # BLD AUTO: 1.03 K/UL (ref 0–0.8)
MONOCYTES NFR BLD AUTO: 7.5 % (ref 2–6)
MONOCYTES NFR BLD MANUAL: 5 % (ref 2–6)
MPC BLD CALC-MCNC: 9.7 FL (ref 9.4–12.4)
NEUTROPHILS # BLD AUTO: 10.67 K/UL (ref 1.8–7.7)
NEUTROPHILS NFR BLD AUTO: 78.2 % (ref 53–65)
NEUTS SEG NFR BLD MANUAL: 74 % (ref 50–62)
NRBC BLD MANUAL-RTO: ABNORMAL %
PLATELET # BLD AUTO: 264 K/UL (ref 150–400)
PLATELET MORPHOLOGY: NORMAL
POTASSIUM SERPL-SCNC: 4.1 MMOL/L (ref 3.5–5.1)
PROT SERPL-MCNC: 5.6 G/DL (ref 6.4–8.2)
RBC # BLD AUTO: 3.58 M/UL (ref 4.2–5.4)
RBC MORPH BLD: NORMAL
SODIUM SERPL-SCNC: 142 MMOL/L (ref 136–145)
WBC # BLD AUTO: 13.66 K/UL (ref 4.5–11)

## 2023-02-27 PROCEDURE — S0179 MEGESTROL 20 MG: HCPCS | Performed by: FAMILY MEDICINE

## 2023-02-27 PROCEDURE — 25000003 PHARM REV CODE 250: Performed by: NURSE PRACTITIONER

## 2023-02-27 PROCEDURE — 25000003 PHARM REV CODE 250: Performed by: FAMILY MEDICINE

## 2023-02-27 PROCEDURE — 85025 COMPLETE CBC W/AUTO DIFF WBC: CPT | Performed by: FAMILY MEDICINE

## 2023-02-27 PROCEDURE — 99900035 HC TECH TIME PER 15 MIN (STAT)

## 2023-02-27 PROCEDURE — 63600175 PHARM REV CODE 636 W HCPCS: Performed by: PHYSICIAN ASSISTANT

## 2023-02-27 PROCEDURE — 99308 PR NURSING FAC CARE, SUBSEQ, MINOR COMPLIC: ICD-10-PCS | Mod: ,,, | Performed by: FAMILY MEDICINE

## 2023-02-27 PROCEDURE — 63600175 PHARM REV CODE 636 W HCPCS: Performed by: FAMILY MEDICINE

## 2023-02-27 PROCEDURE — 97535 SELF CARE MNGMENT TRAINING: CPT

## 2023-02-27 PROCEDURE — 11000004 HC SNF PRIVATE

## 2023-02-27 PROCEDURE — 80053 COMPREHEN METABOLIC PANEL: CPT | Performed by: FAMILY MEDICINE

## 2023-02-27 PROCEDURE — 99308 SBSQ NF CARE LOW MDM 20: CPT | Mod: ,,, | Performed by: FAMILY MEDICINE

## 2023-02-27 PROCEDURE — 25000003 PHARM REV CODE 250: Performed by: PHYSICIAN ASSISTANT

## 2023-02-27 PROCEDURE — 97110 THERAPEUTIC EXERCISES: CPT | Mod: CQ

## 2023-02-27 PROCEDURE — 84630 ASSAY OF ZINC: CPT | Mod: 90 | Performed by: FAMILY MEDICINE

## 2023-02-27 PROCEDURE — 97530 THERAPEUTIC ACTIVITIES: CPT | Mod: CQ

## 2023-02-27 PROCEDURE — 94761 N-INVAS EAR/PLS OXIMETRY MLT: CPT

## 2023-02-27 RX ADMIN — TAMSULOSIN HYDROCHLORIDE 0.4 MG: 0.4 CAPSULE ORAL at 08:02

## 2023-02-27 RX ADMIN — ZINC SULFATE 220 MG (50 MG) CAPSULE 220 MG: CAPSULE at 08:02

## 2023-02-27 RX ADMIN — Medication 6 MG: at 08:02

## 2023-02-27 RX ADMIN — FLUTICASONE PROPIONATE 100 MCG: 50 SPRAY, METERED NASAL at 08:02

## 2023-02-27 RX ADMIN — MUPIROCIN: 20 OINTMENT TOPICAL at 09:02

## 2023-02-27 RX ADMIN — MEGESTROL ACETATE 400 MG: 40 SUSPENSION ORAL at 08:02

## 2023-02-27 RX ADMIN — OXYCODONE HYDROCHLORIDE AND ACETAMINOPHEN 500 MG: 500 TABLET ORAL at 08:02

## 2023-02-27 RX ADMIN — MIRTAZAPINE 15 MG: 15 TABLET, FILM COATED ORAL at 08:02

## 2023-02-27 RX ADMIN — SENNOSIDES AND DOCUSATE SODIUM 1 TABLET: 8.6; 5 TABLET ORAL at 08:02

## 2023-02-27 RX ADMIN — LEVOTHYROXINE SODIUM 125 MCG: 0.03 TABLET ORAL at 05:02

## 2023-02-27 RX ADMIN — ENOXAPARIN SODIUM 40 MG: 40 INJECTION SUBCUTANEOUS at 05:02

## 2023-02-27 RX ADMIN — HYDRALAZINE HYDROCHLORIDE 25 MG: 25 TABLET ORAL at 09:02

## 2023-02-27 RX ADMIN — POLYETHYLENE GLYCOL 3350 17 G: 17 POWDER, FOR SOLUTION ORAL at 08:02

## 2023-02-27 RX ADMIN — GABAPENTIN 100 MG: 100 CAPSULE ORAL at 08:02

## 2023-02-27 RX ADMIN — OXYCODONE HYDROCHLORIDE 10 MG: 5 TABLET ORAL at 08:02

## 2023-02-27 RX ADMIN — MUPIROCIN: 20 OINTMENT TOPICAL at 08:02

## 2023-02-27 RX ADMIN — PREDNISONE 5 MG: 5 TABLET ORAL at 08:02

## 2023-02-27 RX ADMIN — CARVEDILOL 25 MG: 12.5 TABLET, FILM COATED ORAL at 08:02

## 2023-02-27 RX ADMIN — OXYCODONE HYDROCHLORIDE 10 MG: 5 TABLET ORAL at 02:02

## 2023-02-27 RX ADMIN — FAMOTIDINE 20 MG: 20 TABLET, FILM COATED ORAL at 08:02

## 2023-02-27 RX ADMIN — MULTIPLE VITAMINS W/ MINERALS TAB 1 TABLET: TAB at 08:02

## 2023-02-27 RX ADMIN — LISINOPRIL 40 MG: 20 TABLET ORAL at 08:02

## 2023-02-27 RX ADMIN — ALPRAZOLAM 1 MG: 1 TABLET ORAL at 08:02

## 2023-02-27 RX ADMIN — HYDRALAZINE HYDROCHLORIDE 25 MG: 25 TABLET ORAL at 05:02

## 2023-02-27 RX ADMIN — GABAPENTIN 100 MG: 100 CAPSULE ORAL at 02:02

## 2023-02-27 NOTE — HOSPITAL COURSE
02/27/2023---progress note--- the patient has been he for 6-7 days.  She had a good bit of pain in her left hip.  And had a restless night last night overall she is been getting stronger.  She is very weary of skin tears.  Normal bowel movements normal p.o. intake no shortness breath no chest pain working with PT and OT.  CBC and CMP were done with a zinc level this morning will review these and make changes when necessary    02/27/2023-discuss the patient's care with her family.  The patient is wishing to go home and the family wants her to be followed by [facility] home health care at home.  She will need a hospital bed for discharge and will start bladder training her with getting her Mathews catheter out.  Patient states she is just tired of being in the hospital.  She also has some tenderness on the left heel.  Will put a press..support pad over this area    03/02/2023 HOSPITAL DAY 10  DISCHARGE  PT IS AN 88 YR OLD ADMITED TO [facility] SWING BED WITH HX REVISION L THR AND MULTIPLE SKIN TEARS WITH EXTREMELY FRAGILE SKIN TEARS. PT HAD MUSCLE WEAKNESS WITH PT/OT RECOMMENDED WITH LIMITED PARTICIPATION PER PT DUE TO FATIGUE AND SKIN WOUNDS. THE WOUND CARE TEAM FOLLOWED PT WITH RECOMMENDATIONS MADE FOR WOUND CARE.RD EVALUATED PT AND MADE RECOMMENDATIONS AS DISCUSSED WITH FAMILY ON DX WITH MEAL INTAKE 50-75%. PT REQUIRED A MATHEWS AND THIS WAS REMOVED ON DC WITH I/O CATHS RECOMMENDED.  PT WAS ADVISED TO INCREASE REST AND FLUIDS. PT WAS ADVISED TO CONTINUE MEDICATIONS AS DIRECTED AND TAKE OVER THE COUNTER MIRALAX FOR CONSTIPATION, TYLENOL FOR MILD PAIN AND BHARGAVI FOR WOUND HEALING. PT MAY TAKE MAGIC CUP ICE CREAM AND PREMIER PROTEIN SHAKES DAILY.  PT WAS ADVISED REGARDING FALL PRECAUTIONS.  PT WAS ADVISED REGARDING TAKING ASPIRIN DAILY TO BE DISCUSSED WITH DR FULLER  PT WAS ADVISED TO KEEP MEDICAL APPOINTMENT WITH DR FULLER ON MONDAY  PT HAS A MATHEWS AND MATHEWS MAY BE DC TOMORROW AND IN AND OUT CATH EVERY 8 HOURS;  URINE CULTURE IS PENDING  PT WAS ADVISED WOUND CARE WILL EVALUATE TOMORROW    Into

## 2023-02-27 NOTE — NURSING
Nurses Note -- 4 Eyes      2/27/2023   07:02AM      Skin assessed during: Q Shift Change      [x] No Pressure Injuries Present    []Prevention Measures Documented      [] Yes- Altered Skin Integrity Present or Discovered   [] LDA Added if Not in Epic (Describe Wound)   [] New Altered Skin Integrity was Present on Admit and Documented in LDA   [] Wound Image Taken    Wound Care Consulted? No    Attending Nurse:  Slim Rodrigues LPN     Second RN/Staff Member:  LESLEE PUENTE     @ 07:02AM RESTING IN BED AWAKE TALKING WITH SITTER. SITTER PRESENT @ BEDSIDE. ALERT AND ORIENTED. NO DISTRESS NOTED. DENIES PAIN. COMMUNICATION BOARD UPDATED. OFF GOING NURSE PRESENT PERFORMING BEDSIDE SHIFT REPORT. NO CONCERNS VOICED. ERICRN WILL RESOURCE ME WITH THIS PT.

## 2023-02-27 NOTE — PLAN OF CARE
Spoke with daughter and pt. Is requesting to d/c home with sitters and [facility] by end of week. Ordered a hospital bed with low air loss mattress and bedside table per daughter's request. Will plan for d/c home on Thursday. Will follow.

## 2023-02-27 NOTE — PT/OT/SLP PROGRESS
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  She presents with decreased balance, safety, strength and mobility. Performance deficits affecting function are weakness, impaired endurance, impaired self care skills, impaired functional mobility, impaired balance, decreased safety awareness. Pt was agreed to completed an ADL retraining in room but refused to go to the therapy gym due heel soreness..     Rehab Prognosis:  Fair; patient would benefit from acute skilled OT services to address these deficits and reach maximum level of function.       Plan:     Patient to be seen 5 x/week to address the above listed problems via self-care/home management, therapeutic activities, therapeutic exercises, neuromuscular re-education  Plan of Care Expires:    Plan of Care Reviewed with: patient    Subjective     Pain/Comfort:  Pain Rating 1: 0/10  Location - Side 1: Right  Location 1: hip    Objective:     Communicated with: Pt prior to session.  Patient found up in chair  upon OT entry to room.    General Precautions: Standard, fall    Orthopedic Precautions:LUE partial weight bearing  Braces:    Respiratory Status: Room air     Occupational Performance:     Bed Mobility:    Patient completed Rolling/Turning to Left with  minimum assistance  Patient completed Rolling/Turning to Right with minimum assistance  Patient completed Supine to Sit with Min A assistance       Functional Mobility/Transfers:  Patient completed Sit <> Stand Transfer with  min assistance  with  rolling walker   Functional Mobility: Pt completed side stepping and functional t/f with Min A    Activities of Daily Living:  Sit to stand t/f with Mod A with cues to stand upright and to keep her LEs underneath her body  Sit to supine t/f with Mod A with assistance with Les  Toilet t/f with Min A  Toileting with Max A      AMPAC 6 Click ADL: 14    Treatment & Education:      Patient left supine with all lines intact; Pt insisted on returning to bed after short session.    GOALS:   Multidisciplinary Problems       Occupational Therapy Goals          Problem: Occupational Therapy    Goal Priority Disciplines Outcome Interventions   Occupational Therapy Goal     OT, PT/OT Ongoing, Progressing    Description: Goals to be met by: 03/22/23     Patient will increase functional independence with ADLs by performing:    UE Dressing with Modified [place] and Set-up Assistance.  LE Dressing with Modified [place] and Set-up Assistance.  Grooming while standing at sink with Modified [place] and Supervision.  Toileting from toilet with Modified [place] and Supervision for hygiene and clothing management.   Sitting at edge of bed x5-10 minutes with Modified [place] and Supervision.  Supine to sit with Modified [place] and Set-up Assistance.  Stand pivot transfers with Modified [place] and Supervision.  Squat pivot transfers with Modified [place] and Supervision.  Step transfer with Modified [place] and Supervision  Toilet transfer to toilet with Modified [place] and Supervision.                         Time Tracking:     OT Date of Treatment:    OT Start Time:  1:15  OT Stop Time:  1:30  OT Total Time (min):      Billable Minutes:Self Care/Home Management 15    OT/BRIGHT: OT          2/27/2023

## 2023-02-27 NOTE — PT/OT/SLP PROGRESS
Physical Therapy Treatment    Patient Name:  Zandra Veloz   MRN:  [id number]    Recommendations:     Discharge Recommendations: home with home health  Discharge Equipment Recommendations: walker, rolling  Barriers to discharge: Inaccessible home and Decreased caregiver support    Assessment:     Zandra Veloz is a 88 y.o. female admitted with a medical diagnosis of <principal problem not specified>.  She presents with the following impairments/functional limitations: impaired self care skills, impaired balance, decreased safety awareness, decreased ROM. Pt. With fair participation    Rehab Prognosis: Fair and Poor; patient would benefit from acute skilled PT services to address these deficits and reach maximum level of function.    Recent Surgery: * No surgery found *      Plan:     During this hospitalization, patient to be seen 5 x/week to address the identified rehab impairments via gait training, therapeutic activities, therapeutic exercises and progress toward the following goals:    Plan of Care Expires:  03/30/23    Subjective     Chief Complaint: skin sensitivity  Patient/Family Comments/goals: Pt. Hesitantly agrees to bed exercises, sitter encouraged participation.   Pain/Comfort:  Pain Rating 1: 0/10 (Pt. Reported tenderness on inner arm Left during transfer and left knee pain with AAROM) Did not rate on pain scale.   Pain Addressed 2: Reposition      Objective:     Communicated with Slim Rodrigues LPN prior to session.  Patient found HOB elevated with chao catheter upon PT entry to room.     General Precautions: Standard, fall  Orthopedic Precautions: LLE partial weight bearing  Braces: N/A  Respiratory Status: Room air     Functional Mobility:  Bed Mobility:     Scooting: minimum assistance and moderate assistance  Supine to Sit: minimum assistance  Sit to Supine: minimum assistance and of 2 persons  Transfers:     Sit to Stand:  minimum assistance with rolling walker with  frequent VC/ TC for safe hand and foot placement required.   Bed to Chair: minimum assistance and moderate assistance with  rolling walker  using  Step Transfer  Toilet Transfer: minimum assistance and moderate assistance with  rolling walker  using  Step Transfer  Balance: Pt. Participated in seated wt. Shifts and static standing with RW x 12 mins total with rests required to complete.      AM-PAC 6 CLICK MOBILITY  Turning over in bed (including adjusting bedclothes, sheets and blankets)?: 3  Sitting down on and standing up from a chair with arms (e.g., wheelchair, bedside commode, etc.): 3  Moving from lying on back to sitting on the side of the bed?: 3  Moving to and from a bed to a chair (including a wheelchair)?: 3  Need to walk in hospital room?: 2  Climbing 3-5 steps with a railing?: 1  Basic Mobility Total Score: 15       Treatment & Education:  Pt. Participated in mobility per above and : 2 x 15 reps each of Active to AA; AP, Heel slides, hip ABD/ ADD and SAQs each bilateral with rests required to complete.     Patient left HOB elevated with call button in reach, sitter present and nursing notified.    GOALS:   Multidisciplinary Problems       Physical Therapy Goals          Problem: Physical Therapy    Goal Priority Disciplines Outcome Goal Variances Interventions   Physical Therapy Goal     PT, PT/OT Ongoing, Progressing     Description: Goals to be met by: 2 weeks     Patient will increase functional independence with mobility by performin. Supine to sit with MInimal Assistance  2. Sit to supine with MInimal Assistance  3. Rolling to Left and Right with Stand-by Assistance.  4. Sit to stand transfer with Minimal Assistance  5. Bed to chair transfer with Minimal Assistance using Rolling Walker  6. Gait  x 75 feet with Minimal Assistance using Rolling Walker.     Goals to be met by: 4 weeks     Patient will increase functional independence with mobility by performin. Supine to sit with  Stand-by Assistance  2. Sit to supine with Stand-by Assistance  3. Rolling to Left and Right with Modified [place].  4. Sit to stand transfer with Stand-by Assistance  5. Bed to chair transfer with Stand-by Assistance using Rolling Walker  6. Gait  x 150 feet with Minimal Assistance using Rolling Walker.                          Time Tracking:     PT Received On: 02/27/23  PT Start Time: 1339     PT Stop Time: 1440  PT Total Time (min): 61 min     Billable Minutes: Therapeutic Activity 31 and Therapeutic Exercise 30    Treatment Type: Treatment  PT/PTA: PTA     PTA Visit Number: 2     02/27/2023

## 2023-02-27 NOTE — CONSULTS
[facility] - Medical Surgical Unit  Adult Nutrition  Consult Note    SUMMARY     Recommendations    Recommendation/Intervention: 1. Magic cup with L and D tray 2. Check Zinc level; if WNL then D/C ZnSO4 3. Rec to MD to inc Megace to BID 4. Honor prefs 5. Change to Regular diet  Goals: Meet EEN >75% to improve nutritional status and aid in WND healing  Nutrition Goal Status: new    Assessment and Plan    No notes have been filed under this hospital service.  Service: Nutrition       Malnutrition Assessment  Malnutrition Type: acute illness or injury  Skin (Micronutrient): bruised, cuts unhealed       Energy Intake (Malnutrition): less than or equal to 50% for greater than or equal to 1 month  Subcutaneous Fat (Malnutrition): moderate depletion  Muscle Mass (Malnutrition): moderate depletion   Orbital Region (Subcutaneous Fat Loss): moderate depletion  Upper Arm Region (Subcutaneous Fat Loss): moderate depletion  Thoracic and Lumbar Region: moderate depletion   [place] Region (Muscle Loss): moderate depletion  Clavicle Bone Region (Muscle Loss): moderate depletion                 Reason for Assessment    Reason For Assessment: consult (swing bed pt)  Diagnosis:  (s/p L femur fx, multiple skin tears)  Relevant Medical History: Adv age, HTN, Hypothyroidism, recent UTI  Interdisciplinary Rounds: attended  General Information Comments: RDN visited pt this a.m. and she was in pain.  Staff is addressing her pain.  Intake is not good when she is hurting.  Meal intake since admit ~70%.  RDN took prefs and reviewed POC with pt and her family.  She denies recent WL but appears wasted.  Multiple WNDs noted. She receives Megace once daily. She did not tolerate Vamshi or Ensure well.    Nutrition Risk Screen    Nutrition Risk Screen: reduced oral intake over the last month, large or nonhealing wound, burn or pressure injury    Nutrition/Diet History    Patient Reported Diet/Restrictions/Preferences:  "general  Spiritual, Cultural Beliefs, Sikh Practices, Values that Affect Care: no  Food Allergies: NKFA  Factors Affecting Nutritional Intake: decreased appetite, pain    Anthropometrics    Temp: 99.2 °F (37.3 °C)  Height Method: Stated  Height: 5' 6" (167.6 cm)  Height (inches): 66 in  Weight Method: Bed Scale  Weight: 58.5 kg (129 lb)  Weight (lb): 129 lb  Ideal Body Weight (IBW), Female: 130 lb  % Ideal Body Weight, Female (lb): 99.23 %  BMI (Calculated): 20.8  BMI Grade: 18.5-24.9 - normal       Lab/Procedures/Meds    Pertinent Labs Reviewed: reviewed  Pertinent Labs Comments: Hgb 9.9, Hct 30.7, BUN23, crt 1.05, Alb 2.8  Pertinent Medications Reviewed: reviewed  Pertinent Medications Comments: Pepcid, Zinc sulfate, Megace one daily, Synthroid, Lisinopril, Prednisone, Remeron    Physical Findings/Assessment         Estimated/Assessed Needs    Weight Used For Calorie Calculations: 58.5 kg (129 lb)  Energy Calorie Requirements (kcal): 8339-9351  Energy Need Method: Kcal/kg  Protein Requirements: 71-89  Weight Used For Protein Calculations: 59 kg (130 lb 1.1 oz)     Estimated Fluid Requirement Method: RDA Method  RDA Method (mL): 1755         Nutrition Prescription Ordered    Current Diet Order: Cardiac  Nutrition Order Comments: recommend Regular diet    Evaluation of Received Nutrient/Fluid Intake    Energy Calories Required: not meeting needs  Protein Required: not meeting needs  Fluid Required: not meeting needs  Tolerance: not tolerating  % Intake of Estimated Energy Needs: 50 - 75 %  % Meal Intake: 50 - 75 %    Nutrition Risk    Level of Risk/Frequency of Follow-up: high       Monitor and Evaluation    Food and Nutrient Intake: food and beverage intake  Anthropometric Measurements: weight  Biochemical Data, Medical Tests and Procedures: electrolyte and renal panel  Nutrition-Focused Physical Findings: overall appearance, skin       Nutrition Follow-Up    RD Follow-up?: Yes  "

## 2023-02-27 NOTE — NURSING
"@ 13:51pm resting in bed talking with sitter and tech. alert and oriented. no distress noted. safety measures in place. no c/o pain. encouarged her to drink more fluids re: low output today. she refused therapy even after educating. Stated "im not going bc I cant bear any weight". Therapist present @ bedside. Spit out her am dose of vit c pill stated "I cant swallow it.   "

## 2023-02-27 NOTE — NURSING
"Assisted therapy with transfer from chair to bed.  Pt stated "you have no idea what you've done to my arm" Completed transfer and asked if I could assist her with anything else and she stated "you have done enough, you've bruised my arm". Bruises were present on admission, please refer to wound care notes.   "

## 2023-02-27 NOTE — PROGRESS NOTES
[facility] - Medical Surgical Unit  Hospital Medicine  Progress Note    Patient Name: Zandra Veloz  MRN: [id number]  Patient Class: IP- Swing   Admission Date: 2/20/2023  Length of Stay: 8 days  Attending Physician: Lebron King DO  Primary Care Provider: Brandon Thornton MD        Subjective:     Principal Problem:<principal problem not specified>        HPI:  Patient comes in for muscle strengthening.  Patient is 88 years old who has been in specialty for some time in [facility] she is had a prosthetic fixed of the left hip.  And revision of the total hip replacement.  Done on 12/22/2022.  She has history of heart murmur, palpitations, lumbar radiculopathy, hypertension, arthritis, multiple falls at home and fragile skin with multiple tears.  With PT and OT she is able to take 3 shallow steps around a chair and sit and stand moderate assist.      Overview/Hospital Course:  02/27/2023---progress note--- the patient has been he for 6-7 days.  She had a good bit of pain in her left hip.  And had a restless night last night overall she is been getting stronger.  She is very weary of skin tears.  Normal bowel movements normal p.o. intake no shortness breath no chest pain working with PT and OT.  CBC and CMP were done with a zinc level this morning will review these and make changes when necessary    02/27/2023-discuss the patient's care with her family.  The patient is wishing to go home and the family wants her to be followed by [facility] home health care at home.  She will need a hospital bed for discharge and will start bladder training her with getting her Burk catheter out.  Patient states she is just tired of being in the hospital.  She also has some tenderness on the left heel.  Will put a press..support pad over this area      No new subjective & objective note has been filed under this hospital service since the last note was generated.      Assessment/Plan:      No  notes have been filed under this hospital service.  Service: Hospital Medicine    VTE Risk Mitigation (From admission, onward)           Ordered     enoxaparin injection 40 mg  Daily         02/21/23 0642     IP VTE HIGH RISK PATIENT  Once         02/21/23 0555     Place sequential compression device  Until discontinued         02/21/23 0555     Place KARINA hose  Until discontinued         02/21/23 0555                    Discharge Planning   YANIV:      Code Status: Full Code   Is the patient medically ready for discharge?:     Reason for patient still in hospital (select all that apply): Treatment  Discharge Plan A: Home, Home Health      Testing 123 testing 123---this is a note to state that I did a face-to-face interview with this patient.  The patient is in a hospital bed.  The patient requires frequent changes a body position and has or has and immediate need for the change in body positions due to the medical condition.  The patient requires body positioning in ways to flex in standard bed.  To not only reduce patient at times these head the bed at 30° or more due to COPD problems and or aspiration.  The patient also has a wounds and sores on certain areas of her body especially the buttocks the patient needs to be I have a low air loss mattress on to avoid any kind of further wound development.  The patient has multiple stage II ulcers and skin tears.  Not only to the trunk with to the lower extremities and legs.  The patient can benefit from being on air loss mattress to help treat these wounds.  That are developed on the legs add buttocks.  The patient knows how to use has the cognitive ability to work a hospital bed with no complications            Lebron King,   Department of Hospital Medicine   [facility] Surgical Unit

## 2023-02-28 PROCEDURE — 25000003 PHARM REV CODE 250: Performed by: FAMILY MEDICINE

## 2023-02-28 PROCEDURE — 63600175 PHARM REV CODE 636 W HCPCS: Performed by: PHYSICIAN ASSISTANT

## 2023-02-28 PROCEDURE — 25000003 PHARM REV CODE 250: Performed by: NURSE PRACTITIONER

## 2023-02-28 PROCEDURE — 27000981 HC MATTRESS, ACCUCAIR DAILY RENTAL

## 2023-02-28 PROCEDURE — 25000003 PHARM REV CODE 250: Performed by: PHYSICIAN ASSISTANT

## 2023-02-28 PROCEDURE — 63600175 PHARM REV CODE 636 W HCPCS: Performed by: FAMILY MEDICINE

## 2023-02-28 PROCEDURE — 94761 N-INVAS EAR/PLS OXIMETRY MLT: CPT

## 2023-02-28 PROCEDURE — S0179 MEGESTROL 20 MG: HCPCS | Performed by: FAMILY MEDICINE

## 2023-02-28 PROCEDURE — 11000004 HC SNF PRIVATE

## 2023-02-28 PROCEDURE — 27000944

## 2023-02-28 RX ADMIN — TAMSULOSIN HYDROCHLORIDE 0.4 MG: 0.4 CAPSULE ORAL at 09:02

## 2023-02-28 RX ADMIN — OXYCODONE HYDROCHLORIDE 10 MG: 5 TABLET ORAL at 02:02

## 2023-02-28 RX ADMIN — ALPRAZOLAM 1 MG: 1 TABLET ORAL at 08:02

## 2023-02-28 RX ADMIN — GABAPENTIN 100 MG: 100 CAPSULE ORAL at 04:02

## 2023-02-28 RX ADMIN — GABAPENTIN 100 MG: 100 CAPSULE ORAL at 08:02

## 2023-02-28 RX ADMIN — ZINC SULFATE 220 MG (50 MG) CAPSULE 220 MG: CAPSULE at 09:02

## 2023-02-28 RX ADMIN — LEVOTHYROXINE SODIUM 125 MCG: 0.03 TABLET ORAL at 05:02

## 2023-02-28 RX ADMIN — HYDRALAZINE HYDROCHLORIDE 25 MG: 25 TABLET ORAL at 05:02

## 2023-02-28 RX ADMIN — POLYETHYLENE GLYCOL 3350 17 G: 17 POWDER, FOR SOLUTION ORAL at 09:02

## 2023-02-28 RX ADMIN — SENNOSIDES AND DOCUSATE SODIUM 1 TABLET: 8.6; 5 TABLET ORAL at 09:02

## 2023-02-28 RX ADMIN — PREDNISONE 5 MG: 5 TABLET ORAL at 09:02

## 2023-02-28 RX ADMIN — OXYCODONE HYDROCHLORIDE AND ACETAMINOPHEN 500 MG: 500 TABLET ORAL at 09:02

## 2023-02-28 RX ADMIN — GABAPENTIN 100 MG: 100 CAPSULE ORAL at 09:02

## 2023-02-28 RX ADMIN — OXYCODONE HYDROCHLORIDE AND ACETAMINOPHEN 500 MG: 500 TABLET ORAL at 08:02

## 2023-02-28 RX ADMIN — FLUTICASONE PROPIONATE 100 MCG: 50 SPRAY, METERED NASAL at 09:02

## 2023-02-28 RX ADMIN — MEGESTROL ACETATE 400 MG: 40 SUSPENSION ORAL at 09:02

## 2023-02-28 RX ADMIN — ENOXAPARIN SODIUM 40 MG: 40 INJECTION SUBCUTANEOUS at 04:02

## 2023-02-28 RX ADMIN — CARVEDILOL 25 MG: 12.5 TABLET, FILM COATED ORAL at 09:02

## 2023-02-28 RX ADMIN — LISINOPRIL 40 MG: 20 TABLET ORAL at 09:02

## 2023-02-28 RX ADMIN — CARVEDILOL 25 MG: 12.5 TABLET, FILM COATED ORAL at 08:02

## 2023-02-28 RX ADMIN — MUPIROCIN: 20 OINTMENT TOPICAL at 09:02

## 2023-02-28 RX ADMIN — SENNOSIDES AND DOCUSATE SODIUM 1 TABLET: 8.6; 5 TABLET ORAL at 08:02

## 2023-02-28 RX ADMIN — MUPIROCIN: 20 OINTMENT TOPICAL at 08:02

## 2023-02-28 RX ADMIN — MULTIPLE VITAMINS W/ MINERALS TAB 1 TABLET: TAB at 09:02

## 2023-02-28 RX ADMIN — MIRTAZAPINE 15 MG: 15 TABLET, FILM COATED ORAL at 08:02

## 2023-02-28 RX ADMIN — Medication 6 MG: at 08:02

## 2023-02-28 RX ADMIN — FAMOTIDINE 20 MG: 20 TABLET, FILM COATED ORAL at 09:02

## 2023-02-28 NOTE — PLAN OF CARE
[facility] - Medical Surgical Unit - Swing Bed   Interdisciplinary Team Meeting    Patient: Zandra Veloz   Today's Date: 2/28/2023   Estimated D/C Date: 3/3/23        Physician:Lebron King D.O. Nurse Practitioner:    Pharmacy:Abhi Wells, Pharm D Unit Director:  Bre Edwards, Admin.   : Gricelda Ford RN Physical/Occupational Therapy: Theresa Coburn OT   Speech Therapy: ST Yaniv    Nursing: Deidre Nevarez RN  Respiratory: Benji Soliz, Resp. D Dietary:  Marysol Villalpando, Dietary  Other:      Nurse  New Symptoms/Problems:   Last BM: 2/27/23 Urine: incontinent Diarrhea: No   Constipated: No Bowel: incontinent Burk: No   Isolation: No D/C Date:  Date:    O2 Device: Room Air O2 Flow:   SpO2: 95 %   Nutrition: Regular  Speech/Swallowing: No current speech or swallowing issues  Aspiration Precautions: No  Cognition: Memory issues    Physical Therapy  Physical Therapy/Gait: Min A of 2 persons with a RW ELOS: Plan to DC  3/3/23   Transfers: Minimal Assistance of 2 persons Range of Motion/Restrictions:      Occupational Therapy  Eating/Grooming: Standby Assistance Toileting: Minimal Assistance   Bathing: Moderate Assistance to Min A Dressing (Upper Body): Minimal Assistance   Dressing (Lower Body): Moderate Assistance        Tx Plan/Recommendations reviewed with family and/or patient on (date) 2/28/23.  Additional family Conference/Training:   D/C Plan/Recommendations: Home with HH  YANIV: 3/3/23    Pharmacy  Medication Changes (see MD orders in chart): Yes  MD:Lebron King D.O. Labs Reviewed: Yes New Lab Orders: Yes   MD/NP Signature:

## 2023-02-28 NOTE — PROGRESS NOTES
NEW WOUND CARE CONSULT          Patient Name: Zandra Veloz is a 88 y.o. female       Chief Complaint:  New Wound Care Consult    Review of patient's allergies indicates:   Allergen Reactions    Bactrim [sulfamethoxazole-trimethoprim]     Codeine     Levaquin [levofloxacin]         I have reviewed the patient's medical, surgical, family and social history.      Subjective:    HPI     89 YO WF seen today for inpatient evaluation. Pt is currently in swing bed due to lengthy hospitalization. Pt had fall on 12/17/22 resulting in scott prosthetic fracture of left hip. She had revision of left hip HARRIS on 12/22/22. According to family and chart review, the patient received large traumatic skin tears to the lower extremities scott operatively. Wounds have been treated by wound care team at both [facility], and [facility]. These notes were reviewed.   Current wounds:  Left anterior lower leg, onset 12/22/22, traumatic skin tear  Right medial thigh, onset 12/30/22, skin tear  Left medial thigh, onset 12/22/22, skin tear  Left posterior knee/ thigh; onset 1/9/23; unknown etiology ?blisters ?skin tear  Right buttock, onset 2/20/23, pressure injury  Left ankle, onset 1/30/23, skin tear    Record review, and family reveal wounds have improved the most with Polymem.   Pt has significant pain to the areas, which is worse with dressing changes. Skin is very thin and fragile.      Review of Systems   Constitutional:  Negative for fever.   Genitourinary:         Indwelling chao catheter   Musculoskeletal:  Positive for gait problem (S/P HARRIS revision) and leg pain.   Integumentary:  Positive for wound. Negative for rash.   Hematological:  Bruises/bleeds easily.      Medications:    Current Facility-Administered Medications on File Prior to Visit   Medication Dose Route Frequency Provider Last Rate Last Admin    acetaminophen tablet 650 mg  650 mg Oral Q6H PRN JULIA Becker        ALPRAZolam tablet 1 mg  1 mg  Oral QHS JULIA Becker   1 mg at 02/27/23 2055    ascorbic acid (vitamin C) tablet 500 mg  500 mg Oral BID JULIA Becker   500 mg at 02/27/23 2055    bisacodyL EC tablet 10 mg  10 mg Oral Daily PRN JULIA Becker        calcium carbonate 200 mg calcium (500 mg) chewable tablet 500 mg  500 mg Oral BID PRN JULIA Becker        carvediloL tablet 25 mg  25 mg Oral BID JULIA Becker   25 mg at 02/27/23 2055    diclofenac sodium 1 % gel 4 g  4 g Topical (Top) TID PRN JULIA Becker   4 g at 02/23/23 0949    docusate sodium capsule 100 mg  100 mg Oral Daily PRN JULIA Becker        enoxaparin injection 40 mg  40 mg Subcutaneous Daily Lebron King DO   40 mg at 02/27/23 1704    famotidine tablet 20 mg  20 mg Oral Daily JULIA Becker   20 mg at 02/27/23 0832    fluticasone propionate 50 mcg/actuation nasal spray 100 mcg  2 spray Each Nostril Daily JULIA Becker   100 mcg at 02/27/23 0841    gabapentin capsule 100 mg  100 mg Oral TID JULIA Becker   100 mg at 02/27/23 2055    hydrALAZINE tablet 25 mg  25 mg Oral Q8H AV JosephP   25 mg at 02/28/23 0552    lactulose 20 gram/30 mL solution Soln 20 g  20 g Oral Q6H PRN JULIA Becker        levothyroxine tablet 125 mcg  125 mcg Oral Before breakfast JULIA Becker   125 mcg at 02/28/23 0552    lisinopriL tablet 40 mg  40 mg Oral Daily JULIA Becker   40 mg at 02/27/23 0832    megestroL 400 mg/10 mL (10 mL) suspension 400 mg  400 mg Oral Daily Lebron King DO   400 mg at 02/27/23 0832    melatonin tablet 6 mg  6 mg Oral Nightly PRN JULIA Becker   6 mg at 02/27/23 2054    mirtazapine tablet 15 mg  15 mg Oral QHS JULIA Becker   15 mg at 02/27/23 2055    multivitamin tablet  1 tablet Oral Daily Lebron King DO   1 tablet at 02/27/23 0832    mupirocin 2 % ointment   Nasal BID Lebron King DO   Given at 02/27/23  2120    ondansetron disintegrating tablet 4 mg  4 mg Oral Q6H PRN JULIA Becker        oxyCODONE immediate release tablet 10 mg  10 mg Oral Q6H PRN JULIA Becker   10 mg at 02/27/23 2054    polyethylene glycol packet 17 g  17 g Oral Daily JULIA Becker   17 g at 02/27/23 0831    potassium bicarbonate disintegrating tablet 35 mEq  35 mEq Oral PRN JULIA Becker        potassium bicarbonate disintegrating tablet 50 mEq  50 mEq Oral PRN JULIA Becker        potassium bicarbonate disintegrating tablet 60 mEq  60 mEq Oral PRN JULIA Becker        predniSONE tablet 5 mg  5 mg Oral Daily JULIA Becker   5 mg at 02/27/23 0832    senna-docusate 8.6-50 mg per tablet 1 tablet  1 tablet Oral BID JULIA Becker   1 tablet at 02/27/23 2055    tamsulosin 24 hr capsule 0.4 mg  0.4 mg Oral Daily JULIA Becker   0.4 mg at 02/27/23 0832    traZODone tablet 50 mg  50 mg Oral Nightly PRN JULIA Becker        white petrolatum 41 % ointment   Topical (Top) PRN Lebron King,         zinc sulfate capsule 220 mg  220 mg Oral Daily JULIA Becker   220 mg at 02/27/23 0832     Current Outpatient Medications on File Prior to Visit   Medication Sig Dispense Refill    acetaminophen (TYLENOL) 325 MG tablet Take 2 tablets (650 mg total) by mouth every 6 (six) hours as needed.  0    ALPRAZolam (XANAX) 1 MG tablet Take 1 tablet (1 mg total) by mouth every evening. 30 tablet 0    ascorbic acid, vitamin C, (VITAMIN C) 500 MG tablet Take 1 tablet (500 mg total) by mouth 2 (two) times daily.      bisacodyL (DULCOLAX) 5 mg EC tablet Take 10 mg by mouth daily as needed for Constipation.      carvediloL (COREG) 25 MG tablet Take 1 tablet (25 mg total) by mouth 2 (two) times daily. 60 tablet 11    diclofenac sodium (VOLTAREN) 1 % Gel Apply 4 g topically 3 (three) times daily as needed (pain).      docusate sodium (COLACE) 100 MG capsule Take 100 mg by  mouth daily as needed for Constipation.      famotidine (PEPCID) 20 MG tablet Take 1 tablet (20 mg total) by mouth once daily. 90 tablet 2    fluticasone propionate (FLONASE) 50 mcg/actuation nasal spray 2 sprays (100 mcg total) by Each Nostril route once daily.  0    gabapentin (NEURONTIN) 100 MG capsule Take 1 capsule (100 mg total) by mouth 3 (three) times daily. 90 capsule 11    hydrALAZINE (APRESOLINE) 50 MG tablet Take 1 tablet (50 mg total) by mouth every 8 (eight) hours. 90 tablet 11    HYDROcodone-acetaminophen (NORCO) 7.5-325 mg per tablet Take 1 tablet by mouth every 8 (eight) hours as needed for Pain.      lactulose (CHRONULAC) 20 gram/30 mL Soln Take 30 mLs (20 g total) by mouth every 6 (six) hours as needed.      levothyroxine (SYNTHROID) 125 MCG tablet Take 1 tablet (125 mcg total) by mouth before breakfast. 30 tablet 11    lisinopriL (PRINIVIL,ZESTRIL) 40 MG tablet Take 1 tablet (40 mg total) by mouth once daily. 30 tablet 0    megestroL (MEGACE) 40 MG Tab Take 1 tablet (40 mg total) by mouth once daily. 30 tablet 11    mirtazapine (REMERON) 15 MG tablet Take 1 tablet (15 mg total) by mouth every evening. 30 tablet 11    ondansetron (ZOFRAN-ODT) 4 MG TbDL Take 1 tablet (4 mg total) by mouth every 6 (six) hours as needed.      oxyCODONE (ROXICODONE) 10 mg Tab immediate release tablet Take 1 tablet (10 mg total) by mouth every 6 (six) hours as needed for Pain.  0    pantoprazole (PROTONIX) 40 MG tablet Take 1 tablet (40 mg total) by mouth once daily. 60 tablet 2    polyethylene glycol (GLYCOLAX) 17 gram PwPk Take 17 g by mouth once daily.  0    potassium bicarbonate (K-LYTE) disintegrating tablet Take 2 tablets (50 mEq total) by mouth as needed (if potassium level is 3.5-3.8 mmol/L give 50 mEq x 1 dose -given as 25 meq x2tabs or 10 meq x5 tabs).      potassium bicarbonate (K-LYTE) disintegrating tablet Take 1.5 tablets (37.5 mEq total) by mouth as needed (if potassium level is 3.1-3.4 mmol/L give 35  mEq every 2 hours x 2 doses - given as 25 meq tab + 10 meq tab every 2h X 2 doses). (Patient taking differently: Take 37.5 mEq by mouth as needed (if potassium level is 3.1-3.4 mmol/L give 35 mEq every 2 hours x 2 doses - given as 25 meq tab + 10 meq tab every 2h X 2 doses).)      potassium bicarbonate (K-LYTE) disintegrating tablet Take 3 tablets (60 mEq total) by mouth as needed (if potassium level is 3 mmol/L or less give 60 mEq every 2 hours x 2 doses - given as 25 meq x 2 tabs + 10meq tab or 10 meq x 6 tabs every 2h x 2).      predniSONE (DELTASONE) 5 MG tablet Take 1 tablet (5 mg total) by mouth once daily.      tamsulosin (FLOMAX) 0.4 mg Cap Take 1 capsule (0.4 mg total) by mouth once daily. 30 capsule 11    traZODone (DESYREL) 50 MG tablet Take 1 tablet (50 mg total) by mouth nightly as needed for Insomnia.      urea 20 % Crea Apply 1 application topically daily as needed.  0    white petrolatum 41 % Oint Apply topically as needed (apply with dressing changes).      zinc sulfate (ZINCATE) 50 mg zinc (220 mg) capsule Take 1 capsule (220 mg total) by mouth once daily.          Physical Exam  Vitals reviewed.   Constitutional:       General: She is awake.      Appearance: She is ill-appearing.   HENT:      Head: Normocephalic and atraumatic.      Right Ear: External ear normal.      Left Ear: External ear normal.   Cardiovascular:      Pulses:           Dorsalis pedis pulses are 2+ on the right side and 2+ on the left side.   Pulmonary:      Effort: Pulmonary effort is normal.   Abdominal:      Palpations: Abdomen is soft.   Musculoskeletal:      Right lower leg: No edema.      Left lower leg: No edema.   Skin:     General: Skin is warm and dry.      Findings: Bruising and wound present.          Neurological:      Mental Status: She is alert.        Please see Wound Docs (scan in Media) for complete Wound Assessment and lower extremity assessment when applicable.    NO DEBRIDEMENT OF ANY WOUNDS PERFORMED DUE  "TO PAIN.        Assessment and Plan:    1. Traumatic open wound of lower leg with delayed healing, left  All wounds:  Clean with NS or Vashe  Apply oil emulsion gauze (contact layer) to wound beds to aid in prevention of "sticking"  Apply Polymem (at bedside purchased by family) over contact layer (cut to fit)  Cover with silicone border foam dressings or roll gauze and tape. Avoid tape contact with skin  Change twice weekly and as needed for soilage or dislodgement    2. Noninfected skin tear of right lower extremity, initial encounter  As above    3. Tear of skin of multiple sites of left lower extremity, initial encounter  As above    4. 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     Follow Up & Goals:  Follow up in about 1 week (around 3/3/2023).        Short term goals  Reduction of devitalized tissue  Reduction of bacterial burden  Stimulate and/or maintain acute phases of wound healing  Promote granulation formation  Promote epithelization  Promote compliance with plan of care  Address factors affecting wound healing  Optimize nutritional status    Long term goals  Prevent loss of limb  Prevent infection  Conservative Wound Treatment  Prevent recurrent ulcerations  Resolve wounds          "

## 2023-02-28 NOTE — NURSING
Pt requested coffee at this time. Made pt coffee and brought to room. Pt wanted to stay sitting up in bed; NADN; sitter at bedside. Burk clamped per bladder training order per provider. Call bell within reach; bed lowest position and locked.

## 2023-02-28 NOTE — NURSING
Clamped chao at this time. NADN; Offered to turn pt at this time. Pt refused to turn. Pt requested to sit up. Daughter noted in room. Sitter noted in room. Pt stated she didn't need anything.

## 2023-02-28 NOTE — NURSING
Changed, cleaned, and applied new dressings to pt Left posterior ankle; anterior and medial lower left extremity, left upper anterior and medial extremity, and posterior left knee.    Cleaned small wound to sacral applied new dressing.    Pt tolerated dressing changes well. YASMIN. Daughter and sitter at bedside noted.

## 2023-02-28 NOTE — NURSING
Clamped chao at this time. Notified Dr. King a nurse's station at this time that bladder training is in progress. No new order to d/c chao at this time.

## 2023-02-28 NOTE — NURSING
Pt has an order for bladder training. Chao was unclamped at 1908 and clamped at 2130. At that time 150 was emptied from the chao bag. Clamped the chao again at 1130. Will clamp it again at 0130.

## 2023-02-28 NOTE — NURSING
Nurses Note -- 4 Eyes      2/28/2023   1:01 AM      Skin assessed during: Q Shift Change      [] No Pressure Injuries Present    []Prevention Measures Documented      [x] Yes- Altered Skin Integrity Present or Discovered   [] LDA Added if Not in Epic (Describe Wound)   [] New Altered Skin Integrity was Present on Admit and Documented in LDA   [] Wound Image Taken    Wound Care Consulted? Yes    Attending Nurse:  Kelin Ferguson RN     Second RN/Staff Member:  Slim Rodrigues

## 2023-02-28 NOTE — NURSING
Bruising noted on bilateral arms and legs on AM assessment. Scabs noted on bilateral legs on AM assessment. Pt requested to sit up; teaching done importance of turning.

## 2023-02-28 NOTE — PROGRESS NOTES
Physical therapy attempted but patient refused stating she was not feeling well and did not want to do therapy.

## 2023-02-28 NOTE — NURSING
AM meds admin. At this time. YASMIN. Pt requested to stay sitting up at this time to drink her coffee. Teaching done; Chao noted clamped. Unclamped chao at this time. Chao draining yellow urine noted; chao bag draining below bladder and off floor noted. Dr. King stated to continue doing bladder training for 6-8 more hours. Pt requested more black coffee at this time.

## 2023-03-01 LAB — ZINC SERPL-MCNC: 69 MCG/DL (ref 60–106)

## 2023-03-01 PROCEDURE — 11000004 HC SNF PRIVATE

## 2023-03-01 PROCEDURE — 25000003 PHARM REV CODE 250: Performed by: PHYSICIAN ASSISTANT

## 2023-03-01 PROCEDURE — S0179 MEGESTROL 20 MG: HCPCS | Performed by: FAMILY MEDICINE

## 2023-03-01 PROCEDURE — 25000003 PHARM REV CODE 250: Performed by: FAMILY MEDICINE

## 2023-03-01 PROCEDURE — 63600175 PHARM REV CODE 636 W HCPCS: Performed by: FAMILY MEDICINE

## 2023-03-01 PROCEDURE — 94761 N-INVAS EAR/PLS OXIMETRY MLT: CPT

## 2023-03-01 PROCEDURE — 27000981 HC MATTRESS, ACCUCAIR DAILY RENTAL

## 2023-03-01 PROCEDURE — 27000944

## 2023-03-01 PROCEDURE — 63600175 PHARM REV CODE 636 W HCPCS: Performed by: PHYSICIAN ASSISTANT

## 2023-03-01 PROCEDURE — 25000003 PHARM REV CODE 250: Performed by: NURSE PRACTITIONER

## 2023-03-01 RX ADMIN — OXYCODONE HYDROCHLORIDE AND ACETAMINOPHEN 500 MG: 500 TABLET ORAL at 09:03

## 2023-03-01 RX ADMIN — TAMSULOSIN HYDROCHLORIDE 0.4 MG: 0.4 CAPSULE ORAL at 09:03

## 2023-03-01 RX ADMIN — CARVEDILOL 25 MG: 12.5 TABLET, FILM COATED ORAL at 09:03

## 2023-03-01 RX ADMIN — GABAPENTIN 100 MG: 100 CAPSULE ORAL at 09:03

## 2023-03-01 RX ADMIN — FLUTICASONE PROPIONATE 100 MCG: 50 SPRAY, METERED NASAL at 09:03

## 2023-03-01 RX ADMIN — ENOXAPARIN SODIUM 40 MG: 40 INJECTION SUBCUTANEOUS at 05:03

## 2023-03-01 RX ADMIN — PREDNISONE 5 MG: 5 TABLET ORAL at 09:03

## 2023-03-01 RX ADMIN — DICLOFENAC SODIUM 4 G: 10 GEL TOPICAL at 09:03

## 2023-03-01 RX ADMIN — MULTIPLE VITAMINS W/ MINERALS TAB 1 TABLET: TAB at 09:03

## 2023-03-01 RX ADMIN — HYDRALAZINE HYDROCHLORIDE 25 MG: 25 TABLET ORAL at 01:03

## 2023-03-01 RX ADMIN — LISINOPRIL 40 MG: 20 TABLET ORAL at 09:03

## 2023-03-01 RX ADMIN — HYDRALAZINE HYDROCHLORIDE 25 MG: 25 TABLET ORAL at 05:03

## 2023-03-01 RX ADMIN — ALPRAZOLAM 1 MG: 1 TABLET ORAL at 09:03

## 2023-03-01 RX ADMIN — MIRTAZAPINE 15 MG: 15 TABLET, FILM COATED ORAL at 09:03

## 2023-03-01 RX ADMIN — MUPIROCIN: 20 OINTMENT TOPICAL at 09:03

## 2023-03-01 RX ADMIN — ZINC SULFATE 220 MG (50 MG) CAPSULE 220 MG: CAPSULE at 09:03

## 2023-03-01 RX ADMIN — OXYCODONE HYDROCHLORIDE 10 MG: 5 TABLET ORAL at 11:03

## 2023-03-01 RX ADMIN — MEGESTROL ACETATE 400 MG: 40 SUSPENSION ORAL at 09:03

## 2023-03-01 RX ADMIN — HYDRALAZINE HYDROCHLORIDE 25 MG: 25 TABLET ORAL at 09:03

## 2023-03-01 RX ADMIN — FAMOTIDINE 20 MG: 20 TABLET, FILM COATED ORAL at 09:03

## 2023-03-01 RX ADMIN — GABAPENTIN 100 MG: 100 CAPSULE ORAL at 03:03

## 2023-03-01 RX ADMIN — LEVOTHYROXINE SODIUM 125 MCG: 0.03 TABLET ORAL at 05:03

## 2023-03-01 RX ADMIN — SENNOSIDES AND DOCUSATE SODIUM 1 TABLET: 8.6; 5 TABLET ORAL at 09:03

## 2023-03-01 NOTE — NURSING
Clamped pt chao at 0540 this morning. 300ml was emptied from her chao bag including the 150ml from 0116 this morning as well. Chao is to be unclamped at 0740. Pt BP was 143/74 this morning and 0600 hydralazine 25mg was given along with morning synthroid meds.

## 2023-03-01 NOTE — NURSING
Pt is still undergoing bladder training with chao inserted. Clamped pt chao at 2120, emptied 150ml of yellow urine from Chao bag. Will unclamp chao at 2320.

## 2023-03-01 NOTE — NURSING
Nurses Note -- 4 Eyes      3/1/2023   3:46 AM      Skin assessed during: Q Shift Change      [] No Pressure Injuries Present    []Prevention Measures Documented      [x] Yes- Altered Skin Integrity Present or Discovered   [] LDA Added if Not in Epic (Describe Wound)   [] New Altered Skin Integrity was Present on Admit and Documented in LDA   [] Wound Image Taken    Wound Care Consulted? Yes    Attending Nurse:  Kelin Ferguson RN     Second RN/Staff Member:  Deidre Nevarez

## 2023-03-01 NOTE — NURSING
Burk unclamped at this time d/t bladder training per provider order. Burk bag off floor draining below bladder.

## 2023-03-01 NOTE — NURSING
Nurses Note -- 4 Eyes      2/28/2023   7:00 AM      Skin assessed during: Q Shift Change      [] No Pressure Injuries Present    []Prevention Measures Documented      [x] Yes- Altered Skin Integrity Present or Discovered   [] LDA Added if Not in Epic (Describe Wound)   [] New Altered Skin Integrity was Present on Admit and Documented in LDA   [] Wound Image Taken    Wound Care Consulted? Yes    Attending Nurse:  VANDANA HAQ RN     Second RN/Staff Member: Kelin Ferguson RN     Pt here for wound care pt stated. Multiple skin tears to  left lower leg and small wound to sacral area noted. 4x4 mepilex noted to sacral area dry/intact.

## 2023-03-01 NOTE — NURSING
Pt asleep, clamped chao at this time. Pt has some urine in chao bag. Appears to be around 100ml. Did not empty chao at this time, did not want to disturb pt while resting.

## 2023-03-01 NOTE — NURSING
Unclamped pt chao at this time, no urine noted in the chao tube, same amount of urine present in the bag at 0116 this morning. Pt resting with eyes closed, easily arousable. No complaints at this time.

## 2023-03-01 NOTE — NURSING
Nurses Note -- 4 Eyes      3/1/2023   0719        Skin assessed during: Q Shift Change      [] No Pressure Injuries Present    []Prevention Measures Documented      [x] Yes- Altered Skin Integrity Present or Discovered   [] LDA Added if Not in Epic (Describe Wound)   [x] New Altered Skin Integrity was Present on Admit and Documented in LDA   [] Wound Image Taken    Wound Care Consulted? Yes    Attending Nurse:  RONALDO GREY RN     Second RN/Staff Member:  Kelin Ferguson RN

## 2023-03-02 VITALS
OXYGEN SATURATION: 97 % | WEIGHT: 129 LBS | SYSTOLIC BLOOD PRESSURE: 148 MMHG | HEIGHT: 66 IN | RESPIRATION RATE: 17 BRPM | BODY MASS INDEX: 20.73 KG/M2 | DIASTOLIC BLOOD PRESSURE: 61 MMHG | TEMPERATURE: 98 F | HEART RATE: 70 BPM

## 2023-03-02 LAB
BACTERIA #/AREA URNS HPF: ABNORMAL /HPF
BILIRUB UR QL STRIP: NEGATIVE
CLARITY UR: CLEAR
COLOR UR: YELLOW
GLUCOSE UR STRIP-MCNC: NEGATIVE MG/DL
KETONES UR STRIP-SCNC: ABNORMAL MG/DL
LEUKOCYTE ESTERASE UR QL STRIP: ABNORMAL
NITRITE UR QL STRIP: NEGATIVE
PH UR STRIP: 6 PH UNITS
PROT UR QL STRIP: ABNORMAL
RBC # UR STRIP: ABNORMAL /UL
RBC #/AREA URNS HPF: ABNORMAL /HPF
SP GR UR STRIP: 1.02
SQUAMOUS #/AREA URNS LPF: ABNORMAL /LPF
UROBILINOGEN UR STRIP-ACNC: 0.2 MG/DL
WBC #/AREA URNS HPF: ABNORMAL /HPF

## 2023-03-02 PROCEDURE — S0179 MEGESTROL 20 MG: HCPCS | Performed by: FAMILY MEDICINE

## 2023-03-02 PROCEDURE — 99316 PR NURSING FAC DISCHRGE DAY,MORE 30 MIN: ICD-10-PCS | Mod: ,,, | Performed by: EMERGENCY MEDICINE

## 2023-03-02 PROCEDURE — 25000003 PHARM REV CODE 250: Performed by: PHYSICIAN ASSISTANT

## 2023-03-02 PROCEDURE — 63600175 PHARM REV CODE 636 W HCPCS: Performed by: PHYSICIAN ASSISTANT

## 2023-03-02 PROCEDURE — 25000003 PHARM REV CODE 250: Performed by: FAMILY MEDICINE

## 2023-03-02 PROCEDURE — 94761 N-INVAS EAR/PLS OXIMETRY MLT: CPT

## 2023-03-02 PROCEDURE — 87086 URINE CULTURE/COLONY COUNT: CPT | Performed by: EMERGENCY MEDICINE

## 2023-03-02 PROCEDURE — 81001 URINALYSIS AUTO W/SCOPE: CPT | Performed by: EMERGENCY MEDICINE

## 2023-03-02 PROCEDURE — 87077 CULTURE AEROBIC IDENTIFY: CPT | Performed by: EMERGENCY MEDICINE

## 2023-03-02 PROCEDURE — 25000003 PHARM REV CODE 250: Performed by: NURSE PRACTITIONER

## 2023-03-02 PROCEDURE — 99316 NF DSCHRG MGMT 30 MIN+: CPT | Mod: ,,, | Performed by: EMERGENCY MEDICINE

## 2023-03-02 RX ORDER — PREDNISONE 5 MG/1
5 TABLET ORAL DAILY
Qty: 30 TABLET | Refills: 0
Start: 2023-03-02 | End: 2023-03-06 | Stop reason: SDUPTHER

## 2023-03-02 RX ORDER — ASCORBIC ACID 500 MG
500 TABLET ORAL 2 TIMES DAILY
Qty: 60 TABLET | Refills: 0
Start: 2023-03-02 | End: 2023-04-01

## 2023-03-02 RX ORDER — LACTULOSE 10 G/15ML
20 SOLUTION ORAL EVERY 6 HOURS PRN
Qty: 900 ML | Refills: 0
Start: 2023-03-02 | End: 2023-03-27

## 2023-03-02 RX ORDER — TRAZODONE HYDROCHLORIDE 50 MG/1
50 TABLET ORAL NIGHTLY PRN
Qty: 30 TABLET | Refills: 0
Start: 2023-03-02 | End: 2023-04-01

## 2023-03-02 RX ORDER — HYDRALAZINE HYDROCHLORIDE 25 MG/1
25 TABLET, FILM COATED ORAL EVERY 8 HOURS
Qty: 90 TABLET | Refills: 0 | Status: SHIPPED | OUTPATIENT
Start: 2023-03-02 | End: 2023-03-27

## 2023-03-02 RX ORDER — ONDANSETRON 4 MG/1
4 TABLET, ORALLY DISINTEGRATING ORAL EVERY 8 HOURS PRN
Qty: 30 TABLET | Refills: 0
Start: 2023-03-02 | End: 2023-03-06 | Stop reason: SDUPTHER

## 2023-03-02 RX ORDER — ZINC SULFATE 50(220)MG
220 CAPSULE ORAL DAILY
Qty: 30 CAPSULE | Refills: 0 | Status: SHIPPED | OUTPATIENT
Start: 2023-03-02 | End: 2023-04-01

## 2023-03-02 RX ADMIN — FLUTICASONE PROPIONATE 100 MCG: 50 SPRAY, METERED NASAL at 09:03

## 2023-03-02 RX ADMIN — ZINC SULFATE 220 MG (50 MG) CAPSULE 220 MG: CAPSULE at 09:03

## 2023-03-02 RX ADMIN — FAMOTIDINE 20 MG: 20 TABLET, FILM COATED ORAL at 09:03

## 2023-03-02 RX ADMIN — MULTIPLE VITAMINS W/ MINERALS TAB 1 TABLET: TAB at 09:03

## 2023-03-02 RX ADMIN — LISINOPRIL 40 MG: 20 TABLET ORAL at 09:03

## 2023-03-02 RX ADMIN — MEGESTROL ACETATE 400 MG: 40 SUSPENSION ORAL at 09:03

## 2023-03-02 RX ADMIN — OXYCODONE HYDROCHLORIDE 10 MG: 5 TABLET ORAL at 11:03

## 2023-03-02 RX ADMIN — CARVEDILOL 25 MG: 12.5 TABLET, FILM COATED ORAL at 09:03

## 2023-03-02 RX ADMIN — HYDRALAZINE HYDROCHLORIDE 25 MG: 25 TABLET ORAL at 06:03

## 2023-03-02 RX ADMIN — SENNOSIDES AND DOCUSATE SODIUM 1 TABLET: 8.6; 5 TABLET ORAL at 09:03

## 2023-03-02 RX ADMIN — LEVOTHYROXINE SODIUM 125 MCG: 0.03 TABLET ORAL at 06:03

## 2023-03-02 RX ADMIN — OXYCODONE HYDROCHLORIDE AND ACETAMINOPHEN 500 MG: 500 TABLET ORAL at 09:03

## 2023-03-02 RX ADMIN — TAMSULOSIN HYDROCHLORIDE 0.4 MG: 0.4 CAPSULE ORAL at 09:03

## 2023-03-02 RX ADMIN — PREDNISONE 5 MG: 5 TABLET ORAL at 09:03

## 2023-03-02 RX ADMIN — GABAPENTIN 100 MG: 100 CAPSULE ORAL at 09:03

## 2023-03-02 RX ADMIN — POLYETHYLENE GLYCOL 3350 17 G: 17 POWDER, FOR SOLUTION ORAL at 09:03

## 2023-03-02 RX ADMIN — MUPIROCIN: 20 OINTMENT TOPICAL at 09:03

## 2023-03-02 NOTE — DISCHARGE INSTRUCTIONS
PT WAS ADVISED TO INCREASE REST AND FLUIDS  PT WAS ADVISED TO CONTINUE MEDICATIONS AS DIRECTED AND TAKE OVER THE COUNTER MIRALAX FOR CONSTIPATION, TYLENOL FOR MILD PAIN AND BHARGAVI FOR WOUND HEALING. PT MAY TAKE MAGIC CUP ICE CREAM AND PREMIER PROTEIN SHAKES DAILY.  PT WAS ADVISED REGARDING FALL PRECAUTIONS.  PT WAS ADVISED REGARDING TAKING ASPIRIN DAILY TO BE DISCUSSED WITH DR FULLER  PT WAS ADVISED TO KEEP MEDICAL APPOINTMENT WITH DR FULLER ON MONDAY  PT HAS A MATHEWS AND MATHEWS MAY BE DC TOMORROW AND IN AND OUT CATH EVERY 8 HOURS; URINE CULTURE IS PENDING  PT WAS ADVISED WOUND CARE WILL EVALUATE TOMORROW

## 2023-03-02 NOTE — NURSING
0961 CNA assisted pt to [facility] per family requested. In and out was refused at that time. Pt was unsuccessful at this time. Family the requested for the Pt to be in and out cath. 400cc was collected

## 2023-03-02 NOTE — PLAN OF CARE
[facility]  Discharge Final Note    Primary Care Provider: Brandon Thornton MD    Expected Discharge Date: 3/2/2023    Final Discharge Note (most recent)       Final Note - 03/02/23 1540          Final Note    Assessment Type Final Discharge Note     Anticipated Discharge Disposition Home-Health Care Sv   [facility]     What phone number can be called within the next 1-3 days to see how you are doing after discharge? [phone]     Hospital Resources/Appts/Education Provided Provided patient/caregiver with written discharge plan information;Appointments scheduled and added to AVS        Post-Acute Status    Post-Acute Authorization Home Health;HME     HME Status Referrals Sent   Medical Store    Home Health Status Referrals Sent   [facility]    Coverage Medicare     Patient choice form signed by patient/caregiver List with quality metrics by geographic area provided;List from CMS Compare;List from System Post-Acute Care     Discharge Delays None known at this time                 Pt. Will d/c home this afternoon with f/u appt. Scheduled with Dr. Thornton on Monday. Pt. Referred to [facility] per family request. All DME was delivered to home address this morning. Ambulance transport set up and pt. Is waiting for transport to home at this time. No other d/c needs were identified.    Important Message from Medicare  Important Message from Medicare regarding Discharge Appeal Rights: Given to patient/caregiver, Explained to patient/caregiver, Signed/date by patient/caregiver     Date IMM was signed: 03/02/23  Time IMM was signed: 7229

## 2023-03-03 ENCOUNTER — PATIENT OUTREACH (OUTPATIENT)
Dept: ADMINISTRATIVE | Facility: CLINIC | Age: 88
End: 2023-03-03

## 2023-03-03 NOTE — PROGRESS NOTES
C3 nurse spoke with Zandra Veloz and daughter Eunice Ochoa for a TCC post hospital discharge follow up call. The patient has a scheduled HOSFU appointment with Brandon Thornton MD  on 3/6/23 @ 462.

## 2023-03-04 LAB — UA COMPLETE W REFLEX CULTURE PNL UR: ABNORMAL

## 2023-03-06 ENCOUNTER — OFFICE VISIT (OUTPATIENT)
Dept: FAMILY MEDICINE | Facility: CLINIC | Age: 88
End: 2023-03-06
Payer: MEDICARE

## 2023-03-06 VITALS
SYSTOLIC BLOOD PRESSURE: 100 MMHG | BODY MASS INDEX: 20.57 KG/M2 | DIASTOLIC BLOOD PRESSURE: 62 MMHG | TEMPERATURE: 98 F | OXYGEN SATURATION: 95 % | RESPIRATION RATE: 16 BRPM | HEIGHT: 66 IN | WEIGHT: 128 LBS | HEART RATE: 70 BPM

## 2023-03-06 DIAGNOSIS — M19.90 OSTEOARTHRITIS, UNSPECIFIED OSTEOARTHRITIS TYPE, UNSPECIFIED SITE: Primary | ICD-10-CM

## 2023-03-06 DIAGNOSIS — F41.9 ANXIETY: ICD-10-CM

## 2023-03-06 DIAGNOSIS — I10 PRIMARY HYPERTENSION: ICD-10-CM

## 2023-03-06 DIAGNOSIS — Z98.890 S/P ORIF (OPEN REDUCTION INTERNAL FIXATION) FRACTURE: Primary | ICD-10-CM

## 2023-03-06 DIAGNOSIS — Z87.81 S/P ORIF (OPEN REDUCTION INTERNAL FIXATION) FRACTURE: ICD-10-CM

## 2023-03-06 DIAGNOSIS — M54.16 LUMBAR RADICULOPATHY: ICD-10-CM

## 2023-03-06 DIAGNOSIS — S81.802D OPEN WOUND OF LEFT LOWER LEG, SUBSEQUENT ENCOUNTER: ICD-10-CM

## 2023-03-06 DIAGNOSIS — R33.9 URINARY RETENTION: ICD-10-CM

## 2023-03-06 DIAGNOSIS — Z87.81 S/P ORIF (OPEN REDUCTION INTERNAL FIXATION) FRACTURE: Primary | ICD-10-CM

## 2023-03-06 DIAGNOSIS — M19.90 OSTEOARTHRITIS, UNSPECIFIED OSTEOARTHRITIS TYPE, UNSPECIFIED SITE: ICD-10-CM

## 2023-03-06 DIAGNOSIS — T14.8XXA MULTIPLE SKIN TEARS: ICD-10-CM

## 2023-03-06 DIAGNOSIS — Z98.890 S/P ORIF (OPEN REDUCTION INTERNAL FIXATION) FRACTURE: ICD-10-CM

## 2023-03-06 DIAGNOSIS — K21.9 GASTROESOPHAGEAL REFLUX DISEASE, UNSPECIFIED WHETHER ESOPHAGITIS PRESENT: ICD-10-CM

## 2023-03-06 PROCEDURE — 99495 TCM SERVICES (MODERATE COMPLEXITY): ICD-10-PCS | Mod: ,,, | Performed by: FAMILY MEDICINE

## 2023-03-06 PROCEDURE — 99495 TRANSJ CARE MGMT MOD F2F 14D: CPT | Mod: ,,, | Performed by: FAMILY MEDICINE

## 2023-03-06 RX ORDER — ALPRAZOLAM 1 MG/1
1 TABLET ORAL NIGHTLY
Qty: 30 TABLET | Refills: 2 | Status: SHIPPED | OUTPATIENT
Start: 2023-03-06 | End: 2023-03-17

## 2023-03-06 RX ORDER — LISINOPRIL 40 MG/1
40 TABLET ORAL DAILY
Qty: 30 TABLET | Refills: 0
Start: 2023-03-06 | End: 2023-03-06

## 2023-03-06 RX ORDER — FLUTICASONE PROPIONATE 50 MCG
2 SPRAY, SUSPENSION (ML) NASAL DAILY
Refills: 0
Start: 2023-03-06

## 2023-03-06 RX ORDER — FAMOTIDINE 20 MG/1
20 TABLET, FILM COATED ORAL DAILY
Qty: 90 TABLET | Refills: 2 | Status: SHIPPED | OUTPATIENT
Start: 2023-03-06

## 2023-03-06 RX ORDER — PREDNISONE 5 MG/1
5 TABLET ORAL DAILY
Qty: 30 TABLET | Refills: 0
Start: 2023-03-06 | End: 2023-04-05

## 2023-03-06 RX ORDER — MIRTAZAPINE 15 MG/1
15 TABLET, FILM COATED ORAL NIGHTLY
Qty: 30 TABLET | Refills: 11 | Status: SHIPPED | OUTPATIENT
Start: 2023-03-06 | End: 2024-03-05

## 2023-03-06 RX ORDER — PANTOPRAZOLE SODIUM 40 MG/1
40 TABLET, DELAYED RELEASE ORAL DAILY
Qty: 60 TABLET | Refills: 2
Start: 2023-03-06 | End: 2024-03-05

## 2023-03-06 RX ORDER — GABAPENTIN 100 MG/1
100 CAPSULE ORAL 3 TIMES DAILY
Qty: 90 CAPSULE | Refills: 11 | Status: SHIPPED | OUTPATIENT
Start: 2023-03-06

## 2023-03-06 RX ORDER — CARVEDILOL 25 MG/1
25 TABLET ORAL 2 TIMES DAILY
Qty: 60 TABLET | Refills: 11 | Status: SHIPPED | OUTPATIENT
Start: 2023-03-06 | End: 2024-03-05

## 2023-03-06 RX ORDER — ONDANSETRON 4 MG/1
4 TABLET, ORALLY DISINTEGRATING ORAL EVERY 8 HOURS PRN
Qty: 30 TABLET | Refills: 0
Start: 2023-03-06 | End: 2023-03-16

## 2023-03-06 RX ORDER — HYDROCODONE BITARTRATE AND ACETAMINOPHEN 7.5; 325 MG/1; MG/1
1 TABLET ORAL EVERY 8 HOURS PRN
Qty: 120 TABLET | Refills: 0 | Status: SHIPPED | OUTPATIENT
Start: 2023-03-06 | End: 2023-03-30 | Stop reason: SDUPTHER

## 2023-03-06 RX ORDER — ALPRAZOLAM 1 MG/1
1 TABLET ORAL NIGHTLY
Qty: 30 TABLET | Refills: 0
Start: 2023-03-06 | End: 2023-03-06 | Stop reason: SDUPTHER

## 2023-03-06 RX ORDER — LISINOPRIL 10 MG/1
10 TABLET ORAL DAILY
Qty: 90 TABLET | Refills: 3 | Status: SHIPPED | OUTPATIENT
Start: 2023-03-06

## 2023-03-06 RX ORDER — POLYETHYLENE GLYCOL 3350 17 G/17G
17 POWDER, FOR SOLUTION ORAL DAILY
Refills: 0
Start: 2023-03-06

## 2023-03-06 RX ORDER — LISINOPRIL 10 MG/1
10 TABLET ORAL DAILY
COMMUNITY
End: 2023-03-06 | Stop reason: SDUPTHER

## 2023-03-06 NOTE — PROGRESS NOTES
Brandon Thornton MD        PATIENT NAME: Zandra Veloz  : 1934  DATE: 3/6/23  MRN: [id number]      Billing Provider: Brandon Thornton MD  Level of Service: TCM SERVICES (MODERATE COMPLEXITY)  Patient PCP Information       Provider PCP Type    Brandon Thornton MD General            Reason for Visit / Chief Complaint: Fall (Follow up Stennis injury from recent falls), Chest Pain (Having pain in right side of chest does this when she is stressed), and hospice care (Will admit for hospice care to evaluate urine output and wound care.)       Update PCP  Update Chief Complaint         History of Present Illness / Problem Focused Workflow     Zandra Veloz presents to the clinic with Fall (Follow up Stennis injury from recent falls), Chest Pain (Having pain in right side of chest does this when she is stressed), and hospice care (Will admit for hospice care to evaluate urine output and wound care.)     Patient is for follow-up of her recent fractures right lower extremity from fall.  She has been in a swing bed at [facility].  He kept demanding to go home and was released with indwelling catheter week that she is unable to walk and ongoing multiple tears on lower and upper extremities which required dressings.  She has sacral decubitus.    Fall  Pertinent negatives include no abdominal pain, fever, headaches, hematuria or nausea.   Chest Pain   Pertinent negatives include no abdominal pain, cough, dizziness, fever, headaches, nausea, palpitations or weakness (global weakness).   Pertinent negatives for past medical history include no seizures.     Review of Systems     Review of Systems   Constitutional:  Positive for fatigue. Negative for activity change, appetite change, fever and unexpected weight change.   HENT:  Negative for congestion, rhinorrhea, sinus pressure, sinus pain, sore throat and trouble swallowing.    Eyes:  Negative for photophobia, pain, discharge and visual disturbance.    Respiratory:  Negative for cough, chest tightness, wheezing and stridor.    Cardiovascular:  Positive for chest pain. Negative for palpitations and leg swelling.   Gastrointestinal:  Negative for abdominal pain, blood in stool, constipation, diarrhea and nausea.   Endocrine: Negative for polydipsia, polyphagia and polyuria.   Genitourinary:  Negative for difficulty urinating, flank pain and hematuria.   Musculoskeletal:  Positive for arthralgias. Negative for neck pain.   Skin:  Positive for wound. Negative for rash.   Allergic/Immunologic: Negative for food allergies.   Neurological:  Negative for dizziness, tremors, seizures, syncope, weakness (global weakness) and headaches.   Psychiatric/Behavioral:  Negative for behavioral problems, confusion, decreased concentration, dysphoric mood and hallucinations. The patient is not nervous/anxious.       Medical / Social / Family History     Past Medical History:   Diagnosis Date    Hypertension     Osteoporosis     Thyroid disorder        Past Surgical History:   Procedure Laterality Date    BLADDER SURGERY      HEMIARTHROPLASTY OF HIP Left 9/21/2021    Procedure: HEMIARTHROPLASTY, HIP;  Surgeon: Dequan Singer MD;  Location: [facility] OR;  Service: Orthopedics;  Laterality: Left;    PARTIAL HIP ARTHROPLASTY Right     Dr Singer    REVISION TOTAL HIP ARTHROPLASTY Left 12/22/2022    Procedure: REVISION, TOTAL ARTHROPLASTY, HIP, VICTORIANO PROSTHETIC FX;  Surgeon: Dequan Singer MD;  Location: [facility] OR;  Service: Orthopedics;  Laterality: Left;       Social History  Ms.  reports that she has quit smoking. She has never used smokeless tobacco. She reports that she does not drink alcohol and does not use drugs.    Family History  Ms.'s family history includes Bladder Cancer in her father; Heart attack in her mother; Heart disease in her mother; No Known Problems in her brother, maternal grandfather, maternal grandmother, paternal grandfather, paternal  grandmother, and sister.    Medications and Allergies     Medications  Outpatient Medications Marked as Taking for the 3/6/23 encounter (Office Visit) with Brandon Thornton MD   Medication Sig Dispense Refill    acetaminophen (TYLENOL) 325 MG tablet Take 2 tablets (650 mg total) by mouth every 6 (six) hours as needed.  0    ascorbic acid, vitamin C, (VITAMIN C) 500 MG tablet Take 1 tablet (500 mg total) by mouth 2 (two) times daily. 60 tablet 0    bisacodyL (DULCOLAX) 5 mg EC tablet Take 10 mg by mouth daily as needed for Constipation.      levothyroxine (SYNTHROID) 125 MCG tablet Take 1 tablet (125 mcg total) by mouth before breakfast. 30 tablet 11    white petrolatum 41 % Oint Apply topically as needed (apply with dressing changes). 198 g 0    [DISCONTINUED] ALPRAZolam (XANAX) 1 MG tablet Take 1 tablet (1 mg total) by mouth every evening. 30 tablet 0    [DISCONTINUED] carvediloL (COREG) 25 MG tablet Take 1 tablet (25 mg total) by mouth 2 (two) times daily. 60 tablet 11    [DISCONTINUED] famotidine (PEPCID) 20 MG tablet Take 1 tablet (20 mg total) by mouth once daily. 90 tablet 2    [DISCONTINUED] fluticasone propionate (FLONASE) 50 mcg/actuation nasal spray 2 sprays (100 mcg total) by Each Nostril route once daily.  0    [DISCONTINUED] gabapentin (NEURONTIN) 100 MG capsule Take 1 capsule (100 mg total) by mouth 3 (three) times daily. 90 capsule 11    [DISCONTINUED] HYDROcodone-acetaminophen (NORCO) 7.5-325 mg per tablet Take 1 tablet by mouth every 8 (eight) hours as needed for Pain.      [DISCONTINUED] lisinopriL (PRINIVIL,ZESTRIL) 40 MG tablet Take 1 tablet (40 mg total) by mouth once daily. 30 tablet 0    [DISCONTINUED] mirtazapine (REMERON) 15 MG tablet Take 1 tablet (15 mg total) by mouth every evening. 30 tablet 11    [DISCONTINUED] ondansetron (ZOFRAN-ODT) 4 MG TbDL Take 1 tablet (4 mg total) by mouth every 8 (eight) hours as needed. 30 tablet 0    [DISCONTINUED] pantoprazole (PROTONIX) 40 MG tablet Take 1  tablet (40 mg total) by mouth once daily. 60 tablet 2    [DISCONTINUED] polyethylene glycol (GLYCOLAX) 17 gram PwPk Take 17 g by mouth once daily.  0    [DISCONTINUED] predniSONE (DELTASONE) 5 MG tablet Take 1 tablet (5 mg total) by mouth once daily. 30 tablet 0       Allergies  Review of patient's allergies indicates:   Allergen Reactions    Bactrim [sulfamethoxazole-trimethoprim]     Codeine     Levaquin [levofloxacin]        Physical Examination     Vitals:    03/06/23 0917   BP: 100/62   Pulse: 70   Resp: 16   Temp: 98.3 °F (36.8 °C)     Physical Exam  Constitutional:       General: She is not in acute distress.     Appearance: Normal appearance. She is ill-appearing.   HENT:      Head: Normocephalic.      Right Ear: Tympanic membrane and ear canal normal.      Left Ear: Tympanic membrane and ear canal normal.      Nose: Nose normal.      Mouth/Throat:      Mouth: Mucous membranes are moist.      Pharynx: No oropharyngeal exudate.   Eyes:      Extraocular Movements: Extraocular movements intact.      Pupils: Pupils are equal, round, and reactive to light.   Cardiovascular:      Rate and Rhythm: Normal rate and regular rhythm.      Heart sounds: No murmur heard.  Pulmonary:      Effort: Pulmonary effort is normal.      Breath sounds: Normal breath sounds. No wheezing.   Abdominal:      General: Abdomen is flat. Bowel sounds are normal.      Palpations: Abdomen is soft.      Hernia: No hernia is present.   Musculoskeletal:      Cervical back: Normal range of motion and neck supple.      Right lower leg: No edema.      Left lower leg: No edema.      Comments: Patient is patient is seated in a wheelchair unable to walk without assistance.  Indwelling catheter in place.   Lymphadenopathy:      Cervical: No cervical adenopathy.   Skin:     General: Skin is warm and dry.      Coloration: Skin is not jaundiced.      Findings: Lesion present.   Neurological:      General: No focal deficit present.      Mental Status: She  is alert and oriented to person, place, and time. Mental status is at baseline.      Cranial Nerves: No cranial nerve deficit.      Gait: Gait normal.   Psychiatric:         Behavior: Behavior normal.         Judgment: Judgment normal.        Assessment and Plan (including Health Maintenance)      Problem List  Smart Sets  Document Outside HM   :    Plan:     1. Osteoarthritis, unspecified osteoarthritis type, unspecified site  The current medical regimen is effective;  continue present plan and medications.  -     HYDROcodone-acetaminophen (NORCO) 7.5-325 mg per tablet; Take 1 tablet by mouth every 8 (eight) hours as needed for Pain.  Dispense: 120 tablet; Refill: 0    2. Gastroesophageal reflux disease, unspecified whether esophagitis present  The current medical regimen is effective;  continue present plan and medications.  -     pantoprazole (PROTONIX) 40 MG tablet; Take 1 tablet (40 mg total) by mouth once daily.  Dispense: 60 tablet; Refill: 2    3. Anxiety  The current medical regimen is effective;  continue present plan and medications.  -     ALPRAZolam (XANAX) 1 MG tablet; Take 1 tablet (1 mg total) by mouth every evening.  Dispense: 30 tablet; Refill: 0    Other orders  -     carvediloL (COREG) 25 MG tablet; Take 1 tablet (25 mg total) by mouth 2 (two) times daily.  Dispense: 60 tablet; Refill: 11  -     famotidine (PEPCID) 20 MG tablet; Take 1 tablet (20 mg total) by mouth once daily.  Dispense: 90 tablet; Refill: 2  -     fluticasone propionate (FLONASE) 50 mcg/actuation nasal spray; 2 sprays (100 mcg total) by Each Nostril route once daily.; Refill: 0  -     gabapentin (NEURONTIN) 100 MG capsule; Take 1 capsule (100 mg total) by mouth 3 (three) times daily.  Dispense: 90 capsule; Refill: 11  -     lisinopriL (PRINIVIL,ZESTRIL) 40 MG tablet; Take 1 tablet (40 mg total) by mouth once daily.  Dispense: 30 tablet; Refill: 0  -     mirtazapine (REMERON) 15 MG tablet; Take 1 tablet (15 mg total) by mouth  every evening.  Dispense: 30 tablet; Refill: 11  -     ondansetron (ZOFRAN-ODT) 4 MG TbDL; Take 1 tablet (4 mg total) by mouth every 8 (eight) hours as needed.  Dispense: 30 tablet; Refill: 0  -     polyethylene glycol (GLYCOLAX) 17 gram PwPk; Take 17 g by mouth once daily.; Refill: 0  -     predniSONE (DELTASONE) 5 MG tablet; Take 1 tablet (5 mg total) by mouth once daily.  Dispense: 30 tablet; Refill: 0          Health Maintenance Due   Topic Date Due    Lipid Panel  Never done    Shingles Vaccine (1 of 2) Never done    Pneumococcal Vaccines (Age 65+) (1 - PCV) Never done    Influenza Vaccine (1) Never done       Problem List Items Addressed This Visit          Renal/    Urinary retention       Orthopedic    S/P ORIF (open reduction internal fixation) fracture    Multiple skin tears    Overview                                                    Open wound of left lower leg    Overview                                               Other Visit Diagnoses       Osteoarthritis, unspecified osteoarthritis type, unspecified site    -  Primary    Relevant Medications    HYDROcodone-acetaminophen (NORCO) 7.5-325 mg per tablet    Gastroesophageal reflux disease, unspecified whether esophagitis present        Relevant Medications    pantoprazole (PROTONIX) 40 MG tablet    Anxiety        Relevant Medications    ALPRAZolam (XANAX) 1 MG tablet            Health Maintenance Topics with due status: Not Due       Topic Last Completion Date    TETANUS VACCINE 10/13/2022   Due to complexity of care and decline in status will recommend hospice.  She hopes to have the catheter removed over will acquire 3 times daily cast cathing she requires room wound care at home    No future appointments.     There are no Patient Instructions on file for this visit.  Follow up in about 3 months (around 6/6/2023) for routine followup.     Signature:  Brandon Thornton MD      Date of encounter: 3/6/23

## 2023-03-17 DIAGNOSIS — F41.9 ANXIETY: ICD-10-CM

## 2023-03-17 RX ORDER — ALPRAZOLAM 1 MG/1
1 TABLET ORAL 2 TIMES DAILY PRN
Qty: 60 TABLET | Refills: 1 | Status: SHIPPED | OUTPATIENT
Start: 2023-03-17 | End: 2023-04-16

## 2023-03-27 RX ORDER — LORAZEPAM 1 MG/1
1 TABLET ORAL EVERY 4 HOURS PRN
Qty: 90 TABLET | Refills: 2 | Status: SHIPPED | OUTPATIENT
Start: 2023-03-27 | End: 2023-04-26

## 2023-03-29 DIAGNOSIS — R41.82 ALTERED MENTAL STATUS, UNSPECIFIED ALTERED MENTAL STATUS TYPE: Primary | ICD-10-CM

## 2023-03-29 DIAGNOSIS — I10 ESSENTIAL HYPERTENSION: ICD-10-CM

## 2023-03-29 DIAGNOSIS — S06.5XAA SUBDURAL HEMATOMA: ICD-10-CM

## 2023-03-30 ENCOUNTER — DOCUMENTATION ONLY (OUTPATIENT)
Dept: WOUND CARE | Facility: CLINIC | Age: 88
End: 2023-03-30
Payer: MEDICARE

## 2023-03-30 DIAGNOSIS — M19.90 OSTEOARTHRITIS, UNSPECIFIED OSTEOARTHRITIS TYPE, UNSPECIFIED SITE: ICD-10-CM

## 2023-03-30 NOTE — PROGRESS NOTES
Family called to notify me of new skin tear that patient developed on right lower extremity. Patient has been unable to make follow up appointment post hospital discharge because she is unable to transfer without maximum assistance.   She had a fall on 12/17 when she was trying to go to the restroom at her home. Patient sustained a left femur fracture. She is status post ORIF on 12/20. She is status post ORIF on 12/20. Since that time patient has remained mostly bed bound and developed foot drop. She remains unable to bear weight on left lower extremity.   Since discharge, her dementia has progressively worsened, weakness has increased, appetite is poor and intake is inadequate to heal wounds, and she requires a caregiver 24/7. Family reports she is extremely confused, often worse at night.   Wounds remain stagnant and she has developed new skin breakdown on sacral.   Significant PMH includes hypertension, bradycardia, osteoarthritis, hypothyroidism, osteoporosis, and multiple falls. Wound healing is complicated by bradycardia, hx of a-fib, chronic pain, weakness, fragile skin, limited mobility, multiple falls, infection, and pain.   Discussed with family recommendation to admit patient to Hospice. Family is in agreement and would like evaluation by [facility].

## 2023-03-31 RX ORDER — HYDROCODONE BITARTRATE AND ACETAMINOPHEN 7.5; 325 MG/1; MG/1
1 TABLET ORAL EVERY 8 HOURS PRN
Qty: 120 TABLET | Refills: 0 | Status: SHIPPED | OUTPATIENT
Start: 2023-03-31

## 2023-04-09 NOTE — DISCHARGE SUMMARY
[facility]  Hospital Medicine  Discharge Summary      Patient Name: Zandra Veloz  MRN: [id number]  SUSAN: [id number]  Patient Class: IP- Swing  Admission Date: 2/20/2023  Hospital Length of Stay: 10 days  Discharge Date and Time: 3/2/2023  4:33 PM  Attending Physician: No att. providers found   Discharging Provider: Lebron King DO  Primary Care Provider: Brandon Thornton MD    Primary Care Team: Networked reference to record PCT     HPI:   Patient comes in for muscle strengthening.  Patient is 88 years old who has been in specialty for some time in [facility] she is had a prosthetic fixed of the left hip.  And revision of the total hip replacement.  Done on 12/22/2022.  She has history of heart murmur, palpitations, lumbar radiculopathy, hypertension, arthritis, multiple falls at home and fragile skin with multiple tears.  With PT and OT she is able to take 3 shallow steps around a chair and sit and stand moderate assist.      * No surgery found *      Hospital Course:   02/27/2023---progress note--- the patient has been he for 6-7 days.  She had a good bit of pain in her left hip.  And had a restless night last night overall she is been getting stronger.  She is very weary of skin tears.  Normal bowel movements normal p.o. intake no shortness breath no chest pain working with PT and OT.  CBC and CMP were done with a zinc level this morning will review these and make changes when necessary    02/27/2023-discuss the patient's care with her family.  The patient is wishing to go home and the family wants her to be followed by [facility] home health care at home.  She will need a hospital bed for discharge and will start bladder training her with getting her Burk catheter out.  Patient states she is just tired of being in the hospital.  She also has some tenderness on the left heel.  Will put a press..support pad over this area    03/02/2023 HOSPITAL DAY  10  DISCHARGE          PT WAS ADVISED TO INCREASE REST AND FLUIDS. PT WAS ADVISED TO CONTINUE MEDICATIONS AS DIRECTED AND TAKE OVER THE COUNTER MIRALAX FOR CONSTIPATION, TYLENOL FOR MILD PAIN AND BHARGAVI FOR WOUND HEALING. PT MAY TAKE MAGIC CUP ICE CREAM AND PREMIER PROTEIN SHAKES DAILY.  PT WAS ADVISED REGARDING FALL PRECAUTIONS.  PT WAS ADVISED REGARDING TAKING ASPIRIN DAILY TO BE DISCUSSED WITH DR FULLER  PT WAS ADVISED TO KEEP MEDICAL APPOINTMENT WITH DR FULLER ON MONDAY  PT HAS A MATHEWS AND MATHEWS MAY BE DC TOMORROW AND IN AND OUT CATH EVERY 8 HOURS; URINE CULTURE IS PENDING  PT WAS ADVISED WOUND CARE WILL EVALUATE TOMORROW    Into       Goals of Care Treatment Preferences:  Code Status: Full Code      Consults:   Consults (From admission, onward)        Status Ordering Provider     Inpatient consult to Registered Dietitian/Nutritionist  Once        Provider:  (Not yet assigned)    SE Dumas          No new Assessment & Plan notes have been filed under this hospital service since the last note was generated.  Service: Hospital Medicine    There are no hospital problems to display for this patient.      Discharged Condition: good    Disposition: Home or Self Care    Follow Up:    Patient Instructions:      Diet Cardiac     Notify your health care provider if you experience any of the following:  temperature >100.4     Notify your health care provider if you experience any of the following:  severe uncontrolled pain     Notify your health care provider if you experience any of the following:  redness, tenderness, or signs of infection (pain, swelling, redness, odor or green/yellow discharge around incision site)     Leave dressing on - Keep it clean, dry, and intact until clinic visit   Order Comments: UNTIL WOUND CARE VISIT ON 3/3/2023     Activity as tolerated       Significant Diagnostic Studies: Labs:   BMP: No results for input(s): GLU, NA, K, CL, CO2, BUN, CREATININE, CALCIUM, MG in the last  48 hours., CMP No results for input(s): NA, K, CL, CO2, GLU, BUN, CREATININE, CALCIUM, PROT, ALBUMIN, BILITOT, ALKPHOS, AST, ALT, ANIONGAP, ESTGFRAFRICA, EGFRNONAA in the last 48 hours., CBC No results for input(s): WBC, HGB, HCT, PLT in the last 48 hours., INR   Lab Results   Component Value Date    INR 1.07 12/17/2022    INR 0.94 09/21/2021   , Lipid Panel No results found for: CHOL, HDL, LDLCALC, TRIG, CHOLHDL and Troponin No results for input(s): TROPONINI in the last 168 hours.    Pending Diagnostic Studies:     None         Medications:  Reconciled Home Medications:      Medication List      CONTINUE taking these medications    acetaminophen 325 MG tablet  Commonly known as: TYLENOL  Take 2 tablets (650 mg total) by mouth every 6 (six) hours as needed.     bisacodyL 5 mg EC tablet  Commonly known as: DULCOLAX  Take 10 mg by mouth daily as needed for Constipation.     levothyroxine 125 MCG tablet  Commonly known as: SYNTHROID  Take 1 tablet (125 mcg total) by mouth before breakfast.     tamsulosin 0.4 mg Cap  Commonly known as: FLOMAX  Take 1 capsule (0.4 mg total) by mouth once daily.     traZODone 50 MG tablet  Commonly known as: DESYREL  Take 1 tablet (50 mg total) by mouth nightly as needed for Insomnia.        STOP taking these medications    ascorbic acid (vitamin C) 500 MG tablet  Commonly known as: VITAMIN C     diclofenac sodium 1 % Gel  Commonly known as: VOLTAREN     docusate sodium 100 MG capsule  Commonly known as: COLACE     hydrALAZINE 50 MG tablet  Commonly known as: APRESOLINE     lactulose 20 gram/30 mL Soln  Commonly known as: CHRONULAC     ondansetron 4 MG Tbdl  Commonly known as: ZOFRAN-ODT     oxyCODONE 10 mg Tab immediate release tablet  Commonly known as: ROXICODONE     potassium bicarbonate disintegrating tablet  Commonly known as: K-LYTE     predniSONE 5 MG tablet  Commonly known as: DELTASONE     urea 20 % Crea     white petrolatum 41 % Oint     zinc sulfate 50 mg zinc (220 mg)  capsule  Commonly known as: ZINCATE        ASK your doctor about these medications    ascorbic acid (vitamin C) 500 MG tablet  Commonly known as: VITAMIN C  Take 1 tablet (500 mg total) by mouth 2 (two) times daily.  Ask about: Should I take this medication?     white petrolatum 41 % Oint  Apply topically as needed (apply with dressing changes).  Ask about: Should I take this medication?     zinc sulfate 50 mg zinc (220 mg) capsule  Commonly known as: ZINCATE  Take 1 capsule (220 mg total) by mouth once daily.  Ask about: Should I take this medication?            Indwelling Lines/Drains at time of discharge:   Lines/Drains/Airways     None                 Time spent on the discharge of patient: 45   minutes         Lebron King DO  Department of Hospital Medicine  [facility]

## 2023-04-18 PROBLEM — M62.81 MUSCLE WEAKNESS: Status: ACTIVE | Noted: 2023-04-18

## 2023-04-18 NOTE — ASSESSMENT & PLAN NOTE
03/02/2023  PT HAS MULTIPLE WOUNDS DUE TO SKIN TEARS  WOUND CARE TEAM FOLLOWED AND MADE RECOMMENDATIONS  PT WILL BE DC HOME

## 2023-04-18 NOTE — ASSESSMENT & PLAN NOTE
03/02/2023  PT IS S/P L THR REVISION 12/22/2022  PT IMPROVED WITH CONTINUED PAIN AND LIMITED PT/OT AND WOUND HEALING   AND WILL BE DC HOME

## 2023-04-18 NOTE — DISCHARGE SUMMARY
[facility] - Medical Surgical Unit  Hospital Medicine  Discharge Summary      Patient Name: Zandra Veloz  MRN: [id number]  SUSAN: [id number]  Patient Class: IP- Swing  Admission Date: 2/20/2023  Hospital Length of Stay: 10 days  Discharge Date and Time: 3/2/2023  4:33 PM  Attending Physician: No att. providers found   Discharging Provider: Bell Juarez MD  Primary Care Provider: Brandon Thornton MD    Primary Care Team: Networked reference to record PCT     HPI:   Patient comes in for muscle strengthening.  Patient is 88 years old who has been in specialty for some time in [facility] she is had a prosthetic fixed of the left hip.  And revision of the total hip replacement.  Done on 12/22/2022.  She has history of heart murmur, palpitations, lumbar radiculopathy, hypertension, arthritis, multiple falls at home and fragile skin with multiple tears.  With PT and OT she is able to take 3 shallow steps around a chair and sit and stand moderate assist.      * No surgery found *      Hospital Course:   02/27/2023---progress note--- the patient has been he for 6-7 days.  She had a good bit of pain in her left hip.  And had a restless night last night overall she is been getting stronger.  She is very weary of skin tears.  Normal bowel movements normal p.o. intake no shortness breath no chest pain working with PT and OT.  CBC and CMP were done with a zinc level this morning will review these and make changes when necessary    02/27/2023-discuss the patient's care with her family.  The patient is wishing to go home and the family wants her to be followed by [facility] home health care at home.  She will need a hospital bed for discharge and will start bladder training her with getting her Burk catheter out.  Patient states she is just tired of being in the hospital.  She also has some tenderness on the left heel.  Will put a press..support pad over this area    03/02/2023 HOSPITAL DAY  10  DISCHARGE  PT IS AN 88 YR OLD ADMITED TO [facility] SWING [facility]ED WITH HX REVISION L THR AND MULTIPLE SKIN TEARS WITH EXTREMELY FRAGILE SKIN TEARS. PT HAD MUSCLE WEAKNESS WITH PT/OT RECOMMENDED WITH LIMITED PARTICIPATION PER PT DUE TO FATIGUE AND SKIN WOUNDS. THE WOUND CARE TEAM FOLLOWED PT WITH RECOMMENDATIONS MADE FOR WOUND CARE.RD EVALUATED PT AND MADE RECOMMENDATIONS AS DISCUSSED WITH FAMILY ON DX WITH MEAL INTAKE 50-75%. PT REQUIRED A MTAHEWS AND THIS WAS REMOVED ON DC WITH I/O CATHS RECOMMENDED.  PT WAS ADVISED TO INCREASE REST AND FLUIDS. PT WAS ADVISED TO CONTINUE MEDICATIONS AS DIRECTED AND TAKE OVER THE COUNTER MIRALAX FOR CONSTIPATION, TYLENOL FOR MILD PAIN AND BHARGAVI FOR WOUND HEALING. PT MAY TAKE MAGIC CUP ICE CREAM AND PREMIER PROTEIN SHAKES DAILY.  PT WAS ADVISED REGARDING FALL PRECAUTIONS.  PT WAS ADVISED REGARDING TAKING ASPIRIN DAILY TO BE DISCUSSED WITH DR FULLER  PT WAS ADVISED TO KEEP MEDICAL APPOINTMENT WITH DR FULLER ON MONDAY  PT HAS A MATHEWS AND MATHEWS MAY BE DC TOMORROW AND IN AND OUT CATH EVERY 8 HOURS; URINE CULTURE IS PENDING  PT WAS ADVISED WOUND CARE WILL EVALUATE TOMORROW    Into       Goals of Care Treatment Preferences:  Code Status: Full Code      Consults:   Consults (From admission, onward)        Status Ordering Provider     Inpatient consult to Registered Dietitian/Nutritionist  Once        Provider:  (Not yet assigned)    Completed [facility]          Cardiac/Vascular  Primary hypertension  03/02/2023  PT HAS HTN  /61      Renal/  Urinary retention  03/02/2023  PT HAD AUR TREATED WITH MATHEWS  MATHEWS DC ON DC WITH I AND O CATHS RECOMMENDED      Orthopedic  Muscle weakness  03/02/2023  PT HAD LIMITED PT/OT PARTICIPATION DUE TO FATIGUE AND SKIN WOUNDS      Multiple skin tears  03/02/2023  PT HAS MULTIPLE WOUNDS DUE TO SKIN TEARS  WOUND CARE TEAM FOLLOWED AND MADE RECOMMENDATIONS  PT WILL BE DC HOME     Closed fracture of left hip  03/02/2023  PT IS S/P L THR  REVISION 12/22/2022  PT IMPROVED WITH CONTINUED PAIN AND LIMITED PT/OT AND WOUND HEALING   AND WILL BE DC HOME      Final Active Diagnoses:    Diagnosis Date Noted POA    Muscle weakness [M62.81] 04/18/2023 Yes    Urinary retention [R33.9] 02/09/2023 Yes    Multiple skin tears [T14.8XXA] 12/28/2022 Yes    Closed fracture of left hip [S72.002A] 09/20/2021 Yes    Primary hypertension [I10]  Yes      Problems Resolved During this Admission:       Discharged Condition: stable    [place] [facility] [place]:    Patient Instructions:      Diet Cardiac     Notify your health care provider if you experience any of the following:  temperature >100.4     Notify your health care provider if you experience any of the following:  severe uncontrolled pain     Notify your health care provider if you experience any of the following:  redness, tenderness, or signs of infection (pain, swelling, redness, odor or green/yellow discharge around incision site)     Leave dressing on - Keep it clean, dry, and intact until clinic visit   Order Comments: UNTIL WOUND CARE VISIT ON 3/3/2023     Activity as tolerated       Significant Diagnostic Studies: Labs: All labs within the past 24 hours have been reviewed    Pending Diagnostic Studies:     None         Medications:  Reconciled Home Medications:      Medication List      CONTINUE taking these medications    acetaminophen 325 MG tablet  Commonly known as: TYLENOL  Take 2 tablets (650 mg total) by mouth every 6 (six) hours as needed.     bisacodyL 5 mg EC tablet  Commonly known as: DULCOLAX  Take 10 mg by mouth daily as needed for Constipation.     levothyroxine 125 MCG tablet  Commonly known as: SYNTHROID  Take 1 tablet (125 mcg total) by mouth before breakfast.     tamsulosin 0.4 mg Cap  Commonly known as: FLOMAX  Take 1 capsule (0.4 mg total) by mouth once daily.     traZODone 50 MG tablet  Commonly known as: DESYREL  Take 1 tablet (50 mg total) by mouth nightly as  needed for Insomnia.        STOP taking these medications    ascorbic acid (vitamin C) 500 MG tablet  Commonly known as: VITAMIN C     diclofenac sodium 1 % Gel  Commonly known as: VOLTAREN     docusate sodium 100 MG capsule  Commonly known as: COLACE     hydrALAZINE 50 MG tablet  Commonly known as: APRESOLINE     lactulose 20 gram/30 mL Soln  Commonly known as: CHRONULAC     ondansetron 4 MG Tbdl  Commonly known as: ZOFRAN-ODT     oxyCODONE 10 mg Tab immediate release tablet  Commonly known as: ROXICODONE     potassium bicarbonate disintegrating tablet  Commonly known as: K-LYTE     predniSONE 5 MG tablet  Commonly known as: DELTASONE     urea 20 % Crea     white petrolatum 41 % Oint     zinc sulfate 50 mg zinc (220 mg) capsule  Commonly known as: ZINCATE        ASK your doctor about these medications    ascorbic acid (vitamin C) 500 MG tablet  Commonly known as: VITAMIN C  Take 1 tablet (500 mg total) by mouth 2 (two) times daily.  Ask about: Should I take this medication?     white petrolatum 41 % Oint  Apply topically as needed (apply with dressing changes).  Ask about: Should I take this medication?     zinc sulfate 50 mg zinc (220 mg) capsule  Commonly known as: ZINCATE  Take 1 capsule (220 mg total) by mouth once daily.  Ask about: Should I take this medication?            Indwelling Lines/Drains at time of discharge:   Lines/Drains/Airways     None                 Time spent on the discharge of patient: 60 minutes         Bell Juarez MD  Department of Hospital Medicine  [facility] - Medical Surgical Unit

## 2023-04-24 NOTE — ASSESSMENT & PLAN NOTE
Clean with vashe  Apply vashe moistened drawtex to wound bed  Cover and secure with bordered foam  Change twice weekly and PRN for soilage (M, Th)  Keep leg elevated and avoid pressure on wound.         Patent

## 2023-04-27 ENCOUNTER — EXTERNAL HOME HEALTH (OUTPATIENT)
Dept: HOME HEALTH SERVICES | Facility: HOSPITAL | Age: 88
End: 2023-04-27
Payer: MEDICARE

## 2023-04-28 ENCOUNTER — DOCUMENT SCAN (OUTPATIENT)
Dept: HOME HEALTH SERVICES | Facility: HOSPITAL | Age: 88
End: 2023-04-28
Payer: MEDICARE

## 2023-05-09 DIAGNOSIS — Z71.89 COMPLEX CARE COORDINATION: ICD-10-CM

## 2023-12-09 DIAGNOSIS — Z71.89 COMPLEX CARE COORDINATION: ICD-10-CM

## 2024-01-19 NOTE — NURSING
"C/o pain in her right arm and left hip. Arrived in room to tx with prn pain med. She stated "put me back in bed". I asked sitter whom is present @ bedside how long has she been sitting up? She stated its only been seven minutes. I asked her how did the therapist lawson ask her to sit up for? She stated "I don't know". I asked sitter and she stated "not sure". I telephoned therapy and talked with karen Coburn and asked him was lawson (pt) available so I could talk with her regarding this matter. He stated she was not available but he would relay message that pt was wanting to get put back in bed. Sitter stated she uses gait belt. I called charge nurse in room so she can witness to pt stating "therapist hurt her right arm  while assisting to recliner chair". Charge nurse present. Old bruising noted to right arm. I asked sitter what happened and she replied she (pt) had to go under her arms to keep her from falling on floor.   " Labs today

## 2024-08-10 NOTE — PLAN OF CARE
Problem: Adult Inpatient Plan of Care  Goal: Plan of Care Review  Outcome: Ongoing, Progressing  Goal: Patient-Specific Goal (Individualized)  Outcome: Ongoing, Progressing  Goal: Absence of Hospital-Acquired Illness or Injury  Outcome: Ongoing, Progressing  Goal: Optimal Comfort and Wellbeing  Outcome: Ongoing, Progressing  Goal: Readiness for Transition of Care  Outcome: Ongoing, Progressing     Problem: Impaired Wound Healing  Goal: Optimal Wound Healing  Outcome: Ongoing, Progressing     Problem: Skin Injury Risk Increased  Goal: Skin Health and Integrity  Outcome: Ongoing, Progressing     Problem: Fall Injury Risk  Goal: Absence of Fall and Fall-Related Injury  Outcome: Ongoing, Progressing     Problem: Infection  Goal: Absence of Infection Signs and Symptoms  Outcome: Ongoing, Progressing      As mentioned you do have a small blood clot.  This does not explain your symptoms.  If you develop any worsening pain, chest pain, shortness of breath, or new and concerning symptoms return to the emergency department immediately.  Even if doing better it is importantly follow-up with your primary care doctor.  Please continue to take your anticoagulant/Xarelto as previously prescribed.  Do not miss any doses.

## (undated) DEVICE — SOL IRRIGATION SALINE 0.9% 1000ML BOTTLE

## (undated) DEVICE — BLADE SAW SGTTL 18.6X.8X73.8MM

## (undated) DEVICE — GOWN TOGA SYS PEELWY ZIP 2 XL

## (undated) DEVICE — GOWN TOGA SZ LG (DR. P)

## (undated) DEVICE — TOWEL OR BLUE STRL 16X26 4/PK

## (undated) DEVICE — SKIN STAPLER PMR35

## (undated) DEVICE — DRAPE THREE-QUARTER 53X77IN

## (undated) DEVICE — SPONGE GAUZE 4X4 12 PLY STL AMD 10/TRAY

## (undated) DEVICE — Device

## (undated) DEVICE — GLOVE BIOGEL SKINSENSE PI 7.5

## (undated) DEVICE — GLOVE SURGICAL PROTEXIS PI CLASSIC SIZE 8.5

## (undated) DEVICE — DRESSING AQUACEL FOAM RECT 6X6

## (undated) DEVICE — SOL NACL IRR 1000ML BTL

## (undated) DEVICE — SPONGE COTTON WOVEN 4X4IN

## (undated) DEVICE — ELECTRODE REM POLYHESIVE II

## (undated) DEVICE — DRAPE INCISE IOBAN 2 13X13IN

## (undated) DEVICE — GLOVE 7.5 PROTEXIS PI BLUE

## (undated) DEVICE — BLADE CARBON SURG SS SZ 15 STERILE

## (undated) DEVICE — GLOVE SURGICAL PROTEXIS PI BLUE SIZE 6.5

## (undated) DEVICE — KIT TOTAL KNEE RUSH

## (undated) DEVICE — STAPLER SKIN PROXIMATE PLUS MD 35 REG DISP

## (undated) DEVICE — SUT VICRYL CTD 1 27IN CP

## (undated) DEVICE — SUCTION KAMVAC TUBE MINI STL

## (undated) DEVICE — GLOVE SURGICAL PROTEXIS PI BLUE SIZE 7.5

## (undated) DEVICE — APPLICATOR CHLORAPREP HI-LITE TINTED ORANGE 26ML

## (undated) DEVICE — SPACESUIT TOGA T5 ZIPPER PEEL

## (undated) DEVICE — IMP GUIDEWIRE BALL TIP .03X1000
Type: IMPLANTABLE DEVICE | Site: HIP | Status: NON-FUNCTIONAL
Removed: 2021-09-21

## (undated) DEVICE — TUBE SUCTION KAMVAC MINI 20/BX

## (undated) DEVICE — GLOVE SURGICAL PROTEXIS PI CLASSIC SIZE 6.5

## (undated) DEVICE — FILM IOBAN ANTIMICROBL 35X35CM

## (undated) DEVICE — SUTURE VICRYL 2-0 UNDYED CT-1 ANTI

## (undated) DEVICE — APPLICATOR CHLORAPREP ORN 26ML

## (undated) DEVICE — SUTURE VICRYL 1 CP UD 27IN

## (undated) DEVICE — GLOVE SURGICAL PROTEXIS PI CLASSIC SIZE 7.5

## (undated) DEVICE — UNDERGLOVE BIOGEL PI SZ 5.5

## (undated) DEVICE — GLOVE 8 PROTEXIS PI BLUE

## (undated) DEVICE — CDS HIP TOTAL

## (undated) DEVICE — NEEDL1/2 CIRCLE TROCAR PIINT MAYO CATGUT .056X1.95 STERILE

## (undated) DEVICE — GLOVE 6.0 PROTEXIS PI BLUE

## (undated) DEVICE — SHAFT MODIFIED HALL MODULAR 450MM

## (undated) DEVICE — SUT VICRYL 2-0 36 CT-1

## (undated) DEVICE — DRESSING AQUACEL A/G ADVANTAGE ANTIMICROBIAL 6 X 6IN

## (undated) DEVICE — SUT 4-0 ETHILON 18 PS-2

## (undated) DEVICE — GLOVE BIOGEL SKINSENSE PI 6.5

## (undated) DEVICE — GLOVE PROTEXIS BLUE LATEX 6.5

## (undated) DEVICE — GLOVE 6.5 PROTEXIS PI BLUE

## (undated) DEVICE — BLADE SURG #15 CARBON STEEL